# Patient Record
Sex: MALE | Race: WHITE | NOT HISPANIC OR LATINO | Employment: OTHER | URBAN - METROPOLITAN AREA
[De-identification: names, ages, dates, MRNs, and addresses within clinical notes are randomized per-mention and may not be internally consistent; named-entity substitution may affect disease eponyms.]

---

## 2018-11-10 PROCEDURE — 83036 HEMOGLOBIN GLYCOSYLATED A1C: CPT

## 2018-11-12 ENCOUNTER — LAB REQUISITION (OUTPATIENT)
Dept: LAB | Facility: HOSPITAL | Age: 78
End: 2018-11-12

## 2018-11-12 DIAGNOSIS — Z00.00 ROUTINE GENERAL MEDICAL EXAMINATION AT A HEALTH CARE FACILITY: ICD-10-CM

## 2018-11-12 LAB — HBA1C MFR BLD: 6 % (ref 4.8–5.6)

## 2019-08-20 ENCOUNTER — OFFICE (OUTPATIENT)
Dept: URBAN - METROPOLITAN AREA CLINIC 64 | Facility: CLINIC | Age: 79
End: 2019-08-20

## 2019-08-20 VITALS
HEART RATE: 60 BPM | DIASTOLIC BLOOD PRESSURE: 97 MMHG | SYSTOLIC BLOOD PRESSURE: 161 MMHG | HEIGHT: 70 IN | WEIGHT: 183 LBS

## 2019-08-20 DIAGNOSIS — R19.4 CHANGE IN BOWEL HABIT: ICD-10-CM

## 2019-08-20 DIAGNOSIS — K22.70 BARRETT'S ESOPHAGUS WITHOUT DYSPLASIA: ICD-10-CM

## 2019-08-20 DIAGNOSIS — R93.3 ABNORMAL FINDINGS ON DIAGNOSTIC IMAGING OF OTHER PARTS OF DI: ICD-10-CM

## 2019-08-20 DIAGNOSIS — R14.0 ABDOMINAL DISTENSION (GASEOUS): ICD-10-CM

## 2019-08-20 PROCEDURE — 99204 OFFICE O/P NEW MOD 45 MIN: CPT | Performed by: INTERNAL MEDICINE

## 2019-08-20 RX ORDER — POLYETHYLENE GLYCOL 3350 17 G/17G
POWDER, FOR SOLUTION ORAL
Qty: 1 | Refills: 3 | Status: COMPLETED
Start: 2019-08-20 | End: 2019-11-08

## 2019-10-15 RX ORDER — ESCITALOPRAM OXALATE 10 MG/1
10 TABLET ORAL DAILY
COMMUNITY

## 2019-10-15 RX ORDER — HYDRALAZINE HYDROCHLORIDE 25 MG/1
25 TABLET, FILM COATED ORAL 2 TIMES DAILY PRN
COMMUNITY
End: 2020-09-16

## 2019-10-15 RX ORDER — FLUTICASONE PROPIONATE 50 MCG
1 SPRAY, SUSPENSION (ML) NASAL
Status: ON HOLD | COMMUNITY
End: 2022-08-18

## 2019-10-15 RX ORDER — ROSUVASTATIN CALCIUM 20 MG/1
20 TABLET, COATED ORAL EVERY EVENING
COMMUNITY

## 2019-10-15 RX ORDER — PREDNISONE 1 MG/1
5 TABLET ORAL DAILY
COMMUNITY

## 2019-10-15 RX ORDER — PROMETHAZINE HYDROCHLORIDE 25 MG/1
25 TABLET ORAL EVERY 6 HOURS PRN
COMMUNITY
End: 2020-03-04 | Stop reason: SDUPTHER

## 2019-10-15 RX ORDER — TAMSULOSIN HYDROCHLORIDE 0.4 MG/1
1 CAPSULE ORAL NIGHTLY
COMMUNITY
End: 2020-05-14 | Stop reason: HOSPADM

## 2019-10-15 RX ORDER — BRIMONIDINE TARTRATE 2 MG/ML
1 SOLUTION/ DROPS OPHTHALMIC NIGHTLY
COMMUNITY
End: 2020-03-04 | Stop reason: SDUPTHER

## 2019-10-15 RX ORDER — ASPIRIN 81 MG/1
81 TABLET ORAL DAILY
COMMUNITY
End: 2020-03-04

## 2019-10-15 RX ORDER — LEVOTHYROXINE SODIUM 0.03 MG/1
25 TABLET ORAL
COMMUNITY

## 2019-10-15 RX ORDER — LORATADINE 10 MG/1
10 TABLET ORAL DAILY
Status: ON HOLD | COMMUNITY
End: 2022-08-18

## 2019-10-15 RX ORDER — GALANTAMINE HYDROBROMIDE 8 MG/1
8 CAPSULE, EXTENDED RELEASE ORAL 2 TIMES DAILY
COMMUNITY
End: 2021-04-26

## 2019-10-15 RX ORDER — METOPROLOL SUCCINATE 50 MG/1
50 TABLET, EXTENDED RELEASE ORAL NIGHTLY
COMMUNITY
End: 2020-03-04 | Stop reason: SDUPTHER

## 2019-10-15 RX ORDER — PANTOPRAZOLE SODIUM 40 MG/1
40 TABLET, DELAYED RELEASE ORAL 2 TIMES DAILY
Status: ON HOLD | COMMUNITY
End: 2022-08-18

## 2019-10-16 ENCOUNTER — ANESTHESIA EVENT (OUTPATIENT)
Dept: GASTROENTEROLOGY | Facility: HOSPITAL | Age: 79
End: 2019-10-16

## 2019-10-17 ENCOUNTER — HOSPITAL ENCOUNTER (OUTPATIENT)
Facility: HOSPITAL | Age: 79
Setting detail: HOSPITAL OUTPATIENT SURGERY
Discharge: HOME | End: 2019-10-17
Attending: INTERNAL MEDICINE | Admitting: INTERNAL MEDICINE

## 2019-10-17 ENCOUNTER — ANESTHESIA (OUTPATIENT)
Dept: GASTROENTEROLOGY | Facility: HOSPITAL | Age: 79
End: 2019-10-17

## 2019-10-17 ENCOUNTER — ON CAMPUS - OUTPATIENT (OUTPATIENT)
Dept: URBAN - METROPOLITAN AREA HOSPITAL 85 | Facility: HOSPITAL | Age: 79
End: 2019-10-17

## 2019-10-17 VITALS
SYSTOLIC BLOOD PRESSURE: 123 MMHG | DIASTOLIC BLOOD PRESSURE: 80 MMHG | HEIGHT: 70 IN | HEART RATE: 63 BPM | BODY MASS INDEX: 26.16 KG/M2 | WEIGHT: 182.76 LBS | RESPIRATION RATE: 14 BRPM | TEMPERATURE: 98.1 F | OXYGEN SATURATION: 96 %

## 2019-10-17 DIAGNOSIS — R93.89 ABNORMAL FINDING ON CT SCAN: ICD-10-CM

## 2019-10-17 DIAGNOSIS — K44.9 DIAPHRAGMATIC HERNIA WITHOUT OBSTRUCTION OR GANGRENE: ICD-10-CM

## 2019-10-17 DIAGNOSIS — R93.3 ABNORMAL FINDINGS ON DIAGNOSTIC IMAGING OF OTHER PARTS OF DI: ICD-10-CM

## 2019-10-17 DIAGNOSIS — K21.0 GASTRO-ESOPHAGEAL REFLUX DISEASE WITH ESOPHAGITIS: ICD-10-CM

## 2019-10-17 DIAGNOSIS — K22.70 BARRETT'S ESOPHAGUS WITHOUT DYSPLASIA: ICD-10-CM

## 2019-10-17 DIAGNOSIS — K22.10 ULCER OF ESOPHAGUS WITHOUT BLEEDING: ICD-10-CM

## 2019-10-17 PROCEDURE — 43239 EGD BIOPSY SINGLE/MULTIPLE: CPT | Performed by: INTERNAL MEDICINE

## 2019-10-17 PROCEDURE — 88305 TISSUE EXAM BY PATHOLOGIST: CPT | Performed by: INTERNAL MEDICINE

## 2019-10-17 PROCEDURE — 25010000002 PROPOFOL 10 MG/ML EMULSION: Performed by: ANESTHESIOLOGY

## 2019-10-17 PROCEDURE — 88312 SPECIAL STAINS GROUP 1: CPT | Performed by: INTERNAL MEDICINE

## 2019-10-17 RX ORDER — PROPOFOL 10 MG/ML
VIAL (ML) INTRAVENOUS AS NEEDED
Status: DISCONTINUED | OUTPATIENT
Start: 2019-10-17 | End: 2019-10-17 | Stop reason: SURG

## 2019-10-17 RX ORDER — FLUMAZENIL 0.1 MG/ML
0.2 INJECTION INTRAVENOUS AS NEEDED
Status: DISCONTINUED | OUTPATIENT
Start: 2019-10-17 | End: 2019-10-17 | Stop reason: HOSPADM

## 2019-10-17 RX ORDER — PROMETHAZINE HYDROCHLORIDE 25 MG/ML
6.25 INJECTION, SOLUTION INTRAMUSCULAR; INTRAVENOUS ONCE AS NEEDED
Status: DISCONTINUED | OUTPATIENT
Start: 2019-10-17 | End: 2019-10-17 | Stop reason: HOSPADM

## 2019-10-17 RX ORDER — SODIUM CHLORIDE 0.9 % (FLUSH) 0.9 %
3 SYRINGE (ML) INJECTION EVERY 12 HOURS SCHEDULED
Status: DISCONTINUED | OUTPATIENT
Start: 2019-10-17 | End: 2019-10-17 | Stop reason: HOSPADM

## 2019-10-17 RX ORDER — SODIUM CHLORIDE 9 MG/ML
9 INJECTION, SOLUTION INTRAVENOUS CONTINUOUS PRN
Status: DISCONTINUED | OUTPATIENT
Start: 2019-10-17 | End: 2019-10-17 | Stop reason: HOSPADM

## 2019-10-17 RX ORDER — PROMETHAZINE HYDROCHLORIDE 25 MG/1
25 SUPPOSITORY RECTAL ONCE AS NEEDED
Status: DISCONTINUED | OUTPATIENT
Start: 2019-10-17 | End: 2019-10-17 | Stop reason: HOSPADM

## 2019-10-17 RX ORDER — LABETALOL HYDROCHLORIDE 5 MG/ML
5 INJECTION, SOLUTION INTRAVENOUS
Status: DISCONTINUED | OUTPATIENT
Start: 2019-10-17 | End: 2019-10-17 | Stop reason: HOSPADM

## 2019-10-17 RX ORDER — MEPERIDINE HYDROCHLORIDE 25 MG/ML
12.5 INJECTION INTRAMUSCULAR; INTRAVENOUS; SUBCUTANEOUS
Status: DISCONTINUED | OUTPATIENT
Start: 2019-10-17 | End: 2019-10-17 | Stop reason: HOSPADM

## 2019-10-17 RX ORDER — LIDOCAINE HYDROCHLORIDE 10 MG/ML
INJECTION, SOLUTION EPIDURAL; INFILTRATION; INTRACAUDAL; PERINEURAL AS NEEDED
Status: DISCONTINUED | OUTPATIENT
Start: 2019-10-17 | End: 2019-10-17 | Stop reason: SURG

## 2019-10-17 RX ORDER — ACETAMINOPHEN 650 MG/1
650 SUPPOSITORY RECTAL ONCE AS NEEDED
Status: DISCONTINUED | OUTPATIENT
Start: 2019-10-17 | End: 2019-10-17 | Stop reason: HOSPADM

## 2019-10-17 RX ORDER — NALOXONE HCL 0.4 MG/ML
0.4 VIAL (ML) INJECTION AS NEEDED
Status: DISCONTINUED | OUTPATIENT
Start: 2019-10-17 | End: 2019-10-17 | Stop reason: HOSPADM

## 2019-10-17 RX ORDER — SODIUM CHLORIDE 0.9 % (FLUSH) 0.9 %
10 SYRINGE (ML) INJECTION AS NEEDED
Status: DISCONTINUED | OUTPATIENT
Start: 2019-10-17 | End: 2019-10-17 | Stop reason: HOSPADM

## 2019-10-17 RX ORDER — ACETAMINOPHEN 325 MG/1
650 TABLET ORAL ONCE AS NEEDED
Status: DISCONTINUED | OUTPATIENT
Start: 2019-10-17 | End: 2019-10-17 | Stop reason: HOSPADM

## 2019-10-17 RX ORDER — ONDANSETRON 2 MG/ML
4 INJECTION INTRAMUSCULAR; INTRAVENOUS ONCE AS NEEDED
Status: DISCONTINUED | OUTPATIENT
Start: 2019-10-17 | End: 2019-10-17 | Stop reason: HOSPADM

## 2019-10-17 RX ORDER — ONDANSETRON 2 MG/ML
4 INJECTION INTRAMUSCULAR; INTRAVENOUS ONCE AS NEEDED
Status: DISCONTINUED | OUTPATIENT
Start: 2019-10-17 | End: 2019-10-17

## 2019-10-17 RX ORDER — PHENYLEPHRINE HCL IN 0.9% NACL 0.5 MG/5ML
.5-3 SYRINGE (ML) INTRAVENOUS
Status: DISCONTINUED | OUTPATIENT
Start: 2019-10-17 | End: 2019-10-17 | Stop reason: HOSPADM

## 2019-10-17 RX ORDER — HYDRALAZINE HYDROCHLORIDE 20 MG/ML
5 INJECTION INTRAMUSCULAR; INTRAVENOUS
Status: DISCONTINUED | OUTPATIENT
Start: 2019-10-17 | End: 2019-10-17 | Stop reason: HOSPADM

## 2019-10-17 RX ORDER — PROMETHAZINE HYDROCHLORIDE 25 MG/1
25 TABLET ORAL ONCE AS NEEDED
Status: DISCONTINUED | OUTPATIENT
Start: 2019-10-17 | End: 2019-10-17 | Stop reason: HOSPADM

## 2019-10-17 RX ORDER — SODIUM CHLORIDE 0.9 % (FLUSH) 0.9 %
3-10 SYRINGE (ML) INJECTION AS NEEDED
Status: DISCONTINUED | OUTPATIENT
Start: 2019-10-17 | End: 2019-10-17 | Stop reason: HOSPADM

## 2019-10-17 RX ADMIN — SODIUM CHLORIDE 9 ML/HR: 0.9 INJECTION, SOLUTION INTRAVENOUS at 10:18

## 2019-10-17 RX ADMIN — PROPOFOL 50 MG: 10 INJECTION, EMULSION INTRAVENOUS at 10:25

## 2019-10-17 RX ADMIN — PROPOFOL 50 MG: 10 INJECTION, EMULSION INTRAVENOUS at 10:20

## 2019-10-17 RX ADMIN — LIDOCAINE HYDROCHLORIDE 50 MG: 10 INJECTION, SOLUTION EPIDURAL; INFILTRATION; INTRACAUDAL; PERINEURAL at 10:20

## 2019-10-17 RX ADMIN — PROPOFOL 50 MG: 10 INJECTION, EMULSION INTRAVENOUS at 10:31

## 2019-10-17 NOTE — H&P
Patient Care Team:  Maude Ricketts APRN as PCP - General      Subjective .     History of present illness:    Jayden Bond is a 79 y.o. male who presents today for Procedure(s):  ESOPHAGOGASTRODUODENOSCOPY for the indications listed below.     The updated Patient Profile was reviewed prior to the procedure, in conjunction with the Physical Exam, including medical conditions, surgical procedures, medications, allergies, family history and social history.     Pre-operatively, I reviewed the indication(s) for the procedure, the risks of the procedure [including but not limited to: unexpected bleeding possibly requiring hospitalization and/or unplanned repeat procedures, perforation possibly requiring surgical treatment, missed lesions and complications of sedation/MAC (also explained by anesthesia staff)].     I have evaluated the patient for risks associated with the planned anesthesia and the procedure to be performed and find the patient an acceptable candidate for IV sedation.    Multiple opportunities were provided for any questions or concerns, and all questions were answered satisfactorily before any anesthesia was administered. We will proceed with the planned procedure.      ASSESSMENT/PLAN:  egd      Past Medical History:  Past Medical History:   Diagnosis Date   • Coronary artery disease     [facility]   • Dementia (CMS/HCC)    • Depression    • Disease of thyroid gland    • GERD (gastroesophageal reflux disease)    • Head pain    • Hyperlipidemia    • Hypertension    • Stroke (CMS/HCC)     x 3 in 2018       Past Surgical History:  Past Surgical History:   Procedure Laterality Date   • APPENDECTOMY     • CHOLECYSTECTOMY     • HERNIA REPAIR     • PACEMAKER IMPLANTATION     • TEMPORAL ARTERY BIOPSY     • WRIST SURGERY      severed artery       Social History:  Social History     Tobacco Use   • Smoking status: Former Smoker   • Smokeless tobacco: Never Used   • Tobacco comment: quit 25 yrs ago   Substance Use  Topics   • Alcohol use: No     Frequency: Never   • Drug use: No       Family History:  History reviewed. No pertinent family history.    Medications:  Medications Prior to Admission   Medication Sig Dispense Refill Last Dose   • apixaban (ELIQUIS) 5 MG tablet tablet Take 5 mg by mouth 2 (Two) Times a Day.      • aspirin 81 MG EC tablet Take 81 mg by mouth Daily.      • brimonidine (ALPHAGAN) 0.2 % ophthalmic solution Administer 1 drop to both eyes Every Night.      • escitalopram (LEXAPRO) 10 MG tablet Take 10 mg by mouth Daily.      • fluticasone (FLONASE) 50 MCG/ACT nasal spray 2 sprays into the nostril(s) as directed by provider Daily.      • galantamine ER (RAZADYNE ER) 8 MG 24 hr capsule Take 8 mg by mouth 2 (Two) Times a Day.      • hydrALAZINE (APRESOLINE) 50 MG tablet Take 50 mg by mouth 2 (Two) Times a Day.      • levothyroxine (SYNTHROID, LEVOTHROID) 25 MCG tablet Take 25 mcg by mouth Daily.      • loratadine (CLARITIN) 10 MG tablet Take 10 mg by mouth Daily.      • metoprolol succinate XL (TOPROL-XL) 50 MG 24 hr tablet Take 50 mg by mouth Every Night.      • pantoprazole (PROTONIX) 40 MG EC tablet Take 40 mg by mouth Daily.      • predniSONE (DELTASONE) 5 MG tablet Take 5 mg by mouth Daily.      • promethazine (PHENERGAN) 25 MG tablet Take 25 mg by mouth Every 6 (Six) Hours As Needed for Nausea or Vomiting.      • rosuvastatin (CRESTOR) 20 MG tablet Take 20 mg by mouth Every Night.      • tamsulosin (FLOMAX) 0.4 MG capsule 24 hr capsule Take 1 capsule by mouth Every Night.          Scheduled Meds:  sodium chloride 3 mL Intravenous Q12H     Continuous Infusions:   PRN Meds:.•  sodium chloride    ALLERGIES:  Doxycycline        Objective     Vital Signs:        Physical Exam:      General Appearance:    Awake and alert, in no acute distress   Lungs:     Clear to auscultation bilaterally, respirations regular, even and unlabored    Heart:    Regular rhythm and normal rate, normal S1 and S2, no             murmur, no gallop, no rub   Abdomen:     Normal bowel sounds, soft, non-tender, no rebound or guarding, non-distended, no hepatosplenomegaly        Results Review:   I reviewed the patient's labs and imaging.  Lab Results (last 24 hours)     ** No results found for the last 24 hours. **          Imaging Results (last 24 hours)     ** No results found for the last 24 hours. **             I discussed the patients findings and my recommendations with the patient.  Ashok Sanchez MD  10/17/19  9:27 AM

## 2019-10-17 NOTE — ANESTHESIA POSTPROCEDURE EVALUATION
Patient: Jayden Bond    Procedure Summary     Date:  10/17/19 Room / Location:  [facility] ENDOSCOPY 1 / [facility] ENDOSCOPY    Anesthesia Start:  1012 Anesthesia Stop:  1040    Procedure:  ESOPHAGOGASTRODUODENOSCOPY with biopsy x 2 areas (N/A ) Diagnosis:  (BARRETTS ESOPHAGUS, CHANGE IN BOWEL HABITS, BLOATING)    Surgeon:  Ashok Sanchez MD Provider:  Shira Hung MD    Anesthesia Type:  MAC ASA Status:  3          Anesthesia Type: MAC  Last vitals  BP   (!) 184/99 (10/17/19 0946)   Temp   98.1 °F (36.7 °C) (10/17/19 0946)   Pulse   61 (10/17/19 0946)   Resp   14 (10/17/19 0946)     SpO2         Post Anesthesia Care and Evaluation    Patient location during evaluation: PACU  Patient participation: complete - patient participated  Level of consciousness: awake  Pain management: adequate  Airway patency: patent  Anesthetic complications: No anesthetic complications  PONV Status: controlled  Cardiovascular status: acceptable  Respiratory status: acceptable  Hydration status: acceptable  Post Neuraxial Block status: Motor and sensory function returned to baseline

## 2019-10-17 NOTE — OP NOTE
ESOPHAGOGASTRODUODENOSCOPY Procedure Report    Patient Name:  Jayden Bond  YOB: 1940    Date of Surgery:  10/17/2019     Pre-Op Diagnosis:  BARRETTS ESOPHAGUS, CHANGE IN BOWEL HABITS, BLOATING         Procedure/CPT® Codes:      Procedure(s):  ESOPHAGOGASTRODUODENOSCOPY with biopsy x 2 areas    Staff:  Surgeon(s):  Ashok Sanchez MD      Anesthesia: Monitored Anesthesia Care    Description of Procedure:  A description of the procedure as well as risks, benefits and alternative methods were explained to the patient who voiced understanding and signed the corresponding consent form. A physical exam was performed and vital signs were monitored throughout the procedure.    An upper GI endoscope was placed into the mouth and proceeded through the esophagus, stomach and second portion of the duodenum without difficulty. The scope was then retroflexed and the fundus was visualized. The procedure was not difficult and there were no immediate complications.  From 42-37 hiatal hernia was noticed, at the GE junction there was a close to 8 mm ulcer which was biopsied.  Very short segment of Rapp's was noticed less than 1 cm which was biopsied separately.  Rest of the esophageal mucosa looks normal.  Gastric mucosa looks normal fundus body antrum area except there is a slight deformity with erosion in the antral region.  There was no obvious ulcer noticed in the pyloric channel.  First and the second part of the duodenum also looks normal without any ulceration.  Subsequently, side-viewing ERCP scope was advanced to evaluate ampulla given CT findings.  No obvious mass was seen around the ampulla.  No other mass was seen in the second part of the duodenum with a side-viewing scope as well.  Patient tolerated procedure very well no immediate complication was noticed.    Impression:  1.  No obvious duodenal mass.  No obvious ampullary mass noticed with a side-viewing scope.  2.  Short segment of Rapp's was  biopsied.  3.  Small ulcer was seen at the GE junction area that was separately biopsied.    Recommendations:  Follow the biopsy result.  Patient will be placed on a PPI p.o. daily.  Follow the GI clinic as scheduled.  Continue with the fiber supplement.  Follow up with GI clinic as needed  Follow up with PCP as scheduled  Follow up with biopsy report      Ashok Sanchez MD     Date: 10/17/2019    Time: 10:39 AM

## 2019-10-17 NOTE — ANESTHESIA PREPROCEDURE EVALUATION
Anesthesia Evaluation     Patient summary reviewed and Nursing notes reviewed   no history of anesthetic complications:  NPO Solid Status: > 8 hours  NPO Liquid Status: > 8 hours           Airway   Mallampati: II  TM distance: >3 FB  Neck ROM: full  No difficulty expected  Dental      Pulmonary - negative pulmonary ROS    breath sounds clear to auscultation  Cardiovascular     ECG reviewed  Rhythm: regular  Rate: normal    (+) pacemaker pacemaker, hypertension 2 medications or greater, CAD, hyperlipidemia,       Neuro/Psych  (+) CVA, psychiatric history Depression, dementia,     GI/Hepatic/Renal/Endo    (+)  GERD poorly controlled,  hypothyroidism,     Musculoskeletal (-) negative ROS    Abdominal     Abdomen: soft.   Substance History - negative use     OB/GYN negative ob/gyn ROS         Other - negative ROS                       Anesthesia Plan    ASA 3     MAC     intravenous induction   Anesthetic plan, all risks, benefits, and alternatives have been provided, discussed and informed consent has been obtained with: patient.

## 2019-10-18 LAB
LAB AP CASE REPORT: NORMAL
LAB AP CLINICAL INFORMATION: NORMAL
LAB AP DIAGNOSIS COMMENT: NORMAL
PATH REPORT.FINAL DX SPEC: NORMAL
PATH REPORT.GROSS SPEC: NORMAL

## 2019-11-08 ENCOUNTER — OFFICE (OUTPATIENT)
Dept: URBAN - METROPOLITAN AREA CLINIC 64 | Facility: CLINIC | Age: 79
End: 2019-11-08

## 2019-11-08 VITALS
DIASTOLIC BLOOD PRESSURE: 83 MMHG | HEART RATE: 64 BPM | SYSTOLIC BLOOD PRESSURE: 126 MMHG | WEIGHT: 184 LBS | HEIGHT: 70 IN

## 2019-11-08 DIAGNOSIS — R14.0 ABDOMINAL DISTENSION (GASEOUS): ICD-10-CM

## 2019-11-08 DIAGNOSIS — K21.9 GASTRO-ESOPHAGEAL REFLUX DISEASE WITHOUT ESOPHAGITIS: ICD-10-CM

## 2019-11-08 DIAGNOSIS — R53.83 OTHER FATIGUE: ICD-10-CM

## 2019-11-08 DIAGNOSIS — I10 ESSENTIAL (PRIMARY) HYPERTENSION: ICD-10-CM

## 2019-11-08 PROCEDURE — 99214 OFFICE O/P EST MOD 30 MIN: CPT | Performed by: INTERNAL MEDICINE

## 2020-02-03 ENCOUNTER — APPOINTMENT (OUTPATIENT)
Dept: GENERAL RADIOLOGY | Facility: HOSPITAL | Age: 80
End: 2020-02-03

## 2020-02-03 ENCOUNTER — HOSPITAL ENCOUNTER (EMERGENCY)
Facility: HOSPITAL | Age: 80
End: 2020-02-03

## 2020-02-03 ENCOUNTER — HOSPITAL ENCOUNTER (OUTPATIENT)
Facility: HOSPITAL | Age: 80
Setting detail: OBSERVATION
Discharge: HOME | End: 2020-02-04
Attending: FAMILY MEDICINE | Admitting: FAMILY MEDICINE

## 2020-02-03 DIAGNOSIS — R07.9 CHEST PAIN, UNSPECIFIED TYPE: Primary | ICD-10-CM

## 2020-02-03 LAB
ALBUMIN SERPL-MCNC: 4.1 G/DL (ref 3.5–5.2)
ALBUMIN/GLOB SERPL: 1.4 G/DL
ALP SERPL-CCNC: 75 U/L (ref 39–117)
ALT SERPL W P-5'-P-CCNC: 17 U/L (ref 1–41)
ANION GAP SERPL CALCULATED.3IONS-SCNC: 11 MMOL/L (ref 5–15)
ANION GAP SERPL CALCULATED.3IONS-SCNC: 12 MMOL/L (ref 5–15)
AST SERPL-CCNC: 22 U/L (ref 1–40)
BASOPHILS # BLD AUTO: 0.1 10*3/MM3 (ref 0–0.2)
BASOPHILS NFR BLD AUTO: 0.5 % (ref 0–1.5)
BILIRUB SERPL-MCNC: 0.4 MG/DL (ref 0.2–1.2)
BUN BLD-MCNC: 11 MG/DL (ref 8–23)
BUN BLD-MCNC: 12 MG/DL (ref 8–23)
BUN/CREAT SERPL: 10.5 (ref 7–25)
BUN/CREAT SERPL: 8.9 (ref 7–25)
CALCIUM SPEC-SCNC: 8.9 MG/DL (ref 8.6–10.5)
CALCIUM SPEC-SCNC: 9.5 MG/DL (ref 8.6–10.5)
CHLORIDE SERPL-SCNC: 103 MMOL/L (ref 98–107)
CHLORIDE SERPL-SCNC: 105 MMOL/L (ref 98–107)
CHOLEST SERPL-MCNC: 103 MG/DL (ref 0–200)
CO2 SERPL-SCNC: 26 MMOL/L (ref 22–29)
CO2 SERPL-SCNC: 27 MMOL/L (ref 22–29)
CREAT BLD-MCNC: 1.14 MG/DL (ref 0.76–1.27)
CREAT BLD-MCNC: 1.24 MG/DL (ref 0.76–1.27)
D DIMER PPP FEU-MCNC: 0.27 MCGFEU/ML (ref 0.17–0.59)
DEPRECATED RDW RBC AUTO: 44.2 FL (ref 37–54)
EOSINOPHIL # BLD AUTO: 0.1 10*3/MM3 (ref 0–0.4)
EOSINOPHIL NFR BLD AUTO: 1.1 % (ref 0.3–6.2)
ERYTHROCYTE [DISTWIDTH] IN BLOOD BY AUTOMATED COUNT: 14 % (ref 12.3–15.4)
GFR SERPL CREATININE-BSD FRML MDRD: 56 ML/MIN/1.73
GFR SERPL CREATININE-BSD FRML MDRD: 62 ML/MIN/1.73
GLOBULIN UR ELPH-MCNC: 2.9 GM/DL
GLUCOSE BLD-MCNC: 129 MG/DL (ref 65–99)
GLUCOSE BLD-MCNC: 86 MG/DL (ref 65–99)
HCT VFR BLD AUTO: 42.5 % (ref 37.5–51)
HDLC SERPL-MCNC: 46 MG/DL (ref 40–60)
HGB BLD-MCNC: 14.5 G/DL (ref 13–17.7)
LDLC SERPL CALC-MCNC: 26 MG/DL (ref 0–100)
LDLC/HDLC SERPL: 0.57 {RATIO}
LIPASE SERPL-CCNC: 37 U/L (ref 13–60)
LYMPHOCYTES # BLD AUTO: 1.4 10*3/MM3 (ref 0.7–3.1)
LYMPHOCYTES NFR BLD AUTO: 14.7 % (ref 19.6–45.3)
MCH RBC QN AUTO: 30.4 PG (ref 26.6–33)
MCHC RBC AUTO-ENTMCNC: 34.1 G/DL (ref 31.5–35.7)
MCV RBC AUTO: 89.2 FL (ref 79–97)
MONOCYTES # BLD AUTO: 0.7 10*3/MM3 (ref 0.1–0.9)
MONOCYTES NFR BLD AUTO: 7.5 % (ref 5–12)
NEUTROPHILS # BLD AUTO: 7.2 10*3/MM3 (ref 1.7–7)
NEUTROPHILS NFR BLD AUTO: 76.2 % (ref 42.7–76)
NRBC BLD AUTO-RTO: 0 /100 WBC (ref 0–0.2)
NT-PROBNP SERPL-MCNC: 166.2 PG/ML (ref 5–1800)
NT-PROBNP SERPL-MCNC: 172.9 PG/ML (ref 5–1800)
PLATELET # BLD AUTO: 268 10*3/MM3 (ref 140–450)
PMV BLD AUTO: 7.9 FL (ref 6–12)
POTASSIUM BLD-SCNC: 4.1 MMOL/L (ref 3.5–5.2)
POTASSIUM BLD-SCNC: 4.1 MMOL/L (ref 3.5–5.2)
PROCALCITONIN SERPL-MCNC: 0.06 NG/ML (ref 0.1–0.25)
PROT SERPL-MCNC: 7 G/DL (ref 6–8.5)
RBC # BLD AUTO: 4.76 10*6/MM3 (ref 4.14–5.8)
SODIUM BLD-SCNC: 142 MMOL/L (ref 136–145)
SODIUM BLD-SCNC: 142 MMOL/L (ref 136–145)
TRIGL SERPL-MCNC: 154 MG/DL (ref 0–150)
TROPONIN T SERPL-MCNC: <0.01 NG/ML (ref 0–0.03)
TROPONIN T SERPL-MCNC: <0.01 NG/ML (ref 0–0.03)
VLDLC SERPL-MCNC: 30.8 MG/DL
WBC NRBC COR # BLD: 9.5 10*3/MM3 (ref 3.4–10.8)

## 2020-02-03 PROCEDURE — 36415 COLL VENOUS BLD VENIPUNCTURE: CPT

## 2020-02-03 PROCEDURE — 85379 FIBRIN DEGRADATION QUANT: CPT | Performed by: FAMILY MEDICINE

## 2020-02-03 PROCEDURE — 80053 COMPREHEN METABOLIC PANEL: CPT | Performed by: PHYSICIAN ASSISTANT

## 2020-02-03 PROCEDURE — 83880 ASSAY OF NATRIURETIC PEPTIDE: CPT | Performed by: PHYSICIAN ASSISTANT

## 2020-02-03 PROCEDURE — G0378 HOSPITAL OBSERVATION PER HR: HCPCS

## 2020-02-03 PROCEDURE — 83880 ASSAY OF NATRIURETIC PEPTIDE: CPT | Performed by: FAMILY MEDICINE

## 2020-02-03 PROCEDURE — 71046 X-RAY EXAM CHEST 2 VIEWS: CPT

## 2020-02-03 PROCEDURE — 84484 ASSAY OF TROPONIN QUANT: CPT | Performed by: FAMILY MEDICINE

## 2020-02-03 PROCEDURE — 93005 ELECTROCARDIOGRAM TRACING: CPT

## 2020-02-03 PROCEDURE — 84145 PROCALCITONIN (PCT): CPT | Performed by: FAMILY MEDICINE

## 2020-02-03 PROCEDURE — 99284 EMERGENCY DEPT VISIT MOD MDM: CPT

## 2020-02-03 PROCEDURE — 84484 ASSAY OF TROPONIN QUANT: CPT | Performed by: PHYSICIAN ASSISTANT

## 2020-02-03 PROCEDURE — 80048 BASIC METABOLIC PNL TOTAL CA: CPT | Performed by: FAMILY MEDICINE

## 2020-02-03 PROCEDURE — 83690 ASSAY OF LIPASE: CPT | Performed by: PHYSICIAN ASSISTANT

## 2020-02-03 PROCEDURE — 93005 ELECTROCARDIOGRAM TRACING: CPT | Performed by: FAMILY MEDICINE

## 2020-02-03 PROCEDURE — 80061 LIPID PANEL: CPT | Performed by: FAMILY MEDICINE

## 2020-02-03 PROCEDURE — 85025 COMPLETE CBC W/AUTO DIFF WBC: CPT | Performed by: PHYSICIAN ASSISTANT

## 2020-02-03 RX ORDER — LEVOTHYROXINE SODIUM 0.03 MG/1
25 TABLET ORAL
COMMUNITY
End: 2020-03-04 | Stop reason: SDUPTHER

## 2020-02-03 RX ORDER — CLOPIDOGREL BISULFATE 75 MG/1
75 TABLET ORAL DAILY
Status: ON HOLD | COMMUNITY
End: 2020-02-04

## 2020-02-03 RX ORDER — CALCIUM CARBONATE 200(500)MG
2 TABLET,CHEWABLE ORAL 2 TIMES DAILY PRN
Status: DISCONTINUED | OUTPATIENT
Start: 2020-02-03 | End: 2020-02-04 | Stop reason: HOSPADM

## 2020-02-03 RX ORDER — MULTIPLE VITAMINS W/ MINERALS TAB 9MG-400MCG
1 TAB ORAL NIGHTLY
COMMUNITY

## 2020-02-03 RX ORDER — ESCITALOPRAM OXALATE 10 MG/1
10 TABLET ORAL DAILY
COMMUNITY
End: 2020-03-04 | Stop reason: SDUPTHER

## 2020-02-03 RX ORDER — PROMETHAZINE HYDROCHLORIDE 25 MG/1
25 TABLET ORAL EVERY 6 HOURS PRN
COMMUNITY
End: 2020-03-06 | Stop reason: HOSPADM

## 2020-02-03 RX ORDER — SODIUM CHLORIDE 0.9 % (FLUSH) 0.9 %
10 SYRINGE (ML) INJECTION AS NEEDED
Status: DISCONTINUED | OUTPATIENT
Start: 2020-02-03 | End: 2020-02-04 | Stop reason: HOSPADM

## 2020-02-03 RX ORDER — NITROGLYCERIN 0.4 MG/1
0.4 TABLET SUBLINGUAL
COMMUNITY

## 2020-02-03 RX ORDER — PREDNISONE 1 MG/1
5 TABLET ORAL DAILY
COMMUNITY
End: 2020-03-04 | Stop reason: SDUPTHER

## 2020-02-03 RX ORDER — FLUTICASONE PROPIONATE 50 MCG
1 SPRAY, SUSPENSION (ML) NASAL DAILY
COMMUNITY
End: 2020-03-04 | Stop reason: SDUPTHER

## 2020-02-03 RX ORDER — BRIMONIDINE TARTRATE 2 MG/ML
1 SOLUTION/ DROPS OPHTHALMIC 2 TIMES DAILY
Status: ON HOLD | COMMUNITY
End: 2020-02-04

## 2020-02-03 RX ORDER — ACETAMINOPHEN 325 MG/1
650 TABLET ORAL EVERY 4 HOURS PRN
Status: DISCONTINUED | OUTPATIENT
Start: 2020-02-03 | End: 2020-02-04 | Stop reason: HOSPADM

## 2020-02-03 RX ORDER — LORATADINE 10 MG/1
1 CAPSULE, LIQUID FILLED ORAL DAILY
COMMUNITY
End: 2020-03-04 | Stop reason: SDUPTHER

## 2020-02-03 RX ORDER — CHOLECALCIFEROL (VITAMIN D3) 125 MCG
5 CAPSULE ORAL NIGHTLY PRN
Status: DISCONTINUED | OUTPATIENT
Start: 2020-02-03 | End: 2020-02-04 | Stop reason: HOSPADM

## 2020-02-03 RX ORDER — PANTOPRAZOLE SODIUM 40 MG/1
40 TABLET, DELAYED RELEASE ORAL 2 TIMES DAILY
Status: ON HOLD | COMMUNITY
End: 2020-02-04

## 2020-02-03 RX ORDER — HEPARIN SODIUM 5000 [USP'U]/ML
5000 INJECTION, SOLUTION INTRAVENOUS; SUBCUTANEOUS EVERY 12 HOURS SCHEDULED
Status: DISCONTINUED | OUTPATIENT
Start: 2020-02-04 | End: 2020-02-04

## 2020-02-03 RX ORDER — TAMSULOSIN HYDROCHLORIDE 0.4 MG/1
1 CAPSULE ORAL NIGHTLY
Status: ON HOLD | COMMUNITY
End: 2020-02-04

## 2020-02-03 RX ORDER — TRAMADOL HYDROCHLORIDE 50 MG/1
50 TABLET ORAL EVERY 6 HOURS PRN
Status: DISCONTINUED | OUTPATIENT
Start: 2020-02-03 | End: 2020-02-04 | Stop reason: HOSPADM

## 2020-02-03 RX ORDER — ROSUVASTATIN CALCIUM 20 MG/1
20 TABLET, COATED ORAL NIGHTLY
Status: ON HOLD | COMMUNITY
End: 2020-02-04

## 2020-02-03 RX ORDER — ACETAMINOPHEN 160 MG/5ML
650 SOLUTION ORAL EVERY 4 HOURS PRN
Status: DISCONTINUED | OUTPATIENT
Start: 2020-02-03 | End: 2020-02-04 | Stop reason: HOSPADM

## 2020-02-03 RX ORDER — HYDRALAZINE HYDROCHLORIDE 50 MG/1
50 TABLET, FILM COATED ORAL 2 TIMES DAILY
Status: ON HOLD | COMMUNITY
End: 2020-02-04

## 2020-02-03 RX ORDER — ASPIRIN 81 MG/1
324 TABLET, CHEWABLE ORAL ONCE
Status: COMPLETED | OUTPATIENT
Start: 2020-02-03 | End: 2020-02-03

## 2020-02-03 RX ORDER — GALANTAMINE HYDROBROMIDE 8 MG/1
8 CAPSULE, EXTENDED RELEASE ORAL
COMMUNITY
End: 2020-03-04 | Stop reason: SDUPTHER

## 2020-02-03 RX ORDER — CHLORAL HYDRATE 500 MG
1000 CAPSULE ORAL
COMMUNITY
End: 2021-09-14

## 2020-02-03 RX ORDER — ACETAMINOPHEN 650 MG/1
650 SUPPOSITORY RECTAL EVERY 4 HOURS PRN
Status: DISCONTINUED | OUTPATIENT
Start: 2020-02-03 | End: 2020-02-04 | Stop reason: HOSPADM

## 2020-02-03 RX ORDER — BISACODYL 10 MG
10 SUPPOSITORY, RECTAL RECTAL DAILY PRN
Status: DISCONTINUED | OUTPATIENT
Start: 2020-02-03 | End: 2020-02-04 | Stop reason: HOSPADM

## 2020-02-03 RX ORDER — LANOLIN ALCOHOL/MO/W.PET/CERES
2000 CREAM (GRAM) TOPICAL DAILY
COMMUNITY
End: 2021-10-22

## 2020-02-03 RX ADMIN — ASPIRIN 81 MG 324 MG: 81 TABLET ORAL at 18:32

## 2020-02-03 NOTE — ED PROVIDER NOTES
Subjective   History:  Patient is a 79-year-old male who presents to the ER with chest pain for the last week.  He reports he went to [facility] because he felt like something had moved in his chest a previous stent or his pacemaker.  He was told at [place] that his pacemaker was out of the pocket.  He is been trying to get a hold of his cardiologist at [facility] without success.      Onset: at least 1 week  Location: chest  Duration: constant  Character: dull ache  Aggravating/Alleviating factors: None  Radiation None  Severity: moderate            Review of Systems   Constitutional: Negative for fatigue and fever.   HENT: Negative for congestion.    Respiratory: Negative for cough and shortness of breath.    Cardiovascular: Positive for chest pain. Negative for palpitations and leg swelling.   Gastrointestinal: Negative for abdominal pain, nausea and vomiting.   Genitourinary: Negative.    Musculoskeletal: Negative.    Skin: Negative.    Psychiatric/Behavioral: Negative.        No past medical history on file.    No Known Allergies    No past surgical history on file.    No family history on file.    Social History     Socioeconomic History   • Marital status:      Spouse name: Not on file   • Number of children: Not on file   • Years of education: Not on file   • Highest education level: Not on file           Objective   Physical Exam   Constitutional: He is oriented to person, place, and time. He appears well-developed and well-nourished.   HENT:   Head: Normocephalic and atraumatic.   Eyes: Pupils are equal, round, and reactive to light.   Neck: Normal range of motion.   Cardiovascular: Normal rate and regular rhythm.   No increase in pain with palpation of chest wall   Pulmonary/Chest: Effort normal and breath sounds normal. He has no decreased breath sounds.   Musculoskeletal: Normal range of motion.   Neurological: He is alert and oriented to person, place, and time.   Skin: Skin is warm and  dry.   Psychiatric: He has a normal mood and affect. His behavior is normal.       Procedures           ED Course  ED Course as of Feb 03 2001   Mon Feb 03, 2020   1821 ECG interpreted by Dr. Narvaez and reviewed by myself: Sinus rhythm with PVCs    [MG]      ED Course User Index  [MG] Rehana Momin CHRIS LEIVA          Xr Chest 2 View    Result Date: 2/3/2020  1. Left chest wall pacemaker with leads in expected position. 2. Basilar predominant interstitial thickening likely related to chronic lung disease. Bandlike atelectasis or scarring within the left lower lobe.  Electronically Signed By-Elliott Saeed On:2/3/2020 7:34 PM This report was finalized on [id number] by  Elliott Saeed, .      Labs Reviewed   COMPREHENSIVE METABOLIC PANEL - Abnormal; Notable for the following components:       Result Value    Glucose 129 (*)     eGFR Non  Amer 56 (*)     All other components within normal limits    Narrative:     GFR Normal >60  Chronic Kidney Disease <60  Kidney Failure <15     CBC WITH AUTO DIFFERENTIAL - Abnormal; Notable for the following components:    Neutrophil % 76.2 (*)     Lymphocyte % 14.7 (*)     Neutrophils, Absolute 7.20 (*)     All other components within normal limits   LIPASE - Normal   BNP (IN-HOUSE) - Normal    Narrative:     Among patients with dyspnea, NT-proBNP is highly sensitive for the detection of acute congestive heart failure. In addition NT-proBNP of <300 pg/ml effectively rules out acute congestive heart failure with 99% negative predictive value.    Results may be falsely decreased if patient taking Biotin.     TROPONIN (IN-HOUSE) - Normal    Narrative:     Troponin T Reference Range:  <= 0.03 ng/mL-   Negative for AMI  >0.03 ng/mL-     Abnormal for myocardial necrosis.  Clinicians would have to utilize clinical acumen, EKG, Troponin and serial changes to determine if it is an Acute Myocardial Infarction or myocardial injury due to an underlying chronic condition.       Results  may be falsely decreased if patient taking Biotin.     URINALYSIS W/ CULTURE IF INDICATED   CBC AND DIFFERENTIAL    Narrative:     The following orders were created for panel order CBC & Differential.  Procedure                               Abnormality         Status                     ---------                               -----------         ------                     CBC Auto Differential[[id number]]        Abnormal            Final result                 Please view results for these tests on the individual orders.     Medications   sodium chloride 0.9 % flush 10 mL (has no administration in time range)   aspirin chewable tablet 324 mg (324 mg Oral Given 2/3/20 1832)                                           MDM  Number of Diagnoses or Management Options  Chest pain, unspecified type:   Diagnosis management comments: DISPOSITION:   Chart Review:  Comorbidity:  has no past medical history on file.  Differentials:this list is not all inclusive and does not constitute the entirety of considered causes --> pacemaker displacement  ECG: interpreted by ER physician and reviewed by myself: Sinus Rhythm with PVCs  Labs: Lab work fairly unremarkable cardiac etiology,    Imaging: Was interpreted by physician and reviewed by myself:  Xr Chest 2 View    Result Date: 2/3/2020  1. Left chest wall pacemaker with leads in expected position. 2. Basilar predominant interstitial thickening likely related to chronic lung disease. Bandlike atelectasis or scarring within the left lower lobe.  Electronically Signed By-Elliott Saeed On:2/3/2020 7:34 PM This report was finalized on [id number] by  Elliott Saeed, .      Disposition/Treatment:  Patient is a 79-year-old male who presents to the ER with chest pain over the last week he was seen at [facility] for chest pain in the ER he did not get admitted.  He reports that he is no better.  They try to call the cardiologist at [facility] but report they were unable to get  through.  Patient was admitted to Dr. Brown he is a [facility] patient for chest pain rule out he was stable and in agreement with plan.  Cardiology consult was pended.  Spoke with Dr. Narvaez who agrees with plan.       Amount and/or Complexity of Data Reviewed  Clinical lab tests: reviewed  Tests in the radiology section of CPT®: reviewed  Tests in the medicine section of CPT®: reviewed    Patient Progress  Patient progress: stable      Final diagnoses:   Chest pain, unspecified type            Rehana Momin, CHRIS  02/03/20 2005

## 2020-02-04 ENCOUNTER — APPOINTMENT (OUTPATIENT)
Dept: NUCLEAR MEDICINE | Facility: HOSPITAL | Age: 80
End: 2020-02-04

## 2020-02-04 ENCOUNTER — APPOINTMENT (OUTPATIENT)
Dept: CARDIOLOGY | Facility: HOSPITAL | Age: 80
End: 2020-02-04

## 2020-02-04 VITALS
HEART RATE: 63 BPM | BODY MASS INDEX: 26.05 KG/M2 | RESPIRATION RATE: 16 BRPM | SYSTOLIC BLOOD PRESSURE: 135 MMHG | HEIGHT: 70 IN | WEIGHT: 182 LBS | DIASTOLIC BLOOD PRESSURE: 67 MMHG | TEMPERATURE: 97.5 F | OXYGEN SATURATION: 94 %

## 2020-02-04 LAB
ANION GAP SERPL CALCULATED.3IONS-SCNC: 10 MMOL/L (ref 5–15)
BASOPHILS # BLD AUTO: 0.1 10*3/MM3 (ref 0–0.2)
BASOPHILS NFR BLD AUTO: 0.5 % (ref 0–1.5)
BH CV ECHO MEAS - ACS: 2.1 CM
BH CV ECHO MEAS - AO MAX PG (FULL): 1.1 MMHG
BH CV ECHO MEAS - AO MAX PG: 4.2 MMHG
BH CV ECHO MEAS - AO MEAN PG (FULL): 0.87 MMHG
BH CV ECHO MEAS - AO MEAN PG: 2.6 MMHG
BH CV ECHO MEAS - AO ROOT AREA (BSA CORRECTED): 2
BH CV ECHO MEAS - AO ROOT AREA: 12.1 CM^2
BH CV ECHO MEAS - AO ROOT DIAM: 3.9 CM
BH CV ECHO MEAS - AO V2 MAX: 102.7 CM/SEC
BH CV ECHO MEAS - AO V2 MEAN: 77.7 CM/SEC
BH CV ECHO MEAS - AO V2 VTI: 23.7 CM
BH CV ECHO MEAS - AORTIC HR: 60.4 BPM
BH CV ECHO MEAS - AORTIC R-R: 0.99 SEC
BH CV ECHO MEAS - ASC AORTA: 3.2 CM
BH CV ECHO MEAS - AVA(I,A): 4 CM^2
BH CV ECHO MEAS - AVA(I,D): 4 CM^2
BH CV ECHO MEAS - AVA(V,A): 3.8 CM^2
BH CV ECHO MEAS - AVA(V,D): 3.8 CM^2
BH CV ECHO MEAS - BSA(HAYCOCK): 2 M^2
BH CV ECHO MEAS - BSA: 2 M^2
BH CV ECHO MEAS - BZI_BMI: 26.1 KILOGRAMS/M^2
BH CV ECHO MEAS - BZI_METRIC_HEIGHT: 177.8 CM
BH CV ECHO MEAS - BZI_METRIC_WEIGHT: 82.6 KG
BH CV ECHO MEAS - CI(AO): 8.6 L/MIN/M^2
BH CV ECHO MEAS - CI(LVOT): 2.9 L/MIN/M^2
BH CV ECHO MEAS - CO(AO): 17.3 L/MIN
BH CV ECHO MEAS - CO(LVOT): 5.8 L/MIN
BH CV ECHO MEAS - EDV(CUBED): 38.3 ML
BH CV ECHO MEAS - EDV(MOD-SP2): 35.2 ML
BH CV ECHO MEAS - EDV(MOD-SP4): 50.6 ML
BH CV ECHO MEAS - EDV(TEICH): 46.4 ML
BH CV ECHO MEAS - EF(CUBED): 51 %
BH CV ECHO MEAS - EF(MOD-BP): 64 %
BH CV ECHO MEAS - EF(MOD-SP2): 64.2 %
BH CV ECHO MEAS - EF(MOD-SP4): 51.6 %
BH CV ECHO MEAS - EF(TEICH): 44.1 %
BH CV ECHO MEAS - ESV(CUBED): 18.8 ML
BH CV ECHO MEAS - ESV(MOD-SP2): 12.6 ML
BH CV ECHO MEAS - ESV(MOD-SP4): 24.5 ML
BH CV ECHO MEAS - ESV(TEICH): 26 ML
BH CV ECHO MEAS - FS: 21.2 %
BH CV ECHO MEAS - IVS/LVPW: 1
BH CV ECHO MEAS - IVSD: 1.2 CM
BH CV ECHO MEAS - LA DIMENSION(2D): 2.9 CM
BH CV ECHO MEAS - LV DIASTOLIC VOL/BSA (35-75): 25.2 ML/M^2
BH CV ECHO MEAS - LV MASS(C)D: 135.2 GRAMS
BH CV ECHO MEAS - LV MASS(C)DI: 67.4 GRAMS/M^2
BH CV ECHO MEAS - LV MAX PG: 3.1 MMHG
BH CV ECHO MEAS - LV MEAN PG: 1.7 MMHG
BH CV ECHO MEAS - LV SYSTOLIC VOL/BSA (12-30): 12.2 ML/M^2
BH CV ECHO MEAS - LV V1 MAX: 88.7 CM/SEC
BH CV ECHO MEAS - LV V1 MEAN: 60.3 CM/SEC
BH CV ECHO MEAS - LV V1 VTI: 21.8 CM
BH CV ECHO MEAS - LVIDD: 3.4 CM
BH CV ECHO MEAS - LVIDS: 2.7 CM
BH CV ECHO MEAS - LVOT AREA: 4.4 CM^2
BH CV ECHO MEAS - LVOT DIAM: 2.4 CM
BH CV ECHO MEAS - LVPWD: 1.2 CM
BH CV ECHO MEAS - MV A MAX VEL: 107.2 CM/SEC
BH CV ECHO MEAS - MV DEC SLOPE: 120 CM/SEC^2
BH CV ECHO MEAS - MV DEC TIME: 0.5 SEC
BH CV ECHO MEAS - MV E MAX VEL: 59.5 CM/SEC
BH CV ECHO MEAS - MV E/A: 0.55
BH CV ECHO MEAS - MV MAX PG: 4.4 MMHG
BH CV ECHO MEAS - MV MEAN PG: 1.7 MMHG
BH CV ECHO MEAS - MV V2 MAX: 105.1 CM/SEC
BH CV ECHO MEAS - MV V2 MEAN: 60.5 CM/SEC
BH CV ECHO MEAS - MV V2 VTI: 27.2 CM
BH CV ECHO MEAS - MVA(VTI): 3.5 CM^2
BH CV ECHO MEAS - PA ACC TIME: 0.14 SEC
BH CV ECHO MEAS - PA MAX PG (FULL): 1 MMHG
BH CV ECHO MEAS - PA MAX PG: 1.5 MMHG
BH CV ECHO MEAS - PA MEAN PG (FULL): 0.55 MMHG
BH CV ECHO MEAS - PA MEAN PG: 0.84 MMHG
BH CV ECHO MEAS - PA PR(ACCEL): 13.9 MMHG
BH CV ECHO MEAS - PA V2 MAX: 60.3 CM/SEC
BH CV ECHO MEAS - PA V2 MEAN: 44.1 CM/SEC
BH CV ECHO MEAS - PA V2 VTI: 12.4 CM
BH CV ECHO MEAS - PULM DIAS VEL: 46.1 CM/SEC
BH CV ECHO MEAS - PULM S/D: 1.1
BH CV ECHO MEAS - PULM SYS VEL: 49.5 CM/SEC
BH CV ECHO MEAS - PVA(I,A): 3.8 CM^2
BH CV ECHO MEAS - PVA(I,D): 3.8 CM^2
BH CV ECHO MEAS - PVA(V,A): 2.9 CM^2
BH CV ECHO MEAS - PVA(V,D): 2.9 CM^2
BH CV ECHO MEAS - QP/QS: 0.5
BH CV ECHO MEAS - RAP SYSTOLE: 8 MMHG
BH CV ECHO MEAS - RV MAX PG: 0.45 MMHG
BH CV ECHO MEAS - RV MEAN PG: 0.29 MMHG
BH CV ECHO MEAS - RV V1 MAX: 33.7 CM/SEC
BH CV ECHO MEAS - RV V1 MEAN: 26.1 CM/SEC
BH CV ECHO MEAS - RV V1 VTI: 9 CM
BH CV ECHO MEAS - RVDD: 3.1 CM
BH CV ECHO MEAS - RVOT AREA: 5.3 CM^2
BH CV ECHO MEAS - RVOT DIAM: 2.6 CM
BH CV ECHO MEAS - RVSP: 38.5 MMHG
BH CV ECHO MEAS - SI(AO): 142.9 ML/M^2
BH CV ECHO MEAS - SI(CUBED): 9.7 ML/M^2
BH CV ECHO MEAS - SI(LVOT): 47.6 ML/M^2
BH CV ECHO MEAS - SI(MOD-SP2): 11.3 ML/M^2
BH CV ECHO MEAS - SI(MOD-SP4): 13 ML/M^2
BH CV ECHO MEAS - SI(TEICH): 10.2 ML/M^2
BH CV ECHO MEAS - SV(AO): 286.5 ML
BH CV ECHO MEAS - SV(CUBED): 19.5 ML
BH CV ECHO MEAS - SV(LVOT): 95.4 ML
BH CV ECHO MEAS - SV(MOD-SP2): 22.6 ML
BH CV ECHO MEAS - SV(MOD-SP4): 26.1 ML
BH CV ECHO MEAS - SV(RVOT): 47.5 ML
BH CV ECHO MEAS - SV(TEICH): 20.5 ML
BH CV ECHO MEAS - TR MAX VEL: 275.6 CM/SEC
BH CV NUCLEAR PRIOR STUDY: 3
BH CV STRESS BP STAGE 1: NORMAL
BH CV STRESS BP STAGE 2: NORMAL
BH CV STRESS COMMENTS STAGE 1: NORMAL
BH CV STRESS COMMENTS STAGE 2: NORMAL
BH CV STRESS DOSE REGADENOSON STAGE 1: 0.4
BH CV STRESS DURATION MIN STAGE 1: 0
BH CV STRESS DURATION MIN STAGE 2: 4
BH CV STRESS DURATION SEC STAGE 1: 10
BH CV STRESS DURATION SEC STAGE 2: 0
BH CV STRESS HR STAGE 1: 62
BH CV STRESS HR STAGE 2: 71
BH CV STRESS PROTOCOL 1: NORMAL
BH CV STRESS RECOVERY BP: NORMAL MMHG
BH CV STRESS RECOVERY HR: 68 BPM
BH CV STRESS STAGE 1: 1
BH CV STRESS STAGE 2: 2
BILIRUB UR QL STRIP: NEGATIVE
BUN BLD-MCNC: 11 MG/DL (ref 8–23)
BUN/CREAT SERPL: 10.4 (ref 7–25)
CALCIUM SPEC-SCNC: 8.5 MG/DL (ref 8.6–10.5)
CHLORIDE SERPL-SCNC: 105 MMOL/L (ref 98–107)
CLARITY UR: CLEAR
CO2 SERPL-SCNC: 26 MMOL/L (ref 22–29)
COLOR UR: YELLOW
CREAT BLD-MCNC: 1.06 MG/DL (ref 0.76–1.27)
DEPRECATED RDW RBC AUTO: 42.9 FL (ref 37–54)
EOSINOPHIL # BLD AUTO: 0.2 10*3/MM3 (ref 0–0.4)
EOSINOPHIL NFR BLD AUTO: 2.4 % (ref 0.3–6.2)
ERYTHROCYTE [DISTWIDTH] IN BLOOD BY AUTOMATED COUNT: 13.7 % (ref 12.3–15.4)
GFR SERPL CREATININE-BSD FRML MDRD: 67 ML/MIN/1.73
GLUCOSE BLD-MCNC: 112 MG/DL (ref 65–99)
GLUCOSE UR STRIP-MCNC: NEGATIVE MG/DL
HCT VFR BLD AUTO: 37.3 % (ref 37.5–51)
HGB BLD-MCNC: 12.9 G/DL (ref 13–17.7)
HGB UR QL STRIP.AUTO: NEGATIVE
KETONES UR QL STRIP: NEGATIVE
LEUKOCYTE ESTERASE UR QL STRIP.AUTO: NEGATIVE
LV EF NUC BP: 66 %
LYMPHOCYTES # BLD AUTO: 2 10*3/MM3 (ref 0.7–3.1)
LYMPHOCYTES NFR BLD AUTO: 19.7 % (ref 19.6–45.3)
MAGNESIUM SERPL-MCNC: 2 MG/DL (ref 1.6–2.4)
MAXIMAL PREDICTED HEART RATE: 141 BPM
MCH RBC QN AUTO: 30.4 PG (ref 26.6–33)
MCHC RBC AUTO-ENTMCNC: 34.5 G/DL (ref 31.5–35.7)
MCV RBC AUTO: 88.1 FL (ref 79–97)
MONOCYTES # BLD AUTO: 0.8 10*3/MM3 (ref 0.1–0.9)
MONOCYTES NFR BLD AUTO: 8.3 % (ref 5–12)
NEUTROPHILS # BLD AUTO: 7.1 10*3/MM3 (ref 1.7–7)
NEUTROPHILS NFR BLD AUTO: 69.1 % (ref 42.7–76)
NITRITE UR QL STRIP: NEGATIVE
NRBC BLD AUTO-RTO: 0 /100 WBC (ref 0–0.2)
PERCENT MAX PREDICTED HR: 50.35 %
PH UR STRIP.AUTO: 6.5 [PH] (ref 5–8)
PLATELET # BLD AUTO: 235 10*3/MM3 (ref 140–450)
PMV BLD AUTO: 7.9 FL (ref 6–12)
POTASSIUM BLD-SCNC: 3.7 MMOL/L (ref 3.5–5.2)
PROT UR QL STRIP: NEGATIVE
RBC # BLD AUTO: 4.23 10*6/MM3 (ref 4.14–5.8)
SODIUM BLD-SCNC: 141 MMOL/L (ref 136–145)
SP GR UR STRIP: 1.02 (ref 1–1.03)
STRESS BASELINE BP: NORMAL MMHG
STRESS BASELINE HR: 65 BPM
STRESS PERCENT HR: 59 %
STRESS POST PEAK BP: NORMAL MMHG
STRESS POST PEAK HR: 71 BPM
STRESS TARGET HR: 120 BPM
TROPONIN T SERPL-MCNC: <0.01 NG/ML (ref 0–0.03)
TROPONIN T SERPL-MCNC: <0.01 NG/ML (ref 0–0.03)
UROBILINOGEN UR QL STRIP: NORMAL
WBC NRBC COR # BLD: 10.2 10*3/MM3 (ref 3.4–10.8)

## 2020-02-04 PROCEDURE — 78452 HT MUSCLE IMAGE SPECT MULT: CPT | Performed by: INTERNAL MEDICINE

## 2020-02-04 PROCEDURE — 99204 OFFICE O/P NEW MOD 45 MIN: CPT | Performed by: INTERNAL MEDICINE

## 2020-02-04 PROCEDURE — 93017 CV STRESS TEST TRACING ONLY: CPT

## 2020-02-04 PROCEDURE — A9500 TC99M SESTAMIBI: HCPCS | Performed by: FAMILY MEDICINE

## 2020-02-04 PROCEDURE — 84484 ASSAY OF TROPONIN QUANT: CPT | Performed by: FAMILY MEDICINE

## 2020-02-04 PROCEDURE — 63710000001 PREDNISONE PER 5 MG: Performed by: FAMILY MEDICINE

## 2020-02-04 PROCEDURE — 81003 URINALYSIS AUTO W/O SCOPE: CPT | Performed by: FAMILY MEDICINE

## 2020-02-04 PROCEDURE — 85025 COMPLETE CBC W/AUTO DIFF WBC: CPT | Performed by: FAMILY MEDICINE

## 2020-02-04 PROCEDURE — 93306 TTE W/DOPPLER COMPLETE: CPT

## 2020-02-04 PROCEDURE — 78452 HT MUSCLE IMAGE SPECT MULT: CPT

## 2020-02-04 PROCEDURE — 25010000002 REGADENOSON 0.4 MG/5ML SOLUTION: Performed by: FAMILY MEDICINE

## 2020-02-04 PROCEDURE — G0378 HOSPITAL OBSERVATION PER HR: HCPCS

## 2020-02-04 PROCEDURE — 83735 ASSAY OF MAGNESIUM: CPT | Performed by: FAMILY MEDICINE

## 2020-02-04 PROCEDURE — 93016 CV STRESS TEST SUPVJ ONLY: CPT | Performed by: NURSE PRACTITIONER

## 2020-02-04 PROCEDURE — 93306 TTE W/DOPPLER COMPLETE: CPT | Performed by: INTERNAL MEDICINE

## 2020-02-04 PROCEDURE — 0 TECHNETIUM SESTAMIBI: Performed by: FAMILY MEDICINE

## 2020-02-04 PROCEDURE — 93018 CV STRESS TEST I&R ONLY: CPT | Performed by: INTERNAL MEDICINE

## 2020-02-04 PROCEDURE — 80048 BASIC METABOLIC PNL TOTAL CA: CPT | Performed by: FAMILY MEDICINE

## 2020-02-04 RX ORDER — LANOLIN ALCOHOL/MO/W.PET/CERES
1000 CREAM (GRAM) TOPICAL NIGHTLY
Status: DISCONTINUED | OUTPATIENT
Start: 2020-02-04 | End: 2020-02-04 | Stop reason: HOSPADM

## 2020-02-04 RX ORDER — HYDRALAZINE HYDROCHLORIDE 25 MG/1
50 TABLET, FILM COATED ORAL EVERY 12 HOURS
Status: DISCONTINUED | OUTPATIENT
Start: 2020-02-04 | End: 2020-02-04 | Stop reason: HOSPADM

## 2020-02-04 RX ORDER — LEVOTHYROXINE SODIUM 0.03 MG/1
25 TABLET ORAL
Status: DISCONTINUED | OUTPATIENT
Start: 2020-02-04 | End: 2020-02-04

## 2020-02-04 RX ORDER — ISOSORBIDE MONONITRATE 30 MG/1
30 TABLET, EXTENDED RELEASE ORAL DAILY
Qty: 30 TABLET | Refills: 0 | Status: SHIPPED | OUTPATIENT
Start: 2020-02-04 | End: 2021-10-22

## 2020-02-04 RX ORDER — LANOLIN ALCOHOL/MO/W.PET/CERES
1000 CREAM (GRAM) TOPICAL DAILY
Status: DISCONTINUED | OUTPATIENT
Start: 2020-02-04 | End: 2020-02-04 | Stop reason: HOSPADM

## 2020-02-04 RX ORDER — FLUTICASONE PROPIONATE 50 MCG
1 SPRAY, SUSPENSION (ML) NASAL DAILY
Status: DISCONTINUED | OUTPATIENT
Start: 2020-02-04 | End: 2020-02-04 | Stop reason: HOSPADM

## 2020-02-04 RX ORDER — NITROGLYCERIN 0.4 MG/1
0.4 TABLET SUBLINGUAL
Status: DISCONTINUED | OUTPATIENT
Start: 2020-02-04 | End: 2020-02-04 | Stop reason: HOSPADM

## 2020-02-04 RX ORDER — TAMSULOSIN HYDROCHLORIDE 0.4 MG/1
0.4 CAPSULE ORAL NIGHTLY
Qty: 30 CAPSULE | Refills: 0 | Status: SHIPPED | OUTPATIENT
Start: 2020-02-04 | End: 2020-03-04 | Stop reason: SDUPTHER

## 2020-02-04 RX ORDER — LEVOTHYROXINE SODIUM 0.03 MG/1
25 TABLET ORAL
Status: DISCONTINUED | OUTPATIENT
Start: 2020-02-04 | End: 2020-02-04 | Stop reason: HOSPADM

## 2020-02-04 RX ORDER — ROSUVASTATIN CALCIUM 10 MG/1
20 TABLET, COATED ORAL NIGHTLY
Status: DISCONTINUED | OUTPATIENT
Start: 2020-02-04 | End: 2020-02-04 | Stop reason: HOSPADM

## 2020-02-04 RX ORDER — TAMSULOSIN HYDROCHLORIDE 0.4 MG/1
0.4 CAPSULE ORAL DAILY
Status: DISCONTINUED | OUTPATIENT
Start: 2020-02-04 | End: 2020-02-04

## 2020-02-04 RX ORDER — ROSUVASTATIN CALCIUM 20 MG/1
20 TABLET, COATED ORAL NIGHTLY
Qty: 30 TABLET | Refills: 0 | Status: SHIPPED | OUTPATIENT
Start: 2020-02-04 | End: 2020-03-04 | Stop reason: SDUPTHER

## 2020-02-04 RX ORDER — PREDNISONE 1 MG/1
5 TABLET ORAL DAILY
Status: DISCONTINUED | OUTPATIENT
Start: 2020-02-04 | End: 2020-02-04 | Stop reason: HOSPADM

## 2020-02-04 RX ORDER — MULTIVITAMIN,THERAPEUTIC
1 TABLET ORAL DAILY
Status: DISCONTINUED | OUTPATIENT
Start: 2020-02-04 | End: 2020-02-04 | Stop reason: HOSPADM

## 2020-02-04 RX ORDER — PANTOPRAZOLE SODIUM 40 MG/1
40 TABLET, DELAYED RELEASE ORAL NIGHTLY
Status: DISCONTINUED | OUTPATIENT
Start: 2020-02-04 | End: 2020-02-04 | Stop reason: HOSPADM

## 2020-02-04 RX ORDER — TAMSULOSIN HYDROCHLORIDE 0.4 MG/1
0.4 CAPSULE ORAL NIGHTLY
Status: DISCONTINUED | OUTPATIENT
Start: 2020-02-04 | End: 2020-02-04 | Stop reason: HOSPADM

## 2020-02-04 RX ORDER — BRIMONIDINE TARTRATE 2 MG/ML
1 SOLUTION/ DROPS OPHTHALMIC 2 TIMES DAILY
Qty: 10 ML | Refills: 12 | Status: SHIPPED | OUTPATIENT
Start: 2020-02-04

## 2020-02-04 RX ORDER — PROMETHAZINE HYDROCHLORIDE 25 MG/1
25 TABLET ORAL EVERY 6 HOURS PRN
Status: DISCONTINUED | OUTPATIENT
Start: 2020-02-04 | End: 2020-02-04 | Stop reason: HOSPADM

## 2020-02-04 RX ORDER — PANTOPRAZOLE SODIUM 40 MG/1
40 TABLET, DELAYED RELEASE ORAL NIGHTLY
Qty: 60 TABLET | Refills: 0 | Status: SHIPPED | OUTPATIENT
Start: 2020-02-04 | End: 2020-03-04 | Stop reason: SDUPTHER

## 2020-02-04 RX ORDER — BRIMONIDINE TARTRATE 2 MG/ML
1 SOLUTION/ DROPS OPHTHALMIC 2 TIMES DAILY
Status: DISCONTINUED | OUTPATIENT
Start: 2020-02-04 | End: 2020-02-04 | Stop reason: HOSPADM

## 2020-02-04 RX ORDER — CETIRIZINE HYDROCHLORIDE 10 MG/1
10 TABLET ORAL DAILY
Status: DISCONTINUED | OUTPATIENT
Start: 2020-02-04 | End: 2020-02-04 | Stop reason: HOSPADM

## 2020-02-04 RX ORDER — GALANTAMINE HYDROBROMIDE 8 MG/1
8 CAPSULE, EXTENDED RELEASE ORAL
Status: DISCONTINUED | OUTPATIENT
Start: 2020-02-04 | End: 2020-02-04

## 2020-02-04 RX ORDER — ESCITALOPRAM OXALATE 10 MG/1
10 TABLET ORAL DAILY
Status: DISCONTINUED | OUTPATIENT
Start: 2020-02-04 | End: 2020-02-04 | Stop reason: HOSPADM

## 2020-02-04 RX ORDER — HYDRALAZINE HYDROCHLORIDE 50 MG/1
50 TABLET, FILM COATED ORAL EVERY 12 HOURS
Qty: 60 TABLET | Refills: 0 | Status: SHIPPED | OUTPATIENT
Start: 2020-02-05 | End: 2020-03-04 | Stop reason: SDUPTHER

## 2020-02-04 RX ORDER — GALANTAMINE HYDROBROMIDE 4 MG/1
8 TABLET, FILM COATED ORAL 2 TIMES DAILY WITH MEALS
Status: DISCONTINUED | OUTPATIENT
Start: 2020-02-04 | End: 2020-02-04 | Stop reason: HOSPADM

## 2020-02-04 RX ADMIN — BRIMONIDINE TARTRATE 1 DROP: 2 SOLUTION OPHTHALMIC at 08:30

## 2020-02-04 RX ADMIN — METOPROLOL TARTRATE 25 MG: 25 TABLET ORAL at 00:53

## 2020-02-04 RX ADMIN — HYDRALAZINE HYDROCHLORIDE 50 MG: 25 TABLET, FILM COATED ORAL at 00:53

## 2020-02-04 RX ADMIN — CYANOCOBALAMIN TAB 1000 MCG 1000 MCG: 1000 TAB at 08:29

## 2020-02-04 RX ADMIN — APIXABAN 5 MG: 5 TABLET, FILM COATED ORAL at 00:53

## 2020-02-04 RX ADMIN — PREDNISONE 5 MG: 5 TABLET ORAL at 08:29

## 2020-02-04 RX ADMIN — TECHNETIUM TC 99M SESTAMIBI 1 DOSE: 1 INJECTION INTRAVENOUS at 12:20

## 2020-02-04 RX ADMIN — ROSUVASTATIN CALCIUM 20 MG: 10 TABLET, FILM COATED ORAL at 00:53

## 2020-02-04 RX ADMIN — CETIRIZINE HYDROCHLORIDE 10 MG: 10 TABLET, FILM COATED ORAL at 08:29

## 2020-02-04 RX ADMIN — FLUTICASONE PROPIONATE 1 SPRAY: 50 SPRAY, METERED NASAL at 11:19

## 2020-02-04 RX ADMIN — LEVOTHYROXINE SODIUM 25 MCG: 25 TABLET ORAL at 07:18

## 2020-02-04 RX ADMIN — GALANTAMINE 8 MG: 4 TABLET, FILM COATED ORAL at 11:19

## 2020-02-04 RX ADMIN — CYANOCOBALAMIN TAB 1000 MCG 1000 MCG: 1000 TAB at 00:53

## 2020-02-04 RX ADMIN — BRIMONIDINE TARTRATE 1 DROP: 2 SOLUTION OPHTHALMIC at 01:43

## 2020-02-04 RX ADMIN — PANTOPRAZOLE SODIUM 40 MG: 40 TABLET, DELAYED RELEASE ORAL at 00:54

## 2020-02-04 RX ADMIN — TECHNETIUM TC 99M SESTAMIBI 1 DOSE: 1 INJECTION INTRAVENOUS at 10:45

## 2020-02-04 RX ADMIN — HYDRALAZINE HYDROCHLORIDE 50 MG: 25 TABLET, FILM COATED ORAL at 14:03

## 2020-02-04 RX ADMIN — ESCITALOPRAM OXALATE 10 MG: 10 TABLET ORAL at 08:29

## 2020-02-04 RX ADMIN — TAMSULOSIN HYDROCHLORIDE 0.4 MG: 0.4 CAPSULE ORAL at 00:52

## 2020-02-04 RX ADMIN — REGADENOSON 0.4 MG: 0.08 INJECTION, SOLUTION INTRAVENOUS at 12:20

## 2020-02-04 NOTE — ED NOTES
Pt given urine specimen cup but unable to provide sample at this time.      Kelley Daniel  02/03/20 6649

## 2020-02-04 NOTE — PLAN OF CARE
Problem: Patient Care Overview  Goal: Plan of Care Review  Outcome: Ongoing (interventions implemented as appropriate)  Flowsheets (Taken 2/4/2020 1304)  Progress: improving  Plan of Care Reviewed With: patient; spouse  Outcome Summary: Pt having a myoview today and if normal will be discharged later today  Goal: Individualization and Mutuality  Outcome: Ongoing (interventions implemented as appropriate)  Goal: Discharge Needs Assessment  Outcome: Ongoing (interventions implemented as appropriate)  Goal: Interprofessional Rounds/Family Conf  Outcome: Ongoing (interventions implemented as appropriate)     Problem: Skin Injury Risk (Adult)  Goal: Identify Related Risk Factors and Signs and Symptoms  Outcome: Ongoing (interventions implemented as appropriate)  Goal: Skin Health and Integrity  Outcome: Ongoing (interventions implemented as appropriate)     Problem: Cardiac: ACS (Acute Coronary Syndrome) (Adult)  Goal: Signs and Symptoms of Listed Potential Problems Will be Absent, Minimized or Managed (Cardiac: ACS)  Outcome: Ongoing (interventions implemented as appropriate)

## 2020-02-04 NOTE — DISCHARGE SUMMARY
Date of Discharge:  2/4/2020    Discharge Diagnosis:  Chest pain, history coronary artery disease    Presenting Problem/History of Present Illness:   The patient presented with chest wall pain. He felt that perhaps his pacemaker AICD had been dislodged. He could not get into his usual cardiologist at [facility]. He came to the hospital was subsequently admitted. The patient was also complaining of some slight shortness of breath. With his history, he underwent stress Myoview under direction of Dr. Garcia. Fortunately this was negative. The patient did not have any EKG changes and his serial troponins were all negative. He was discharged back to home in stable condition. He will follow up with his usual cardiologist as scheduled at his earliest convenience.      Active Hospital Problems    Diagnosis  POA   • Chest pain [R07.9]  Yes      Resolved Hospital Problems   No resolved problems to display.         Condition at discharge: Stable  Hospital Course   see above      Procedures Performed         Consults:   Consults     Date and Time Order Name Status Description    2/3/2020 2003 Cardiology (on-call MD unless specified) Completed     2/3/2020 2000 Family Medicine Consult Completed     2/3/2020 1940 Hospitalist (on-call MD unless specified) Completed           Pertinent Test Results:Xr Chest 2 View    Result Date: 2/3/2020  1. Left chest wall pacemaker with leads in expected position. 2. Basilar predominant interstitial thickening likely related to chronic lung disease. Bandlike atelectasis or scarring within the left lower lobe.  Electronically Signed By-Elliott Saeed On:2/3/2020 7:34 PM This report was finalized on [id number] by  Elliott Saeed, .      Imaging Results (Last 7 Days)     Procedure Component Value Units Date/Time    XR Chest 2 View [[id number]] Collected:  02/03/20 1932     Updated:  02/03/20 1936    Narrative:       EXAMINATION: XR CHEST 2 VW-     DATE OF EXAM: 2/3/2020 6:58 PM      INDICATION: Chest pain with concern of pacemaker moved out of pocket.      COMPARISON: None available     TECHNIQUE: PA and Lateral views of the chest were obtained.     FINDINGS:  There is a left chest wall pacemaker with leads terminating over the  right atrium and right ventricle. The leads appear contiguous without  evidence of disconnection. The cardiomediastinal silhouette is within  normal limits. There are coarsened basilar predominant interstitial  markings likely related to chronic lung disease. There is bandlike  atelectasis or scarring within the left lower lobe. There is no pleural  effusion or pneumothorax. Multilevel degenerative changes of the  thoracic spine.       Impression:       1. Left chest wall pacemaker with leads in expected position.  2. Basilar predominant interstitial thickening likely related to chronic  lung disease. Bandlike atelectasis or scarring within the left lower  lobe.     Electronically Signed By-Elliott Saeed On:2/3/2020 7:34 PM  This report was finalized on [id number] by  Elliott Saeed, .              Condition on Discharge:  Fair    Vital Signs  Temp:  [97.5 °F (36.4 °C)-98 °F (36.7 °C)] 97.5 °F (36.4 °C)  Heart Rate:  [59-69] 63  Resp:  [15-20] 16  BP: (127-175)/(67-95) 135/67    Physical Exam:      please refer to physical exam done date of admission February 4, 2020                                                                  Discharge Disposition  Home or Self Care    Discharge Medications     Discharge Medications      New Medications      Instructions Start Date   apixaban 5 MG tablet tablet  Commonly known as:  ELIQUIS   5 mg, Oral, Every 12 Hours Scheduled      brimonidine 0.2 % ophthalmic solution  Commonly known as:  ALPHAGAN   1 drop, Both Eyes, 2 Times Daily      hydrALAZINE 50 MG tablet  Commonly known as:  APRESOLINE   50 mg, Oral, Every 12 Hours   Start Date:  February 5, 2020     isosorbide mononitrate 30 MG 24 hr tablet  Commonly known as:   IMDUR   30 mg, Oral, Daily      metoprolol tartrate 25 MG tablet  Commonly known as:  LOPRESSOR   25 mg, Oral, Every 12 Hours Scheduled      pantoprazole 40 MG EC tablet  Commonly known as:  PROTONIX   40 mg, Oral, Nightly      rosuvastatin 20 MG tablet  Commonly known as:  CRESTOR   20 mg, Oral, Nightly      tamsulosin 0.4 MG capsule 24 hr capsule  Commonly known as:  FLOMAX   0.4 mg, Oral, Nightly         Continue These Medications      Instructions Start Date   escitalopram 10 MG tablet  Commonly known as:  LEXAPRO   10 mg, Oral, Daily      fish oil 1000 MG capsule capsule   1,000 mg, Oral, Daily With Breakfast      fluticasone 50 MCG/ACT nasal spray  Commonly known as:  FLONASE   1 spray, Nasal, Daily      galantamine ER 8 MG 24 hr capsule  Commonly known as:  RAZADYNE ER   8 mg, Oral, Daily With Breakfast & Dinner      levothyroxine 25 MCG tablet  Commonly known as:  SYNTHROID, LEVOTHROID   25 mcg, Oral, Every Morning Before Breakfast, 1 hour before breakfast       Loratadine 10 MG capsule   1 tablet, Oral, Daily      multivitamin with minerals tablet tablet   1 tablet, Oral, Nightly      nitroglycerin 0.4 MG SL tablet  Commonly known as:  NITROSTAT   0.4 mg, Sublingual, Every 5 Minutes PRN, Take no more than 3 doses in 15 minutes.       predniSONE 5 MG tablet  Commonly known as:  DELTASONE   5 mg, Oral, Daily      promethazine 25 MG tablet  Commonly known as:  PHENERGAN   25 mg, Oral, Every 6 Hours PRN      vitamin B-12 1000 MCG tablet  Commonly known as:  CYANOCOBALAMIN   1,000 mcg, Oral, Daily             Discharge Diet:   Diet Instructions     Diet: Cardiac      Discharge Diet:  Cardiac          Activity at Discharge:   Activity Instructions     Activity as Tolerated            Follow-up AppointmentsNo future appointments.      Test Results Pending at Discharge:   none       Romelia Cornejo DO  02/04/20  5:24 PM

## 2020-02-04 NOTE — NURSING NOTE
Dr Harris paged regarding his am med and that pt wife was concerned about the myoview and didn't want to have it done , the wife voiced she was told by his other cardiologist' if he had another one it could kill him',dr harris informed of this. She stated she was on her way in

## 2020-02-04 NOTE — NURSING NOTE
Wife explained she stays with  him at all times because the pt tends to get scared and confused at night and monitors his meds strictly and watches his care.

## 2020-02-04 NOTE — PLAN OF CARE
Problem: Patient Care Overview  Goal: Plan of Care Review  Outcome: Ongoing (interventions implemented as appropriate)  Flowsheets  Taken 2/4/2020 0333  Progress: no change  Taken 2/3/2020 2300  Plan of Care Reviewed With: spouse  Note:   Poss stress test this am. Cardiology consult, telemetry, meds,   Goal: Individualization and Mutuality  Outcome: Ongoing (interventions implemented as appropriate)  Goal: Discharge Needs Assessment  Outcome: Ongoing (interventions implemented as appropriate)  Goal: Interprofessional Rounds/Family Conf  Outcome: Ongoing (interventions implemented as appropriate)     Problem: Skin Injury Risk (Adult)  Goal: Identify Related Risk Factors and Signs and Symptoms  Outcome: Ongoing (interventions implemented as appropriate)  Goal: Skin Health and Integrity  Outcome: Ongoing (interventions implemented as appropriate)     Problem: Cardiac: ACS (Acute Coronary Syndrome) (Adult)  Goal: Signs and Symptoms of Listed Potential Problems Will be Absent, Minimized or Managed (Cardiac: ACS)  Outcome: Ongoing (interventions implemented as appropriate)

## 2020-02-04 NOTE — H&P
"        History and Physical      Patient Care Team:  Maude Ricketts APRN as PCP - General (Nurse Practitioner)    Chief complaint left-sided chest wall pain, concern about pacemaker being \"out-of-pocket\", occasional shortness of breath    Subjective     Patient is a 79 y.o. male presents with 1 week of left-sided chest wall pain.  He was convinced that his pacemaker had moved.  Apparently he sees a cardiologist in [facility] who wanted him to go to the emergency department at [place] but the patient and his wife refused to go to the [place] emergency department.  They then were seen at [place] and was told that his pacemaker was out of the pocket and he wanted to see his cardiologist but apparently did not get a call back per the wife.  In either event he then came to our ER and has been admitted for chest pain rule out.  Patient and his wife are very limited historians but what I can gather is they want him \"checked out to make sure his pacemaker has not moved.\"  They also tell me that his usual cardiologist, Dr. Toledo, has told them that if the patient gets another stress Myoview, \"it will likely kill him\".  This morning patient is resting comfortably, so far his serial troponins are negative, d-dimer is negative, patient denies any cough or shortness of breath    Review of Systems   All systems were reviewed and negative except for: Left-sided chest wall pain and some recurrent dyspnea    History  No past medical history on file.  No past surgical history on file.  No family history on file.  Social History     Tobacco Use   • Smoking status: Never Smoker   • Smokeless tobacco: Never Used   Substance Use Topics   • Alcohol use: Not on file   • Drug use: Not on file     Allergies:  Doxycycline    Objective     Vital Signs  Temp:  [97.6 °F (36.4 °C)-98 °F (36.7 °C)] 98 °F (36.7 °C)  Heart Rate:  [59-69] 59  Resp:  [15-18] 16  BP: (130-175)/(80-95) 130/80      Physical Exam:      General Appearance: "    Alert, cooperative, in no acute distress   Head:    Normocephalic, without obvious abnormality, atraumatic   Eyes:           Conjunctivae and sclerae normal, no   icterus, no pallor, corneas clear, PERRLA   Throat:   No oral lesions, no thrush, oral mucosa moist   Neck:   No adenopathy, supple, trachea midline, no thyromegaly, no   carotid bruit, no JVD   Lungs:     Clear to auscultation,respirations regular, even and                  unlabored    Heart:    Regular rhythm and normal rate, normal S1 and S2, no            murmur, no gallop, no rub, no click   Chest Wall:    No abnormalities observed   Abdomen:     Normal bowel sounds, no masses, no organomegaly, soft        non-tender, non-distended, no guarding, no rebound                tenderness   Rectal:     Deferred   Extremities:   Moves all extremities well, no edema, no cyanosis, no             redness   Pulses:   Pulses palpable and equal bilaterally   Skin:   No bleeding, bruising or rash, no pain on deep palpation around the well-healed pacemaker insertion site   Lymph nodes:   No palpable adenopathy   Neurologic:   Cranial nerves 2 - 12 grossly intact, sensation intact, DTR       present and equal bilaterally       Labs:  Lab Results (last 24 hours)     Procedure Component Value Units Date/Time    Troponin [[id number]]  (Normal) Collected:  02/04/20 0805    Specimen:  Blood Updated:  02/04/20 0912     Troponin T <0.010 ng/mL     Narrative:       Troponin T Reference Range:  <= 0.03 ng/mL-   Negative for AMI  >0.03 ng/mL-     Abnormal for myocardial necrosis.  Clinicians would have to utilize clinical acumen, EKG, Troponin and serial changes to determine if it is an Acute Myocardial Infarction or myocardial injury due to an underlying chronic condition.       Results may be falsely decreased if patient taking Biotin.      Troponin [[id number]]  (Normal) Collected:  02/04/20 0351    Specimen:  Blood Updated:  02/04/20 0426     Troponin T <0.010 ng/mL      Narrative:       Troponin T Reference Range:  <= 0.03 ng/mL-   Negative for AMI  >0.03 ng/mL-     Abnormal for myocardial necrosis.  Clinicians would have to utilize clinical acumen, EKG, Troponin and serial changes to determine if it is an Acute Myocardial Infarction or myocardial injury due to an underlying chronic condition.       Results may be falsely decreased if patient taking Biotin.      Basic Metabolic Panel [[id number]]  (Abnormal) Collected:  02/04/20 0351    Specimen:  Blood Updated:  02/04/20 0426     Glucose 112 mg/dL      BUN 11 mg/dL      Creatinine 1.06 mg/dL      Sodium 141 mmol/L      Potassium 3.7 mmol/L      Chloride 105 mmol/L      CO2 26.0 mmol/L      Calcium 8.5 mg/dL      eGFR Non African Amer 67 mL/min/1.73      BUN/Creatinine Ratio 10.4     Anion Gap 10.0 mmol/L     Narrative:       GFR Normal >60  Chronic Kidney Disease <60  Kidney Failure <15      Magnesium [[id number]]  (Normal) Collected:  02/04/20 0351    Specimen:  Blood Updated:  02/04/20 0426     Magnesium 2.0 mg/dL     CBC & Differential [[id number]] Collected:  02/04/20 0351    Specimen:  Blood Updated:  02/04/20 0408    Narrative:       The following orders were created for panel order CBC & Differential.  Procedure                               Abnormality         Status                     ---------                               -----------         ------                     CBC Auto Differential[[id number]]        Abnormal            Final result                 Please view results for these tests on the individual orders.    CBC Auto Differential [[id number]]  (Abnormal) Collected:  02/04/20 0351    Specimen:  Blood Updated:  02/04/20 0408     WBC 10.20 10*3/mm3      RBC 4.23 10*6/mm3      Hemoglobin 12.9 g/dL      Hematocrit 37.3 %      MCV 88.1 fL      MCH 30.4 pg      MCHC 34.5 g/dL      RDW 13.7 %      RDW-SD 42.9 fl      MPV 7.9 fL      Platelets 235 10*3/mm3      Neutrophil % 69.1 %      Lymphocyte % 19.7 %      Monocyte %  8.3 %      Eosinophil % 2.4 %      Basophil % 0.5 %      Neutrophils, Absolute 7.10 10*3/mm3      Lymphocytes, Absolute 2.00 10*3/mm3      Monocytes, Absolute 0.80 10*3/mm3      Eosinophils, Absolute 0.20 10*3/mm3      Basophils, Absolute 0.10 10*3/mm3      nRBC 0.0 /100 WBC     Urinalysis With Culture If Indicated - Urine, Clean Catch [[id number]]  (Normal) Collected:  02/04/20 0134    Specimen:  Urine, Clean Catch Updated:  02/04/20 0322     Color, UA Yellow     Appearance, UA Clear     pH, UA 6.5     Specific Gravity, UA 1.021     Glucose, UA Negative     Ketones, UA Negative     Bilirubin, UA Negative     Blood, UA Negative     Protein, UA Negative     Leuk Esterase, UA Negative     Nitrite, UA Negative     Urobilinogen, UA 0.2 E.U./dL    Narrative:       Urine microscopic not indicated.    D-dimer, Quantitative [[id number]]  (Normal) Collected:  02/03/20 2239    Specimen:  Blood Updated:  02/03/20 2313     D-Dimer, Quantitative 0.27 MCGFEU/mL     Narrative:       Reference Range  --------------------------------------------------------------------     < 0.50   Negative Predictive Value  0.50-0.59   Indeterminate    >= 0.60   Probable VTE             A very low percentage of patients with DVT may yield D-Dimer results   below the cut-off of 0.50 MCGFEU/mL.  This is known to be more   prevalent in patients with distal DVT.             Results of this test should always be interpreted in conjunction with   the patient's medical history, clinical presentation and other   findings.  Clinical diagnosis should not be based on the result of   [place] D-Dimer alone.    Procalcitonin [[id number]]  (Abnormal) Collected:  02/03/20 2034    Specimen:  Blood from Arm, Right Updated:  02/03/20 2301     Procalcitonin 0.06 ng/mL     Narrative:       As a Marker for Sepsis (Non-Neonates):   1. <0.5 ng/mL represents a low risk of severe sepsis and/or septic shock.  1. >2 ng/mL represents a high risk of severe sepsis and/or septic  "shock.    As a Marker for Lower Respiratory Tract Infections that require antibiotic therapy:  PCT on Admission     Antibiotic Therapy             6-12 Hrs later  > 0.5                Strongly Recommended            >0.25 - <0.5         Recommended  0.1 - 0.25           Discouraged                   Remeasure/reassess PCT  <0.1                 Strongly Discouraged          Remeasure/reassess PCT      As 28 day mortality risk marker: \"Change in Procalcitonin Result\" (> 80 % or <=80 %) if Day 0 (or Day 1) and Day 4 values are available. Refer to http://www.[employer] Labspct-calculator.com/   Change in PCT <=80 %   A decrease of PCT levels below or equal to 80 % defines a positive change in PCT test result representing a higher risk for 28-day all-cause mortality of patients diagnosed with severe sepsis or septic shock.  Change in PCT > 80 %   A decrease of PCT levels of more than 80 % defines a negative change in PCT result representing a lower risk for 28-day all-cause mortality of patients diagnosed with severe sepsis or septic shock.                Results may be falsely decreased if patient taking Biotin.     Lipid Panel [[id number]]  (Abnormal) Collected:  02/03/20 1825    Specimen:  Blood Updated:  02/03/20 2127     Total Cholesterol 103 mg/dL      Triglycerides 154 mg/dL      HDL Cholesterol 46 mg/dL      LDL Cholesterol  26 mg/dL      VLDL Cholesterol 30.8 mg/dL      LDL/HDL Ratio 0.57    Narrative:       Cholesterol Reference Ranges  (U.S. Department of Health and Human Services ATP III Classifications)    Desirable          <200 mg/dL  Borderline High    200-239 mg/dL  High Risk          >240 mg/dL      Triglyceride Reference Ranges  (U.S. Department of Health and Human Services ATP III Classifications)    Normal           <150 mg/dL  Borderline High  150-199 mg/dL  High             200-499 mg/dL  Very High        >500 mg/dL    HDL Reference Ranges  (U.S. Department of Health and Human Services ATP III " Classifcations)    Low     <40 mg/dl (major risk factor for CHD)  High    >60 mg/dl ('negative' risk factor for CHD)        LDL Reference Ranges  (U.S. Department of Health and Human Services ATP III Classifcations)    Optimal          <100 mg/dL  Near Optimal     100-129 mg/dL  Borderline High  130-159 mg/dL  High             160-189 mg/dL  Very High        >189 mg/dL    Basic Metabolic Panel [[id number]]  (Normal) Collected:  02/03/20 2034    Specimen:  Blood from Arm, Right Updated:  02/03/20 2059     Glucose 86 mg/dL      BUN 12 mg/dL      Creatinine 1.14 mg/dL      Sodium 142 mmol/L      Potassium 4.1 mmol/L      Chloride 105 mmol/L      CO2 26.0 mmol/L      Calcium 8.9 mg/dL      eGFR Non African Amer 62 mL/min/1.73      BUN/Creatinine Ratio 10.5     Anion Gap 11.0 mmol/L     Narrative:       GFR Normal >60  Chronic Kidney Disease <60  Kidney Failure <15      Troponin [[id number]]  (Normal) Collected:  02/03/20 2034    Specimen:  Blood from Arm, Right Updated:  02/03/20 2059     Troponin T <0.010 ng/mL     Narrative:       Troponin T Reference Range:  <= 0.03 ng/mL-   Negative for AMI  >0.03 ng/mL-     Abnormal for myocardial necrosis.  Clinicians would have to utilize clinical acumen, EKG, Troponin and serial changes to determine if it is an Acute Myocardial Infarction or myocardial injury due to an underlying chronic condition.       Results may be falsely decreased if patient taking Biotin.      BNP [[id number]]  (Normal) Collected:  02/03/20 2034    Specimen:  Blood from Arm, Right Updated:  02/03/20 2057     proBNP 166.2 pg/mL     Narrative:       Among patients with dyspnea, NT-proBNP is highly sensitive for the detection of acute congestive heart failure. In addition NT-proBNP of <300 pg/ml effectively rules out acute congestive heart failure with 99% negative predictive value.    Results may be falsely decreased if patient taking Biotin.      Lipase [[id number]]  (Normal) Collected:  02/03/20 6390     Specimen:  Blood Updated:  02/03/20 1857     Lipase 37 U/L     Comprehensive Metabolic Panel [[id number]]  (Abnormal) Collected:  02/03/20 1825    Specimen:  Blood Updated:  02/03/20 1857     Glucose 129 mg/dL      BUN 11 mg/dL      Creatinine 1.24 mg/dL      Sodium 142 mmol/L      Potassium 4.1 mmol/L      Chloride 103 mmol/L      CO2 27.0 mmol/L      Calcium 9.5 mg/dL      Total Protein 7.0 g/dL      Albumin 4.10 g/dL      ALT (SGPT) 17 U/L      AST (SGOT) 22 U/L      Alkaline Phosphatase 75 U/L      Total Bilirubin 0.4 mg/dL      eGFR Non African Amer 56 mL/min/1.73      Globulin 2.9 gm/dL      A/G Ratio 1.4 g/dL      BUN/Creatinine Ratio 8.9     Anion Gap 12.0 mmol/L     Narrative:       GFR Normal >60  Chronic Kidney Disease <60  Kidney Failure <15      Troponin [[id number]]  (Normal) Collected:  02/03/20 1825    Specimen:  Blood Updated:  02/03/20 1857     Troponin T <0.010 ng/mL     Narrative:       Troponin T Reference Range:  <= 0.03 ng/mL-   Negative for AMI  >0.03 ng/mL-     Abnormal for myocardial necrosis.  Clinicians would have to utilize clinical acumen, EKG, Troponin and serial changes to determine if it is an Acute Myocardial Infarction or myocardial injury due to an underlying chronic condition.       Results may be falsely decreased if patient taking Biotin.      BNP [[id number]]  (Normal) Collected:  02/03/20 1825    Specimen:  Blood Updated:  02/03/20 1855     proBNP 172.9 pg/mL     Narrative:       Among patients with dyspnea, NT-proBNP is highly sensitive for the detection of acute congestive heart failure. In addition NT-proBNP of <300 pg/ml effectively rules out acute congestive heart failure with 99% negative predictive value.    Results may be falsely decreased if patient taking Biotin.      CBC & Differential [[id number]] Collected:  02/03/20 1825    Specimen:  Blood Updated:  02/03/20 1832    Narrative:       The following orders were created for panel order CBC & Differential.  Procedure     "                           Abnormality         Status                     ---------                               -----------         ------                     CBC Auto Differential[[id number]]        Abnormal            Final result                 Please view results for these tests on the individual orders.    CBC Auto Differential [[id number]]  (Abnormal) Collected:  02/03/20 1825    Specimen:  Blood Updated:  02/03/20 1832     WBC 9.50 10*3/mm3      RBC 4.76 10*6/mm3      Hemoglobin 14.5 g/dL      Hematocrit 42.5 %      MCV 89.2 fL      MCH 30.4 pg      MCHC 34.1 g/dL      RDW 14.0 %      RDW-SD 44.2 fl      MPV 7.9 fL      Platelets 268 10*3/mm3      Neutrophil % 76.2 %      Lymphocyte % 14.7 %      Monocyte % 7.5 %      Eosinophil % 1.1 %      Basophil % 0.5 %      Neutrophils, Absolute 7.20 10*3/mm3      Lymphocytes, Absolute 1.40 10*3/mm3      Monocytes, Absolute 0.70 10*3/mm3      Eosinophils, Absolute 0.10 10*3/mm3      Basophils, Absolute 0.10 10*3/mm3      nRBC 0.0 /100 WBC           Radiology:  Xr Chest 2 View    Result Date: 2/3/2020  1. Left chest wall pacemaker with leads in expected position. 2. Basilar predominant interstitial thickening likely related to chronic lung disease. Bandlike atelectasis or scarring within the left lower lobe.  Electronically Signed By-Elliott Saeed On:2/3/2020 7:34 PM This report was finalized on [id number] by  Elliott Saeed, .        Results Review:    I reviewed the patient's new clinical results.  I reviewed the patient's new imaging results and agree with the interpretation.    Assessment/Plan       Chest pain  Recurrent dyspnea on exertion  Angina with history of previous stent placements in the past.  Most recent apparently was November 2019.    Pacemaker placement in 2015, patient concerned with pacer has moved \"out of pocket\" as told by the nurse practitioner at his usual cardiologist office.  COPD   coagulopathy secondary to medication use/Eliquis  Major " depression disorder  Acquired hypothyroidism  Essential hypertension  Dyslipidemia    Early satiety-patient has appointment with his usual gastroenterologist and neurologist Dr. Sanchez this Friday, February 7, 2020    Patient has been admitted for observation, once again so far his troponins are negative.  On telemetry it appears that his pacemaker is functioning normally.  Patient and his wife would like to speak to cardiologist about not having the stress Myoview and what else might need to be done to check the pacemaker.  Currently they are refusing stress Myoview testing.      Expected Length of Stay 1-2 days    I discussed the patients findings and my recommendations with patient and nursing staff.     Romelia Cornejo,   02/04/20  9:18 AM

## 2020-02-04 NOTE — NURSING NOTE
Per Dr Jose tellezview negative and can go home from his standpoint and follow up with his cardiologist in 1-2 weeks.  Will make Dr Cornejo aware

## 2020-02-04 NOTE — CONSULTS
CARDIOLOGY CONSULT NOTE      Referring Provider: Dr. Cornejo    Reason for Consultation: Chest Pain    Attending: Romelia Cornejo DO    Chief complaint    Chest pain    Subjective .     History of present illness:  Jayden Bond is a 79 y.o. male who presents with reported chest pain.  The patient is normally seen by [facility].  He has a history of known coronary artery disease, sick sinus syndrome, previous dual-chamber pacemaker, hypertension, dyslipidemia and thoracic abdominal aortic aneurysm measuring 4.1 cm, and dysautonomia.    I have reviewed all pertinent cardiac records available in care everywhere including last cardiac catheterization from 2014 stress Myoview from 2018 and echocardiogram from 2017.    The cath report from November 2014 showed first diagonal lesion of 60%, patent stent in the left circumflex, patent stent in the distal RCA, patent stent in the second posterior lateral ventricular branch there was an 80% ostial stenosis in the PDA.    Stress test from March 2018 is reviewed that showed no reversible ischemia    Echocardiogram from December 2017 is reported as ejection fraction of 70% with RVSP of 39 mmHg.    There is also report of a CTA of the chest from November 2018 that showed a thoracic aortic aneurysm measuring 4.1 cm.    In June 2015 the patient had a dual-chamber [employer] pacemaker implanted for sick sinus syndrome.    According to the patient and his wife he has been having a sensation of something moving in his chest.  He reports he feels like a stent is moving he was recently seen at [facility] emergency room and his wife reports someone told her that the pacemaker was not in correct position.  Unfortunately he has not followed up with his primary cardiologist.    He came to the emergency room at [facility] due to reports of elevated blood pressure and chest pain with feelings of movement in his chest.    On admission his EKG showed no acute ST or T  wave segment abnormalities his cardiac enzymes have been negative x3 he has been pain-free since he has been here.  His BNP was less than 200 and there was no evidence of heart failure on chest x-ray            Review of Systems   Constitution: Negative for decreased appetite and diaphoresis.   HENT: Negative for congestion, hearing loss and nosebleeds.    Cardiovascular: Positive for chest pain and palpitations. Negative for claudication, dyspnea on exertion, irregular heartbeat, leg swelling, near-syncope, orthopnea, paroxysmal nocturnal dyspnea and syncope.   Respiratory: Negative for cough, shortness of breath and sleep disturbances due to breathing.    Endocrine: Negative for polyuria.   Hematologic/Lymphatic: Does not bruise/bleed easily.   Skin: Negative for itching and rash.   Musculoskeletal: Negative for back pain, muscle weakness and myalgias.   Gastrointestinal: Negative for abdominal pain, change in bowel habit and nausea.   Genitourinary: Negative for dysuria, flank pain, frequency and hesitancy.   Neurological: Negative for dizziness, tremors and weakness.   Psychiatric/Behavioral: Negative for altered mental status. The patient does not have insomnia.        History  Past Medical History:   Diagnosis Date   • CAD (coronary artery disease)    • Dysautonomia (CMS/HCC)    • Dyslipidemia    • Hypertension    • SSS (sick sinus syndrome) (CMS/[facility])        Past Surgical History:   Procedure Laterality Date   • LEFT HEART CATH         History reviewed. No pertinent family history.    Social History     Tobacco Use   • Smoking status: Never Smoker   • Smokeless tobacco: Never Used   Substance Use Topics   • Alcohol use: Not on file   • Drug use: Not on file        Medications Prior to Admission   Medication Sig Dispense Refill Last Dose   • escitalopram (LEXAPRO) 10 MG tablet Take 10 mg by mouth Daily.   2/3/2020 at Unknown time   • fluticasone (FLONASE) 50 MCG/ACT nasal spray 1 spray into the nostril(s) as  directed by provider Daily.   2/3/2020 at Unknown time   • galantamine ER (RAZADYNE ER) 8 MG 24 hr capsule Take 8 mg by mouth Daily With Breakfast & Dinner.   2/3/2020 at Unknown time   • levothyroxine (SYNTHROID, LEVOTHROID) 25 MCG tablet Take 25 mcg by mouth Every Morning Before Breakfast. 1 hour before breakfast   2/3/2020 at Unknown time   • Loratadine 10 MG capsule Take 1 tablet by mouth Daily.   2/3/2020 at Unknown time   • Multiple Vitamins-Minerals (MULTIVITAMIN WITH MINERALS) tablet tablet Take 1 tablet by mouth Every Night.   2/3/2020 at Unknown time   • Omega-3 Fatty Acids (FISH OIL) 1000 MG capsule capsule Take 1,000 mg by mouth Daily With Breakfast.   2/3/2020 at Unknown time   • predniSONE (DELTASONE) 5 MG tablet Take 5 mg by mouth Daily.   2/3/2020 at Unknown time   • promethazine (PHENERGAN) 25 MG tablet Take 25 mg by mouth Every 6 (Six) Hours As Needed for Nausea or Vomiting.      • vitamin B-12 (CYANOCOBALAMIN) 1000 MCG tablet Take 1,000 mcg by mouth Daily.   2/3/2020 at Unknown time   • nitroglycerin (NITROSTAT) 0.4 MG SL tablet Place 0.4 mg under the tongue Every 5 (Five) Minutes As Needed for Chest Pain. Take no more than 3 doses in 15 minutes.            Doxycycline    Scheduled Meds:    apixaban 5 mg Oral Q12H   brimonidine 1 drop Both Eyes BID   cetirizine 10 mg Oral Daily   escitalopram 10 mg Oral Daily   fluticasone 1 spray Nasal Daily   galantamine 8 mg Oral BID With Meals   hydrALAZINE 50 mg Oral Q12H   levothyroxine 25 mcg Oral Q AM   metoprolol tartrate 25 mg Oral Q12H   pantoprazole 40 mg Oral Nightly   predniSONE 5 mg Oral Daily   rosuvastatin 20 mg Oral Nightly   tamsulosin 0.4 mg Oral Nightly   THERA 1 tablet Oral Daily   vitamin B-12 1,000 mcg Oral Nightly   vitamin B-12 1,000 mcg Oral Daily     Continuous Infusions:   PRN Meds:.•  acetaminophen **OR** acetaminophen **OR** acetaminophen  •  bisacodyl  •  calcium carbonate  •  magnesium hydroxide  •  melatonin  •  nitroglycerin  •   "promethazine  •  [COMPLETED] Insert peripheral IV **AND** sodium chloride  •  sodium chloride  •  traMADol    Objective     VITAL SIGNS  Vitals:    02/04/20 1250 02/04/20 1410 02/04/20 1411 02/04/20 1412   BP: 144/82 145/80 135/84 127/82   BP Location:  Right arm Right arm Right arm   Patient Position:  Lying Sitting Standing   Pulse: 64      Resp: 20      Temp:       TempSrc:       SpO2:       Weight: 82.6 kg (182 lb)      Height: 177.8 cm (70\")          Flowsheet Rows      First Filed Value   Admission Height  177.8 cm (70\") Documented at 02/03/2020 1746   Admission Weight  82.6 kg (182 lb) Documented at 02/03/2020 1746           TELEMETRY: SR    Physical Exam:  Physical Exam   Constitutional: He is oriented to person, place, and time. He appears well-developed and well-nourished. No distress.   HENT:   Head: Normocephalic and atraumatic.   Eyes: Pupils are equal, round, and reactive to light.   Neck: Normal range of motion. Neck supple. No JVD present.   Cardiovascular: Normal rate, regular rhythm, S1 normal, S2 normal, normal heart sounds and intact distal pulses.   No murmur heard.  Pulmonary/Chest: Effort normal and breath sounds normal.   Abdominal: Soft. Normal appearance. He exhibits no distension. There is no tenderness.   Musculoskeletal: Normal range of motion. He exhibits no edema.   Neurological: He is alert and oriented to person, place, and time.   Skin: Skin is warm and dry.   Psychiatric: He has a normal mood and affect.        Results Review:   I reviewed the patient's new clinical results.    CBC    Results from last 7 days   Lab Units 02/04/20  0351 02/03/20  1825   WBC 10*3/mm3 10.20 9.50   HEMOGLOBIN g/dL 12.9* 14.5   PLATELETS 10*3/mm3 235 268     BMP   Results from last 7 days   Lab Units 02/04/20  0351 02/03/20 2034 02/03/20  1825   SODIUM mmol/L 141 142 142   POTASSIUM mmol/L 3.7 4.1 4.1   CHLORIDE mmol/L 105 105 103   CO2 mmol/L 26.0 26.0 27.0   BUN mg/dL 11 12 11   CREATININE mg/dL 1.06 " 1.14 1.24   GLUCOSE mg/dL 112* 86 129*   MAGNESIUM mg/dL 2.0  --   --      Cr Clearance Estimated Creatinine Clearance: 66 mL/min (by C-G formula based on SCr of 1.06 mg/dL).  [facility]     HbA1C   Lab Results   Component Value Date    HGBA1C 6.00 (H) 11/10/2018     Blood Glucose No results found for: POCGLU  Infection   Results from last 7 days   Lab Units 02/03/20 2034   PROCALCITONIN ng/mL 0.06*     CMP   Results from last 7 days   Lab Units 02/04/20 0351 02/03/20 2034 02/03/20  1825   SODIUM mmol/L 141 142 142   POTASSIUM mmol/L 3.7 4.1 4.1   CHLORIDE mmol/L 105 105 103   CO2 mmol/L 26.0 26.0 27.0   BUN mg/dL 11 12 11   CREATININE mg/dL 1.06 1.14 1.24   GLUCOSE mg/dL 112* 86 129*   ALBUMIN g/dL  --   --  4.10   BILIRUBIN mg/dL  --   --  0.4   ALK PHOS U/L  --   --  75   AST (SGOT) U/L  --   --  22   ALT (SGPT) U/L  --   --  17   LIPASE U/L  --   --  37     ABG      UA    Results from last 7 days   Lab Units 02/04/20  0134   NITRITE UA  Negative     SHENG  No results found for: POCMETH, POCAMPHET, POCBARBITUR, POCBENZO, POCCOCAINE, POCOPIATES, POCOXYCODO, POCPHENCYC, POCPROPOXY, POCTHC, POCTRICYC  [facility] Labs   Results from last 7 days   Lab Units 02/04/20 0351 02/03/20 2034 02/03/20  1825   HEMOGLOBIN g/dL 12.9*  --  14.5   PLATELETS 10*3/mm3 235  --  268   CREATININE mg/dL 1.06 1.14 1.24     Radiology(recent) Xr Chest 2 View    Result Date: 2/3/2020  1. Left chest wall pacemaker with leads in expected position. 2. Basilar predominant interstitial thickening likely related to chronic lung disease. Bandlike atelectasis or scarring within the left lower lobe.  Electronically Signed By-Elliott Saeed On:2/3/2020 7:34 PM This report was finalized on [id number] by  Elliott Saeed, .      Results from last 7 days   Lab Units 02/04/20  0805   TROPONIN T ng/mL <0.010       Imaging Results (Last 24 Hours)     Procedure Component Value Units Date/Time    XR Chest 2 View [[id number]] Collected:  02/03/20 1932      Updated:  02/03/20 1936    Narrative:       EXAMINATION: XR CHEST 2 VW-     DATE OF EXAM: 2/3/2020 6:58 PM     INDICATION: Chest pain with concern of pacemaker moved out of pocket.      COMPARISON: None available     TECHNIQUE: PA and Lateral views of the chest were obtained.     FINDINGS:  There is a left chest wall pacemaker with leads terminating over the  right atrium and right ventricle. The leads appear contiguous without  evidence of disconnection. The cardiomediastinal silhouette is within  normal limits. There are coarsened basilar predominant interstitial  markings likely related to chronic lung disease. There is bandlike  atelectasis or scarring within the left lower lobe. There is no pleural  effusion or pneumothorax. Multilevel degenerative changes of the  thoracic spine.       Impression:       1. Left chest wall pacemaker with leads in expected position.  2. Basilar predominant interstitial thickening likely related to chronic  lung disease. Bandlike atelectasis or scarring within the left lower  lobe.     Electronically Signed By-Elliott Saeed On:2/3/2020 7:34 PM  This report was finalized on [id number] by  Elliott Saeed, .          EKG              I personally viewed and interpreted the patient's EKG/Telemetry data:    ECHOCARDIOGRAM:      STRESS MYOVIEW:    CARDIAC CATHETERIZATION:    OTHER:         Assessment/Plan       Chest pain    ASSESSMENT:    Chest Pain     -MI ruled out     -EKG with no acute ST/T wave abnormalities  CAD      -Multiple PCI in past to LAD, Lcx, RA, PL ventricular branch     -Report of residual disease in D1 60%; ostial PDA 80%  Preserved LV function  SSS/Dual Chamber PM  HTN  Hx orthostatic hypotension  Thoracic Aortic Aneurysm 4.1 cm 11/2018        Plan:  Stress myoview to rule out ischemic burden  Will have BST rep interrogate PM  Check Echocardiogram  Additional recommendations per Dr. Garcia    I discussed the patients findings and my recommendations with patient  and RN      Patient is seen and examined and findings are verified.  Patient does not report much symptoms but he was admitted with chest pain.  Patient was reporting that his pacemaker is acting funny and his stent is moving his chest.    Hemodynamics are stable.    Chest is clear to auscultation.  Normal S1 and S2.  Abdomen is soft.  No leg edema noted.    Patient was ruled out for myocardial infarction.  EKG was unremarkable.  I proceeded with stress test which showed no myocardial ischemia or infarction.    At this stage I would recommend to add Imdur 30 mg daily.  Patient can be discharged and follow-up with PCP.  Patient also had primary cardiologist and he can be follow-up with them.  Pacemaker would be interrogated prior to discharge.    Electronically signed by Jose Garcia MD, 02/04/20, 3:52 PM.      Jose Garcia MD  02/04/20  3:51 PM

## 2020-02-07 ENCOUNTER — OFFICE (OUTPATIENT)
Dept: URBAN - METROPOLITAN AREA CLINIC 64 | Facility: CLINIC | Age: 80
End: 2020-02-07

## 2020-02-07 ENCOUNTER — TRANSCRIBE ORDERS (OUTPATIENT)
Dept: ADMINISTRATIVE | Facility: HOSPITAL | Age: 80
End: 2020-02-07

## 2020-02-07 VITALS — DIASTOLIC BLOOD PRESSURE: 79 MMHG | WEIGHT: 182 LBS | SYSTOLIC BLOOD PRESSURE: 115 MMHG | HEIGHT: 70 IN

## 2020-02-07 DIAGNOSIS — K59.00 CONSTIPATION, UNSPECIFIED: ICD-10-CM

## 2020-02-07 DIAGNOSIS — K59.00 CONSTIPATION, UNSPECIFIED CONSTIPATION TYPE: Primary | ICD-10-CM

## 2020-02-07 DIAGNOSIS — R68.81 EARLY SATIETY: ICD-10-CM

## 2020-02-07 DIAGNOSIS — R14.0 ABDOMINAL DISTENSION (GASEOUS): ICD-10-CM

## 2020-02-07 DIAGNOSIS — R14.0 GASTRIC TYMPANY: ICD-10-CM

## 2020-02-07 PROCEDURE — 99213 OFFICE O/P EST LOW 20 MIN: CPT | Performed by: NURSE PRACTITIONER

## 2020-02-26 ENCOUNTER — APPOINTMENT (OUTPATIENT)
Dept: NUCLEAR MEDICINE | Facility: HOSPITAL | Age: 80
End: 2020-02-26

## 2020-03-04 ENCOUNTER — APPOINTMENT (OUTPATIENT)
Dept: GENERAL RADIOLOGY | Facility: HOSPITAL | Age: 80
End: 2020-03-04

## 2020-03-04 ENCOUNTER — APPOINTMENT (OUTPATIENT)
Dept: CT IMAGING | Facility: HOSPITAL | Age: 80
End: 2020-03-04

## 2020-03-04 ENCOUNTER — HOSPITAL ENCOUNTER (INPATIENT)
Facility: HOSPITAL | Age: 80
LOS: 1 days | Discharge: HOME | End: 2020-03-06
Attending: FAMILY MEDICINE | Admitting: FAMILY MEDICINE

## 2020-03-04 DIAGNOSIS — R55 SYNCOPE, UNSPECIFIED SYNCOPE TYPE: Primary | ICD-10-CM

## 2020-03-04 DIAGNOSIS — R53.1 WEAKNESS: ICD-10-CM

## 2020-03-04 DIAGNOSIS — R42 DIZZY: ICD-10-CM

## 2020-03-04 LAB
ALBUMIN SERPL-MCNC: 3.8 G/DL (ref 3.5–5.2)
ALBUMIN/GLOB SERPL: 1.5 G/DL
ALP SERPL-CCNC: 66 U/L (ref 39–117)
ALT SERPL W P-5'-P-CCNC: 15 U/L (ref 1–41)
ANION GAP SERPL CALCULATED.3IONS-SCNC: 10 MMOL/L (ref 5–15)
AST SERPL-CCNC: 18 U/L (ref 1–40)
BACTERIA UR QL AUTO: ABNORMAL /HPF
BASOPHILS # BLD AUTO: 0.1 10*3/MM3 (ref 0–0.2)
BASOPHILS NFR BLD AUTO: 0.5 % (ref 0–1.5)
BILIRUB SERPL-MCNC: 0.4 MG/DL (ref 0.2–1.2)
BILIRUB UR QL STRIP: NEGATIVE
BUN BLD-MCNC: 14 MG/DL (ref 8–23)
BUN/CREAT SERPL: 9.4 (ref 7–25)
CALCIUM SPEC-SCNC: 9.3 MG/DL (ref 8.6–10.5)
CHLORIDE SERPL-SCNC: 105 MMOL/L (ref 98–107)
CLARITY UR: ABNORMAL
CO2 SERPL-SCNC: 26 MMOL/L (ref 22–29)
COLOR UR: YELLOW
CREAT BLD-MCNC: 1.49 MG/DL (ref 0.76–1.27)
DEPRECATED RDW RBC AUTO: 44.6 FL (ref 37–54)
EOSINOPHIL # BLD AUTO: 0.1 10*3/MM3 (ref 0–0.4)
EOSINOPHIL NFR BLD AUTO: 0.8 % (ref 0.3–6.2)
ERYTHROCYTE [DISTWIDTH] IN BLOOD BY AUTOMATED COUNT: 14.2 % (ref 12.3–15.4)
GFR SERPL CREATININE-BSD FRML MDRD: 45 ML/MIN/1.73
GLOBULIN UR ELPH-MCNC: 2.5 GM/DL
GLUCOSE BLD-MCNC: 130 MG/DL (ref 65–99)
GLUCOSE UR STRIP-MCNC: NEGATIVE MG/DL
HCT VFR BLD AUTO: 37.9 % (ref 37.5–51)
HGB BLD-MCNC: 12.8 G/DL (ref 13–17.7)
HGB UR QL STRIP.AUTO: ABNORMAL
HYALINE CASTS UR QL AUTO: ABNORMAL /LPF
KETONES UR QL STRIP: NEGATIVE
LEUKOCYTE ESTERASE UR QL STRIP.AUTO: NEGATIVE
LIPASE SERPL-CCNC: 35 U/L (ref 13–60)
LYMPHOCYTES # BLD AUTO: 0.9 10*3/MM3 (ref 0.7–3.1)
LYMPHOCYTES NFR BLD AUTO: 9.1 % (ref 19.6–45.3)
MAGNESIUM SERPL-MCNC: 2 MG/DL (ref 1.6–2.4)
MCH RBC QN AUTO: 30.3 PG (ref 26.6–33)
MCHC RBC AUTO-ENTMCNC: 33.9 G/DL (ref 31.5–35.7)
MCV RBC AUTO: 89.5 FL (ref 79–97)
MONOCYTES # BLD AUTO: 0.7 10*3/MM3 (ref 0.1–0.9)
MONOCYTES NFR BLD AUTO: 6.7 % (ref 5–12)
NEUTROPHILS # BLD AUTO: 8.6 10*3/MM3 (ref 1.7–7)
NEUTROPHILS NFR BLD AUTO: 82.9 % (ref 42.7–76)
NITRITE UR QL STRIP: NEGATIVE
NRBC BLD AUTO-RTO: 0 /100 WBC (ref 0–0.2)
NT-PROBNP SERPL-MCNC: 257.3 PG/ML (ref 5–1800)
PH UR STRIP.AUTO: 6 [PH] (ref 5–8)
PLATELET # BLD AUTO: 241 10*3/MM3 (ref 140–450)
PMV BLD AUTO: 8 FL (ref 6–12)
POTASSIUM BLD-SCNC: 4.5 MMOL/L (ref 3.5–5.2)
PROT SERPL-MCNC: 6.3 G/DL (ref 6–8.5)
PROT UR QL STRIP: ABNORMAL
RBC # BLD AUTO: 4.23 10*6/MM3 (ref 4.14–5.8)
RBC # UR: ABNORMAL /HPF
REF LAB TEST METHOD: ABNORMAL
SODIUM BLD-SCNC: 141 MMOL/L (ref 136–145)
SP GR UR STRIP: 1.02 (ref 1–1.03)
SQUAMOUS #/AREA URNS HPF: ABNORMAL /HPF
TROPONIN T SERPL-MCNC: <0.01 NG/ML (ref 0–0.03)
UROBILINOGEN UR QL STRIP: ABNORMAL
WBC NRBC COR # BLD: 10.4 10*3/MM3 (ref 3.4–10.8)
WBC UR QL AUTO: ABNORMAL /HPF

## 2020-03-04 PROCEDURE — 93005 ELECTROCARDIOGRAM TRACING: CPT | Performed by: EMERGENCY MEDICINE

## 2020-03-04 PROCEDURE — 71045 X-RAY EXAM CHEST 1 VIEW: CPT

## 2020-03-04 PROCEDURE — 99284 EMERGENCY DEPT VISIT MOD MDM: CPT

## 2020-03-04 PROCEDURE — G0378 HOSPITAL OBSERVATION PER HR: HCPCS

## 2020-03-04 PROCEDURE — 83690 ASSAY OF LIPASE: CPT | Performed by: PHYSICIAN ASSISTANT

## 2020-03-04 PROCEDURE — 93005 ELECTROCARDIOGRAM TRACING: CPT | Performed by: FAMILY MEDICINE

## 2020-03-04 PROCEDURE — 81001 URINALYSIS AUTO W/SCOPE: CPT | Performed by: PHYSICIAN ASSISTANT

## 2020-03-04 PROCEDURE — P9612 CATHETERIZE FOR URINE SPEC: HCPCS

## 2020-03-04 PROCEDURE — 70450 CT HEAD/BRAIN W/O DYE: CPT

## 2020-03-04 PROCEDURE — 83880 ASSAY OF NATRIURETIC PEPTIDE: CPT | Performed by: PHYSICIAN ASSISTANT

## 2020-03-04 PROCEDURE — 84484 ASSAY OF TROPONIN QUANT: CPT | Performed by: PHYSICIAN ASSISTANT

## 2020-03-04 PROCEDURE — 85025 COMPLETE CBC W/AUTO DIFF WBC: CPT | Performed by: PHYSICIAN ASSISTANT

## 2020-03-04 PROCEDURE — 83735 ASSAY OF MAGNESIUM: CPT | Performed by: PHYSICIAN ASSISTANT

## 2020-03-04 PROCEDURE — 80053 COMPREHEN METABOLIC PANEL: CPT | Performed by: PHYSICIAN ASSISTANT

## 2020-03-04 RX ORDER — LEVOTHYROXINE SODIUM 0.03 MG/1
25 TABLET ORAL
Status: DISCONTINUED | OUTPATIENT
Start: 2020-03-05 | End: 2020-03-06 | Stop reason: HOSPADM

## 2020-03-04 RX ORDER — LORAZEPAM 2 MG/ML
0.5 INJECTION INTRAMUSCULAR EVERY 6 HOURS PRN
Status: DISCONTINUED | OUTPATIENT
Start: 2020-03-04 | End: 2020-03-06 | Stop reason: HOSPADM

## 2020-03-04 RX ORDER — PROMETHAZINE HYDROCHLORIDE 25 MG/1
25 TABLET ORAL EVERY 6 HOURS PRN
Status: DISCONTINUED | OUTPATIENT
Start: 2020-03-04 | End: 2020-03-06 | Stop reason: HOSPADM

## 2020-03-04 RX ORDER — POTASSIUM CHLORIDE 1.5 G/1.77G
40 POWDER, FOR SOLUTION ORAL AS NEEDED
Status: DISCONTINUED | OUTPATIENT
Start: 2020-03-04 | End: 2020-03-06 | Stop reason: HOSPADM

## 2020-03-04 RX ORDER — CLOPIDOGREL BISULFATE 75 MG/1
75 TABLET ORAL DAILY
COMMUNITY
End: 2020-10-13 | Stop reason: SDUPTHER

## 2020-03-04 RX ORDER — SODIUM CHLORIDE 9 MG/ML
100 INJECTION, SOLUTION INTRAVENOUS CONTINUOUS
Status: DISCONTINUED | OUTPATIENT
Start: 2020-03-04 | End: 2020-03-06 | Stop reason: HOSPADM

## 2020-03-04 RX ORDER — DIPHENHYDRAMINE HYDROCHLORIDE 50 MG/ML
25 INJECTION INTRAMUSCULAR; INTRAVENOUS EVERY 6 HOURS PRN
Status: DISCONTINUED | OUTPATIENT
Start: 2020-03-04 | End: 2020-03-06 | Stop reason: HOSPADM

## 2020-03-04 RX ORDER — PANTOPRAZOLE SODIUM 40 MG/1
40 TABLET, DELAYED RELEASE ORAL
Status: DISCONTINUED | OUTPATIENT
Start: 2020-03-05 | End: 2020-03-05

## 2020-03-04 RX ORDER — NITROGLYCERIN 0.4 MG/1
0.4 TABLET SUBLINGUAL
Status: DISCONTINUED | OUTPATIENT
Start: 2020-03-04 | End: 2020-03-06 | Stop reason: HOSPADM

## 2020-03-04 RX ORDER — ACETAMINOPHEN 325 MG/1
650 TABLET ORAL EVERY 6 HOURS PRN
Status: DISCONTINUED | OUTPATIENT
Start: 2020-03-04 | End: 2020-03-06 | Stop reason: HOSPADM

## 2020-03-04 RX ORDER — SODIUM CHLORIDE 0.9 % (FLUSH) 0.9 %
10 SYRINGE (ML) INJECTION AS NEEDED
Status: DISCONTINUED | OUTPATIENT
Start: 2020-03-04 | End: 2020-03-06 | Stop reason: HOSPADM

## 2020-03-04 RX ORDER — MULTIPLE VITAMINS W/ MINERALS TAB 2148-113
1 TAB ORAL NIGHTLY
Status: DISCONTINUED | OUTPATIENT
Start: 2020-03-04 | End: 2020-03-06 | Stop reason: HOSPADM

## 2020-03-04 RX ORDER — ONDANSETRON 2 MG/ML
4 INJECTION INTRAMUSCULAR; INTRAVENOUS EVERY 6 HOURS PRN
Status: DISCONTINUED | OUTPATIENT
Start: 2020-03-04 | End: 2020-03-06 | Stop reason: HOSPADM

## 2020-03-04 RX ORDER — POTASSIUM CHLORIDE 20 MEQ/1
40 TABLET, EXTENDED RELEASE ORAL AS NEEDED
Status: DISCONTINUED | OUTPATIENT
Start: 2020-03-04 | End: 2020-03-06 | Stop reason: HOSPADM

## 2020-03-04 RX ORDER — ISOSORBIDE MONONITRATE 30 MG/1
30 TABLET, EXTENDED RELEASE ORAL DAILY
Status: DISCONTINUED | OUTPATIENT
Start: 2020-03-05 | End: 2020-03-06 | Stop reason: HOSPADM

## 2020-03-04 RX ORDER — ROSUVASTATIN CALCIUM 10 MG/1
20 TABLET, COATED ORAL NIGHTLY
Status: DISCONTINUED | OUTPATIENT
Start: 2020-03-04 | End: 2020-03-06 | Stop reason: HOSPADM

## 2020-03-04 RX ORDER — FLUTICASONE PROPIONATE 50 MCG
2 SPRAY, SUSPENSION (ML) NASAL DAILY
Status: DISCONTINUED | OUTPATIENT
Start: 2020-03-05 | End: 2020-03-06 | Stop reason: HOSPADM

## 2020-03-04 RX ORDER — GALANTAMINE HYDROBROMIDE 8 MG/1
8 CAPSULE, EXTENDED RELEASE ORAL 2 TIMES DAILY
Status: DISCONTINUED | OUTPATIENT
Start: 2020-03-04 | End: 2020-03-06 | Stop reason: HOSPADM

## 2020-03-04 RX ORDER — POTASSIUM CHLORIDE 7.45 MG/ML
10 INJECTION INTRAVENOUS
Status: DISCONTINUED | OUTPATIENT
Start: 2020-03-04 | End: 2020-03-06 | Stop reason: HOSPADM

## 2020-03-04 RX ORDER — HYDRALAZINE HYDROCHLORIDE 25 MG/1
50 TABLET, FILM COATED ORAL 2 TIMES DAILY
Status: DISCONTINUED | OUTPATIENT
Start: 2020-03-04 | End: 2020-03-06 | Stop reason: HOSPADM

## 2020-03-04 RX ORDER — PREDNISONE 1 MG/1
5 TABLET ORAL
Status: DISCONTINUED | OUTPATIENT
Start: 2020-03-05 | End: 2020-03-06 | Stop reason: HOSPADM

## 2020-03-04 RX ORDER — CLOPIDOGREL BISULFATE 75 MG/1
75 TABLET ORAL DAILY
Status: DISCONTINUED | OUTPATIENT
Start: 2020-03-05 | End: 2020-03-06 | Stop reason: HOSPADM

## 2020-03-04 RX ORDER — LANOLIN ALCOHOL/MO/W.PET/CERES
1000 CREAM (GRAM) TOPICAL DAILY
Status: DISCONTINUED | OUTPATIENT
Start: 2020-03-05 | End: 2020-03-06 | Stop reason: HOSPADM

## 2020-03-04 RX ORDER — ESCITALOPRAM OXALATE 10 MG/1
10 TABLET ORAL DAILY
Status: DISCONTINUED | OUTPATIENT
Start: 2020-03-05 | End: 2020-03-06 | Stop reason: HOSPADM

## 2020-03-04 RX ORDER — TAMSULOSIN HYDROCHLORIDE 0.4 MG/1
0.4 CAPSULE ORAL NIGHTLY
Status: DISCONTINUED | OUTPATIENT
Start: 2020-03-04 | End: 2020-03-06 | Stop reason: HOSPADM

## 2020-03-04 RX ORDER — BRIMONIDINE TARTRATE 2 MG/ML
1 SOLUTION/ DROPS OPHTHALMIC 2 TIMES DAILY
Status: DISCONTINUED | OUTPATIENT
Start: 2020-03-04 | End: 2020-03-06 | Stop reason: HOSPADM

## 2020-03-04 RX ORDER — CETIRIZINE HYDROCHLORIDE 10 MG/1
10 TABLET ORAL DAILY
Status: DISCONTINUED | OUTPATIENT
Start: 2020-03-05 | End: 2020-03-06 | Stop reason: HOSPADM

## 2020-03-04 NOTE — ED PROVIDER NOTES
Subjective   Patient is a 79-year-old  male with history of stroke, CAD with pacemaker placement, dementia, hypertension presents the ER with wife who is reporting fatigue, weakness and syncopal episode earlier today.  Wife states that they were leaving the [facility] from getting blood work, she states that she left patient with her daughter at the curb, went side came back out and patient was on the ground.  Patient's daughter states that she lowered him to the ground, denies head injury.  Patient's wife in the room reports that he has been acting more lethargic, dizzy and reports he appears short of breath over the last few days.  Patient says her significant for stroke 11/2019.  Patient denies any pain at this time, no chest pain, vomiting, headache, neck pain or back pain.  Patient does report feeling fatigue, slightly dizzy and a little short of breath.  Patient denies any sensory or motor deficits.  Patient denies any slurred speech or visual disturbances.  Patient states that he was aware that he was about to pass out, remembers this episode.  Patient denies any recent travel, denies being on any muscles been sick, denies fever chills.      History provided by:  Patient      Review of Systems   Constitutional: Positive for fatigue. Negative for chills, diaphoresis and fever.   HENT: Negative for sore throat and trouble swallowing.    Respiratory: Positive for shortness of breath. Negative for cough and wheezing.    Cardiovascular: Negative for chest pain and palpitations.   Gastrointestinal: Negative for abdominal pain, diarrhea, nausea and vomiting.   Genitourinary: Negative for dysuria.   Musculoskeletal: Negative for myalgias.   Skin: Negative for rash.   Neurological: Positive for dizziness and weakness. Negative for syncope, light-headedness and headaches.   Psychiatric/Behavioral: Negative for behavioral problems.   All other systems reviewed and are negative.      Past Medical  History:   Diagnosis Date   • CAD (coronary artery disease)    • Coronary artery disease     PTCI   • Dementia (CMS/HCC)    • Depression    • Disease of thyroid gland    • Dysautonomia (CMS/HCC)    • Dyslipidemia    • GERD (gastroesophageal reflux disease)    • Head pain    • Hyperlipidemia    • Hypertension    • SSS (sick sinus syndrome) (CMS/HCC)    • Stroke (CMS/HCC)     x 3 in 2018       Allergies   Allergen Reactions   • Doxycycline GI Intolerance   • Doxycycline Nausea And Vomiting       Past Surgical History:   Procedure Laterality Date   • APPENDECTOMY     • CHOLECYSTECTOMY     • ENDOSCOPY N/A 10/17/2019    Procedure: ESOPHAGOGASTRODUODENOSCOPY with biopsy x 2 areas;  Surgeon: Ashok Sanchez MD;  Location: [facility] ENDOSCOPY;  Service: Gastroenterology   • HERNIA REPAIR     • LEFT HEART CATH     • PACEMAKER IMPLANTATION     • TEMPORAL ARTERY BIOPSY     • WRIST SURGERY      severed artery       No family history on file.    Social History     Socioeconomic History   • Marital status:      Spouse name: Not on file   • Number of children: Not on file   • Years of education: Not on file   • Highest education level: Not on file   Tobacco Use   • Smoking status: Never Smoker   • Smokeless tobacco: Never Used   • Tobacco comment: quit 25 yrs ago   Substance and Sexual Activity   • Alcohol use: No     Frequency: Never   • Drug use: No   • Sexual activity: Defer   Social History Narrative    ** Merged History Encounter **                Objective   Physical Exam   Constitutional: He is oriented to person, place, and time. He appears well-developed and well-nourished. No distress.   HENT:   Head: Normocephalic and atraumatic.   Mouth/Throat: Oropharynx is clear and moist. No oropharyngeal exudate.   Eyes: Pupils are equal, round, and reactive to light. Conjunctivae and EOM are normal.   EOMs normal, no nystagmus, visual fields are full   Neck: Normal range of motion.   No meningismus   Cardiovascular: Normal  "rate, regular rhythm, normal heart sounds and intact distal pulses.   No murmur heard.  Pulmonary/Chest: Effort normal and breath sounds normal. He has no wheezes.   Abdominal: Soft. Bowel sounds are normal. There is no tenderness.   Lymphadenopathy:     He has no cervical adenopathy.   Neurological: He is alert and oriented to person, place, and time. He has normal strength. No cranial nerve deficit or sensory deficit. He displays a negative Romberg sign. GCS eye subscore is 4. GCS verbal subscore is 5. GCS motor subscore is 6.   Skin: Skin is warm. Capillary refill takes less than 2 seconds. No rash noted. No erythema.   Psychiatric: He has a normal mood and affect. His behavior is normal.   Nursing note and vitals reviewed.      ECG 12 Lead    Date/Time: 3/4/2020 7:08 PM  Performed by: Rehana Patel PA  Authorized by: Rehana Patel PA   Interpreted by physician  Comparison: compared with previous ECG from 2/3/2020  Similar to previous ECG  Comparison to previous ECG: NSR., paced, PVCs rate 69  Rhythm: sinus rhythm and paced  Ectopy: PVCs  Rate: normal  BPM: 61  Conduction: conduction normal  ST Segments: ST segments normal  Clinical impression: abnormal ECG                 ED Course  ED Course as of Mar 05 0344   Wed Mar 04, 2020   1912 Spoke to Dominic, representative from [employer] who states that patient's pacemaker is operating appropriately, she called no abnormalities, dysrhythmias or changes in rhythm.    [MM]   2038 Spoke to Lnyette Dunlap regarding patient's HPI, physical exam, lab work and imaging who agreed accept patient for admission.    [MM]      ED Course User Index  [MM] Rehana Patel PA    /96 (BP Location: Left arm, Patient Position: Lying)   Pulse 56   Temp 97.8 °F (36.6 °C) (Oral)   Resp 18   Ht 172.7 cm (68\")   Wt 80.2 kg (176 lb 12.9 oz)   SpO2 96%   BMI 26.88 kg/m²   Labs Reviewed   COMPREHENSIVE METABOLIC PANEL - Abnormal; Notable for the following components:       " Result Value    Glucose 130 (*)     Creatinine 1.49 (*)     eGFR Non  Amer 45 (*)     All other components within normal limits    Narrative:     GFR Normal >60  Chronic Kidney Disease <60  Kidney Failure <15     URINALYSIS W/ MICROSCOPIC IF INDICATED (NO CULTURE) - Abnormal; Notable for the following components:    Appearance, UA Cloudy (*)     Blood, UA Moderate (2+) (*)     Protein, UA 30 mg/dL (1+) (*)     All other components within normal limits   CBC WITH AUTO DIFFERENTIAL - Abnormal; Notable for the following components:    Hemoglobin 12.8 (*)     Neutrophil % 82.9 (*)     Lymphocyte % 9.1 (*)     Neutrophils, Absolute 8.60 (*)     All other components within normal limits   URINALYSIS, MICROSCOPIC ONLY - Abnormal; Notable for the following components:    RBC, UA Too Numerous to Count (*)     WBC, UA 3-5 (*)     All other components within normal limits   LIPASE - Normal   TROPONIN (IN-HOUSE) - Normal    Narrative:     Troponin T Reference Range:  <= 0.03 ng/mL-   Negative for AMI  >0.03 ng/mL-     Abnormal for myocardial necrosis.  Clinicians would have to utilize clinical acumen, EKG, Troponin and serial changes to determine if it is an Acute Myocardial Infarction or myocardial injury due to an underlying chronic condition.       Results may be falsely decreased if patient taking Biotin.     BNP (IN-HOUSE) - Normal    Narrative:     Among patients with dyspnea, NT-proBNP is highly sensitive for the detection of acute congestive heart failure. In addition NT-proBNP of <300 pg/ml effectively rules out acute congestive heart failure with 99% negative predictive value.    Results may be falsely decreased if patient taking Biotin.     MAGNESIUM - Normal   COMPREHENSIVE METABOLIC PANEL   CBC WITH AUTO DIFFERENTIAL   CBC AND DIFFERENTIAL    Narrative:     The following orders were created for panel order CBC & Differential.  Procedure                               Abnormality         Status                      ---------                               -----------         ------                     CBC Auto Differential[[id number]]        Abnormal            Final result                 Please view results for these tests on the individual orders.   CBC AND DIFFERENTIAL    Narrative:     The following orders were created for panel order CBC & Differential.  Procedure                               Abnormality         Status                     ---------                               -----------         ------                     CBC Auto Differential[[id number]]                                                         Please view results for these tests on the individual orders.       Ct Head Without Contrast    Result Date: 3/4/2020  1. No acute intercranial abnormality. Specifically, no evidence of hemorrhage, mass effect or midline shift. 2. Evidence of prior infarcts involving the right frontal, right occipital, and left temporal lobes. The left temporal infarct is chronic in appearance but is new from prior exam dated 11/22/2018. 3. Confluent periventricular white matter hypodensity likely representing sequelae of advanced chronic small vessel ischemic disease.  Electronically Signed By-Elliott Saeed On:3/4/2020 8:19 PM This report was finalized on [id number] by  Elliott Saeed .    Xr Chest 1 View    Result Date: 3/4/2020  1. Probable retrocardiac/medial left basilar airspace opacity likely representing atelectasis or infection.  Electronically Signed By-Elliott Saeed On:3/4/2020 7:25 PM This report was finalized on [id number] by  Elliott Saeed, .                                           MDM  Number of Diagnoses or Management Options  Dizzy:   Syncope, unspecified syncope type:   Weakness:   Diagnosis management comments: MEDICAL DECISION  Epic Chart Review: Patient's last hospital admission 2/3/2020 for chest pain, stress test per Dr. Garcia negative, EKG is normal.  Serial troponins  normal.  Comorbidities: History of stroke, hypertension, CAD, hyperlipidemia,  Differentials: TIA, dysrhythmia, electrolyte abnormality, dehydration, intracranial abnormality or hemorrhage; this list is not all inclusive and does not constitute the entirety of considered causes  Radiology interpretation:  Images reviewed by me and interpreted by radiologist,   CT Head without   Final result by Elliott Saeed MD (03/04/20 20:19:40)    Impression:     1. No acute intercranial abnormality. Specifically, no evidence of  hemorrhage, mass effect or midline shift.  2. Evidence of prior infarcts involving the right frontal, right  occipital, and left temporal lobes. The left temporal infarct is chronic  in appearance but is new from prior exam dated 11/22/2018.  3. Confluent periventricular white matter hypodensity likely  representing sequelae of advanced chronic small vessel ischemic disease.     Electronically Signed By-Elliott Saeed On:3/4/2020 8:19 PM  This report was finalized on [id number] by  Elliott Saeed, .        Narrative:     Exam: CT HEAD WO CONTRAST-     Date of Exam: 3/4/2020 7:52 PM     Indication: Dizziness, syncope     Comparison: Head CT dated 11/22/2018     Technique:  Axial CT images of the head were obtained from skull base to  vertex without IV contrast.  Coronal and sagittal reconstructions were  performed.  Automated exposure control and iterative reconstruction  methods were used.     FINDINGS  The ventricles and sulci are proportionally prominent compatible with  global cerebral volume loss. There is no mass effect or midline shift.  There is no acute intracranial hemorrhage. There is no abnormal  extra-axial fluid collection. There is chronic-appearing hypodensity and  encephalomalacia involving the right frontal cortex, left temporal lobe,  and right occipital lobe likely related to prior infarcts. The left  temporal infarct has a chronic appearance but is new from 11/22/2018.  The  remainder of the previously mentioned infarct superior present on  the prior exam. There is confluent periventricular white matter  hypodensity likely representing sequelae of chronic small vessel  ischemic disease. There is atherosclerotic calcification within the  carotid siphons. There is normal variant vertebrobasilar dolichoectasia.  The calvarium is intact. The mastoid air cells and middle ears are well  aerated. The paranasal sinuses are clear. There is mild mucosal  thickening within the right posterior ethmoid sinuses.    Lab interpretation:  Labs viewed by me significant for, glucose 130, creatinine 1.49.  Lipase normal 35.  Troponin less than 0.01.  BNP normal 257.3.  Magnesium normal 2.0.  Urinalysis 2+ blood, 1+ protein, negative UTI.  CBC within normal limits with exception of hemoglobin 12.8.  Urinalysis TNTC RBCs.  EKG interpretation: Reviewed by self, interpreted by Dr. Narvaez, normal sinus rhythm, paced, rate of 69, PVCs present.    Patient was seen and evaluated by myself in the emergency room.  Patient denied any pain or discomfort at this time.  Patient had peripheral IV placed, lab work obtained.  Patient's lab work is unremarkable.  EKG showed normal sinus rhythm, no acute changes.  Pacemaker was interrogated, spoke with Dominic, with [employer] who stated that there were no acute findings within the last week, noted that there is 6 years of battery life left.  CT of head showed no acute intracranial abnormalities, evidence of prior infarct involving the right frontal right occipital and left temporal lobes, the left temporal infarct is chronic in appearance but is new from prior exam dated 11/22/2018.  Patient was reevaluated, patient has no new complaints.  Patient has no focal neurological deficits.  Patient will be admitted for observation and further work-up of syncopal episode.  Consult placed to family medicine physician, spoke with Lynette Dunlap who agreed to accept patient for admission.   Patient remained afebrile, nontoxic appearance and in no acute distress while in the ER.  Patient was stable on admission.         Amount and/or Complexity of Data Reviewed  Clinical lab tests: ordered and reviewed  Tests in the radiology section of CPT®: ordered and reviewed    Patient Progress  Patient progress: stable      Final diagnoses:   Syncope, unspecified syncope type   Dizzy   Weakness            Rehana Patel PA  03/05/20 0350

## 2020-03-05 ENCOUNTER — APPOINTMENT (OUTPATIENT)
Dept: CARDIOLOGY | Facility: HOSPITAL | Age: 80
End: 2020-03-05

## 2020-03-05 LAB
ALBUMIN SERPL-MCNC: 3.5 G/DL (ref 3.5–5.2)
ALBUMIN/GLOB SERPL: 1.5 G/DL
ALP SERPL-CCNC: 62 U/L (ref 39–117)
ALT SERPL W P-5'-P-CCNC: 14 U/L (ref 1–41)
ANION GAP SERPL CALCULATED.3IONS-SCNC: 10 MMOL/L (ref 5–15)
AST SERPL-CCNC: 17 U/L (ref 1–40)
BACTERIA UR QL AUTO: ABNORMAL /HPF
BASOPHILS # BLD AUTO: 0 10*3/MM3 (ref 0–0.2)
BASOPHILS NFR BLD AUTO: 0.3 % (ref 0–1.5)
BH CV XLRA MEAS LEFT CCA RATIO VEL: 68 CM/SEC
BH CV XLRA MEAS LEFT DIST CCA EDV: -17.2 CM/SEC
BH CV XLRA MEAS LEFT DIST CCA PSV: -59.6 CM/SEC
BH CV XLRA MEAS LEFT DIST ICA PSV: -49.5 CM/SEC
BH CV XLRA MEAS LEFT ICA RATIO VEL: -57.6 CM/SEC
BH CV XLRA MEAS LEFT ICA/CCA RATIO: -0.85
BH CV XLRA MEAS LEFT PROX CCA EDV: 12.1 CM/SEC
BH CV XLRA MEAS LEFT PROX CCA PSV: 68 CM/SEC
BH CV XLRA MEAS LEFT PROX ECA PSV: -154 CM/SEC
BH CV XLRA MEAS LEFT PROX ICA EDV: -15.3 CM/SEC
BH CV XLRA MEAS LEFT PROX ICA PSV: -57.6 CM/SEC
BH CV XLRA MEAS LEFT PROX SCLA PSV: 80.1 CM/SEC
BH CV XLRA MEAS LEFT VERTEBRAL A EDV: -12.2 CM/SEC
BH CV XLRA MEAS LEFT VERTEBRAL A PSV: -37 CM/SEC
BH CV XLRA MEAS RIGHT CCA RATIO VEL: 77 CM/SEC
BH CV XLRA MEAS RIGHT DIST CCA EDV: -9.2 CM/SEC
BH CV XLRA MEAS RIGHT DIST CCA PSV: -67.6 CM/SEC
BH CV XLRA MEAS RIGHT DIST ICA EDV: -13.2 CM/SEC
BH CV XLRA MEAS RIGHT DIST ICA PSV: -47.4 CM/SEC
BH CV XLRA MEAS RIGHT ICA RATIO VEL: -47.4 CM/SEC
BH CV XLRA MEAS RIGHT ICA/CCA RATIO: -0.62
BH CV XLRA MEAS RIGHT PROX CCA EDV: 11.2 CM/SEC
BH CV XLRA MEAS RIGHT PROX CCA PSV: 77 CM/SEC
BH CV XLRA MEAS RIGHT PROX ECA PSV: -113 CM/SEC
BH CV XLRA MEAS RIGHT PROX ICA EDV: -12.7 CM/SEC
BH CV XLRA MEAS RIGHT PROX ICA PSV: -47.4 CM/SEC
BH CV XLRA MEAS RIGHT PROX SCLA PSV: 101 CM/SEC
BH CV XLRA MEAS RIGHT VERTEBRAL A EDV: -8.8 CM/SEC
BH CV XLRA MEAS RIGHT VERTEBRAL A PSV: -50.5 CM/SEC
BILIRUB SERPL-MCNC: 0.3 MG/DL (ref 0.2–1.2)
BILIRUB UR QL STRIP: NEGATIVE
BUN BLD-MCNC: 13 MG/DL (ref 8–23)
BUN/CREAT SERPL: 10.4 (ref 7–25)
CALCIUM SPEC-SCNC: 8.8 MG/DL (ref 8.6–10.5)
CHLORIDE SERPL-SCNC: 107 MMOL/L (ref 98–107)
CHOLEST SERPL-MCNC: 102 MG/DL (ref 0–200)
CLARITY UR: ABNORMAL
CO2 SERPL-SCNC: 23 MMOL/L (ref 22–29)
COLOR UR: ABNORMAL
CREAT BLD-MCNC: 1.25 MG/DL (ref 0.76–1.27)
DEPRECATED RDW RBC AUTO: 43.3 FL (ref 37–54)
EOSINOPHIL # BLD AUTO: 0.2 10*3/MM3 (ref 0–0.4)
EOSINOPHIL NFR BLD AUTO: 1.4 % (ref 0.3–6.2)
ERYTHROCYTE [DISTWIDTH] IN BLOOD BY AUTOMATED COUNT: 14 % (ref 12.3–15.4)
GFR SERPL CREATININE-BSD FRML MDRD: 56 ML/MIN/1.73
GLOBULIN UR ELPH-MCNC: 2.4 GM/DL
GLUCOSE BLD-MCNC: 94 MG/DL (ref 65–99)
GLUCOSE UR STRIP-MCNC: NEGATIVE MG/DL
HBA1C MFR BLD: 6.2 % (ref 4.8–5.6)
HCT VFR BLD AUTO: 38.4 % (ref 37.5–51)
HDLC SERPL-MCNC: 38 MG/DL (ref 40–60)
HGB BLD-MCNC: 12.9 G/DL (ref 13–17.7)
HGB UR QL STRIP.AUTO: ABNORMAL
HYALINE CASTS UR QL AUTO: ABNORMAL /LPF
KETONES UR QL STRIP: NEGATIVE
LDLC SERPL CALC-MCNC: 33 MG/DL (ref 0–100)
LDLC/HDLC SERPL: 0.87 {RATIO}
LEUKOCYTE ESTERASE UR QL STRIP.AUTO: ABNORMAL
LYMPHOCYTES # BLD AUTO: 1.9 10*3/MM3 (ref 0.7–3.1)
LYMPHOCYTES NFR BLD AUTO: 16.5 % (ref 19.6–45.3)
MCH RBC QN AUTO: 29.5 PG (ref 26.6–33)
MCHC RBC AUTO-ENTMCNC: 33.5 G/DL (ref 31.5–35.7)
MCV RBC AUTO: 88.2 FL (ref 79–97)
MONOCYTES # BLD AUTO: 0.9 10*3/MM3 (ref 0.1–0.9)
MONOCYTES NFR BLD AUTO: 7.3 % (ref 5–12)
NEUTROPHILS # BLD AUTO: 8.7 10*3/MM3 (ref 1.7–7)
NEUTROPHILS NFR BLD AUTO: 74.5 % (ref 42.7–76)
NITRITE UR QL STRIP: NEGATIVE
NRBC BLD AUTO-RTO: 0 /100 WBC (ref 0–0.2)
PH UR STRIP.AUTO: 5.5 [PH] (ref 5–8)
PLATELET # BLD AUTO: 224 10*3/MM3 (ref 140–450)
PMV BLD AUTO: 7.8 FL (ref 6–12)
POTASSIUM BLD-SCNC: 3.8 MMOL/L (ref 3.5–5.2)
PROT SERPL-MCNC: 5.9 G/DL (ref 6–8.5)
PROT UR QL STRIP: ABNORMAL
RBC # BLD AUTO: 4.36 10*6/MM3 (ref 4.14–5.8)
RBC # UR: ABNORMAL /HPF
REF LAB TEST METHOD: ABNORMAL
SODIUM BLD-SCNC: 140 MMOL/L (ref 136–145)
SP GR UR STRIP: 1.01 (ref 1–1.03)
SQUAMOUS #/AREA URNS HPF: ABNORMAL /HPF
TRIGL SERPL-MCNC: 155 MG/DL (ref 0–150)
TSH SERPL DL<=0.05 MIU/L-ACNC: 3.57 UIU/ML (ref 0.27–4.2)
UROBILINOGEN UR QL STRIP: ABNORMAL
VLDLC SERPL-MCNC: 31 MG/DL
WBC NRBC COR # BLD: 11.7 10*3/MM3 (ref 3.4–10.8)
WBC UR QL AUTO: ABNORMAL /HPF

## 2020-03-05 PROCEDURE — 99222 1ST HOSP IP/OBS MODERATE 55: CPT | Performed by: NURSE PRACTITIONER

## 2020-03-05 PROCEDURE — 93880 EXTRACRANIAL BILAT STUDY: CPT

## 2020-03-05 PROCEDURE — 80061 LIPID PANEL: CPT | Performed by: NURSE PRACTITIONER

## 2020-03-05 PROCEDURE — 80053 COMPREHEN METABOLIC PANEL: CPT | Performed by: NURSE PRACTITIONER

## 2020-03-05 PROCEDURE — 25010000002 CEFTRIAXONE PER 250 MG: Performed by: NURSE PRACTITIONER

## 2020-03-05 PROCEDURE — G0378 HOSPITAL OBSERVATION PER HR: HCPCS

## 2020-03-05 PROCEDURE — 83036 HEMOGLOBIN GLYCOSYLATED A1C: CPT | Performed by: NURSE PRACTITIONER

## 2020-03-05 PROCEDURE — 84443 ASSAY THYROID STIM HORMONE: CPT | Performed by: NURSE PRACTITIONER

## 2020-03-05 PROCEDURE — 85025 COMPLETE CBC W/AUTO DIFF WBC: CPT | Performed by: NURSE PRACTITIONER

## 2020-03-05 PROCEDURE — 63710000001 PREDNISONE PER 5 MG: Performed by: NURSE PRACTITIONER

## 2020-03-05 PROCEDURE — 81001 URINALYSIS AUTO W/SCOPE: CPT | Performed by: UROLOGY

## 2020-03-05 RX ORDER — PANTOPRAZOLE SODIUM 40 MG/1
40 TABLET, DELAYED RELEASE ORAL
Status: DISCONTINUED | OUTPATIENT
Start: 2020-03-05 | End: 2020-03-06 | Stop reason: HOSPADM

## 2020-03-05 RX ORDER — HYDRALAZINE HYDROCHLORIDE 20 MG/ML
10 INJECTION INTRAMUSCULAR; INTRAVENOUS EVERY 6 HOURS PRN
Status: DISCONTINUED | OUTPATIENT
Start: 2020-03-05 | End: 2020-03-06 | Stop reason: HOSPADM

## 2020-03-05 RX ORDER — PANTOPRAZOLE SODIUM 40 MG/1
40 TABLET, DELAYED RELEASE ORAL
Status: DISCONTINUED | OUTPATIENT
Start: 2020-03-06 | End: 2020-03-05

## 2020-03-05 RX ADMIN — Medication 10 ML: at 00:12

## 2020-03-05 RX ADMIN — TAMSULOSIN HYDROCHLORIDE 0.4 MG: 0.4 CAPSULE ORAL at 00:11

## 2020-03-05 RX ADMIN — CYANOCOBALAMIN TAB 1000 MCG 1000 MCG: 1000 TAB at 09:57

## 2020-03-05 RX ADMIN — PREDNISONE 5 MG: 5 TABLET ORAL at 08:00

## 2020-03-05 RX ADMIN — Medication 10 ML: at 20:56

## 2020-03-05 RX ADMIN — CETIRIZINE HYDROCHLORIDE 10 MG: 10 TABLET, FILM COATED ORAL at 09:57

## 2020-03-05 RX ADMIN — APIXABAN 5 MG: 5 TABLET, FILM COATED ORAL at 00:11

## 2020-03-05 RX ADMIN — ISOSORBIDE MONONITRATE 30 MG: 30 TABLET, EXTENDED RELEASE ORAL at 09:57

## 2020-03-05 RX ADMIN — ESCITALOPRAM OXALATE 10 MG: 10 TABLET ORAL at 09:57

## 2020-03-05 RX ADMIN — PANTOPRAZOLE SODIUM 40 MG: 40 TABLET, DELAYED RELEASE ORAL at 22:08

## 2020-03-05 RX ADMIN — HYDRALAZINE HYDROCHLORIDE 50 MG: 25 TABLET, FILM COATED ORAL at 00:12

## 2020-03-05 RX ADMIN — HYDRALAZINE HYDROCHLORIDE 50 MG: 25 TABLET, FILM COATED ORAL at 09:57

## 2020-03-05 RX ADMIN — Medication 10 ML: at 09:57

## 2020-03-05 RX ADMIN — FLUTICASONE PROPIONATE 2 SPRAY: 50 SPRAY, METERED NASAL at 09:58

## 2020-03-05 RX ADMIN — HYDRALAZINE HYDROCHLORIDE 50 MG: 25 TABLET, FILM COATED ORAL at 20:55

## 2020-03-05 RX ADMIN — MULTIPLE VITAMINS W/ MINERALS TAB 1 TABLET: TAB at 20:55

## 2020-03-05 RX ADMIN — APIXABAN 5 MG: 5 TABLET, FILM COATED ORAL at 09:57

## 2020-03-05 RX ADMIN — TAMSULOSIN HYDROCHLORIDE 0.4 MG: 0.4 CAPSULE ORAL at 20:55

## 2020-03-05 RX ADMIN — BRIMONIDINE TARTRATE 1 DROP: 2 SOLUTION OPHTHALMIC at 20:56

## 2020-03-05 RX ADMIN — LEVOTHYROXINE SODIUM 25 MCG: 25 TABLET ORAL at 05:10

## 2020-03-05 RX ADMIN — ROSUVASTATIN CALCIUM 20 MG: 10 TABLET, FILM COATED ORAL at 20:55

## 2020-03-05 RX ADMIN — CEFTRIAXONE SODIUM 1 G: 1 INJECTION, POWDER, FOR SOLUTION INTRAMUSCULAR; INTRAVENOUS at 21:21

## 2020-03-05 RX ADMIN — BRIMONIDINE TARTRATE 1 DROP: 2 SOLUTION OPHTHALMIC at 09:58

## 2020-03-05 RX ADMIN — MULTIPLE VITAMINS W/ MINERALS TAB 1 TABLET: TAB at 00:11

## 2020-03-05 RX ADMIN — APIXABAN 5 MG: 5 TABLET, FILM COATED ORAL at 20:55

## 2020-03-05 RX ADMIN — PANTOPRAZOLE SODIUM 40 MG: 40 TABLET, DELAYED RELEASE ORAL at 05:10

## 2020-03-05 RX ADMIN — ROSUVASTATIN CALCIUM 20 MG: 10 TABLET, FILM COATED ORAL at 00:12

## 2020-03-05 RX ADMIN — SODIUM CHLORIDE 100 ML/HR: 900 INJECTION, SOLUTION INTRAVENOUS at 00:11

## 2020-03-05 RX ADMIN — CLOPIDOGREL BISULFATE 75 MG: 75 TABLET ORAL at 09:57

## 2020-03-05 RX ADMIN — METOPROLOL TARTRATE 25 MG: 25 TABLET, FILM COATED ORAL at 17:29

## 2020-03-05 RX ADMIN — METOPROLOL TARTRATE 25 MG: 25 TABLET, FILM COATED ORAL at 00:11

## 2020-03-05 RX ADMIN — WATER 1 G: 100 INJECTION, SOLUTION INTRAVENOUS at 00:11

## 2020-03-05 RX ADMIN — GALANTAMINE HYDROBROMIDE 8 MG: 8 CAPSULE, EXTENDED RELEASE ORAL at 20:55

## 2020-03-05 NOTE — CONSULTS
Primary Care Provider: Maude Ricketts APRN     Consult requested by: DEANDRA Tucker    Reason for Consultation: Neurological evaluation, presyncope    History taken from: patient chart RN    Chief complaint: Presyncope       SUBJECTIVE:    History of present illness: Patient is a 79 y.o. male admitted through ER where he was brought by ambulance yesterday anila after syncope episode at home. Wife reports he was getting out of the car and passed out, eyes rolled back in his head and he was unresponsive for a minute or 2. She did not think he was breathing during that time but not sure. He had Hx of labile BP, some hypotension, syncope and presyncope in past, dementia, CAD. He was here in Jan for chest pain and work up. Since here he has had no further syncope/dizziness and denies all pain or c/o.     Portions of the above HPI have been copied from previous encounters and edited as appropriate.    Patient's wife at bedside. States they were driving back from his Cardiology appointment when the patient feel that his vision was a little off. When arriving home, the daughter in law was helping the patient out of the car and he states he felt very weak and dizzy. The family member lowered him to the ground and patient lost consciousness. The wife feels that he was stiff and his eyes were rolled back. No obvious seizure activity. Patient was unconscious for several minutes per the wife. He came to after she started pumping on his chest. He did not have loss of bowel or bladder, no tongue bite. He was talking to EMS when they arrived. Wife did not feel that he was more confused than normal or lethargic after the event. No other recent history of events like this. Wife feels that patient has been very weak the past few weeks and she is not comfortable with him going home in the same condition.     Review of Systems   Constitutional: Positive for fatigue. Negative for chills.   Eyes: Negative for photophobia and  visual disturbance.   Cardiovascular: Negative for chest pain, palpitations and leg swelling.   Gastrointestinal: Negative for diarrhea, nausea and vomiting.   Neurological: Positive for dizziness, syncope and facial asymmetry. Negative for tremors, seizures, speech difficulty, weakness, light-headedness, numbness and headaches.          PATIENT HISTORY:  Past Medical History:   Diagnosis Date   • CAD (coronary artery disease)    • Coronary artery disease     [facility]   • Dementia (CMS/HCC)    • Depression    • Disease of thyroid gland    • Dysautonomia (CMS/HCC)    • Dyslipidemia    • GERD (gastroesophageal reflux disease)    • Head pain    • Hyperlipidemia    • Hypertension    • SSS (sick sinus syndrome) (CMS/HCC)    • Stroke (CMS/HCC)     x 3 in 2018   , Past Surgical History:   Procedure Laterality Date   • APPENDECTOMY     • CHOLECYSTECTOMY     • ENDOSCOPY N/A 10/17/2019    Procedure: ESOPHAGOGASTRODUODENOSCOPY with biopsy x 2 areas;  Surgeon: Ashok Sanchez MD;  Location: [facility] ENDOSCOPY;  Service: Gastroenterology   • HERNIA REPAIR     • LEFT HEART CATH     • PACEMAKER IMPLANTATION     • TEMPORAL ARTERY BIOPSY     • WRIST SURGERY      severed artery   , No family history on file., Social History     Tobacco Use   • Smoking status: Never Smoker   • Smokeless tobacco: Never Used   • Tobacco comment: quit 25 yrs ago   Substance Use Topics   • Alcohol use: No     Frequency: Never   • Drug use: No   , Medications Prior to Admission   Medication Sig Dispense Refill Last Dose   • clopidogrel (PLAVIX) 75 MG tablet Take 75 mg by mouth Daily.      • apixaban (ELIQUIS) 5 MG tablet tablet Take 1 tablet by mouth Every 12 (Twelve) Hours. Indications: Atrial Fibrillation 60 tablet 0    • brimonidine (ALPHAGAN) 0.2 % ophthalmic solution Administer 1 drop to both eyes 2 (Two) Times a Day. 10 mL 12    • escitalopram (LEXAPRO) 10 MG tablet Take 10 mg by mouth Daily.   10/17/2019 at Unknown time   • fluticasone (FLONASE) 50 MCG/ACT  nasal spray 2 sprays into the nostril(s) as directed by provider Daily.   10/16/2019 at Unknown time   • galantamine ER (RAZADYNE ER) 8 MG 24 hr capsule Take 8 mg by mouth 2 (Two) Times a Day.   10/16/2019 at Unknown time   • hydrALAZINE (APRESOLINE) 50 MG tablet Take 50 mg by mouth 2 (Two) Times a Day.   10/17/2019 at Unknown time   • isosorbide mononitrate (IMDUR) 30 MG 24 hr tablet Take 1 tablet by mouth Daily. 30 tablet 0    • levothyroxine (SYNTHROID, LEVOTHROID) 25 MCG tablet Take 25 mcg by mouth Daily.   10/17/2019 at Unknown time   • loratadine (CLARITIN) 10 MG tablet Take 10 mg by mouth Daily.   10/17/2019 at Unknown time   • metoprolol tartrate (LOPRESSOR) 25 MG tablet Take 1 tablet by mouth Every 12 (Twelve) Hours. (Patient taking differently: Take 25 mg by mouth Every Evening. Hold if pulse < 60) 60 tablet 0    • Multiple Vitamins-Minerals (MULTIVITAMIN WITH MINERALS) tablet tablet Take 1 tablet by mouth Every Night.   2/3/2020 at Unknown time   • nitroglycerin (NITROSTAT) 0.4 MG SL tablet Place 0.4 mg under the tongue Every 5 (Five) Minutes As Needed for Chest Pain. Take no more than 3 doses in 15 minutes.      • Omega-3 Fatty Acids (FISH OIL) 1000 MG capsule capsule Take 1,000 mg by mouth Daily With Breakfast.   2/3/2020 at Unknown time   • pantoprazole (PROTONIX) 40 MG EC tablet Take 40 mg by mouth Daily.   10/16/2019 at Unknown time   • predniSONE (DELTASONE) 5 MG tablet Take 5 mg by mouth Daily.   10/16/2019 at Unknown time   • promethazine (PHENERGAN) 25 MG tablet Take 25 mg by mouth Every 6 (Six) Hours As Needed for Nausea or Vomiting.      • rosuvastatin (CRESTOR) 20 MG tablet Take 20 mg by mouth Every Night.   10/16/2019 at Unknown time   • tamsulosin (FLOMAX) 0.4 MG capsule 24 hr capsule Take 1 capsule by mouth Every Night.   10/16/2019 at Unknown time   • vitamin B-12 (CYANOCOBALAMIN) 1000 MCG tablet Take 1,000 mcg by mouth Daily.   2/3/2020 at Unknown time   , Scheduled Meds:    apixaban 5 mg  Oral Q12H   brimonidine 1 drop Both Eyes BID   cefTRIAXone 1 g Intravenous Q24H   cetirizine 10 mg Oral Daily   clopidogrel 75 mg Oral Daily   escitalopram 10 mg Oral Daily   fluticasone 2 spray Nasal Daily   galantamine ER 8 mg Oral BID   hydrALAZINE 50 mg Oral BID   isosorbide mononitrate 30 mg Oral Daily   levothyroxine 25 mcg Oral Q AM   metoprolol tartrate 25 mg Oral Q PM   multivitamin (eye vitamin) 1 tablet Oral Nightly   pantoprazole 40 mg Oral Q AM   predniSONE 5 mg Oral Daily With Breakfast   rosuvastatin 20 mg Oral Nightly   tamsulosin 0.4 mg Oral Nightly   vitamin B-12 1,000 mcg Oral Daily   , Continuous Infusions:    sodium chloride 100 mL/hr Last Rate: 100 mL/hr (03/05/20 0011)   , PRN Meds:  •  acetaminophen  •  diphenhydrAMINE  •  hydrALAZINE  •  LORazepam  •  nitroglycerin  •  ondansetron  •  potassium chloride **OR** potassium chloride **OR** potassium chloride  •  promethazine  •  [COMPLETED] Insert peripheral IV **AND** sodium chloride, Allergies:  Doxycycline and Doxycycline    ________________________________________________________        OBJECTIVE:    PHYSICAL EXAM:    Constitutional: The patient is in no apparent distress, bright awake and alert. There is no shortness of breath.     HEENT: Normocephalic, atraumatic.     Chest: Breathing unlabored    Cardiac: Regular rate and rhythm.     Extremities:  No clubbing, cyanosis or edema.    NEUROLOGICAL:    Cognition:   Oriented x 3   Fund of knowledge limited .  Short term memory is not intact     Cranial nerves;    II - pupils bilaterally equal reacting to light,  No new Visual field deficits;  Fundoscopic exam- Not able to be done, non-dilated exam  III,IV,VI: EOMI with no diplopia  V: Normal facial sensations  VII: No facial asymmetry,  VIII: No New hearing abnormality  IX, X, XI: normal gag and shoulder shrug;  XII: tongue is in the midline.    Sensory:  Intact to light touch in all extremities.     Motor: Strength 5-/5 bilaterally upper and  lower extremities. No involuntary movements present. Normal tone and bulk.  Deep tendon reflexes: 0/4 and symmetrical in biceps, brachioradialis, triceps, bilateral 2/4 knees and ankles. Both plantars are flexor.    Cerebellar: Finger to nose and mirror movements normal bilaterally.    Gait and balance: Deferred.     Physical exam performed by HAILEY Almendarez.  ________________________________________________________   RESULTS REVIEW:    VITAL SIGNS:   Temp:  [97.3 °F (36.3 °C)-97.8 °F (36.6 °C)] 97.8 °F (36.6 °C)  Heart Rate:  [56-67] 56  Resp:  [17-18] 18  BP: (113-162)/(63-96) 162/96     LABS:  WBC   Date Value Ref Range Status   03/05/2020 11.70 (H) 3.40 - 10.80 10*3/mm3 Final     RBC   Date Value Ref Range Status   03/05/2020 4.36 4.14 - 5.80 10*6/mm3 Final     Hemoglobin   Date Value Ref Range Status   03/05/2020 12.9 (L) 13.0 - 17.7 g/dL Final     Hematocrit   Date Value Ref Range Status   03/05/2020 38.4 37.5 - 51.0 % Final     MCV   Date Value Ref Range Status   03/05/2020 88.2 79.0 - 97.0 fL Final     MCH   Date Value Ref Range Status   03/05/2020 29.5 26.6 - 33.0 pg Final     MCHC   Date Value Ref Range Status   03/05/2020 33.5 31.5 - 35.7 g/dL Final     RDW   Date Value Ref Range Status   03/05/2020 14.0 12.3 - 15.4 % Final     RDW-SD   Date Value Ref Range Status   03/05/2020 43.3 37.0 - 54.0 fl Final     MPV   Date Value Ref Range Status   03/05/2020 7.8 6.0 - 12.0 fL Final     Platelets   Date Value Ref Range Status   03/05/2020 224 140 - 450 10*3/mm3 Final     Neutrophil %   Date Value Ref Range Status   03/05/2020 74.5 42.7 - 76.0 % Final     Lymphocyte %   Date Value Ref Range Status   03/05/2020 16.5 (L) 19.6 - 45.3 % Final     Monocyte %   Date Value Ref Range Status   03/05/2020 7.3 5.0 - 12.0 % Final     Eosinophil %   Date Value Ref Range Status   03/05/2020 1.4 0.3 - 6.2 % Final     Basophil %   Date Value Ref Range Status   03/05/2020 0.3 0.0 - 1.5 % Final     Neutrophils, Absolute   Date  Value Ref Range Status   03/05/2020 8.70 (H) 1.70 - 7.00 10*3/mm3 Final     Lymphocytes, Absolute   Date Value Ref Range Status   03/05/2020 1.90 0.70 - 3.10 10*3/mm3 Final     Monocytes, Absolute   Date Value Ref Range Status   03/05/2020 0.90 0.10 - 0.90 10*3/mm3 Final     Eosinophils, Absolute   Date Value Ref Range Status   03/05/2020 0.20 0.00 - 0.40 10*3/mm3 Final     Basophils, Absolute   Date Value Ref Range Status   03/05/2020 0.00 0.00 - 0.20 10*3/mm3 Final     nRBC   Date Value Ref Range Status   03/05/2020 0.0 0.0 - 0.2 /100 WBC Final     Glucose   Date Value Ref Range Status   03/05/2020 94 65 - 99 mg/dL Final     BUN   Date Value Ref Range Status   03/05/2020 13 8 - 23 mg/dL Final     Creatinine   Date Value Ref Range Status   03/05/2020 1.25 0.76 - 1.27 mg/dL Final     Sodium   Date Value Ref Range Status   03/05/2020 140 136 - 145 mmol/L Final     Potassium   Date Value Ref Range Status   03/05/2020 3.8 3.5 - 5.2 mmol/L Final     Chloride   Date Value Ref Range Status   03/05/2020 107 98 - 107 mmol/L Final     CO2   Date Value Ref Range Status   03/05/2020 23.0 22.0 - 29.0 mmol/L Final     Calcium   Date Value Ref Range Status   03/05/2020 8.8 8.6 - 10.5 mg/dL Final     Total Protein   Date Value Ref Range Status   03/05/2020 5.9 (L) 6.0 - 8.5 g/dL Final     Albumin   Date Value Ref Range Status   03/05/2020 3.50 3.50 - 5.20 g/dL Final     ALT (SGPT)   Date Value Ref Range Status   03/05/2020 14 1 - 41 U/L Final     AST (SGOT)   Date Value Ref Range Status   03/05/2020 17 1 - 40 U/L Final     Alkaline Phosphatase   Date Value Ref Range Status   03/05/2020 62 39 - 117 U/L Final     Total Bilirubin   Date Value Ref Range Status   03/05/2020 0.3 0.2 - 1.2 mg/dL Final     eGFR Non  Amer   Date Value Ref Range Status   03/05/2020 56 (L) >60 mL/min/1.73 Final     BUN/Creatinine Ratio   Date Value Ref Range Status   03/05/2020 10.4 7.0 - 25.0 Final     Anion Gap   Date Value Ref Range Status    03/05/2020 10.0 5.0 - 15.0 mmol/L Final       Lab Results   Component Value Date    LDL 26 02/03/2020    HGBA1C 6.00 (H) 11/10/2018         IMAGING STUDIES:  Ct Head Without Contrast    Result Date: 3/4/2020  1. No acute intercranial abnormality. Specifically, no evidence of hemorrhage, mass effect or midline shift. 2. Evidence of prior infarcts involving the right frontal, right occipital, and left temporal lobes. The left temporal infarct is chronic in appearance but is new from prior exam dated 11/22/2018. 3. Confluent periventricular white matter hypodensity likely representing sequelae of advanced chronic small vessel ischemic disease.  Electronically Signed By-Elliott Saeed On:3/4/2020 8:19 PM This report was finalized on [id number] by  Elliott Saeed, .    Xr Chest 1 View    Result Date: 3/4/2020  1. Probable retrocardiac/medial left basilar airspace opacity likely representing atelectasis or infection.  Electronically Signed By-Elliott Saeed On:3/4/2020 7:25 PM This report was finalized on [id number] by  Elliott Saeed, .      I reviewed the patient's new clinical results.      ________________________________________________________     PROBLEM LIST:    Syncope          Assessment/Plan   ASSESSMENT/PLAN:  Syncope vs seizure. Most likely hypotensive episode with symptoms of weakness and dizziness prior to spell. Cannot rule out seizure- will obtain EEG.   - CT head did not show any acute findings   - Carotids   - EEG in AM or can be done outpatient   - EKG: Sinus rhythm, CA interval prolonged 222  - A1C: P, B12: P, LDL: 33, TSH: 3.570  - Orthostatic Vitals (lying, sitting, standing)  - Medications reviewed  - Fall (syncope) precautions    Generalized weakness  - PT/OT eval  - may need placement    HTN  - BP very labile, pt has history of orthostatic hypotension  - Closely monitor     History of chronic infarcts  -Continue Eliquis, Plavix, statin  - Blood pressure should be less than 130/80  outpatient, HbA1c less than 6.5, LDL less than 70; b12>500 and smoking cessation if applicable. We would be grateful if the primary team / primary care physician would keep a close watch on the above targets.    Will follow.     I discussed the patient's findings and my recommendations with patient, family and nursing staff    DEANDRA Qureshi  03/05/20  10:04 AM

## 2020-03-05 NOTE — PROGRESS NOTES
Discharge Planning Assessment   [facility]     Patient Name: Jayden Bond  MRN: [id number]  Today's Date: 3/5/2020    Admit Date: 3/4/2020    Discharge Needs Assessment     Row Name 03/05/20 1507       Living Environment    Lives With  spouse    Current Living Arrangements  home/apartment/condo    Potentially Unsafe Housing Conditions  unable to assess    Primary Care Provided by  spouse/significant other    Provides Primary Care For  no one, unable/limited ability to care for self    Family Caregiver if Needed  spouse    Quality of Family Relationships  supportive    Able to Return to Prior Arrangements  yes spouse states that patient will return home with her       Resource/Environmental Concerns    Resource/Environmental Concerns  none    Transportation Concerns  car, none       Transition Planning    Patient/Family Anticipates Transition to  home with family    Transportation Anticipated  family or friend will provide       Discharge Needs Assessment    Readmission Within the Last 30 Days  other (see comments) was here in february for chest   pain as an observation    Concerns to be Addressed  discharge planning    Equipment Currently Used at Home  oxygen patient has oxygen thru [facility] at night    Anticipated Changes Related to Illness  inability to care for self    Provided Post Acute Provider List?  N/A    N/A Provider List Comment  spouse denies any needs for discharge        Discharge Plan     Row Name 03/05/20 1510       Plan    Plan  from home with physical therapy to evaluate    Patient/Family in Agreement with Plan  yes spouse states patient has no dme to ambulate; she denies any needs for discharge          Functional Status     Row Name 03/05/20 1505       Functional Status    Usual Activity Tolerance  poor    Current Activity Tolerance  poor       Functional Status, IADL    Medications  assistive person wife states she takes care of medications and helping bathing patient           Carol naegele rn  Case  management  Office number [phone]  Cell phone [phone]

## 2020-03-05 NOTE — PLAN OF CARE
Patient resting abed, no complaints voiced. Patient having some hematuria but did have a straight cath procedure done earlier.

## 2020-03-05 NOTE — CONSULTS
Urology Consult Note    Patient:Jayden Bond :1940  Room:[facility]  Admit Date3/2020  Age:79 y.o.     SEX:male     DOS:3/5/2020     MR:[id number]     Visit:[id number]       Attending: Allan Mckeon MD  Referring Provider: Dr. Mckeon  Reason for Consultation: Gross hematuria    Patient Care Team:  Maude Ricketts APRN as PCP - General (Nurse Practitioner)  Maude Ricketts APRN    Chief complaint blood in urine    Subjective .     History of present illness: 79-year-old gentleman who was admitted after a syncopal episode yesterday.  At that time in the emergency room patient had an in and out catheterization performed.  Ever since then he has been noticing gross hematuria.  He has passed a few small clots with this.  It does seem to be turning more brown and clearing a little bit today.  Of note he is on Eliquis and Plavix which makes this worse.  He does have some associated obstructive voiding symptoms.  Patient has some urgency and frequency.  He also gets up 1-4 times a night.  Patient is followed by my partner Dr. Daniel Cerrato.  He did have a cystoscopy approximately 2 years ago which revealed BPH but no other pathology.    Patient is emptying well with a postvoid residual of 9 mL.    Urinalysis reveals too numerous to count red cells, 6-12 white cells, but no bacteria.    Review of Systems  10 point review of systems were reviewed and are negative except for:  Constitution:  positive for See HPI    History  Past Medical History:   Diagnosis Date   • CAD (coronary artery disease)    • Coronary artery disease     PTCI   • Dementia (CMS/HCC)    • Depression    • Disease of thyroid gland    • Dysautonomia (CMS/HCC)    • Dyslipidemia    • GERD (gastroesophageal reflux disease)    • Head pain    • Hyperlipidemia    • Hypertension    • SSS (sick sinus syndrome) (CMS/HCC)    • Stroke (CMS/HCC)     x 3 in 2018     Past Surgical History:   Procedure Laterality Date   • APPENDECTOMY     • CHOLECYSTECTOMY     •  ENDOSCOPY N/A 10/17/2019    Procedure: ESOPHAGOGASTRODUODENOSCOPY with biopsy x 2 areas;  Surgeon: Ashok Sanchez MD;  Location: [facility] ENDOSCOPY;  Service: Gastroenterology   • HERNIA REPAIR     • LEFT HEART CATH     • PACEMAKER IMPLANTATION     • TEMPORAL ARTERY BIOPSY     • WRIST SURGERY      severed artery     Social History     Socioeconomic History   • Marital status:      Spouse name: Not on file   • Number of children: Not on file   • Years of education: Not on file   • Highest education level: Not on file   Tobacco Use   • Smoking status: Never Smoker   • Smokeless tobacco: Never Used   • Tobacco comment: quit 25 yrs ago   Substance and Sexual Activity   • Alcohol use: No     Frequency: Never   • Drug use: No   • Sexual activity: Defer   Social History Narrative    ** Merged History Encounter **          No family history on file.  Allergy  Allergies   Allergen Reactions   • Doxycycline GI Intolerance   • Doxycycline Nausea And Vomiting     Prior to Admission medications    Medication Sig Start Date End Date Taking? Authorizing Provider   clopidogrel (PLAVIX) 75 MG tablet Take 75 mg by mouth Daily.   Yes Juan Bateman MD   apixaban (ELIQUIS) 5 MG tablet tablet Take 1 tablet by mouth Every 12 (Twelve) Hours. Indications: Atrial Fibrillation 2/4/20   Romelia Cornejo DO   brimonidine (ALPHAGAN) 0.2 % ophthalmic solution Administer 1 drop to both eyes 2 (Two) Times a Day. 2/4/20   Romelia Cornejo DO   escitalopram (LEXAPRO) 10 MG tablet Take 10 mg by mouth Daily.    ProviderJuan MD   fluticasone (FLONASE) 50 MCG/ACT nasal spray 2 sprays into the nostril(s) as directed by provider Daily.    Juan Bateman MD   galantamine ER (RAZADYNE ER) 8 MG 24 hr capsule Take 8 mg by mouth 2 (Two) Times a Day.    Juan Bateman MD   hydrALAZINE (APRESOLINE) 50 MG tablet Take 50 mg by mouth 2 (Two) Times a Day.    Juan Bateman MD   isosorbide mononitrate (IMDUR) 30 MG 24 hr tablet  Take 1 tablet by mouth Daily. 20   Romelia Cornejo, DO   levothyroxine (SYNTHROID, LEVOTHROID) 25 MCG tablet Take 25 mcg by mouth Daily.    Juan Bateman MD   loratadine (CLARITIN) 10 MG tablet Take 10 mg by mouth Daily.    Juan Bateman MD   metoprolol tartrate (LOPRESSOR) 25 MG tablet Take 1 tablet by mouth Every 12 (Twelve) Hours.  Patient taking differently: Take 25 mg by mouth Every Evening. Hold if pulse < 60 20   Romelia Cornejo, DO   Multiple Vitamins-Minerals (MULTIVITAMIN WITH MINERALS) tablet tablet Take 1 tablet by mouth Every Night.    Juan Bateman MD   nitroglycerin (NITROSTAT) 0.4 MG SL tablet Place 0.4 mg under the tongue Every 5 (Five) Minutes As Needed for Chest Pain. Take no more than 3 doses in 15 minutes.    Juan Bateman MD   Omega-3 Fatty Acids (FISH OIL) 1000 MG capsule capsule Take 1,000 mg by mouth Daily With Breakfast.    Juan Bateman MD   pantoprazole (PROTONIX) 40 MG EC tablet Take 40 mg by mouth Daily.    Juan Bateman MD   predniSONE (DELTASONE) 5 MG tablet Take 5 mg by mouth Daily.    Juan Bateman MD   promethazine (PHENERGAN) 25 MG tablet Take 25 mg by mouth Every 6 (Six) Hours As Needed for Nausea or Vomiting.    Juan Bateman MD   rosuvastatin (CRESTOR) 20 MG tablet Take 20 mg by mouth Every Night.    Juan Bateman MD   tamsulosin (FLOMAX) 0.4 MG capsule 24 hr capsule Take 1 capsule by mouth Every Night.    Juan Bateman MD   vitamin B-12 (CYANOCOBALAMIN) 1000 MCG tablet Take 1,000 mcg by mouth Daily.    Juan Bateman MD         Objective     tMax 24 hours:  Temp (24hrs), Av.6 °F (36.4 °C), Min:97.3 °F (36.3 °C), Max:97.8 °F (36.6 °C)    Vital Sign Ranges:  Temp:  [97.3 °F (36.3 °C)-97.8 °F (36.6 °C)] 97.8 °F (36.6 °C)  Heart Rate:  [56-82] 82  Resp:  [16-18] 16  BP: (113-162)/(63-96) 152/94  Intake and Output Last 3 Shifts:  I/O last 3 completed shifts:  In: -   Out: 200  [Urine:200]      Physical Exam:   General Appearance alert, appears stated age and cooperative  Head normocephalic, without obvious abnormality and atraumatic  Eyes lids and lashes normal, conjunctivae and sclerae normal, no icterus, no pallor and corneas clear  Lungs respirations regular, respirations even and respirations unlabored  Heart regular rhythm & normal rate  Abdomen soft non-tender, no guarding and no rebound tenderness  Extremities moves extremities well, no edema, no cyanosis and no redness  Skin no bleeding, bruising or rash  Neurologic Mental Status orientated to person, place, time and situation    Results Review:     Lab Results (last 24 hours)     Procedure Component Value Units Date/Time    Urinalysis With Microscopic If Indicated (No Culture) - Urine, Clean Catch [[id number]]  (Abnormal) Collected:  03/05/20 1006    Specimen:  Urine, Clean Catch Updated:  03/05/20 1311     Color, UA Orange     Comment: Result checked         Appearance, UA Cloudy     Comment: Result checked         pH, UA 5.5     Specific Gravity, UA 1.014     Glucose, UA Negative     Ketones, UA Negative     Bilirubin, UA Negative     Blood, UA Large (3+)     Protein,  mg/dL (2+)     Leuk Esterase, UA Trace     Nitrite, UA Negative     Urobilinogen, UA 0.2 E.U./dL    Urinalysis, Microscopic Only - Urine, Clean Catch [[id number]]  (Abnormal) Collected:  03/05/20 1006    Specimen:  Urine, Clean Catch Updated:  03/05/20 1311     RBC, UA Too Numerous to Count /HPF      WBC, UA 6-12 /HPF      Bacteria, UA None Seen /HPF      Squamous Epithelial Cells, UA 0-2 /HPF      Hyaline Casts, UA 0-2 /LPF      Methodology Automated Microscopy    Hemoglobin A1c [[id number]] Collected:  03/05/20 0520    Specimen:  Blood Updated:  03/05/20 1243    Lipid Panel [[id number]]  (Abnormal) Collected:  03/05/20 0520    Specimen:  Blood Updated:  03/05/20 1125     Total Cholesterol 102 mg/dL      Triglycerides 155 mg/dL      HDL Cholesterol 38 mg/dL         LDL Cholesterol  33 mg/dL      VLDL Cholesterol 31 mg/dL      LDL/HDL Ratio 0.87    Narrative:       Cholesterol Reference Ranges  (U.S. Department of Health and Human Services ATP III Classifications)    Desirable          <200 mg/dL  Borderline High    200-239 mg/dL  High Risk          >240 mg/dL      Triglyceride Reference Ranges  (U.S. Department of Health and Human Services ATP III Classifications)    Normal           <150 mg/dL  Borderline High  150-199 mg/dL  High             200-499 mg/dL  Very High        >500 mg/dL    HDL Reference Ranges  (U.S. Department of Health and Human Services ATP III Classifcations)    Low     <40 mg/dl (major risk factor for CHD)  High    >60 mg/dl ('negative' risk factor for CHD)        LDL Reference Ranges  (U.S. Department of Health and Human Services ATP III Classifcations)    Optimal          <100 mg/dL  Near Optimal     100-129 mg/dL  Borderline High  130-159 mg/dL  High             160-189 mg/dL  Very High        >189 mg/dL    TSH [[id number]]  (Normal) Collected:  03/05/20 0520    Specimen:  Blood Updated:  03/05/20 1051     TSH 3.570 uIU/mL     Comprehensive Metabolic Panel [[id number]]  (Abnormal) Collected:  03/05/20 0520    Specimen:  Blood Updated:  03/05/20 0658     Glucose 94 mg/dL      BUN 13 mg/dL      Creatinine 1.25 mg/dL      Sodium 140 mmol/L      Potassium 3.8 mmol/L      Chloride 107 mmol/L      CO2 23.0 mmol/L      Calcium 8.8 mg/dL      Total Protein 5.9 g/dL      Albumin 3.50 g/dL      ALT (SGPT) 14 U/L      AST (SGOT) 17 U/L      Alkaline Phosphatase 62 U/L      Total Bilirubin 0.3 mg/dL      eGFR Non African Amer 56 mL/min/1.73      Globulin 2.4 gm/dL      A/G Ratio 1.5 g/dL      BUN/Creatinine Ratio 10.4     Anion Gap 10.0 mmol/L     Narrative:       GFR Normal >60  Chronic Kidney Disease <60  Kidney Failure <15      CBC & Differential [[id number]] Collected:  03/05/20 0520    Specimen:  Blood Updated:  03/05/20 0633    Narrative:       The following  orders were created for panel order CBC & Differential.  Procedure                               Abnormality         Status                     ---------                               -----------         ------                     CBC Auto Differential[[id number]]        Abnormal            Final result                 Please view results for these tests on the individual orders.    CBC Auto Differential [[id number]]  (Abnormal) Collected:  03/05/20 0520    Specimen:  Blood Updated:  03/05/20 0633     WBC 11.70 10*3/mm3      RBC 4.36 10*6/mm3      Hemoglobin 12.9 g/dL      Hematocrit 38.4 %      MCV 88.2 fL      MCH 29.5 pg      MCHC 33.5 g/dL      RDW 14.0 %      RDW-SD 43.3 fl      MPV 7.8 fL      Platelets 224 10*3/mm3      Neutrophil % 74.5 %      Lymphocyte % 16.5 %      Monocyte % 7.3 %      Eosinophil % 1.4 %      Basophil % 0.3 %      Neutrophils, Absolute 8.70 10*3/mm3      Lymphocytes, Absolute 1.90 10*3/mm3      Monocytes, Absolute 0.90 10*3/mm3      Eosinophils, Absolute 0.20 10*3/mm3      Basophils, Absolute 0.00 10*3/mm3      nRBC 0.0 /100 WBC     Comprehensive Metabolic Panel [[id number]]  (Abnormal) Collected:  03/04/20 1934    Specimen:  Blood Updated:  03/04/20 2006     Glucose 130 mg/dL      BUN 14 mg/dL      Creatinine 1.49 mg/dL      Sodium 141 mmol/L      Potassium 4.5 mmol/L      Chloride 105 mmol/L      CO2 26.0 mmol/L      Calcium 9.3 mg/dL      Total Protein 6.3 g/dL      Albumin 3.80 g/dL      ALT (SGPT) 15 U/L      AST (SGOT) 18 U/L      Alkaline Phosphatase 66 U/L      Total Bilirubin 0.4 mg/dL      eGFR Non African Amer 45 mL/min/1.73      Globulin 2.5 gm/dL      A/G Ratio 1.5 g/dL      BUN/Creatinine Ratio 9.4     Anion Gap 10.0 mmol/L     Narrative:       GFR Normal >60  Chronic Kidney Disease <60  Kidney Failure <15      Lipase [[id number]]  (Normal) Collected:  03/04/20 1934    Specimen:  Blood Updated:  03/04/20 2006     Lipase 35 U/L     Troponin [[id number]]  (Normal) Collected:   03/04/20 1934    Specimen:  Blood Updated:  03/04/20 2006     Troponin T <0.010 ng/mL     Narrative:       Troponin T Reference Range:  <= 0.03 ng/mL-   Negative for AMI  >0.03 ng/mL-     Abnormal for myocardial necrosis.  Clinicians would have to utilize clinical acumen, EKG, Troponin and serial changes to determine if it is an Acute Myocardial Infarction or myocardial injury due to an underlying chronic condition.       Results may be falsely decreased if patient taking Biotin.      Magnesium [[id number]]  (Normal) Collected:  03/04/20 1934    Specimen:  Blood Updated:  03/04/20 2006     Magnesium 2.0 mg/dL     BNP [[id number]]  (Normal) Collected:  03/04/20 1934    Specimen:  Blood Updated:  03/04/20 2002     proBNP 257.3 pg/mL     Narrative:       Among patients with dyspnea, NT-proBNP is highly sensitive for the detection of acute congestive heart failure. In addition NT-proBNP of <300 pg/ml effectively rules out acute congestive heart failure with 99% negative predictive value.    Results may be falsely decreased if patient taking Biotin.      Urinalysis, Microscopic Only - Urine, Catheter [[id number]]  (Abnormal) Collected:  03/04/20 1817    Specimen:  Urine, Catheter Updated:  03/04/20 1900     RBC, UA Too Numerous to Count /HPF      WBC, UA 3-5 /HPF      Bacteria, UA None Seen /HPF      Squamous Epithelial Cells, UA 0-2 /HPF      Hyaline Casts, UA 7-12 /LPF      Methodology Manual Light Microscopy    Urinalysis With Microscopic If Indicated (No Culture) - Urine, Catheter [[id number]]  (Abnormal) Collected:  03/04/20 1817    Specimen:  Urine, Catheter Updated:  03/04/20 1844     Color, UA Yellow     Appearance, UA Cloudy     Comment: Result checked         pH, UA 6.0     Specific Gravity, UA 1.016     Glucose, UA Negative     Ketones, UA Negative     Bilirubin, UA Negative     Blood, UA Moderate (2+)     Protein, UA 30 mg/dL (1+)     Leuk Esterase, UA Negative     Nitrite, UA Negative     Urobilinogen, UA 0.2  E.U./dL    CBC & Differential [[id number]] Collected:  03/04/20 1817    Specimen:  Blood Updated:  03/04/20 1826    Narrative:       The following orders were created for panel order CBC & Differential.  Procedure                               Abnormality         Status                     ---------                               -----------         ------                     CBC Auto Differential[[id number]]        Abnormal            Final result                 Please view results for these tests on the individual orders.    CBC Auto Differential [[id number]]  (Abnormal) Collected:  03/04/20 1817    Specimen:  Blood Updated:  03/04/20 1826     WBC 10.40 10*3/mm3      RBC 4.23 10*6/mm3      Hemoglobin 12.8 g/dL      Hematocrit 37.9 %      MCV 89.5 fL      MCH 30.3 pg      MCHC 33.9 g/dL      RDW 14.2 %      RDW-SD 44.6 fl      MPV 8.0 fL      Platelets 241 10*3/mm3      Neutrophil % 82.9 %      Lymphocyte % 9.1 %      Monocyte % 6.7 %      Eosinophil % 0.8 %      Basophil % 0.5 %      Neutrophils, Absolute 8.60 10*3/mm3      Lymphocytes, Absolute 0.90 10*3/mm3      Monocytes, Absolute 0.70 10*3/mm3      Eosinophils, Absolute 0.10 10*3/mm3      Basophils, Absolute 0.10 10*3/mm3      nRBC 0.0 /100 WBC          No results found for: URINECX     Imaging Results (Last 7 Days)     Procedure Component Value Units Date/Time    CT Head Without Contrast [[id number]] Collected:  03/04/20 2014     Updated:  03/04/20 2021    Narrative:       Exam: CT HEAD WO CONTRAST-     Date of Exam: 3/4/2020 7:52 PM     Indication: Dizziness, syncope     Comparison: Head CT dated 11/22/2018     Technique:  Axial CT images of the head were obtained from skull base to  vertex without IV contrast.  Coronal and sagittal reconstructions were  performed.  Automated exposure control and iterative reconstruction  methods were used.     FINDINGS  The ventricles and sulci are proportionally prominent compatible with  global cerebral volume loss. There is  no mass effect or midline shift.  There is no acute intracranial hemorrhage. There is no abnormal  extra-axial fluid collection. There is chronic-appearing hypodensity and  encephalomalacia involving the right frontal cortex, left temporal lobe,  and right occipital lobe likely related to prior infarcts. The left  temporal infarct has a chronic appearance but is new from 11/22/2018.  The remainder of the previously mentioned infarct superior present on  the prior exam. There is confluent periventricular white matter  hypodensity likely representing sequelae of chronic small vessel  ischemic disease. There is atherosclerotic calcification within the  carotid siphons. There is normal variant vertebrobasilar dolichoectasia.  The calvarium is intact. The mastoid air cells and middle ears are well  aerated. The paranasal sinuses are clear. There is mild mucosal  thickening within the right posterior ethmoid sinuses.       Impression:       1. No acute intercranial abnormality. Specifically, no evidence of  hemorrhage, mass effect or midline shift.  2. Evidence of prior infarcts involving the right frontal, right  occipital, and left temporal lobes. The left temporal infarct is chronic  in appearance but is new from prior exam dated 11/22/2018.  3. Confluent periventricular white matter hypodensity likely  representing sequelae of advanced chronic small vessel ischemic disease.     Electronically Signed By-Elliott Saeed On:3/4/2020 8:19 PM  This report was finalized on [id number] by  Elliott Saeed, .    XR Chest 1 View [[id number]] Collected:  03/04/20 1922     Updated:  03/04/20 1927    Narrative:       EXAMINATION: XR CHEST 1 VW-     DATE OF EXAM: 3/4/2020 7:03 PM     INDICATION: Weakness, dizziness, syncope     COMPARISON: Chest radiograph dated 02/03/2020     TECHNIQUE: Portable AP view of the chest was obtained.     FINDINGS:  There is a left chest wall pacemaker with leads terminating of the right  atrium and  right ventricle. There is stable cardiomegaly with coronary  artery calcification. There is aortic arch athetotic calcification.  Pulmonary vascularity appears within normal limits. There is left medial  basilar airspace opacity which appears new from the prior examination  and may reflect atelectasis or infection. The right lung is clear. There  is no pleural effusion or pneumothorax. Multilevel degenerative changes  of the thoracic spine.       Impression:       1. Probable retrocardiac/medial left basilar airspace opacity likely  representing atelectasis or infection.     Electronically Signed By-Elliott Saeed On:3/4/2020 7:25 PM  This report was finalized on [id number] by  Elliott Saeed, .          Inpatient Meds:   Scheduled Meds:  apixaban 5 mg Oral Q12H   brimonidine 1 drop Both Eyes BID   cefTRIAXone 1 g Intravenous Q24H   cetirizine 10 mg Oral Daily   clopidogrel 75 mg Oral Daily   escitalopram 10 mg Oral Daily   fluticasone 2 spray Nasal Daily   galantamine ER 8 mg Oral BID   hydrALAZINE 50 mg Oral BID   isosorbide mononitrate 30 mg Oral Daily   levothyroxine 25 mcg Oral Q AM   metoprolol tartrate 25 mg Oral Q PM   multivitamin (eye vitamin) 1 tablet Oral Nightly   pantoprazole 40 mg Oral Q AM   predniSONE 5 mg Oral Daily With Breakfast   rosuvastatin 20 mg Oral Nightly   tamsulosin 0.4 mg Oral Nightly   vitamin B-12 1,000 mcg Oral Daily      Continuous Infusions:  sodium chloride 100 mL/hr Last Rate: 100 mL/hr (03/05/20 0011)      PRN Meds:.•  acetaminophen  •  diphenhydrAMINE  •  hydrALAZINE  •  LORazepam  •  nitroglycerin  •  ondansetron  •  potassium chloride **OR** potassium chloride **OR** potassium chloride  •  promethazine  •  [COMPLETED] Insert peripheral IV **AND** sodium chloride      Assessment/Plan     Active Problems:    Syncope    Gross hematuria likely due to trauma from intermittent catheterization in a patient that is anticoagulated  BPH with lower urinary tract  symptoms    Plan  If urine is clearing then okay for discharge tomorrow.  We will keep n.p.o. after not midnight in case needs clot evacuation and fulguration tomorrow  Follow-up Dr. Cerrato next week as already scheduled.        I discussed the patients findings and my recommendations with patient and family    Thank you for this  consult    Laureano Nick MD  03/05/20  1:56 PM

## 2020-03-05 NOTE — PLAN OF CARE
Patient to ambulate without complaints of dizziness with a standby assist. Urology stated that they will likely be doing a bedside cysto. Pt orthostatics were unremarkable.

## 2020-03-05 NOTE — H&P
Patient Care Team:  Maude Ricketts APRN as PCP - General (Nurse Practitioner)  Maude Ricketts APRN    Chief complaint Syncope/Near syncopy    Subjective     Patient is a 79 y.o. male admitted through ER where he was brought by ambulance yesterday anila after syncope episode at home. Wife reports he was getting out of the car and passed out, eyes rolled back in his head and he was unresponsive for a minute or 2. She did not think he was breathing during that time but not sure. He had Hx of labile BP, some hypotension, syncope and presyncope in past, dementia, CAD. He was here in Jan for chest pain and work up. Since here he has had no further syncope/dizziness and denies all pain or c/o.   Review of Systems   Pertinent items are noted in HPI, all other systems reviewed and negative    History  Past Medical History:   Diagnosis Date   • CAD (coronary artery disease)    • Coronary artery disease     PTCI   • Dementia (CMS/HCC)    • Depression    • Disease of thyroid gland    • Dysautonomia (CMS/HCC)    • Dyslipidemia    • GERD (gastroesophageal reflux disease)    • Head pain    • Hyperlipidemia    • Hypertension    • SSS (sick sinus syndrome) (CMS/HCC)    • Stroke (CMS/HCC)     x 3 in 2018     Past Surgical History:   Procedure Laterality Date   • APPENDECTOMY     • CHOLECYSTECTOMY     • ENDOSCOPY N/A 10/17/2019    Procedure: ESOPHAGOGASTRODUODENOSCOPY with biopsy x 2 areas;  Surgeon: Ashok Sanchez MD;  Location: [facility] ENDOSCOPY;  Service: Gastroenterology   • HERNIA REPAIR     • LEFT HEART CATH     • PACEMAKER IMPLANTATION     • TEMPORAL ARTERY BIOPSY     • WRIST SURGERY      severed artery     No family history on file.  Social History     Tobacco Use   • Smoking status: Never Smoker   • Smokeless tobacco: Never Used   • Tobacco comment: quit 25 yrs ago   Substance Use Topics   • Alcohol use: No     Frequency: Never   • Drug use: No     Allergies:  Doxycycline and Doxycycline    Objective     Vital  Signs  Temp:  [97.3 °F (36.3 °C)-97.8 °F (36.6 °C)] 97.8 °F (36.6 °C)  Heart Rate:  [56-67] 56  Resp:  [17-18] 18  BP: (113-162)/(63-96) 162/96      Physical Exam:      General Appearance:    Alert, cooperative, in no acute distress, pleasant    Head:    Normocephalic, without obvious abnormality, atraumatic   Eyes:            Lids and lashes normal, conjunctivae and sclerae normal   Ears:    Ears appear intact with no abnormalities noted   Throat:   No oral lesions, no thrush, oral mucosa moist   Neck:   No adenopathy, supple, trachea midline   Back:     No kyphosis present, no scoliosis present, no skin lesions,      erythema or scars, no tenderness to percussion or                   palpation,   range of motion normal   Lungs:     Clear to auscultation,respirations regular, even and                  unlabored    Heart:    Regular rhythm and normal rate, normal S1 and S2, no            murmur, no gallop, no rub, no click   Chest Wall:    No abnormalities observed   Abdomen:     Normal bowel sounds, no masses, no organomegaly, soft        non-tender, non-distended, no guarding, no rebound                tenderness   Rectal:     Deferred   Extremities:   Moves all extremities well, no edema, no cyanosis, no             redness   Pulses:   Pulses palpable and equal bilaterally   Skin:   No bleeding, bruising or rash   Lymph nodes:   No palpable adenopathy   Neurologic:   Cranial nerves 2 - 12 grossly intact, alert to person and place       Results Review:    I reviewed the patient's new clinical results.  I reviewed the patient's new imaging results and agree with the interpretation.    Assessment/Plan     1. Syncope-Does not present as seizure, posssible hypotension episode, will consult neurology  2. HTN-BP labile, up an down, cont to monitor, IV Hydralazine ordered  3. CAD-Stable, no chest pain or SOA  4. Dementia-Cont home meds  5. Fatigue/Physical decline-PT eval    Expected Length of Stay 2 days    I discussed  the patients findings and my recommendations with patient and nursing staff.     Maude Ricketts, DEANDRA  03/05/20  8:00 AM

## 2020-03-06 ENCOUNTER — APPOINTMENT (OUTPATIENT)
Dept: NEUROLOGY | Facility: HOSPITAL | Age: 80
End: 2020-03-06

## 2020-03-06 VITALS
SYSTOLIC BLOOD PRESSURE: 162 MMHG | BODY MASS INDEX: 27.7 KG/M2 | HEART RATE: 71 BPM | HEIGHT: 68 IN | WEIGHT: 182.76 LBS | DIASTOLIC BLOOD PRESSURE: 88 MMHG | TEMPERATURE: 97.8 F | OXYGEN SATURATION: 96 % | RESPIRATION RATE: 18 BRPM

## 2020-03-06 PROBLEM — R55 SYNCOPE: Status: RESOLVED | Noted: 2020-03-04 | Resolved: 2020-03-06

## 2020-03-06 LAB
ALBUMIN SERPL-MCNC: 3.1 G/DL (ref 3.5–5.2)
ALBUMIN/GLOB SERPL: 1.2 G/DL
ALP SERPL-CCNC: 61 U/L (ref 39–117)
ALT SERPL W P-5'-P-CCNC: 13 U/L (ref 1–41)
ANION GAP SERPL CALCULATED.3IONS-SCNC: 10 MMOL/L (ref 5–15)
AST SERPL-CCNC: 20 U/L (ref 1–40)
BASOPHILS # BLD AUTO: 0 10*3/MM3 (ref 0–0.2)
BASOPHILS NFR BLD AUTO: 0.3 % (ref 0–1.5)
BILIRUB SERPL-MCNC: 0.2 MG/DL (ref 0.2–1.2)
BUN BLD-MCNC: 15 MG/DL (ref 8–23)
BUN/CREAT SERPL: 11.4 (ref 7–25)
CALCIUM SPEC-SCNC: 8.5 MG/DL (ref 8.6–10.5)
CHLORIDE SERPL-SCNC: 109 MMOL/L (ref 98–107)
CO2 SERPL-SCNC: 21 MMOL/L (ref 22–29)
CREAT BLD-MCNC: 1.32 MG/DL (ref 0.76–1.27)
DEPRECATED RDW RBC AUTO: 44.2 FL (ref 37–54)
EOSINOPHIL # BLD AUTO: 0.2 10*3/MM3 (ref 0–0.4)
EOSINOPHIL NFR BLD AUTO: 2 % (ref 0.3–6.2)
ERYTHROCYTE [DISTWIDTH] IN BLOOD BY AUTOMATED COUNT: 14.1 % (ref 12.3–15.4)
FOLATE SERPL-MCNC: 16.5 NG/ML (ref 4.78–24.2)
GFR SERPL CREATININE-BSD FRML MDRD: 52 ML/MIN/1.73
GLOBULIN UR ELPH-MCNC: 2.6 GM/DL
GLUCOSE BLD-MCNC: 87 MG/DL (ref 65–99)
HCT VFR BLD AUTO: 34.1 % (ref 37.5–51)
HGB BLD-MCNC: 11.5 G/DL (ref 13–17.7)
LYMPHOCYTES # BLD AUTO: 1.6 10*3/MM3 (ref 0.7–3.1)
LYMPHOCYTES NFR BLD AUTO: 13.1 % (ref 19.6–45.3)
MCH RBC QN AUTO: 30 PG (ref 26.6–33)
MCHC RBC AUTO-ENTMCNC: 33.8 G/DL (ref 31.5–35.7)
MCV RBC AUTO: 88.7 FL (ref 79–97)
MONOCYTES # BLD AUTO: 1 10*3/MM3 (ref 0.1–0.9)
MONOCYTES NFR BLD AUTO: 8.1 % (ref 5–12)
NEUTROPHILS # BLD AUTO: 9.3 10*3/MM3 (ref 1.7–7)
NEUTROPHILS NFR BLD AUTO: 76.5 % (ref 42.7–76)
NRBC BLD AUTO-RTO: 0.1 /100 WBC (ref 0–0.2)
PLATELET # BLD AUTO: 201 10*3/MM3 (ref 140–450)
PMV BLD AUTO: 7.8 FL (ref 6–12)
POTASSIUM BLD-SCNC: 4.1 MMOL/L (ref 3.5–5.2)
PROT SERPL-MCNC: 5.7 G/DL (ref 6–8.5)
RBC # BLD AUTO: 3.84 10*6/MM3 (ref 4.14–5.8)
SODIUM BLD-SCNC: 140 MMOL/L (ref 136–145)
VIT B12 BLD-MCNC: 901 PG/ML (ref 211–946)
WBC NRBC COR # BLD: 12.2 10*3/MM3 (ref 3.4–10.8)

## 2020-03-06 PROCEDURE — 95816 EEG AWAKE AND DROWSY: CPT

## 2020-03-06 PROCEDURE — 99232 SBSQ HOSP IP/OBS MODERATE 35: CPT | Performed by: NURSE PRACTITIONER

## 2020-03-06 PROCEDURE — 82607 VITAMIN B-12: CPT | Performed by: NURSE PRACTITIONER

## 2020-03-06 PROCEDURE — 80053 COMPREHEN METABOLIC PANEL: CPT | Performed by: NURSE PRACTITIONER

## 2020-03-06 PROCEDURE — 95816 EEG AWAKE AND DROWSY: CPT | Performed by: PSYCHIATRY & NEUROLOGY

## 2020-03-06 PROCEDURE — 97163 PT EVAL HIGH COMPLEX 45 MIN: CPT

## 2020-03-06 PROCEDURE — 85025 COMPLETE CBC W/AUTO DIFF WBC: CPT | Performed by: NURSE PRACTITIONER

## 2020-03-06 PROCEDURE — 82746 ASSAY OF FOLIC ACID SERUM: CPT | Performed by: NURSE PRACTITIONER

## 2020-03-06 RX ADMIN — BRIMONIDINE TARTRATE 1 DROP: 2 SOLUTION OPHTHALMIC at 10:01

## 2020-03-06 RX ADMIN — LEVOTHYROXINE SODIUM 25 MCG: 25 TABLET ORAL at 05:28

## 2020-03-06 RX ADMIN — FLUTICASONE PROPIONATE 2 SPRAY: 50 SPRAY, METERED NASAL at 10:01

## 2020-03-06 NOTE — PLAN OF CARE
Problem: Patient Care Overview  Goal: Plan of Care Review  Outcome: Ongoing (interventions implemented as appropriate)  Flowsheets (Taken 3/6/2020 1025)  Outcome Summary: Patient can perform all bed mobility, transfers and gait with min a for safety.  He is an increased fall risk 2' to generalized weakness and balance issues.  He states some increased confusion with UTI.  He would benefit from AD device training to decrease fall risk.  PT to see patient while at  and then DC home with home health intervention and family assist based on todays findings.

## 2020-03-06 NOTE — PROGRESS NOTES
LOS: 1 day     Chief Complaint: Syncope       SUBJECTIVE:  History taken from: patient chart family      Patient Complaints: The wife does feel he is back to his baseline.  They have decided not to go to do outpatient therapy because it causes too much emotional stress for the patient.      Review of Systems   Cardiovascular: Positive for chest pain. Negative for palpitations and leg swelling.   Neurological: Positive for weakness (generalized). Negative for dizziness, tremors, seizures, syncope, facial asymmetry, speech difficulty, light-headedness, numbness and headaches.        ________________________________________________     OBJECTIVE:    On exam:  GENERAL: NAD  CARDIO: RRR  NEURO:  Oriented x2  Short term memory not intact   EOMI, PERRL   No facial asymmetry  Speech clear without dysarthria  Sensations intact and equal bilaterally  Strength 5-/5 and equal in all extremities  No ataxia    ________________________________________________   RESULTS REVIEW    VITAL SIGNS:  Temp:  [97.7 °F (36.5 °C)-98.5 °F (36.9 °C)] 97.9 °F (36.6 °C)  Heart Rate:  [58-63] 58  Resp:  [17-18] 18  BP: (106-161)/(61-77) 161/77    LABS:   Lab Results   Component Value Date    WBC 12.20 (H) 03/06/2020    HGB 11.5 (L) 03/06/2020    HCT 34.1 (L) 03/06/2020    MCV 88.7 03/06/2020     03/06/2020     Lab Results   Component Value Date    GLUCOSE 87 03/06/2020    BUN 15 03/06/2020    CREATININE 1.32 (H) 03/06/2020    EGFRIFNONA 52 (L) 03/06/2020    BCR 11.4 03/06/2020    K 4.1 03/06/2020    CO2 21.0 (L) 03/06/2020    CALCIUM 8.5 (L) 03/06/2020    ALBUMIN 3.10 (L) 03/06/2020    LABIL2 1.5 11/21/2018    AST 20 03/06/2020    ALT 13 03/06/2020       Lab Results   Component Value Date    TSH 3.570 03/05/2020    LDL 33 03/05/2020    HGBA1C 6.20 (H) 03/05/2020         IMAGING STUDIES:  Ct Head Without Contrast    Result Date: 3/4/2020  1. No acute intercranial abnormality. Specifically, no evidence of hemorrhage, mass effect or  midline shift. 2. Evidence of prior infarcts involving the right frontal, right occipital, and left temporal lobes. The left temporal infarct is chronic in appearance but is new from prior exam dated 11/22/2018. 3. Confluent periventricular white matter hypodensity likely representing sequelae of advanced chronic small vessel ischemic disease.  Electronically Signed By-Elliott Saeed On:3/4/2020 8:19 PM This report was finalized on [id number] by  Elliott Saeed, .    Xr Chest 1 View    Result Date: 3/4/2020  1. Probable retrocardiac/medial left basilar airspace opacity likely representing atelectasis or infection.  Electronically Signed By-Elliott Saeed On:3/4/2020 7:25 PM This report was finalized on [id number] by  Elliott Saeed, .      I reviewed the patient's new clinical results.    ________________________________________________      PROBLEM LIST:    Syncope        Assessment/Plan   ASSESSMENT/PLAN:    Syncope vs seizure. Most likely hypotensive episode with symptoms of weakness and dizziness prior to spell. Cannot rule out seizure.   - CT head did not show any acute findings   - Carotids: Bilateral ICA plaque without significant stenosis.  - EEG reviewed per Dr. Soria- no epileptic spikes just generalized slowing.   - EKG: Sinus rhythm, UT interval prolonged 222  - A1C: 6.2, B12: P, LDL: 33, TSH: 3.570  - Orthostatic Vitals normal per nursing staff   - Fall (syncope) precautions  - This was a single event, no AED or additional testing needed at this time.      Generalized weakness  - PT/OT eval  - Family decided not to follow up with outpatient rehab.      HTN  - BP very labile, pt has history of orthostatic hypotension  - Closely monitor      History of embolic strokes   -Continue Eliquis, Plavix, statin  - Blood pressure should be less than 130/80 outpatient, HbA1c less than 6.5, LDL less than 70; b12>500 and smoking cessation if applicable. We would be grateful if the primary team / primary  care physician would keep a close watch on the above targets.       There are no further recommendations. Will sign off, please call with any questions or concerns.    **Please refer to previous notes for further details and recommendations.  I discussed the patients findings and my recommendations with patient and family.     DEANDRA Qureshi  03/06/20  9:15 AM

## 2020-03-06 NOTE — PROGRESS NOTES
Discharge Planning Assessment   [facility]     Patient Name: Jayden Bond  MRN: [id number]  Today's Date: 3/6/2020    Admit Date: 3/4/2020          Plan    Plan  rehab list given for spouse to review    Provided Post Acute Provider List?  Yes    Post Acute Provider List  Inpatient Rehab;Nursing Home    Delivered To  Support Person given to spouse to review    Method of Delivery  In person    Patient/Family in Agreement with Plan  yes    Plan Comments  -- per physical therapy patient needs rehab; np has talked to spouse regarding rehab but spouse states she needs to talk to patient first; will continue to follow          Carol naegele rn  Case management  Office number [phone]  Cell phone [phone]

## 2020-03-06 NOTE — PLAN OF CARE
Problem: Patient Care Overview  Goal: Plan of Care Review  3/6/2020 1035 by Aisha Lanza, PT  Outcome: Ongoing (interventions implemented as appropriate)  Flowsheets (Taken 3/6/2020 1035)  Outcome Summary: patient could also benefit from short stay at  rehab based on limited family support at home as wife has recently had fall with fractured hip  3/6/2020 1029 by Aisha Lanza, PT  Outcome: Ongoing (interventions implemented as appropriate)  Flowsheets (Taken 3/6/2020 1025)  Outcome Summary: Patient can perform all bed mobility, transfers and gait with min a for safety.  He is an increased fall risk 2' to generalized weakness and balance issues.  He states some increased confusion with UTI.  He would benefit from AD device training to decrease fall risk.  PT to see patient while at  and then DC home with home health intervention and family assist based on todays findings.

## 2020-03-06 NOTE — DISCHARGE PLACEMENT REQUEST
"Barrington Bond (79 y.o. Male)     Date of Birth Social Security Number Address Home Phone MRN    [id number]  [id number] beds at the [facility]   [place] [facility] [phone] [id number]    Sabianist Marital Status          Episcopal        Admission Date Admission Type Admitting Provider Attending Provider Department, Room/Bed    3/4/20 Emergency Allan Mckeon MD Heimer, Brian T, MD [facility] 3C MEDICAL INPATIENT, 378/1    Discharge Date Discharge Disposition Discharge Destination                       Attending Provider:  Allan Mckeon MD    Allergies:  Doxycycline, Doxycycline    Isolation:  None   Infection:  None   Code Status:  CPR    Ht:  172.7 cm (68\")   Wt:  82.9 kg (182 lb 12.2 oz)    Admission Cmt:  None   Principal Problem:  None                Active Insurance as of 3/4/2020     Primary Coverage     Payor Plan Insurance Group Employer/Plan Group    MEDICARE MEDICARE A & B      Payor Plan Address Payor Plan Phone Number Payor Plan Fax Number Effective Dates    [address] [phone]  8/1/2005 - None Entered    [facility] [id number]       Subscriber Name Subscriber Birth Date Member ID       BARRINGTON BOND 1940 [id number]           Secondary Coverage     Payor Plan Insurance Group Employer/Plan Group    STANDARD LIFE ACCIDENT STANDARD LIFE AND ACCIDENT [facility] SUP [id number]     Payor Plan Address Payor Plan Phone Number Payor Plan Fax Number Effective Dates    [address] [address] [phone]  8/17/2005 - None Entered    [facility] [id number]       Subscriber Name Subscriber Birth Date Member ID       BARRINGTON BOND 1940 [id number]                 Emergency Contacts      (Rel.) Home Phone Work Phone Mobile Phone    MATT BOND (Spouse) [phone] -- [phone]            Emergency Contact Information     Name Relation Home Work Mobile    MATT BOND Spouse [phone]          Insurance Information                MEDICARE/MEDICARE A & B Phone: [phone]    Subscriber: Ronda" Jayden MCNULTY Subscriber#: [id number]    Group#:  Precert#:         STANDARD LIFE ACCIDENT/STANDARD LIFE AND ACCIDENT [facility] Phone: [phone]    Subscriber: Jayden Bond Subscriber#: [id number]    Group#: [id number] Precert#:

## 2020-03-06 NOTE — PROGRESS NOTES
Urology Progress Note    Patient Identification:  Name:  Jayden Bond  Age:  79 y.o.  Sex:  male  :  1940  MRN:  [id number]    Chief Complaint: Urine has cleared    History of Present Illness: Patient voiding well.  Urine is now clear yellow color.    Problem List:  Patient Active Problem List   Diagnosis   • Chest pain   • Syncope     Past Medical History:  Past Medical History:   Diagnosis Date   • CAD (coronary artery disease)    • Coronary artery disease     PT[facility]   • Dementia (CMS/HCC)    • Depression    • Disease of thyroid gland    • Dysautonomia (CMS/HCC)    • Dyslipidemia    • GERD (gastroesophageal reflux disease)    • Head pain    • Hyperlipidemia    • Hypertension    • SSS (sick sinus syndrome) (CMS/HCC)    • Stroke (CMS/HCC)     x 3 in 2018     Past Surgical History:  Past Surgical History:   Procedure Laterality Date   • APPENDECTOMY     • CHOLECYSTECTOMY     • ENDOSCOPY N/A 10/17/2019    Procedure: ESOPHAGOGASTRODUODENOSCOPY with biopsy x 2 areas;  Surgeon: Ashok Sanchez MD;  Location: [facility] ENDOSCOPY;  Service: Gastroenterology   • HERNIA REPAIR     • LEFT HEART CATH     • PACEMAKER IMPLANTATION     • TEMPORAL ARTERY BIOPSY     • WRIST SURGERY      severed artery     Home Meds:  Medications Prior to Admission   Medication Sig Dispense Refill Last Dose   • clopidogrel (PLAVIX) 75 MG tablet Take 75 mg by mouth Daily.      • apixaban (ELIQUIS) 5 MG tablet tablet Take 1 tablet by mouth Every 12 (Twelve) Hours. Indications: Atrial Fibrillation 60 tablet 0    • brimonidine (ALPHAGAN) 0.2 % ophthalmic solution Administer 1 drop to both eyes 2 (Two) Times a Day. 10 mL 12    • escitalopram (LEXAPRO) 10 MG tablet Take 10 mg by mouth Daily.   10/17/2019 at Unknown time   • fluticasone (FLONASE) 50 MCG/ACT nasal spray 2 sprays into the nostril(s) as directed by provider Daily.   10/16/2019 at Unknown time   • galantamine ER (RAZADYNE ER) 8 MG 24 hr capsule Take 8 mg by mouth 2 (Two) Times a Day.    10/16/2019 at Unknown time   • hydrALAZINE (APRESOLINE) 50 MG tablet Take 50 mg by mouth 2 (Two) Times a Day.   10/17/2019 at Unknown time   • isosorbide mononitrate (IMDUR) 30 MG 24 hr tablet Take 1 tablet by mouth Daily. 30 tablet 0    • levothyroxine (SYNTHROID, LEVOTHROID) 25 MCG tablet Take 25 mcg by mouth Daily.   10/17/2019 at Unknown time   • loratadine (CLARITIN) 10 MG tablet Take 10 mg by mouth Daily.   10/17/2019 at Unknown time   • metoprolol tartrate (LOPRESSOR) 25 MG tablet Take 1 tablet by mouth Every 12 (Twelve) Hours. (Patient taking differently: Take 25 mg by mouth Every Evening. Hold if pulse < 60) 60 tablet 0    • Multiple Vitamins-Minerals (MULTIVITAMIN WITH MINERALS) tablet tablet Take 1 tablet by mouth Every Night.   2/3/2020 at Unknown time   • nitroglycerin (NITROSTAT) 0.4 MG SL tablet Place 0.4 mg under the tongue Every 5 (Five) Minutes As Needed for Chest Pain. Take no more than 3 doses in 15 minutes.      • Omega-3 Fatty Acids (FISH OIL) 1000 MG capsule capsule Take 1,000 mg by mouth Daily With Breakfast.   2/3/2020 at Unknown time   • pantoprazole (PROTONIX) 40 MG EC tablet Take 40 mg by mouth Daily.   10/16/2019 at Unknown time   • predniSONE (DELTASONE) 5 MG tablet Take 5 mg by mouth Daily.   10/16/2019 at Unknown time   • promethazine (PHENERGAN) 25 MG tablet Take 25 mg by mouth Every 6 (Six) Hours As Needed for Nausea or Vomiting.      • rosuvastatin (CRESTOR) 20 MG tablet Take 20 mg by mouth Every Night.   10/16/2019 at Unknown time   • tamsulosin (FLOMAX) 0.4 MG capsule 24 hr capsule Take 1 capsule by mouth Every Night.   10/16/2019 at Unknown time   • vitamin B-12 (CYANOCOBALAMIN) 1000 MCG tablet Take 1,000 mcg by mouth Daily.   2/3/2020 at Unknown time     Current Meds:    Current Facility-Administered Medications:   •  acetaminophen (TYLENOL) tablet 650 mg, 650 mg, Oral, Q6H PRN, Atkins, Maude D, APRN  •  apixaban (ELIQUIS) tablet 5 mg, 5 mg, Oral, Q12H, Maude Ricketts  APRN, 5 mg at 03/05/20 2055  •  brimonidine (ALPHAGAN) 0.2 % ophthalmic solution 1 drop, 1 drop, Both Eyes, BID, Atkins, Theresa D, APRN, 1 drop at 03/05/20 2056  •  cefTRIAXone (ROCEPHIN) 1 g in sodium chloride 0.9 % 100 mL IVPB, 1 g, Intravenous, Q24H, Atkins, Theresa D, APRN, Last Rate: 200 mL/hr at 03/05/20 2121, 1 g at 03/05/20 2121  •  cetirizine (zyrTEC) tablet 10 mg, 10 mg, Oral, Daily, Atkins, Theresa D, APRN, 10 mg at 03/05/20 0957  •  clopidogrel (PLAVIX) tablet 75 mg, 75 mg, Oral, Daily, Atkins, Theresa D, APRN, 75 mg at 03/05/20 0957  •  diphenhydrAMINE (BENADRYL) injection 25 mg, 25 mg, Intravenous, Q6H PRN, Atkins, Theresa D, APRN  •  escitalopram (LEXAPRO) tablet 10 mg, 10 mg, Oral, Daily, Atkins, Theresa D, APRN, 10 mg at 03/05/20 0957  •  fluticasone (FLONASE) 50 MCG/ACT nasal spray 2 spray, 2 spray, Nasal, Daily, Atkins, Theresa D, APRN, 2 spray at 03/05/20 0958  •  galantamine ER (RAZADYNE ER) 24 hr capsule 8 mg, 8 mg, Oral, BID, Atkins, Theresa D, APRN, 8 mg at 03/05/20 2055  •  hydrALAZINE (APRESOLINE) injection 10 mg, 10 mg, Intravenous, Q6H PRN, Atkins, Maude D, APRN  •  hydrALAZINE (APRESOLINE) tablet 50 mg, 50 mg, Oral, BID, Atkins, Maude D, APRN, 50 mg at 03/05/20 2055  •  isosorbide mononitrate (IMDUR) 24 hr tablet 30 mg, 30 mg, Oral, Daily, Atkins, Theresa D, APRN, 30 mg at 03/05/20 0957  •  levothyroxine (SYNTHROID, LEVOTHROID) tablet 25 mcg, 25 mcg, Oral, Q AM, Attyler, Maude D, APRN, 25 mcg at 03/06/20 0528  •  LORazepam (ATIVAN) injection 0.5 mg, 0.5 mg, Intravenous, Q6H PRN, Atkins, Maude D, APRN  •  metoprolol tartrate (LOPRESSOR) tablet 25 mg, 25 mg, Oral, Q PM, Attyler, Maude D, APRN, 25 mg at 03/05/20 1729  •  multivitamin (eye vitamin) tablet 1 tablet, 1 tablet, Oral, Nightly, Attyler, Maude D, APRN, 1 tablet at 03/05/20 2055  •  nitroglycerin (NITROSTAT) SL tablet 0.4 mg, 0.4 mg, Sublingual, Q5 Min PRN, Attyler, Maude D, APRN  •  ondansetron (ZOFRAN) injection 4 mg,  "4 mg, Intravenous, Q6H PRN, Atkins, Maude D, APRN  •  pantoprazole (PROTONIX) EC tablet 40 mg, 40 mg, Oral, BID AC, Allan Mckeon MD, 40 mg at 20  •  potassium chloride (K-DUR,KLOR-CON) CR tablet 40 mEq, 40 mEq, Oral, PRN **OR** potassium chloride (KLOR-CON) packet 40 mEq, 40 mEq, Oral, PRN **OR** potassium chloride 10 mEq in 100 mL IVPB, 10 mEq, Intravenous, Q1H PRN, Atkins, Maude D, APRN  •  predniSONE (DELTASONE) tablet 5 mg, 5 mg, Oral, Daily With Breakfast, Atkins, Maude D, APRN, 5 mg at 20 0800  •  promethazine (PHENERGAN) tablet 25 mg, 25 mg, Oral, Q6H PRN, Atkins, Maude D, APRN  •  rosuvastatin (CRESTOR) tablet 20 mg, 20 mg, Oral, Nightly, Atkins, Maude D, APRN, 20 mg at 20  •  [COMPLETED] Insert peripheral IV, , , Once **AND** sodium chloride 0.9 % flush 10 mL, 10 mL, Intravenous, PRN, Rehana Patel PA, 10 mL at 20  •  sodium chloride 0.9 % infusion, 100 mL/hr, Intravenous, Continuous, Atkins, Maude D, APRN, Last Rate: 100 mL/hr at 20, 100 mL/hr at 20  •  tamsulosin (FLOMAX) 24 hr capsule 0.4 mg, 0.4 mg, Oral, Nightly, Atkins, Maude D, APRN, 0.4 mg at 20  •  vitamin B-12 (CYANOCOBALAMIN) tablet 1,000 mcg, 1,000 mcg, Oral, Daily, Atkins, Maude D, APRN, 1,000 mcg at 20 0957  Allergies:  Doxycycline and Doxycycline    Review of Systems no fevers or chills.  No congestion or nasal discharge.    Objective:  tMax 24 hours:  Temp (24hrs), Av °F (36.7 °C), Min:97.7 °F (36.5 °C), Max:98.5 °F (36.9 °C)    Vital Sign Ranges:  Temp:  [97.7 °F (36.5 °C)-98.5 °F (36.9 °C)] 97.9 °F (36.6 °C)  Heart Rate:  [58-63] 58  Resp:  [17-18] 18  BP: (106-161)/(61-77) 161/77  Intake and Output Last 3 Shifts:  I/O last 3 completed shifts:  In: 840 [P.O.:840]  Out: 325 [Urine:325]    Exam:  /77 (BP Location: Left arm, Patient Position: Lying)   Pulse 58   Temp 97.9 °F (36.6 °C) (Oral)   Resp 18   Ht 172.7 cm (68\")   Wt " 82.9 kg (182 lb 12.2 oz)   SpO2 98%   BMI 27.79 kg/m²    General Appearance:    Alert, cooperative, no acute distress, general         appearance is normal   Head:    Normocephalic, without obvious abnormality, atraumatic   Eyes:            Pupils/Irises normal. Exterior inspection conjunctivae       and lids normal.   Ears:    Normal external inspection   Nose:   Exterior inspection of nose is normal   Throat:   Lips, mucosa, and tongue normal   Lungs:     Respirations unlabored; normal effort, no audible     abnormality   CV:   Regular rhythm and normal rate, no edema   Abdomen:     examination of the abdomen is normal with     no masses, tenderness, or distension    :        Data Review:  All labs (24hrs):    Lab Results (last 24 hours)     Procedure Component Value Units Date/Time    Comprehensive Metabolic Panel [[id number]]  (Abnormal) Collected:  03/06/20 0325    Specimen:  Blood Updated:  03/06/20 0434     Glucose 87 mg/dL      BUN 15 mg/dL      Creatinine 1.32 mg/dL      Sodium 140 mmol/L      Potassium 4.1 mmol/L      Chloride 109 mmol/L      CO2 21.0 mmol/L      Calcium 8.5 mg/dL      Total Protein 5.7 g/dL      Albumin 3.10 g/dL      ALT (SGPT) 13 U/L      AST (SGOT) 20 U/L      Alkaline Phosphatase 61 U/L      Total Bilirubin 0.2 mg/dL      eGFR Non African Amer 52 mL/min/1.73      Globulin 2.6 gm/dL      A/G Ratio 1.2 g/dL      BUN/Creatinine Ratio 11.4     Anion Gap 10.0 mmol/L     Narrative:       GFR Normal >60  Chronic Kidney Disease <60  Kidney Failure <15      CBC & Differential [[id number]] Collected:  03/06/20 0325    Specimen:  Blood Updated:  03/06/20 0405    Narrative:       The following orders were created for panel order CBC & Differential.  Procedure                               Abnormality         Status                     ---------                               -----------         ------                     CBC Auto Differential[[id number]]        Abnormal            Final result                  Please view results for these tests on the individual orders.    CBC Auto Differential [[id number]]  (Abnormal) Collected:  03/06/20 0325    Specimen:  Blood Updated:  03/06/20 0405     WBC 12.20 10*3/mm3      RBC 3.84 10*6/mm3      Hemoglobin 11.5 g/dL      Hematocrit 34.1 %      MCV 88.7 fL      MCH 30.0 pg      MCHC 33.8 g/dL      RDW 14.1 %      RDW-SD 44.2 fl      MPV 7.8 fL      Platelets 201 10*3/mm3      Neutrophil % 76.5 %      Lymphocyte % 13.1 %      Monocyte % 8.1 %      Eosinophil % 2.0 %      Basophil % 0.3 %      Neutrophils, Absolute 9.30 10*3/mm3      Lymphocytes, Absolute 1.60 10*3/mm3      Monocytes, Absolute 1.00 10*3/mm3      Eosinophils, Absolute 0.20 10*3/mm3      Basophils, Absolute 0.00 10*3/mm3      nRBC 0.1 /100 WBC     Vitamin B12 [[id number]] Collected:  03/06/20 0325    Specimen:  Blood Updated:  03/06/20 0400    Folate [[id number]] Collected:  03/06/20 0325    Specimen:  Blood Updated:  03/06/20 0400    Hemoglobin A1c [[id number]]  (Abnormal) Collected:  03/05/20 0520    Specimen:  Blood Updated:  03/05/20 1603     Hemoglobin A1C 6.20 %     Narrative:       Hemoglobin A1C Ranges:    Increased Risk for Diabetes  5.7% to 6.4%  Diabetes                     >= 6.5%  Diabetic Goal                < 7.0%    Urinalysis With Microscopic If Indicated (No Culture) - Urine, Clean Catch [[id number]]  (Abnormal) Collected:  03/05/20 1006    Specimen:  Urine, Clean Catch Updated:  03/05/20 1311     Color, UA Orange     Comment: Result checked         Appearance, UA Cloudy     Comment: Result checked         pH, UA 5.5     Specific Gravity, UA 1.014     Glucose, UA Negative     Ketones, UA Negative     Bilirubin, UA Negative     Blood, UA Large (3+)     Protein,  mg/dL (2+)     Leuk Esterase, UA Trace     Nitrite, UA Negative     Urobilinogen, UA 0.2 E.U./dL    Urinalysis, Microscopic Only - Urine, Clean Catch [[id number]]  (Abnormal) Collected:  03/05/20 1006    Specimen:  Urine, Clean  Catch Updated:  03/05/20 1311     RBC, UA Too Numerous to Count /HPF      WBC, UA 6-12 /HPF      Bacteria, UA None Seen /HPF      Squamous Epithelial Cells, UA 0-2 /HPF      Hyaline Casts, UA 0-2 /LPF      Methodology Automated Microscopy    Lipid Panel [[id number]]  (Abnormal) Collected:  03/05/20 0520    Specimen:  Blood Updated:  03/05/20 1125     Total Cholesterol 102 mg/dL      Triglycerides 155 mg/dL      HDL Cholesterol 38 mg/dL      LDL Cholesterol  33 mg/dL      VLDL Cholesterol 31 mg/dL      LDL/HDL Ratio 0.87    Narrative:       Cholesterol Reference Ranges  (U.S. Department of Health and Human Services ATP III Classifications)    Desirable          <200 mg/dL  Borderline High    200-239 mg/dL  High Risk          >240 mg/dL      Triglyceride Reference Ranges  (U.S. Department of Health and Human Services ATP III Classifications)    Normal           <150 mg/dL  Borderline High  150-199 mg/dL  High             200-499 mg/dL  Very High        >500 mg/dL    HDL Reference Ranges  (U.S. Department of Health and Human Services ATP III Classifcations)    Low     <40 mg/dl (major risk factor for CHD)  High    >60 mg/dl ('negative' risk factor for CHD)        LDL Reference Ranges  (U.S. Department of Health and Human Services ATP III Classifcations)    Optimal          <100 mg/dL  Near Optimal     100-129 mg/dL  Borderline High  130-159 mg/dL  High             160-189 mg/dL  Very High        >189 mg/dL    TSH [[id number]]  (Normal) Collected:  03/05/20 0520    Specimen:  Blood Updated:  03/05/20 1051     TSH 3.570 uIU/mL         Radiology:   Imaging Results (Last 72 Hours)     Procedure Component Value Units Date/Time    CT Head Without Contrast [[id number]] Collected:  03/04/20 2014     Updated:  03/04/20 2021    Narrative:       Exam: CT HEAD WO CONTRAST-     Date of Exam: 3/4/2020 7:52 PM     Indication: Dizziness, syncope     Comparison: Head CT dated 11/22/2018     Technique:  Axial CT images of the head were  obtained from skull base to  vertex without IV contrast.  Coronal and sagittal reconstructions were  performed.  Automated exposure control and iterative reconstruction  methods were used.     FINDINGS  The ventricles and sulci are proportionally prominent compatible with  global cerebral volume loss. There is no mass effect or midline shift.  There is no acute intracranial hemorrhage. There is no abnormal  extra-axial fluid collection. There is chronic-appearing hypodensity and  encephalomalacia involving the right frontal cortex, left temporal lobe,  and right occipital lobe likely related to prior infarcts. The left  temporal infarct has a chronic appearance but is new from 11/22/2018.  The remainder of the previously mentioned infarct superior present on  the prior exam. There is confluent periventricular white matter  hypodensity likely representing sequelae of chronic small vessel  ischemic disease. There is atherosclerotic calcification within the  carotid siphons. There is normal variant vertebrobasilar dolichoectasia.  The calvarium is intact. The mastoid air cells and middle ears are well  aerated. The paranasal sinuses are clear. There is mild mucosal  thickening within the right posterior ethmoid sinuses.       Impression:       1. No acute intercranial abnormality. Specifically, no evidence of  hemorrhage, mass effect or midline shift.  2. Evidence of prior infarcts involving the right frontal, right  occipital, and left temporal lobes. The left temporal infarct is chronic  in appearance but is new from prior exam dated 11/22/2018.  3. Confluent periventricular white matter hypodensity likely  representing sequelae of advanced chronic small vessel ischemic disease.     Electronically Signed By-Elliott Saeed On:3/4/2020 8:19 PM  This report was finalized on [id number] by  Elliott Saeed, .    XR Chest 1 View [[id number]] Collected:  03/04/20 1922     Updated:  03/04/20 1927    Narrative:        EXAMINATION: XR CHEST 1 VW-     DATE OF EXAM: 3/4/2020 7:03 PM     INDICATION: Weakness, dizziness, syncope     COMPARISON: Chest radiograph dated 02/03/2020     TECHNIQUE: Portable AP view of the chest was obtained.     FINDINGS:  There is a left chest wall pacemaker with leads terminating of the right  atrium and right ventricle. There is stable cardiomegaly with coronary  artery calcification. There is aortic arch athetotic calcification.  Pulmonary vascularity appears within normal limits. There is left medial  basilar airspace opacity which appears new from the prior examination  and may reflect atelectasis or infection. The right lung is clear. There  is no pleural effusion or pneumothorax. Multilevel degenerative changes  of the thoracic spine.       Impression:       1. Probable retrocardiac/medial left basilar airspace opacity likely  representing atelectasis or infection.     Electronically Signed By-Elliott Saeed On:3/4/2020 7:25 PM  This report was finalized on [id number] by  Elliott Saeed, .          Assessment/Plan:    Active Problems:    Syncope    Gross hematuria secondary to urethral or prostatic trauma from catheterization in a patient on anticoagulation  BPH with lower urinary tract symptoms    Plan  Okay for discharge from urology standpoint  Follow-up with Dr. Cerrato next week as already arranged      Laureano Nick MD  3/6/2020  7:46 AM

## 2020-03-06 NOTE — PROCEDURES
This is an inpatient, 26-minute 18 seconds  Digitally recorded multi-montage EEG with leads placed according to the international 10/20 system  Photic stimulation was not done  Hyperventilation was not done    With the patient fully aroused, the background is slow at about 6 to 7 Hz  No alpha rhythm    He was drowsy but I did not see any deeper stages of sleep  No asymmetry    Nothing suggesting clear-cut epileptiform activity or asymmetry  No clinical events      Impression:    This may be an abnormal adult awake and drowsy EEG.  Does show mild slowing which can be sometimes seen in drowsy state but if awake then this is minimally abnormal showing mild encephalopathy    Nonspecific  No seizures but EEG like this does not rule out epilepsy

## 2020-03-06 NOTE — PLAN OF CARE
Problem: Patient Care Overview  Goal: Plan of Care Review  Outcome: Ongoing (interventions implemented as appropriate)  Flowsheets (Taken 3/6/2020 4073)  Progress: no change  Plan of Care Reviewed With: patient  Outcome Summary: Pt resting comfortably in bed. Breathing even and unlabored. Pt has no c/o pain or discomfort. Pt has fall in room after getting up from bed to use restroom without alerting staff for assistance. Bed alarm was set however did not alarm. Charge nurse placed work order for bed check and will order new bed.Urinal moved closer to patient, bell given to spouse to alert staff if patient attempts to get up again without alarm or call light. Will continue to monitor.  Goal: Individualization and Mutuality  Outcome: Ongoing (interventions implemented as appropriate)  Goal: Discharge Needs Assessment  Outcome: Ongoing (interventions implemented as appropriate)  Goal: Interprofessional Rounds/Family Conf  Outcome: Ongoing (interventions implemented as appropriate)

## 2020-03-06 NOTE — PLAN OF CARE
Problem: Patient Care Overview  Goal: Plan of Care Review  3/6/2020 1115 by Aisha Lanza, PT  Outcome: Ongoing (interventions implemented as appropriate)  Flowsheets  Taken 3/6/2020 1115  Plan of Care Reviewed With: patient;spouse  Taken 3/6/2020 1108  Outcome Summary: patient has had decline in gait, strength and balance.  He has sustained several falls and cognitive status has declined.  He is disoriented but can respond to 1 step command with repeation.  He shows increased fall risk and should not ambulate or transfer without min a for safety and AD. Patient initially refuses AD and says he will not use one at home.  But he does give in and use one.  Wife has health issues of her own and will be limited in her ability to care for him.  WIll be seen at  and then will DC to IP rehab to improve functional level.  3/6/2020 1035 by Aisha Lanza, PT  Outcome: Ongoing (interventions implemented as appropriate)  Flowsheets (Taken 3/6/2020 1035)  Outcome Summary: patient could also benefit from short stay at IP rehab based on limited family support at home as wife has recently had fall with fractured hip  3/6/2020 1029 by Aisha Lanza, PT  Outcome: Ongoing (interventions implemented as appropriate)  Flowsheets (Taken 3/6/2020 1025)  Outcome Summary: Patient can perform all bed mobility, transfers and gait with min a for safety.  He is an increased fall risk 2' to generalized weakness and balance issues.  He states some increased confusion with UTI.  He would benefit from AD device training to decrease fall risk.  PT to see patient while at  and then DC home with home health intervention and family assist based on todays findings.

## 2020-03-07 ENCOUNTER — READMISSION MANAGEMENT (OUTPATIENT)
Dept: CALL CENTER | Facility: HOSPITAL | Age: 80
End: 2020-03-07

## 2020-03-07 NOTE — NURSING NOTE
Placed call out to Dr. Mckeon to get order for DME for walker and HHPT.  Linda declined and said that we needed to contact Lynette Ricketts because she placed the discharge orders.

## 2020-03-07 NOTE — OUTREACH NOTE
Prep Survey      Responses   Gnosticist facility patient discharged from?  [facility]   Is LACE score < 7 ?  No   Eligibility  Not Eligible   What are the reasons patient is not eligible?  [facility], [facility]   Does the patient have one of the following disease processes/diagnoses(primary or secondary)?  Other   Prep survey completed?  Yes          Sariah Dominguez RN

## 2020-03-08 NOTE — DISCHARGE SUMMARY
Date of Discharge:  3/8/2020    Discharge Diagnosis: Syncope, weakness    Presenting Problem/History of Present Illness  Active Hospital Problems   No active problems to display.      Resolved Hospital Problems    Diagnosis Date Resolved POA   • Syncope [R55] 03/06/2020 Yes      Hospital Course  Patient is a 79 y.o. male with mild dementia admitted through the ER after a syncope spell at home. He has had stroke and seizure work up that was all negative. Cardiac monitoring was normal, no evidence for ACS. Labs remained good. Neurology consulted and recommended testing completed. Urology consulted as pt had hematuria following catherization, however, this cleared up by day 2.  PT eval recommended inpt rehab as pt is very weak and has been for some time. Spouse has noted this and pt currently getting outpt PT with no improvement. However, spouse refuses inpt rehab at this time for pt and he will be DC home    Procedures Performed    03/06 1626 EEG    Consults:   Consults     Date and Time Order Name Status Description    3/5/2020 0618 Inpatient Urology Consult Completed     3/4/2020 2029 Family Medicine Consult Completed     2/3/2020 2003 Cardiology (on-call MD unless specified) Completed     2/3/2020 2000 Family Medicine Consult Completed     2/3/2020 1940 Hospitalist (on-call MD unless specified) Completed           Pertinent Test Results:     Condition on Discharge:  Fair    Vital Signs       Physical Exam:     General Appearance:    Alert, cooperative, in no acute distress, mild dementia   Head:    Normocephalic, without obvious abnormality, atraumatic   Eyes:            Lids and lashes normal, conjunctivae and sclerae normal,    Ears:    Ears appear intact with no abnormalities noted   Throat:   No oral lesions, no thrush, oral mucosa moist   Neck:   No adenopathy, supple, trachea midline   Back:     No kyphosis present, no scoliosis present, no skin lesions,      erythema or scars, no tenderness to percussion  or                   palpation,   range of motion normal   Lungs:     Clear to auscultation,respirations regular, even and                  unlabored    Heart:    Regular rhythm and normal rate, normal S1 and S2, no            murmur, no gallop, no rub, no click   Chest Wall:    No abnormalities observed   Abdomen:     Normal bowel sounds, no masses, no organomegaly, soft        non-tender, non-distended, no guarding, no rebound                tenderness   Rectal:     Deferred   Extremities:   Moves all extremities well, no edema, no cyanosis, no             redness   Pulses:   Pulses palpable and equal bilaterally   Skin:   No bleeding, bruising or rash   Lymph nodes:   No palpable adenopathy   Neurologic:   Cranial nerves 2 - 12 grossly intact, sensation intact       Discharge Disposition  Home or Self Care    Discharge Medications     Discharge Medications      Changes to Medications      Instructions Start Date   metoprolol tartrate 25 MG tablet  Commonly known as:  LOPRESSOR  What changed:    · when to take this  · additional instructions   25 mg, Oral, Every 12 Hours Scheduled         Continue These Medications      Instructions Start Date   apixaban 5 MG tablet tablet  Commonly known as:  ELIQUIS   5 mg, Oral, Every 12 Hours Scheduled      brimonidine 0.2 % ophthalmic solution  Commonly known as:  ALPHAGAN   1 drop, Both Eyes, 2 Times Daily      clopidogrel 75 MG tablet  Commonly known as:  PLAVIX   75 mg, Oral, Daily      escitalopram 10 MG tablet  Commonly known as:  LEXAPRO   10 mg, Oral, Daily      fish oil 1000 MG capsule capsule   1,000 mg, Oral, Daily With Breakfast      fluticasone 50 MCG/ACT nasal spray  Commonly known as:  FLONASE   2 sprays, Nasal, Daily      galantamine ER 8 MG 24 hr capsule  Commonly known as:  RAZADYNE ER   8 mg, Oral, 2 Times Daily      hydrALAZINE 50 MG tablet  Commonly known as:  APRESOLINE   50 mg, Oral, 2 Times Daily      isosorbide mononitrate 30 MG 24 hr tablet  Commonly  known as:  IMDUR   30 mg, Oral, Daily      levothyroxine 25 MCG tablet  Commonly known as:  SYNTHROID, LEVOTHROID   25 mcg, Oral, Daily      loratadine 10 MG tablet  Commonly known as:  CLARITIN   10 mg, Oral, Daily      multivitamin with minerals tablet tablet   1 tablet, Oral, Nightly      nitroglycerin 0.4 MG SL tablet  Commonly known as:  NITROSTAT   0.4 mg, Sublingual, Every 5 Minutes PRN, Take no more than 3 doses in 15 minutes.       pantoprazole 40 MG EC tablet  Commonly known as:  PROTONIX   40 mg, Oral, Daily      predniSONE 5 MG tablet  Commonly known as:  DELTASONE   5 mg, Oral, Daily      rosuvastatin 20 MG tablet  Commonly known as:  CRESTOR   20 mg, Oral, Nightly      tamsulosin 0.4 MG capsule 24 hr capsule  Commonly known as:  FLOMAX   1 capsule, Oral, Nightly      vitamin B-12 1000 MCG tablet  Commonly known as:  CYANOCOBALAMIN   1,000 mcg, Oral, Daily         Stop These Medications    promethazine 25 MG tablet  Commonly known as:  PHENERGAN            Discharge Diet:   Diet Instructions     Healthy heart diet                 Activity at Discharge:   Activity Instructions     As tolerated               Follow-up Appointments  No future appointments.  Additional Instructions for the Follow-ups that You Need to Schedule     Ambulatory Referral to Home Health   As directed      Face to Face Visit Date:  3/7/2020    Follow-up provider for Plan of Care?:  I will be treating the patient on an ongoing basis.  Please send me the Plan of Care for signature.    Follow-up provider:  VASU GAINES [[id number]]    Reason/Clinical Findings:  weakness    Describe mobility limitations that make leaving home difficult:  weakness    Nursing/Therapeutic Services Requested:  Physical Therapy    PT orders:  Strengthening Home safety assessment    Frequency:  1 Week 1               Test Results Pending at Discharge       Maude Ricketts, DEANDRA  03/08/20  7:22 PM

## 2020-03-08 NOTE — DISCHARGE PLACEMENT REQUEST
"Barrington Bond (79 y.o. Male)     Date of Birth Social Security Number Address Home Phone MRN    1940  [id number]   [place] [facility] [phone] [id number]    Mandaeism Marital Status          Alevism        Admission Date Admission Type Admitting Provider Attending Provider Department, Room/Bed    3/4/20 Emergency Allan Mckeon MD  [facility] 3C MEDICAL INPATIENT, 378/1    Discharge Date Discharge Disposition Discharge Destination        3/6/2020 Home or Self Care              Attending Provider:  (none)   Allergies:  Doxycycline, Doxycycline    Isolation:  None   Infection:  None   Code Status:  Prior    Ht:  172.7 cm (68\")   Wt:  82.9 kg (182 lb 12.2 oz)    Admission Cmt:  None   Principal Problem:  None                Active Insurance as of 3/4/2020     Primary Coverage     Payor Plan Insurance Group Employer/Plan Group    MEDICARE MEDICARE A & B      Payor Plan Address Payor Plan Phone Number Payor Plan Fax Number Effective Dates    [address] [phone]  8/1/2005 - None Entered    [facility] [id number]       Subscriber Name Subscriber Birth Date Member ID       BARRINGTON BOND 1940 [id number]           Secondary Coverage     Payor Plan Insurance Group Employer/Plan Group    STANDARD LIFE ACCIDENT STANDARD LIFE AND ACCIDENT MC SUP NGN     Payor Plan Address Payor Plan Phone Number Payor Plan Fax Number Effective Dates    [address] [address] [phone]  8/17/2005 - None Entered    [facility] [id number]       Subscriber Name Subscriber Birth Date Member ID       BARRINGTON BOND 1940 [id number]                 Emergency Contacts      (Rel.) Home Phone Work Phone Mobile Phone    VELMAMATT (Spouse) [phone] -- [phone]            "

## 2020-03-08 NOTE — DISCHARGE PLACEMENT REQUEST
"Barrington Bond (79 y.o. Male)     Date of Birth Social Security Number Address Home Phone MRN    1940  [id number] [facility]   [place] [facility] [phone] [id number]    Mandaen Marital Status          Mosque        Admission Date Admission Type Admitting Provider Attending Provider Department, Room/Bed    3/4/20 Emergency Allan Mckeon MD  [facility] 3C MEDICAL INPATIENT, 378/1    Discharge Date Discharge Disposition Discharge Destination        3/6/2020 Home or Self Care              Attending Provider:  (none)   Allergies:  Doxycycline, Doxycycline    Isolation:  None   Infection:  None   Code Status:  Prior    Ht:  172.7 cm (68\")   Wt:  82.9 kg (182 lb 12.2 oz)    Admission Cmt:  None   Principal Problem:  None                Active Insurance as of 3/4/2020     Primary Coverage     Payor Plan Insurance Group Employer/Plan Group    MEDICARE MEDICARE A & B      Payor Plan Address Payor Plan Phone Number Payor Plan Fax Number Effective Dates    [address] [phone]  8/1/2005 - None Entered    [facility] [address]       Subscriber Name Subscriber Birth Date Member ID       BARRINGTON BOND 1940 [id number]           Secondary Coverage     Payor Plan Insurance Group Employer/Plan Group    STANDARD LIFE ACCIDENT STANDARD LIFE AND ACCIDENT MC SUP NGN     Payor Plan Address Payor Plan Phone Number Payor Plan Fax Number Effective Dates    [address] [address] [phone]  8/17/2005 - None Entered    [facility] [address]       Subscriber Name Subscriber Birth Date Member ID       BARRINGTON BOND 1940 [id number]                 Emergency Contacts      (Rel.) Home Phone Work Phone Mobile Phone    VELMAMATT (Spouse) [phone] -- [phone]            "

## 2020-03-09 NOTE — PROGRESS NOTES
Discharge Planning Assessment   [facility]     Patient Name: Jayden Bond  MRN: [id number]  Today's Date: 3/9/2020    Admit Date: 3/4/2020          Plan    Final Discharge Disposition Code  06 - home with home health care    Final Note  home with [facility] home health          Carol naegele rn  Case management  Office number [phone]  Cell phone [phone]

## 2020-05-01 ENCOUNTER — HOSPITAL ENCOUNTER (EMERGENCY)
Facility: HOSPITAL | Age: 80
Discharge: HOME | End: 2020-05-01
Admitting: EMERGENCY MEDICINE

## 2020-05-01 ENCOUNTER — APPOINTMENT (OUTPATIENT)
Dept: GENERAL RADIOLOGY | Facility: HOSPITAL | Age: 80
End: 2020-05-01

## 2020-05-01 VITALS
WEIGHT: 180 LBS | HEART RATE: 63 BPM | OXYGEN SATURATION: 97 % | SYSTOLIC BLOOD PRESSURE: 125 MMHG | TEMPERATURE: 97.4 F | DIASTOLIC BLOOD PRESSURE: 75 MMHG | BODY MASS INDEX: 26.66 KG/M2 | HEIGHT: 69 IN | RESPIRATION RATE: 16 BRPM

## 2020-05-01 DIAGNOSIS — R07.89 CHEST WALL PAIN: Primary | ICD-10-CM

## 2020-05-01 LAB
ALBUMIN SERPL-MCNC: 2.9 G/DL (ref 3.5–5.2)
ALBUMIN/GLOB SERPL: 1.5 G/DL
ALP SERPL-CCNC: 48 U/L (ref 39–117)
ALT SERPL W P-5'-P-CCNC: 11 U/L (ref 1–41)
ANION GAP SERPL CALCULATED.3IONS-SCNC: 10 MMOL/L (ref 5–15)
APTT PPP: 26.4 SECONDS (ref 24–31)
AST SERPL-CCNC: 19 U/L (ref 1–40)
BACTERIA UR QL AUTO: ABNORMAL /HPF
BASOPHILS # BLD AUTO: 0 10*3/MM3 (ref 0–0.2)
BASOPHILS NFR BLD AUTO: 0.3 % (ref 0–1.5)
BILIRUB SERPL-MCNC: 0.3 MG/DL (ref 0.2–1.2)
BILIRUB UR QL STRIP: NEGATIVE
BUN BLD-MCNC: 8 MG/DL (ref 8–23)
BUN/CREAT SERPL: 7.3 (ref 7–25)
CALCIUM SPEC-SCNC: 7.2 MG/DL (ref 8.6–10.5)
CHLORIDE SERPL-SCNC: 113 MMOL/L (ref 98–107)
CLARITY UR: CLEAR
CO2 SERPL-SCNC: 21 MMOL/L (ref 22–29)
COLOR UR: YELLOW
CREAT BLD-MCNC: 1.09 MG/DL (ref 0.76–1.27)
DEPRECATED RDW RBC AUTO: 43.8 FL (ref 37–54)
EOSINOPHIL # BLD AUTO: 0.1 10*3/MM3 (ref 0–0.4)
EOSINOPHIL NFR BLD AUTO: 0.9 % (ref 0.3–6.2)
ERYTHROCYTE [DISTWIDTH] IN BLOOD BY AUTOMATED COUNT: 14.1 % (ref 12.3–15.4)
GFR SERPL CREATININE-BSD FRML MDRD: 65 ML/MIN/1.73
GLOBULIN UR ELPH-MCNC: 2 GM/DL
GLUCOSE BLD-MCNC: 160 MG/DL (ref 65–99)
GLUCOSE UR STRIP-MCNC: NEGATIVE MG/DL
HCT VFR BLD AUTO: 33.9 % (ref 37.5–51)
HGB BLD-MCNC: 11.6 G/DL (ref 13–17.7)
HGB UR QL STRIP.AUTO: ABNORMAL
HYALINE CASTS UR QL AUTO: ABNORMAL /LPF
INR PPP: 1.23 (ref 0.9–1.1)
KETONES UR QL STRIP: NEGATIVE
LEUKOCYTE ESTERASE UR QL STRIP.AUTO: NEGATIVE
LYMPHOCYTES # BLD AUTO: 1.1 10*3/MM3 (ref 0.7–3.1)
LYMPHOCYTES NFR BLD AUTO: 10.6 % (ref 19.6–45.3)
MCH RBC QN AUTO: 29.9 PG (ref 26.6–33)
MCHC RBC AUTO-ENTMCNC: 34.2 G/DL (ref 31.5–35.7)
MCV RBC AUTO: 87.5 FL (ref 79–97)
MONOCYTES # BLD AUTO: 0.5 10*3/MM3 (ref 0.1–0.9)
MONOCYTES NFR BLD AUTO: 5.1 % (ref 5–12)
NEUTROPHILS # BLD AUTO: 8.4 10*3/MM3 (ref 1.7–7)
NEUTROPHILS NFR BLD AUTO: 83.1 % (ref 42.7–76)
NITRITE UR QL STRIP: NEGATIVE
NRBC BLD AUTO-RTO: 0 /100 WBC (ref 0–0.2)
PH UR STRIP.AUTO: 6 [PH] (ref 5–8)
PLATELET # BLD AUTO: 234 10*3/MM3 (ref 140–450)
PMV BLD AUTO: 8.2 FL (ref 6–12)
POTASSIUM BLD-SCNC: 3.9 MMOL/L (ref 3.5–5.2)
PROT SERPL-MCNC: 4.9 G/DL (ref 6–8.5)
PROT UR QL STRIP: NEGATIVE
PROTHROMBIN TIME: 12.5 SECONDS (ref 9.6–11.7)
RBC # BLD AUTO: 3.87 10*6/MM3 (ref 4.14–5.8)
RBC # UR: ABNORMAL /HPF
REF LAB TEST METHOD: ABNORMAL
SODIUM BLD-SCNC: 144 MMOL/L (ref 136–145)
SP GR UR STRIP: 1.02 (ref 1–1.03)
SQUAMOUS #/AREA URNS HPF: ABNORMAL /HPF
TROPONIN T SERPL-MCNC: <0.01 NG/ML (ref 0–0.03)
TROPONIN T SERPL-MCNC: <0.01 NG/ML (ref 0–0.03)
UROBILINOGEN UR QL STRIP: ABNORMAL
WBC NRBC COR # BLD: 10.1 10*3/MM3 (ref 3.4–10.8)
WBC UR QL AUTO: ABNORMAL /HPF

## 2020-05-01 PROCEDURE — 80053 COMPREHEN METABOLIC PANEL: CPT | Performed by: NURSE PRACTITIONER

## 2020-05-01 PROCEDURE — 84484 ASSAY OF TROPONIN QUANT: CPT | Performed by: PHYSICIAN ASSISTANT

## 2020-05-01 PROCEDURE — 85610 PROTHROMBIN TIME: CPT | Performed by: NURSE PRACTITIONER

## 2020-05-01 PROCEDURE — 85730 THROMBOPLASTIN TIME PARTIAL: CPT | Performed by: NURSE PRACTITIONER

## 2020-05-01 PROCEDURE — P9612 CATHETERIZE FOR URINE SPEC: HCPCS

## 2020-05-01 PROCEDURE — 84484 ASSAY OF TROPONIN QUANT: CPT | Performed by: NURSE PRACTITIONER

## 2020-05-01 PROCEDURE — 93005 ELECTROCARDIOGRAM TRACING: CPT

## 2020-05-01 PROCEDURE — 85025 COMPLETE CBC W/AUTO DIFF WBC: CPT | Performed by: NURSE PRACTITIONER

## 2020-05-01 PROCEDURE — 81001 URINALYSIS AUTO W/SCOPE: CPT | Performed by: PHYSICIAN ASSISTANT

## 2020-05-01 PROCEDURE — 71045 X-RAY EXAM CHEST 1 VIEW: CPT

## 2020-05-01 PROCEDURE — 99284 EMERGENCY DEPT VISIT MOD MDM: CPT

## 2020-05-01 RX ORDER — SODIUM CHLORIDE 0.9 % (FLUSH) 0.9 %
10 SYRINGE (ML) INJECTION AS NEEDED
Status: DISCONTINUED | OUTPATIENT
Start: 2020-05-01 | End: 2020-05-02 | Stop reason: HOSPADM

## 2020-05-01 RX ADMIN — SODIUM CHLORIDE 500 ML: 0.9 INJECTION, SOLUTION INTRAVENOUS at 16:01

## 2020-05-01 NOTE — DISCHARGE INSTRUCTIONS
Return to the ER for any worsening symptoms.  Follow-up with your primary care doctor on Monday.  May also follow-up with your cardiologist.

## 2020-05-01 NOTE — ED PROVIDER NOTES
Subjective   Patient is a 79-year-old white male with history of dementia, CAD, sick sinus syndrome with pacemaker, hypertension, high cholesterol, CVA who is brought in by EMS from home with reports of pain over the left chest at the site of his pacemaker.  Wife reports that patient had what appeared to be a syncopal episode just prior to him complaining of this pain in his chest.  States he has had similar episodes to this in the last couple of months. Per wife patient has been worked up for this. Patient is poor historian due to his dementia and gives little further information.  He denies any chest pain or symptoms currently.          Review of Systems   Unable to perform ROS: Dementia   Cardiovascular: Positive for chest pain.       Past Medical History:   Diagnosis Date   • CAD (coronary artery disease)    • Coronary artery disease     PTCI   • Dementia (CMS/HCC)    • Depression    • Disease of thyroid gland    • Dysautonomia (CMS/HCC)    • Dyslipidemia    • GERD (gastroesophageal reflux disease)    • Head pain    • Hyperlipidemia    • Hypertension    • SSS (sick sinus syndrome) (CMS/HCC)    • Stroke (CMS/HCC)     x 3 in 2018       Allergies   Allergen Reactions   • Doxycycline GI Intolerance   • Doxycycline Nausea And Vomiting       Past Surgical History:   Procedure Laterality Date   • APPENDECTOMY     • CHOLECYSTECTOMY     • ENDOSCOPY N/A 10/17/2019    Procedure: ESOPHAGOGASTRODUODENOSCOPY with biopsy x 2 areas;  Surgeon: Ashok Sanchez MD;  Location: [facility] ENDOSCOPY;  Service: Gastroenterology   • HERNIA REPAIR     • LEFT HEART CATH     • PACEMAKER IMPLANTATION     • TEMPORAL ARTERY BIOPSY     • WRIST SURGERY      severed artery       History reviewed. No pertinent family history.    Social History     Socioeconomic History   • Marital status:      Spouse name: Not on file   • Number of children: Not on file   • Years of education: Not on file   • Highest education level: Not on file   Tobacco Use    • Smoking status: Never Smoker   • Smokeless tobacco: Never Used   • Tobacco comment: quit 25 yrs ago   Substance and Sexual Activity   • Alcohol use: No     Frequency: Never   • Drug use: No   • Sexual activity: Defer   Social History Narrative    ** Merged History Encounter **                Objective   Physical Exam   Constitutional: He appears well-developed.   Vital signs and triage nurse note reviewed.  Constitutional: Awake, alert; well-developed and well-nourished. No acute distress is noted.  HEENT: Normocephalic, atraumatic; pupils are PERRL with intact EOM; oropharynx is pink and moist without exudate or erythema.  No drooling or pooling of oral secretions.  Neck: Supple, full range of motion without pain; no cervical lymphadenopathy. Normal phonation.  Cardiovascular: Regular rate and rhythm, normal S1-S2.  No murmur noted.  Pulmonary: Respiratory effort regular nonlabored, breath sounds clear to auscultation all fields.  Abdomen: Soft, nontender, nondistended with normoactive bowel sounds; no rebound or guarding.  Musculoskeletal: Independent range of motion of all extremities with no palpable tenderness or edema.  Neuro: Alert oriented to person, speech is clear and appropriate, GCS 14.    Skin: Flesh tone, warm, dry, intact; no erythematous or petechial rash or lesion.        Procedures           ED Course  ED Course as of May 01 2103   Fri May 01, 2020   1545 EKG reviewed by me and interpreted by Dr. Narvaez: Atrial paced complexes with a ventricular rate of 60.  Borderline prolonged KS interval.  No significant change from prior EKG on 3/4/2020.    [MD]   1600 Pacemaker was interrogated and I spoke with [employer] rep who reports pacemaker is functioning appropriately and has shown no abnormality in the last 3 weeks.    [MD]   1657 Care turned over to KILEY Kimball at 1700 pending repeat troponin results and disposition.    [MD]   1712 Care assumed from Rosa Rodriguez NP at 1700. Agree with her above  assessment    [MG]   2102 Patient's wife reports that patient was nauseous to be catheterized because he has an enlarged prostate and he is on Eliquis and he has had traumatic catheterizations in the past.  Patient did have hematuria after his catheterization.  Spoke with the wife and then spoke with Dr. Nick about this.  Patient's wife was given careful close return precaution follow-up instructions if this does not get better over the weekend to come back to the ER or call first urology on-call doctor.  She was in agreement with plan.  Dr. Nick was in agreement with plan    [MG]      ED Course User Index  [MD] Monica Rodriguez APRN  [MG] Rehana Momin, CHIRS      Labs Reviewed   COMPREHENSIVE METABOLIC PANEL - Abnormal; Notable for the following components:       Result Value    Glucose 160 (*)     Chloride 113 (*)     CO2 21.0 (*)     Calcium 7.2 (*)     Total Protein 4.9 (*)     Albumin 2.90 (*)     All other components within normal limits    Narrative:     GFR Normal >60  Chronic Kidney Disease <60  Kidney Failure <15     PROTIME-INR - Abnormal; Notable for the following components:    Protime 12.5 (*)     INR 1.23 (*)     All other components within normal limits   CBC WITH AUTO DIFFERENTIAL - Abnormal; Notable for the following components:    RBC 3.87 (*)     Hemoglobin 11.6 (*)     Hematocrit 33.9 (*)     Neutrophil % 83.1 (*)     Lymphocyte % 10.6 (*)     Neutrophils, Absolute 8.40 (*)     All other components within normal limits   URINALYSIS W/ CULTURE IF INDICATED - Abnormal; Notable for the following components:    Blood, UA Small (1+) (*)     All other components within normal limits   URINALYSIS, MICROSCOPIC ONLY - Abnormal; Notable for the following components:    RBC, UA 21-30 (*)     WBC, UA 0-2 (*)     All other components within normal limits   APTT - Normal   TROPONIN (IN-HOUSE) - Normal    Narrative:     Troponin T Reference Range:  <= 0.03 ng/mL-   Negative for AMI  >0.03 ng/mL-     Abnormal  for myocardial necrosis.  Clinicians would have to utilize clinical acumen, EKG, Troponin and serial changes to determine if it is an Acute Myocardial Infarction or myocardial injury due to an underlying chronic condition.       Results may be falsely decreased if patient taking Biotin.     TROPONIN (IN-HOUSE) - Normal    Narrative:     Troponin T Reference Range:  <= 0.03 ng/mL-   Negative for AMI  >0.03 ng/mL-     Abnormal for myocardial necrosis.  Clinicians would have to utilize clinical acumen, EKG, Troponin and serial changes to determine if it is an Acute Myocardial Infarction or myocardial injury due to an underlying chronic condition.       Results may be falsely decreased if patient taking Biotin.     CBC AND DIFFERENTIAL    Narrative:     The following orders were created for panel order CBC & Differential.  Procedure                               Abnormality         Status                     ---------                               -----------         ------                     CBC Auto Differential[[id number]]        Abnormal            Final result                 Please view results for these tests on the individual orders.     Xr Chest 1 View    Result Date: 5/1/2020  Mildly limited study demonstrating chronic changes with no active disease.  Electronically Signed By-Felix Vieira On:5/1/2020 4:16 PM This report was finalized on [id number] by  Felix Vieira, .    Medications   sodium chloride 0.9 % flush 10 mL (has no administration in time range)   sodium chloride 0.9 % bolus 500 mL (0 mL Intravenous Stopped 5/1/20 7744)                                          MDM  Number of Diagnoses or Management Options  Chest wall pain:   Diagnosis management comments: I examined the patient using the appropriate personal protective equipment.      DISPOSITION:   Chart Review:  Comorbidity:  has a past medical history of CAD (coronary artery disease), Coronary artery disease, Dementia (CMS/[facility]), Depression,  Disease of thyroid gland, Dysautonomia (CMS/HCC), Dyslipidemia, GERD (gastroesophageal reflux disease), Head pain, Hyperlipidemia, Hypertension, SSS (sick sinus syndrome) (CMS/HCC), and Stroke (CMS/HCC).  Differentials:this list is not all inclusive and does not constitute the entirety of considered causes -->   ECG: interpreted by ER physician and reviewed by myself: Atrial paced complexes borderline prolonged NE interval.  Labs: Troponin and repeat troponin negative.  Glucose 160, PVC 10.10    Imaging: Was interpreted by physician and reviewed by myself:  Xr Chest 1 View    Result Date: 5/1/2020  Mildly limited study demonstrating chronic changes with no active disease.  Electronically Signed By-Felix Vieira On:5/1/2020 4:16 PM This report was finalized on [id number] by  Felix Vieira, .      Disposition/Treatment:    Patient is 79-year-old male who presents to the ER complaining of pain over his pacemaker site.  Wife reports that he had a similar episode several months ago along with a syncopal like espiode and had a full work-up including CVA rule out which was negative.  She reports that he seemed to have some sort of syncopal episode but did not lose consciousness.  Patient's lab work and imaging was negative.  Pacemaker was interrogated by Rosa Rodriguez.  Patient has not had an episode in several months.  Patient was monitored in the ER and had a repeat troponin which was negative. He had no reoccurrence of pain. Patient did have hematuria after catherization. He is on eliquis. Wife was being evaluated in the ER and was not able to provide history at the time of patient's enlarged prostate and instructions from urology to not cath. Spoke with Dr. Nick who is careful with close return precautions. Recommended verbally they call the office Monday. He was discharged home in stable condition return precaution follow instruction provided he was in agreement with plan       Amount and/or Complexity of Data  Reviewed  Clinical lab tests: reviewed  Tests in the radiology section of CPT®: reviewed  Tests in the medicine section of CPT®: reviewed  Decide to obtain previous medical records or to obtain history from someone other than the patient: yes    Patient Progress  Patient progress: stable      Final diagnoses:   Chest wall pain            Rehana Momin PA-C  05/01/20 9687       Rehana Momin PA-C  05/01/20 2104       Rehana Momin PA-C  05/09/20 0699

## 2020-05-02 NOTE — ED NOTES
Upon discharge the pts wife is extremely angry because she states that pt was mini cathed while he was here and she did not want him to be. States he has been cathed before and it caused bleeding. PA notified and is at the bedside.     Karyn Washington RN  05/01/20 2026

## 2020-05-11 ENCOUNTER — HOSPITAL ENCOUNTER (INPATIENT)
Facility: HOSPITAL | Age: 80
LOS: 3 days | Discharge: HOME HEALTH | End: 2020-05-14
Attending: EMERGENCY MEDICINE | Admitting: FAMILY MEDICINE

## 2020-05-11 ENCOUNTER — APPOINTMENT (OUTPATIENT)
Dept: GENERAL RADIOLOGY | Facility: HOSPITAL | Age: 80
End: 2020-05-11

## 2020-05-11 ENCOUNTER — APPOINTMENT (OUTPATIENT)
Dept: CT IMAGING | Facility: HOSPITAL | Age: 80
End: 2020-05-11

## 2020-05-11 DIAGNOSIS — J18.9 HCAP (HEALTHCARE-ASSOCIATED PNEUMONIA): ICD-10-CM

## 2020-05-11 DIAGNOSIS — J44.9 CHRONIC OBSTRUCTIVE PULMONARY DISEASE, UNSPECIFIED COPD TYPE (HCC): ICD-10-CM

## 2020-05-11 DIAGNOSIS — R55 SYNCOPE, UNSPECIFIED SYNCOPE TYPE: Primary | ICD-10-CM

## 2020-05-11 DIAGNOSIS — Z20.822 SUSPECTED COVID-19 VIRUS INFECTION: ICD-10-CM

## 2020-05-11 LAB
ALBUMIN SERPL-MCNC: 3.8 G/DL (ref 3.5–5.2)
ALBUMIN/GLOB SERPL: 1.5 G/DL
ALP SERPL-CCNC: 62 U/L (ref 39–117)
ALT SERPL W P-5'-P-CCNC: 14 U/L (ref 1–41)
ANION GAP SERPL CALCULATED.3IONS-SCNC: 10 MMOL/L (ref 5–15)
APTT PPP: 24.8 SECONDS (ref 61–76.5)
AST SERPL-CCNC: 18 U/L (ref 1–40)
B PERT DNA SPEC QL NAA+PROBE: NOT DETECTED
BASOPHILS # BLD AUTO: 0.1 10*3/MM3 (ref 0–0.2)
BASOPHILS NFR BLD AUTO: 0.3 % (ref 0–1.5)
BILIRUB SERPL-MCNC: 0.3 MG/DL (ref 0.2–1.2)
BILIRUB UR QL STRIP: NEGATIVE
BUN BLD-MCNC: 15 MG/DL (ref 8–23)
BUN/CREAT SERPL: 11.8 (ref 7–25)
C PNEUM DNA NPH QL NAA+NON-PROBE: NOT DETECTED
CALCIUM SPEC-SCNC: 8.8 MG/DL (ref 8.6–10.5)
CHLORIDE SERPL-SCNC: 105 MMOL/L (ref 98–107)
CLARITY UR: CLEAR
CO2 SERPL-SCNC: 25 MMOL/L (ref 22–29)
COLOR UR: YELLOW
CREAT BLD-MCNC: 1.27 MG/DL (ref 0.76–1.27)
CRP SERPL-MCNC: 0.31 MG/DL (ref 0–0.5)
D DIMER PPP FEU-MCNC: 0.26 MCGFEU/ML (ref 0.17–0.59)
D-LACTATE SERPL-SCNC: 2.7 MMOL/L (ref 0.5–2)
DEPRECATED RDW RBC AUTO: 44.6 FL (ref 37–54)
EOSINOPHIL # BLD AUTO: 0.2 10*3/MM3 (ref 0–0.4)
EOSINOPHIL NFR BLD AUTO: 1.1 % (ref 0.3–6.2)
ERYTHROCYTE [DISTWIDTH] IN BLOOD BY AUTOMATED COUNT: 14.3 % (ref 12.3–15.4)
FERRITIN SERPL-MCNC: 24.22 NG/ML (ref 30–400)
FLUAV H1 2009 PAND RNA NPH QL NAA+PROBE: NOT DETECTED
FLUAV H1 HA GENE NPH QL NAA+PROBE: NOT DETECTED
FLUAV H3 RNA NPH QL NAA+PROBE: NOT DETECTED
FLUAV SUBTYP SPEC NAA+PROBE: NOT DETECTED
FLUBV RNA ISLT QL NAA+PROBE: NOT DETECTED
GFR SERPL CREATININE-BSD FRML MDRD: 55 ML/MIN/1.73
GLOBULIN UR ELPH-MCNC: 2.5 GM/DL
GLUCOSE BLD-MCNC: 168 MG/DL (ref 65–99)
GLUCOSE UR STRIP-MCNC: NEGATIVE MG/DL
HADV DNA SPEC NAA+PROBE: NOT DETECTED
HCOV 229E RNA SPEC QL NAA+PROBE: NOT DETECTED
HCOV HKU1 RNA SPEC QL NAA+PROBE: NOT DETECTED
HCOV NL63 RNA SPEC QL NAA+PROBE: NOT DETECTED
HCOV OC43 RNA SPEC QL NAA+PROBE: NOT DETECTED
HCT VFR BLD AUTO: 34.2 % (ref 37.5–51)
HGB BLD-MCNC: 11.6 G/DL (ref 13–17.7)
HGB UR QL STRIP.AUTO: NEGATIVE
HMPV RNA NPH QL NAA+NON-PROBE: NOT DETECTED
HPIV1 RNA SPEC QL NAA+PROBE: NOT DETECTED
HPIV2 RNA SPEC QL NAA+PROBE: NOT DETECTED
HPIV3 RNA NPH QL NAA+PROBE: NOT DETECTED
HPIV4 P GENE NPH QL NAA+PROBE: NOT DETECTED
INR PPP: 1.1 (ref 2–3)
KETONES UR QL STRIP: NEGATIVE
LDH SERPL-CCNC: 171 U/L (ref 135–225)
LEUKOCYTE ESTERASE UR QL STRIP.AUTO: NEGATIVE
LYMPHOCYTES # BLD AUTO: 0.9 10*3/MM3 (ref 0.7–3.1)
LYMPHOCYTES NFR BLD AUTO: 6.1 % (ref 19.6–45.3)
M PNEUMO IGG SER IA-ACNC: NOT DETECTED
MAGNESIUM SERPL-MCNC: 2 MG/DL (ref 1.6–2.4)
MCH RBC QN AUTO: 30 PG (ref 26.6–33)
MCHC RBC AUTO-ENTMCNC: 34.1 G/DL (ref 31.5–35.7)
MCV RBC AUTO: 88 FL (ref 79–97)
MONOCYTES # BLD AUTO: 0.7 10*3/MM3 (ref 0.1–0.9)
MONOCYTES NFR BLD AUTO: 4.4 % (ref 5–12)
NEUTROPHILS # BLD AUTO: 13.5 10*3/MM3 (ref 1.7–7)
NEUTROPHILS NFR BLD AUTO: 88.1 % (ref 42.7–76)
NITRITE UR QL STRIP: NEGATIVE
NRBC BLD AUTO-RTO: 0 /100 WBC (ref 0–0.2)
NT-PROBNP SERPL-MCNC: 131.6 PG/ML (ref 5–1800)
PH UR STRIP.AUTO: 6 [PH] (ref 5–8)
PLATELET # BLD AUTO: 233 10*3/MM3 (ref 140–450)
PMV BLD AUTO: 8.2 FL (ref 6–12)
POTASSIUM BLD-SCNC: 4.4 MMOL/L (ref 3.5–5.2)
PROCALCITONIN SERPL-MCNC: 0.08 NG/ML (ref 0.1–0.25)
PROT SERPL-MCNC: 6.3 G/DL (ref 6–8.5)
PROT UR QL STRIP: NEGATIVE
PROTHROMBIN TIME: 11.4 SECONDS (ref 19.4–28.5)
RBC # BLD AUTO: 3.88 10*6/MM3 (ref 4.14–5.8)
RHINOVIRUS RNA SPEC NAA+PROBE: NOT DETECTED
RSV RNA NPH QL NAA+NON-PROBE: NOT DETECTED
SODIUM BLD-SCNC: 140 MMOL/L (ref 136–145)
SP GR UR STRIP: 1.02 (ref 1–1.03)
TROPONIN T SERPL-MCNC: <0.01 NG/ML (ref 0–0.03)
TROPONIN T SERPL-MCNC: <0.01 NG/ML (ref 0–0.03)
TSH SERPL DL<=0.05 MIU/L-ACNC: 4.64 UIU/ML (ref 0.27–4.2)
UROBILINOGEN UR QL STRIP: NORMAL
WBC NRBC COR # BLD: 15.4 10*3/MM3 (ref 3.4–10.8)

## 2020-05-11 PROCEDURE — 71045 X-RAY EXAM CHEST 1 VIEW: CPT

## 2020-05-11 PROCEDURE — 84484 ASSAY OF TROPONIN QUANT: CPT | Performed by: FAMILY MEDICINE

## 2020-05-11 PROCEDURE — 36415 COLL VENOUS BLD VENIPUNCTURE: CPT

## 2020-05-11 PROCEDURE — 83880 ASSAY OF NATRIURETIC PEPTIDE: CPT | Performed by: EMERGENCY MEDICINE

## 2020-05-11 PROCEDURE — 82728 ASSAY OF FERRITIN: CPT | Performed by: FAMILY MEDICINE

## 2020-05-11 PROCEDURE — 85730 THROMBOPLASTIN TIME PARTIAL: CPT | Performed by: EMERGENCY MEDICINE

## 2020-05-11 PROCEDURE — 81003 URINALYSIS AUTO W/O SCOPE: CPT | Performed by: EMERGENCY MEDICINE

## 2020-05-11 PROCEDURE — 84145 PROCALCITONIN (PCT): CPT | Performed by: EMERGENCY MEDICINE

## 2020-05-11 PROCEDURE — 84443 ASSAY THYROID STIM HORMONE: CPT | Performed by: FAMILY MEDICINE

## 2020-05-11 PROCEDURE — 87150 DNA/RNA AMPLIFIED PROBE: CPT | Performed by: EMERGENCY MEDICINE

## 2020-05-11 PROCEDURE — 93005 ELECTROCARDIOGRAM TRACING: CPT | Performed by: EMERGENCY MEDICINE

## 2020-05-11 PROCEDURE — 85610 PROTHROMBIN TIME: CPT | Performed by: EMERGENCY MEDICINE

## 2020-05-11 PROCEDURE — 94640 AIRWAY INHALATION TREATMENT: CPT

## 2020-05-11 PROCEDURE — U0004 COV-19 TEST NON-CDC HGH THRU: HCPCS | Performed by: EMERGENCY MEDICINE

## 2020-05-11 PROCEDURE — 70450 CT HEAD/BRAIN W/O DYE: CPT

## 2020-05-11 PROCEDURE — 85025 COMPLETE CBC W/AUTO DIFF WBC: CPT | Performed by: EMERGENCY MEDICINE

## 2020-05-11 PROCEDURE — 84484 ASSAY OF TROPONIN QUANT: CPT | Performed by: EMERGENCY MEDICINE

## 2020-05-11 PROCEDURE — 0099U HC BIOFIRE FILMARRAY RESP PANEL 1: CPT | Performed by: EMERGENCY MEDICINE

## 2020-05-11 PROCEDURE — 80053 COMPREHEN METABOLIC PANEL: CPT | Performed by: EMERGENCY MEDICINE

## 2020-05-11 PROCEDURE — 85379 FIBRIN DEGRADATION QUANT: CPT | Performed by: FAMILY MEDICINE

## 2020-05-11 PROCEDURE — 87040 BLOOD CULTURE FOR BACTERIA: CPT | Performed by: EMERGENCY MEDICINE

## 2020-05-11 PROCEDURE — 86140 C-REACTIVE PROTEIN: CPT | Performed by: EMERGENCY MEDICINE

## 2020-05-11 PROCEDURE — 99284 EMERGENCY DEPT VISIT MOD MDM: CPT

## 2020-05-11 PROCEDURE — 94799 UNLISTED PULMONARY SVC/PX: CPT

## 2020-05-11 PROCEDURE — 83615 LACTATE (LD) (LDH) ENZYME: CPT | Performed by: EMERGENCY MEDICINE

## 2020-05-11 PROCEDURE — 83605 ASSAY OF LACTIC ACID: CPT | Performed by: FAMILY MEDICINE

## 2020-05-11 PROCEDURE — 25010000002 CEFEPIME PER 500 MG: Performed by: EMERGENCY MEDICINE

## 2020-05-11 PROCEDURE — 87147 CULTURE TYPE IMMUNOLOGIC: CPT | Performed by: EMERGENCY MEDICINE

## 2020-05-11 PROCEDURE — 83735 ASSAY OF MAGNESIUM: CPT | Performed by: EMERGENCY MEDICINE

## 2020-05-11 RX ORDER — CHOLECALCIFEROL (VITAMIN D3) 125 MCG
5 CAPSULE ORAL NIGHTLY PRN
Status: DISCONTINUED | OUTPATIENT
Start: 2020-05-11 | End: 2020-05-14 | Stop reason: HOSPADM

## 2020-05-11 RX ORDER — BRIMONIDINE TARTRATE 2 MG/ML
1 SOLUTION/ DROPS OPHTHALMIC 2 TIMES DAILY
Status: DISCONTINUED | OUTPATIENT
Start: 2020-05-11 | End: 2020-05-14 | Stop reason: HOSPADM

## 2020-05-11 RX ORDER — ONDANSETRON 2 MG/ML
4 INJECTION INTRAMUSCULAR; INTRAVENOUS EVERY 6 HOURS PRN
Status: DISCONTINUED | OUTPATIENT
Start: 2020-05-11 | End: 2020-05-14 | Stop reason: HOSPADM

## 2020-05-11 RX ORDER — ACETAMINOPHEN 325 MG/1
650 TABLET ORAL EVERY 4 HOURS PRN
Status: DISCONTINUED | OUTPATIENT
Start: 2020-05-11 | End: 2020-05-14 | Stop reason: HOSPADM

## 2020-05-11 RX ORDER — CALCIUM CARBONATE 200(500)MG
2 TABLET,CHEWABLE ORAL 2 TIMES DAILY PRN
Status: DISCONTINUED | OUTPATIENT
Start: 2020-05-11 | End: 2020-05-14 | Stop reason: HOSPADM

## 2020-05-11 RX ORDER — GALANTAMINE HYDROBROMIDE 8 MG/1
8 CAPSULE, EXTENDED RELEASE ORAL 2 TIMES DAILY
Status: DISCONTINUED | OUTPATIENT
Start: 2020-05-11 | End: 2020-05-14 | Stop reason: HOSPADM

## 2020-05-11 RX ORDER — BISACODYL 10 MG
10 SUPPOSITORY, RECTAL RECTAL DAILY PRN
Status: DISCONTINUED | OUTPATIENT
Start: 2020-05-11 | End: 2020-05-14 | Stop reason: HOSPADM

## 2020-05-11 RX ORDER — SODIUM CHLORIDE 0.9 % (FLUSH) 0.9 %
10 SYRINGE (ML) INJECTION AS NEEDED
Status: DISCONTINUED | OUTPATIENT
Start: 2020-05-11 | End: 2020-05-14 | Stop reason: HOSPADM

## 2020-05-11 RX ORDER — ACETAMINOPHEN 650 MG/1
650 SUPPOSITORY RECTAL EVERY 4 HOURS PRN
Status: DISCONTINUED | OUTPATIENT
Start: 2020-05-11 | End: 2020-05-14 | Stop reason: HOSPADM

## 2020-05-11 RX ORDER — PANTOPRAZOLE SODIUM 40 MG/1
40 TABLET, DELAYED RELEASE ORAL DAILY
Status: DISCONTINUED | OUTPATIENT
Start: 2020-05-12 | End: 2020-05-14 | Stop reason: HOSPADM

## 2020-05-11 RX ORDER — ESCITALOPRAM OXALATE 10 MG/1
10 TABLET ORAL DAILY
Status: DISCONTINUED | OUTPATIENT
Start: 2020-05-12 | End: 2020-05-14 | Stop reason: HOSPADM

## 2020-05-11 RX ORDER — LEVOTHYROXINE SODIUM 0.03 MG/1
25 TABLET ORAL DAILY
Status: DISCONTINUED | OUTPATIENT
Start: 2020-05-12 | End: 2020-05-14 | Stop reason: HOSPADM

## 2020-05-11 RX ORDER — ISOSORBIDE MONONITRATE 30 MG/1
30 TABLET, EXTENDED RELEASE ORAL DAILY
Status: DISCONTINUED | OUTPATIENT
Start: 2020-05-12 | End: 2020-05-14 | Stop reason: HOSPADM

## 2020-05-11 RX ORDER — ALBUTEROL SULFATE 90 UG/1
2 AEROSOL, METERED RESPIRATORY (INHALATION) EVERY 4 HOURS PRN
COMMUNITY
End: 2020-10-12

## 2020-05-11 RX ORDER — TAMSULOSIN HYDROCHLORIDE 0.4 MG/1
0.4 CAPSULE ORAL NIGHTLY
Status: DISCONTINUED | OUTPATIENT
Start: 2020-05-11 | End: 2020-05-12

## 2020-05-11 RX ORDER — ALBUTEROL SULFATE 2.5 MG/3ML
2.5 SOLUTION RESPIRATORY (INHALATION)
Status: DISCONTINUED | OUTPATIENT
Start: 2020-05-11 | End: 2020-05-14 | Stop reason: HOSPADM

## 2020-05-11 RX ORDER — HYDRALAZINE HYDROCHLORIDE 25 MG/1
25 TABLET, FILM COATED ORAL 2 TIMES DAILY
Status: DISCONTINUED | OUTPATIENT
Start: 2020-05-11 | End: 2020-05-14 | Stop reason: HOSPADM

## 2020-05-11 RX ORDER — ALBUTEROL SULFATE 90 UG/1
2 AEROSOL, METERED RESPIRATORY (INHALATION)
Status: DISCONTINUED | OUTPATIENT
Start: 2020-05-11 | End: 2020-05-14 | Stop reason: HOSPADM

## 2020-05-11 RX ORDER — NITROGLYCERIN 0.4 MG/1
0.4 TABLET SUBLINGUAL
Status: DISCONTINUED | OUTPATIENT
Start: 2020-05-11 | End: 2020-05-14 | Stop reason: HOSPADM

## 2020-05-11 RX ORDER — IPRATROPIUM BROMIDE AND ALBUTEROL SULFATE 2.5; .5 MG/3ML; MG/3ML
3 SOLUTION RESPIRATORY (INHALATION)
Status: DISCONTINUED | OUTPATIENT
Start: 2020-05-11 | End: 2020-05-11

## 2020-05-11 RX ORDER — SODIUM CHLORIDE 9 MG/ML
100 INJECTION, SOLUTION INTRAVENOUS CONTINUOUS
Status: DISCONTINUED | OUTPATIENT
Start: 2020-05-12 | End: 2020-05-14

## 2020-05-11 RX ORDER — ROSUVASTATIN CALCIUM 10 MG/1
20 TABLET, COATED ORAL NIGHTLY
Status: DISCONTINUED | OUTPATIENT
Start: 2020-05-11 | End: 2020-05-14 | Stop reason: HOSPADM

## 2020-05-11 RX ORDER — SODIUM CHLORIDE 0.9 % (FLUSH) 0.9 %
10 SYRINGE (ML) INJECTION EVERY 12 HOURS SCHEDULED
Status: DISCONTINUED | OUTPATIENT
Start: 2020-05-11 | End: 2020-05-14 | Stop reason: HOSPADM

## 2020-05-11 RX ORDER — ONDANSETRON 4 MG/1
4 TABLET, FILM COATED ORAL EVERY 6 HOURS PRN
Status: DISCONTINUED | OUTPATIENT
Start: 2020-05-11 | End: 2020-05-14 | Stop reason: HOSPADM

## 2020-05-11 RX ORDER — LANOLIN ALCOHOL/MO/W.PET/CERES
1000 CREAM (GRAM) TOPICAL DAILY
Status: DISCONTINUED | OUTPATIENT
Start: 2020-05-12 | End: 2020-05-14 | Stop reason: HOSPADM

## 2020-05-11 RX ORDER — PREDNISONE 1 MG/1
5 TABLET ORAL DAILY
Status: DISCONTINUED | OUTPATIENT
Start: 2020-05-12 | End: 2020-05-14 | Stop reason: HOSPADM

## 2020-05-11 RX ORDER — ACETAMINOPHEN 160 MG/5ML
650 SOLUTION ORAL EVERY 4 HOURS PRN
Status: DISCONTINUED | OUTPATIENT
Start: 2020-05-11 | End: 2020-05-14 | Stop reason: HOSPADM

## 2020-05-11 RX ORDER — HYDROCODONE BITARTRATE AND ACETAMINOPHEN 7.5; 325 MG/1; MG/1
1 TABLET ORAL EVERY 4 HOURS PRN
Status: DISCONTINUED | OUTPATIENT
Start: 2020-05-11 | End: 2020-05-14 | Stop reason: HOSPADM

## 2020-05-11 RX ORDER — VANCOMYCIN HYDROCHLORIDE
20 ONCE
Status: DISCONTINUED | OUTPATIENT
Start: 2020-05-11 | End: 2020-05-12

## 2020-05-11 RX ADMIN — Medication 10 ML: at 21:14

## 2020-05-11 RX ADMIN — ROSUVASTATIN CALCIUM 20 MG: 10 TABLET, FILM COATED ORAL at 21:13

## 2020-05-11 RX ADMIN — SODIUM CHLORIDE 500 ML: 0.9 INJECTION, SOLUTION INTRAVENOUS at 12:57

## 2020-05-11 RX ADMIN — HYDRALAZINE HYDROCHLORIDE 25 MG: 25 TABLET, FILM COATED ORAL at 21:13

## 2020-05-11 RX ADMIN — ALBUTEROL SULFATE 2 PUFF: 90 AEROSOL, METERED RESPIRATORY (INHALATION) at 20:33

## 2020-05-11 RX ADMIN — SODIUM CHLORIDE 100 ML/HR: 900 INJECTION, SOLUTION INTRAVENOUS at 23:31

## 2020-05-11 RX ADMIN — APIXABAN 5 MG: 5 TABLET, FILM COATED ORAL at 21:14

## 2020-05-11 RX ADMIN — CEFEPIME HYDROCHLORIDE 2 G: 2 INJECTION, POWDER, FOR SOLUTION INTRAVENOUS at 15:47

## 2020-05-11 RX ADMIN — GALANTAMINE HYDROBROMIDE 8 MG: 8 CAPSULE, EXTENDED RELEASE ORAL at 21:14

## 2020-05-11 RX ADMIN — BRIMONIDINE TARTRATE 1 DROP: 2 SOLUTION OPHTHALMIC at 21:15

## 2020-05-11 RX ADMIN — TAMSULOSIN HYDROCHLORIDE 0.4 MG: 0.4 CAPSULE ORAL at 21:14

## 2020-05-11 RX ADMIN — METOPROLOL TARTRATE 25 MG: 25 TABLET, FILM COATED ORAL at 21:13

## 2020-05-11 NOTE — ED PROVIDER NOTES
"Subjective   Chief complaint: Patient 79-year-old male.  He had a syncopal event at home.  He does have a history of dementia.  His wife I am speaking with on the phone the bedside.  When I asked patient what happens he states \"cannot remember\".  His wife states that he was getting up to go to a cardiologist appointment with Dr. Jeffers.  He stood up said he could not do the sat down and went unresponsive.  He took a while to come around he was in and out of consciousness.  Got real pale and diaphoretic.  No no seizure activity.  He has had passing out spells before.  His wife states that he got a stent in his heart back in November of last year.  Ever since then he has gone \"downhill\".  He has a pacemaker as well.    Context: Was sitting at home and wife was standing him up    Duration: Few minutes he goes in and out of consciousness    Timing: Just prior to arrival    Severity: Severe    Associated Symptoms: No chest pain.  He does feel weak all over.        PCP:            Review of Systems   Constitutional: Negative for fever.   HENT: Negative.    Respiratory: Negative.    Cardiovascular: Negative.         Patient chronically has pain around his pacemaker insertion site   Gastrointestinal: Negative.    Musculoskeletal: Negative.    Skin: Negative.    Neurological: Positive for syncope and light-headedness.        Patient with general weakness   Psychiatric/Behavioral: Negative.        Past Medical History:   Diagnosis Date   • CAD (coronary artery disease)    • Coronary artery disease     PTCI   • Dementia (CMS/HCC)    • Depression    • Disease of thyroid gland    • Dysautonomia (CMS/HCC)    • Dyslipidemia    • GERD (gastroesophageal reflux disease)    • Head pain    • Hyperlipidemia    • Hypertension    • SSS (sick sinus syndrome) (CMS/HCC)    • Stroke (CMS/HCC)     x 3 in 2018       Allergies   Allergen Reactions   • Doxycycline GI Intolerance   • Doxycycline Nausea And Vomiting       Past Surgical History: "   Procedure Laterality Date   • APPENDECTOMY     • CHOLECYSTECTOMY     • ENDOSCOPY N/A 10/17/2019    Procedure: ESOPHAGOGASTRODUODENOSCOPY with biopsy x 2 areas;  Surgeon: Ashok Sanchez MD;  Location: [facility] ENDOSCOPY;  Service: Gastroenterology   • HERNIA REPAIR     • LEFT HEART CATH     • PACEMAKER IMPLANTATION      Medtronic   • TEMPORAL ARTERY BIOPSY     • WRIST SURGERY      severed artery       Family History   Problem Relation Age of Onset   • Heart disease Father        Social History     Socioeconomic History   • Marital status:      Spouse name: Not on file   • Number of children: Not on file   • Years of education: Not on file   • Highest education level: Not on file   Tobacco Use   • Smoking status: Never Smoker   • Smokeless tobacco: Never Used   • Tobacco comment: quit 25 yrs ago   Substance and Sexual Activity   • Alcohol use: No     Frequency: Never   • Drug use: No   • Sexual activity: Defer   Social History Narrative    ** Merged History Encounter **                Objective   Physical Exam   Constitutional: He appears well-developed and well-nourished.   HENT:   Head: Normocephalic and atraumatic.   Eyes: Pupils are equal, round, and reactive to light.   Neck: Neck supple.   Cardiovascular: Normal rate and regular rhythm.   Pulmonary/Chest: Effort normal and breath sounds normal.   Abdominal: Soft.   Neurological: He is alert. He is disoriented. No cranial nerve deficit or sensory deficit.   Patient chronically disoriented to time.  Patient has no focal deficit.  He is generally weak.   Skin: Skin is warm. There is pallor.   Psychiatric: He has a normal mood and affect. His behavior is normal.   Nursing note and vitals reviewed.      Procedures           ED Course           Results for orders placed or performed during the hospital encounter of 05/11/20   Comprehensive Metabolic Panel   Result Value Ref Range    Glucose 168 (H) 65 - 99 mg/dL    BUN 15 8 - 23 mg/dL    Creatinine 1.27 0.76 -  1.27 mg/dL    Sodium 140 136 - 145 mmol/L    Potassium 4.4 3.5 - 5.2 mmol/L    Chloride 105 98 - 107 mmol/L    CO2 25.0 22.0 - 29.0 mmol/L    Calcium 8.8 8.6 - 10.5 mg/dL    Total Protein 6.3 6.0 - 8.5 g/dL    Albumin 3.80 3.50 - 5.20 g/dL    ALT (SGPT) 14 1 - 41 U/L    AST (SGOT) 18 1 - 40 U/L    Alkaline Phosphatase 62 39 - 117 U/L    Total Bilirubin 0.3 0.2 - 1.2 mg/dL    eGFR Non African Amer 55 (L) >60 mL/min/1.73    Globulin 2.5 gm/dL    A/G Ratio 1.5 g/dL    BUN/Creatinine Ratio 11.8 7.0 - 25.0    Anion Gap 10.0 5.0 - 15.0 mmol/L   Protime-INR   Result Value Ref Range    Protime 11.4 (L) 19.4 - 28.5 Seconds    INR 1.10 (L) 2.00 - 3.00   aPTT   Result Value Ref Range    PTT 24.8 (L) 61.0 - 76.5 seconds   CBC Auto Differential   Result Value Ref Range    WBC 15.40 (H) 3.40 - 10.80 10*3/mm3    RBC 3.88 (L) 4.14 - 5.80 10*6/mm3    Hemoglobin 11.6 (L) 13.0 - 17.7 g/dL    Hematocrit 34.2 (L) 37.5 - 51.0 %    MCV 88.0 79.0 - 97.0 fL    MCH 30.0 26.6 - 33.0 pg    MCHC 34.1 31.5 - 35.7 g/dL    RDW 14.3 12.3 - 15.4 %    RDW-SD 44.6 37.0 - 54.0 fl    MPV 8.2 6.0 - 12.0 fL    Platelets 233 140 - 450 10*3/mm3    Neutrophil % 88.1 (H) 42.7 - 76.0 %    Lymphocyte % 6.1 (L) 19.6 - 45.3 %    Monocyte % 4.4 (L) 5.0 - 12.0 %    Eosinophil % 1.1 0.3 - 6.2 %    Basophil % 0.3 0.0 - 1.5 %    Neutrophils, Absolute 13.50 (H) 1.70 - 7.00 10*3/mm3    Lymphocytes, Absolute 0.90 0.70 - 3.10 10*3/mm3    Monocytes, Absolute 0.70 0.10 - 0.90 10*3/mm3    Eosinophils, Absolute 0.20 0.00 - 0.40 10*3/mm3    Basophils, Absolute 0.10 0.00 - 0.20 10*3/mm3    nRBC 0.0 0.0 - 0.2 /100 WBC   BNP   Result Value Ref Range    proBNP 131.6 5.0-1,800.0 pg/mL     Xr Chest 1 View    Result Date: 5/11/2020  Evidence for mild increase in ill-defined airspace infiltrates and interstitial prominence within the mid to lower lungs bilaterally. These findings may indicate developing atypical/viral infection or multifocal pneumonia. Changes of pulmonary edema could  also be considered. Recommend correlation for signs or symptoms of acute infection versus volume overload and follow-up to ensure resolution.  Electronically Signed By-Harshil Guerrero On:5/11/2020 1:04 PM This report was finalized on [id number] by  Harshil Guerrero, .                        EKG interpreted by myself shows sinus rhythm Q waves in the inferior leads and nonspecific T wave abnormalities rate of 60.            MDM  Number of Diagnoses or Management Options  HCAP (healthcare-associated pneumonia):   Suspected Covid-19 Virus Infection:   Syncope, unspecified syncope type:   Diagnosis management comments: This point time patient had a syncopal event at home.  He stood up and was so weak he completely passed out.  Sounds orthostatics.  The wife states that he has been declining however at home for the last few weeks.  There was no mention of chest pain.  The patient does have a history of dementia.  He has been hospitalized here recently.  And with these new infiltrates I will treat him for H CAP.  He has a high white count.  Again he has not been coughing here however with these chest x-ray changes I will go ahead and draw coronavirus to rule that out and we will further obtain work-up for his syncope.  Does not appear to have any acute ischemic changes on his EKG.  He had a syncope episode like this a month ago.  This may not be related to any pneumonia.  He has ongoing issues with this.  And again it appears to me potentially this is fluid balance issue as he was so weak he stood up to go to the doctors and then collapsed and went unresponsive.  Currently he was he was awake pleasant asking for something to drink.  No signs of any acute distress.  Do not think PE.       Amount and/or Complexity of Data Reviewed  Clinical lab tests: reviewed  Tests in the radiology section of CPT®: reviewed  Tests in the medicine section of CPT®: reviewed  Discuss the patient with other providers: yes  Independent  visualization of images, tracings, or specimens: yes    Risk of Complications, Morbidity, and/or Mortality  Presenting problems: high  Diagnostic procedures: high  Management options: high        Final diagnoses:   None     Syncope  HCAP  Rule out COVID         Cy Rivera,   05/11/20 1413       Cy Rivera,   05/11/20 1514

## 2020-05-12 LAB
ANION GAP SERPL CALCULATED.3IONS-SCNC: 12 MMOL/L (ref 5–15)
BACTERIA BLD CULT: ABNORMAL
BASOPHILS # BLD AUTO: 0 10*3/MM3 (ref 0–0.2)
BASOPHILS NFR BLD AUTO: 0.4 % (ref 0–1.5)
BOTTLE TYPE: ABNORMAL
BUN BLD-MCNC: 15 MG/DL (ref 8–23)
BUN/CREAT SERPL: 11.4 (ref 7–25)
CALCIUM SPEC-SCNC: 8.9 MG/DL (ref 8.6–10.5)
CHLORIDE SERPL-SCNC: 105 MMOL/L (ref 98–107)
CO2 SERPL-SCNC: 24 MMOL/L (ref 22–29)
CREAT BLD-MCNC: 1.32 MG/DL (ref 0.76–1.27)
D-LACTATE SERPL-SCNC: 1.5 MMOL/L (ref 0.5–2)
DEPRECATED RDW RBC AUTO: 42.4 FL (ref 37–54)
EOSINOPHIL # BLD AUTO: 0.2 10*3/MM3 (ref 0–0.4)
EOSINOPHIL NFR BLD AUTO: 1.9 % (ref 0.3–6.2)
ERYTHROCYTE [DISTWIDTH] IN BLOOD BY AUTOMATED COUNT: 14 % (ref 12.3–15.4)
GFR SERPL CREATININE-BSD FRML MDRD: 52 ML/MIN/1.73
GLUCOSE BLD-MCNC: 91 MG/DL (ref 65–99)
HCT VFR BLD AUTO: 35.5 % (ref 37.5–51)
HGB BLD-MCNC: 12 G/DL (ref 13–17.7)
LACTATE HOLD SPECIMEN: NORMAL
LYMPHOCYTES # BLD AUTO: 1.7 10*3/MM3 (ref 0.7–3.1)
LYMPHOCYTES NFR BLD AUTO: 14.9 % (ref 19.6–45.3)
MAGNESIUM SERPL-MCNC: 1.9 MG/DL (ref 1.6–2.4)
MCH RBC QN AUTO: 29.1 PG (ref 26.6–33)
MCHC RBC AUTO-ENTMCNC: 33.8 G/DL (ref 31.5–35.7)
MCV RBC AUTO: 86.1 FL (ref 79–97)
MONOCYTES # BLD AUTO: 0.9 10*3/MM3 (ref 0.1–0.9)
MONOCYTES NFR BLD AUTO: 7.8 % (ref 5–12)
NEUTROPHILS # BLD AUTO: 8.4 10*3/MM3 (ref 1.7–7)
NEUTROPHILS NFR BLD AUTO: 75 % (ref 42.7–76)
NRBC BLD AUTO-RTO: 0 /100 WBC (ref 0–0.2)
NT-PROBNP SERPL-MCNC: 179.9 PG/ML (ref 5–1800)
PLATELET # BLD AUTO: 254 10*3/MM3 (ref 140–450)
PMV BLD AUTO: 8.1 FL (ref 6–12)
POTASSIUM BLD-SCNC: 3.8 MMOL/L (ref 3.5–5.2)
RBC # BLD AUTO: 4.12 10*6/MM3 (ref 4.14–5.8)
REF LAB TEST METHOD: NORMAL
SARS-COV-2 RNA RESP QL NAA+PROBE: NOT DETECTED
SODIUM BLD-SCNC: 141 MMOL/L (ref 136–145)
TROPONIN T SERPL-MCNC: <0.01 NG/ML (ref 0–0.03)
TROPONIN T SERPL-MCNC: <0.01 NG/ML (ref 0–0.03)
WBC NRBC COR # BLD: 11.2 10*3/MM3 (ref 3.4–10.8)

## 2020-05-12 PROCEDURE — 85025 COMPLETE CBC W/AUTO DIFF WBC: CPT | Performed by: FAMILY MEDICINE

## 2020-05-12 PROCEDURE — 25010000002 CEFEPIME PER 500 MG: Performed by: FAMILY MEDICINE

## 2020-05-12 PROCEDURE — 83605 ASSAY OF LACTIC ACID: CPT | Performed by: FAMILY MEDICINE

## 2020-05-12 PROCEDURE — 94799 UNLISTED PULMONARY SVC/PX: CPT

## 2020-05-12 PROCEDURE — 63710000001 PREDNISONE PER 5 MG: Performed by: FAMILY MEDICINE

## 2020-05-12 PROCEDURE — 84484 ASSAY OF TROPONIN QUANT: CPT | Performed by: NURSE PRACTITIONER

## 2020-05-12 PROCEDURE — 83735 ASSAY OF MAGNESIUM: CPT | Performed by: FAMILY MEDICINE

## 2020-05-12 PROCEDURE — 80048 BASIC METABOLIC PNL TOTAL CA: CPT | Performed by: FAMILY MEDICINE

## 2020-05-12 PROCEDURE — 83880 ASSAY OF NATRIURETIC PEPTIDE: CPT | Performed by: FAMILY MEDICINE

## 2020-05-12 PROCEDURE — 84484 ASSAY OF TROPONIN QUANT: CPT | Performed by: FAMILY MEDICINE

## 2020-05-12 RX ORDER — CLOPIDOGREL BISULFATE 75 MG/1
75 TABLET ORAL DAILY
Status: DISCONTINUED | OUTPATIENT
Start: 2020-05-12 | End: 2020-05-14 | Stop reason: HOSPADM

## 2020-05-12 RX ADMIN — HYDRALAZINE HYDROCHLORIDE 25 MG: 25 TABLET, FILM COATED ORAL at 09:10

## 2020-05-12 RX ADMIN — GALANTAMINE HYDROBROMIDE 8 MG: 8 CAPSULE, EXTENDED RELEASE ORAL at 12:18

## 2020-05-12 RX ADMIN — HYDRALAZINE HYDROCHLORIDE 25 MG: 25 TABLET, FILM COATED ORAL at 20:40

## 2020-05-12 RX ADMIN — CEFEPIME HYDROCHLORIDE 2 G: 2 INJECTION, POWDER, FOR SOLUTION INTRAVENOUS at 20:51

## 2020-05-12 RX ADMIN — APIXABAN 5 MG: 5 TABLET, FILM COATED ORAL at 09:10

## 2020-05-12 RX ADMIN — ESCITALOPRAM OXALATE 10 MG: 10 TABLET ORAL at 09:10

## 2020-05-12 RX ADMIN — ALBUTEROL SULFATE 2 PUFF: 90 AEROSOL, METERED RESPIRATORY (INHALATION) at 12:20

## 2020-05-12 RX ADMIN — ALBUTEROL SULFATE 2 PUFF: 90 AEROSOL, METERED RESPIRATORY (INHALATION) at 15:33

## 2020-05-12 RX ADMIN — CYANOCOBALAMIN TAB 1000 MCG 1000 MCG: 1000 TAB at 09:10

## 2020-05-12 RX ADMIN — APIXABAN 5 MG: 5 TABLET, FILM COATED ORAL at 20:40

## 2020-05-12 RX ADMIN — ALBUTEROL SULFATE 2 PUFF: 90 AEROSOL, METERED RESPIRATORY (INHALATION) at 19:29

## 2020-05-12 RX ADMIN — PANTOPRAZOLE SODIUM 40 MG: 40 TABLET, DELAYED RELEASE ORAL at 09:10

## 2020-05-12 RX ADMIN — BRIMONIDINE TARTRATE 1 DROP: 2 SOLUTION OPHTHALMIC at 09:16

## 2020-05-12 RX ADMIN — PREDNISONE 5 MG: 5 TABLET ORAL at 09:10

## 2020-05-12 RX ADMIN — LEVOTHYROXINE SODIUM 25 MCG: 25 TABLET ORAL at 09:10

## 2020-05-12 RX ADMIN — ALBUTEROL SULFATE 2 PUFF: 90 AEROSOL, METERED RESPIRATORY (INHALATION) at 07:23

## 2020-05-12 RX ADMIN — Medication 10 ML: at 09:11

## 2020-05-12 RX ADMIN — Medication 10 ML: at 21:06

## 2020-05-12 RX ADMIN — CLOPIDOGREL BISULFATE 75 MG: 75 TABLET ORAL at 14:50

## 2020-05-12 RX ADMIN — METOPROLOL TARTRATE 25 MG: 25 TABLET, FILM COATED ORAL at 20:40

## 2020-05-12 RX ADMIN — CEFEPIME HYDROCHLORIDE 2 G: 2 INJECTION, POWDER, FOR SOLUTION INTRAVENOUS at 09:11

## 2020-05-12 RX ADMIN — ISOSORBIDE MONONITRATE 30 MG: 30 TABLET, EXTENDED RELEASE ORAL at 09:10

## 2020-05-12 RX ADMIN — GALANTAMINE HYDROBROMIDE 8 MG: 8 CAPSULE, EXTENDED RELEASE ORAL at 20:42

## 2020-05-12 RX ADMIN — BRIMONIDINE TARTRATE 1 DROP: 2 SOLUTION OPHTHALMIC at 20:42

## 2020-05-12 RX ADMIN — ROSUVASTATIN CALCIUM 20 MG: 10 TABLET, FILM COATED ORAL at 20:40

## 2020-05-12 NOTE — SIGNIFICANT NOTE
"ST received orders for swallow evaluation 2/2 \"dysphagia screen failed\". However, it is documented that the patient passed a swallow screen and was placed on a regular diet/thin liquids. Pt has been tested for COVID-19 with results still pending. Contacted RN who reports pt is slightly confused (baseline dementia) but mental status has improved overall. RN reports pt has tolerated meds given with liquids and has not demonstrated difficulty with meals. SLP to f/u tomorrow to complete clinical swallow evaluation if indicated and appropriate.   "

## 2020-05-12 NOTE — CONSULTS
[facility]: [facility]         CONSULT NOTE    Patient Identification:  Jayden Bond  79 y.o.  male  1940  [id number]            Requesting physician: Attending physician    Reason for Consultation:  PNA R/O COVID      History of Present Illness:    79 year old male who presented to [facility] ED with syncopal spell. He has a mild dementia and was recently admitted to the hospital in March for a syncopal episode. At that time he was worked up for stroke, seizure, and cardiac workup which were all negative. The wife told ED physician that the patient went unresponsive. The wife reported he was in and out of consciousness, no seizure activity but was pale and diaphoretic. He has had a history of hypotension, syncope, dementia, CAD, depression, dyslipidemia, GERD, SSS that required a pacemaker, dysautonomia, and previous stroke in 2018. He has multiple PCI in past known to have HTN with orthostatic hypotension.      Assessment:     possible left lower lobe Pneumonia  Await Covid results  WC 11, neutrophils normal; leukopenia  Echo 2/4/20: EF 61- 65%;  RVSP 38          Recommendations:    Antibiotics  GI prophylaxis protonix  Bronchodilator  Eliquis     once COVID-19 results are available will consider CT scan of the chest    Review of Sytems:  Review of Systems   HENT: Positive for congestion, rhinorrhea and sneezing.    Respiratory: Positive for cough and shortness of breath. Negative for apnea, chest tightness, wheezing and stridor.    Cardiovascular: Negative for leg swelling.       Past Medical History:  Past Medical History:   Diagnosis Date   • CAD (coronary artery disease)    • Coronary artery disease     PTCI   • Dementia (CMS/HCC)    • Depression    • Disease of thyroid gland    • Dysautonomia (CMS/HCC)    • Dyslipidemia    • GERD (gastroesophageal reflux disease)    • Head pain    • Hyperlipidemia    • Hypertension    • SSS (sick sinus syndrome) (CMS/HCC)    • Stroke (CMS/HCC)     x 3 in 2018       Past  Surgical History:  Past Surgical History:   Procedure Laterality Date   • APPENDECTOMY     • CHOLECYSTECTOMY     • ENDOSCOPY N/A 10/17/2019    Procedure: ESOPHAGOGASTRODUODENOSCOPY with biopsy x 2 areas;  Surgeon: Ashok Sanchez MD;  Location: [facility] ENDOSCOPY;  Service: Gastroenterology   • HERNIA REPAIR     • LEFT HEART CATH     • PACEMAKER IMPLANTATION      Medtronic   • TEMPORAL ARTERY BIOPSY     • WRIST SURGERY      severed artery        Home Meds:  Medications Prior to Admission   Medication Sig Dispense Refill Last Dose   • albuterol sulfate  (90 Base) MCG/ACT inhaler Inhale 2 puffs Every 4 (Four) Hours As Needed for Wheezing.      • apixaban (ELIQUIS) 5 MG tablet tablet Take 1 tablet by mouth Every 12 (Twelve) Hours. Indications: Atrial Fibrillation 60 tablet 0    • brimonidine (ALPHAGAN) 0.2 % ophthalmic solution Administer 1 drop to both eyes 2 (Two) Times a Day. 10 mL 12    • clopidogrel (PLAVIX) 75 MG tablet Take 75 mg by mouth Daily.      • escitalopram (LEXAPRO) 10 MG tablet Take 10 mg by mouth Daily.   10/17/2019 at Unknown time   • fluticasone (FLONASE) 50 MCG/ACT nasal spray 2 sprays into the nostril(s) as directed by provider Daily.   10/16/2019 at Unknown time   • galantamine ER (RAZADYNE ER) 8 MG 24 hr capsule Take 8 mg by mouth 2 (Two) Times a Day.   10/16/2019 at Unknown time   • hydrALAZINE (APRESOLINE) 25 MG tablet Take 25 mg by mouth 2 (Two) Times a Day.   10/17/2019 at Unknown time   • isosorbide mononitrate (IMDUR) 30 MG 24 hr tablet Take 1 tablet by mouth Daily. 30 tablet 0    • levothyroxine (SYNTHROID, LEVOTHROID) 25 MCG tablet Take 25 mcg by mouth Daily.   10/17/2019 at Unknown time   • loratadine (CLARITIN) 10 MG tablet Take 10 mg by mouth Daily.   10/17/2019 at Unknown time   • metoprolol tartrate (LOPRESSOR) 25 MG tablet Take 1 tablet by mouth Every 12 (Twelve) Hours. (Patient taking differently: Take 25 mg by mouth Every Evening. Hold if pulse < 60) 60 tablet 0    • Multiple  "Vitamins-Minerals (MULTIVITAMIN WITH MINERALS) tablet tablet Take 1 tablet by mouth Every Night.   2/3/2020 at Unknown time   • nitroglycerin (NITROSTAT) 0.4 MG SL tablet Place 0.4 mg under the tongue Every 5 (Five) Minutes As Needed for Chest Pain. Take no more than 3 doses in 15 minutes.      • Omega-3 Fatty Acids (FISH OIL) 1000 MG capsule capsule Take 1,000 mg by mouth Daily With Breakfast.   2/3/2020 at Unknown time   • pantoprazole (PROTONIX) 40 MG EC tablet Take 40 mg by mouth Daily.   10/16/2019 at Unknown time   • predniSONE (DELTASONE) 5 MG tablet Take 5 mg by mouth Daily.   10/16/2019 at Unknown time   • rosuvastatin (CRESTOR) 20 MG tablet Take 20 mg by mouth Every Night.   10/16/2019 at Unknown time   • tamsulosin (FLOMAX) 0.4 MG capsule 24 hr capsule Take 1 capsule by mouth Every Night.   10/16/2019 at Unknown time   • vitamin B-12 (CYANOCOBALAMIN) 1000 MCG tablet Take 1,000 mcg by mouth Daily.   2/3/2020 at Unknown time       Allergies:  Allergies   Allergen Reactions   • Doxycycline GI Intolerance   • Doxycycline Nausea And Vomiting       Social History:   Social History     Socioeconomic History   • Marital status:      Spouse name: Not on file   • Number of children: Not on file   • Years of education: Not on file   • Highest education level: Not on file   Tobacco Use   • Smoking status: Never Smoker   • Smokeless tobacco: Never Used   • Tobacco comment: quit 25 yrs ago   Substance and Sexual Activity   • Alcohol use: No     Frequency: Never   • Drug use: No   • Sexual activity: Defer   Social History Narrative    ** Merged History Encounter **            Family History:  Family History   Problem Relation Age of Onset   • Heart disease Father        Physical Exam:  /96 (BP Location: Left arm, Patient Position: Lying)   Pulse 70   Temp 97.4 °F (36.3 °C) (Oral)   Resp 18   Ht 180.3 cm (71\")   Wt 81.8 kg (180 lb 5.4 oz)   SpO2 90%   BMI 25.15 kg/m²  Body mass index is 25.15 kg/m². " 90% 81.8 kg (180 lb 5.4 oz)  Physical Exam   Cardiovascular:   Murmur heard.  Pulmonary/Chest: No respiratory distress. He has wheezes. He has rales. He exhibits no tenderness.   Abdominal: He exhibits no distension. There is no tenderness.   Musculoskeletal: He exhibits edema.       LABS:  Lab Results   Component Value Date    CALCIUM 8.9 05/12/2020     Results from last 7 days   Lab Units 05/12/20  0548 05/11/20  1256   MAGNESIUM mg/dL 1.9 2.0   SODIUM mmol/L 141 140   POTASSIUM mmol/L 3.8 4.4   CHLORIDE mmol/L 105 105   CO2 mmol/L 24.0 25.0   BUN mg/dL 15 15   CREATININE mg/dL 1.32* 1.27   GLUCOSE mg/dL 91 168*   CALCIUM mg/dL 8.9 8.8   WBC 10*3/mm3 11.20* 15.40*   HEMOGLOBIN g/dL 12.0* 11.6*   PLATELETS 10*3/mm3 254 233   ALT (SGPT) U/L  --  14   AST (SGOT) U/L  --  18   PROBNP pg/mL  --  131.6   PROCALCITONIN ng/mL  --  0.08*     Lab Results   Component Value Date    CKTOTAL 23 (L) 11/09/2018    TROPONINT <0.010 05/12/2020     Results from last 7 days   Lab Units 05/12/20  0548 05/11/20  2234 05/11/20  1256   TROPONIN T ng/mL <0.010 <0.010 <0.010         Results from last 7 days   Lab Units 05/12/20  0231 05/11/20  2234 05/11/20  1256   PROCALCITONIN ng/mL  --   --  0.08*   LACTATE mmol/L 1.5 2.7*  --          Results from last 7 days   Lab Units 05/11/20  1443   ADENOVIRUS DETECTION BY PCR  Not Detected   CORONAVIRUS 229E  Not Detected   CORONAVIRUS HKU1  Not Detected   CORONAVIRUS NL63  Not Detected   CORONAVIRUS OC43  Not Detected   HUMAN METAPNEUMOVIRUS  Not Detected   HUMAN RHINOVIRUS/ENTEROVIRUS  Not Detected   INFLUENZA B PCR  Not Detected   PARAINFLUENZA 1  Not Detected   PARAINFLUENZA VIRUS 2  Not Detected   PARAINFLUENZA VIRUS 3  Not Detected   PARAINFLUENZA VIRUS 4  Not Detected   BORDETELLA PERTUSSIS PCR  Not Detected   CHLAMYDOPHILA PNEUMONIAE PCR  Not Detected   MYCOPLAMA PNEUMO PCR  Not Detected   INFLUENZA A PCR  Not Detected   INFLUENZA A H3  Not Detected   INFLUENZA A H1  Not Detected   RSV,  PCR  Not Detected     Results from last 7 days   Lab Units 05/11/20  1256   INR  1.10*         Lab Results   Component Value Date    TSH 4.640 (H) 05/11/2020     Estimated Creatinine Clearance: 52.5 mL/min (A) (by C-G formula based on SCr of 1.32 mg/dL (H)).  Results from last 7 days   Lab Units 05/11/20  1752   NITRITE UA  Negative        Imaging:  Imaging Results (Last 24 Hours)     Procedure Component Value Units Date/Time    CT Head Without Contrast [[id number]] Collected:  05/11/20 1501     Updated:  05/11/20 1512    Narrative:       CT HEAD WO CONTRAST-     Date of Exam: 5/11/2020 2:51 PM     Indication: Syncope/fainting; R55-Syncope and collapse; J18.9-Pneumonia,  unspecified organism; R68.89-Other general symptoms and signs.       Comparison: CT head without contrast 03/04/2020, MRI brain without  contrast 11/20/2018.     Technique:  Without contrast, contiguous axial CT images of the head  were obtained from skull base to vertex.   Automated exposure control  and iterative reconstruction methods were used.     FINDINGS:  No acute intracranial hemorrhage, mass, or mass effect is seen.  Increased left cerebral convexity extra-axial CSF density compared to  the right is stable. The septum pellucidum is to the right of the falx,  which is also stable and may be related to right-sided volume loss.  Mild-to-moderate global volume loss is stable. Advanced  periventricular/subcortical white matter hypodensities are nonspecific,  but are most commonly seen in the setting of chronic small vessel  ischemic change. Prior infarcts are redemonstrated in right frontal  tolerated occipital, and left temporal lobes. 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 No acute fracture is noted. There is  mucosal thickening in the right side of the sphenoid sinus and in right  ethmoid air cells posteriorly. Mastoid air cells are clear.       Impression:       1.No acute intracranial abnormality is identified on noncontrast  CT.  2.Stable appearance of prior infarcts and advanced probable chronic  small vessel ischemic change.     Electronically Signed By-Armida Escobar On:5/11/2020 3:10 PM  This report was finalized on [id number] by  Armida Escobar, .    XR Chest 1 View [[id number]] Collected:  05/11/20 1302     Updated:  05/11/20 1306    Narrative:          DATE OF EXAM:   5/11/2020 12:30 PM     PROCEDURE:   XR CHEST 1 VW-     INDICATIONS:   Weakness syncope     COMPARISON:  05/01/2020, 03/04/2020, 02/03/2020, 11/09/2018.     TECHNIQUE:   [Portable chest radiograph]     FINDINGS:   There may be mild interval increase in ill-defined airspace infiltrates  within the lung bases. There may also be mild interval increase in  interstitial prominence. The cardiac silhouette and mediastinum are  stable. A left cardiac pacemaker/AICD remains in place with leads  intact. No acute osseous abnormalities are seen.        Impression:       Evidence for mild increase in ill-defined airspace infiltrates and  interstitial prominence within the mid to lower lungs bilaterally. These  findings may indicate developing atypical/viral infection or multifocal  pneumonia. Changes of pulmonary edema could also be considered.  Recommend correlation for signs or symptoms of acute infection versus  volume overload and follow-up to ensure resolution.     Electronically Signed By-Harshil Guerrero On:5/11/2020 1:04 PM  This report was finalized on [id number] by  Harshil Guerrero, .            Current Meds:   SCHEDULE    albuterol sulfate HFA 2 puff Inhalation 4x Daily - RT   apixaban 5 mg Oral Q12H   brimonidine 1 drop Both Eyes BID   cefepime 2 g Intravenous Q12H   escitalopram 10 mg Oral Daily   galantamine ER 8 mg Oral BID   hydrALAZINE 25 mg Oral BID   isosorbide mononitrate 30 mg Oral Daily   levothyroxine 25 mcg Oral Daily   metoprolol tartrate 25 mg Oral Nightly   pantoprazole 40 mg Oral Daily   predniSONE 5 mg Oral Daily   rosuvastatin 20 mg Oral Nightly    sodium chloride 10 mL Intravenous Q12H   vancomycin 20 mg/kg (Adjusted) Intravenous Once   vitamin B-12 1,000 mcg Oral Daily     Infusions    Pharmacy to Dose Cefepime     sodium chloride 100 mL/hr Last Rate: 100 mL/hr (05/11/20 6553)     PRNs  •  acetaminophen **OR** acetaminophen **OR** acetaminophen  •  albuterol  •  bisacodyl  •  calcium carbonate  •  HYDROcodone-acetaminophen  •  magnesium hydroxide  •  melatonin  •  nitroglycerin  •  ondansetron **OR** ondansetron  •  Pharmacy to Dose Cefepime  •  [COMPLETED] Insert peripheral IV **AND** sodium chloride  •  sodium chloride        Alayna Cruz, APRN  5/12/2020  06:51

## 2020-05-12 NOTE — PROGRESS NOTES
H&P addendum     Community acquired pneumonia present upon admission  Plans: Antimicrobial therapy and respiratory support

## 2020-05-12 NOTE — H&P
Patient Care Team:  Maude Ricketts APRN as PCP - General (Nurse Practitioner)  Deam, Ashutosh Santana MD as Consulting Physician (Cardiac Electrophysiology)    Chief Conplaint  Subjective     The patient is a 79 y.o. male presents with acute syncope    HPI  The patient is a poor historian secondary to history of Alzheimer's dementia.  History was obtained primarily through the chart.  The patient is a 79-year-old  male with known history of autonomic dysfunction syndrome and atherosclerotic heart disease who was recently referred to electrophysiology.  The day of presentation, he apparently had an acute syncopal event and was brought to [facility] for evaluation.  He was admitted and was found to have a community-acquired pneumonia.  COVID-19 status is at present pending.  The patient denies acute distress.  He denies substernal chest pain or left arm paresthesias, jaw discomfort or orthopnea.    Review of Systems  Review of Systems   Constitutional: Positive for fatigue.   Respiratory: Negative.    Cardiovascular: Negative.    Gastrointestinal: Negative.    Genitourinary: Negative.    Neurological: Positive for syncope and weakness.       History  Past Medical History:   Diagnosis Date   • CAD (coronary artery disease)    • Coronary artery disease     PTCI   • Dementia (CMS/HCC)    • Depression    • Disease of thyroid gland    • Dysautonomia (CMS/[facility])    • Dyslipidemia    • GERD (gastroesophageal reflux disease)    • Head pain    • Hyperlipidemia    • Hypertension    • SSS (sick sinus syndrome) (CMS/HCC)    • Stroke (CMS/HCC)     x 3 in 2018     Past Surgical History:   Procedure Laterality Date   • APPENDECTOMY     • CHOLECYSTECTOMY     • ENDOSCOPY N/A 10/17/2019    Procedure: ESOPHAGOGASTRODUODENOSCOPY with biopsy x 2 areas;  Surgeon: Ashok Sanchez MD;  Location: [facility] ENDOSCOPY;  Service: Gastroenterology   • HERNIA REPAIR     • LEFT HEART CATH     • PACEMAKER IMPLANTATION      [employer]    • TEMPORAL ARTERY BIOPSY     • WRIST SURGERY      severed artery     Family History   Problem Relation Age of Onset   • Heart disease Father      Social History     Tobacco Use   • Smoking status: Never Smoker   • Smokeless tobacco: Never Used   • Tobacco comment: quit 25 yrs ago   Substance Use Topics   • Alcohol use: No     Frequency: Never   • Drug use: No     Allergies:  Doxycycline and Doxycycline    Objective     Vital Signs  Temp:  [97.4 °F (36.3 °C)-98.4 °F (36.9 °C)] 97.4 °F (36.3 °C)  Heart Rate:  [58-80] 80  Resp:  [16-18] 18  BP: ()/() 181/100      Physical Exam:   Physical Exam   Constitutional: He appears well-developed and well-nourished.   HENT:   Head: Normocephalic and atraumatic.   Eyes: Pupils are equal, round, and reactive to light. EOM are normal.   Neck: Normal range of motion.   Cardiovascular: Regular rhythm.   Pulmonary/Chest: Effort normal.   Abdominal: Soft.   Musculoskeletal: Normal range of motion.   Neurological: He is alert.   Skin: Skin is warm.   Nursing note and vitals reviewed.           Results Review:   CBC    Results from last 7 days   Lab Units 05/12/20  0548 05/11/20  1256   WBC 10*3/mm3 11.20* 15.40*   HEMOGLOBIN g/dL 12.0* 11.6*   PLATELETS 10*3/mm3 254 233     BMP   Results from last 7 days   Lab Units 05/12/20  0548 05/11/20  1256   SODIUM mmol/L 141 140   POTASSIUM mmol/L 3.8 4.4   CHLORIDE mmol/L 105 105   CO2 mmol/L 24.0 25.0   BUN mg/dL 15 15   CREATININE mg/dL 1.32* 1.27   GLUCOSE mg/dL 91 168*   MAGNESIUM mg/dL 1.9 2.0     Cr Clearance Estimated Creatinine Clearance: 52.5 mL/min (A) (by C-G formula based on SCr of 1.32 mg/dL (H)).  Coag   Results from last 7 days   Lab Units 05/11/20  1256   INR  1.10*   APTT seconds 24.8*     HbA1C   Lab Results   Component Value Date    HGBA1C 6.20 (H) 03/05/2020    HGBA1C 6.00 (H) 11/10/2018     Blood Glucose No results found for: POCGLU  Infection   Results from last 7 days   Lab Units 05/11/20  2223    PROCALCITONIN ng/mL 0.08*     CMP   Results from last 7 days   Lab Units 05/12/20  0548 05/11/20  1256   SODIUM mmol/L 141 140   POTASSIUM mmol/L 3.8 4.4   CHLORIDE mmol/L 105 105   CO2 mmol/L 24.0 25.0   BUN mg/dL 15 15   CREATININE mg/dL 1.32* 1.27   GLUCOSE mg/dL 91 168*   ALBUMIN g/dL  --  3.80   BILIRUBIN mg/dL  --  0.3   ALK PHOS U/L  --  62   AST (SGOT) U/L  --  18   ALT (SGPT) U/L  --  14     UA    Results from last 7 days   Lab Units 05/11/20  1752   NITRITE UA  Negative     Radiology(recent) Ct Head Without Contrast    Result Date: 5/11/2020  1.No acute intracranial abnormality is identified on noncontrast CT. 2.Stable appearance of prior infarcts and advanced probable chronic small vessel ischemic change.  Electronically Signed By-Armida Escobar On:5/11/2020 3:10 PM This report was finalized on [id number] by  Armida Escobar, .    Xr Chest 1 View    Result Date: 5/11/2020  Evidence for mild increase in ill-defined airspace infiltrates and interstitial prominence within the mid to lower lungs bilaterally. These findings may indicate developing atypical/viral infection or multifocal pneumonia. Changes of pulmonary edema could also be considered. Recommend correlation for signs or symptoms of acute infection versus volume overload and follow-up to ensure resolution.  Electronically Signed By-Harshil Guerrero On:5/11/2020 1:04 PM This report was finalized on [id number] by  Harshil Guerrero, .       Assessment:    Acute syncope  Atherosclerotic heart disease of native coronary arteries with angina pectoris  History of cardiac asystole  Cerebrovascular disease status post CVA  Panlobular COPD with emphysema  Valvular heart disease  Hypertension associated with chronic kidney disease stage III  Chronic kidney disease stage III secondary to hypertensive nephropathy  Acquired hypothyroidism  Dyslipidemia  History of pacemaker placement  Alzheimer's dementia without behavioral disturbance  Gastroesophageal reflux  disease without esophagitis  History of autonomic dysfunction syndrome  Anemia of chronic disease  Insulin resistance        Discontinue alpha blockade//will follow orthostatics//cardiac evaluation//may need EP evaluation//plans to follow  Expected Length of Stay 2 days    I discussed the patients findings and my recommendations with patient and nursing staff.     Allan Mckeon MD  05/12/20  07:22

## 2020-05-12 NOTE — DISCHARGE PLACEMENT REQUEST
"Barrington Bond (79 y.o. Male)     Date of Birth Social Security Number Address Home Phone MRN    1940  [id number]  [place] [address] [phone] [id number]    Mosque Marital Status          Restorationist        Admission Date Admission Type Admitting Provider Attending Provider Department, Room/Bed    5/11/20 Emergency Allan Mckeon MD [facility]imer[facility] T[facility]    Discharge Date Discharge Disposition Discharge Destination                       Attending Provider:  Allan Mckeon MD    Allergies:  Doxycycline, Doxycycline    Isolation:  Enh Drop/Con   Infection:  COVID (rule out) (05/11/20)   Code Status:  CPR    Ht:  180.3 cm (71\")   Wt:  81.8 kg (180 lb 5.4 oz)    Admission Cmt:  None   Principal Problem:  None                Active Insurance as of 5/11/2020     Primary Coverage     Payor Plan Insurance Group Employer/Plan Group    MEDICARE MEDICARE A & B      Payor Plan Address Payor Plan Phone Number Payor Plan Fax Number Effective Dates    [address] [phone]  8/1/2005 - None Entered    [facility] [address]       Subscriber Name Subscriber Birth Date Member ID       BARRINGTON BOND 1940 [id number]           Secondary Coverage     Payor Plan Insurance Group Employer/Plan Group    STANDARD LIFE ACCIDENT STANDARD LIFE ACCIDENT      Payor Plan Address Payor Plan Phone Number Payor Plan Fax Number Effective Dates    [address]   9/27/2019 - None Entered    [facility] [address]       Subscriber Name Subscriber Birth Date Member ID       BARRINGTON BOND 1940 [id number]                 Emergency Contacts      (Rel.) Home Phone Work Phone Mobile Phone    MATT BOND (Spouse) [phone] -- [phone]        Miscellaneous DME [[id number]] (Order [id number])   Order   Date: 5/12/2020 Department: [facility] OBSERVATION Ordering/Authorizing: Allan Mckeon MD   Order History   Outpatient   Date/Time Action Taken User Additional " Information   05/12/20 1431 Sign Ines Whitmore RN Ordering Mode: Verbal with readback   Order Details     Frequency Duration Priority Order Class   None None Routine External   Start Date/Time     Start Date   05/12/20   Order Information     Order Date Service Start Date Start Time   05/12/20 Medicine 05/12/20    Reference Links     Associated Reports   View Encounter   Order Questions     Question Answer Comment   Type of DME 3 in 1 Bedside Commode    Length of Need (99 Months = Lifetime) 99 Months = Lifetime    Note:  Custom values to enter length of need for DME          Verbal Order Info     Action Created on Order Mode Entered by Responsible Provider Signed by Signed on   Ordering 05/12/20 1431 Verbal with readback Ines Whitmore RN Heimer, Brian T, MD     Source Order Set / Preference List     Preference List   AMB SUPPLIES [1416]   Clinical Indications      ICD-10-CM ICD-9-CM   Syncope, unspecified syncope type  - Primary     R55 780.2   Reprint Order Requisition     Miscellaneous DME (Order #[id number]) on 5/12/20   Encounter-Level Cardiology Documents:     There are no encounter-level cardiology documents.   Encounter     View Encounter          Order Provider Info         Office phone Pager E-mail   Ordering User Ines Whitmore RN -- -- --   Authorizing Provider Allan Mckeon [phone] -- --   Attending Provider Allan Mckeon [phone] -- --   Entered By Ines Whitmore RN -- -- --   Ordering Provider Allan Mckeon [phone] -- --   Linked Charges     No charges linked to this procedure   Tracking Reports      Cosign Tracking Order Transmittal Tracking   Authorized by:  Allan Mckeon MD  (NPI: [id number])       Lab Component SmartPhrase Guide     Miscellaneous DME (Order #[id number]) on 5/12/20         Insurance Information                MEDICARE/MEDICARE A & B Phone: [phone]    Subscriber: Jayden Bond Subscriber#: [id number]    Group#:  Precert#:         STANDARD  LIFE ACCIDENT/STANDARD LIFE ACCIDENT Phone:     Subscriber: Jayden Bond Subscriber#: [id number]    Group#:  Precert#:              History & Physical      Allan Mckeon MD at 05/12/20 0722          Patient Care Team:  Maude Ricketts APRN as PCP - General (Nurse Practitioner)  Deam, Ashutosh Santana MD as Consulting Physician (Cardiac Electrophysiology)    Chief Conplaint  Subjective     The patient is a 79 y.o. male presents with acute syncope    HPI  The patient is a poor historian secondary to history of Alzheimer's dementia.  History was obtained primarily through the chart.  The patient is a 79-year-old  male with known history of autonomic dysfunction syndrome and atherosclerotic heart disease who was recently referred to electrophysiology.  The day of presentation, he apparently had an acute syncopal event and was brought to [facility] for evaluation.  He was admitted and was found to have a community-acquired pneumonia.  COVID-19 status is at present pending.  The patient denies acute distress.  He denies substernal chest pain or left arm paresthesias, jaw discomfort or orthopnea.    Review of Systems  Review of Systems   Constitutional: Positive for fatigue.   Respiratory: Negative.    Cardiovascular: Negative.    Gastrointestinal: Negative.    Genitourinary: Negative.    Neurological: Positive for syncope and weakness.       History  Past Medical History:   Diagnosis Date   • CAD (coronary artery disease)    • Coronary artery disease     PTCI   • Dementia (CMS/HCC)    • Depression    • Disease of thyroid gland    • Dysautonomia (CMS/HCC)    • Dyslipidemia    • GERD (gastroesophageal reflux disease)    • Head pain    • Hyperlipidemia    • Hypertension    • SSS (sick sinus syndrome) (CMS/HCC)    • Stroke (CMS/HCC)     x 3 in 2018     Past Surgical History:   Procedure Laterality Date   • APPENDECTOMY     • CHOLECYSTECTOMY     • ENDOSCOPY N/A 10/17/2019    Procedure:  ESOPHAGOGASTRODUODENOSCOPY with biopsy x 2 areas;  Surgeon: Ashok Sanchez MD;  Location: [facility] ENDOSCOPY;  Service: Gastroenterology   • HERNIA REPAIR     • LEFT HEART CATH     • PACEMAKER IMPLANTATION      [employer]   • TEMPORAL ARTERY BIOPSY     • WRIST SURGERY      severed artery     Family History   Problem Relation Age of Onset   • Heart disease Father      Social History     Tobacco Use   • Smoking status: Never Smoker   • Smokeless tobacco: Never Used   • Tobacco comment: quit 25 yrs ago   Substance Use Topics   • Alcohol use: No     Frequency: Never   • Drug use: No     Allergies:  Doxycycline and Doxycycline    Objective     Vital Signs  Temp:  [97.4 °F (36.3 °C)-98.4 °F (36.9 °C)] 97.4 °F (36.3 °C)  Heart Rate:  [58-80] 80  Resp:  [16-18] 18  BP: ()/() 181/100      Physical Exam:   Physical Exam   Constitutional: He appears well-developed and well-nourished.   HENT:   Head: Normocephalic and atraumatic.   Eyes: Pupils are equal, round, and reactive to light. EOM are normal.   Neck: Normal range of motion.   Cardiovascular: Regular rhythm.   Pulmonary/Chest: Effort normal.   Abdominal: Soft.   Musculoskeletal: Normal range of motion.   Neurological: He is alert.   Skin: Skin is warm.   Nursing note and vitals reviewed.           Results Review:   CBC    Results from last 7 days   Lab Units 05/12/20  0548 05/11/20  1256   WBC 10*3/mm3 11.20* 15.40*   HEMOGLOBIN g/dL 12.0* 11.6*   PLATELETS 10*3/mm3 254 233     BMP   Results from last 7 days   Lab Units 05/12/20  0548 05/11/20  1256   SODIUM mmol/L 141 140   POTASSIUM mmol/L 3.8 4.4   CHLORIDE mmol/L 105 105   CO2 mmol/L 24.0 25.0   BUN mg/dL 15 15   CREATININE mg/dL 1.32* 1.27   GLUCOSE mg/dL 91 168*   MAGNESIUM mg/dL 1.9 2.0     Cr Clearance Estimated Creatinine Clearance: 52.5 mL/min (A) (by C-G formula based on SCr of 1.32 mg/dL (H)).  Coag   Results from last 7 days   Lab Units 05/11/20  1256   INR  1.10*   APTT seconds 24.8*     HbA1C      Lab Results   Component Value Date    HGBA1C 6.20 (H) 03/05/2020    HGBA1C 6.00 (H) 11/10/2018     Blood Glucose No results found for: POCGLU  Infection   Results from last 7 days   Lab Units 05/11/20  1256   PROCALCITONIN ng/mL 0.08*     CMP   Results from last 7 days   Lab Units 05/12/20  0548 05/11/20  1256   SODIUM mmol/L 141 140   POTASSIUM mmol/L 3.8 4.4   CHLORIDE mmol/L 105 105   CO2 mmol/L 24.0 25.0   BUN mg/dL 15 15   CREATININE mg/dL 1.32* 1.27   GLUCOSE mg/dL 91 168*   ALBUMIN g/dL  --  3.80   BILIRUBIN mg/dL  --  0.3   ALK PHOS U/L  --  62   AST (SGOT) U/L  --  18   ALT (SGPT) U/L  --  14     UA    Results from last 7 days   Lab Units 05/11/20  1752   NITRITE UA  Negative     Radiology(recent) Ct Head Without Contrast    Result Date: 5/11/2020  1.No acute intracranial abnormality is identified on noncontrast CT. 2.Stable appearance of prior infarcts and advanced probable chronic small vessel ischemic change.  Electronically Signed By-Armida Escobar On:5/11/2020 3:10 PM This report was finalized on [id number] by  Armida Escobar, .    Xr Chest 1 View    Result Date: 5/11/2020  Evidence for mild increase in ill-defined airspace infiltrates and interstitial prominence within the mid to lower lungs bilaterally. These findings may indicate developing atypical/viral infection or multifocal pneumonia. Changes of pulmonary edema could also be considered. Recommend correlation for signs or symptoms of acute infection versus volume overload and follow-up to ensure resolution.  Electronically Signed By-Harshil Guerrero On:5/11/2020 1:04 PM This report was finalized on [id number] by  Harshil Guerrero, .       Assessment:    Acute syncope  Atherosclerotic heart disease of native coronary arteries with angina pectoris  History of cardiac asystole  Cerebrovascular disease status post CVA  Panlobular COPD with emphysema  Valvular heart disease  Hypertension associated with chronic kidney disease stage III  Chronic  kidney disease stage III secondary to hypertensive nephropathy  Acquired hypothyroidism  Dyslipidemia  History of pacemaker placement  Alzheimer's dementia without behavioral disturbance  Gastroesophageal reflux disease without esophagitis  History of autonomic dysfunction syndrome  Anemia of chronic disease  Insulin resistance        Discontinue alpha blockade//will follow orthostatics//cardiac evaluation//may need EP evaluation//plans to follow  Expected Length of Stay 2 days    I discussed the patients findings and my recommendations with patient and nursing staff.     Allan Mckeon MD  05/12/20  07:22          Electronically signed by Allan Mkceon MD at 05/12/20 0736          Physician Progress Notes (last 24 hours) (Notes from 05/11/20 1432 through 05/12/20 1432)      Allan Mckeon MD at 05/12/20 0737        H&P addendum     Community acquired pneumonia present upon admission  Plans: Antimicrobial therapy and respiratory support      Electronically signed by Allan Mckeon MD at 05/12/20 0763

## 2020-05-12 NOTE — PLAN OF CARE
Problem: Patient Care Overview  Goal: Plan of Care Review  Outcome: Ongoing (interventions implemented as appropriate)  Flowsheets  Taken 5/12/2020 0208  Progress: improving  Outcome Summary: Waiting on results of COVID 19 test. Pt has no complaints of pain on this shift.  Pt is resting at this time.  Will continue to monitor.  Taken 5/11/2020 2333  Plan of Care Reviewed With: patient     Problem: Patient Care Overview  Goal: Discharge Needs Assessment  Outcome: Ongoing (interventions implemented as appropriate)  Flowsheets  Taken 5/12/2020 0208 by Mary Hsieh RN  Equipment Needed After Discharge: none  Anticipated Changes Related to Illness: none  Transportation Concerns: car, none  Concerns to be Addressed: no discharge needs identified  Readmission Within the Last 30 Days: no previous admission in last 30 days  Taken 5/11/2020 1618 by Elly Johnson RN  Equipment Currently Used at Home: oxygen  Taken 5/11/2020 1649 by Elly Johnson, RN  Transportation Anticipated: family or friend will provide  Patient/Family Anticipated Services at Transition: none  Patient/Family Anticipates Transition to: home with family     Problem: Fall Risk (Adult)  Goal: Identify Related Risk Factors and Signs and Symptoms  Outcome: Ongoing (interventions implemented as appropriate)  Flowsheets (Taken 5/12/2020 0208)  Related Risk Factors (Fall Risk): gait/mobility problems; homeostatic imbalance  Signs and Symptoms (Fall Risk): presence of risk factors     Problem: Fall Risk (Adult)  Goal: Absence of Fall  Outcome: Ongoing (interventions implemented as appropriate)  Flowsheets (Taken 5/12/2020 0208)  Absence of Fall: making progress toward outcome     Problem: Syncope (Adult)  Goal: Identify Related Risk Factors and Signs and Symptoms  Outcome: Ongoing (interventions implemented as appropriate)     Problem: Syncope (Adult)  Goal: Physical Safety/Health Maintenance  Outcome: Ongoing (interventions implemented as  appropriate)  Flowsheets (Taken 5/12/2020 0208)  Physical Safety/Health Maintenance: making progress toward outcome     Problem: Syncope (Adult)  Goal: Optimal Emotional/Functional [place]  Outcome: Ongoing (interventions implemented as appropriate)  Flowsheets (Taken 5/12/2020 0208)  Optimal Emotional/Functional [place]: making progress toward outcome

## 2020-05-12 NOTE — PROGRESS NOTES
Discharge Planning Assessment  [facility]     Patient Name: Jayden Bond  MRN: [id number]  Today's Date: 5/12/2020    Admit Date: 5/11/2020    Discharge Needs Assessment     Row Name 05/12/20 1435       Living Environment    Lives With  spouse Spoke  to spouse per phone due to Covid process    Current Living Arrangements  home/apartment/condo    Primary Care Provided by  spouse/significant other Daughter lives next door     Quality of Family Relationships  helpful;involved    Able to Return to Prior Arrangements  -- Pending PT eval       Resource/Environmental Concerns    Resource/Environmental Concerns  none    Transportation Concerns  car, none       Transition Planning    Patient/Family Anticipates Transition to  home with family    Patient/Family Anticipated Services at Transition  home health care Recently had [facility]    Transportation Anticipated  family or friend will provide       Discharge Needs Assessment    Concerns to be Addressed  discharge planning    Equipment Currently Used at Home  rollator;oxygen Uses oxygen at HS at 2 L from [employer]    Equipment Needed After Discharge  -- Spouse asked for a 3 in 1 BSC - referral made to Leah from [employer] - order completed and sent per [facility] - Leah kentl contact spouse to set up delivery        Discharge Plan     Row Name 05/12/20 1441       Plan    Plan  Pending PT aniket    PCP Liliam NP           Durable Medical Equipment      Service Provider Request Status Selected Services Address Phone Number Fax Number    [facility] Pending - Request Sent N/A [address] [address] [phone]             Demographic Summary     Row Name 05/12/20 1434       General Information    Admission Type  inpatient    Arrived From  emergency department    Required Notices Provided  Important Message from Medicare    Referral Source  admission list    Reason for Consult  discharge planning    Preferred Language  English         Functional Status     Row Name 05/12/20 1434       Functional Status, IADL    Medications  completely dependent    Meal Preparation  completely dependent    Housekeeping  completely dependent    Laundry  completely dependent    Shopping  completely dependent        DC Barriers - Covid Pending, Blood cultures Pending, Echo Pending    Ines Whitmore RN, CM  Office Phone [phone]  Cell [phone]

## 2020-05-12 NOTE — CONSULTS
[facility] HEART SPECIALISTS CONSULT        Subjective:     Encounter Date:05/11/2020      Patient ID: Jayden Bond is a 79 y.o. male.    Chief Complaint: syncope      HPI:  Jayden Bond is a 79 y.o. male who known to Dr. Toledo with [facility], but patient had upcoming appointment with Dr. Jeffers as patient's family was seeking a second opinion possible pacemaker pocket issues.  Patient has a past cardiac history of CAD s/p PCI with AMEYA to the LAD 11/29/2019 at [facility], SSS s/p Medtronic PPM 2015, NSVT, and orthostatic hypotension.  Last 2D echo 2/2020 showed EF = 61-65% with grade 1 diastolic dysfunction, and no significant VHD.  He has a PMH of HTN, HLD, CKD, thyroid dysfunction, COPD, CVA, and dementia.  Patient's wife reports a history of 4.2 cm aortic aneurysm.      Patient presented per EMS following a syncope episode, and cardiology was consulted for further evaluation.  Patient has a history of dementia and a difficulty historian, so much of his history is coming from his wife by phone with patient's permission.  She states that patient has been c/o weakness.  She tried to stand him to go to clinic appointment with Dr. Jeffers, but patient lost conscious for several minutes while seated, and she called EMS.  She tells me that he has not had any recent c/o angina though he constantly c/o tenderness in a localized area of his pacemaker site.  She reports that he has had no c/o dyspnea, PND/orthopnea, or edema.   She tells me that patient has been eating and drinking.       Past Medical History:   Diagnosis Date   • CAD (coronary artery disease)    • Coronary artery disease     PTCI   • Dementia (CMS/HCC)    • Depression    • Disease of thyroid gland    • Dysautonomia (CMS/HCC)    • Dyslipidemia    • GERD (gastroesophageal reflux disease)    • Head pain    • Hyperlipidemia    • Hypertension    • SSS (sick sinus syndrome) (CMS/HCC)    • Stroke (CMS/HCC)     x 3 in 2018         Past Surgical History:  "  Procedure Laterality Date   • APPENDECTOMY     • CHOLECYSTECTOMY     • ENDOSCOPY N/A 10/17/2019    Procedure: ESOPHAGOGASTRODUODENOSCOPY with biopsy x 2 areas;  Surgeon: Ashok Sanchez MD;  Location: [facility] ENDOSCOPY;  Service: Gastroenterology   • HERNIA REPAIR     • LEFT HEART CATH     • PACEMAKER IMPLANTATION      Medtronic   • TEMPORAL ARTERY BIOPSY     • WRIST SURGERY      severed artery         Social History     Socioeconomic History   • Marital status:      Spouse name: Not on file   • Number of children: Not on file   • Years of education: Not on file   • Highest education level: Not on file   Tobacco Use   • Smoking status: Never Smoker   • Smokeless tobacco: Never Used   • Tobacco comment: quit 25 yrs ago   Substance and Sexual Activity   • Alcohol use: No     Frequency: Never   • Drug use: No   • Sexual activity: Defer   Social History Narrative    ** Merged History Encounter **              Allergies   Allergen Reactions   • Doxycycline GI Intolerance   • Doxycycline Nausea And Vomiting       Current Medications:   Scheduled Meds:  albuterol sulfate HFA 2 puff Inhalation 4x Daily - RT   apixaban 5 mg Oral Q12H   brimonidine 1 drop Both Eyes BID   cefepime 2 g Intravenous Q12H   escitalopram 10 mg Oral Daily   galantamine ER 8 mg Oral BID   hydrALAZINE 25 mg Oral BID   isosorbide mononitrate 30 mg Oral Daily   levothyroxine 25 mcg Oral Daily   metoprolol tartrate 25 mg Oral Nightly   pantoprazole 40 mg Oral Daily   predniSONE 5 mg Oral Daily   rosuvastatin 20 mg Oral Nightly   sodium chloride 10 mL Intravenous Q12H   vitamin B-12 1,000 mcg Oral Daily     Continuous Infusions:  Pharmacy to Dose Cefepime     sodium chloride 100 mL/hr Last Rate: 100 mL/hr (05/11/20 2331)       ROS  All other systems reviewed and are negative.       Objective:         /84 (BP Location: Left arm, Patient Position: Lying)   Pulse 71   Temp 97.6 °F (36.4 °C) (Oral)   Resp 15   Ht 180.3 cm (71\")   Wt 81.8 kg " (180 lb 5.4 oz)   SpO2 95%   BMI 25.15 kg/m²       General: Well-developed in NAD.  Neuro: AAOx2.  HEENT: sclera clear, no xanthalasmas.  CV: S1S2 RRR. No murmurs or gallops. No JVD.  Resp: Breathing is unlabored. Anterior lungs diminished bases.  GI: BS+. Abdomen soft and NTTP.  Ext: Pedal pulses are palpable. Extremities are non-edematous.  MS: moves all extremities, generalized weakness.  Skin: warm, dry.  Psych: calm and cooperative.            Lab Review:     Results from last 7 days   Lab Units 05/12/20  0548 05/11/20  1256   SODIUM mmol/L 141 140   POTASSIUM mmol/L 3.8 4.4   CHLORIDE mmol/L 105 105   CO2 mmol/L 24.0 25.0   BUN mg/dL 15 15   CREATININE mg/dL 1.32* 1.27   GLUCOSE mg/dL 91 168*   CALCIUM mg/dL 8.9 8.8   AST (SGOT) U/L  --  18   ALT (SGPT) U/L  --  14     Results from last 7 days   Lab Units 05/12/20  0548 05/11/20  2234 05/11/20  1256   TROPONIN T ng/mL <0.010 <0.010 <0.010     Results from last 7 days   Lab Units 05/12/20  0548 05/11/20  1256   WBC 10*3/mm3 11.20* 15.40*   HEMOGLOBIN g/dL 12.0* 11.6*   HEMATOCRIT % 35.5* 34.2*   PLATELETS 10*3/mm3 254 233     Results from last 7 days   Lab Units 05/11/20  1256   INR  1.10*   APTT seconds 24.8*     Results from last 7 days   Lab Units 05/12/20  0548 05/11/20  1256   MAGNESIUM mg/dL 1.9 2.0           Invalid input(s): LDLCALC  Results from last 7 days   Lab Units 05/11/20  1256   PROBNP pg/mL 131.6     Results from last 7 days   Lab Units 05/11/20  1256   TSH uIU/mL 4.640*       Recent Radiology:  Imaging Results (Most Recent)     Procedure Component Value Units Date/Time    CT Head Without Contrast [[id number]] Collected:  05/11/20 1501     Updated:  05/11/20 1512    Narrative:       CT HEAD WO CONTRAST-     Date of Exam: 5/11/2020 2:51 PM     Indication: Syncope/fainting; R55-Syncope and collapse; J18.9-Pneumonia,  unspecified organism; R68.89-Other general symptoms and signs.       Comparison: CT head without contrast 03/04/2020, MRI brain  without  contrast 11/20/2018.     Technique:  Without contrast, contiguous axial CT images of the head  were obtained from skull base to vertex.   Automated exposure control  and iterative reconstruction methods were used.     FINDINGS:  No acute intracranial hemorrhage, mass, or mass effect is seen.  Increased left cerebral convexity extra-axial CSF density compared to  the right is stable. The septum pellucidum is to the right of the falx,  which is also stable and may be related to right-sided volume loss.  Mild-to-moderate global volume loss is stable. Advanced  periventricular/subcortical white matter hypodensities are nonspecific,  but are most commonly seen in the setting of chronic small vessel  ischemic change. Prior infarcts are redemonstrated in right frontal  tolerated occipital, and left temporal lobes. 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 No acute fracture is noted. There is  mucosal thickening in the right side of the sphenoid sinus and in right  ethmoid air cells posteriorly. Mastoid air cells are clear.       Impression:       1.No acute intracranial abnormality is identified on noncontrast CT.  2.Stable appearance of prior infarcts and advanced probable chronic  small vessel ischemic change.     Electronically Signed By-Armida Escobar On:5/11/2020 3:10 PM  This report was finalized on [id number] by  Armida Escobar, .    XR Chest 1 View [[id number]] Collected:  05/11/20 1302     Updated:  05/11/20 1306    Narrative:          DATE OF EXAM:   5/11/2020 12:30 PM     PROCEDURE:   XR CHEST 1 VW-     INDICATIONS:   Weakness syncope     COMPARISON:  05/01/2020, 03/04/2020, 02/03/2020, 11/09/2018.     TECHNIQUE:   [Portable chest radiograph]     FINDINGS:   There may be mild interval increase in ill-defined airspace infiltrates  within the lung bases. There may also be mild interval increase in  interstitial prominence. The cardiac silhouette and mediastinum are  stable. A left cardiac  pacemaker/AICD remains in place with leads  intact. No acute osseous abnormalities are seen.        Impression:       Evidence for mild increase in ill-defined airspace infiltrates and  interstitial prominence within the mid to lower lungs bilaterally. These  findings may indicate developing atypical/viral infection or multifocal  pneumonia. Changes of pulmonary edema could also be considered.  Recommend correlation for signs or symptoms of acute infection versus  volume overload and follow-up to ensure resolution.     Electronically Signed By-Harshil Guerrero On:5/11/2020 1:04 PM  This report was finalized on [id number] by  Harshil Guerrero, .            ECHOCARDIOGRAM:    Results for orders placed during the hospital encounter of 02/03/20   Adult Transthoracic Echo Complete W/ Cont if Necessary Per Protocol    Narrative · Left ventricular systolic function is normal.  · Left ventricular wall thickness is consistent with borderline concentric   hypertrophy.  · Left ventricular diastolic dysfunction (grade I) consistent with   impaired relaxation.  · Estimated EF appears to be in the range of 61 - 65%.                Assessment:         Active Hospital Problems    Diagnosis POA   • Syncope [R55] Yes     1) Syncope  - troponin negative x 2  - EKG shows no acute ST abnormalities  - d-dimer negative  - check 2D echo  - check device interrogation  - check TSH  - check orthostatic v/s    2) Bilateral PNA  - leukocytosis  - blood culture pending  - lactate initially elevated.  - COVID 19 test PENDING  - pulmonary following    3) CAD s/p PCI with AMEYA to LAD 11/29/2019 at [place]   - Last 2D echo 2/2020 showed EF = 61-65% with grade 1 diastolic dysfunction, and no significant VHD.    4) SSS s/p Medtronic PPM in 2015  - hx NSVT    5) Hx orthostatic hypotension    6) Hx CVA    7) COPD    8) CKD  - Cr 1.32 on admit    9) Hx thyroid dysfunction    10) hx dementia    11) reported hx 4.2 cm aortic aneurysm  - will obtain records            Plan:   Will check serial cardiac enzymes.  Will check 2D echo.  Will interrogate PPM.  Check orthostatic v/s.  Will resume plavix (also on eliquis without aspirin) given within 6 months AMEYA.  Will obtain records from Dr. Toledo's office.  Will d/w attending cardiologist for further recommendations.       Electronically signed by DEANDRA Lea, 05/12/20, 2:17 PM.

## 2020-05-13 ENCOUNTER — APPOINTMENT (OUTPATIENT)
Dept: CT IMAGING | Facility: HOSPITAL | Age: 80
End: 2020-05-13

## 2020-05-13 ENCOUNTER — APPOINTMENT (OUTPATIENT)
Dept: CARDIOLOGY | Facility: HOSPITAL | Age: 80
End: 2020-05-13

## 2020-05-13 LAB
ANION GAP SERPL CALCULATED.3IONS-SCNC: 11 MMOL/L (ref 5–15)
BASOPHILS # BLD AUTO: 0.1 10*3/MM3 (ref 0–0.2)
BASOPHILS NFR BLD AUTO: 0.5 % (ref 0–1.5)
BH CV ECHO MEAS - AO MAX PG (FULL): 1.7 MMHG
BH CV ECHO MEAS - AO MAX PG: 4.2 MMHG
BH CV ECHO MEAS - AO MEAN PG (FULL): 1.4 MMHG
BH CV ECHO MEAS - AO MEAN PG: 2.8 MMHG
BH CV ECHO MEAS - AO ROOT AREA (BSA CORRECTED): 1.5
BH CV ECHO MEAS - AO ROOT AREA: 7.5 CM^2
BH CV ECHO MEAS - AO ROOT DIAM: 3.1 CM
BH CV ECHO MEAS - AO V2 MAX: 102.4 CM/SEC
BH CV ECHO MEAS - AO V2 MEAN: 79.9 CM/SEC
BH CV ECHO MEAS - AO V2 VTI: 25.3 CM
BH CV ECHO MEAS - AORTIC HR: 75.1 BPM
BH CV ECHO MEAS - AORTIC R-R: 0.8 SEC
BH CV ECHO MEAS - AVA(I,A): 3.7 CM^2
BH CV ECHO MEAS - AVA(I,D): 3.7 CM^2
BH CV ECHO MEAS - AVA(V,A): 4 CM^2
BH CV ECHO MEAS - AVA(V,D): 4 CM^2
BH CV ECHO MEAS - BSA(HAYCOCK): 2 M^2
BH CV ECHO MEAS - BSA: 2 M^2
BH CV ECHO MEAS - BZI_BMI: 25.1 KILOGRAMS/M^2
BH CV ECHO MEAS - BZI_METRIC_HEIGHT: 180.3 CM
BH CV ECHO MEAS - BZI_METRIC_WEIGHT: 81.6 KG
BH CV ECHO MEAS - CI(AO): 7.1 L/MIN/M^2
BH CV ECHO MEAS - CI(LVOT): 3.5 L/MIN/M^2
BH CV ECHO MEAS - CO(AO): 14.2 L/MIN
BH CV ECHO MEAS - CO(LVOT): 7 L/MIN
BH CV ECHO MEAS - EDV(CUBED): 20.2 ML
BH CV ECHO MEAS - EDV(MOD-SP4): 30 ML
BH CV ECHO MEAS - EDV(TEICH): 27.6 ML
BH CV ECHO MEAS - EF(CUBED): 64.1 %
BH CV ECHO MEAS - EF(MOD-BP): 70 %
BH CV ECHO MEAS - EF(MOD-SP4): 69.6 %
BH CV ECHO MEAS - EF(TEICH): 57.6 %
BH CV ECHO MEAS - ESV(CUBED): 7.3 ML
BH CV ECHO MEAS - ESV(MOD-SP4): 9.1 ML
BH CV ECHO MEAS - ESV(TEICH): 11.7 ML
BH CV ECHO MEAS - FS: 28.9 %
BH CV ECHO MEAS - IVS/LVPW: 1.5
BH CV ECHO MEAS - IVSD: 1.8 CM
BH CV ECHO MEAS - LA DIMENSION(2D): 3.5 CM
BH CV ECHO MEAS - LV DIASTOLIC VOL/BSA (35-75): 14.9 ML/M^2
BH CV ECHO MEAS - LV MASS(C)D: 138.8 GRAMS
BH CV ECHO MEAS - LV MASS(C)DI: 68.8 GRAMS/M^2
BH CV ECHO MEAS - LV MAX PG: 2.5 MMHG
BH CV ECHO MEAS - LV MEAN PG: 1.4 MMHG
BH CV ECHO MEAS - LV SYSTOLIC VOL/BSA (12-30): 4.5 ML/M^2
BH CV ECHO MEAS - LV V1 MAX: 78.5 CM/SEC
BH CV ECHO MEAS - LV V1 MEAN: 54.4 CM/SEC
BH CV ECHO MEAS - LV V1 VTI: 17.8 CM
BH CV ECHO MEAS - LVIDD: 2.7 CM
BH CV ECHO MEAS - LVIDS: 1.9 CM
BH CV ECHO MEAS - LVOT AREA: 5.3 CM^2
BH CV ECHO MEAS - LVOT DIAM: 2.6 CM
BH CV ECHO MEAS - LVPWD: 1.2 CM
BH CV ECHO MEAS - MV A MAX VEL: 114.2 CM/SEC
BH CV ECHO MEAS - MV DEC SLOPE: 301.6 CM/SEC^2
BH CV ECHO MEAS - MV DEC TIME: 0.21 SEC
BH CV ECHO MEAS - MV E MAX VEL: 63 CM/SEC
BH CV ECHO MEAS - MV E/A: 0.55
BH CV ECHO MEAS - MV MAX PG: 6.3 MMHG
BH CV ECHO MEAS - MV MEAN PG: 2.3 MMHG
BH CV ECHO MEAS - MV V2 MAX: 125.7 CM/SEC
BH CV ECHO MEAS - MV V2 MEAN: 70.6 CM/SEC
BH CV ECHO MEAS - MV V2 VTI: 23.9 CM
BH CV ECHO MEAS - MVA(VTI): 3.9 CM^2
BH CV ECHO MEAS - RAP SYSTOLE: 3 MMHG
BH CV ECHO MEAS - RVDD: 3.4 CM
BH CV ECHO MEAS - RVSP: 7.6 MMHG
BH CV ECHO MEAS - SI(AO): 94 ML/M^2
BH CV ECHO MEAS - SI(CUBED): 6.4 ML/M^2
BH CV ECHO MEAS - SI(LVOT): 46.4 ML/M^2
BH CV ECHO MEAS - SI(MOD-SP4): 10.3 ML/M^2
BH CV ECHO MEAS - SI(TEICH): 7.9 ML/M^2
BH CV ECHO MEAS - SV(AO): 189.6 ML
BH CV ECHO MEAS - SV(CUBED): 13 ML
BH CV ECHO MEAS - SV(LVOT): 93.5 ML
BH CV ECHO MEAS - SV(MOD-SP4): 20.9 ML
BH CV ECHO MEAS - SV(TEICH): 15.9 ML
BH CV ECHO MEAS - TR MAX VEL: 106.7 CM/SEC
BUN BLD-MCNC: 12 MG/DL (ref 8–23)
BUN/CREAT SERPL: 10.6 (ref 7–25)
CALCIUM SPEC-SCNC: 9.3 MG/DL (ref 8.6–10.5)
CHLORIDE SERPL-SCNC: 105 MMOL/L (ref 98–107)
CO2 SERPL-SCNC: 25 MMOL/L (ref 22–29)
CREAT BLD-MCNC: 1.13 MG/DL (ref 0.76–1.27)
DEPRECATED RDW RBC AUTO: 42 FL (ref 37–54)
EOSINOPHIL # BLD AUTO: 0.3 10*3/MM3 (ref 0–0.4)
EOSINOPHIL NFR BLD AUTO: 2.8 % (ref 0.3–6.2)
ERYTHROCYTE [DISTWIDTH] IN BLOOD BY AUTOMATED COUNT: 13.9 % (ref 12.3–15.4)
GFR SERPL CREATININE-BSD FRML MDRD: 63 ML/MIN/1.73
GLUCOSE BLD-MCNC: 92 MG/DL (ref 65–99)
HCT VFR BLD AUTO: 36.2 % (ref 37.5–51)
HGB BLD-MCNC: 12.5 G/DL (ref 13–17.7)
LYMPHOCYTES # BLD AUTO: 1.8 10*3/MM3 (ref 0.7–3.1)
LYMPHOCYTES NFR BLD AUTO: 16.1 % (ref 19.6–45.3)
MCH RBC QN AUTO: 29.4 PG (ref 26.6–33)
MCHC RBC AUTO-ENTMCNC: 34.5 G/DL (ref 31.5–35.7)
MCV RBC AUTO: 85.4 FL (ref 79–97)
MONOCYTES # BLD AUTO: 1.1 10*3/MM3 (ref 0.1–0.9)
MONOCYTES NFR BLD AUTO: 9.4 % (ref 5–12)
NEUTROPHILS # BLD AUTO: 8 10*3/MM3 (ref 1.7–7)
NEUTROPHILS NFR BLD AUTO: 71.2 % (ref 42.7–76)
NRBC BLD AUTO-RTO: 0 /100 WBC (ref 0–0.2)
PLATELET # BLD AUTO: 257 10*3/MM3 (ref 140–450)
PMV BLD AUTO: 7.9 FL (ref 6–12)
POTASSIUM BLD-SCNC: 3.7 MMOL/L (ref 3.5–5.2)
RBC # BLD AUTO: 4.24 10*6/MM3 (ref 4.14–5.8)
SODIUM BLD-SCNC: 141 MMOL/L (ref 136–145)
TSH SERPL DL<=0.05 MIU/L-ACNC: 4.89 UIU/ML (ref 0.27–4.2)
WBC NRBC COR # BLD: 11.2 10*3/MM3 (ref 3.4–10.8)

## 2020-05-13 PROCEDURE — 93306 TTE W/DOPPLER COMPLETE: CPT

## 2020-05-13 PROCEDURE — 25010000002 ONDANSETRON PER 1 MG: Performed by: FAMILY MEDICINE

## 2020-05-13 PROCEDURE — 99221 1ST HOSP IP/OBS SF/LOW 40: CPT | Performed by: PSYCHIATRY & NEUROLOGY

## 2020-05-13 PROCEDURE — 85025 COMPLETE CBC W/AUTO DIFF WBC: CPT | Performed by: FAMILY MEDICINE

## 2020-05-13 PROCEDURE — 25010000002 CEFEPIME PER 500 MG: Performed by: FAMILY MEDICINE

## 2020-05-13 PROCEDURE — 94799 UNLISTED PULMONARY SVC/PX: CPT

## 2020-05-13 PROCEDURE — 80048 BASIC METABOLIC PNL TOTAL CA: CPT | Performed by: FAMILY MEDICINE

## 2020-05-13 PROCEDURE — 97162 PT EVAL MOD COMPLEX 30 MIN: CPT

## 2020-05-13 PROCEDURE — 71250 CT THORAX DX C-: CPT

## 2020-05-13 PROCEDURE — 99233 SBSQ HOSP IP/OBS HIGH 50: CPT | Performed by: INTERNAL MEDICINE

## 2020-05-13 PROCEDURE — 25010000002 PIPERACILLIN SOD-TAZOBACTAM PER 1 G: Performed by: INTERNAL MEDICINE

## 2020-05-13 PROCEDURE — 93306 TTE W/DOPPLER COMPLETE: CPT | Performed by: INTERNAL MEDICINE

## 2020-05-13 PROCEDURE — 84443 ASSAY THYROID STIM HORMONE: CPT | Performed by: NURSE PRACTITIONER

## 2020-05-13 PROCEDURE — 25010000003 LEVETIRACETAM IN NACL 0.75% 1000 MG/100ML SOLUTION: Performed by: PSYCHIATRY & NEUROLOGY

## 2020-05-13 PROCEDURE — 92610 EVALUATE SWALLOWING FUNCTION: CPT

## 2020-05-13 PROCEDURE — 63710000001 PREDNISONE PER 5 MG: Performed by: FAMILY MEDICINE

## 2020-05-13 PROCEDURE — 4B02XSZ MEASUREMENT OF CARDIAC PACEMAKER, EXTERNAL APPROACH: ICD-10-PCS | Performed by: NURSE PRACTITIONER

## 2020-05-13 RX ORDER — POTASSIUM CHLORIDE 7.45 MG/ML
10 INJECTION INTRAVENOUS
Status: DISCONTINUED | OUTPATIENT
Start: 2020-05-13 | End: 2020-05-14 | Stop reason: HOSPADM

## 2020-05-13 RX ORDER — LEVETIRACETAM 10 MG/ML
1000 INJECTION INTRAVASCULAR ONCE
Status: COMPLETED | OUTPATIENT
Start: 2020-05-13 | End: 2020-05-13

## 2020-05-13 RX ORDER — POTASSIUM CHLORIDE 20 MEQ/1
40 TABLET, EXTENDED RELEASE ORAL AS NEEDED
Status: DISCONTINUED | OUTPATIENT
Start: 2020-05-13 | End: 2020-05-14 | Stop reason: HOSPADM

## 2020-05-13 RX ORDER — HYDRALAZINE HYDROCHLORIDE 20 MG/ML
20 INJECTION INTRAMUSCULAR; INTRAVENOUS EVERY 6 HOURS PRN
Status: DISCONTINUED | OUTPATIENT
Start: 2020-05-13 | End: 2020-05-14 | Stop reason: HOSPADM

## 2020-05-13 RX ORDER — MAGNESIUM SULFATE HEPTAHYDRATE 40 MG/ML
2 INJECTION, SOLUTION INTRAVENOUS AS NEEDED
Status: DISCONTINUED | OUTPATIENT
Start: 2020-05-13 | End: 2020-05-14 | Stop reason: HOSPADM

## 2020-05-13 RX ORDER — LEVETIRACETAM 500 MG/1
500 TABLET ORAL 2 TIMES DAILY
Status: DISCONTINUED | OUTPATIENT
Start: 2020-05-13 | End: 2020-05-14 | Stop reason: HOSPADM

## 2020-05-13 RX ORDER — MAGNESIUM SULFATE 1 G/100ML
1 INJECTION INTRAVENOUS AS NEEDED
Status: DISCONTINUED | OUTPATIENT
Start: 2020-05-13 | End: 2020-05-14 | Stop reason: HOSPADM

## 2020-05-13 RX ORDER — LORAZEPAM 2 MG/ML
0.5 INJECTION INTRAMUSCULAR EVERY 6 HOURS PRN
Status: DISCONTINUED | OUTPATIENT
Start: 2020-05-13 | End: 2020-05-14 | Stop reason: HOSPADM

## 2020-05-13 RX ORDER — POTASSIUM CHLORIDE 1.5 G/1.77G
40 POWDER, FOR SOLUTION ORAL AS NEEDED
Status: DISCONTINUED | OUTPATIENT
Start: 2020-05-13 | End: 2020-05-14 | Stop reason: HOSPADM

## 2020-05-13 RX ADMIN — GALANTAMINE HYDROBROMIDE 8 MG: 8 CAPSULE, EXTENDED RELEASE ORAL at 10:30

## 2020-05-13 RX ADMIN — CLOPIDOGREL BISULFATE 75 MG: 75 TABLET ORAL at 08:26

## 2020-05-13 RX ADMIN — Medication 10 ML: at 21:54

## 2020-05-13 RX ADMIN — HYDROCODONE BITARTRATE AND ACETAMINOPHEN 1 TABLET: 7.5; 325 TABLET ORAL at 15:06

## 2020-05-13 RX ADMIN — ALBUTEROL SULFATE 2 PUFF: 90 AEROSOL, METERED RESPIRATORY (INHALATION) at 15:45

## 2020-05-13 RX ADMIN — PIPERACILLIN AND TAZOBACTAM 3.38 G: 3; .375 INJECTION, POWDER, FOR SOLUTION INTRAVENOUS at 23:41

## 2020-05-13 RX ADMIN — ISOSORBIDE MONONITRATE 30 MG: 30 TABLET, EXTENDED RELEASE ORAL at 08:27

## 2020-05-13 RX ADMIN — LEVETIRACETAM 500 MG: 500 TABLET ORAL at 21:55

## 2020-05-13 RX ADMIN — APIXABAN 5 MG: 5 TABLET, FILM COATED ORAL at 21:55

## 2020-05-13 RX ADMIN — ALBUTEROL SULFATE 2 PUFF: 90 AEROSOL, METERED RESPIRATORY (INHALATION) at 19:06

## 2020-05-13 RX ADMIN — HYDRALAZINE HYDROCHLORIDE 25 MG: 25 TABLET, FILM COATED ORAL at 08:26

## 2020-05-13 RX ADMIN — ALBUTEROL SULFATE 2 PUFF: 90 AEROSOL, METERED RESPIRATORY (INHALATION) at 07:32

## 2020-05-13 RX ADMIN — APIXABAN 5 MG: 5 TABLET, FILM COATED ORAL at 08:27

## 2020-05-13 RX ADMIN — CEFEPIME HYDROCHLORIDE 2 G: 2 INJECTION, POWDER, FOR SOLUTION INTRAVENOUS at 08:27

## 2020-05-13 RX ADMIN — Medication 10 ML: at 08:27

## 2020-05-13 RX ADMIN — ROSUVASTATIN CALCIUM 20 MG: 10 TABLET, FILM COATED ORAL at 21:54

## 2020-05-13 RX ADMIN — ONDANSETRON 4 MG: 2 INJECTION INTRAMUSCULAR; INTRAVENOUS at 08:39

## 2020-05-13 RX ADMIN — ESCITALOPRAM OXALATE 10 MG: 10 TABLET ORAL at 08:26

## 2020-05-13 RX ADMIN — METOPROLOL TARTRATE 25 MG: 25 TABLET, FILM COATED ORAL at 21:55

## 2020-05-13 RX ADMIN — PANTOPRAZOLE SODIUM 40 MG: 40 TABLET, DELAYED RELEASE ORAL at 08:27

## 2020-05-13 RX ADMIN — BRIMONIDINE TARTRATE 1 DROP: 2 SOLUTION OPHTHALMIC at 21:57

## 2020-05-13 RX ADMIN — GALANTAMINE HYDROBROMIDE 8 MG: 8 CAPSULE, EXTENDED RELEASE ORAL at 21:54

## 2020-05-13 RX ADMIN — CYANOCOBALAMIN TAB 1000 MCG 1000 MCG: 1000 TAB at 08:27

## 2020-05-13 RX ADMIN — BRIMONIDINE TARTRATE 1 DROP: 2 SOLUTION OPHTHALMIC at 10:30

## 2020-05-13 RX ADMIN — SODIUM CHLORIDE 100 ML/HR: 900 INJECTION, SOLUTION INTRAVENOUS at 23:44

## 2020-05-13 RX ADMIN — PIPERACILLIN AND TAZOBACTAM 3.38 G: 3; .375 INJECTION, POWDER, FOR SOLUTION INTRAVENOUS at 16:43

## 2020-05-13 RX ADMIN — LEVOTHYROXINE SODIUM 25 MCG: 25 TABLET ORAL at 08:27

## 2020-05-13 RX ADMIN — PREDNISONE 5 MG: 5 TABLET ORAL at 08:27

## 2020-05-13 RX ADMIN — LEVETIRACETAM 1000 MG: 10 INJECTION INTRAVENOUS at 15:07

## 2020-05-13 NOTE — PLAN OF CARE
"  Problem: Patient Care Overview  Goal: Plan of Care Review  Outcome: Ongoing (interventions implemented as appropriate)  Flowsheets (Taken 5/13/2020 0949)  Plan of Care Reviewed With: patient  Outcome Summary: 80 y/o male admitted on 5/11 due to syncopal episode at MD office with c/o cough; (-) COVID and suspect pna. PMH includes Alzheimer's dse, pacemaker. Pt is able to follow commands but unsure of accuracy of report regarding PLOF. Pt mainly c/o \"jerking and not being able to hold his head up.\" Jerking / tremoring noted in supine and sitting but not during ambulation. Pt required min A for supine to sit transfer and to come to standing. Pt ambulate 20 ft using rw CGA for most part, min A only to maneuver rw around corners. Pt is a high risk for falls and unsure if he has adequate support at home thus will recommend IP rehab for strength, balance training. PPE used gloves, mask, face shield.      "

## 2020-05-13 NOTE — PLAN OF CARE
Problem: Patient Care Overview  Goal: Plan of Care Review  Outcome: Ongoing (interventions implemented as appropriate)  Flowsheets  Taken 5/13/2020 1748 by Sturgeon, Valerie, LPN  Progress: no change  Outcome Summary: pt c/o facial tremors with headache several times during the shift; MD aware and neurology consult ordered; new meds started; required O2 at 2L per n/c; ECHO and chest CT completed this shift; will continue to monitor  Taken 5/13/2020 0927 by Reyes, Carmela, PT  Plan of Care Reviewed With: patient

## 2020-05-13 NOTE — THERAPY EVALUATION
Patient Name: Jayden Bond  : 1940    MRN: [id number]                              Today's Date: 2020       Admit Date: 2020    Visit Dx:     ICD-10-CM ICD-9-CM   1. Syncope, unspecified syncope type R55 780.2   2. HCAP (healthcare-associated pneumonia) J18.9 486   3. Suspected Covid-19 Virus Infection R68.89      Patient Active Problem List   Diagnosis   • Chest pain   • Hyperlipidemia   • Hypertension   • Stroke (CMS/HCC)   • Disease of thyroid gland   • CAD (coronary artery disease)   • Dysautonomia (CMS/HCC)   • Dementia (CMS/HCC)   • Coronary artery disease   • Syncope     Past Medical History:   Diagnosis Date   • CAD (coronary artery disease)    • Coronary artery disease     [facility]   • Dementia (CMS/HCC)    • Depression    • Disease of thyroid gland    • Dysautonomia (CMS/HCC)    • Dyslipidemia    • GERD (gastroesophageal reflux disease)    • Head pain    • Hyperlipidemia    • Hypertension    • SSS (sick sinus syndrome) (CMS/HCC)    • Stroke (CMS/HCC)     x 3 in 2018     Past Surgical History:   Procedure Laterality Date   • APPENDECTOMY     • CHOLECYSTECTOMY     • ENDOSCOPY N/A 10/17/2019    Procedure: ESOPHAGOGASTRODUODENOSCOPY with biopsy x 2 areas;  Surgeon: Ashok Sanchez MD;  Location: [facility] ENDOSCOPY;  Service: Gastroenterology   • HERNIA REPAIR     • LEFT HEART CATH     • PACEMAKER IMPLANTATION      [employer]   • TEMPORAL ARTERY BIOPSY     • WRIST SURGERY      severed artery     General Information     Row Name 20 0933          PT Evaluation Time/Intention    Document Type  evaluation  -CR     Mode of Treatment  physical therapy  -CR     Row Name 20 0933          General Information    Patient Profile Reviewed?  yes  -CR     Prior Level of Function  min assist:;gait;transfer  -CR     Existing Precautions/Restrictions  cardiac  -CR     Barriers to Rehab  medically complex  -CR     Row Name 20 0933          Home Main Entrance    Number of Stairs, Main Entrance  one  -CR      Row Name 05/13/20 0933          Cognitive Assessment/Intervention- PT/OT    Orientation Status (Cognition)  oriented to;person;place  -CR     Row Name 05/13/20 0933          Safety Issues, Functional Mobility    Impairments Affecting Function (Mobility)  balance;muscle tone abnormal;strength;postural/trunk control;endurance/activity tolerance  -CR       User Key  (r) = Recorded By, (t) = Taken By, (c) = Cosigned By    Initials Name Provider Type    CR Reyes, Carmela, PT Physical Therapist        Mobility     Row Name 05/13/20 0947          Bed Mobility Assessment/Treatment    Bed Mobility Assessment/Treatment  supine-sit;sit-supine  -CR     Supine-Sit [place] (Bed Mobility)  minimum assist (75% patient effort);verbal cues  -CR     Sit-Supine [place] (Bed Mobility)  supervision  -CR     Assistive Device (Bed Mobility)  bed rails  -CR     Comment (Bed Mobility)  during supine to sit, had pt lean on elbow for 20 seconds prior to bringing trunk fully upright  -CR     Row Name 05/13/20 0947          Sit-Stand Transfer    Sit-Stand [place] (Transfers)  minimum assist (75% patient effort)  -CR     Assistive Device (Sit-Stand Transfers)  walker, front-wheeled  -CR     Row Name 05/13/20 0929          Gait/Stairs Assessment/Training    Gait/Stairs Assessment/Training  gait/ambulation assistive device  -CR     [place] Level (Gait)  contact guard min A to manuever rw  -CR     Assistive Device (Gait)  walker, front-wheeled  -CR     Deviations/Abnormal Patterns (Gait)  festinating/shuffling;gait speed decreased  -CR     Bilateral Gait Deviations  forward flexed posture  -CR       User Key  (r) = Recorded By, (t) = Taken By, (c) = Cosigned By    Initials Name Provider Type    CR Reyes, Carmela, PT Physical Therapist        Obj/Interventions     Row Name 05/13/20 0948          General ROM    GENERAL ROM COMMENTS  AROM BUE/LE wfl   -CR     Row Name 05/13/20 0948          MMT (Manual Muscle Testing)    General  MMT Comments  grossly wfl   -CR     Row Name 05/13/20 0948          Static Sitting Balance    Level of [place] (Unsupported Sitting, Static Balance)  supervision  -CR     Sitting Position (Unsupported Sitting, Static Balance)  sitting on edge of bed  -CR     Time Able to Maintain Position (Unsupported Sitting, Static Balance)  2 to 3 minutes  -CR     Row Name 05/13/20 0948          Dynamic Sitting Balance    Level of [place], Reaches Outside Midline (Sitting, Dynamic Balance)  contact guard assist  -CR     Sitting Position, Reaches Outside Midline (Sitting, Dynamic Balance)  sitting on edge of bed  -CR     Row Name 05/13/20 0948          Static Standing Balance    Level of [place] (Supported Standing, Static Balance)  contact guard assist  -CR     Time Able to Maintain Position (Supported Standing, Static Balance)  45 to 60 seconds  -CR     Assistive Device Utilized (Supported Standing, Static Balance)  walker, rolling  -CR     Row Name 05/13/20 0948          Dynamic Standing Balance    Level of [place], Reaches Outside Midline (Standing, Dynamic Balance)  contact guard assist  -CR     Time Able to Maintain Position, Reaches Outside Midline (Standing, Dynamic Balance)  1 to 2 minutes  -CR     Assistive Device Utilized (Supported Standing, Dynamic Balance)  walker, rolling  -CR     Row Name 05/13/20 0948          Sensory Assessment/Intervention    Sensory General Assessment  no sensation deficits identified  -CR       User Key  (r) = Recorded By, (t) = Taken By, (c) = Cosigned By    Initials Name Provider Type    CR Reyes, Carmela, PT Physical Therapist        Goals/Plan     Row Name 05/13/20 0951          Bed Mobility Goal 1 (PT)    Activity/Assistive Device (Bed Mobility Goal 1, PT)  sit to supine/supine to sit  -CR     [place] Level/Cues Needed (Bed Mobility Goal 1, PT)  conditional independence  -CR     Time Frame (Bed Mobility Goal 1, PT)  5 days  -CR     Row Name 05/13/20 0975           "Transfer Goal 1 (PT)    Activity/Assistive Device (Transfer Goal 1, PT)  transfers, all  -CR     [place] Level/Cues Needed (Transfer Goal 1, PT)  supervision required  -CR     Time Frame (Transfer Goal 1, PT)  1 week  -CR     Row Name 05/13/20 0958          Gait Training Goal 1 (PT)    Activity/Assistive Device (Gait Training Goal 1, PT)  assistive device use;decrease fall risk;improve balance and speed;increase endurance/gait distance;walker, rolling  -CR     [place] Level (Gait Training Goal 1, PT)  supervision required  -CR     Distance (Gait Goal 1, PT)  80 ft x2  -CR     Time Frame (Gait Training Goal 1, PT)  2 weeks  -CR       User Key  (r) = Recorded By, (t) = Taken By, (c) = Cosigned By    Initials Name Provider Type    CR Reyes, Carmela, PT Physical Therapist        Clinical Impression     Row Name 05/13/20 0970          Pain Assessment    Additional Documentation  Pain Scale: FACES Pre/Post-Treatment (Group)  -CR     Row Name 05/13/20 7614          Pain Scale: FACES Pre/Post-Treatment    Pain: FACES Scale, Pretreatment  0-->no hurt  -CR     Pain: FACES Scale, Post-Treatment  0-->no hurt  -CR     Row Name 05/13/20 3827          Plan of Care Review    Plan of Care Reviewed With  patient  -CR     Outcome Summary  78 y/o male admitted on 5/11 due to syncopal episode at MD office with c/o cough; (-) COVID and suspect pna. PMH includes Alzheimer's dse, pacemaker. Pt is able to follow commands but unsure of accuracy of report regarding PLOF. Pt mainly c/o \"jerking and not being able to hold his head up.\" Jerking / tremoring noted in supine and sitting but not during ambulation. Pt required min A for supine to sit transfer and to come to standing. Pt ambulate 20 ft using rw CGA for most part, min A only to maneuver rw around corners. Pt is a high risk for falls and unsure if he has adequate support at home thus will recommend IP rehab for strength, balance training.   -CR     Row Name 05/13/20 0949       "    Physical Therapy Clinical Impression    Criteria for Skilled Interventions Met (PT Clinical Impression)  yes;treatment indicated  -CR     Rehab Potential (PT Clinical Summary)  fair, will monitor progress closely  -CR     Predicted Duration of Therapy (PT)  dc  -CR     Row Name 05/13/20 0949          Positioning and Restraints    Pre-Treatment Position  in bed  -CR     Post Treatment Position  bed  -CR     In Bed  notified nsg;fowlers;exit alarm on;call light within reach  -CR       User Key  (r) = Recorded By, (t) = Taken By, (c) = Cosigned By    Initials Name Provider Type    CR Reyes, Carmela, PT Physical Therapist        Outcome Measures     Row Name 05/13/20 1000          How much help from another person do you currently need...    Turning from your back to your side while in flat bed without using bedrails?  3  -CR     Moving from lying on back to sitting on the side of a flat bed without bedrails?  3  -CR     Moving to and from a bed to a chair (including a wheelchair)?  3  -CR     Standing up from a chair using your arms (e.g., wheelchair, bedside chair)?  3  -CR     Climbing 3-5 steps with a railing?  2  -CR     To walk in hospital room?  3  -CR     AM-PAC 6 Clicks Score (PT)  17  -CR     Row Name 05/13/20 1000          Functional Assessment    Outcome Measure Options  AM-PAC 6 Clicks Basic Mobility (PT)  -CR       User Key  (r) = Recorded By, (t) = Taken By, (c) = Cosigned By    Initials Name Provider Type    CR Reyes, Carmela, PT Physical Therapist        Physical Therapy Education                 Title: PT OT SLP Therapies (In Progress)     Topic: Physical Therapy (In Progress)     Point: Mobility training (In Progress)     Description:   Instruct learner(s) on safety and technique for assisting patient out of bed, chair or wheelchair.  Instruct in the proper use of assistive devices, such as walker, crutches, cane or brace.              Patient Friendly Description:   It's important to get you on  your feet again, but we need to do so in a way that is safe for you. Falling has serious consequences, and your personal safety is the most important thing of all.        When it's time to get out of bed, one of us or a family member will sit next to you on the bed to give you support.     If your doctor or nurse tells you to use a walker, crutches, a cane, or a brace, be sure you use it every time you get out of bed, even if you think you don't need it.    Learning Progress Summary           Patient Acceptance, E, NR by CR at 5/13/2020 1000                   Point: Home exercise program (Not Started)     Description:   Instruct learner(s) on appropriate technique for monitoring, assisting and/or progressing patient with therapeutic exercises and activities.              Learner Progress:   Not documented in this visit.          Point: Body mechanics (Not Started)     Description:   Instruct learner(s) on proper positioning and spine alignment for patient and/or caregiver during mobility tasks and/or exercises.              Learner Progress:   Not documented in this visit.          Point: Precautions (Not Started)     Description:   Instruct learner(s) on prescribed precautions during mobility and gait tasks              Learner Progress:   Not documented in this visit.                      User Key     Initials Effective Dates Name Provider Type Discipline     03/01/19 -  Reyes, Carmela, PT Physical Therapist PT              PT Recommendation and Plan  Planned Therapy Interventions (PT Eval): balance training, bed mobility training, gait training, neuromuscular re-education, strengthening, patient/family education, transfer training, postural re-education  Outcome Summary/Treatment Plan (PT)  Anticipated Discharge Disposition (PT): inpatient rehabilitation facility  Plan of Care Reviewed With: patient  Outcome Summary: 78 y/o male admitted on 5/11 due to syncopal episode at MD office with c/o cough; (-) COVID and  "suspect pna. PMH includes Alzheimer's dse, pacemaker. Pt is able to follow commands but unsure of accuracy of report regarding PLOF. Pt mainly c/o \"jerking and not being able to hold his head up.\" Jerking / tremoring noted in supine and sitting but not during ambulation. Pt required min A for supine to sit transfer and to come to standing. Pt ambulate 20 ft using rw CGA for most part, min A only to maneuver rw around corners. Pt is a high risk for falls and unsure if he has adequate support at home thus will recommend IP rehab for strength, balance training.      Time Calculation:   PT Charges     Row Name 05/13/20 1001             Time Calculation    Start Time  0850  -CR      Stop Time  0918  -CR      Time Calculation (min)  28 min  -CR      PT Received On  05/13/20  -CR      PT - Next Appointment  05/14/20  -CR      PT Goal Re-Cert Due Date  05/27/20  -CR         Time Calculation- PT    Total Timed Code Minutes- PT  0 minute(s)  -CR        User Key  (r) = Recorded By, (t) = Taken By, (c) = Cosigned By    Initials Name Provider Type    CR Reyes, Carmela, PT Physical Therapist        Therapy Charges for Today     Code Description Service Date Service Provider Modifiers Qty    [id number] HC PT EVAL MOD COMPLEXITY 3 5/13/2020 Reyes, Carmela, PT GP 1          PT G-Codes  Outcome Measure Options: AM-PAC 6 Clicks Basic Mobility (PT)  AM-PAC 6 Clicks Score (PT): 17    Carmela Reyes, PT  5/13/2020       "

## 2020-05-13 NOTE — PROGRESS NOTES
[facility]        LOS:  LOS: 2 days   Patient Name: Jayden Bond  Age/Sex: 79 y.o. male  : 1940  MRN: [id number]    Day of Service: 20   Length of Stay: 2  Encounter Provider: DEANDRA Zambrano  Place of Service: [facility] CARDIOLOGY  Patient Care Team:  Maude Ricketts APRN as PCP - General (Nurse Practitioner)  Deam, Ashutosh Santana MD as Consulting Physician (Cardiac Electrophysiology)    Subjective:     Chief Complaint: f/u syncope    Subjective: Patient is laying in bed in no acute distress.  He is SR on telemetry, HR 60s.  He states he does not feel well. He denies chest pain or SOA.  His COVID screening was negative.  He has been moved out of COVID rule out.  He complains of new twitching / jerking movements with face on left side. I witnessed episode while I was in the room, he has no focal weaknesses or deficits.  He has no facial droop, his strength and movement is equal in bilateral upper and lower extremities.        Current Medications:   Scheduled Meds:  albuterol sulfate HFA 2 puff Inhalation 4x Daily - RT   apixaban 5 mg Oral Q12H   brimonidine 1 drop Both Eyes BID   cefepime 2 g Intravenous Q8H   clopidogrel 75 mg Oral Daily   escitalopram 10 mg Oral Daily   galantamine ER 8 mg Oral BID   hydrALAZINE 25 mg Oral BID   isosorbide mononitrate 30 mg Oral Daily   levothyroxine 25 mcg Oral Daily   metoprolol tartrate 25 mg Oral Nightly   pantoprazole 40 mg Oral Daily   predniSONE 5 mg Oral Daily   rosuvastatin 20 mg Oral Nightly   sodium chloride 10 mL Intravenous Q12H   vitamin B-12 1,000 mcg Oral Daily     Continuous Infusions:  Pharmacy to Dose Cefepime     sodium chloride 100 mL/hr Last Rate: 100 mL/hr (20 3029)       Allergies:  Allergies   Allergen Reactions   • Doxycycline GI Intolerance   • Doxycycline Nausea And Vomiting       Review of Symptoms:  Constitutional: Patient afebrile no chills or unexpected weight  changes  Respiratory: No cough, no wheezing or dyspnea  Cardiovascular: No chest pain, palpitations, dyspnea, orthopnea and no edema  Gastrointestinal: No nausea, vomiting, constipation or diarrhea.  No melena or dark stools    All other systems reviewed and are negative save those described above      Objective:     Temp:  [97.3 °F (36.3 °C)-98.1 °F (36.7 °C)] 97.7 °F (36.5 °C)  Heart Rate:  [60-82] 60  Resp:  [15-19] 17  BP: (133-201)/() 174/92     Intake/Output Summary (Last 24 hours) at 5/13/2020 1033  Last data filed at 5/13/2020 0832  Gross per 24 hour   Intake 360 ml   Output 1125 ml   Net -765 ml     Body mass index is 25.1 kg/m².      05/12/20  0253 05/12/20  0556 05/13/20  1010   Weight: 80.8 kg (178 lb 2.1 oz) 81.8 kg (180 lb 5.4 oz) 81.6 kg (180 lb)       Physical exam  Constitutional: well-nourished, and appears stated age in no acute distress  PERRL: Conjunctiva clear, no pallor, anicteric  HENMT: normocephalic, normal dentition, no cyanosis or pallor  Neck:no bruits, or thrills and bilateral normal carotid upstroke. Normal jugular venous pressure  Cardiovascular: No parasternal heaves an non-displaced focal PMI. Normal rate and rhythm: no rub, gallop, murmur or click and normal S1 and S2; no lower or upper extremity edema.   Lungs: unlabored, no wheezing with no rales or rhonchi on auscultation.  Extremities: Warm, no clubbing, cyanosis. Full and equal peripheral pulses in extremities with no bruits appreciated.   Abdomen: soft, non-tender, non-distended  Musculoskeletal: no joint tenderness or swelling and no erythema  Skin: Warm and dry, non-erythematous   Neuro:alert and normal affect. Oriented to time, place and person.           Lab Review:   Results from last 7 days   Lab Units 05/13/20  0504 05/12/20  0548 05/11/20  1256   SODIUM mmol/L 141 141 140   POTASSIUM mmol/L 3.7 3.8 4.4   CHLORIDE mmol/L 105 105 105   CO2 mmol/L 25.0 24.0 25.0   BUN mg/dL 12 15 15   CREATININE mg/dL 1.13 1.32*  1.27   GLUCOSE mg/dL 92 91 168*   CALCIUM mg/dL 9.3 8.9 8.8   AST (SGOT) U/L  --   --  18   ALT (SGPT) U/L  --   --  14     Results from last 7 days   Lab Units 05/12/20  1424 05/12/20  0548 05/11/20  2234 05/11/20  1256   TROPONIN T ng/mL <0.010 <0.010 <0.010 <0.010     Results from last 7 days   Lab Units 05/13/20  0504 05/12/20  0548   WBC 10*3/mm3 11.20* 11.20*   HEMOGLOBIN g/dL 12.5* 12.0*   HEMATOCRIT % 36.2* 35.5*   PLATELETS 10*3/mm3 257 254     Results from last 7 days   Lab Units 05/11/20  1256   INR  1.10*   APTT seconds 24.8*     Results from last 7 days   Lab Units 05/12/20  0548 05/11/20  1256   MAGNESIUM mg/dL 1.9 2.0           Invalid input(s): LDLCALC  Results from last 7 days   Lab Units 05/12/20  0548 05/11/20  1256   PROBNP pg/mL 179.9 131.6     Results from last 7 days   Lab Units 05/13/20  0504 05/11/20  1256   TSH uIU/mL 4.890* 4.640*       Recent Radiology:  Imaging Results (Most Recent)     Procedure Component Value Units Date/Time    CT Chest Without Contrast [[id number]] Collected:  05/13/20 0819     Updated:  05/13/20 0825    Narrative:          DATE OF EXAM:  5/13/2020 7:50 AM     PROCEDURE:  CT CHEST WO CONTRAST-     INDICATIONS:   Pleural effusion     COMPARISON:   11/21/2018     TECHNIQUE:  Routine transaxial slices were obtained through the chest without the  administration of intravenous contrast. Reconstructed coronal and  sagittal images were also obtained. Automated exposure control and  iterative construction methods were used.     FINDINGS:  There is advanced emphysematous lung disease and there are changes of  interstitial fibrosis present. This as a basilar predominance. No acute  infiltrates are present in the lungs. There are no suspicious pulmonary  nodules or masses there is a stable nodule in the left lower lobe which  is calcified. No pleural fluid is identified.     There is a transvenous pacemaker in place. Cardiac stents are in place  with dense atherosclerotic  coronary calcifications. There are several  small nodes identified in the mediastinum. Some of these are calcified  and all are unchanged. Similarly there are bilateral calcified hilar  nodes present. No suspicious lymphadenopathy is seen. There is no  evidence of pleural or pericardial fluid. There is a small hiatal hernia  present. Scans to the upper abdomen are unremarkable.        Impression:          1. Advanced emphysematous lung disease and interstitial fibrosis. This  is stable compared with the previous CT.  2. Evidence of old healed granulomatous disease. Partially calcified  mediastinal nodes are present and stable compared with the patient's  previous CT scan.  3. Coronary atherosclerotic disease.  4. Small hiatal hernia, unchanged from the patient's previous study.     Electronically Signed By-Desmond Dwyer On:5/13/2020 8:23 AM  This report was finalized on [id number] by  Desmond Dwyer, .    CT Head Without Contrast [[id number]] Collected:  05/11/20 1501     Updated:  05/11/20 1512    Narrative:       CT HEAD WO CONTRAST-     Date of Exam: 5/11/2020 2:51 PM     Indication: Syncope/fainting; R55-Syncope and collapse; J18.9-Pneumonia,  unspecified organism; R68.89-Other general symptoms and signs.       Comparison: CT head without contrast 03/04/2020, MRI brain without  contrast 11/20/2018.     Technique:  Without contrast, contiguous axial CT images of the head  were obtained from skull base to vertex.   Automated exposure control  and iterative reconstruction methods were used.     FINDINGS:  No acute intracranial hemorrhage, mass, or mass effect is seen.  Increased left cerebral convexity extra-axial CSF density compared to  the right is stable. The septum pellucidum is to the right of the falx,  which is also stable and may be related to right-sided volume loss.  Mild-to-moderate global volume loss is stable. Advanced  periventricular/subcortical white matter hypodensities are nonspecific,  but are most  commonly seen in the setting of chronic small vessel  ischemic change. Prior infarcts are redemonstrated in right frontal  tolerated occipital, and left temporal lobes. 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 No acute fracture is noted. There is  mucosal thickening in the right side of the sphenoid sinus and in right  ethmoid air cells posteriorly. Mastoid air cells are clear.       Impression:       1.No acute intracranial abnormality is identified on noncontrast CT.  2.Stable appearance of prior infarcts and advanced probable chronic  small vessel ischemic change.     Electronically Signed By-Armida Escobar On:5/11/2020 3:10 PM  This report was finalized on [id number] by  Armida Escobar, .    XR Chest 1 View [[id number]] Collected:  05/11/20 1302     Updated:  05/11/20 1306    Narrative:          DATE OF EXAM:   5/11/2020 12:30 PM     PROCEDURE:   XR CHEST 1 VW-     INDICATIONS:   Weakness syncope     COMPARISON:  05/01/2020, 03/04/2020, 02/03/2020, 11/09/2018.     TECHNIQUE:   [Portable chest radiograph]     FINDINGS:   There may be mild interval increase in ill-defined airspace infiltrates  within the lung bases. There may also be mild interval increase in  interstitial prominence. The cardiac silhouette and mediastinum are  stable. A left cardiac pacemaker/AICD remains in place with leads  intact. No acute osseous abnormalities are seen.        Impression:       Evidence for mild increase in ill-defined airspace infiltrates and  interstitial prominence within the mid to lower lungs bilaterally. These  findings may indicate developing atypical/viral infection or multifocal  pneumonia. Changes of pulmonary edema could also be considered.  Recommend correlation for signs or symptoms of acute infection versus  volume overload and follow-up to ensure resolution.     Electronically Signed By-Harshil Guerrero On:5/11/2020 1:04 PM  This report was finalized on [id number] by  Harshil Guerrero, .           ECHOCARDIOGRAM:    Results for orders placed during the hospital encounter of 02/03/20   Adult Transthoracic Echo Complete W/ Cont if Necessary Per Protocol    Narrative · Left ventricular systolic function is normal.  · Left ventricular wall thickness is consistent with borderline concentric   hypertrophy.  · Left ventricular diastolic dysfunction (grade I) consistent with   impaired relaxation.  · Estimated EF appears to be in the range of 61 - 65%.            I reviewed the patient's new clinical results.    EKG:          Assessment:       Syncope    1) Syncope  - troponin negative x 2  - EKG shows no acute ST abnormalities  - d-dimer negative  - check 2D echo  - check device interrogation  - TSH 4.8- management per primary  - check orthostatic v/s     2) Bilateral PNA  - leukocytosis  - blood culture pending  - lactate initially elevated.  - COVID 19 test NEGATIVE  - pulmonary following  - on Abx      3) CAD s/p PCI with AMEYA to LAD 11/29/2019 at [place]   - Last 2D echo 2/2020 showed EF = 61-65% with grade 1 diastolic dysfunction, and no significant VHD.  - on Plavix, no ASA as he is also on apixiban for prior CVA     4) SSS s/p Medtronic PPM in 2015  - hx NSVT     5) Hx orthostatic hypotension / autonomic dysfunction  - + tilt table test 2015     6) Hx CVA     7) COPD     8) CKD  - Cr 1.32 on admit  - improved with IVF, 1.13 today     9) Hx thyroid dysfunction     10) hx dementia     11) reported hx 4.2 cm aortic aneurysm    Plan:   Patient will have 2D ECHO today  PPM interrogation planned today 70    Patient with jerking and twitching movements today- he reports these are new, I recommend neurology evaluation    Patient's kidney function has improved with IVF    Syncopal event could be multifactorial in setting of dehydration, pneumonia, underlying autonomic dysfunction with possible orthostasis in setting of above.    Patient has history of autonomic dysfunction with positive tilt table test which  prompted pacemaker 2015.  Continue hydralazine and Imdur systolic blood pressure greater than 150

## 2020-05-13 NOTE — THERAPY EVALUATION
Acute Care - Speech Language Pathology   Swallow Initial Evaluation  [facility]     Patient Name: Jayden Bond  : 1940  MRN: [id number]  Today's Date: 2020               Admit Date: 2020    Visit Dx:     ICD-10-CM ICD-9-CM   1. Syncope, unspecified syncope type R55 780.2   2. HCAP (healthcare-associated pneumonia) J18.9 486   3. Suspected Covid-19 Virus Infection R68.89      Patient Active Problem List   Diagnosis   • Chest pain   • Hyperlipidemia   • Hypertension   • Stroke (CMS/HCC)   • Disease of thyroid gland   • CAD (coronary artery disease)   • Dysautonomia (CMS/HCC)   • Dementia (CMS/HCC)   • Coronary artery disease   • Syncope     Past Medical History:   Diagnosis Date   • CAD (coronary artery disease)    • Coronary artery disease     [facility]   • Dementia (CMS/HCC)    • Depression    • Disease of thyroid gland    • Dysautonomia (CMS/HCC)    • Dyslipidemia    • GERD (gastroesophageal reflux disease)    • Head pain    • Hyperlipidemia    • Hypertension    • SSS (sick sinus syndrome) (CMS/HCC)    • Stroke (CMS/HCC)     x 3 in 2018     Past Surgical History:   Procedure Laterality Date   • APPENDECTOMY     • CHOLECYSTECTOMY     • ENDOSCOPY N/A 10/17/2019    Procedure: ESOPHAGOGASTRODUODENOSCOPY with biopsy x 2 areas;  Surgeon: Ashok Sanchez MD;  Location: [facility] ENDOSCOPY;  Service: Gastroenterology   • HERNIA REPAIR     • LEFT HEART CATH     • PACEMAKER IMPLANTATION      Medtronic   • TEMPORAL ARTERY BIOPSY     • WRIST SURGERY      severed artery        SWALLOW EVALUATION (last 72 hours)      SLP Adult Swallow Evaluation     Row Name 20 1300       General Information    Current Method of Nutrition  regular textures;thin liquids  -EC    Prior Level of Function-Swallowing  no diet consistency restrictions  -EC    Plans/Goals Discussed with  patient  -EC       Oral Motor and Function    Dentition Assessment  upper dentures/partial in place;lower dentures/partial in place  -EC    Secretion  Management  WNL/WFL  -EC    Mucosal Quality  moist, healthy  -EC    Volitional Swallow  WFL  -EC       Oral Musculature and Cranial Nerve Assessment    Oral Motor General Assessment  WFL  -EC       General Eating/Swallowing Observations    Eating/Swallowing Skills  self-fed;fed by SLP;appropriate self-feeding skills observed  -EC    Positioning During Eating  upright 90 degree;upright in bed  -EC    Utensils Used  spoon;straw  -EC    Consistencies Trialed  regular textures;pureed;thin liquids mechanical soft, mixed consistency  -EC       Clinical Swallow Eval    Clinical Swallow Evaluation Summary  Pt takes trials of applesauce, peaches, crackers, ice chips and thin water by straw w/oral transit appearing WFL and no clinical s/s of aspiration.  Pt has no c/o swallow difficulty.    -EC       Clinical Impression    SLP Swallowing Diagnosis  functional oral phase;functional pharyngeal phase  -EC    Rehab Potential/Prognosis, Swallowing  good, to achieve stated therapy goals  -EC    Swallow Criteria for Skilled Therapeutic Interventions Met  demonstrates skilled criteria  -EC       Recommendations    Therapy Frequency (Swallow)  PRN  -EC    Predicted Duration Therapy Intervention (Days)  until discharge  -EC    SLP Diet Recommendation  regular textures;thin liquids  -EC       Swallow Goals (SLP)    Oral Nutrition/Hydration Goal Selection (SLP)  oral nutrition/hydration, SLP goal 1;oral nutrition/hydration, SLP goal 2  -EC       Oral Nutrition/Hydration Goal 1 (SLP)    Oral Nutrition/Hydration Goal 1, SLP  Pt to be seen for a follow up meal assessment to ensure diet tolerance w/further recommendations as indicated.  -EC    Time Frame (Oral Nutrition/Hydration Goal 1, SLP)  2 days  -EC       Oral Nutrition/Hydration Goal 2 (SLP)    Oral Nutrition/Hydration Goal 2, SLP  Pt to tolerate safest & least restrictive diet recommendations w/no complications from aspiration.  -EC    Time Frame (Oral Nutrition/Hydration Goal 2,  SLP)  by discharge  -EC      User Key  (r) = Recorded By, (t) = Taken By, (c) = Cosigned By    Initials Name Effective Dates    EC Judit Pradhan 03/01/19 -           EDUCATION  The patient has been educated in the following areas:   Dysphagia (Swallowing Impairment).    SLP Recommendation and Plan  SLP Swallowing Diagnosis: functional oral phase, functional pharyngeal phase  SLP Diet Recommendation: regular textures, thin liquids                 Swallow Criteria for Skilled Therapeutic Interventions Met: demonstrates skilled criteria     Rehab Potential/Prognosis, Swallowing: good, to achieve stated therapy goals  Therapy Frequency (Swallow): PRN  Predicted Duration Therapy Intervention (Days): until discharge            SLP GOALS     Row Name 05/13/20 1300             Oral Nutrition/Hydration Goal 1 (SLP)    Oral Nutrition/Hydration Goal 1, SLP  Pt to be seen for a follow up meal assessment to ensure diet tolerance w/further recommendations as indicated.  -EC      Time Frame (Oral Nutrition/Hydration Goal 1, SLP)  2 days  -EC         Oral Nutrition/Hydration Goal 2 (SLP)    Oral Nutrition/Hydration Goal 2, SLP  Pt to tolerate safest & least restrictive diet recommendations w/no complications from aspiration.  -EC      Time Frame (Oral Nutrition/Hydration Goal 2, SLP)  by discharge  -EC        User Key  (r) = Recorded By, (t) = Taken By, (c) = Cosigned By    Initials Name Provider Type    Judit Bran Speech and Language Pathologist             Time Calculation:                Judit Pradhan  5/13/2020

## 2020-05-13 NOTE — PLAN OF CARE
Problem: Patient Care Overview  Goal: Plan of Care Review  Outcome: Ongoing (interventions implemented as appropriate)  Note:   Swallow evaluation completed on this date.  Pt tolerates all consistencies w/no clinical s/s of aspiration.  Recommend continue regular diet w/thin liquids. ST to follow up for diet tolerance.

## 2020-05-13 NOTE — PROGRESS NOTES
Walking oximetry not done.  No plans to discharge patient today.  Valerie Sturgeon LPN notified, and she agreed we would wait closer to patient discharge.

## 2020-05-13 NOTE — CONSULTS
Primary Care Provider: Maude Ricketts APRN     Consult requested by: Dr. Mckeon  Reason for Consultation: Neurological evaluation/syncope and tremors, could be seizures    History taken from: patient chart RN    Chief complaint: Passing out       SUBJECTIVE:    History of present illness:   The patient is a very pleasant 79-year-old right-handed white male who was evaluated in room 248 at [facility].  Source of information is the patient but more than the previous records.  We have seen this gentleman for syncopal type episodes in the past and were unable to find any particular cause  Also he has autonomic instability and had significant variations in his blood pressure and we were suspecting orthostatic hypotension    Now he is having facial twitching which is very atypical but definitely more so on the face on the left side and sometimes a whole-body myoclonic type activity with no other abnormalities identified    No his prior EEG was showing mild slowing and not specific for seizures this episode concerns me for possibility that he may be having focal seizure  He has had prior infarcts  CT was stable  There is nothing suggesting CNS infection type picture    As per admitting, the patient is a poor historian secondary to history of Alzheimer's dementia.  History was obtained primarily through the chart.  The patient is a 79-year-old  male with known history of autonomic dysfunction syndrome and atherosclerotic heart disease who was recently referred to electrophysiology.  The day of presentation, he apparently had an acute syncopal event and was brought to [facility] for evaluation.  He was admitted and was found to have a community-acquired pneumonia.  COVID-19 status is at present pending.  The patient denies acute distress.  He denies substernal chest pain or left arm paresthesias, jaw discomfort or orthopnea.    Review of Systems   Very difficult to do because of his  cognitive changes  No fever chills rigors or sweats  No weight issues  No sleep problems  HEENT:  No speech problem, vision changes, facial asymmetry or pain, or neck problem except for the facial twitching  Chest: No chest pain, clubbing, cyanosis, orthopnea palpitations  Pulmonary:  No shortness of air, cough or expectoration  Abdomen:  No swelling/tension, constipation,diarrhea or pain  No genitourinary symptoms  Extremity problems as discussed  No back problem  He has a history of dementia  Neurologic issues as discussed  No hematologic, dermatologic or endocrine problems          PATIENT HISTORY:  Past Medical History:   Diagnosis Date   • CAD (coronary artery disease)    • Coronary artery disease     PTCI   • Dementia (CMS/[facility])    • Depression    • Disease of thyroid gland    • Dysautonomia (CMS/[facility])    • Dyslipidemia    • GERD (gastroesophageal reflux disease)    • Head pain    • Hyperlipidemia    • Hypertension    • SSS (sick sinus syndrome) (CMS/[facility])    • Stroke (CMS/[facility])     x 3 in 2018   , Past Surgical History:   Procedure Laterality Date   • APPENDECTOMY     • CHOLECYSTECTOMY     • ENDOSCOPY N/A 10/17/2019    Procedure: ESOPHAGOGASTRODUODENOSCOPY with biopsy x 2 areas;  Surgeon: Ashok Sanchez MD;  Location: [facility] ENDOSCOPY;  Service: Gastroenterology   • HERNIA REPAIR     • LEFT HEART CATH     • PACEMAKER IMPLANTATION      [employer]   • TEMPORAL ARTERY BIOPSY     • WRIST SURGERY      severed artery   , Family History   Problem Relation Age of Onset   • Heart disease Father    , Social History     Tobacco Use   • Smoking status: Never Smoker   • Smokeless tobacco: Never Used   • Tobacco comment: quit 25 yrs ago   Substance Use Topics   • Alcohol use: No     Frequency: Never   • Drug use: No   , Medications Prior to Admission   Medication Sig Dispense Refill Last Dose   • albuterol sulfate  (90 Base) MCG/ACT inhaler Inhale 2 puffs Every 4 (Four) Hours As Needed for Wheezing.      • apixaban  (ELIQUIS) 5 MG tablet tablet Take 1 tablet by mouth Every 12 (Twelve) Hours. Indications: Atrial Fibrillation 60 tablet 0    • brimonidine (ALPHAGAN) 0.2 % ophthalmic solution Administer 1 drop to both eyes 2 (Two) Times a Day. 10 mL 12    • clopidogrel (PLAVIX) 75 MG tablet Take 75 mg by mouth Daily.      • escitalopram (LEXAPRO) 10 MG tablet Take 10 mg by mouth Daily.   10/17/2019 at Unknown time   • fluticasone (FLONASE) 50 MCG/ACT nasal spray 2 sprays into the nostril(s) as directed by provider Daily.   10/16/2019 at Unknown time   • galantamine ER (RAZADYNE ER) 8 MG 24 hr capsule Take 8 mg by mouth 2 (Two) Times a Day.   10/16/2019 at Unknown time   • hydrALAZINE (APRESOLINE) 25 MG tablet Take 25 mg by mouth 2 (Two) Times a Day.   10/17/2019 at Unknown time   • isosorbide mononitrate (IMDUR) 30 MG 24 hr tablet Take 1 tablet by mouth Daily. 30 tablet 0    • levothyroxine (SYNTHROID, LEVOTHROID) 25 MCG tablet Take 25 mcg by mouth Daily.   10/17/2019 at Unknown time   • loratadine (CLARITIN) 10 MG tablet Take 10 mg by mouth Daily.   10/17/2019 at Unknown time   • metoprolol tartrate (LOPRESSOR) 25 MG tablet Take 1 tablet by mouth Every 12 (Twelve) Hours. (Patient taking differently: Take 25 mg by mouth Every Evening. Hold if pulse < 60) 60 tablet 0    • Multiple Vitamins-Minerals (MULTIVITAMIN WITH MINERALS) tablet tablet Take 1 tablet by mouth Every Night.   2/3/2020 at Unknown time   • nitroglycerin (NITROSTAT) 0.4 MG SL tablet Place 0.4 mg under the tongue Every 5 (Five) Minutes As Needed for Chest Pain. Take no more than 3 doses in 15 minutes.      • Omega-3 Fatty Acids (FISH OIL) 1000 MG capsule capsule Take 1,000 mg by mouth Daily With Breakfast.   2/3/2020 at Unknown time   • pantoprazole (PROTONIX) 40 MG EC tablet Take 40 mg by mouth Daily.   10/16/2019 at Unknown time   • predniSONE (DELTASONE) 5 MG tablet Take 5 mg by mouth Daily.   10/16/2019 at Unknown time   • rosuvastatin (CRESTOR) 20 MG tablet Take  20 mg by mouth Every Night.   10/16/2019 at Unknown time   • tamsulosin (FLOMAX) 0.4 MG capsule 24 hr capsule Take 1 capsule by mouth Every Night.   10/16/2019 at Unknown time   • vitamin B-12 (CYANOCOBALAMIN) 1000 MCG tablet Take 1,000 mcg by mouth Daily.   2/3/2020 at Unknown time   , Scheduled Meds:    albuterol sulfate HFA 2 puff Inhalation 4x Daily - RT   apixaban 5 mg Oral Q12H   brimonidine 1 drop Both Eyes BID   cefepime 2 g Intravenous Q8H   clopidogrel 75 mg Oral Daily   escitalopram 10 mg Oral Daily   galantamine ER 8 mg Oral BID   hydrALAZINE 25 mg Oral BID   isosorbide mononitrate 30 mg Oral Daily   levothyroxine 25 mcg Oral Daily   metoprolol tartrate 25 mg Oral Nightly   pantoprazole 40 mg Oral Daily   predniSONE 5 mg Oral Daily   rosuvastatin 20 mg Oral Nightly   sodium chloride 10 mL Intravenous Q12H   vitamin B-12 1,000 mcg Oral Daily   , Continuous Infusions:    Pharmacy to Dose Cefepime     sodium chloride 100 mL/hr Last Rate: 100 mL/hr (05/11/20 2331)   , PRN Meds:  •  acetaminophen **OR** acetaminophen **OR** acetaminophen  •  albuterol  •  bisacodyl  •  calcium carbonate  •  hydrALAZINE  •  HYDROcodone-acetaminophen  •  magnesium hydroxide  •  melatonin  •  nitroglycerin  •  ondansetron **OR** ondansetron  •  Pharmacy to Dose Cefepime  •  [COMPLETED] Insert peripheral IV **AND** sodium chloride  •  sodium chloride, Allergies:  Doxycycline and Doxycycline    ________________________________________________________        OBJECTIVE:  Upon today's exam, the gentleman is pleasantly confused, in no acute distress      Neurologic Exam    The patient is awake and alert and oriented x health  Normal speech and no aphasia  Cranial nerve examination demonstrate:  Full fields of vision to confrontation  Pupils are round, reactive to light and accommodation and size of about 3 mm  No ptosis or nystagmus  Funduscopic examination was not successful  Eye movements are conjugate     Sensation on the face and  scalp are normal  Muscles of mastication are normal and symmetric  Muscles of  facial expression are normal and symmetric, he has periodic left-sided twitching  Hearing is intact bilaterally  Head turning and shoulder shrugs were unremarkable  Tongue was midline  Could not visualize his oropharynx or uvula  Motor examination:  Normal bulk, tone and strength was 5-/5  No fasciculations     Sensory examination:  Intact for soft touch, pain and position sensation  Romberg was not evaluated     Reflexes:  0     Coordination:  Slow finger-to-nose to finger gait:  Deferred     Toe signs:  [place]    ________________________________________________________   RESULTS REVIEW:    VITAL SIGNS:   Temp:  [97.3 °F (36.3 °C)-98.1 °F (36.7 °C)] 97.7 °F (36.5 °C)  Heart Rate:  [60-91] 91  Resp:  [16-18] 17  BP: (116-201)/() 116/79     LABS:  WBC   Date Value Ref Range Status   05/13/2020 11.20 (H) 3.40 - 10.80 10*3/mm3 Final     RBC   Date Value Ref Range Status   05/13/2020 4.24 4.14 - 5.80 10*6/mm3 Final     Hemoglobin   Date Value Ref Range Status   05/13/2020 12.5 (L) 13.0 - 17.7 g/dL Final     Hematocrit   Date Value Ref Range Status   05/13/2020 36.2 (L) 37.5 - 51.0 % Final     MCV   Date Value Ref Range Status   05/13/2020 85.4 79.0 - 97.0 fL Final     MCH   Date Value Ref Range Status   05/13/2020 29.4 26.6 - 33.0 pg Final     MCHC   Date Value Ref Range Status   05/13/2020 34.5 31.5 - 35.7 g/dL Final     RDW   Date Value Ref Range Status   05/13/2020 13.9 12.3 - 15.4 % Final     RDW-SD   Date Value Ref Range Status   05/13/2020 42.0 37.0 - 54.0 fl Final     MPV   Date Value Ref Range Status   05/13/2020 7.9 6.0 - 12.0 fL Final     Platelets   Date Value Ref Range Status   05/13/2020 257 140 - 450 10*3/mm3 Final     Neutrophil %   Date Value Ref Range Status   05/13/2020 71.2 42.7 - 76.0 % Final     Lymphocyte %   Date Value Ref Range Status   05/13/2020 16.1 (L) 19.6 - 45.3 % Final     Monocyte %   Date Value Ref Range  Status   05/13/2020 9.4 5.0 - 12.0 % Final     Eosinophil %   Date Value Ref Range Status   05/13/2020 2.8 0.3 - 6.2 % Final     Basophil %   Date Value Ref Range Status   05/13/2020 0.5 0.0 - 1.5 % Final     Neutrophils, Absolute   Date Value Ref Range Status   05/13/2020 8.00 (H) 1.70 - 7.00 10*3/mm3 Final     Lymphocytes, Absolute   Date Value Ref Range Status   05/13/2020 1.80 0.70 - 3.10 10*3/mm3 Final     Monocytes, Absolute   Date Value Ref Range Status   05/13/2020 1.10 (H) 0.10 - 0.90 10*3/mm3 Final     Eosinophils, Absolute   Date Value Ref Range Status   05/13/2020 0.30 0.00 - 0.40 10*3/mm3 Final     Basophils, Absolute   Date Value Ref Range Status   05/13/2020 0.10 0.00 - 0.20 10*3/mm3 Final     nRBC   Date Value Ref Range Status   05/13/2020 0.0 0.0 - 0.2 /100 WBC Final     Glucose   Date Value Ref Range Status   05/13/2020 92 65 - 99 mg/dL Final     BUN   Date Value Ref Range Status   05/13/2020 12 8 - 23 mg/dL Final     Creatinine   Date Value Ref Range Status   05/13/2020 1.13 0.76 - 1.27 mg/dL Final     Sodium   Date Value Ref Range Status   05/13/2020 141 136 - 145 mmol/L Final     Potassium   Date Value Ref Range Status   05/13/2020 3.7 3.5 - 5.2 mmol/L Final     Chloride   Date Value Ref Range Status   05/13/2020 105 98 - 107 mmol/L Final     CO2   Date Value Ref Range Status   05/13/2020 25.0 22.0 - 29.0 mmol/L Final     Calcium   Date Value Ref Range Status   05/13/2020 9.3 8.6 - 10.5 mg/dL Final     Total Protein   Date Value Ref Range Status   05/11/2020 6.3 6.0 - 8.5 g/dL Final     Albumin   Date Value Ref Range Status   05/11/2020 3.80 3.50 - 5.20 g/dL Final     ALT (SGPT)   Date Value Ref Range Status   05/11/2020 14 1 - 41 U/L Final     AST (SGOT)   Date Value Ref Range Status   05/11/2020 18 1 - 40 U/L Final     Alkaline Phosphatase   Date Value Ref Range Status   05/11/2020 62 39 - 117 U/L Final     Total Bilirubin   Date Value Ref Range Status   05/11/2020 0.3 0.2 - 1.2 mg/dL Final      eGFR Non  Amer   Date Value Ref Range Status   05/13/2020 63 >60 mL/min/1.73 Final     BUN/Creatinine Ratio   Date Value Ref Range Status   05/13/2020 10.6 7.0 - 25.0 Final     Anion Gap   Date Value Ref Range Status   05/13/2020 11.0 5.0 - 15.0 mmol/L Final       Lab Results   Component Value Date    TSH 4.890 (H) 05/13/2020    LDL 33 03/05/2020    HGBA1C 6.20 (H) 03/05/2020    [id number] 901 03/06/2020         IMAGING STUDIES:  Ct Head Without Contrast    Result Date: 5/11/2020  1.No acute intracranial abnormality is identified on noncontrast CT. 2.Stable appearance of prior infarcts and advanced probable chronic small vessel ischemic change.  Electronically Signed By-Armida Escobar On:5/11/2020 3:10 PM This report was finalized on [id number] by  Armida Escobar, .    Ct Chest Without Contrast    Result Date: 5/13/2020   1. Advanced emphysematous lung disease and interstitial fibrosis. This is stable compared with the previous CT. 2. Evidence of old healed granulomatous disease. Partially calcified mediastinal nodes are present and stable compared with the patient's previous CT scan. 3. Coronary atherosclerotic disease. 4. Small hiatal hernia, unchanged from the patient's previous study.  Electronically Signed By-Desmond Dwyer On:5/13/2020 8:23 AM This report was finalized on [id number] by  Desmond Dwyer, .      I reviewed the patient's new clinical results.      ________________________________________________________     PROBLEM LIST:    Syncope          Assessment/Plan   ASSESSMENT/PLAN:  Facial twitching and episode of syncope with no significant orthostasis.  This could be focal seizures.  I am going to start him on Keppra.  We have had EEG on him in the past and negative EEG does not rule out epilepsy.  Seizure precautions state laws apply anyway though it looks like he is not doing any particular activities outside       - Fall, syncope precautions (see below)    SEIZURE/SYNCOPE  INSTRUCTIONS:  -Recommended to observe all seizure precautions, including, but not limited to:   -No driving until seizure free for more than 3 months- as per State driving regulation / law;   -Avoid all high-risk activity, Take showers instead of baths, Avoid swimming without observation, Avoid open heat sources, Avoid working at heights, and Avoid engaging in all potentially hazardous activities.   -Patient expressed clear understanding.          I discussed the patient's findings and my recommendations with patient and nursing staff    Luiz Soria MD  05/13/20  14:28

## 2020-05-13 NOTE — PLAN OF CARE
Problem: Patient Care Overview  Goal: Plan of Care Review  Outcome: Ongoing (interventions implemented as appropriate)  Flowsheets (Taken 5/13/2020 0774)  Progress: improving  Plan of Care Reviewed With: patient  Note:   Patient transfer from Observation after a negative COVID 19 test. Pt has intermittent confusion with a history of dementia. Will continue to monitor.

## 2020-05-13 NOTE — DISCHARGE PLACEMENT REQUEST
"Bixby Barrington MCNULTY (79 y.o. Male)     Date of Birth Social Security Number Address Home Phone MRN    1940  [id number] [facility]   [place] [facility] [phone] [id number]    Alevism Marital Status          Confucianism        Admission Date Admission Type Admitting Provider Attending Provider Department, Room/Bed    5/11/20 Emergency Allan Mckeon MD Heimer, Brian T, MD [address] MEDICAL INPATIENT, 248/1    Discharge Date Discharge Disposition Discharge Destination                       Attending Provider:  Allan Mckeon MD    Allergies:  Doxycycline, Doxycycline    Isolation:  None   Infection:  None   Code Status:  CPR    Ht:  180.3 cm (71\")   Wt:  81.6 kg (180 lb)    Admission Cmt:  None   Principal Problem:  None                Active Insurance as of 5/11/2020     Primary Coverage     Payor Plan Insurance Group Employer/Plan Group    MEDICARE MEDICARE A & B      Payor Plan Address Payor Plan Phone Number Payor Plan Fax Number Effective Dates    [address] [phone]  8/1/2005 - None Entered    [facility] [id number]       Subscriber Name Subscriber Birth Date Member ID       BARRINGTON CARREON 1940 [id number]           Secondary Coverage     Payor Plan Insurance Group Employer/Plan Group    STANDARD LIFE ACCIDENT STANDARD LIFE ACCIDENT      Payor Plan Address Payor Plan Phone Number Payor Plan Fax Number Effective Dates    [address]   9/27/2019 - None Entered    [place] [address]       Subscriber Name Subscriber Birth Date Member ID       BARRINGTON CARREON 1940 [id number]                 Emergency Contacts      (Rel.) Home Phone Work Phone Mobile Phone    BONITA CARREONY (Spouse) [phone] -- [phone]          "

## 2020-05-13 NOTE — PROGRESS NOTES
Daily Progress Note    Syncope    Assessment    Possible left lower lobe Pneumonia  Negative for COVID 19  WC 11, neutrophils normal; leukopenia  Echo 2/4/20: EF 61- 65%;  RVSP 38    Plan    Antibiotics cefipime  GI prophylaxis protonix  Bronchodilator  Eliquis     LOS: 2 days       Subjective         Objective     Vital signs for last 24 hours:  Vitals:    05/13/20 0418 05/13/20 0538 05/13/20 0732 05/13/20 0818   BP: (!) 189/104 (!) 178/102  174/92   BP Location: Right leg      Patient Position: Lying      Pulse: 60 64 66 60   Resp: 18 16 18 17   Temp:    97.7 °F (36.5 °C)   TempSrc:       SpO2:   95% 92%   Weight:       Height:           Intake/Output last 3 shifts:  I/O last 3 completed shifts:  In: 360 [P.O.:360]  Out: 1775 [Urine:1775]  Intake/Output this shift:  I/O this shift:  In: -   Out: 700 [Urine:700]      Radiology  Imaging Results (Last 24 Hours)     Procedure Component Value Units Date/Time    CT Chest Without Contrast [[id number]] Collected:  05/13/20 0819     Updated:  05/13/20 0825    Narrative:          DATE OF EXAM:  5/13/2020 7:50 AM     PROCEDURE:  CT CHEST WO CONTRAST-     INDICATIONS:   Pleural effusion     COMPARISON:   11/21/2018     TECHNIQUE:  Routine transaxial slices were obtained through the chest without the  administration of intravenous contrast. Reconstructed coronal and  sagittal images were also obtained. Automated exposure control and  iterative construction methods were used.     FINDINGS:  There is advanced emphysematous lung disease and there are changes of  interstitial fibrosis present. This as a basilar predominance. No acute  infiltrates are present in the lungs. There are no suspicious pulmonary  nodules or masses there is a stable nodule in the left lower lobe which  is calcified. No pleural fluid is identified.     There is a transvenous pacemaker in place. Cardiac stents are in place  with dense atherosclerotic coronary calcifications. There are several  small nodes  identified in the mediastinum. Some of these are calcified  and all are unchanged. Similarly there are bilateral calcified hilar  nodes present. No suspicious lymphadenopathy is seen. There is no  evidence of pleural or pericardial fluid. There is a small hiatal hernia  present. Scans to the upper abdomen are unremarkable.        Impression:          1. Advanced emphysematous lung disease and interstitial fibrosis. This  is stable compared with the previous CT.  2. Evidence of old healed granulomatous disease. Partially calcified  mediastinal nodes are present and stable compared with the patient's  previous CT scan.  3. Coronary atherosclerotic disease.  4. Small hiatal hernia, unchanged from the patient's previous study.     Electronically Signed By-Desmond Dwyer On:5/13/2020 8:23 AM  This report was finalized on [id number] by  Desmond Dwyer, .          Labs:  Results from last 7 days   Lab Units 05/13/20  0504   WBC 10*3/mm3 11.20*   HEMOGLOBIN g/dL 12.5*   HEMATOCRIT % 36.2*   PLATELETS 10*3/mm3 257     Results from last 7 days   Lab Units 05/13/20  0504  05/11/20  1256   SODIUM mmol/L 141   < > 140   POTASSIUM mmol/L 3.7   < > 4.4   CHLORIDE mmol/L 105   < > 105   CO2 mmol/L 25.0   < > 25.0   BUN mg/dL 12   < > 15   CREATININE mg/dL 1.13   < > 1.27   CALCIUM mg/dL 9.3   < > 8.8   BILIRUBIN mg/dL  --   --  0.3   ALK PHOS U/L  --   --  62   ALT (SGPT) U/L  --   --  14   AST (SGOT) U/L  --   --  18   GLUCOSE mg/dL 92   < > 168*    < > = values in this interval not displayed.         Results from last 7 days   Lab Units 05/11/20  1256   ALBUMIN g/dL 3.80     Results from last 7 days   Lab Units 05/12/20  1424 05/12/20  0548 05/11/20  2234   TROPONIN T ng/mL <0.010 <0.010 <0.010         Results from last 7 days   Lab Units 05/12/20  0548   MAGNESIUM mg/dL 1.9     Results from last 7 days   Lab Units 05/11/20  1256   INR  1.10*   APTT seconds 24.8*     Results from last 7 days   Lab Units 05/13/20  0504   TSH uIU/mL  4.890*           Meds:   SCHEDULE    albuterol sulfate HFA 2 puff Inhalation 4x Daily - RT   apixaban 5 mg Oral Q12H   brimonidine 1 drop Both Eyes BID   cefepime 2 g Intravenous Q8H   clopidogrel 75 mg Oral Daily   escitalopram 10 mg Oral Daily   galantamine ER 8 mg Oral BID   hydrALAZINE 25 mg Oral BID   isosorbide mononitrate 30 mg Oral Daily   levothyroxine 25 mcg Oral Daily   metoprolol tartrate 25 mg Oral Nightly   pantoprazole 40 mg Oral Daily   predniSONE 5 mg Oral Daily   rosuvastatin 20 mg Oral Nightly   sodium chloride 10 mL Intravenous Q12H   vitamin B-12 1,000 mcg Oral Daily     Infusions    Pharmacy to Dose Cefepime     sodium chloride 100 mL/hr Last Rate: 100 mL/hr (05/11/20 3681)     PRNs  •  acetaminophen **OR** acetaminophen **OR** acetaminophen  •  albuterol  •  bisacodyl  •  calcium carbonate  •  hydrALAZINE  •  HYDROcodone-acetaminophen  •  magnesium hydroxide  •  melatonin  •  nitroglycerin  •  ondansetron **OR** ondansetron  •  Pharmacy to Dose Cefepime  •  [COMPLETED] Insert peripheral IV **AND** sodium chloride  •  sodium chloride    Physical Exam:  Physical Exam   Pulmonary/Chest: He has wheezes. He has rales.   Musculoskeletal: He exhibits edema.   Neurological: He is alert.   Skin: Skin is warm and dry.       ROS  Review of Systems   HENT: Positive for congestion.    Respiratory: Positive for cough and shortness of breath.                  Total time spent with patient greater than: 1 hour

## 2020-05-13 NOTE — PROGRESS NOTES
"     LOS: 2 days   Patient Care Team:  Maude Ricketts APRN as PCP - General (Nurse Practitioner)  Deam, Ashutosh Santana MD as Consulting Physician (Cardiac Electrophysiology)    Chief Complaint:  syncope    Subjective    \"I'm sleepy\"    Interval History:     Patient Complaints: tired  Patient Denies:  HA, CP, dizziness, SOA, N/V, abd pain, diarrhea  History taken from: patient and nurse    Review of Systems:   All negative in 12 point system except for fatigue  Objective    Pt resting bed, arouses easily to answer questions but is drowsy  Nurse reports pt has been started on Keppra per neurologist who believes pt have been having seizure like activity earlier with face trembling/jerking    Vital Signs  Temp:  [97.3 °F (36.3 °C)-98.1 °F (36.7 °C)] 97.7 °F (36.5 °C)  Heart Rate:  [60-82] 60  Resp:  [14-19] 17  BP: (133-201)/() 174/92    Physical Exam:     General Appearance:    Alert, cooperative, in no acute distress, sleepy   Head:    Normocephalic, without obvious abnormality, atraumatic   Eyes:            Lids and lashes normal, conjunctivae and sclerae normal   Ears:    Ears appear intact with no abnormalities noted   Throat:   No oral lesions, no thrush, oral mucosa moist   Neck:   No adenopathy, supple, trachea midline   Back:     Not examined   Lungs:     Clear to auscultation,respirations regular, even and                  unlabored    Heart:    Regular rhythm and normal rate, normal S1 and S2, no            murmur, no gallop, no rub, no click   Chest Wall:    No abnormalities observed   Abdomen:     Normal bowel sounds, no masses, no organomegaly, soft        non-tender, non-distended, no guarding, no rebound                tenderness   Rectal:     Deferred   Extremities:   Moves all extremities well, no edema, no cyanosis, no             redness   Pulses:   Pulses palpable and equal bilaterally   Skin:   No bleeding, bruising or rash   Lymph nodes:   No palpable adenopathy   Neurologic:   Cranial " nerves 2 - 12 grossly intact, pt forgetful        Results Review:     I reviewed the patient's new clinical results.  I reviewed the patient's new imaging results and agree with the interpretation.    Medication Review:   Scheduled Meds:  albuterol sulfate HFA 2 puff Inhalation 4x Daily - RT   apixaban 5 mg Oral Q12H   brimonidine 1 drop Both Eyes BID   cefepime 2 g Intravenous Q8H   clopidogrel 75 mg Oral Daily   escitalopram 10 mg Oral Daily   galantamine ER 8 mg Oral BID   hydrALAZINE 25 mg Oral BID   isosorbide mononitrate 30 mg Oral Daily   levothyroxine 25 mcg Oral Daily   metoprolol tartrate 25 mg Oral Nightly   pantoprazole 40 mg Oral Daily   predniSONE 5 mg Oral Daily   rosuvastatin 20 mg Oral Nightly   sodium chloride 10 mL Intravenous Q12H   vitamin B-12 1,000 mcg Oral Daily     Continuous Infusions:  Pharmacy to Dose Cefepime     sodium chloride 100 mL/hr Last Rate: 100 mL/hr (05/11/20 2331)     PRN Meds:.•  acetaminophen **OR** acetaminophen **OR** acetaminophen  •  albuterol  •  bisacodyl  •  calcium carbonate  •  hydrALAZINE  •  HYDROcodone-acetaminophen  •  magnesium hydroxide  •  melatonin  •  nitroglycerin  •  ondansetron **OR** ondansetron  •  Pharmacy to Dose Cefepime  •  [COMPLETED] Insert peripheral IV **AND** sodium chloride  •  sodium chloride    Labs:  Lab Results (last 24 hours)     Procedure Component Value Units Date/Time    Blood Culture - Blood, Arm, Right [[id number]]  (Abnormal) Collected:  05/11/20 1446    Specimen:  Blood from Arm, Right Updated:  05/13/20 0642     Blood Culture Gram Positive Cocci     Gram Stain Aerobic Bottle Gram positive cocci    TSH [[id number]]  (Abnormal) Collected:  05/13/20 0504    Specimen:  Blood Updated:  05/13/20 0608     TSH 4.890 uIU/mL      Comment: TSH results may be falsely decreased if patient taking Biotin.       Basic Metabolic Panel [[id number]]  (Normal) Collected:  05/13/20 0504    Specimen:  Blood Updated:  05/13/20 0603     Glucose 92 mg/dL         BUN 12 mg/dL      Creatinine 1.13 mg/dL      Sodium 141 mmol/L      Potassium 3.7 mmol/L      Chloride 105 mmol/L      CO2 25.0 mmol/L      Calcium 9.3 mg/dL      eGFR Non African Amer 63 mL/min/1.73      BUN/Creatinine Ratio 10.6     Anion Gap 11.0 mmol/L     Narrative:       GFR Normal >60  Chronic Kidney Disease <60  Kidney Failure <15      CBC & Differential [[id number]] Collected:  05/13/20 0504    Specimen:  Blood Updated:  05/13/20 0539    Narrative:       The following orders were created for panel order CBC & Differential.  Procedure                               Abnormality         Status                     ---------                               -----------         ------                     CBC Auto Differential[[id number]]        Abnormal            Final result                 Please view results for these tests on the individual orders.    CBC Auto Differential [[id number]]  (Abnormal) Collected:  05/13/20 0504    Specimen:  Blood Updated:  05/13/20 0539     WBC 11.20 10*3/mm3      RBC 4.24 10*6/mm3      Hemoglobin 12.5 g/dL      Hematocrit 36.2 %      MCV 85.4 fL      MCH 29.4 pg      MCHC 34.5 g/dL      RDW 13.9 %      RDW-SD 42.0 fl      MPV 7.9 fL      Platelets 257 10*3/mm3      Neutrophil % 71.2 %      Lymphocyte % 16.1 %      Monocyte % 9.4 %      Eosinophil % 2.8 %      Basophil % 0.5 %      Neutrophils, Absolute 8.00 10*3/mm3      Lymphocytes, Absolute 1.80 10*3/mm3      Monocytes, Absolute 1.10 10*3/mm3      Eosinophils, Absolute 0.30 10*3/mm3      Basophils, Absolute 0.10 10*3/mm3      nRBC 0.0 /100 WBC     Blood Culture ID, PCR - Blood, Arm, Right [[id number]]  (Abnormal) Collected:  05/11/20 1446    Specimen:  Blood from Arm, Right Updated:  05/12/20 2208     BCID, PCR Negative by BCID PCR. Culture to Follow.     BOTTLE TYPE Aerobic Bottle    BNP [[id number]]  (Normal) Collected:  05/12/20 0548    Specimen:  Blood Updated:  05/12/20 1831     proBNP 179.9 pg/mL     Narrative:       Among  patients with dyspnea, NT-proBNP is highly sensitive for the detection of acute congestive heart failure. In addition NT-proBNP of <300 pg/ml effectively rules out acute congestive heart failure with 99% negative predictive value.    Results may be falsely decreased if patient taking Biotin.      CORONAVIRUS (COVID-19),RT-PCR, BIOTAP, NP/OP SWAB IN TRANSPORT MEDIA OR SALINE - Swab, Nasopharynx [[id number]] Collected:  05/11/20 1443    Specimen:  Swab from Nasopharynx Updated:  05/12/20 1722     Reference Lab Report --     COVID19 Not Detected    Blood Culture - Blood, Arm, Left [[id number]] Collected:  05/11/20 1515    Specimen:  Blood from Arm, Left Updated:  05/12/20 1530     Blood Culture No growth at 24 hours    Troponin [[id number]]  (Normal) Collected:  05/12/20 1424    Specimen:  Blood Updated:  05/12/20 1503     Troponin T <0.010 ng/mL     Narrative:       Troponin T Reference Range:  <= 0.03 ng/mL-   Negative for AMI  >0.03 ng/mL-     Abnormal for myocardial necrosis.  Clinicians would have to utilize clinical acumen, EKG, Troponin and serial changes to determine if it is an Acute Myocardial Infarction or myocardial injury due to an underlying chronic condition.       Results may be falsely decreased if patient taking Biotin.             Assessment/Plan     Acute syncope-no further episodes since admission  Atherosclerotic heart disease of native coronary arteries with angina pectoris  History of cardiac asystole  Cerebrovascular disease status post CVA  Panlobular COPD with emphysema  Valvular heart disease  Hypertension associated with chronic kidney disease stage III  Chronic kidney disease stage III secondary to hypertensive nephropathy  Acquired hypothyroidism  Dyslipidemia  History of pacemaker placement  Alzheimer's dementia without behavioral disturbance  Gastroesophageal reflux disease without esophagitis  History of autonomic dysfunction syndrome  Anemia of chronic disease  Insulin  resistance  Possible focal seizure activity-Seen and evaluated by neurology and started on Keppra        PLAN-cont current meds as recommended by specialist, if pt remains stable possible DC tomorrow  Will need 6min walking oximeter prior to DC        Maude Ricketts, DEANDRA  05/13/20  09:44

## 2020-05-13 NOTE — PROGRESS NOTES
Continued Stay Note  [facility]     Patient Name: Jayden Bond  MRN: [id number]  Today's Date: 5/13/2020    Admit Date: 5/11/2020    Discharge Plan     Row Name 05/13/20 1213       Plan    Plan  Declined IP rehab. Anticipate home with spouse and [facility] , order placed and pending acceptance. May require walking oximetry prior to d/c, has HS O2 through [facility].     Provided Post Acute Provider List?  Refused    Refused Provider List Comment  wife declined a HH list due to having [facility] in the past and wants to use their services again     Patient/Family in Agreement with Plan  yes    Plan Comments  Called and spoke to patient via room phone and informed of PT recommendations of IP rehab, patient declined., agreeable to HH, and asked I call his wife. Called and spoke to wife Nava, and informed patient is declining IP rehab, wife is also declining rehab. She is agreeable to [facility], order placed and referral made. DC barriers: IV abx, IVF        Elizabet Mcclellan RN

## 2020-05-14 ENCOUNTER — READMISSION MANAGEMENT (OUTPATIENT)
Dept: CALL CENTER | Facility: HOSPITAL | Age: 80
End: 2020-05-14

## 2020-05-14 VITALS
HEART RATE: 79 BPM | DIASTOLIC BLOOD PRESSURE: 64 MMHG | SYSTOLIC BLOOD PRESSURE: 104 MMHG | BODY MASS INDEX: 25.2 KG/M2 | TEMPERATURE: 98.1 F | HEIGHT: 71 IN | OXYGEN SATURATION: 92 % | RESPIRATION RATE: 16 BRPM | WEIGHT: 180 LBS

## 2020-05-14 PROBLEM — R55 SYNCOPE: Status: RESOLVED | Noted: 2020-05-11 | Resolved: 2020-05-14

## 2020-05-14 LAB
ALBUMIN SERPL-MCNC: 3.5 G/DL (ref 3.5–5.2)
ALBUMIN/GLOB SERPL: 1.6 G/DL
ALP SERPL-CCNC: 52 U/L (ref 39–117)
ALT SERPL W P-5'-P-CCNC: 12 U/L (ref 1–41)
ANION GAP SERPL CALCULATED.3IONS-SCNC: 10 MMOL/L (ref 5–15)
AST SERPL-CCNC: 19 U/L (ref 1–40)
BACTERIA SPEC AEROBE CULT: ABNORMAL
BASOPHILS # BLD AUTO: 0 10*3/MM3 (ref 0–0.2)
BASOPHILS NFR BLD AUTO: 0.3 % (ref 0–1.5)
BILIRUB SERPL-MCNC: 0.5 MG/DL (ref 0.2–1.2)
BUN BLD-MCNC: 16 MG/DL (ref 8–23)
BUN/CREAT SERPL: 10 (ref 7–25)
CALCIUM SPEC-SCNC: 8.9 MG/DL (ref 8.6–10.5)
CHLORIDE SERPL-SCNC: 106 MMOL/L (ref 98–107)
CO2 SERPL-SCNC: 26 MMOL/L (ref 22–29)
CREAT BLD-MCNC: 1.6 MG/DL (ref 0.76–1.27)
DEPRECATED RDW RBC AUTO: 45.1 FL (ref 37–54)
EOSINOPHIL # BLD AUTO: 0.4 10*3/MM3 (ref 0–0.4)
EOSINOPHIL NFR BLD AUTO: 3.4 % (ref 0.3–6.2)
ERYTHROCYTE [DISTWIDTH] IN BLOOD BY AUTOMATED COUNT: 14.5 % (ref 12.3–15.4)
GFR SERPL CREATININE-BSD FRML MDRD: 42 ML/MIN/1.73
GLOBULIN UR ELPH-MCNC: 2.2 GM/DL
GLUCOSE BLD-MCNC: 92 MG/DL (ref 65–99)
GRAM STN SPEC: ABNORMAL
HCT VFR BLD AUTO: 34 % (ref 37.5–51)
HGB BLD-MCNC: 11.6 G/DL (ref 13–17.7)
ISOLATED FROM: ABNORMAL
LYMPHOCYTES # BLD AUTO: 1.6 10*3/MM3 (ref 0.7–3.1)
LYMPHOCYTES NFR BLD AUTO: 15.2 % (ref 19.6–45.3)
MAGNESIUM SERPL-MCNC: 2.1 MG/DL (ref 1.6–2.4)
MCH RBC QN AUTO: 29.8 PG (ref 26.6–33)
MCHC RBC AUTO-ENTMCNC: 34.1 G/DL (ref 31.5–35.7)
MCV RBC AUTO: 87.4 FL (ref 79–97)
MONOCYTES # BLD AUTO: 1 10*3/MM3 (ref 0.1–0.9)
MONOCYTES NFR BLD AUTO: 9.1 % (ref 5–12)
NEUTROPHILS # BLD AUTO: 7.6 10*3/MM3 (ref 1.7–7)
NEUTROPHILS NFR BLD AUTO: 72 % (ref 42.7–76)
NRBC BLD AUTO-RTO: 0 /100 WBC (ref 0–0.2)
PLATELET # BLD AUTO: 234 10*3/MM3 (ref 140–450)
PMV BLD AUTO: 8 FL (ref 6–12)
POTASSIUM BLD-SCNC: 3.9 MMOL/L (ref 3.5–5.2)
PROT SERPL-MCNC: 5.7 G/DL (ref 6–8.5)
RBC # BLD AUTO: 3.89 10*6/MM3 (ref 4.14–5.8)
SODIUM BLD-SCNC: 142 MMOL/L (ref 136–145)
WBC NRBC COR # BLD: 10.6 10*3/MM3 (ref 3.4–10.8)

## 2020-05-14 PROCEDURE — 94799 UNLISTED PULMONARY SVC/PX: CPT

## 2020-05-14 PROCEDURE — 80053 COMPREHEN METABOLIC PANEL: CPT | Performed by: NURSE PRACTITIONER

## 2020-05-14 PROCEDURE — 83735 ASSAY OF MAGNESIUM: CPT | Performed by: NURSE PRACTITIONER

## 2020-05-14 PROCEDURE — 25010000002 PIPERACILLIN SOD-TAZOBACTAM PER 1 G: Performed by: INTERNAL MEDICINE

## 2020-05-14 PROCEDURE — 85025 COMPLETE CBC W/AUTO DIFF WBC: CPT | Performed by: FAMILY MEDICINE

## 2020-05-14 PROCEDURE — 63710000001 PREDNISONE PER 5 MG: Performed by: FAMILY MEDICINE

## 2020-05-14 RX ORDER — LORAZEPAM 0.5 MG/1
0.5 TABLET ORAL EVERY 6 HOURS PRN
Status: DISCONTINUED | OUTPATIENT
Start: 2020-05-14 | End: 2020-05-14 | Stop reason: HOSPADM

## 2020-05-14 RX ORDER — LEVETIRACETAM 500 MG/1
500 TABLET ORAL 2 TIMES DAILY
Qty: 60 TABLET | Refills: 1 | Status: SHIPPED | OUTPATIENT
Start: 2020-05-14 | End: 2020-09-16

## 2020-05-14 RX ADMIN — PREDNISONE 5 MG: 5 TABLET ORAL at 08:06

## 2020-05-14 RX ADMIN — LORAZEPAM 0.5 MG: 0.5 TABLET ORAL at 13:39

## 2020-05-14 RX ADMIN — BRIMONIDINE TARTRATE 1 DROP: 2 SOLUTION OPHTHALMIC at 08:06

## 2020-05-14 RX ADMIN — PANTOPRAZOLE SODIUM 40 MG: 40 TABLET, DELAYED RELEASE ORAL at 08:06

## 2020-05-14 RX ADMIN — ACETAMINOPHEN 650 MG: 325 TABLET, FILM COATED ORAL at 13:12

## 2020-05-14 RX ADMIN — ALBUTEROL SULFATE 2 PUFF: 90 AEROSOL, METERED RESPIRATORY (INHALATION) at 07:05

## 2020-05-14 RX ADMIN — Medication 10 ML: at 08:06

## 2020-05-14 RX ADMIN — APIXABAN 5 MG: 5 TABLET, FILM COATED ORAL at 08:06

## 2020-05-14 RX ADMIN — HYDRALAZINE HYDROCHLORIDE 25 MG: 25 TABLET, FILM COATED ORAL at 08:05

## 2020-05-14 RX ADMIN — CYANOCOBALAMIN TAB 1000 MCG 1000 MCG: 1000 TAB at 08:05

## 2020-05-14 RX ADMIN — LEVETIRACETAM 500 MG: 500 TABLET ORAL at 08:06

## 2020-05-14 RX ADMIN — SODIUM CHLORIDE 500 ML: 900 INJECTION, SOLUTION INTRAVENOUS at 08:07

## 2020-05-14 RX ADMIN — ALBUTEROL SULFATE 2 PUFF: 90 AEROSOL, METERED RESPIRATORY (INHALATION) at 10:47

## 2020-05-14 RX ADMIN — ESCITALOPRAM OXALATE 10 MG: 10 TABLET ORAL at 08:05

## 2020-05-14 RX ADMIN — PIPERACILLIN AND TAZOBACTAM 3.38 G: 3; .375 INJECTION, POWDER, FOR SOLUTION INTRAVENOUS at 05:59

## 2020-05-14 RX ADMIN — CLOPIDOGREL BISULFATE 75 MG: 75 TABLET ORAL at 08:05

## 2020-05-14 RX ADMIN — LEVOTHYROXINE SODIUM 25 MCG: 25 TABLET ORAL at 08:06

## 2020-05-14 RX ADMIN — ALBUTEROL SULFATE 2 PUFF: 90 AEROSOL, METERED RESPIRATORY (INHALATION) at 14:58

## 2020-05-14 RX ADMIN — GALANTAMINE HYDROBROMIDE 8 MG: 8 CAPSULE, EXTENDED RELEASE ORAL at 08:06

## 2020-05-14 RX ADMIN — ISOSORBIDE MONONITRATE 30 MG: 30 TABLET, EXTENDED RELEASE ORAL at 08:06

## 2020-05-14 NOTE — PROGRESS NOTES
Continued Stay Note  [facility]     Patient Name: Jayden Bond  MRN: [id number]  Today's Date: 5/14/2020    Admit Date: 5/11/2020    Discharge Plan     Row Name 05/14/20 1515       Plan    Plan Comments  Per Lisa with [facility], she is delivering new home O2 and will deliver new 3:1 BSC to room since the previous one that was delivered could not be located.      Elizabet Mcclellan RN

## 2020-05-14 NOTE — PROGRESS NOTES
Daily Progress Note    * No active hospital problems. *    Assessment    Possible left lower lobe Pneumonia  Negative for COVID 19    Echo 2/4/20: EF 61- 65%;  RVSP 38    Plan    Antibiotics zosyn  GI prophylaxis protonix  Bronchodilator  Eliquis     LOS: 3 days       Subjective         Objective     Vital signs for last 24 hours:  Vitals:    05/13/20 2058 05/14/20 0545 05/14/20 0705 05/14/20 0805   BP: 119/74 159/88  157/86   BP Location: Left arm      Patient Position: Lying Lying     Pulse: 66 60 63 60   Resp: 15 18 18    Temp: 97.9 °F (36.6 °C) 97.7 °F (36.5 °C)     TempSrc: Oral Oral     SpO2: 93% 95% 95%    Weight:       Height:           Intake/Output last 3 shifts:  I/O last 3 completed shifts:  In: 360 [P.O.:360]  Out: 1125 [Urine:1125]  Intake/Output this shift:  I/O this shift:  In: -   Out: 100 [Urine:100]      Radiology  Imaging Results (Last 24 Hours)     ** No results found for the last 24 hours. **          Labs:  Results from last 7 days   Lab Units 05/14/20  0509   WBC 10*3/mm3 10.60   HEMOGLOBIN g/dL 11.6*   HEMATOCRIT % 34.0*   PLATELETS 10*3/mm3 234     Results from last 7 days   Lab Units 05/14/20  0509   SODIUM mmol/L 142   POTASSIUM mmol/L 3.9   CHLORIDE mmol/L 106   CO2 mmol/L 26.0   BUN mg/dL 16   CREATININE mg/dL 1.60*   CALCIUM mg/dL 8.9   BILIRUBIN mg/dL 0.5   ALK PHOS U/L 52   ALT (SGPT) U/L 12   AST (SGOT) U/L 19   GLUCOSE mg/dL 92         Results from last 7 days   Lab Units 05/14/20  0509 05/11/20  1256   ALBUMIN g/dL 3.50 3.80     Results from last 7 days   Lab Units 05/12/20  1424 05/12/20  0548 05/11/20  2234   TROPONIN T ng/mL <0.010 <0.010 <0.010         Results from last 7 days   Lab Units 05/14/20  0509   MAGNESIUM mg/dL 2.1     Results from last 7 days   Lab Units 05/11/20  1256   INR  1.10*   APTT seconds 24.8*     Results from last 7 days   Lab Units 05/13/20  0504   TSH uIU/mL 4.890*           Meds:   SCHEDULE    albuterol sulfate HFA 2 puff Inhalation 4x Daily - RT    apixaban 5 mg Oral Q12H   brimonidine 1 drop Both Eyes BID   clopidogrel 75 mg Oral Daily   escitalopram 10 mg Oral Daily   galantamine ER 8 mg Oral BID   hydrALAZINE 25 mg Oral BID   isosorbide mononitrate 30 mg Oral Daily   levETIRAcetam 500 mg Oral BID   levothyroxine 25 mcg Oral Daily   metoprolol tartrate 25 mg Oral Nightly   pantoprazole 40 mg Oral Daily   piperacillin-tazobactam 3.375 g Intravenous Q8H   predniSONE 5 mg Oral Daily   rosuvastatin 20 mg Oral Nightly   sodium chloride 10 mL Intravenous Q12H   vitamin B-12 1,000 mcg Oral Daily     Infusions     PRNs  •  acetaminophen **OR** acetaminophen **OR** acetaminophen  •  albuterol  •  bisacodyl  •  calcium carbonate  •  hydrALAZINE  •  HYDROcodone-acetaminophen  •  LORazepam  •  magnesium hydroxide  •  magnesium sulfate **OR** magnesium sulfate in D5W 1g/100mL (PREMIX)  •  melatonin  •  nitroglycerin  •  ondansetron **OR** ondansetron  •  potassium chloride **OR** potassium chloride **OR** potassium chloride  •  [COMPLETED] Insert peripheral IV **AND** sodium chloride  •  sodium chloride    Physical Exam:  Physical Exam   Pulmonary/Chest: He has wheezes.   Musculoskeletal: He exhibits edema.   Neurological: He is alert.   Skin: Skin is warm and dry.       ROS  Review of Systems   HENT: Positive for congestion.    Respiratory: Positive for cough.                  Total time spent with patient greater than: 1 hour

## 2020-05-14 NOTE — PROGRESS NOTES
Continued Stay Note   [facility]     Patient Name: Jayden Bond  MRN: [id number]  Today's Date: 5/14/2020    Admit Date: 5/11/2020    Discharge Plan     Row Name 05/14/20 1302       Plan    Plan  Declined IP rehab. Anticipate home with spouse and [facility], order placed and accepted. [facility] to provide new continuous home O2 on 5/14, order placed. See note for details regarding 3:1 BSC.     Plan Comments  Received notice from [facility] bedside RN that patients family requesting his bedside commode that was previously ordered. Faiza stated the commode is not in patients room. Spoke to Lisa from [facility], she informed she deliverd the 3:1 BSC on Tuesday to observation unit room 109. Called obs unit and they cannot locate commode. Per Mary manager of [facility] if commode is not located [facility] will supply another commode and hospital will be responsible for payment. Updated Lisa and bedside RN. Also notified Lisa patient qualifies for continuous home O2, order placed and discharge orders noted. She will deliver new O2 to room today.                  Elizabet Mcclellan RN

## 2020-05-14 NOTE — DISCHARGE INSTRUCTIONS
[facility] tomorrow Friday for repeat bmp  FU with me in 2 weeks in office

## 2020-05-14 NOTE — PROGRESS NOTES
Patient apparently back to the baseline.  Delirious and agitated.  No facial twitching.  No further syncopal type episodes.  Apparently is being discharged.  Continue the present medication follow-up with neurology as indicated

## 2020-05-14 NOTE — PROGRESS NOTES
Exercise Oximetry    Patient Name:Jayden Bond   MRN: [id number]   Date: 05/14/20             ROOM AIR BASELINE   SpO2% 90   Heart Rate 60   Blood Pressure      EXERCISE ON ROOM AIR SpO2% EXERCISE ON O2 @ 2 LPM SpO2%   1 MINUTE       61HR 87 1 MINUTE    2 MINUTES  2 MINUTES    3 MINUTES  3 MINUTES    4 MINUTES  4 MINUTES    5 MINUTES  5 MINUTES    6 MINUTES  6 MINUTES               Distance Walked   Distance Walked   Dyspnea (Latanya Scale)   Dyspnea (Latanya Scale)   Fatigue (Latanya Scale)   Fatigue (Latanya Scale)   SpO2% Post Exercise  SpO2% Post Exercise   HR Post Exercise  HR Post Exercise   Time to Recovery  Time to Recovery     Comments: Patient is very confused states he can't walk. As soon as I stood patient up on the side of he bed his O2 saturation dropped to 87%, I placed a 2lpm NC on the patient and his O2 saturation increased 93%.

## 2020-05-14 NOTE — PLAN OF CARE
Problem: Patient Care Overview  Goal: Plan of Care Review  Outcome: Ongoing (interventions implemented as appropriate)  Flowsheets  Taken 5/14/2020 0619  Progress: no change  Taken 5/13/2020 1901  Plan of Care Reviewed With: patient  Note:   Patient rested comfortably in bed throughout the night.  Patient was lethargic at start of shift. Patient was able to answer all questions appropriately.  No new concerns at this time, will continue to monitor.

## 2020-05-14 NOTE — DISCHARGE SUMMARY
Date of Discharge:  5/14/2020    Discharge Diagnosis: Syncope, HTN, seizure like activity, dehydration, leukocytosis, hypoxia, dementia    Presenting Problem/History of Present Illness  Active Hospital Problems    Diagnosis  POA   • Syncope [R55]  Yes      Resolved Hospital Problems   No resolved problems to display.        Hospital Course  Patient is a 79 y.o. male admitted through the  ER after he had a syncope episode at home. His chest xray showed possible PNA and he was admitted and started on IV antibiotics. He did not have any cough, congestion or SOA. Chest CT done 2 days later was negative for PNA.. He was also negative for Covid 19. Cardiology consulted and pt had ECHO done, no further test or meds were ordered. Pulmonology was consulted for his possible PNA. He was noted to be mildly hypoxic on room air and required continuous O2 which he will be sent home with. Neurology was consulted after he displayed seizure like activity. He was started on Keppra BID. He remained weak and did not eat or drink much with periods of worsening confusion. He required fluid bolus for mildly elevated creatinine. PT recommended inpt rehab at DC, however, spouse again refused this. On day of DC pt was a little more lethargic and confused felt to be due to starting the Keppra. I discussed all of pts medications, Dx and new meds and DC plan with his wife Nava who was agreeable to all. Pt will have [facility].     Procedures Performed         Consults:   Consults     Date and Time Order Name Status Description    5/13/2020 1219 Inpatient Neurology Consult General Completed     5/12/2020 0720 Inpatient Cardiology Consult Completed     5/11/2020 1911 Inpatient Pulmonology Consult      5/11/2020 1415 Hospitalist (on-call MD unless specified) Completed     5/1/2020 2031 Urology (on-call MD unless specified) Completed           Pertinent Test Results: none    Condition on Discharge:  Fair    Vital Signs  Temp:  [97.7 °F (36.5  °C)-97.9 °F (36.6 °C)] 97.7 °F (36.5 °C)  Heart Rate:  [60-91] 63  Resp:  [15-18] 18  BP: (116-174)/(72-92) 159/88    Physical Exam:     General Appearance:    Alert, cooperative, in no acute distress, sleepy, mild confusion   Head:    Normocephalic, without obvious abnormality, atraumatic   Eyes:            Lids and lashes normal, conjunctivae and sclerae normal,    Ears:    Ears appear intact with no abnormalities noted   Throat:   No oral lesions, no thrush, oral mucosa moist   Neck:   No adenopathy, supple, trachea midline, no thyromegaly   Back:     No kyphosis present, no scoliosis present, no skin lesions,      erythema or scars, no tenderness to percussion or                   palpation,   range of motion normal   Lungs:     Clear to auscultation,respirations regular, even and                  unlabored    Heart:    Regular rhythm and normal rate, normal S1 and S2, no            murmur, no gallop, no rub, no click   Chest Wall:    No abnormalities observed   Abdomen:     Normal bowel sounds, no masses, no organomegaly, soft        non-tender, non-distended, no guarding, no rebound                tenderness   Rectal:     Deferred   Extremities:   Moves all extremities well, no edema, no cyanosis, no             redness   Pulses:   Pulses palpable and equal bilaterally   Skin:   No bleeding, bruising or rash   Lymph nodes:   No palpable adenopathy   Neurologic:   Cranial nerves 2 - 12 grossly intact, sensation intact, mild confusion       Discharge Disposition      Discharge Medications     Discharge Medications      ASK your doctor about these medications      Instructions Start Date   albuterol sulfate  (90 Base) MCG/ACT inhaler  Commonly known as:  PROVENTIL HFA;VENTOLIN HFA;PROAIR HFA   2 puffs, Inhalation, Every 4 Hours PRN      apixaban 5 MG tablet tablet  Commonly known as:  ELIQUIS   5 mg, Oral, Every 12 Hours Scheduled      brimonidine 0.2 % ophthalmic solution  Commonly known as:  ALPHAGAN   1  drop, Both Eyes, 2 Times Daily      clopidogrel 75 MG tablet  Commonly known as:  PLAVIX   75 mg, Oral, Daily      escitalopram 10 MG tablet  Commonly known as:  LEXAPRO   10 mg, Oral, Daily      fish oil 1000 MG capsule capsule   1,000 mg, Oral, Daily With Breakfast      fluticasone 50 MCG/ACT nasal spray  Commonly known as:  FLONASE   2 sprays, Nasal, Daily      galantamine ER 8 MG 24 hr capsule  Commonly known as:  RAZADYNE ER   8 mg, Oral, 2 Times Daily      hydrALAZINE 25 MG tablet  Commonly known as:  APRESOLINE   25 mg, Oral, 2 Times Daily      isosorbide mononitrate 30 MG 24 hr tablet  Commonly known as:  IMDUR   30 mg, Oral, Daily      levothyroxine 25 MCG tablet  Commonly known as:  SYNTHROID, LEVOTHROID   25 mcg, Oral, Daily      loratadine 10 MG tablet  Commonly known as:  CLARITIN   10 mg, Oral, Daily      metoprolol tartrate 25 MG tablet  Commonly known as:  LOPRESSOR   25 mg, Oral, Every 12 Hours Scheduled      multivitamin with minerals tablet tablet   1 tablet, Oral, Nightly      nitroglycerin 0.4 MG SL tablet  Commonly known as:  NITROSTAT   0.4 mg, Sublingual, Every 5 Minutes PRN, Take no more than 3 doses in 15 minutes.       pantoprazole 40 MG EC tablet  Commonly known as:  PROTONIX   40 mg, Oral, Daily      predniSONE 5 MG tablet  Commonly known as:  DELTASONE   5 mg, Oral, Daily      rosuvastatin 20 MG tablet  Commonly known as:  CRESTOR   20 mg, Oral, Nightly      tamsulosin 0.4 MG capsule 24 hr capsule  Commonly known as:  FLOMAX   1 capsule, Oral, Nightly      vitamin B-12 1000 MCG tablet  Commonly known as:  CYANOCOBALAMIN   1,000 mcg, Oral, Daily             Discharge Diet:     Activity at Discharge:     Follow-up Appointments  No future appointments.  Additional Instructions for the Follow-ups that You Need to Schedule     Ambulatory Referral to Home Health   As directed      Face to Face Visit Date:  5/13/2020    Follow-up provider for Plan of Care?:  I will be treating the patient on  an ongoing basis.  Please send me the Plan of Care for signature.    Follow-up provider:  VASU GAINES [[id number]]    Reason/Clinical Findings:  post hospital evaluation    Describe mobility limitations that make leaving home difficult:  weakness    Nursing/Therapeutic Services Requested:  Other ([facility] to evaluate)    Frequency:  1 Week 1               Test Results Pending at Discharge   Order Current Status    Blood Culture - Blood, Arm, Left Preliminary result           Maude Ricketts, DEANDRA  05/14/20  07:37

## 2020-05-15 ENCOUNTER — READMISSION MANAGEMENT (OUTPATIENT)
Dept: CALL CENTER | Facility: HOSPITAL | Age: 80
End: 2020-05-15

## 2020-05-15 ENCOUNTER — LAB REQUISITION (OUTPATIENT)
Dept: LAB | Facility: HOSPITAL | Age: 80
End: 2020-05-15

## 2020-05-15 DIAGNOSIS — E86.0 DEHYDRATION: ICD-10-CM

## 2020-05-15 LAB
ANION GAP SERPL CALCULATED.3IONS-SCNC: 10 MMOL/L (ref 5–15)
BUN BLD-MCNC: 13 MG/DL (ref 8–23)
BUN/CREAT SERPL: 12.5 (ref 7–25)
CALCIUM SPEC-SCNC: 8.9 MG/DL (ref 8.6–10.5)
CHLORIDE SERPL-SCNC: 110 MMOL/L (ref 98–107)
CO2 SERPL-SCNC: 24 MMOL/L (ref 22–29)
CREAT BLD-MCNC: 1.04 MG/DL (ref 0.76–1.27)
GFR SERPL CREATININE-BSD FRML MDRD: 69 ML/MIN/1.73
GLUCOSE BLD-MCNC: 106 MG/DL (ref 65–99)
POTASSIUM BLD-SCNC: 4.2 MMOL/L (ref 3.5–5.2)
SODIUM BLD-SCNC: 144 MMOL/L (ref 136–145)

## 2020-05-15 PROCEDURE — 80048 BASIC METABOLIC PNL TOTAL CA: CPT | Performed by: NURSE PRACTITIONER

## 2020-05-15 NOTE — OUTREACH NOTE
Prep Survey      Responses   [facility] [place] facility patient discharged from?  [facility]   Is LACE score < 7 ?  No   Eligibility  Readm Mgmt   Discharge diagnosis  Syncope, HTN, seizure like activity, dehydration, leukocytosis, hypoxia, dementia   COVID-19 Test Status  Negative   Does the patient have one of the following disease processes/diagnoses(primary or secondary)?  Other   Does the patient have Home health ordered?  Yes   What is the Home health agency?   [facility]   Is there a DME ordered?  Yes   What DME was ordered?  [facility] -  home O2,    3:1 BSC     Prep survey completed?  Yes          Sariah Dominguez RN

## 2020-05-15 NOTE — OUTREACH NOTE
Medical Week 1 Survey      Responses   [facility] patient discharged from?  [facility]   COVID-19 Test Status  Negative   Does the patient have one of the following disease processes/diagnoses(primary or secondary)?  Other   Is there a successful TCM telephone encounter documented?  No   Week 1 attempt successful?  Yes   Call start time  1419   Call end time  1421   Discharge diagnosis  Syncope, HTN, seizure like activity, dehydration, leukocytosis, hypoxia, dementia   Is patient permission given to speak with other caregiver?  Yes   List who call center can speak with  Nava- Wife   Person spoke with today (if not patient) and relationship  Nava- Wife    Meds reviewed with patient/caregiver?  Yes   Is the patient having any side effects they believe may be caused by any medication additions or changes?  No   Does the patient have all medications ordered at discharge?  Yes   Is the patient taking all medications as directed (includes completed medication regime)?  Yes   Does the patient have a primary care provider?   Yes   Does the patient have an appointment with their PCP within 7 days of discharge?  Yes   Has the patient kept scheduled appointments due by today?  N/A   Has home health visited the patient within 72 hours of discharge?  Yes   Has all DME been delivered?  Yes   Pulse Ox monitoring  None   Psychosocial issues?  No   Did the patient receive a copy of their discharge instructions?  Yes   Nursing interventions  Reviewed instructions with patient   What is the patient's perception of their health status since discharge?  Same   Is the patient/caregiver able to teach back signs and symptoms related to disease process for when to call PCP?  Yes   Is the patient/caregiver able to teach back signs and symptoms related to disease process for when to call 911?  Yes   Is the patient/caregiver able to teach back the hierarchy of who to call/visit for symptoms/problems? PCP, Specialist, Home health nurse, Urgent  [facility] [place], ED, 911  Yes   Week 1 call completed?  Yes          Melonie Morales RN

## 2020-05-15 NOTE — PROGRESS NOTES
Case Management Discharge Note      Final Note: Home with [facility]     Provided Post Acute Provider List?: Refused  Refused Provider List Comment: wife declined a HH list due to having [facility] in the past and wants to use their services again        Durable Medical Equipment - Selection Complete      Service Provider Request Status Selected Services Address Phone Number Fax Number    [facility] Selected Durable Medical Equipment [address], [place] [facility] [address] [phone]         Home Medical Care - Selection Complete      Service Provider Request Status Selected Services Address Phone Number Fax Number    [facility] [place] Selected Home Health Services [address] [address] [phone]             Final Discharge Disposition Code: 06 - home with home health care

## 2020-05-16 ENCOUNTER — READMISSION MANAGEMENT (OUTPATIENT)
Dept: CALL CENTER | Facility: HOSPITAL | Age: 80
End: 2020-05-16

## 2020-05-16 LAB — BACTERIA SPEC AEROBE CULT: NORMAL

## 2020-05-16 NOTE — OUTREACH NOTE
Medical Week 1 Survey      Responses   [facility] patient discharged from?  [facility]   COVID-19 Test Status  Negative   Does the patient have one of the following disease processes/diagnoses(primary or secondary)?  Other   Is there a successful TCM telephone encounter documented?  No   Week 1 attempt successful?  Yes   Call start time  1045   Call end time  1055   Discharge diagnosis  Syncope, HTN, seizure like activity, dehydration, leukocytosis, hypoxia, dementia   Is patient permission given to speak with other caregiver?  Yes   List who call center can speak with  Nava- Wife   Person spoke with today (if not patient) and relationship  Nava- Wife    Meds reviewed with patient/caregiver?  Yes   Is the patient having any side effects they believe may be caused by any medication additions or changes?  No   Does the patient have all medications ordered at discharge?  Yes   Is the patient taking all medications as directed (includes completed medication regime)?  Yes   Does the patient have a primary care provider?   Yes   Does the patient have an appointment with their PCP within 7 days of discharge?  Yes   Has the patient kept scheduled appointments due by today?  N/A   What is the Home health agency?   [facility]   Has home health visited the patient within 72 hours of discharge?  Yes   What DME was ordered?  [employer] -  home O2,    3:1 BSC     Has all DME been delivered?  Yes   Pulse Ox monitoring  None   Psychosocial issues?  No   Comments  Pt's wife reports pt hallucinated last night. She reports pt was also restless and confused about his location. She reports he is resting well know. I advised the pt's wife that if the pt is disoriented to his location, difficult to arouse or is hallucinating to return to the ER for evalution.    Did the patient receive a copy of their discharge instructions?  Yes   Nursing interventions  Reviewed instructions with patient   What is the patient's perception of their  health status since discharge?  Same   Is the patient/caregiver able to teach back signs and symptoms related to disease process for when to call PCP?  Yes   Is the patient/caregiver able to teach back signs and symptoms related to disease process for when to call 911?  Yes   Is the patient/caregiver able to teach back the hierarchy of who to call/visit for symptoms/problems? PCP, Specialist, Home health nurse, Urgent Care, ED, 911  Yes   Week 1 call completed?  Yes          Manuel Carrera RN

## 2020-05-17 ENCOUNTER — READMISSION MANAGEMENT (OUTPATIENT)
Dept: CALL CENTER | Facility: HOSPITAL | Age: 80
End: 2020-05-17

## 2020-05-17 NOTE — OUTREACH NOTE
Medical Week 1 Survey      Responses   [facility] patient discharged from?  [facility]   COVID-19 Test Status  Negative   Does the patient have one of the following disease processes/diagnoses(primary or secondary)?  Other   Is there a successful TCM telephone encounter documented?  No   Week 1 attempt successful?  Yes   Call start time  1351   Call end time  1402   Discharge diagnosis  Syncope, HTN, seizure like activity, dehydration, leukocytosis, hypoxia, dementia   Is patient permission given to speak with other caregiver?  Yes   List who call center can speak with  Nava- Wife   Person spoke with today (if not patient) and relationship  Nava- Wife    Meds reviewed with patient/caregiver?  Yes   Is the patient having any side effects they believe may be caused by any medication additions or changes?  Yes   Side effects comments   Wife states he has decreased urin output due to stopping Flomax.  Advised to contact PCP regarding this.   Does the patient have all medications ordered at discharge?  Yes   Is the patient taking all medications as directed (includes completed medication regime)?  Yes   Does the patient have a primary care provider?   Yes   Does the patient have an appointment with their PCP within 7 days of discharge?  Yes   Has the patient kept scheduled appointments due by today?  N/A   Did the patient receive a copy of their discharge instructions?  Yes   Nursing interventions  Reviewed instructions with patient   What is the patient's perception of their health status since discharge?  Same   Is the patient/caregiver able to teach back signs and symptoms related to disease process for when to call PCP?  Yes   Is the patient/caregiver able to teach back signs and symptoms related to disease process for when to call 911?  Yes   Is the patient/caregiver able to teach back the hierarchy of who to call/visit for symptoms/problems? PCP, Specialist, Home health nurse, Urgent Care, ED, 911  Yes   Additional  teach back comments  Wife states he is still having confusion but it has improved some.  She states he is sleeping a lot but was told that this is from the Keppra.  She is going to discuss with his PCP and neurology why they put him on this medication.  She is also going to discus his decrease in urine output since they discontinued the Flomax   Week 1 call completed?  Yes   Wrap up additional comments  Questions answered and pt to discuss concerns with PCP          Faiza Hastings LPN

## 2020-05-20 ENCOUNTER — READMISSION MANAGEMENT (OUTPATIENT)
Dept: CALL CENTER | Facility: HOSPITAL | Age: 80
End: 2020-05-20

## 2020-05-20 NOTE — OUTREACH NOTE
Medical Week 1 Survey      Responses   [facility] patient discharged from?  [facility]   COVID-19 Test Status  Negative   Does the patient have one of the following disease processes/diagnoses(primary or secondary)?  Other   Is there a successful TCM telephone encounter documented?  No   Week 1 attempt successful?  Yes   Call start time  1157   Call end time  1203   Discharge diagnosis  Syncope, HTN, seizure like activity, dehydration, leukocytosis, hypoxia, dementia   Is patient permission given to speak with other caregiver?  Yes   List who call center can speak with  Nava- Wife   Person spoke with today (if not patient) and relationship  Nava- Wife    Meds reviewed with patient/caregiver?  Yes   Is the patient having any side effects they believe may be caused by any medication additions or changes?  No   Does the patient have all medications ordered at discharge?  Yes   Is the patient taking all medications as directed (includes completed medication regime)?  Yes   Does the patient have a primary care provider?   Yes   Does the patient have an appointment with their PCP within 7 days of discharge?  No   What is preventing the patient from scheduling follow up appointments within 7 days of discharge?  Haven't had time   Nursing Interventions  Educated patient on importance of making appointment, Advised patient to make appointment   Has the patient kept scheduled appointments due by today?  N/A   Comments  States she is calling [employer] today    What is the Home health agency?   [facility]    Has home health visited the patient within 72 hours of discharge?  Yes   What DME was ordered?  [employer] -  home O2,    3:1 BSC     Has all DME been delivered?  Yes   Pulse Ox monitoring  None   Psychosocial issues?  No   Did the patient receive a copy of their discharge instructions?  Yes   Nursing interventions  Reviewed instructions with patient   What is the patient's perception of their health status since  discharge?  Same   Is the patient/caregiver able to teach back signs and symptoms related to disease process for when to call PCP?  Yes   Is the patient/caregiver able to teach back signs and symptoms related to disease process for when to call 911?  Yes   Is the patient/caregiver able to teach back the hierarchy of who to call/visit for symptoms/problems? PCP, Specialist, Home health nurse, Urgent Care, ED, 911  Yes   Additional teach back comments  Wife states he is not improving.  Home health was out yesterday and will be back tomorrow.  Advised to contact his PCP.  States he was restless and up frequently to urinate.  States he is still weak and sleeping alot.  States she is also feeling ill and have medical issues.  Advised to contact PCP regarding both her and husbands symptoms.  States she will do that.    Week 1 call completed?  Yes   Wrap up additional comments  Questions answered and pt to discuss concerns with PCP          Faiza Hastings LPN

## 2020-05-22 ENCOUNTER — LAB (OUTPATIENT)
Dept: LAB | Facility: HOSPITAL | Age: 80
End: 2020-05-22

## 2020-05-22 ENCOUNTER — TRANSCRIBE ORDERS (OUTPATIENT)
Dept: ADMINISTRATIVE | Facility: HOSPITAL | Age: 80
End: 2020-05-22

## 2020-05-22 DIAGNOSIS — Z79.899 ENCOUNTER FOR LONG-TERM (CURRENT) USE OF OTHER MEDICATIONS: ICD-10-CM

## 2020-05-22 DIAGNOSIS — I10 HYPERTENSION, UNSPECIFIED TYPE: ICD-10-CM

## 2020-05-22 DIAGNOSIS — E86.0 DEHYDRATION: ICD-10-CM

## 2020-05-22 DIAGNOSIS — I10 HYPERTENSION, UNSPECIFIED TYPE: Primary | ICD-10-CM

## 2020-05-22 LAB
ALBUMIN SERPL-MCNC: 4.2 G/DL (ref 3.5–5.2)
ALBUMIN/GLOB SERPL: 1.5 G/DL
ALP SERPL-CCNC: 65 U/L (ref 39–117)
ALT SERPL W P-5'-P-CCNC: 15 U/L (ref 1–41)
ANION GAP SERPL CALCULATED.3IONS-SCNC: 14.2 MMOL/L (ref 5–15)
AST SERPL-CCNC: 21 U/L (ref 1–40)
BASOPHILS # BLD AUTO: 0.06 10*3/MM3 (ref 0–0.2)
BASOPHILS NFR BLD AUTO: 0.5 % (ref 0–1.5)
BILIRUB SERPL-MCNC: 0.4 MG/DL (ref 0.2–1.2)
BUN BLD-MCNC: 16 MG/DL (ref 8–23)
BUN/CREAT SERPL: 10.6 (ref 7–25)
CALCIUM SPEC-SCNC: 9.6 MG/DL (ref 8.6–10.5)
CHLORIDE SERPL-SCNC: 101 MMOL/L (ref 98–107)
CO2 SERPL-SCNC: 24.8 MMOL/L (ref 22–29)
CREAT BLD-MCNC: 1.51 MG/DL (ref 0.76–1.27)
DEPRECATED RDW RBC AUTO: 41.3 FL (ref 37–54)
EOSINOPHIL # BLD AUTO: 0.1 10*3/MM3 (ref 0–0.4)
EOSINOPHIL NFR BLD AUTO: 0.9 % (ref 0.3–6.2)
ERYTHROCYTE [DISTWIDTH] IN BLOOD BY AUTOMATED COUNT: 12.9 % (ref 12.3–15.4)
GFR SERPL CREATININE-BSD FRML MDRD: 45 ML/MIN/1.73
GLOBULIN UR ELPH-MCNC: 2.8 GM/DL
GLUCOSE BLD-MCNC: 134 MG/DL (ref 65–99)
HCT VFR BLD AUTO: 39.4 % (ref 37.5–51)
HGB BLD-MCNC: 13 G/DL (ref 13–17.7)
IMM GRANULOCYTES # BLD AUTO: 0.07 10*3/MM3 (ref 0–0.05)
IMM GRANULOCYTES NFR BLD AUTO: 0.6 % (ref 0–0.5)
LYMPHOCYTES # BLD AUTO: 1.79 10*3/MM3 (ref 0.7–3.1)
LYMPHOCYTES NFR BLD AUTO: 15.6 % (ref 19.6–45.3)
MAGNESIUM SERPL-MCNC: 2.2 MG/DL (ref 1.6–2.4)
MCH RBC QN AUTO: 29.1 PG (ref 26.6–33)
MCHC RBC AUTO-ENTMCNC: 33 G/DL (ref 31.5–35.7)
MCV RBC AUTO: 88.3 FL (ref 79–97)
MONOCYTES # BLD AUTO: 0.88 10*3/MM3 (ref 0.1–0.9)
MONOCYTES NFR BLD AUTO: 7.7 % (ref 5–12)
NEUTROPHILS # BLD AUTO: 8.6 10*3/MM3 (ref 1.7–7)
NEUTROPHILS NFR BLD AUTO: 74.7 % (ref 42.7–76)
NRBC BLD AUTO-RTO: 0 /100 WBC (ref 0–0.2)
PLATELET # BLD AUTO: 329 10*3/MM3 (ref 140–450)
PMV BLD AUTO: 10.4 FL (ref 6–12)
POTASSIUM BLD-SCNC: 4.3 MMOL/L (ref 3.5–5.2)
PROT SERPL-MCNC: 7 G/DL (ref 6–8.5)
RBC # BLD AUTO: 4.46 10*6/MM3 (ref 4.14–5.8)
SODIUM BLD-SCNC: 140 MMOL/L (ref 136–145)
WBC NRBC COR # BLD: 11.5 10*3/MM3 (ref 3.4–10.8)

## 2020-05-22 PROCEDURE — 83735 ASSAY OF MAGNESIUM: CPT

## 2020-05-22 PROCEDURE — 80177 DRUG SCRN QUAN LEVETIRACETAM: CPT

## 2020-05-22 PROCEDURE — 36415 COLL VENOUS BLD VENIPUNCTURE: CPT

## 2020-05-22 PROCEDURE — 85025 COMPLETE CBC W/AUTO DIFF WBC: CPT

## 2020-05-22 PROCEDURE — 80053 COMPREHEN METABOLIC PANEL: CPT

## 2020-05-24 LAB — LEVETIRACETAM SERPL-MCNC: 24.7 UG/ML (ref 10–40)

## 2020-05-26 ENCOUNTER — READMISSION MANAGEMENT (OUTPATIENT)
Dept: CALL CENTER | Facility: HOSPITAL | Age: 80
End: 2020-05-26

## 2020-05-26 NOTE — OUTREACH NOTE
Medical Week 2 Survey      Responses   [facility] patient discharged from?  [facility]   COVID-19 Test Status  Negative   Does the patient have one of the following disease processes/diagnoses(primary or secondary)?  Other   Week 2 attempt successful?  No   Unsuccessful attempts  Attempt 1          Faiza Hastings LPN

## 2020-05-29 ENCOUNTER — READMISSION MANAGEMENT (OUTPATIENT)
Dept: CALL CENTER | Facility: HOSPITAL | Age: 80
End: 2020-05-29

## 2020-05-29 NOTE — OUTREACH NOTE
Medical Week 2 Survey      Responses   [facility] patient discharged from?  [facility]   COVID-19 Test Status  Negative   Does the patient have one of the following disease processes/diagnoses(primary or secondary)?  Other   Week 2 attempt successful?  No   Unsuccessful attempts  Attempt 2   Revoke  Readmitted [He is back in the hospital. ]          Sara Rivero RN

## 2020-07-21 ENCOUNTER — HOSPITAL ENCOUNTER (EMERGENCY)
Facility: HOSPITAL | Age: 80
Discharge: HOME | End: 2020-07-21
Attending: EMERGENCY MEDICINE | Admitting: EMERGENCY MEDICINE

## 2020-07-21 ENCOUNTER — APPOINTMENT (OUTPATIENT)
Dept: GENERAL RADIOLOGY | Facility: HOSPITAL | Age: 80
End: 2020-07-21

## 2020-07-21 VITALS
HEIGHT: 70 IN | HEART RATE: 61 BPM | DIASTOLIC BLOOD PRESSURE: 89 MMHG | OXYGEN SATURATION: 97 % | SYSTOLIC BLOOD PRESSURE: 153 MMHG | WEIGHT: 176.37 LBS | BODY MASS INDEX: 25.25 KG/M2 | TEMPERATURE: 97.9 F | RESPIRATION RATE: 17 BRPM

## 2020-07-21 DIAGNOSIS — R07.9 CHEST PAIN, UNSPECIFIED TYPE: Primary | ICD-10-CM

## 2020-07-21 DIAGNOSIS — I95.9 HYPOTENSION, UNSPECIFIED HYPOTENSION TYPE: ICD-10-CM

## 2020-07-21 LAB
ALBUMIN SERPL-MCNC: 3.4 G/DL (ref 3.5–5.2)
ALBUMIN/GLOB SERPL: 1.4 G/DL
ALP SERPL-CCNC: 62 U/L (ref 39–117)
ALT SERPL W P-5'-P-CCNC: 11 U/L (ref 1–41)
ANION GAP SERPL CALCULATED.3IONS-SCNC: 10 MMOL/L (ref 5–15)
AST SERPL-CCNC: 17 U/L (ref 1–40)
BACTERIA UR QL AUTO: ABNORMAL /HPF
BACTERIA UR QL AUTO: ABNORMAL /HPF
BASOPHILS # BLD AUTO: 0 10*3/MM3 (ref 0–0.2)
BASOPHILS NFR BLD AUTO: 0.3 % (ref 0–1.5)
BILIRUB SERPL-MCNC: 0.2 MG/DL (ref 0–1.2)
BILIRUB UR QL STRIP: NEGATIVE
BILIRUB UR QL STRIP: NEGATIVE
BUN SERPL-MCNC: 13 MG/DL (ref 8–23)
BUN SERPL-MCNC: ABNORMAL MG/DL
BUN/CREAT SERPL: ABNORMAL
CALCIUM SPEC-SCNC: 8.4 MG/DL (ref 8.6–10.5)
CHLORIDE SERPL-SCNC: 105 MMOL/L (ref 98–107)
CLARITY UR: CLEAR
CLARITY UR: CLEAR
CO2 SERPL-SCNC: 26 MMOL/L (ref 22–29)
COLOR UR: YELLOW
COLOR UR: YELLOW
CREAT SERPL-MCNC: 1.32 MG/DL (ref 0.76–1.27)
DEPRECATED RDW RBC AUTO: 44.6 FL (ref 37–54)
EOSINOPHIL # BLD AUTO: 0.1 10*3/MM3 (ref 0–0.4)
EOSINOPHIL NFR BLD AUTO: 1.4 % (ref 0.3–6.2)
ERYTHROCYTE [DISTWIDTH] IN BLOOD BY AUTOMATED COUNT: 14.8 % (ref 12.3–15.4)
GFR SERPL CREATININE-BSD FRML MDRD: 52 ML/MIN/1.73
GLOBULIN UR ELPH-MCNC: 2.5 GM/DL
GLUCOSE SERPL-MCNC: 150 MG/DL (ref 65–99)
GLUCOSE UR STRIP-MCNC: NEGATIVE MG/DL
GLUCOSE UR STRIP-MCNC: NEGATIVE MG/DL
HCT VFR BLD AUTO: 34.3 % (ref 37.5–51)
HGB BLD-MCNC: 11 G/DL (ref 13–17.7)
HGB UR QL STRIP.AUTO: NEGATIVE
HGB UR QL STRIP.AUTO: NEGATIVE
HYALINE CASTS UR QL AUTO: ABNORMAL /LPF
HYALINE CASTS UR QL AUTO: ABNORMAL /LPF
KETONES UR QL STRIP: NEGATIVE
KETONES UR QL STRIP: NEGATIVE
LEUKOCYTE ESTERASE UR QL STRIP.AUTO: ABNORMAL
LEUKOCYTE ESTERASE UR QL STRIP.AUTO: ABNORMAL
LYMPHOCYTES # BLD AUTO: 1.1 10*3/MM3 (ref 0.7–3.1)
LYMPHOCYTES NFR BLD AUTO: 12.4 % (ref 19.6–45.3)
MCH RBC QN AUTO: 27.7 PG (ref 26.6–33)
MCHC RBC AUTO-ENTMCNC: 32.1 G/DL (ref 31.5–35.7)
MCV RBC AUTO: 86.3 FL (ref 79–97)
MONOCYTES # BLD AUTO: 0.7 10*3/MM3 (ref 0.1–0.9)
MONOCYTES NFR BLD AUTO: 7.5 % (ref 5–12)
NEUTROPHILS NFR BLD AUTO: 7 10*3/MM3 (ref 1.7–7)
NEUTROPHILS NFR BLD AUTO: 78.4 % (ref 42.7–76)
NITRITE UR QL STRIP: NEGATIVE
NITRITE UR QL STRIP: NEGATIVE
NRBC BLD AUTO-RTO: 0 /100 WBC (ref 0–0.2)
PH UR STRIP.AUTO: 6 [PH] (ref 5–8)
PH UR STRIP.AUTO: 6 [PH] (ref 5–8)
PLATELET # BLD AUTO: 240 10*3/MM3 (ref 140–450)
PMV BLD AUTO: 8.4 FL (ref 6–12)
POTASSIUM SERPL-SCNC: 4.5 MMOL/L (ref 3.5–5.2)
PROT SERPL-MCNC: 5.9 G/DL (ref 6–8.5)
PROT UR QL STRIP: NEGATIVE
PROT UR QL STRIP: NEGATIVE
RBC # BLD AUTO: 3.97 10*6/MM3 (ref 4.14–5.8)
RBC # UR: ABNORMAL /HPF
RBC # UR: ABNORMAL /HPF
REF LAB TEST METHOD: ABNORMAL
REF LAB TEST METHOD: ABNORMAL
SODIUM SERPL-SCNC: 141 MMOL/L (ref 136–145)
SP GR UR STRIP: 1.02 (ref 1–1.03)
SP GR UR STRIP: 1.02 (ref 1–1.03)
SQUAMOUS #/AREA URNS HPF: ABNORMAL /HPF
SQUAMOUS #/AREA URNS HPF: ABNORMAL /HPF
TROPONIN T SERPL-MCNC: <0.01 NG/ML (ref 0–0.03)
UROBILINOGEN UR QL STRIP: ABNORMAL
UROBILINOGEN UR QL STRIP: ABNORMAL
WBC # BLD AUTO: 9 10*3/MM3 (ref 3.4–10.8)
WBC UR QL AUTO: ABNORMAL /HPF
WBC UR QL AUTO: ABNORMAL /HPF

## 2020-07-21 PROCEDURE — 99284 EMERGENCY DEPT VISIT MOD MDM: CPT

## 2020-07-21 PROCEDURE — 96360 HYDRATION IV INFUSION INIT: CPT

## 2020-07-21 PROCEDURE — 71045 X-RAY EXAM CHEST 1 VIEW: CPT

## 2020-07-21 PROCEDURE — 96361 HYDRATE IV INFUSION ADD-ON: CPT

## 2020-07-21 PROCEDURE — 51798 US URINE CAPACITY MEASURE: CPT

## 2020-07-21 PROCEDURE — 93005 ELECTROCARDIOGRAM TRACING: CPT | Performed by: EMERGENCY MEDICINE

## 2020-07-21 PROCEDURE — 81001 URINALYSIS AUTO W/SCOPE: CPT | Performed by: EMERGENCY MEDICINE

## 2020-07-21 PROCEDURE — 80053 COMPREHEN METABOLIC PANEL: CPT | Performed by: EMERGENCY MEDICINE

## 2020-07-21 PROCEDURE — 99285 EMERGENCY DEPT VISIT HI MDM: CPT

## 2020-07-21 PROCEDURE — 85025 COMPLETE CBC W/AUTO DIFF WBC: CPT | Performed by: EMERGENCY MEDICINE

## 2020-07-21 PROCEDURE — 84484 ASSAY OF TROPONIN QUANT: CPT | Performed by: EMERGENCY MEDICINE

## 2020-07-21 PROCEDURE — 84520 ASSAY OF UREA NITROGEN: CPT | Performed by: EMERGENCY MEDICINE

## 2020-07-21 RX ORDER — SODIUM CHLORIDE, SODIUM LACTATE, POTASSIUM CHLORIDE, CALCIUM CHLORIDE 600; 310; 30; 20 MG/100ML; MG/100ML; MG/100ML; MG/100ML
125 INJECTION, SOLUTION INTRAVENOUS CONTINUOUS
Status: DISCONTINUED | OUTPATIENT
Start: 2020-07-21 | End: 2020-07-22 | Stop reason: HOSPADM

## 2020-07-21 RX ADMIN — SODIUM CHLORIDE, SODIUM LACTATE, POTASSIUM CHLORIDE, AND CALCIUM CHLORIDE 500 ML: .6; .31; .03; .02 INJECTION, SOLUTION INTRAVENOUS at 19:48

## 2020-07-21 RX ADMIN — SODIUM CHLORIDE, SODIUM LACTATE, POTASSIUM CHLORIDE, AND CALCIUM CHLORIDE 125 ML/HR: .6; .31; .03; .02 INJECTION, SOLUTION INTRAVENOUS at 21:09

## 2020-07-21 NOTE — ED NOTES
Spoke with Nava Bond, wife, about patient's frequency of urinating. States this has been an intermittent problem since May 2020. She also expressed concerns over pacemaker, stating that pt has had occasional chest pain.     Cinthya Narvaez RN  07/21/20 1923

## 2020-07-22 NOTE — ED NOTES
MD notified of wife's concerns regarding pacer, urinary urgency, lack of sleep last night, and bleeding with any urinary catheterizing.      Cinthya Narvaez RN  07/21/20 2003

## 2020-07-22 NOTE — ED NOTES
Attempted to reach by phone at physician's behest. Wanting to see if wife is able to care for him at home or if admission is best option for him.     Cinthya Narvaez, RN  07/21/20 5874

## 2020-07-22 NOTE — ED NOTES
Again attempted to reach wife. Left message on answering machine.     Cinthya Narvaez RN  07/21/20 5386

## 2020-07-22 NOTE — ED PROVIDER NOTES
Subjective   History of Present Illness  79-year-old male presents from home.  Apparently he has had some increased urinary frequency.  He has had occasional chest pain.  Patient has dementia and can give no history about why he is here.  He reports no chest pain or shortness of breath at this time.  No abdominal pain.  EMS reported to staff that patient's house was very hot and he was in heavy clothes    sReview of Systems  Unable to obtain complete review of systems due to dementia however no complaints of headache chest pain abdominal pain vomiting diarrhea.  Past Medical History:   Diagnosis Date   • CAD (coronary artery disease)    • Coronary artery disease     PTCI   • Dementia (CMS/HCC)    • Depression    • Disease of thyroid gland    • Dysautonomia (CMS/HCC)    • Dyslipidemia    • GERD (gastroesophageal reflux disease)    • Head pain    • Hyperlipidemia    • Hypertension    • SSS (sick sinus syndrome) (CMS/HCC)    • Stroke (CMS/HCC)     x 3 in 2018       Allergies   Allergen Reactions   • Doxycycline GI Intolerance   • Doxycycline Nausea And Vomiting       Past Surgical History:   Procedure Laterality Date   • APPENDECTOMY     • CHOLECYSTECTOMY     • ENDOSCOPY N/A 10/17/2019    Procedure: ESOPHAGOGASTRODUODENOSCOPY with biopsy x 2 areas;  Surgeon: Ashok Sanchez MD;  Location: [facility] ENDOSCOPY;  Service: Gastroenterology   • HERNIA REPAIR     • LEFT HEART CATH     • PACEMAKER IMPLANTATION      [employer]   • TEMPORAL ARTERY BIOPSY     • WRIST SURGERY      severed artery       Family History   Problem Relation Age of Onset   • Heart disease Father        Social History     Socioeconomic History   • Marital status:      Spouse name: Not on file   • Number of children: Not on file   • Years of education: Not on file   • Highest education level: Not on file   Tobacco Use   • Smoking status: Never Smoker   • Smokeless tobacco: Never Used   • Tobacco comment: quit 25 yrs ago   Substance and Sexual Activity    • Alcohol use: No     Frequency: Never   • Drug use: No   • Sexual activity: Defer   Social History Narrative    ** Merged History Encounter **          Prior to Admission medications    Medication Sig Start Date End Date Taking? Authorizing Provider   albuterol sulfate  (90 Base) MCG/ACT inhaler Inhale 2 puffs Every 4 (Four) Hours As Needed for Wheezing.    Juan Bateman MD   apixaban (ELIQUIS) 5 MG tablet tablet Take 1 tablet by mouth Every 12 (Twelve) Hours. Indications: Atrial Fibrillation 2/4/20   Romelia Cornejo DO   brimonidine (ALPHAGAN) 0.2 % ophthalmic solution Administer 1 drop to both eyes 2 (Two) Times a Day. 2/4/20   Romelia Cornejo DO   clopidogrel (PLAVIX) 75 MG tablet Take 75 mg by mouth Daily.    Juan Bateman MD   escitalopram (LEXAPRO) 10 MG tablet Take 10 mg by mouth Daily.    Juan Bateman MD   fluticasone (FLONASE) 50 MCG/ACT nasal spray 2 sprays into the nostril(s) as directed by provider Daily.    Juan Bateman MD   galantamine ER (RAZADYNE ER) 8 MG 24 hr capsule Take 8 mg by mouth 2 (Two) Times a Day.    Juan Bateman MD   hydrALAZINE (APRESOLINE) 25 MG tablet Take 25 mg by mouth 2 (Two) Times a Day.    Juan Bateman MD   isosorbide mononitrate (IMDUR) 30 MG 24 hr tablet Take 1 tablet by mouth Daily. 2/4/20   Romelia Cornejo DO   levETIRAcetam (KEPPRA) 500 MG tablet Take 1 tablet by mouth 2 (Two) Times a Day. 5/14/20   Maude Ricketts APRN   levothyroxine (SYNTHROID, LEVOTHROID) 25 MCG tablet Take 25 mcg by mouth Daily.    Juan Bateman MD   loratadine (CLARITIN) 10 MG tablet Take 10 mg by mouth Daily.    Juan Bateman MD   metoprolol tartrate (LOPRESSOR) 25 MG tablet Take 1 tablet by mouth Every Night. 5/14/20   Maude Ricketts APRN   Multiple Vitamins-Minerals (MULTIVITAMIN WITH MINERALS) tablet tablet Take 1 tablet by mouth Every Night.    Juan Bateman MD   nitroglycerin (NITROSTAT) 0.4 MG SL tablet  Place 0.4 mg under the tongue Every 5 (Five) Minutes As Needed for Chest Pain. Take no more than 3 doses in 15 minutes.    Juan Bateman MD   Omega-3 Fatty Acids (FISH OIL) 1000 MG capsule capsule Take 1,000 mg by mouth Daily With Breakfast.    Juan Bateman MD   pantoprazole (PROTONIX) 40 MG EC tablet Take 40 mg by mouth Daily.    Juan Bateman MD   predniSONE (DELTASONE) 5 MG tablet Take 5 mg by mouth Daily.    Juan Bateman MD   rosuvastatin (CRESTOR) 20 MG tablet Take 20 mg by mouth Every Night.    Juan Bateman MD   vitamin B-12 (CYANOCOBALAMIN) 1000 MCG tablet Take 1,000 mcg by mouth Daily.    Juan Bateman MD           Objective   Physical Exam  79-year-old male awake alert.  Generally well-developed well-nourished.  Pupils equal round and light.  Oropharynx clear neck supple chest clear cardiovascular regular rate and rhythm without murmur appreciated abdomen soft nontender extremities no tenderness edema.  Motor sensory grossly intact to extremities without deficit finger-to-nose intact.  Speech clear  Procedures           ED Course      Results for orders placed or performed during the hospital encounter of 07/21/20   Comprehensive Metabolic Panel   Result Value Ref Range    Glucose 150 (H) 65 - 99 mg/dL    BUN      Creatinine 1.32 (H) 0.76 - 1.27 mg/dL    Sodium 141 136 - 145 mmol/L    Potassium 4.5 3.5 - 5.2 mmol/L    Chloride 105 98 - 107 mmol/L    CO2 26.0 22.0 - 29.0 mmol/L    Calcium 8.4 (L) 8.6 - 10.5 mg/dL    Total Protein 5.9 (L) 6.0 - 8.5 g/dL    Albumin 3.40 (L) 3.50 - 5.20 g/dL    ALT (SGPT) 11 1 - 41 U/L    AST (SGOT) 17 1 - 40 U/L    Alkaline Phosphatase 62 39 - 117 U/L    Total Bilirubin 0.2 0.0 - 1.2 mg/dL    eGFR Non African Amer 52 (L) >60 mL/min/1.73    Globulin 2.5 gm/dL    A/G Ratio 1.4 g/dL    BUN/Creatinine Ratio      Anion Gap 10.0 5.0 - 15.0 mmol/L   Troponin   Result Value Ref Range    Troponin T <0.010 0.000 - 0.030 ng/mL   CBC Auto  Differential   Result Value Ref Range    WBC 9.00 3.40 - 10.80 10*3/mm3    RBC 3.97 (L) 4.14 - 5.80 10*6/mm3    Hemoglobin 11.0 (L) 13.0 - 17.7 g/dL    Hematocrit 34.3 (L) 37.5 - 51.0 %    MCV 86.3 79.0 - 97.0 fL    MCH 27.7 26.6 - 33.0 pg    MCHC 32.1 31.5 - 35.7 g/dL    RDW 14.8 12.3 - 15.4 %    RDW-SD 44.6 37.0 - 54.0 fl    MPV 8.4 6.0 - 12.0 fL    Platelets 240 140 - 450 10*3/mm3    Neutrophil % 78.4 (H) 42.7 - 76.0 %    Lymphocyte % 12.4 (L) 19.6 - 45.3 %    Monocyte % 7.5 5.0 - 12.0 %    Eosinophil % 1.4 0.3 - 6.2 %    Basophil % 0.3 0.0 - 1.5 %    Neutrophils, Absolute 7.00 1.70 - 7.00 10*3/mm3    Lymphocytes, Absolute 1.10 0.70 - 3.10 10*3/mm3    Monocytes, Absolute 0.70 0.10 - 0.90 10*3/mm3    Eosinophils, Absolute 0.10 0.00 - 0.40 10*3/mm3    Basophils, Absolute 0.00 0.00 - 0.20 10*3/mm3    nRBC 0.0 0.0 - 0.2 /100 WBC   Urinalysis With Microscopic If Indicated (No Culture) - Urine, Clean Catch   Result Value Ref Range    Color, UA Yellow Yellow, Straw    Appearance, UA Clear Clear    pH, UA 6.0 5.0 - 8.0    Specific Gravity, UA 1.017 1.005 - 1.030    Glucose, UA Negative Negative    Ketones, UA Negative Negative    Bilirubin, UA Negative Negative    Blood, UA Negative Negative    Protein, UA Negative Negative    Leuk Esterase, UA Trace (A) Negative    Nitrite, UA Negative Negative    Urobilinogen, UA 0.2 E.U./dL 0.2 - 1.0 E.U./dL   BUN   Result Value Ref Range    BUN 13 8 - 23 mg/dL   Urinalysis, Microscopic Only - Urine, Clean Catch   Result Value Ref Range    RBC, UA None Seen None Seen /HPF    WBC, UA 3-5 (A) None Seen /HPF    Bacteria, UA Trace (A) None Seen /HPF    Squamous Epithelial Cells, UA 0-2 None Seen, 0-2 /HPF    Hyaline Casts, UA None Seen None Seen /LPF    Methodology Manual Light Microscopy      Xr Chest 1 View    Result Date: 7/21/2020  1. Mild cardiomegaly. 2. Diffuse increase in markings consistent with chronic lung disease. 3. Cardiac Electronically Signed: Abner Timmons MD  "7/21/2020 19:51 EDT    Medications   lactated ringers infusion (125 mL/hr Intravenous New Bag 7/21/20 2109)   lactated ringers bolus 500 mL (0 mL Intravenous Stopped 7/21/20 2015)     /74   Pulse 62   Temp 97.7 °F (36.5 °C) (Oral)   Resp 16   Ht 177.8 cm (70\")   Wt 80 kg (176 lb 5.9 oz)   SpO2 98%   BMI 25.31 kg/m²                                        MDM  Chart review: Patient has had admissions this year for chest pain and syncope most recently May.  At that time he had CT chest that was negative for pneumonia loss.  Inpatient rehab was recommended however wife refused this.  He was set up for home health care.  Comorbidity: As per past history  Differential: UTI, electrolyte abnormality, MI, dehydration  My EKG interpretation: Sinus rhythm nonspecific T wave abnormality laterally.  First-degree AV block, old inferior infarct.  Compared to previous no significant change  Lab: Comprehensive metabolic panel glucose 150 BUN 13 creatinine 1.32, ALT AST normal, urinalysis 3-5 white cells trace bacteria CBC normal white count hemoglobin 11 platelet count 240 with 78 segs no bands  Radiology: I reviewed chest x-ray.  There is mild cardiomegaly with some chronic lung disease without evidence of acute abnormality  Discussion/treatment: Patient received fluid bolus.  Patient was discussed with Dr. Mckeon.  He reports he can follow him up tomorrow in the office if need be.  Patient's findings were discussed with wife.  Repeat evaluation patient is awake alert he is eating.  His blood pressure has remained normal since initial small fluid bolus.  We will check a culture on his urine but with only 3-5 white cells we will hold off on antibiotics unless culture is positive.  He will be discharged to follow-up Dr. Mckeon tomorrow return new or worsening symptoms.  He has had no complaints of pain while here.  He was noted to have had recent cardiology evaluation for orthostatic hypotension with abnormal tilt table " test.  It was noted at that time that there was thought that some of his fatigue was related to not sleeping with frequent bathroom trips.  He was also noted to have had some low blood pressure and was advised to only use hydralazine for blood pressure greater than 150/100.  It was noted that he had a stress myocardial perfusion February this year that was felt to be low risk study with small size lateral wall infarct with no myocardial ischemia.  Patient was evaluated using appropriate PPE      Final diagnoses:   Chest pain, unspecified type   Hypotension, unspecified hypotension type            Prince Simms MD  07/21/20 4450

## 2020-09-16 ENCOUNTER — OFFICE VISIT (OUTPATIENT)
Dept: CARDIOLOGY | Facility: CLINIC | Age: 80
End: 2020-09-16

## 2020-09-16 VITALS
HEART RATE: 65 BPM | BODY MASS INDEX: 25.77 KG/M2 | WEIGHT: 180 LBS | HEIGHT: 70 IN | DIASTOLIC BLOOD PRESSURE: 56 MMHG | SYSTOLIC BLOOD PRESSURE: 98 MMHG

## 2020-09-16 DIAGNOSIS — I25.10 CORONARY ARTERY DISEASE INVOLVING NATIVE CORONARY ARTERY OF NATIVE HEART WITHOUT ANGINA PECTORIS: ICD-10-CM

## 2020-09-16 DIAGNOSIS — I48.0 PAROXYSMAL ATRIAL FIBRILLATION (HCC): ICD-10-CM

## 2020-09-16 DIAGNOSIS — I63.9 CEREBROVASCULAR ACCIDENT (CVA), UNSPECIFIED MECHANISM (HCC): ICD-10-CM

## 2020-09-16 DIAGNOSIS — I10 ESSENTIAL HYPERTENSION: ICD-10-CM

## 2020-09-16 DIAGNOSIS — R07.2 PRECORDIAL PAIN: ICD-10-CM

## 2020-09-16 DIAGNOSIS — E78.2 MIXED HYPERLIPIDEMIA: Primary | ICD-10-CM

## 2020-09-16 PROBLEM — H02.403 ACQUIRED BLEPHAROPTOSIS OF BOTH EYES: Status: ACTIVE | Noted: 2017-03-15

## 2020-09-16 PROBLEM — H02.59 LAXITY OF EYELID: Status: ACTIVE | Noted: 2017-03-15

## 2020-09-16 PROBLEM — I47.29 NONSUSTAINED VENTRICULAR TACHYCARDIA: Status: ACTIVE | Noted: 2019-11-05

## 2020-09-16 PROBLEM — H04.223 EPIPHORA DUE TO INSUFFICIENT DRAINAGE OF BOTH SIDES: Status: ACTIVE | Noted: 2017-03-15

## 2020-09-16 PROBLEM — H04.569 PUNCTAL STENOSIS, ACQUIRED: Status: ACTIVE | Noted: 2017-03-15

## 2020-09-16 PROBLEM — Z96.1 PSEUDOPHAKIA, BOTH EYES: Status: ACTIVE | Noted: 2017-03-15

## 2020-09-16 PROBLEM — Z79.01 ANTICOAGULANT LONG-TERM USE: Status: ACTIVE | Noted: 2019-04-24

## 2020-09-16 PROBLEM — H02.535: Status: ACTIVE | Noted: 2017-03-15

## 2020-09-16 PROBLEM — J44.9 COPD (CHRONIC OBSTRUCTIVE PULMONARY DISEASE) (HCC): Status: ACTIVE | Noted: 2019-02-06

## 2020-09-16 PROCEDURE — 99214 OFFICE O/P EST MOD 30 MIN: CPT | Performed by: INTERNAL MEDICINE

## 2020-09-16 RX ORDER — MONTELUKAST SODIUM 10 MG/1
1 TABLET ORAL DAILY
COMMUNITY
Start: 2020-09-15 | End: 2020-12-07

## 2020-09-16 RX ORDER — TAMSULOSIN HYDROCHLORIDE 0.4 MG/1
0.4 CAPSULE ORAL DAILY
COMMUNITY
End: 2020-10-12

## 2020-09-16 RX ORDER — ESLICARBAZEPINE ACETATE 400 MG/1
1 TABLET ORAL DAILY
COMMUNITY
End: 2020-10-12

## 2020-09-16 RX ORDER — MIDODRINE HYDROCHLORIDE 2.5 MG/1
2.5 TABLET ORAL
Qty: 90 TABLET | Refills: 3 | Status: SHIPPED | OUTPATIENT
Start: 2020-09-16 | End: 2021-04-26

## 2020-09-16 RX ORDER — LEVETIRACETAM 250 MG/1
250 TABLET ORAL 2 TIMES DAILY
COMMUNITY
Start: 2020-08-28 | End: 2020-09-16

## 2020-09-16 NOTE — PROGRESS NOTES
Date of Office Visit: 2020  Encounter Provider: Dr. Jose Garcia  Place of Service: [facility] CARDIOLOGY [place]  Patient Name: Jayden Bond  :1940  Maude Ricketts APRN    Chief Complaint   Patient presents with   • Coronary Artery Disease     consult   • Hypertension   • Hyperlipidemia   • Stroke     History of Present Illness:    I am pleased to see Mr. Bond in my office today as a new consultation.    As you know, patient is 80 years old white gentleman whose past medical history is significant for orthostatic hypotension, CAD, coronary artery stenting, paroxysmal atrial fibrillation, tachybradycardia syndrome, status post permanent pacemaker, who came today to establish his cardiac care with me and also for nosebleed.    In the past patient has been seen by Dr. Mcclain and recently patient is being seen by Dr. Toledo at [facility].  In 2019, patient underwent cardiac catheterization and was found to have intermediate lesion of LAD.  Patient underwent FFR assessment and it was abnormal and patient underwent subsequent stenting.  Patient was started on aspirin and Plavix.  Patient was also on Eliquis 5 mg twice daily for possible paroxysmal atrial fibrillation.    Patient reports that from last few days.  He is having symptom of nosebleed.  Especially when he sneezes or clean his nose.  Patient denies any active chest pain.  Patient denies any orthopnea or PND.  Patient is very limited in his activity.  He is almost wheelchair-bound.  Patient can transfer weight but cannot ambulate.  Patient denies any leg edema.    At this stage, I would recommend that patient blood pressure is very low.  I would start him on midodrine 2.5 mg 3 times a day.  I would discontinue hydralazine.  I would also decrease Eliquis to 2.5 mg twice daily.  I would consider stopping Plavix in future.  Low-dose aspirin would be started.  Blood pressure monitoring is recommended.  I would see the patient  in 6 weeks with pacemaker check        Past Medical History:   Diagnosis Date   • CAD (coronary artery disease)    • Coronary artery disease     PTCI   • Dementia (CMS/HCC)    • Depression    • Disease of thyroid gland    • Dysautonomia (CMS/HCC)    • Dyslipidemia    • GERD (gastroesophageal reflux disease)    • Head pain    • Hyperlipidemia    • Hypertension    • SSS (sick sinus syndrome) (CMS/HCC)    • Stroke (CMS/HCC)     x 3 in 2018         Past Surgical History:   Procedure Laterality Date   • APPENDECTOMY     • CHOLECYSTECTOMY     • ENDOSCOPY N/A 10/17/2019    Procedure: ESOPHAGOGASTRODUODENOSCOPY with biopsy x 2 areas;  Surgeon: Ashok Sanchez MD;  Location: [facility] ENDOSCOPY;  Service: Gastroenterology   • HERNIA REPAIR     • INSERT / REPLACE / REMOVE PACEMAKER     • LEFT HEART CATH     • PACEMAKER IMPLANTATION      Medtronic   • TEMPORAL ARTERY BIOPSY     • WRIST SURGERY      severed artery           Current Outpatient Medications:   •  albuterol sulfate  (90 Base) MCG/ACT inhaler, Inhale 2 puffs Every 4 (Four) Hours As Needed for Wheezing., Disp: , Rfl:   •  apixaban (ELIQUIS) 2.5 MG tablet tablet, Take 2 tablets by mouth Every 12 (Twelve) Hours. Indications: Atrial Fibrillation, Disp: , Rfl:   •  brimonidine (ALPHAGAN) 0.2 % ophthalmic solution, Administer 1 drop to both eyes 2 (Two) Times a Day., Disp: 10 mL, Rfl: 12  •  clopidogrel (PLAVIX) 75 MG tablet, Take 75 mg by mouth Daily., Disp: , Rfl:   •  escitalopram (LEXAPRO) 10 MG tablet, Take 10 mg by mouth Daily., Disp: , Rfl:   •  Eslicarbazepine Acetate (Aptiom) 400 MG tablet, Take 1 tablet by mouth Daily., Disp: , Rfl:   •  fluticasone (FLONASE) 50 MCG/ACT nasal spray, 2 sprays into the nostril(s) as directed by provider Daily., Disp: , Rfl:   •  galantamine ER (RAZADYNE ER) 8 MG 24 hr capsule, Take 8 mg by mouth 2 (Two) Times a Day., Disp: , Rfl:   •  isosorbide mononitrate (IMDUR) 30 MG 24 hr tablet, Take 1 tablet by mouth Daily., Disp: 30  tablet, Rfl: 0  •  levothyroxine (SYNTHROID, LEVOTHROID) 25 MCG tablet, Take 25 mcg by mouth Daily., Disp: , Rfl:   •  loratadine (CLARITIN) 10 MG tablet, Take 10 mg by mouth Daily., Disp: , Rfl:   •  montelukast (SINGULAIR) 10 MG tablet, 1 tablet Daily., Disp: , Rfl:   •  Multiple Vitamins-Minerals (MULTIVITAMIN WITH MINERALS) tablet tablet, Take 1 tablet by mouth Every Night., Disp: , Rfl:   •  nitroglycerin (NITROSTAT) 0.4 MG SL tablet, Place 0.4 mg under the tongue Every 5 (Five) Minutes As Needed for Chest Pain. Take no more than 3 doses in 15 minutes., Disp: , Rfl:   •  Omega-3 Fatty Acids (FISH OIL) 1000 MG capsule capsule, Take 1,000 mg by mouth Daily With Breakfast., Disp: , Rfl:   •  pantoprazole (PROTONIX) 40 MG EC tablet, Take 40 mg by mouth 2 (two) times a day., Disp: , Rfl:   •  predniSONE (DELTASONE) 5 MG tablet, Take 5 mg by mouth Daily., Disp: , Rfl:   •  rosuvastatin (CRESTOR) 20 MG tablet, Take 20 mg by mouth Every Night., Disp: , Rfl:   •  tamsulosin (FLOMAX) 0.4 MG capsule 24 hr capsule, Take 0.4 mg by mouth Daily., Disp: , Rfl:   •  vitamin B-12 (CYANOCOBALAMIN) 1000 MCG tablet, Take 1,000 mcg by mouth Daily., Disp: , Rfl:   •  midodrine (PROAMATINE) 2.5 MG tablet, Take 1 tablet by mouth 3 (Three) Times a Day Before Meals., Disp: 90 tablet, Rfl: 3      Social History     Socioeconomic History   • Marital status:      Spouse name: Not on file   • Number of children: Not on file   • Years of education: Not on file   • Highest education level: Not on file   Tobacco Use   • Smoking status: Never Smoker   • Smokeless tobacco: Never Used   • Tobacco comment: quit 25 yrs ago   Substance and Sexual Activity   • Alcohol use: No     Frequency: Never   • Drug use: No   • Sexual activity: Defer   Social History Narrative    ** Merged History Encounter **              Review of Systems   Constitution: Positive for malaise/fatigue. Negative for chills and fever.   HENT: Negative for ear discharge and  "nosebleeds.    Eyes: Negative for discharge and redness.   Cardiovascular: Negative for chest pain, orthopnea, palpitations, paroxysmal nocturnal dyspnea and syncope.   Respiratory: Positive for shortness of breath. Negative for cough and wheezing.    Endocrine: Negative for heat intolerance.   Skin: Negative for rash.   Musculoskeletal: Positive for arthritis and joint pain. Negative for myalgias.   Gastrointestinal: Negative for abdominal pain, melena, nausea and vomiting.   Genitourinary: Negative for dysuria and hematuria.   Neurological: Positive for disturbances in coordination. Negative for dizziness, light-headedness, numbness and tremors.   Psychiatric/Behavioral: Positive for depression. The patient is not nervous/anxious.        Procedures    Procedures    No orders to display           Objective:    BP 98/56   Pulse 65   Ht 177.8 cm (70\")   Wt 81.6 kg (180 lb)   BMI 25.83 kg/m²         Constitutional:       Appearance: Well-developed.   Eyes:      General: No scleral icterus.        Right eye: No discharge.   HENT:      Head: Normocephalic and atraumatic.   Neck:      Thyroid: No thyromegaly.      Lymphadenopathy: No cervical adenopathy.   Pulmonary:      Effort: Pulmonary effort is normal. No respiratory distress.      Breath sounds: Normal breath sounds. No wheezing. No rales.   Cardiovascular:      Normal rate. Regular rhythm.      No gallop.   Abdominal:      Tenderness: There is no abdominal tenderness.   Skin:     Findings: No erythema or rash.   Neurological:      Mental Status: Alert and oriented to person, place, and time.             Assessment:       Diagnosis Plan   1. Mixed hyperlipidemia  midodrine (PROAMATINE) 2.5 MG tablet    apixaban (ELIQUIS) 2.5 MG tablet tablet   2. Essential hypertension  midodrine (PROAMATINE) 2.5 MG tablet    apixaban (ELIQUIS) 2.5 MG tablet tablet   3. Cerebrovascular accident (CVA), unspecified mechanism (CMS/HCC)  midodrine (PROAMATINE) 2.5 MG tablet    " apixaban (ELIQUIS) 2.5 MG tablet tablet   4. Coronary artery disease involving native coronary artery of native heart without angina pectoris  midodrine (PROAMATINE) 2.5 MG tablet    apixaban (ELIQUIS) 2.5 MG tablet tablet   5. Precordial pain  midodrine (PROAMATINE) 2.5 MG tablet    apixaban (ELIQUIS) 2.5 MG tablet tablet   6. Paroxysmal atrial fibrillation (CMS/HCC)  apixaban (ELIQUIS) 2.5 MG tablet tablet            Plan:       I would discontinue hydralazine.  I would start midodrine 2.5 mg every 8.  Blood pressure monitoring is recommended.  I will see the patient in 6 weeks for pacemaker check and also blood pressure monitoring.  I would decrease Eliquis to 2.5 mg twice daily.

## 2020-10-12 ENCOUNTER — OFFICE VISIT (OUTPATIENT)
Dept: CARDIOLOGY | Facility: CLINIC | Age: 80
End: 2020-10-12

## 2020-10-12 VITALS
HEART RATE: 67 BPM | SYSTOLIC BLOOD PRESSURE: 141 MMHG | OXYGEN SATURATION: 97 % | DIASTOLIC BLOOD PRESSURE: 90 MMHG | WEIGHT: 171 LBS | HEIGHT: 70 IN | BODY MASS INDEX: 24.48 KG/M2

## 2020-10-12 DIAGNOSIS — I10 ESSENTIAL HYPERTENSION: ICD-10-CM

## 2020-10-12 DIAGNOSIS — Z86.73 HISTORY OF CVA (CEREBROVASCULAR ACCIDENT): ICD-10-CM

## 2020-10-12 DIAGNOSIS — I25.10 CORONARY ARTERY DISEASE INVOLVING NATIVE CORONARY ARTERY OF NATIVE HEART WITHOUT ANGINA PECTORIS: ICD-10-CM

## 2020-10-12 DIAGNOSIS — I63.9 CEREBROVASCULAR ACCIDENT (CVA), UNSPECIFIED MECHANISM (HCC): ICD-10-CM

## 2020-10-12 DIAGNOSIS — R07.2 PRECORDIAL PAIN: ICD-10-CM

## 2020-10-12 DIAGNOSIS — E78.2 MIXED HYPERLIPIDEMIA: Primary | ICD-10-CM

## 2020-10-12 PROCEDURE — 99213 OFFICE O/P EST LOW 20 MIN: CPT | Performed by: INTERNAL MEDICINE

## 2020-10-12 NOTE — PROGRESS NOTES
Date of Office Visit: 10/12/2020  Encounter Provider: Dr. Jose Garcia    Place of Service: [facility] CARDIOLOGY [place]  Patient Name: Jayden Bond  :1940  Maude Ricketts APRN    Chief Complaint   Patient presents with   • Follow-up   • Hyperlipidemia     History of Present Illness    I am pleased to see Mr. Bond in my office today as a as a follow-up    As you know, patient is 80 years old white gentleman whose past medical history is significant for orthostatic hypotension, CAD, coronary artery stenting, paroxysmal atrial fibrillation, tachybradycardia syndrome, status post permanent pacemaker, who came today for follow-up.    In the past patient has been seen by Dr. Mcclain and recently patient is being seen by Dr. Toledo at [facility].  In 2019, patient underwent cardiac catheterization and was found to have intermediate lesion of LAD.  Patient underwent FFR assessment and it was abnormal and patient underwent subsequent stenting.  Patient was started on aspirin and Plavix.  Patient was also on Eliquis 5 mg twice daily for possible paroxysmal atrial fibrillation.    Patient came today for unscheduled visit because he had one episode of chest pain.  Chest pain is atypical and possibly noncardiac.  Patient is awake and alert but has advanced dementia and cannot give much of the history.  Most of the history is taken from his wife.  Patient denies any chest discomfort at this stage.  Patient does not have any obvious shortness of breath no leg edema noted.    At this stage I will continue current treatment.  Patient will have a pacemaker check on 15 October 2020.  At present continue current treatment.  I would consider stopping Plavix in future and at that time I would start aspirin 81 mg daily..        Past Medical History:   Diagnosis Date   • CAD (coronary artery disease)    • Coronary artery disease     PTCI   • Dementia (CMS/HCC)    • Depression    • Disease of thyroid gland     • Dysautonomia (CMS/HCC)    • Dyslipidemia    • GERD (gastroesophageal reflux disease)    • Head pain    • Hyperlipidemia    • Hypertension    • SSS (sick sinus syndrome) (CMS/HCC)    • Stroke (CMS/HCC)     x 3 in 2018         Past Surgical History:   Procedure Laterality Date   • APPENDECTOMY     • CHOLECYSTECTOMY     • ENDOSCOPY N/A 10/17/2019    Procedure: ESOPHAGOGASTRODUODENOSCOPY with biopsy x 2 areas;  Surgeon: Ashok Sanchez MD;  Location: [facility] ENDOSCOPY;  Service: Gastroenterology   • HERNIA REPAIR     • INSERT / REPLACE / REMOVE PACEMAKER     • LEFT HEART CATH     • PACEMAKER IMPLANTATION      Medtronic   • TEMPORAL ARTERY BIOPSY     • WRIST SURGERY      severed artery           Current Outpatient Medications:   •  apixaban (ELIQUIS) 2.5 MG tablet tablet, Take 2 tablets by mouth Every 12 (Twelve) Hours. Indications: Atrial Fibrillation, Disp: , Rfl:   •  brimonidine (ALPHAGAN) 0.2 % ophthalmic solution, Administer 1 drop to both eyes 2 (Two) Times a Day., Disp: 10 mL, Rfl: 12  •  clopidogrel (PLAVIX) 75 MG tablet, Take 75 mg by mouth Daily., Disp: , Rfl:   •  escitalopram (LEXAPRO) 10 MG tablet, Take 10 mg by mouth Daily., Disp: , Rfl:   •  fluticasone (FLONASE) 50 MCG/ACT nasal spray, 2 sprays into the nostril(s) as directed by provider Daily., Disp: , Rfl:   •  galantamine ER (RAZADYNE ER) 8 MG 24 hr capsule, Take 8 mg by mouth 2 (Two) Times a Day., Disp: , Rfl:   •  isosorbide mononitrate (IMDUR) 30 MG 24 hr tablet, Take 1 tablet by mouth Daily., Disp: 30 tablet, Rfl: 0  •  levothyroxine (SYNTHROID, LEVOTHROID) 25 MCG tablet, Take 25 mcg by mouth Daily., Disp: , Rfl:   •  loratadine (CLARITIN) 10 MG tablet, Take 10 mg by mouth Daily., Disp: , Rfl:   •  midodrine (PROAMATINE) 2.5 MG tablet, Take 1 tablet by mouth 3 (Three) Times a Day Before Meals. (Patient taking differently: Take 2.5 mg by mouth As Needed.), Disp: 90 tablet, Rfl: 3  •  Multiple Vitamins-Minerals (MULTIVITAMIN WITH MINERALS) tablet  tablet, Take 1 tablet by mouth Every Night., Disp: , Rfl:   •  nitroglycerin (NITROSTAT) 0.4 MG SL tablet, Place 0.4 mg under the tongue Every 5 (Five) Minutes As Needed for Chest Pain. Take no more than 3 doses in 15 minutes., Disp: , Rfl:   •  Omega-3 Fatty Acids (FISH OIL) 1000 MG capsule capsule, Take 1,000 mg by mouth Daily With Breakfast., Disp: , Rfl:   •  pantoprazole (PROTONIX) 40 MG EC tablet, Take 40 mg by mouth 2 (two) times a day., Disp: , Rfl:   •  predniSONE (DELTASONE) 5 MG tablet, Take 5 mg by mouth Daily., Disp: , Rfl:   •  rosuvastatin (CRESTOR) 20 MG tablet, Take 20 mg by mouth Every Night., Disp: , Rfl:   •  vitamin B-12 (CYANOCOBALAMIN) 1000 MCG tablet, Take 1,000 mcg by mouth Daily., Disp: , Rfl:   •  montelukast (SINGULAIR) 10 MG tablet, 1 tablet Daily., Disp: , Rfl:       Social History     Socioeconomic History   • Marital status:      Spouse name: Not on file   • Number of children: Not on file   • Years of education: Not on file   • Highest education level: Not on file   Tobacco Use   • Smoking status: Never Smoker   • Smokeless tobacco: Never Used   • Tobacco comment: quit 25 yrs ago   Substance and Sexual Activity   • Alcohol use: No     Frequency: Never   • Drug use: No   • Sexual activity: Defer   Social History Narrative    ** Merged History Encounter **              Review of Systems   Constitution: Negative for chills and fever.   HENT: Negative for ear discharge and nosebleeds.    Eyes: Negative for discharge and redness.   Cardiovascular: Positive for chest pain. Negative for orthopnea, palpitations, paroxysmal nocturnal dyspnea and syncope.   Respiratory: Positive for shortness of breath. Negative for cough and wheezing.    Endocrine: Negative for heat intolerance.   Skin: Negative for rash.   Musculoskeletal: Positive for arthritis, back pain and joint pain. Negative for myalgias.   Gastrointestinal: Negative for abdominal pain, melena, nausea and vomiting.  "  Genitourinary: Negative for dysuria and hematuria.   Neurological: Negative for dizziness, light-headedness, numbness and tremors.   Psychiatric/Behavioral: Negative for depression. The patient is not nervous/anxious.        Procedures    Procedures    No orders to display           Objective:    /90   Pulse 67   Ht 177.8 cm (70\")   Wt 77.6 kg (171 lb)   SpO2 97% Comment: 2 liters  BMI 24.54 kg/m²         Constitutional:       Appearance: Well-developed.   Eyes:      General: No scleral icterus.        Right eye: No discharge.   HENT:      Head: Normocephalic and atraumatic.   Neck:      Thyroid: No thyromegaly.      Lymphadenopathy: No cervical adenopathy.   Pulmonary:      Effort: Pulmonary effort is normal. No respiratory distress.      Breath sounds: Normal breath sounds. No wheezing. No rales.   Cardiovascular:      Normal rate. Regular rhythm.      No gallop.   Abdominal:      Tenderness: There is no abdominal tenderness.   Skin:     Findings: No erythema or rash.   Neurological:      Mental Status: Alert and oriented to person, place, and time.             Assessment:       Diagnosis Plan   1. Mixed hyperlipidemia     2. Essential hypertension     3. Cerebrovascular accident (CVA), unspecified mechanism (CMS/HCC)     4. Coronary artery disease involving native coronary artery of native heart without angina pectoris     5. Precordial pain     6. History of CVA (cerebrovascular accident)              Plan:       Continue current treatment.  Proceed with pacemaker check on October 15, 2020.  I will see the patient in future continue current therapy.  Once I stop Plavix patient would need aspirin at that time  "

## 2020-10-14 RX ORDER — CLOPIDOGREL BISULFATE 75 MG/1
75 TABLET ORAL DAILY
Qty: 30 TABLET | Refills: 2 | Status: SHIPPED | OUTPATIENT
Start: 2020-10-14 | End: 2021-01-08

## 2020-10-15 ENCOUNTER — CLINICAL SUPPORT NO REQUIREMENTS (OUTPATIENT)
Dept: CARDIOLOGY | Facility: CLINIC | Age: 80
End: 2020-10-15

## 2020-10-15 ENCOUNTER — OFFICE VISIT (OUTPATIENT)
Dept: CARDIOLOGY | Facility: CLINIC | Age: 80
End: 2020-10-15

## 2020-10-15 VITALS
HEART RATE: 62 BPM | HEIGHT: 70 IN | SYSTOLIC BLOOD PRESSURE: 138 MMHG | WEIGHT: 173 LBS | BODY MASS INDEX: 24.77 KG/M2 | OXYGEN SATURATION: 99 % | DIASTOLIC BLOOD PRESSURE: 74 MMHG

## 2020-10-15 DIAGNOSIS — I25.10 CORONARY ARTERY DISEASE INVOLVING NATIVE CORONARY ARTERY OF NATIVE HEART WITHOUT ANGINA PECTORIS: ICD-10-CM

## 2020-10-15 DIAGNOSIS — E78.2 MIXED HYPERLIPIDEMIA: ICD-10-CM

## 2020-10-15 DIAGNOSIS — I49.5 SSS (SICK SINUS SYNDROME) (HCC): Primary | ICD-10-CM

## 2020-10-15 DIAGNOSIS — Z95.0 CARDIAC PACEMAKER IN SITU: ICD-10-CM

## 2020-10-15 DIAGNOSIS — I10 ESSENTIAL HYPERTENSION: ICD-10-CM

## 2020-10-15 PROCEDURE — 93000 ELECTROCARDIOGRAM COMPLETE: CPT | Performed by: NURSE PRACTITIONER

## 2020-10-15 PROCEDURE — 99213 OFFICE O/P EST LOW 20 MIN: CPT | Performed by: NURSE PRACTITIONER

## 2020-10-15 PROCEDURE — 93280 PM DEVICE PROGR EVAL DUAL: CPT | Performed by: NURSE PRACTITIONER

## 2020-10-15 NOTE — PROGRESS NOTES
Cardiology Office Follow Up Visit      Primary Care Provider:  Maude Ricketts APRN    Reason for f/u:     Sick sinus syndrome  Dual-chamber pacemaker  Coronary artery disease  Hypertension      Subjective     CC:    Denies chest pain or dyspnea    History of Present Illness       Jayden Bond is a 80 y.o. male.  Patient was recently seen by Dr. Garcia in [place] for follow-up regarding his coronary artery disease.  He was asked to come to our office today for interrogation of his pacemaker.    The patient has a known history of sick sinus syndrome, high pretension with periods of orthostasis, previous dual-chamber pacemaker, coronary artery disease.    Patient's pacemaker pocket in the left upper chest is without significant redness swelling or hematoma.  It is healed nicely.  He complains of some intermittent pain at the site.  There is no redness or signs of inflammation.  He has been instructed to try not to rub the pacemaker pocket frequently.    He denies exertional chest pain or dyspnea currently per his wife he is compliant with medical therapy        Past Medical History:   Diagnosis Date   • CAD (coronary artery disease)    • Coronary artery disease     PTCI   • Dementia (CMS/HCC)    • Depression    • Disease of thyroid gland    • Dysautonomia (CMS/HCC)    • Dyslipidemia    • GERD (gastroesophageal reflux disease)    • Head pain    • Hyperlipidemia    • Hypertension    • SSS (sick sinus syndrome) (CMS/HCC)    • Stroke (CMS/HCC)     x 3 in 2018       Past Surgical History:   Procedure Laterality Date   • APPENDECTOMY     • CHOLECYSTECTOMY     • ENDOSCOPY N/A 10/17/2019    Procedure: ESOPHAGOGASTRODUODENOSCOPY with biopsy x 2 areas;  Surgeon: Ashok Sanchez MD;  Location: [facility] ENDOSCOPY;  Service: Gastroenterology   • HERNIA REPAIR     • INSERT / REPLACE / REMOVE PACEMAKER     • LEFT HEART CATH     • PACEMAKER IMPLANTATION      Medtronic   • TEMPORAL ARTERY BIOPSY     • WRIST SURGERY      severed artery          Current Outpatient Medications:   •  apixaban (ELIQUIS) 2.5 MG tablet tablet, Take 2 tablets by mouth Every 12 (Twelve) Hours. Indications: Atrial Fibrillation, Disp: , Rfl:   •  brimonidine (ALPHAGAN) 0.2 % ophthalmic solution, Administer 1 drop to both eyes 2 (Two) Times a Day., Disp: 10 mL, Rfl: 12  •  clopidogrel (PLAVIX) 75 MG tablet, Take 1 tablet by mouth Daily., Disp: 30 tablet, Rfl: 2  •  escitalopram (LEXAPRO) 10 MG tablet, Take 10 mg by mouth Daily., Disp: , Rfl:   •  fluticasone (FLONASE) 50 MCG/ACT nasal spray, 2 sprays into the nostril(s) as directed by provider Daily., Disp: , Rfl:   •  galantamine ER (RAZADYNE ER) 8 MG 24 hr capsule, Take 8 mg by mouth 2 (Two) Times a Day., Disp: , Rfl:   •  isosorbide mononitrate (IMDUR) 30 MG 24 hr tablet, Take 1 tablet by mouth Daily., Disp: 30 tablet, Rfl: 0  •  levothyroxine (SYNTHROID, LEVOTHROID) 25 MCG tablet, Take 25 mcg by mouth Daily., Disp: , Rfl:   •  loratadine (CLARITIN) 10 MG tablet, Take 10 mg by mouth Daily., Disp: , Rfl:   •  midodrine (PROAMATINE) 2.5 MG tablet, Take 1 tablet by mouth 3 (Three) Times a Day Before Meals. (Patient taking differently: Take 2.5 mg by mouth As Needed.), Disp: 90 tablet, Rfl: 3  •  montelukast (SINGULAIR) 10 MG tablet, 1 tablet Daily., Disp: , Rfl:   •  Multiple Vitamins-Minerals (MULTIVITAMIN WITH MINERALS) tablet tablet, Take 1 tablet by mouth Every Night., Disp: , Rfl:   •  nitroglycerin (NITROSTAT) 0.4 MG SL tablet, Place 0.4 mg under the tongue Every 5 (Five) Minutes As Needed for Chest Pain. Take no more than 3 doses in 15 minutes., Disp: , Rfl:   •  Omega-3 Fatty Acids (FISH OIL) 1000 MG capsule capsule, Take 1,000 mg by mouth Daily With Breakfast., Disp: , Rfl:   •  pantoprazole (PROTONIX) 40 MG EC tablet, Take 40 mg by mouth 2 (two) times a day., Disp: , Rfl:   •  predniSONE (DELTASONE) 5 MG tablet, Take 5 mg by mouth Daily., Disp: , Rfl:   •  rosuvastatin (CRESTOR) 20 MG tablet, Take 20 mg by mouth  Every Night., Disp: , Rfl:   •  vitamin B-12 (CYANOCOBALAMIN) 1000 MCG tablet, Take 1,000 mcg by mouth Daily., Disp: , Rfl:     Social History     Socioeconomic History   • Marital status:      Spouse name: Not on file   • Number of children: Not on file   • Years of education: Not on file   • Highest education level: Not on file   Tobacco Use   • Smoking status: Never Smoker   • Smokeless tobacco: Never Used   • Tobacco comment: quit 25 yrs ago   Substance and Sexual Activity   • Alcohol use: No     Frequency: Never   • Drug use: No   • Sexual activity: Defer   Social History Narrative    ** Merged History Encounter **            Family History   Problem Relation Age of Onset   • Heart disease Father        The following portions of the patient's history were reviewed and updated as appropriate: allergies, current medications, past family history, past medical history, past social history, past surgical history and problem list.    Review of Systems   Constitution: Negative for decreased appetite and diaphoresis.   HENT: Negative for congestion, hearing loss and nosebleeds.    Cardiovascular: Negative for chest pain, claudication, dyspnea on exertion, irregular heartbeat, leg swelling, near-syncope, orthopnea, palpitations, paroxysmal nocturnal dyspnea and syncope.   Respiratory: Negative for cough, shortness of breath and sleep disturbances due to breathing.    Endocrine: Negative for polyuria.   Hematologic/Lymphatic: Does not bruise/bleed easily.   Skin: Negative for itching and rash.   Musculoskeletal: Negative for back pain, muscle weakness and myalgias.   Gastrointestinal: Negative for abdominal pain, change in bowel habit and nausea.   Genitourinary: Negative for dysuria, flank pain, frequency and hesitancy.   Neurological: Negative for dizziness, tremors and weakness.   Psychiatric/Behavioral: Negative for altered mental status. The patient does not have insomnia.        /74   Pulse 62   Ht  "177.8 cm (70\")   Wt 78.5 kg (173 lb)   SpO2 99%   BMI 24.82 kg/m² .    Objective     Constitutional:       General: Not in acute distress.     Appearance: Normal appearance. Well-developed.   Eyes:      Pupils: Pupils are equal, round, and reactive to light.   HENT:      Head: Normocephalic and atraumatic.   Neck:      Musculoskeletal: Normal range of motion and neck supple.      Vascular: No JVD.   Pulmonary:      Effort: Pulmonary effort is normal.      Breath sounds: Normal breath sounds.   Cardiovascular:      Normal rate. Regular rhythm.   Pulses:     Intact distal pulses.   Edema:     Peripheral edema absent.   Abdominal:      General: There is no distension.      Palpations: Abdomen is soft.      Tenderness: There is no abdominal tenderness.   Musculoskeletal: Normal range of motion.   Skin:     General: Skin is warm and dry.   Neurological:      Mental Status: Alert and oriented to person, place, and time.             ECG 12 Lead    Date/Time: 10/15/2020 2:02 PM  Performed by: Courtney Aden APRN  Authorized by: Courtney Aden APRN   Rhythm: sinus rhythm  Q waves: aVF and III      Clinical impression: abnormal EKG                In Office Device Interrogation:     DEVICE INTERROGATION:  IN OFFICE    DEVICE TYPE:   Dual-chamber pacemaker    :   Medtronic    BATTERY:  Stable    TIME TO ELECTIVE REPLACEMENT INDICATORS:   5.5 years    CHARGE TIME:   Not applicable        LEAD DATA:    Atrial:   2.8 mV, 494 ohms, 0.75 V@0.4 ms    Ventricular:     11.4 mV, 551 ohms, 0.375 V@0.4 ms    LV:      Atrial pacing percentage: 84%    Ventricular pacing percentage: 0%      Arrhythmia Logbook Reviewed: No A. fib        Summary:    Stable Device Function    No significant arhythmia burden.     Battery status is stable.      NEXT IN OFFICE DEVICE CHECK DUE: 6-month    REMOTE DEVICE INTERROGATIONS: Not applicable per patient's wife they do not get signal with the home monitor at her home      Diagnoses and " all orders for this visit:    1. SSS (sick sinus syndrome) (CMS/HCC) (Primary)  Comments:  Stable since pacemaker implant    2. Cardiac pacemaker in situ  Comments:  Stable device function    3. Coronary artery disease involving native coronary artery of native heart without angina pectoris  Comments:  No signs and symptoms to suggest unstable angina    4. Mixed hyperlipidemia  Comments:  Remains on statin therapy    5. Essential hypertension  Comments:  Stable on current medical therapy    Other orders  -     ECG 12 Lead             Plan:  Patient's device is working well.  The battery is stable.    He has had no sustained arrhythmias.    We will continue current medications as prescribed and plan on seeing the patient back for scheduled follow-up with Dr. Garcia in 6 months.

## 2020-10-19 PROBLEM — I49.5 SSS (SICK SINUS SYNDROME) (HCC): Status: ACTIVE | Noted: 2020-10-19

## 2020-11-13 NOTE — TELEPHONE ENCOUNTER
Wife called and stated she knows Dr. Garcia told her not to check the blood pressure in the afternoon and he started him on Midodrine. She said she is still checking it at night and last night she checked it it was 198/128 heart rate of 71 and it was an irregular rhythm per her monitor. She wants to know what she should do with it getting up this high? She said in the morning it will be low 107/72.

## 2020-11-20 NOTE — TELEPHONE ENCOUNTER
His blood pressure yesterday ran high all day 173/115 pule 74 168/113 pulse 78 170/114 pulse 80  197/123 pulse 71 bed time 189/121 pulse 71  One hour later laying down 164/106 pulse 69  Today 163/108 pulse 65  Now 162/101 pulse 76  He has had no blood pressure medication today  Would like a call back today Please call [phone] if no answer  [phone]

## 2020-11-24 RX ORDER — NEBIVOLOL 5 MG/1
5 TABLET ORAL DAILY
Qty: 90 TABLET | Refills: 1 | Status: SHIPPED | OUTPATIENT
Start: 2020-11-24 | End: 2020-12-02

## 2020-11-24 NOTE — TELEPHONE ENCOUNTER
She does not want him to take the Bystolic .. wanted to argue about his B/P and med's  Informed her to make an appt to be seen. She said her and the  are not feeling well so they couldn't bring him in. I Made a phone visit with Courtney on Wed .

## 2020-12-02 ENCOUNTER — OFFICE VISIT (OUTPATIENT)
Dept: CARDIOLOGY | Facility: CLINIC | Age: 80
End: 2020-12-02

## 2020-12-02 VITALS
HEART RATE: 65 BPM | BODY MASS INDEX: 24.39 KG/M2 | SYSTOLIC BLOOD PRESSURE: 119 MMHG | DIASTOLIC BLOOD PRESSURE: 76 MMHG | WEIGHT: 170 LBS

## 2020-12-02 DIAGNOSIS — I10 ESSENTIAL HYPERTENSION: Primary | ICD-10-CM

## 2020-12-02 DIAGNOSIS — I95.1 DYSAUTONOMIA ORTHOSTATIC HYPOTENSION SYNDROME: ICD-10-CM

## 2020-12-02 PROCEDURE — 99442 PR PHYS/QHP TELEPHONE EVALUATION 11-20 MIN: CPT | Performed by: NURSE PRACTITIONER

## 2020-12-02 RX ORDER — ALBUTEROL SULFATE 90 UG/1
2 AEROSOL, METERED RESPIRATORY (INHALATION) EVERY 4 HOURS PRN
COMMUNITY
Start: 2020-10-21 | End: 2021-09-14

## 2020-12-07 ENCOUNTER — OFFICE VISIT (OUTPATIENT)
Dept: CARDIOLOGY | Facility: CLINIC | Age: 80
End: 2020-12-07

## 2020-12-07 VITALS
WEIGHT: 173 LBS | HEART RATE: 63 BPM | SYSTOLIC BLOOD PRESSURE: 100 MMHG | OXYGEN SATURATION: 97 % | HEIGHT: 70 IN | BODY MASS INDEX: 24.77 KG/M2 | DIASTOLIC BLOOD PRESSURE: 67 MMHG

## 2020-12-07 DIAGNOSIS — I47.29 NONSUSTAINED VENTRICULAR TACHYCARDIA (HCC): ICD-10-CM

## 2020-12-07 DIAGNOSIS — I49.5 SSS (SICK SINUS SYNDROME) (HCC): ICD-10-CM

## 2020-12-07 DIAGNOSIS — I63.9 CEREBROVASCULAR ACCIDENT (CVA), UNSPECIFIED MECHANISM (HCC): ICD-10-CM

## 2020-12-07 DIAGNOSIS — I25.10 CORONARY ARTERY DISEASE INVOLVING NATIVE CORONARY ARTERY OF NATIVE HEART WITHOUT ANGINA PECTORIS: ICD-10-CM

## 2020-12-07 DIAGNOSIS — I10 ESSENTIAL HYPERTENSION: ICD-10-CM

## 2020-12-07 DIAGNOSIS — R07.2 PRECORDIAL PAIN: ICD-10-CM

## 2020-12-07 DIAGNOSIS — E78.2 MIXED HYPERLIPIDEMIA: Primary | ICD-10-CM

## 2020-12-07 DIAGNOSIS — Z01.810 PREOPERATIVE CARDIOVASCULAR EXAMINATION: ICD-10-CM

## 2020-12-07 PROCEDURE — 99214 OFFICE O/P EST MOD 30 MIN: CPT | Performed by: INTERNAL MEDICINE

## 2020-12-07 NOTE — PROGRESS NOTES
Date of Office Visit: 2020  Encounter Provider: Dr. Jose Garcia    Place of Service: [facility] CARDIOLOGY [place]  Patient Name: Jayden Bond  :1940  Maude Ricketts APRN    Chief Complaint   Patient presents with   • Surgical Clearance     procedure with urology      History of Present Illness    I am pleased to see Mr. Bond in my office today as a as a follow-up    As you know, patient is 80 years old white gentleman whose past medical history is significant for orthostatic hypotension, CAD, coronary artery stenting, paroxysmal atrial fibrillation, tachybradycardia syndrome, status post permanent pacemaker, who came today for follow-up.    In the past patient has been seen by Dr. Mcclain and recently patient is being seen by Dr. Toledo at [facility].  In 2019, patient underwent cardiac catheterization and was found to have intermediate lesion of LAD.  Patient underwent FFR assessment and it was abnormal and patient underwent subsequent stenting.  Patient was started on aspirin and Plavix.  Patient was also on Eliquis 5 mg twice daily for possible paroxysmal atrial fibrillation.    Patient came today for unscheduled visit because of preoperative clearance.  Patient is being considered for urology surgery.  Patient denies any chest pain.  Patient denies any orthopnea or PND.  Patient had orthostatic hypotension and his blood pressure is definitely moving up and down.  At this stage, blood pressure is slightly low.  Patient is on midodrine.    At this stage, I would recommend that patient can proceed with surgery as planned.  I would not start any antihypertensive regimen.  I advised the patient to take extra half dose of Imdur at bedtime if blood pressure is elevated.  Aggressive blood pressure control in this patient probably would not be desirable because of risk of fall.        Past Medical History:   Diagnosis Date   • CAD (coronary artery disease)    • Coronary artery  disease     PTCI   • Dementia (CMS/HCC)    • Depression    • Disease of thyroid gland    • Dysautonomia (CMS/HCC)    • Dyslipidemia    • GERD (gastroesophageal reflux disease)    • Head pain    • Hyperlipidemia    • Hypertension    • SSS (sick sinus syndrome) (CMS/HCC)    • Stroke (CMS/HCC)     x 3 in 2018         Past Surgical History:   Procedure Laterality Date   • APPENDECTOMY     • CHOLECYSTECTOMY     • CYSTOSCOPY     • ENDOSCOPY N/A 10/17/2019    Procedure: ESOPHAGOGASTRODUODENOSCOPY with biopsy x 2 areas;  Surgeon: Ashok Sanchez MD;  Location: [facility] ENDOSCOPY;  Service: Gastroenterology   • HERNIA REPAIR     • INSERT / REPLACE / REMOVE PACEMAKER     • LEFT HEART CATH     • PACEMAKER IMPLANTATION      Medtronic   • TEMPORAL ARTERY BIOPSY     • WRIST SURGERY      severed artery           Current Outpatient Medications:   •  albuterol sulfate  (90 Base) MCG/ACT inhaler, Inhale 2 puffs Every 4 (Four) Hours As Needed., Disp: , Rfl:   •  apixaban (ELIQUIS) 2.5 MG tablet tablet, Take 2 tablets by mouth Every 12 (Twelve) Hours. Indications: Atrial Fibrillation (Patient taking differently: Take 2.5 mg by mouth Every 12 (Twelve) Hours. Indications: Atrial Fibrillation), Disp: , Rfl:   •  brimonidine (ALPHAGAN) 0.2 % ophthalmic solution, Administer 1 drop to both eyes 2 (Two) Times a Day., Disp: 10 mL, Rfl: 12  •  clopidogrel (PLAVIX) 75 MG tablet, Take 1 tablet by mouth Daily., Disp: 30 tablet, Rfl: 2  •  escitalopram (LEXAPRO) 10 MG tablet, Take 10 mg by mouth Daily., Disp: , Rfl:   •  fluticasone (FLONASE) 50 MCG/ACT nasal spray, 2 sprays into the nostril(s) as directed by provider Daily., Disp: , Rfl:   •  galantamine ER (RAZADYNE ER) 8 MG 24 hr capsule, Take 8 mg by mouth 2 (Two) Times a Day., Disp: , Rfl:   •  isosorbide mononitrate (IMDUR) 30 MG 24 hr tablet, Take 1 tablet by mouth Daily., Disp: 30 tablet, Rfl: 0  •  levothyroxine (SYNTHROID, LEVOTHROID) 25 MCG tablet, Take 25 mcg by mouth Daily., Disp:  , Rfl:   •  loratadine (CLARITIN) 10 MG tablet, Take 10 mg by mouth Daily., Disp: , Rfl:   •  midodrine (PROAMATINE) 2.5 MG tablet, Take 1 tablet by mouth 3 (Three) Times a Day Before Meals. (Patient taking differently: Take 2.5 mg by mouth As Needed. Only taking 1 a day), Disp: 90 tablet, Rfl: 3  •  Multiple Vitamins-Minerals (MULTIVITAMIN WITH MINERALS) tablet tablet, Take 1 tablet by mouth Every Night., Disp: , Rfl:   •  nitroglycerin (NITROSTAT) 0.4 MG SL tablet, Place 0.4 mg under the tongue Every 5 (Five) Minutes As Needed for Chest Pain. Take no more than 3 doses in 15 minutes., Disp: , Rfl:   •  Omega-3 Fatty Acids (FISH OIL) 1000 MG capsule capsule, Take 1,000 mg by mouth Daily With Breakfast., Disp: , Rfl:   •  pantoprazole (PROTONIX) 40 MG EC tablet, Take 40 mg by mouth 2 (two) times a day., Disp: , Rfl:   •  predniSONE (DELTASONE) 5 MG tablet, Take 5 mg by mouth Daily., Disp: , Rfl:   •  rosuvastatin (CRESTOR) 20 MG tablet, Take 20 mg by mouth Every Night., Disp: , Rfl:   •  vitamin B-12 (CYANOCOBALAMIN) 1000 MCG tablet, Take 1,000 mcg by mouth Daily., Disp: , Rfl:       Social History     Socioeconomic History   • Marital status:      Spouse name: Not on file   • Number of children: Not on file   • Years of education: Not on file   • Highest education level: Not on file   Tobacco Use   • Smoking status: Former Smoker   • Smokeless tobacco: Never Used   • Tobacco comment: quit 25 yrs ago   Substance and Sexual Activity   • Alcohol use: No     Frequency: Never   • Drug use: No   • Sexual activity: Defer   Social History Narrative    ** Merged History Encounter **              Review of Systems   Constitution: Negative for chills and fever.   HENT: Negative for ear discharge and nosebleeds.    Eyes: Negative for discharge and redness.   Cardiovascular: Negative for chest pain, orthopnea, palpitations, paroxysmal nocturnal dyspnea and syncope.   Respiratory: Negative for cough, shortness of breath  "and wheezing.    Endocrine: Negative for heat intolerance.   Skin: Negative for rash.   Musculoskeletal: Negative for arthritis and myalgias.   Gastrointestinal: Negative for abdominal pain, melena, nausea and vomiting.   Genitourinary: Negative for dysuria and hematuria.   Neurological: Negative for dizziness, light-headedness, numbness and tremors.   Psychiatric/Behavioral: Negative for depression. The patient is not nervous/anxious.        Procedures    Procedures    No orders to display           Objective:    /67   Pulse 63   Ht 177.8 cm (70\")   Wt 78.5 kg (173 lb)   SpO2 97% Comment: on 2 units  BMI 24.82 kg/m²         Constitutional:       Appearance: Well-developed.   Eyes:      General: No scleral icterus.        Right eye: No discharge.   HENT:      Head: Normocephalic and atraumatic.   Neck:      Thyroid: No thyromegaly.      Lymphadenopathy: No cervical adenopathy.   Pulmonary:      Effort: Pulmonary effort is normal. No respiratory distress.      Breath sounds: Normal breath sounds. No wheezing. No rales.   Cardiovascular:      Normal rate. Regular rhythm.      No gallop.   Abdominal:      Tenderness: There is no abdominal tenderness.   Skin:     Findings: No erythema or rash.   Neurological:      Mental Status: Alert and oriented to person, place, and time.             Assessment:       Diagnosis Plan   1. Mixed hyperlipidemia     2. Essential hypertension     3. Cerebrovascular accident (CVA), unspecified mechanism (CMS/HCC)     4. Coronary artery disease involving native coronary artery of native heart without angina pectoris     5. SSS (sick sinus syndrome) (CMS/HCC)     6. Nonsustained ventricular tachycardia (CMS/HCC)     7. Precordial pain     8. Preoperative cardiovascular examination              Plan:       At this stage, I would recommend that patient should continue current treatment.  I would advise the patient to use Imdur as needed to bring the blood pressure down.  I would not " recommend frequent blood pressure monitoring.

## 2020-12-10 NOTE — PROGRESS NOTES
CARDIOLOGY   TELE-VISIT        You have chosen to receive care through a telephone visit today. Do you consent to use a telephone visit for your medical care today? Yes        Primary Care Provider:   Maude Ricketts APRN        Reason for follow up:    Concern for elevated blood pressure      Subjective       CC:    Denies chest pain or dyspnea    History of Present Illness    Jayden Bond is a 80 y.o. male The patient has a known history of sick sinus syndrome, hypertension with periods of orthostasis, previous dual-chamber pacemaker, coronary artery disease.     He denies exertional chest pain or dyspnea currently per his wife he is compliant with medical therapy    He reports some recent elevations in his blood pressure currently 119/76 he continues to have scheduled appointment for office closer to 100.    No recurrent syncopal episodes    Past Medical History:   Diagnosis Date   • CAD (coronary artery disease)    • Coronary artery disease     PTCI   • Dementia (CMS/HCC)    • Depression    • Disease of thyroid gland    • Dysautonomia (CMS/HCC)    • Dyslipidemia    • GERD (gastroesophageal reflux disease)    • Head pain    • Hyperlipidemia    • Hypertension    • SSS (sick sinus syndrome) (CMS/HCC)    • Stroke (CMS/HCC)     x 3 in 2018       Past Surgical History:   Procedure Laterality Date   • APPENDECTOMY     • CHOLECYSTECTOMY     • CYSTOSCOPY     • ENDOSCOPY N/A 10/17/2019    Procedure: ESOPHAGOGASTRODUODENOSCOPY with biopsy x 2 areas;  Surgeon: Ashok Sanchez MD;  Location: [facility] ENDOSCOPY;  Service: Gastroenterology   • HERNIA REPAIR     • INSERT / REPLACE / REMOVE PACEMAKER     • LEFT HEART CATH     • PACEMAKER IMPLANTATION      Medtronic   • TEMPORAL ARTERY BIOPSY     • WRIST SURGERY      severed artery         Current Outpatient Medications:   •  albuterol sulfate  (90 Base) MCG/ACT inhaler, Inhale 2 puffs Every 4 (Four) Hours As Needed., Disp: , Rfl:   •  apixaban (ELIQUIS) 2.5 MG tablet  tablet, Take 2 tablets by mouth Every 12 (Twelve) Hours. Indications: Atrial Fibrillation (Patient taking differently: Take 2.5 mg by mouth Every 12 (Twelve) Hours. Indications: Atrial Fibrillation), Disp: , Rfl:   •  brimonidine (ALPHAGAN) 0.2 % ophthalmic solution, Administer 1 drop to both eyes 2 (Two) Times a Day., Disp: 10 mL, Rfl: 12  •  clopidogrel (PLAVIX) 75 MG tablet, Take 1 tablet by mouth Daily., Disp: 30 tablet, Rfl: 2  •  escitalopram (LEXAPRO) 10 MG tablet, Take 10 mg by mouth Daily., Disp: , Rfl:   •  fluticasone (FLONASE) 50 MCG/ACT nasal spray, 2 sprays into the nostril(s) as directed by provider Daily., Disp: , Rfl:   •  galantamine ER (RAZADYNE ER) 8 MG 24 hr capsule, Take 8 mg by mouth 2 (Two) Times a Day., Disp: , Rfl:   •  isosorbide mononitrate (IMDUR) 30 MG 24 hr tablet, Take 1 tablet by mouth Daily., Disp: 30 tablet, Rfl: 0  •  levothyroxine (SYNTHROID, LEVOTHROID) 25 MCG tablet, Take 25 mcg by mouth Daily., Disp: , Rfl:   •  loratadine (CLARITIN) 10 MG tablet, Take 10 mg by mouth Daily., Disp: , Rfl:   •  midodrine (PROAMATINE) 2.5 MG tablet, Take 1 tablet by mouth 3 (Three) Times a Day Before Meals. (Patient taking differently: Take 2.5 mg by mouth As Needed. Only taking 1 a day), Disp: 90 tablet, Rfl: 3  •  Multiple Vitamins-Minerals (MULTIVITAMIN WITH MINERALS) tablet tablet, Take 1 tablet by mouth Every Night., Disp: , Rfl:   •  Omega-3 Fatty Acids (FISH OIL) 1000 MG capsule capsule, Take 1,000 mg by mouth Daily With Breakfast., Disp: , Rfl:   •  pantoprazole (PROTONIX) 40 MG EC tablet, Take 40 mg by mouth 2 (two) times a day., Disp: , Rfl:   •  predniSONE (DELTASONE) 5 MG tablet, Take 5 mg by mouth Daily., Disp: , Rfl:   •  rosuvastatin (CRESTOR) 20 MG tablet, Take 20 mg by mouth Every Night., Disp: , Rfl:   •  vitamin B-12 (CYANOCOBALAMIN) 1000 MCG tablet, Take 1,000 mcg by mouth Daily., Disp: , Rfl:   •  nitroglycerin (NITROSTAT) 0.4 MG SL tablet, Place 0.4 mg under the tongue Every  5 (Five) Minutes As Needed for Chest Pain. Take no more than 3 doses in 15 minutes., Disp: , Rfl:     Social History     Socioeconomic History   • Marital status:      Spouse name: Not on file   • Number of children: Not on file   • Years of education: Not on file   • Highest education level: Not on file   Tobacco Use   • Smoking status: Former Smoker   • Smokeless tobacco: Never Used   • Tobacco comment: quit 25 yrs ago   Substance and Sexual Activity   • Alcohol use: No     Frequency: Never   • Drug use: No   • Sexual activity: Defer   Social History Narrative    ** Merged History Encounter **            Family History   Problem Relation Age of Onset   • Heart disease Father        The following portions of the patient's history were reviewed and updated as appropriate:  allergies, current medications, family history, social history, surgical history and problem list.     Review of Systems   Constitution: Negative for decreased appetite and diaphoresis.   HENT: Negative for congestion, hearing loss and nosebleeds.    Cardiovascular: Negative for chest pain, claudication, dyspnea on exertion, irregular heartbeat, leg swelling, near-syncope, orthopnea, palpitations, paroxysmal nocturnal dyspnea and syncope.   Respiratory: Negative for cough, shortness of breath and sleep disturbances due to breathing.    Endocrine: Negative for polyuria.   Hematologic/Lymphatic: Does not bruise/bleed easily.   Skin: Negative for itching and rash.   Musculoskeletal: Negative for back pain, muscle weakness and myalgias.   Gastrointestinal: Negative for abdominal pain, change in bowel habit and nausea.   Genitourinary: Negative for dysuria, flank pain, frequency and hesitancy.   Neurological: Negative for dizziness, tremors and weakness.   Psychiatric/Behavioral: Negative for altered mental status. The patient does not have insomnia.        /76   Pulse 65   Wt 77.1 kg (170 lb)   BMI 24.39 kg/m²     Objective     No  conversational dyspnea, respirations sound easy and unlabored denies edema    Diagnoses and all orders for this visit:    1. Essential hypertension (Primary)  Comments:  Patient has had some isolated elevated blood pressures    2. Dysautonomia orthostatic hypotension syndrome (CMS/HCC)  Comments:  History of orthostatic hypotension which has been well controlled with midodrine      Plan:  Advised the patient to move his isosorbide dosing.    Continue to monitor his blood pressure    Keep scheduled follow-up with Dr. Garcia    This visit has been rescheduled as a phone visit to comply with patient safety concerns in accordance with CDC recommendations. Total time of discussion was 15 minutes.

## 2021-01-08 RX ORDER — CLOPIDOGREL BISULFATE 75 MG/1
TABLET ORAL
Qty: 90 TABLET | Refills: 0 | Status: SHIPPED | OUTPATIENT
Start: 2021-01-08 | End: 2021-04-26

## 2021-03-10 ENCOUNTER — CLINICAL SUPPORT NO REQUIREMENTS (OUTPATIENT)
Dept: CARDIOLOGY | Facility: CLINIC | Age: 81
End: 2021-03-10

## 2021-03-10 ENCOUNTER — OFFICE VISIT (OUTPATIENT)
Dept: CARDIOLOGY | Facility: CLINIC | Age: 81
End: 2021-03-10

## 2021-03-10 VITALS
HEIGHT: 70 IN | BODY MASS INDEX: 24.77 KG/M2 | DIASTOLIC BLOOD PRESSURE: 58 MMHG | WEIGHT: 173 LBS | SYSTOLIC BLOOD PRESSURE: 100 MMHG

## 2021-03-10 DIAGNOSIS — I47.1 PAROXYSMAL SVT (SUPRAVENTRICULAR TACHYCARDIA) (HCC): ICD-10-CM

## 2021-03-10 DIAGNOSIS — I49.5 SSS (SICK SINUS SYNDROME) (HCC): ICD-10-CM

## 2021-03-10 DIAGNOSIS — I25.10 CORONARY ARTERY DISEASE INVOLVING NATIVE CORONARY ARTERY OF NATIVE HEART WITHOUT ANGINA PECTORIS: ICD-10-CM

## 2021-03-10 DIAGNOSIS — Z95.0 CARDIAC PACEMAKER IN SITU: ICD-10-CM

## 2021-03-10 DIAGNOSIS — R00.2 PALPITATIONS: Primary | ICD-10-CM

## 2021-03-10 PROCEDURE — 99213 OFFICE O/P EST LOW 20 MIN: CPT | Performed by: NURSE PRACTITIONER

## 2021-03-10 PROCEDURE — 93280 PM DEVICE PROGR EVAL DUAL: CPT | Performed by: NURSE PRACTITIONER

## 2021-03-11 PROBLEM — R00.2 PALPITATIONS: Status: ACTIVE | Noted: 2021-03-11

## 2021-03-11 NOTE — PROGRESS NOTES
Cardiology Office Follow Up Visit      Primary Care Provider:  Maude Ricktets APRN    Reason for f/u:     Concern for irregular heartbeats      Subjective     CC:    Patient denies chest pain, dyspnea    History of Present Illness       Jayden HAMLET Bond is a 80 y.o. male.  Patient has a known history of sick sinus syndrome, hypertension with periods of orthostasis, sick sinus syndrome with dual-chamber pacemaker and coronary artery disease.    He has very labile blood pressures with wife closely monitors these at home.    He came into the office because she was concerned on a home blood pressure monitor that was saying his heart rhythm was irregular and at times also his heart rate was noted to be 58.    The patient denies any current or recent chest pain, worsening dyspnea, PND, orthopnea, near syncope, lower extremity edema he complains of brief feelings of palpitations are not long-lasting or associated with any other symptoms.  They report compliance with medical therapy        Past Medical History:   Diagnosis Date   • CAD (coronary artery disease)    • Coronary artery disease     PTCI   • Dementia (CMS/HCC)    • Depression    • Disease of thyroid gland    • Dysautonomia (CMS/HCC)    • Dyslipidemia    • GERD (gastroesophageal reflux disease)    • Head pain    • Hyperlipidemia    • Hypertension    • SSS (sick sinus syndrome) (CMS/HCC)    • Stroke (CMS/HCC)     x 3 in 2018       Past Surgical History:   Procedure Laterality Date   • APPENDECTOMY     • CHOLECYSTECTOMY     • CYSTOSCOPY     • ENDOSCOPY N/A 10/17/2019    Procedure: ESOPHAGOGASTRODUODENOSCOPY with biopsy x 2 areas;  Surgeon: Ashok Sanchez MD;  Location: [facility] ENDOSCOPY;  Service: Gastroenterology   • HERNIA REPAIR     • INSERT / REPLACE / REMOVE PACEMAKER     • LEFT HEART CATH     • PACEMAKER IMPLANTATION      Medtronic   • TEMPORAL ARTERY BIOPSY     • WRIST SURGERY      severed artery         Current Outpatient Medications:   •  albuterol sulfate   (90 Base) MCG/ACT inhaler, Inhale 2 puffs Every 4 (Four) Hours As Needed., Disp: , Rfl:   •  apixaban (ELIQUIS) 2.5 MG tablet tablet, Take 2 tablets by mouth Every 12 (Twelve) Hours. Indications: Atrial Fibrillation (Patient taking differently: Take 2.5 mg by mouth Every 12 (Twelve) Hours. Indications: Atrial Fibrillation), Disp: , Rfl:   •  brimonidine (ALPHAGAN) 0.2 % ophthalmic solution, Administer 1 drop to both eyes 2 (Two) Times a Day., Disp: 10 mL, Rfl: 12  •  clopidogrel (PLAVIX) 75 MG tablet, TAKE 1 TABLET BY MOUTH EVERY DAY, Disp: 90 tablet, Rfl: 0  •  escitalopram (LEXAPRO) 10 MG tablet, Take 10 mg by mouth Daily., Disp: , Rfl:   •  fluticasone (FLONASE) 50 MCG/ACT nasal spray, 2 sprays into the nostril(s) as directed by provider Daily., Disp: , Rfl:   •  galantamine ER (RAZADYNE ER) 8 MG 24 hr capsule, Take 8 mg by mouth 2 (Two) Times a Day., Disp: , Rfl:   •  isosorbide mononitrate (IMDUR) 30 MG 24 hr tablet, Take 1 tablet by mouth Daily., Disp: 30 tablet, Rfl: 0  •  levothyroxine (SYNTHROID, LEVOTHROID) 25 MCG tablet, Take 25 mcg by mouth Daily., Disp: , Rfl:   •  loratadine (CLARITIN) 10 MG tablet, Take 10 mg by mouth Daily., Disp: , Rfl:   •  midodrine (PROAMATINE) 2.5 MG tablet, Take 1 tablet by mouth 3 (Three) Times a Day Before Meals. (Patient taking differently: Take 2.5 mg by mouth As Needed. Only taking 1 a day), Disp: 90 tablet, Rfl: 3  •  Multiple Vitamins-Minerals (MULTIVITAMIN WITH MINERALS) tablet tablet, Take 1 tablet by mouth Every Night., Disp: , Rfl:   •  nitroglycerin (NITROSTAT) 0.4 MG SL tablet, Place 0.4 mg under the tongue Every 5 (Five) Minutes As Needed for Chest Pain. Take no more than 3 doses in 15 minutes., Disp: , Rfl:   •  Omega-3 Fatty Acids (FISH OIL) 1000 MG capsule capsule, Take 1,000 mg by mouth Daily With Breakfast., Disp: , Rfl:   •  pantoprazole (PROTONIX) 40 MG EC tablet, Take 40 mg by mouth 2 (two) times a day., Disp: , Rfl:   •  predniSONE (DELTASONE) 5  MG tablet, Take 5 mg by mouth Daily., Disp: , Rfl:   •  rosuvastatin (CRESTOR) 20 MG tablet, Take 20 mg by mouth Every Night., Disp: , Rfl:   •  vitamin B-12 (CYANOCOBALAMIN) 1000 MCG tablet, Take 1,000 mcg by mouth Daily., Disp: , Rfl:   •  Incruse Ellipta 62.5 MCG/INH aerosol powder , Daily., Disp: , Rfl:     Social History     Socioeconomic History   • Marital status:      Spouse name: Not on file   • Number of children: Not on file   • Years of education: Not on file   • Highest education level: Not on file   Tobacco Use   • Smoking status: Former Smoker   • Smokeless tobacco: Never Used   • Tobacco comment: quit 25 yrs ago   Substance and Sexual Activity   • Alcohol use: No   • Drug use: No   • Sexual activity: Defer       Family History   Problem Relation Age of Onset   • Heart disease Father        The following portions of the patient's history were reviewed and updated as appropriate: allergies, current medications, past family history, past medical history, past social history, past surgical history and problem list.    Review of Systems   Constitutional: Negative for decreased appetite and diaphoresis.   HENT: Negative for congestion, hearing loss and nosebleeds.    Cardiovascular: Positive for palpitations. Negative for chest pain, claudication, dyspnea on exertion, irregular heartbeat, leg swelling, near-syncope, orthopnea, paroxysmal nocturnal dyspnea and syncope.   Respiratory: Negative for cough, shortness of breath and sleep disturbances due to breathing.    Endocrine: Negative for polyuria.   Hematologic/Lymphatic: Does not bruise/bleed easily.   Skin: Negative for itching and rash.   Musculoskeletal: Negative for back pain, muscle weakness and myalgias.   Gastrointestinal: Negative for abdominal pain, change in bowel habit and nausea.   Genitourinary: Negative for dysuria, flank pain, frequency and hesitancy.   Neurological: Negative for dizziness, tremors and weakness.  "  Psychiatric/Behavioral: Negative for altered mental status. The patient does not have insomnia.        /58 (BP Location: Left arm, Patient Position: Sitting, Cuff Size: Adult)   Ht 177.8 cm (70\")   Wt 78.5 kg (173 lb)   BMI 24.82 kg/m² .    Objective     Constitutional:       General: Not in acute distress.     Appearance: Normal appearance. Well-developed.   Eyes:      Pupils: Pupils are equal, round, and reactive to light.   HENT:      Head: Normocephalic and atraumatic.   Neck:      Vascular: No JVD.   Pulmonary:      Effort: Pulmonary effort is normal.      Breath sounds: Normal breath sounds.   Cardiovascular:      Normal rate. Regular rhythm.   Pulses:     Intact distal pulses.   Abdominal:      General: There is no distension.      Palpations: Abdomen is soft.      Tenderness: There is no abdominal tenderness.   Musculoskeletal: Normal range of motion.      Cervical back: Normal range of motion and neck supple. Skin:     General: Skin is warm and dry.   Neurological:      Mental Status: Alert and oriented to person, place, and time.           EKG ordered and reviewed by me in office  Procedures        In Office Device Interrogation: Reviewed    DEVICE INTERROGATION:  IN OFFICE    DEVICE TYPE:   Dual-chamber pacemaker    :   Medtronic    BATTERY:  Stable    TIME TO ELECTIVE REPLACEMENT INDICATORS:   5.5 years    CHARGE TIME:   Not applicable        LEAD DATA:    Atrial:   2.4 mV, 437 ohms, 0.75 V@0.4 ms    Ventricular:     12.0 mV, 194 ohms, 0.375 V@0.4 ms    LV:      Atrial pacing percentage: 30.7%    Ventricular pacing percentage: Less than 0.1%      Arrhythmia Logbook Reviewed: Very brief SVT        Summary:    Stable Device Function    No significant arhythmia burden.     Battery status is stable.      NEXT IN OFFICE DEVICE CHECK DUE: 6-month    REMOTE DEVICE INTERROGATIONS: Not applicable      Diagnoses and all orders for this visit:    1. Palpitations (Primary)  Comments:  Patient " complaining of increasing palpitations over the last week    2. Paroxysmal SVT (supraventricular tachycardia) (CMS/[facility])  Comments:  Very brief SVT noted on device interrogation  Orders:  -     Cancel: Comprehensive Metabolic Panel; Future  -     Cancel: TSH; Future  -     Cancel: Magnesium; Future  -     Magnesium; Future  -     Magnesium  -     TSH; Future  -     TSH  -     Comprehensive Metabolic Panel; Future  -     Comprehensive Metabolic Panel    3. Coronary artery disease involving native coronary artery of native heart without angina pectoris  Comments:  Patient denies chest pain    4. SSS (sick sinus syndrome) (CMS/[facility])  Comments:  Stable since pacemaker implant    5. Cardiac pacemaker in situ  Comments:  Device interrogated in office and shows stable device function.  Brief PSVT noted on device interrogation    Other orders  -     ECG 12 Lead                  Medical decision making:    Patient device interrogation is stable.  She had a very brief episode of SVT lasting just a few seconds.    I had a long discussion with him and his wife regarding using Holter monitors to monitor his heart rate.  Due to his pacemaker and occasional PACs.  The patient may not reliably be able to use home monitor to tell what his heart rate and regularity is.    I explained to her that his pacemaker is programmed at 68 counts and milliseconds which is much more accurate than the home monitor.  So when home monitor that reports a heart rate of 58 or 59 is likely actually truly 60.    There is no evidence of pacemaker dysfunction.  He had very brief SVT.  Which may relate to some of his palpitations.    He has not had any recent labs.  I would like to check a BMP, TSH, magnesium level to ensure that his electrolytes are stable.    I have made no changes in his medication.    Unfortunately the patient's had problems in the past with severe orthostasis and not tolerated beta-blockers.    We will continue the current medications  for now.  He remains on midodrine.  If he has any new or worsening problems of asked his wife to contact the office sooner..

## 2021-03-19 ENCOUNTER — LAB (OUTPATIENT)
Dept: LAB | Facility: HOSPITAL | Age: 81
End: 2021-03-19

## 2021-03-19 DIAGNOSIS — R55 SYNCOPE AND COLLAPSE: Primary | ICD-10-CM

## 2021-03-19 DIAGNOSIS — I10 HYPERTENSION, UNSPECIFIED TYPE: ICD-10-CM

## 2021-03-19 LAB
ALBUMIN SERPL-MCNC: 3.6 G/DL (ref 3.5–5.2)
ALBUMIN/GLOB SERPL: 1.3 G/DL
ALP SERPL-CCNC: 77 U/L (ref 39–117)
ALT SERPL W P-5'-P-CCNC: 9 U/L (ref 1–41)
ANION GAP SERPL CALCULATED.3IONS-SCNC: 9.4 MMOL/L (ref 5–15)
AST SERPL-CCNC: 17 U/L (ref 1–40)
BASOPHILS # BLD AUTO: 0.05 10*3/MM3 (ref 0–0.2)
BASOPHILS NFR BLD AUTO: 0.4 % (ref 0–1.5)
BILIRUB SERPL-MCNC: 0.2 MG/DL (ref 0–1.2)
BUN SERPL-MCNC: 18 MG/DL (ref 8–23)
BUN/CREAT SERPL: 15.4 (ref 7–25)
CALCIUM SPEC-SCNC: 9 MG/DL (ref 8.6–10.5)
CHLORIDE SERPL-SCNC: 103 MMOL/L (ref 98–107)
CO2 SERPL-SCNC: 24.6 MMOL/L (ref 22–29)
CREAT SERPL-MCNC: 1.17 MG/DL (ref 0.76–1.27)
DEPRECATED RDW RBC AUTO: 38.6 FL (ref 37–54)
EOSINOPHIL # BLD AUTO: 0.1 10*3/MM3 (ref 0–0.4)
EOSINOPHIL NFR BLD AUTO: 0.9 % (ref 0.3–6.2)
ERYTHROCYTE [DISTWIDTH] IN BLOOD BY AUTOMATED COUNT: 13.5 % (ref 12.3–15.4)
FOLATE SERPL-MCNC: >20 NG/ML (ref 4.78–24.2)
GFR SERPL CREATININE-BSD FRML MDRD: 60 ML/MIN/1.73
GLOBULIN UR ELPH-MCNC: 2.7 GM/DL
GLUCOSE SERPL-MCNC: 213 MG/DL (ref 65–99)
HCT VFR BLD AUTO: 35.6 % (ref 37.5–51)
HGB BLD-MCNC: 11.1 G/DL (ref 13–17.7)
IMM GRANULOCYTES # BLD AUTO: 0.04 10*3/MM3 (ref 0–0.05)
IMM GRANULOCYTES NFR BLD AUTO: 0.4 % (ref 0–0.5)
LYMPHOCYTES # BLD AUTO: 1.01 10*3/MM3 (ref 0.7–3.1)
LYMPHOCYTES NFR BLD AUTO: 9 % (ref 19.6–45.3)
MCH RBC QN AUTO: 25.2 PG (ref 26.6–33)
MCHC RBC AUTO-ENTMCNC: 31.2 G/DL (ref 31.5–35.7)
MCV RBC AUTO: 80.9 FL (ref 79–97)
MONOCYTES # BLD AUTO: 0.47 10*3/MM3 (ref 0.1–0.9)
MONOCYTES NFR BLD AUTO: 4.2 % (ref 5–12)
NEUTROPHILS NFR BLD AUTO: 85.1 % (ref 42.7–76)
NEUTROPHILS NFR BLD AUTO: 9.54 10*3/MM3 (ref 1.7–7)
NRBC BLD AUTO-RTO: 0 /100 WBC (ref 0–0.2)
PLATELET # BLD AUTO: 285 10*3/MM3 (ref 140–450)
PMV BLD AUTO: 10.2 FL (ref 6–12)
POTASSIUM SERPL-SCNC: 4.4 MMOL/L (ref 3.5–5.2)
PROT SERPL-MCNC: 6.3 G/DL (ref 6–8.5)
RBC # BLD AUTO: 4.4 10*6/MM3 (ref 4.14–5.8)
SODIUM SERPL-SCNC: 137 MMOL/L (ref 136–145)
T4 FREE SERPL-MCNC: 1.09 NG/DL (ref 0.93–1.7)
TSH SERPL DL<=0.05 MIU/L-ACNC: 3.25 UIU/ML (ref 0.27–4.2)
VIT B12 BLD-MCNC: >2000 PG/ML (ref 211–946)
WBC # BLD AUTO: 11.21 10*3/MM3 (ref 3.4–10.8)

## 2021-03-19 PROCEDURE — 80053 COMPREHEN METABOLIC PANEL: CPT

## 2021-03-19 PROCEDURE — 82607 VITAMIN B-12: CPT

## 2021-03-19 PROCEDURE — 82746 ASSAY OF FOLIC ACID SERUM: CPT

## 2021-03-19 PROCEDURE — 36415 COLL VENOUS BLD VENIPUNCTURE: CPT

## 2021-03-19 PROCEDURE — 84443 ASSAY THYROID STIM HORMONE: CPT

## 2021-03-19 PROCEDURE — 84439 ASSAY OF FREE THYROXINE: CPT

## 2021-03-19 PROCEDURE — 85025 COMPLETE CBC W/AUTO DIFF WBC: CPT

## 2021-04-14 NOTE — PROGRESS NOTES
Date of Office Visit: 04/15/2021  Encounter Provider: Dr. Jose Garcia  Place of Service: [facility] CARDIOLOGY [place]  Patient Name: Jayden Bond  :1940  Maude Ricketts APRN    Chief Complaint   Patient presents with   • Chest Pain     1 month follow up    • Hypertension   • Hyperlipidemia   • Coronary Artery Disease   • Stroke     History of Present Illness:    I am pleased to see Mr. Bond in my office today as a follow-up.    As you know, patient is 80 years old white gentleman whose past medical history significant for orthostatic hypotension, sick sinus syndrome, status post permanent dual-chamber pacemaker who came today for follow-up.    In 2020, patient underwent stress test which showed small amount of lateral wall ischemia.  However he was relatively asymptomatic and medical treatment was recommended.  Echocardiogram showed EF of 55 to 60%.    This was unscheduled visit.  Patient wife is concerned that he is very lethargic and and fatigue.  Patient denies any chest pain.  Patient denies any palpitation.  Patient is short of breath.  No orthopnea PND no leg edema.    Pacemaker is interrogated and it is functioning appropriately.    At this stage, his hemodynamics are stable.  He is not orthostatic.  He is doing well.  I showed the patient and his wife that patient is short of breath because of deconditioning.  He needs to increase his activity at home with a walker.  Patient wife is very anxious and tries to do too much for the patient.  Patient is old and weak and frail.        Past Medical History:   Diagnosis Date   • CAD (coronary artery disease)    • Coronary artery disease     [facility]CI   • Dementia (CMS/HCC)    • Depression    • Disease of thyroid gland    • Dysautonomia (CMS/HCC)    • Dyslipidemia    • GERD (gastroesophageal reflux disease)    • Head pain    • Hyperlipidemia    • Hypertension    • SSS (sick sinus syndrome) (CMS/HCC)    • Stroke (CMS/HCC)     x 3 in 2018          Past Surgical History:   Procedure Laterality Date   • APPENDECTOMY     • CHOLECYSTECTOMY     • CYSTOSCOPY     • ENDOSCOPY N/A 10/17/2019    Procedure: ESOPHAGOGASTRODUODENOSCOPY with biopsy x 2 areas;  Surgeon: Ashok Sanchez MD;  Location: [facility] ENDOSCOPY;  Service: Gastroenterology   • HERNIA REPAIR     • INSERT / REPLACE / REMOVE PACEMAKER     • LEFT HEART CATH     • PACEMAKER IMPLANTATION      Medtronic   • TEMPORAL ARTERY BIOPSY     • WRIST SURGERY      severed artery           Current Outpatient Medications:   •  albuterol sulfate  (90 Base) MCG/ACT inhaler, Inhale 2 puffs Every 4 (Four) Hours As Needed., Disp: , Rfl:   •  apixaban (ELIQUIS) 2.5 MG tablet tablet, Take 2 tablets by mouth Every 12 (Twelve) Hours. Indications: Atrial Fibrillation (Patient taking differently: Take 2.5 mg by mouth Every 12 (Twelve) Hours. Indications: Atrial Fibrillation), Disp: , Rfl:   •  brimonidine (ALPHAGAN) 0.2 % ophthalmic solution, Administer 1 drop to both eyes 2 (Two) Times a Day., Disp: 10 mL, Rfl: 12  •  clopidogrel (PLAVIX) 75 MG tablet, TAKE 1 TABLET BY MOUTH EVERY DAY, Disp: 90 tablet, Rfl: 0  •  escitalopram (LEXAPRO) 10 MG tablet, Take 10 mg by mouth Daily., Disp: , Rfl:   •  fluticasone (FLONASE) 50 MCG/ACT nasal spray, 2 sprays into the nostril(s) as directed by provider Daily., Disp: , Rfl:   •  galantamine ER (RAZADYNE ER) 8 MG 24 hr capsule, Take 8 mg by mouth 2 (Two) Times a Day., Disp: , Rfl:   •  Incruse Ellipta 62.5 MCG/INH aerosol powder , Daily., Disp: , Rfl:   •  isosorbide mononitrate (IMDUR) 30 MG 24 hr tablet, Take 1 tablet by mouth Daily., Disp: 30 tablet, Rfl: 0  •  levothyroxine (SYNTHROID, LEVOTHROID) 25 MCG tablet, Take 25 mcg by mouth Daily., Disp: , Rfl:   •  loratadine (CLARITIN) 10 MG tablet, Take 10 mg by mouth Daily., Disp: , Rfl:   •  midodrine (PROAMATINE) 2.5 MG tablet, Take 1 tablet by mouth 3 (Three) Times a Day Before Meals. (Patient taking differently: Take 2.5 mg  by mouth As Needed. Only taking 1 a day), Disp: 90 tablet, Rfl: 3  •  Multiple Vitamins-Minerals (MULTIVITAMIN WITH MINERALS) tablet tablet, Take 1 tablet by mouth Every Night., Disp: , Rfl:   •  nitroglycerin (NITROSTAT) 0.4 MG SL tablet, Place 0.4 mg under the tongue Every 5 (Five) Minutes As Needed for Chest Pain. Take no more than 3 doses in 15 minutes., Disp: , Rfl:   •  Omega-3 Fatty Acids (FISH OIL) 1000 MG capsule capsule, Take 1,000 mg by mouth Daily With Breakfast., Disp: , Rfl:   •  pantoprazole (PROTONIX) 40 MG EC tablet, Take 40 mg by mouth 2 (two) times a day., Disp: , Rfl:   •  predniSONE (DELTASONE) 5 MG tablet, Take 5 mg by mouth Daily., Disp: , Rfl:   •  rosuvastatin (CRESTOR) 20 MG tablet, Take 20 mg by mouth Every Night., Disp: , Rfl:   •  vitamin B-12 (CYANOCOBALAMIN) 1000 MCG tablet, Take 1,000 mcg by mouth Daily., Disp: , Rfl:       Social History     Socioeconomic History   • Marital status:      Spouse name: Not on file   • Number of children: Not on file   • Years of education: Not on file   • Highest education level: Not on file   Tobacco Use   • Smoking status: Former Smoker   • Smokeless tobacco: Never Used   • Tobacco comment: quit 25 yrs ago   Vaping Use   • Vaping Use: Never used   Substance and Sexual Activity   • Alcohol use: No   • Drug use: No   • Sexual activity: Defer         Review of Systems   Constitutional: Negative for chills and fever.   HENT: Negative for ear discharge and nosebleeds.    Eyes: Negative for discharge and redness.   Cardiovascular: Negative for chest pain, orthopnea, palpitations, paroxysmal nocturnal dyspnea and syncope.   Respiratory: Positive for shortness of breath. Negative for cough and wheezing.    Endocrine: Negative for heat intolerance.   Skin: Negative for rash.   Musculoskeletal: Positive for arthritis and joint pain. Negative for myalgias.   Gastrointestinal: Negative for abdominal pain, melena, nausea and vomiting.   Genitourinary:  "Negative for dysuria and hematuria.   Neurological: Negative for dizziness, light-headedness, numbness and tremors.   Psychiatric/Behavioral: Negative for depression. The patient is not nervous/anxious.        Procedures    Procedures    No orders to display           Objective:    /68   Pulse 68   Ht 177.8 cm (70\")   Wt 78.5 kg (173 lb)   BMI 24.82 kg/m²         Constitutional:       Appearance: Well-developed.   Eyes:      General: No scleral icterus.        Right eye: No discharge.   HENT:      Head: Normocephalic and atraumatic.   Neck:      Thyroid: No thyromegaly.      Lymphadenopathy: No cervical adenopathy.   Pulmonary:      Effort: Pulmonary effort is normal. No respiratory distress.      Breath sounds: Normal breath sounds. No wheezing. No rales.   Cardiovascular:      Normal rate. Regular rhythm.      No gallop.   Abdominal:      Tenderness: There is no abdominal tenderness.   Skin:     Findings: No erythema or rash.   Neurological:      Mental Status: Alert and oriented to person, place, and time.             Assessment:       Diagnosis Plan   1. Mixed hyperlipidemia     2. Essential hypertension     3. Precordial pain     4. Cerebrovascular accident (CVA), unspecified mechanism (CMS/HCC)     5. Coronary artery disease involving native coronary artery of native heart without angina pectoris     6. Palpitations     7. SSS (sick sinus syndrome) (CMS/[facility])              Plan:       MDM:    1.  Orthostatic hypotension:    Patient blood pressure is stable.  Continue midodrine.    2.  S/p permanent pacemaker:    Patient is doing well.  Pacemaker is interrogated there is no change in the programming.    3.  Abnormal stress test:    Patient has small inferior wall ischemia.  Patient is not having active chest pain.  Recommend observation  "

## 2021-04-15 ENCOUNTER — CLINICAL SUPPORT NO REQUIREMENTS (OUTPATIENT)
Dept: CARDIOLOGY | Facility: CLINIC | Age: 81
End: 2021-04-15

## 2021-04-15 ENCOUNTER — OFFICE VISIT (OUTPATIENT)
Dept: CARDIOLOGY | Facility: CLINIC | Age: 81
End: 2021-04-15

## 2021-04-15 VITALS
BODY MASS INDEX: 24.77 KG/M2 | WEIGHT: 173 LBS | SYSTOLIC BLOOD PRESSURE: 120 MMHG | HEART RATE: 68 BPM | DIASTOLIC BLOOD PRESSURE: 68 MMHG | HEIGHT: 70 IN

## 2021-04-15 DIAGNOSIS — R00.2 PALPITATIONS: ICD-10-CM

## 2021-04-15 DIAGNOSIS — I25.10 CORONARY ARTERY DISEASE INVOLVING NATIVE CORONARY ARTERY OF NATIVE HEART WITHOUT ANGINA PECTORIS: ICD-10-CM

## 2021-04-15 DIAGNOSIS — I10 ESSENTIAL HYPERTENSION: ICD-10-CM

## 2021-04-15 DIAGNOSIS — I49.5 SSS (SICK SINUS SYNDROME) (HCC): Primary | ICD-10-CM

## 2021-04-15 DIAGNOSIS — I63.9 CEREBROVASCULAR ACCIDENT (CVA), UNSPECIFIED MECHANISM (HCC): ICD-10-CM

## 2021-04-15 DIAGNOSIS — E78.2 MIXED HYPERLIPIDEMIA: Primary | ICD-10-CM

## 2021-04-15 DIAGNOSIS — I49.5 SSS (SICK SINUS SYNDROME) (HCC): ICD-10-CM

## 2021-04-15 DIAGNOSIS — R07.2 PRECORDIAL PAIN: ICD-10-CM

## 2021-04-15 DIAGNOSIS — Z95.0 CARDIAC PACEMAKER IN SITU: ICD-10-CM

## 2021-04-15 PROCEDURE — 93280 PM DEVICE PROGR EVAL DUAL: CPT | Performed by: NURSE PRACTITIONER

## 2021-04-15 PROCEDURE — 99213 OFFICE O/P EST LOW 20 MIN: CPT | Performed by: INTERNAL MEDICINE

## 2021-04-16 NOTE — PROGRESS NOTES
DEVICE INTERROGATION:  IN OFFICE    DEVICE TYPE:   Dual-chamber pacemaker    :   Medtronic    BATTERY:  Stable    TIME TO ELECTIVE REPLACEMENT INDICATORS:   4.5 years    CHARGE TIME:   Not applicable        LEAD DATA:       DEVICE REPROGRAMMED FOR TESTING      Atrial:   2.5 mV, 475 ohms, 0.75 V@0.4 ms    Ventricular:     9.9 mV, 494 ohms, 0.375 V@0.4 ms    LV:      Atrial pacing percentage: 32.4%    Ventricular pacing percentage: Less than 1%      Arrhythmia Logbook Reviewed: No A. fib        Summary:    Stable Device Function    No significant arhythmia burden.     Battery status is stable.    Reviewed with: Dr. Garcia      NEXT IN OFFICE DEVICE CHECK DUE: 6-month    REMOTE DEVICE INTERROGATIONS: Not applicable

## 2021-04-26 ENCOUNTER — APPOINTMENT (OUTPATIENT)
Dept: GENERAL RADIOLOGY | Facility: HOSPITAL | Age: 81
End: 2021-04-26

## 2021-04-26 ENCOUNTER — HOSPITAL ENCOUNTER (OUTPATIENT)
Facility: HOSPITAL | Age: 81
Setting detail: OBSERVATION
Discharge: HOME | End: 2021-04-28
Attending: EMERGENCY MEDICINE | Admitting: INTERNAL MEDICINE

## 2021-04-26 DIAGNOSIS — R06.00 DYSPNEA, UNSPECIFIED TYPE: ICD-10-CM

## 2021-04-26 DIAGNOSIS — R07.9 CHEST PAIN, UNSPECIFIED TYPE: Primary | ICD-10-CM

## 2021-04-26 LAB
ALBUMIN SERPL-MCNC: 4 G/DL (ref 3.5–5.2)
ALBUMIN/GLOB SERPL: 1.3 G/DL
ALP SERPL-CCNC: 87 U/L (ref 39–117)
ALT SERPL W P-5'-P-CCNC: 15 U/L (ref 1–41)
ANION GAP SERPL CALCULATED.3IONS-SCNC: 14 MMOL/L (ref 5–15)
APTT PPP: 25.6 SECONDS (ref 24–31)
AST SERPL-CCNC: 22 U/L (ref 1–40)
BASOPHILS # BLD AUTO: 0.1 10*3/MM3 (ref 0–0.2)
BASOPHILS NFR BLD AUTO: 0.9 % (ref 0–1.5)
BILIRUB SERPL-MCNC: 0.3 MG/DL (ref 0–1.2)
BILIRUB UR QL STRIP: NEGATIVE
BUN SERPL-MCNC: 19 MG/DL (ref 8–23)
BUN/CREAT SERPL: 15.3 (ref 7–25)
CALCIUM SPEC-SCNC: 9.4 MG/DL (ref 8.6–10.5)
CHLORIDE SERPL-SCNC: 104 MMOL/L (ref 98–107)
CLARITY UR: CLEAR
CO2 SERPL-SCNC: 22 MMOL/L (ref 22–29)
COLOR UR: YELLOW
CREAT SERPL-MCNC: 1.24 MG/DL (ref 0.76–1.27)
DEPRECATED RDW RBC AUTO: 44.2 FL (ref 37–54)
EOSINOPHIL # BLD AUTO: 0.2 10*3/MM3 (ref 0–0.4)
EOSINOPHIL NFR BLD AUTO: 1.7 % (ref 0.3–6.2)
ERYTHROCYTE [DISTWIDTH] IN BLOOD BY AUTOMATED COUNT: 16.2 % (ref 12.3–15.4)
GFR SERPL CREATININE-BSD FRML MDRD: 56 ML/MIN/1.73
GLOBULIN UR ELPH-MCNC: 3.2 GM/DL
GLUCOSE SERPL-MCNC: 109 MG/DL (ref 65–99)
GLUCOSE UR STRIP-MCNC: NEGATIVE MG/DL
HCT VFR BLD AUTO: 36.1 % (ref 37.5–51)
HGB BLD-MCNC: 11.4 G/DL (ref 13–17.7)
HGB UR QL STRIP.AUTO: NEGATIVE
INR PPP: 0.98 (ref 2–3)
KETONES UR QL STRIP: NEGATIVE
LEUKOCYTE ESTERASE UR QL STRIP.AUTO: NEGATIVE
LYMPHOCYTES # BLD AUTO: 1.7 10*3/MM3 (ref 0.7–3.1)
LYMPHOCYTES NFR BLD AUTO: 13 % (ref 19.6–45.3)
MCH RBC QN AUTO: 24 PG (ref 26.6–33)
MCHC RBC AUTO-ENTMCNC: 31.6 G/DL (ref 31.5–35.7)
MCV RBC AUTO: 75.9 FL (ref 79–97)
MONOCYTES # BLD AUTO: 1.2 10*3/MM3 (ref 0.1–0.9)
MONOCYTES NFR BLD AUTO: 9.3 % (ref 5–12)
NEUTROPHILS NFR BLD AUTO: 75.1 % (ref 42.7–76)
NEUTROPHILS NFR BLD AUTO: 9.7 10*3/MM3 (ref 1.7–7)
NITRITE UR QL STRIP: NEGATIVE
NRBC BLD AUTO-RTO: 0 /100 WBC (ref 0–0.2)
NT-PROBNP SERPL-MCNC: 182.5 PG/ML (ref 0–1800)
PH UR STRIP.AUTO: 5.5 [PH] (ref 5–8)
PLATELET # BLD AUTO: 338 10*3/MM3 (ref 140–450)
PMV BLD AUTO: 7.6 FL (ref 6–12)
POTASSIUM SERPL-SCNC: 4.4 MMOL/L (ref 3.5–5.2)
PROT SERPL-MCNC: 7.2 G/DL (ref 6–8.5)
PROT UR QL STRIP: NEGATIVE
PROTHROMBIN TIME: 10.8 SECONDS (ref 19.4–28.5)
RBC # BLD AUTO: 4.75 10*6/MM3 (ref 4.14–5.8)
SODIUM SERPL-SCNC: 140 MMOL/L (ref 136–145)
SP GR UR STRIP: 1.01 (ref 1–1.03)
TROPONIN T SERPL-MCNC: <0.01 NG/ML (ref 0–0.03)
TROPONIN T SERPL-MCNC: <0.01 NG/ML (ref 0–0.03)
UROBILINOGEN UR QL STRIP: NORMAL
WBC # BLD AUTO: 12.9 10*3/MM3 (ref 3.4–10.8)

## 2021-04-26 PROCEDURE — 71045 X-RAY EXAM CHEST 1 VIEW: CPT

## 2021-04-26 PROCEDURE — 80053 COMPREHEN METABOLIC PANEL: CPT | Performed by: EMERGENCY MEDICINE

## 2021-04-26 PROCEDURE — 85025 COMPLETE CBC W/AUTO DIFF WBC: CPT | Performed by: EMERGENCY MEDICINE

## 2021-04-26 PROCEDURE — 81003 URINALYSIS AUTO W/O SCOPE: CPT | Performed by: EMERGENCY MEDICINE

## 2021-04-26 PROCEDURE — 93005 ELECTROCARDIOGRAM TRACING: CPT | Performed by: EMERGENCY MEDICINE

## 2021-04-26 PROCEDURE — 99220 PR INITIAL OBSERVATION CARE/DAY 70 MINUTES: CPT | Performed by: INTERNAL MEDICINE

## 2021-04-26 PROCEDURE — G0378 HOSPITAL OBSERVATION PER HR: HCPCS

## 2021-04-26 PROCEDURE — 99284 EMERGENCY DEPT VISIT MOD MDM: CPT

## 2021-04-26 PROCEDURE — 85610 PROTHROMBIN TIME: CPT | Performed by: EMERGENCY MEDICINE

## 2021-04-26 PROCEDURE — U0004 COV-19 TEST NON-CDC HGH THRU: HCPCS | Performed by: EMERGENCY MEDICINE

## 2021-04-26 PROCEDURE — C9803 HOPD COVID-19 SPEC COLLECT: HCPCS

## 2021-04-26 PROCEDURE — 83880 ASSAY OF NATRIURETIC PEPTIDE: CPT | Performed by: EMERGENCY MEDICINE

## 2021-04-26 PROCEDURE — 84484 ASSAY OF TROPONIN QUANT: CPT | Performed by: EMERGENCY MEDICINE

## 2021-04-26 PROCEDURE — 85730 THROMBOPLASTIN TIME PARTIAL: CPT | Performed by: EMERGENCY MEDICINE

## 2021-04-26 PROCEDURE — U0005 INFEC AGEN DETEC AMPLI PROBE: HCPCS | Performed by: EMERGENCY MEDICINE

## 2021-04-26 RX ORDER — PANTOPRAZOLE SODIUM 40 MG/1
40 TABLET, DELAYED RELEASE ORAL
Status: DISCONTINUED | OUTPATIENT
Start: 2021-04-27 | End: 2021-04-28 | Stop reason: HOSPADM

## 2021-04-26 RX ORDER — AMOXICILLIN 250 MG
2 CAPSULE ORAL 2 TIMES DAILY
Status: DISCONTINUED | OUTPATIENT
Start: 2021-04-27 | End: 2021-04-28 | Stop reason: HOSPADM

## 2021-04-26 RX ORDER — MULTIPLE VITAMINS W/ MINERALS TAB 9MG-400MCG
1 TAB ORAL NIGHTLY
Status: DISCONTINUED | OUTPATIENT
Start: 2021-04-27 | End: 2021-04-28 | Stop reason: HOSPADM

## 2021-04-26 RX ORDER — MIDODRINE HYDROCHLORIDE 2.5 MG/1
2.5 TABLET ORAL
Status: DISCONTINUED | OUTPATIENT
Start: 2021-04-27 | End: 2021-04-28 | Stop reason: HOSPADM

## 2021-04-26 RX ORDER — ESCITALOPRAM OXALATE 10 MG/1
10 TABLET ORAL DAILY
Status: DISCONTINUED | OUTPATIENT
Start: 2021-04-27 | End: 2021-04-28 | Stop reason: HOSPADM

## 2021-04-26 RX ORDER — LANOLIN ALCOHOL/MO/W.PET/CERES
2000 CREAM (GRAM) TOPICAL DAILY
Status: DISCONTINUED | OUTPATIENT
Start: 2021-04-27 | End: 2021-04-28 | Stop reason: HOSPADM

## 2021-04-26 RX ORDER — POLYETHYLENE GLYCOL 3350 17 G/17G
17 POWDER, FOR SOLUTION ORAL DAILY PRN
Status: DISCONTINUED | OUTPATIENT
Start: 2021-04-26 | End: 2021-04-28 | Stop reason: HOSPADM

## 2021-04-26 RX ORDER — ACETAMINOPHEN 325 MG/1
650 TABLET ORAL EVERY 4 HOURS PRN
Status: DISCONTINUED | OUTPATIENT
Start: 2021-04-26 | End: 2021-04-28 | Stop reason: HOSPADM

## 2021-04-26 RX ORDER — PREDNISONE 1 MG/1
5 TABLET ORAL DAILY
Status: DISCONTINUED | OUTPATIENT
Start: 2021-04-27 | End: 2021-04-28 | Stop reason: HOSPADM

## 2021-04-26 RX ORDER — ONDANSETRON 2 MG/ML
4 INJECTION INTRAMUSCULAR; INTRAVENOUS EVERY 6 HOURS PRN
Status: DISCONTINUED | OUTPATIENT
Start: 2021-04-26 | End: 2021-04-28 | Stop reason: HOSPADM

## 2021-04-26 RX ORDER — CLOPIDOGREL BISULFATE 75 MG/1
TABLET ORAL
Qty: 90 TABLET | Refills: 0 | Status: SHIPPED | OUTPATIENT
Start: 2021-04-26 | End: 2021-07-29

## 2021-04-26 RX ORDER — BISACODYL 5 MG/1
5 TABLET, DELAYED RELEASE ORAL DAILY PRN
Status: DISCONTINUED | OUTPATIENT
Start: 2021-04-26 | End: 2021-04-28 | Stop reason: HOSPADM

## 2021-04-26 RX ORDER — BRIMONIDINE TARTRATE 2 MG/ML
1 SOLUTION/ DROPS OPHTHALMIC 2 TIMES DAILY
Status: DISCONTINUED | OUTPATIENT
Start: 2021-04-27 | End: 2021-04-28 | Stop reason: HOSPADM

## 2021-04-26 RX ORDER — BISACODYL 10 MG
10 SUPPOSITORY, RECTAL RECTAL DAILY PRN
Status: DISCONTINUED | OUTPATIENT
Start: 2021-04-26 | End: 2021-04-28 | Stop reason: HOSPADM

## 2021-04-26 RX ORDER — GALANTAMINE HYDROBROMIDE 8 MG/1
8 TABLET, FILM COATED ORAL 2 TIMES DAILY
Status: ON HOLD | COMMUNITY
End: 2021-09-15

## 2021-04-26 RX ORDER — CLOPIDOGREL BISULFATE 75 MG/1
75 TABLET ORAL DAILY
Status: DISCONTINUED | OUTPATIENT
Start: 2021-04-27 | End: 2021-04-28 | Stop reason: HOSPADM

## 2021-04-26 RX ORDER — CHOLECALCIFEROL (VITAMIN D3) 125 MCG
5 CAPSULE ORAL NIGHTLY PRN
Status: DISCONTINUED | OUTPATIENT
Start: 2021-04-26 | End: 2021-04-28 | Stop reason: HOSPADM

## 2021-04-26 RX ORDER — NITROGLYCERIN 0.4 MG/1
0.4 TABLET SUBLINGUAL
Status: DISCONTINUED | OUTPATIENT
Start: 2021-04-26 | End: 2021-04-28 | Stop reason: HOSPADM

## 2021-04-26 RX ORDER — SODIUM CHLORIDE 0.9 % (FLUSH) 0.9 %
10 SYRINGE (ML) INJECTION AS NEEDED
Status: DISCONTINUED | OUTPATIENT
Start: 2021-04-26 | End: 2021-04-28 | Stop reason: HOSPADM

## 2021-04-26 RX ORDER — GALANTAMINE HYDROBROMIDE 4 MG/1
8 TABLET, FILM COATED ORAL 2 TIMES DAILY WITH MEALS
Status: DISCONTINUED | OUTPATIENT
Start: 2021-04-27 | End: 2021-04-28 | Stop reason: HOSPADM

## 2021-04-26 RX ORDER — ACETAMINOPHEN 650 MG/1
650 SUPPOSITORY RECTAL EVERY 4 HOURS PRN
Status: DISCONTINUED | OUTPATIENT
Start: 2021-04-26 | End: 2021-04-28 | Stop reason: HOSPADM

## 2021-04-26 RX ORDER — ONDANSETRON 4 MG/1
4 TABLET, FILM COATED ORAL EVERY 6 HOURS PRN
Status: DISCONTINUED | OUTPATIENT
Start: 2021-04-26 | End: 2021-04-28 | Stop reason: HOSPADM

## 2021-04-26 RX ORDER — ACETAMINOPHEN 160 MG/5ML
650 SOLUTION ORAL EVERY 4 HOURS PRN
Status: DISCONTINUED | OUTPATIENT
Start: 2021-04-26 | End: 2021-04-28 | Stop reason: HOSPADM

## 2021-04-26 RX ORDER — LEVOTHYROXINE SODIUM 0.03 MG/1
25 TABLET ORAL
Status: DISCONTINUED | OUTPATIENT
Start: 2021-04-27 | End: 2021-04-28 | Stop reason: HOSPADM

## 2021-04-26 RX ORDER — MIDODRINE HYDROCHLORIDE 2.5 MG/1
2.5 TABLET ORAL 3 TIMES DAILY PRN
COMMUNITY
End: 2021-04-30

## 2021-04-26 RX ORDER — ROSUVASTATIN CALCIUM 10 MG/1
20 TABLET, COATED ORAL NIGHTLY
Status: DISCONTINUED | OUTPATIENT
Start: 2021-04-27 | End: 2021-04-28 | Stop reason: HOSPADM

## 2021-04-26 RX ORDER — ISOSORBIDE MONONITRATE 30 MG/1
30 TABLET, EXTENDED RELEASE ORAL DAILY
Status: DISCONTINUED | OUTPATIENT
Start: 2021-04-27 | End: 2021-04-28 | Stop reason: HOSPADM

## 2021-04-26 RX ORDER — NITROGLYCERIN 0.4 MG/1
0.4 TABLET SUBLINGUAL
Status: DISCONTINUED | OUTPATIENT
Start: 2021-04-26 | End: 2021-04-26 | Stop reason: SDUPTHER

## 2021-04-26 RX ADMIN — APIXABAN 2.5 MG: 2.5 TABLET, FILM COATED ORAL at 23:50

## 2021-04-26 RX ADMIN — MULTIPLE VITAMINS W/ MINERALS TAB 1 TABLET: TAB at 23:50

## 2021-04-26 RX ADMIN — ISOSORBIDE MONONITRATE 30 MG: 30 TABLET, EXTENDED RELEASE ORAL at 23:50

## 2021-04-26 RX ADMIN — GALANTAMINE 8 MG: 4 TABLET, FILM COATED ORAL at 23:50

## 2021-04-26 RX ADMIN — ROSUVASTATIN CALCIUM 20 MG: 10 TABLET, FILM COATED ORAL at 23:50

## 2021-04-26 RX ADMIN — Medication 10 ML: at 23:50

## 2021-04-27 ENCOUNTER — APPOINTMENT (OUTPATIENT)
Dept: NUCLEAR MEDICINE | Facility: HOSPITAL | Age: 81
End: 2021-04-27

## 2021-04-27 LAB
QT INTERVAL: 412 MS
SARS-COV-2 ORF1AB RESP QL NAA+PROBE: NOT DETECTED
TROPONIN T SERPL-MCNC: <0.01 NG/ML (ref 0–0.03)

## 2021-04-27 PROCEDURE — 99214 OFFICE O/P EST MOD 30 MIN: CPT | Performed by: INTERNAL MEDICINE

## 2021-04-27 PROCEDURE — 93017 CV STRESS TEST TRACING ONLY: CPT

## 2021-04-27 PROCEDURE — 93005 ELECTROCARDIOGRAM TRACING: CPT | Performed by: FAMILY MEDICINE

## 2021-04-27 PROCEDURE — 97165 OT EVAL LOW COMPLEX 30 MIN: CPT

## 2021-04-27 PROCEDURE — 93016 CV STRESS TEST SUPVJ ONLY: CPT | Performed by: INTERNAL MEDICINE

## 2021-04-27 PROCEDURE — G0378 HOSPITAL OBSERVATION PER HR: HCPCS

## 2021-04-27 PROCEDURE — 97162 PT EVAL MOD COMPLEX 30 MIN: CPT

## 2021-04-27 PROCEDURE — 93010 ELECTROCARDIOGRAM REPORT: CPT | Performed by: INTERNAL MEDICINE

## 2021-04-27 PROCEDURE — 93018 CV STRESS TEST I&R ONLY: CPT | Performed by: INTERNAL MEDICINE

## 2021-04-27 PROCEDURE — 78452 HT MUSCLE IMAGE SPECT MULT: CPT

## 2021-04-27 PROCEDURE — A9500 TC99M SESTAMIBI: HCPCS | Performed by: INTERNAL MEDICINE

## 2021-04-27 PROCEDURE — 63710000001 PREDNISONE PER 5 MG: Performed by: INTERNAL MEDICINE

## 2021-04-27 PROCEDURE — 84484 ASSAY OF TROPONIN QUANT: CPT | Performed by: INTERNAL MEDICINE

## 2021-04-27 PROCEDURE — 97530 THERAPEUTIC ACTIVITIES: CPT

## 2021-04-27 PROCEDURE — 99225 PR SBSQ OBSERVATION CARE/DAY 25 MINUTES: CPT | Performed by: INTERNAL MEDICINE

## 2021-04-27 PROCEDURE — 0 TECHNETIUM SESTAMIBI: Performed by: INTERNAL MEDICINE

## 2021-04-27 PROCEDURE — 78452 HT MUSCLE IMAGE SPECT MULT: CPT | Performed by: INTERNAL MEDICINE

## 2021-04-27 PROCEDURE — 25010000002 REGADENOSON 0.4 MG/5ML SOLUTION: Performed by: INTERNAL MEDICINE

## 2021-04-27 RX ADMIN — BRIMONIDINE TARTRATE 1 DROP: 2 SOLUTION/ DROPS OPHTHALMIC at 00:02

## 2021-04-27 RX ADMIN — GALANTAMINE 8 MG: 4 TABLET, FILM COATED ORAL at 18:32

## 2021-04-27 RX ADMIN — Medication 10 ML: at 08:47

## 2021-04-27 RX ADMIN — MULTIPLE VITAMINS W/ MINERALS TAB 1 TABLET: TAB at 20:01

## 2021-04-27 RX ADMIN — TECHNETIUM TC 99M SESTAMIBI 1 DOSE: 1 INJECTION INTRAVENOUS at 14:26

## 2021-04-27 RX ADMIN — TECHNETIUM TC 99M SESTAMIBI 1 DOSE: 1 INJECTION INTRAVENOUS at 16:04

## 2021-04-27 RX ADMIN — ROSUVASTATIN CALCIUM 20 MG: 10 TABLET, FILM COATED ORAL at 20:01

## 2021-04-27 RX ADMIN — BRIMONIDINE TARTRATE 1 DROP: 2 SOLUTION/ DROPS OPHTHALMIC at 08:58

## 2021-04-27 RX ADMIN — APIXABAN 2.5 MG: 2.5 TABLET, FILM COATED ORAL at 08:52

## 2021-04-27 RX ADMIN — PREDNISONE 5 MG: 5 TABLET ORAL at 08:47

## 2021-04-27 RX ADMIN — GALANTAMINE 8 MG: 4 TABLET, FILM COATED ORAL at 08:52

## 2021-04-27 RX ADMIN — CYANOCOBALAMIN TAB 1000 MCG 2000 MCG: 1000 TAB at 08:48

## 2021-04-27 RX ADMIN — DOCUSATE SODIUM 50 MG AND SENNOSIDES 8.6 MG 2 TABLET: 8.6; 5 TABLET, FILM COATED ORAL at 20:01

## 2021-04-27 RX ADMIN — REGADENOSON 0.4 MG: 0.08 INJECTION, SOLUTION INTRAVENOUS at 16:04

## 2021-04-27 RX ADMIN — LEVOTHYROXINE SODIUM 25 MCG: 0.03 TABLET ORAL at 06:06

## 2021-04-27 RX ADMIN — APIXABAN 2.5 MG: 2.5 TABLET, FILM COATED ORAL at 20:01

## 2021-04-27 RX ADMIN — ESCITALOPRAM OXALATE 10 MG: 10 TABLET ORAL at 08:47

## 2021-04-27 RX ADMIN — CLOPIDOGREL BISULFATE 75 MG: 75 TABLET ORAL at 08:48

## 2021-04-27 RX ADMIN — BRIMONIDINE TARTRATE 1 DROP: 2 SOLUTION/ DROPS OPHTHALMIC at 20:01

## 2021-04-27 RX ADMIN — ISOSORBIDE MONONITRATE 30 MG: 30 TABLET, EXTENDED RELEASE ORAL at 20:01

## 2021-04-27 RX ADMIN — CARBIDOPA AND LEVODOPA 2.5 MG: 50; 200 TABLET, EXTENDED RELEASE ORAL at 08:47

## 2021-04-27 RX ADMIN — Medication 10 ML: at 20:01

## 2021-04-28 VITALS
DIASTOLIC BLOOD PRESSURE: 89 MMHG | RESPIRATION RATE: 15 BRPM | TEMPERATURE: 97.8 F | HEIGHT: 70 IN | HEART RATE: 75 BPM | BODY MASS INDEX: 24.93 KG/M2 | OXYGEN SATURATION: 92 % | SYSTOLIC BLOOD PRESSURE: 129 MMHG | WEIGHT: 174.16 LBS

## 2021-04-28 LAB
ANION GAP SERPL CALCULATED.3IONS-SCNC: 11 MMOL/L (ref 5–15)
BH CV REST NUCLEAR ISOTOPE DOSE: 7.9 MCI
BH CV STRESS BP STAGE 1: NORMAL
BH CV STRESS BP STAGE 2: NORMAL
BH CV STRESS BP STAGE 3: NORMAL
BH CV STRESS BP STAGE 4: NORMAL
BH CV STRESS COMMENTS STAGE 1: NORMAL
BH CV STRESS COMMENTS STAGE 2: NORMAL
BH CV STRESS DOSE REGADENOSON STAGE 1: 0.4
BH CV STRESS DURATION MIN STAGE 1: 0
BH CV STRESS DURATION MIN STAGE 2: 4
BH CV STRESS DURATION SEC STAGE 1: 10
BH CV STRESS DURATION SEC STAGE 2: 0
BH CV STRESS HR STAGE 1: 60
BH CV STRESS HR STAGE 2: 85
BH CV STRESS HR STAGE 3: 81
BH CV STRESS HR STAGE 4: 84
BH CV STRESS NUCLEAR ISOTOPE DOSE: 21.3 MCI
BH CV STRESS PROTOCOL 1: NORMAL
BH CV STRESS RECOVERY BP: NORMAL MMHG
BH CV STRESS RECOVERY HR: 83 BPM
BH CV STRESS STAGE 1: 1
BH CV STRESS STAGE 2: 2
BH CV STRESS STAGE 3: 3
BH CV STRESS STAGE 4: 4
BUN SERPL-MCNC: 18 MG/DL (ref 8–23)
BUN/CREAT SERPL: 13.4 (ref 7–25)
CALCIUM SPEC-SCNC: 8.9 MG/DL (ref 8.6–10.5)
CHLORIDE SERPL-SCNC: 105 MMOL/L (ref 98–107)
CO2 SERPL-SCNC: 24 MMOL/L (ref 22–29)
CREAT SERPL-MCNC: 1.34 MG/DL (ref 0.76–1.27)
GFR SERPL CREATININE-BSD FRML MDRD: 51 ML/MIN/1.73
GLUCOSE SERPL-MCNC: 104 MG/DL (ref 65–99)
LV EF NUC BP: 75 %
MAXIMAL PREDICTED HEART RATE: 140 BPM
PERCENT MAX PREDICTED HR: 60.71 %
POTASSIUM SERPL-SCNC: 4.2 MMOL/L (ref 3.5–5.2)
QT INTERVAL: 409 MS
QT INTERVAL: 409 MS
SODIUM SERPL-SCNC: 140 MMOL/L (ref 136–145)
STRESS BASELINE BP: NORMAL MMHG
STRESS BASELINE HR: 66 BPM
STRESS PERCENT HR: 71 %
STRESS POST PEAK BP: NORMAL MMHG
STRESS POST PEAK HR: 85 BPM
STRESS TARGET HR: 119 BPM

## 2021-04-28 PROCEDURE — 63710000001 PREDNISONE PER 5 MG: Performed by: INTERNAL MEDICINE

## 2021-04-28 PROCEDURE — 99217 PR OBSERVATION CARE DISCHARGE MANAGEMENT: CPT | Performed by: INTERNAL MEDICINE

## 2021-04-28 PROCEDURE — 80048 BASIC METABOLIC PNL TOTAL CA: CPT | Performed by: INTERNAL MEDICINE

## 2021-04-28 PROCEDURE — G0378 HOSPITAL OBSERVATION PER HR: HCPCS

## 2021-04-28 RX ADMIN — APIXABAN 2.5 MG: 2.5 TABLET, FILM COATED ORAL at 08:27

## 2021-04-28 RX ADMIN — DOCUSATE SODIUM 50 MG AND SENNOSIDES 8.6 MG 2 TABLET: 8.6; 5 TABLET, FILM COATED ORAL at 08:27

## 2021-04-28 RX ADMIN — PANTOPRAZOLE SODIUM 40 MG: 40 TABLET, DELAYED RELEASE ORAL at 11:07

## 2021-04-28 RX ADMIN — ESCITALOPRAM OXALATE 10 MG: 10 TABLET ORAL at 08:43

## 2021-04-28 RX ADMIN — CYANOCOBALAMIN TAB 1000 MCG 2000 MCG: 1000 TAB at 08:27

## 2021-04-28 RX ADMIN — GALANTAMINE 8 MG: 4 TABLET, FILM COATED ORAL at 08:27

## 2021-04-28 RX ADMIN — CLOPIDOGREL BISULFATE 75 MG: 75 TABLET ORAL at 08:27

## 2021-04-28 RX ADMIN — BRIMONIDINE TARTRATE 1 DROP: 2 SOLUTION/ DROPS OPHTHALMIC at 08:29

## 2021-04-28 RX ADMIN — PREDNISONE 5 MG: 5 TABLET ORAL at 08:27

## 2021-04-28 RX ADMIN — LEVOTHYROXINE SODIUM 25 MCG: 0.03 TABLET ORAL at 07:07

## 2021-04-28 RX ADMIN — Medication 10 ML: at 08:26

## 2021-04-29 ENCOUNTER — READMISSION MANAGEMENT (OUTPATIENT)
Dept: CALL CENTER | Facility: HOSPITAL | Age: 81
End: 2021-04-29

## 2021-04-29 NOTE — OUTREACH NOTE
Prep Survey      Responses   [facility]?  [facility]   Is LACE score < 7 ?  No   Emergency Room discharge w/ pulse ox?  No   Eligibility  Readm Mgmt   Discharge diagnosis  Atypical chest pain secondary to GERD, ACS ruled out with negative stress    Does the patient have one of the following disease processes/diagnoses(primary or secondary)?  Other   Does the patient have Home health ordered?  No   Is there a DME ordered?  No   Prep survey completed?  Yes          Cande Royal RN

## 2021-04-30 DIAGNOSIS — I10 ESSENTIAL (PRIMARY) HYPERTENSION: ICD-10-CM

## 2021-04-30 DIAGNOSIS — E78.2 MIXED HYPERLIPIDEMIA: ICD-10-CM

## 2021-04-30 RX ORDER — MIDODRINE HYDROCHLORIDE 2.5 MG/1
TABLET ORAL
Qty: 270 TABLET | Refills: 1 | Status: SHIPPED | OUTPATIENT
Start: 2021-04-30 | End: 2021-09-14

## 2021-05-03 ENCOUNTER — READMISSION MANAGEMENT (OUTPATIENT)
Dept: CALL CENTER | Facility: HOSPITAL | Age: 81
End: 2021-05-03

## 2021-05-03 NOTE — OUTREACH NOTE
"Medical Week 1 Survey      Responses   [facility] patient discharged from?  [facility]   Does the patient have one of the following disease processes/diagnoses(primary or secondary)?  Other   Week 1 attempt successful?  Yes   Call start time  0823   Call end time  0826   Is patient permission given to speak with other caregiver?  Yes   List who call center can speak with  MATT CARREON   Person spoke with today (if not patient) and relationship  MATT CARREON- WIFE   Meds reviewed with patient/caregiver?  Yes   Does the patient have all medications ordered at discharge?  N/A   Is the patient taking all medications as directed (includes completed medication regime)?  Yes   Does the patient have a primary care provider?   Yes   Does the patient have an appointment with their PCP within 7 days of discharge?  No   Comments regarding PCP  WIFE STATES \"WE HAVENT' SEEN HER FOR A VERY LONG TIME.\" WIFE DECLINED ASSISTANCE MAKING PCP FOLLOW UP APPOINTMENT   Nursing Interventions  Educated patient on importance of making appointment, Advised patient to make appointment   Has the patient kept scheduled appointments due by today?  N/A   Has home health visited the patient within 72 hours of discharge?  N/A   Psychosocial issues?  No   Did the patient receive a copy of their discharge instructions?  Yes   Nursing interventions  Reviewed instructions with patient   What is the patient's perception of their health status since discharge?  Improving   Is the patient/caregiver able to teach back signs and symptoms related to disease process for when to call PCP?  Yes   Is the patient/caregiver able to teach back signs and symptoms related to disease process for when to call 911?  Yes   Is the patient/caregiver able to teach back the hierarchy of who to call/visit for symptoms/problems? PCP, Specialist, Home health nurse, Urgent Care, ED, 911  Yes   If the patient is a current smoker, are they able to teach back resources for cessation?  Not a " smoker   Week 1 call completed?  Yes          Jazymne Gil LPN

## 2021-05-11 ENCOUNTER — READMISSION MANAGEMENT (OUTPATIENT)
Dept: CALL CENTER | Facility: HOSPITAL | Age: 81
End: 2021-05-11

## 2021-05-11 NOTE — OUTREACH NOTE
Medical Week 2 Survey      Responses   [facility] patient discharged from?  [facility]   Does the patient have one of the following disease processes/diagnoses(primary or secondary)?  Other   Week 2 attempt successful?  Yes   Call start time  1424   Discharge diagnosis  Atypical chest pain secondary to GERD, ACS ruled out with negative stress    Call end time  1439   Person spoke with today (if not patient) and relationship  MATT CARREON- WIFE   Meds reviewed with patient/caregiver?  Yes   Is the patient having any side effects they believe may be caused by any medication additions or changes?  No   Does the patient have all medications ordered at discharge?  N/A   Is the patient taking all medications as directed (includes completed medication regime)?  Yes   Does the patient have a primary care provider?   Yes   Does the patient have an appointment with their PCP within 7 days of discharge?  No   What is preventing the patient from scheduling follow up appointments within 7 days of discharge?  Haven't had time   Nursing Interventions  Verified appointment date/time/provider [has pulmonary MD appt on 5/12/21]   Has the patient kept scheduled appointments due by today?  N/A   Has home health visited the patient within 72 hours of discharge?  N/A   Psychosocial issues?  No   Comments  wife states pt still having pain in chest area, has pulmonary MD appt on 5/12/21    Did the patient receive a copy of their discharge instructions?  Yes   Nursing interventions  Reviewed instructions with patient   What is the patient's perception of their health status since discharge?  Improving   Is the patient/caregiver able to teach back signs and symptoms related to disease process for when to call PCP?  Yes   Is the patient/caregiver able to teach back signs and symptoms related to disease process for when to call 911?  Yes   Is the patient/caregiver able to teach back the hierarchy of who to call/visit for symptoms/problems? PCP,  Specialist, Home health nurse, Urgent Care, ED, 911  Yes   Additional teach back comments  wife states pt still having fluctuating pain in chest, plans to discuss at next appt on 5/12/21   Week 2 Call Completed?  Yes          Damaris Bustamante RN

## 2021-05-20 ENCOUNTER — READMISSION MANAGEMENT (OUTPATIENT)
Dept: CALL CENTER | Facility: HOSPITAL | Age: 81
End: 2021-05-20

## 2021-05-20 NOTE — OUTREACH NOTE
Medical Week 3 Survey      Responses   [facility] patient discharged from?  [facility]   Does the patient have one of the following disease processes/diagnoses(primary or secondary)?  Other   Week 3 attempt successful?  Yes   Call start time  1626   Rescheduled  Rescheduled-pt requested   Call end time  1626          Lorenza Estevez, RN

## 2021-05-25 ENCOUNTER — READMISSION MANAGEMENT (OUTPATIENT)
Dept: CALL CENTER | Facility: HOSPITAL | Age: 81
End: 2021-05-25

## 2021-05-25 NOTE — OUTREACH NOTE
Medical Week 3 Survey      Responses   [facility] patient discharged from?  [facility]   Does the patient have one of the following disease processes/diagnoses(primary or secondary)?  Other   Week 3 attempt successful?  No   Unsuccessful attempts  Attempt 1          Jazmyne Gil LPN

## 2021-06-01 ENCOUNTER — READMISSION MANAGEMENT (OUTPATIENT)
Dept: CALL CENTER | Facility: HOSPITAL | Age: 81
End: 2021-06-01

## 2021-06-01 NOTE — OUTREACH NOTE
"Medical Week 3 Survey      Responses   [facility] patient discharged from?  [facility]   Does the patient have one of the following disease processes/diagnoses(primary or secondary)?  Other   Week 3 attempt successful?  Yes   Call start time  1607   Call end time  1635   General alerts for this patient  Please call home phone([phone] first)   Discharge diagnosis  Atypical chest pain secondary to GERD, ACS ruled out with negative stress    List who call center can speak with  MATT Childers reviewed with patient/caregiver?  Yes   Is the patient taking all medications as directed (includes completed medication regime)?  Yes   Does the patient have a primary care provider?   Yes   Comments regarding PCP  WIFE STATES \"WE HAVENT' SEEN HER FOR A VERY LONG TIME.\" WIFE DECLINED ASSISTANCE MAKING PCP FOLLOW UP APPOINTMENT   Does the patient have an appointment with their PCP within 7 days of discharge?  Yes   Has the patient kept scheduled appointments due by today?  Yes   Comments  Pt will see urology per spouse due to chronic urination   What DME was ordered?  pt is using home oxygen   Has all DME been delivered?  Yes   Psychosocial issues?  No   Did the patient receive a copy of their discharge instructions?  Yes   Nursing interventions  Reviewed instructions with patient   What is the patient's perception of their health status since discharge?  Improving   Is the patient/caregiver able to teach back signs and symptoms related to disease process for when to call PCP?  Yes   Is the patient/caregiver able to teach back signs and symptoms related to disease process for when to call 911?  Yes   Is the patient/caregiver able to teach back the hierarchy of who to call/visit for symptoms/problems? PCP, Specialist, Home health nurse, Urgent Care, ED, 911  Yes   Week 3 Call Completed?  Yes   Is the patient interested in additional calls from an ambulatory ?  NOTE:  applies to high risk patients requiring " additional follow-up.  No   Revoked  No further contact(revokes)-requires comment   Graduated/Revoked comments  Spoused voiced some concerns regarding catheterization.  She was advised to reach out to Patient Relations for further discussion.  She is appreciative of calls but declines the need for any future calls.            Cherie Boykin RN

## 2021-06-30 ENCOUNTER — OFFICE (OUTPATIENT)
Dept: URBAN - METROPOLITAN AREA CLINIC 64 | Facility: CLINIC | Age: 81
End: 2021-06-30

## 2021-06-30 VITALS — SYSTOLIC BLOOD PRESSURE: 90 MMHG | HEART RATE: 77 BPM | DIASTOLIC BLOOD PRESSURE: 58 MMHG | HEIGHT: 70 IN

## 2021-06-30 DIAGNOSIS — R10.12 LEFT UPPER QUADRANT PAIN: ICD-10-CM

## 2021-06-30 DIAGNOSIS — Z79.02 LONG TERM (CURRENT) USE OF ANTITHROMBOTICS/ANTIPLATELETS: ICD-10-CM

## 2021-06-30 DIAGNOSIS — F45.8 OTHER SOMATOFORM DISORDERS: ICD-10-CM

## 2021-06-30 DIAGNOSIS — R11.2 NAUSEA WITH VOMITING, UNSPECIFIED: ICD-10-CM

## 2021-06-30 DIAGNOSIS — Z79.01 LONG TERM (CURRENT) USE OF ANTICOAGULANTS: ICD-10-CM

## 2021-06-30 DIAGNOSIS — R13.10 DYSPHAGIA, UNSPECIFIED: ICD-10-CM

## 2021-06-30 PROCEDURE — 99214 OFFICE O/P EST MOD 30 MIN: CPT | Performed by: NURSE PRACTITIONER

## 2021-07-08 ENCOUNTER — TELEPHONE (OUTPATIENT)
Dept: CARDIOLOGY | Facility: CLINIC | Age: 81
End: 2021-07-08

## 2021-07-08 NOTE — TELEPHONE ENCOUNTER
Wife is insistent on speaking with Dr. Garcia because of his blood pressure. The times that we had open on the schedule didn't fit their schedule so she would like to speak with Dr. Gracia over the phone. His blood pressure is going to 111/70 and 116/75 and he is lethargic with his blood pressure running low. She said only Dr. Garcia understands what is going on.

## 2021-07-13 NOTE — TELEPHONE ENCOUNTER
Spoke to patient's wife, can we check report on pacemaker . He has had some IRR HB show on b/p machine .

## 2021-07-30 RX ORDER — CLOPIDOGREL BISULFATE 75 MG/1
TABLET ORAL
Qty: 90 TABLET | Refills: 1 | Status: SHIPPED | OUTPATIENT
Start: 2021-07-30 | End: 2022-01-31

## 2021-08-04 PROCEDURE — 93306 TTE W/DOPPLER COMPLETE: CPT | Performed by: INTERNAL MEDICINE

## 2021-08-05 ENCOUNTER — OUTSIDE FACILITY SERVICE (OUTPATIENT)
Dept: CARDIOLOGY | Facility: CLINIC | Age: 81
End: 2021-08-05

## 2021-09-13 ENCOUNTER — APPOINTMENT (OUTPATIENT)
Dept: GENERAL RADIOLOGY | Facility: HOSPITAL | Age: 81
End: 2021-09-13

## 2021-09-13 ENCOUNTER — APPOINTMENT (OUTPATIENT)
Dept: CT IMAGING | Facility: HOSPITAL | Age: 81
End: 2021-09-13

## 2021-09-13 ENCOUNTER — HOSPITAL ENCOUNTER (OUTPATIENT)
Facility: HOSPITAL | Age: 81
Setting detail: OBSERVATION
Discharge: HOME | End: 2021-09-15
Attending: EMERGENCY MEDICINE | Admitting: INTERNAL MEDICINE

## 2021-09-13 DIAGNOSIS — R10.9 ABDOMINAL PAIN, UNSPECIFIED ABDOMINAL LOCATION: ICD-10-CM

## 2021-09-13 DIAGNOSIS — G30.9 DEMENTIA IN ALZHEIMER'S DISEASE WITH DELIRIUM (HCC): ICD-10-CM

## 2021-09-13 DIAGNOSIS — R29.818 FOCAL NEUROLOGICAL DEFICIT: ICD-10-CM

## 2021-09-13 DIAGNOSIS — F02.82 DEMENTIA IN ALZHEIMER'S DISEASE WITH DELIRIUM (HCC): ICD-10-CM

## 2021-09-13 DIAGNOSIS — R41.82 ALTERED MENTAL STATUS, UNSPECIFIED ALTERED MENTAL STATUS TYPE: Primary | ICD-10-CM

## 2021-09-13 LAB
ABO GROUP BLD: NORMAL
APTT PPP: 28 SECONDS (ref 24–31)
BASOPHILS # BLD AUTO: 0 10*3/MM3 (ref 0–0.2)
BASOPHILS NFR BLD AUTO: 0.4 % (ref 0–1.5)
BLD GP AB SCN SERPL QL: NEGATIVE
DEPRECATED RDW RBC AUTO: 44.6 FL (ref 37–54)
EOSINOPHIL # BLD AUTO: 0.2 10*3/MM3 (ref 0–0.4)
EOSINOPHIL NFR BLD AUTO: 2.1 % (ref 0.3–6.2)
ERYTHROCYTE [DISTWIDTH] IN BLOOD BY AUTOMATED COUNT: 17.1 % (ref 12.3–15.4)
GLUCOSE BLDC GLUCOMTR-MCNC: 106 MG/DL (ref 70–105)
HCT VFR BLD AUTO: 31.8 % (ref 37.5–51)
HGB BLD-MCNC: 10.2 G/DL (ref 13–17.7)
INR PPP: 1.09 (ref 0.93–1.1)
LYMPHOCYTES # BLD AUTO: 2.5 10*3/MM3 (ref 0.7–3.1)
LYMPHOCYTES NFR BLD AUTO: 24.2 % (ref 19.6–45.3)
MCH RBC QN AUTO: 23.7 PG (ref 26.6–33)
MCHC RBC AUTO-ENTMCNC: 32.1 G/DL (ref 31.5–35.7)
MCV RBC AUTO: 74 FL (ref 79–97)
MONOCYTES # BLD AUTO: 1 10*3/MM3 (ref 0.1–0.9)
MONOCYTES NFR BLD AUTO: 9.7 % (ref 5–12)
NEUTROPHILS NFR BLD AUTO: 6.7 10*3/MM3 (ref 1.7–7)
NEUTROPHILS NFR BLD AUTO: 63.6 % (ref 42.7–76)
NRBC BLD AUTO-RTO: 0 /100 WBC (ref 0–0.2)
PLATELET # BLD AUTO: 279 10*3/MM3 (ref 140–450)
PMV BLD AUTO: 7.2 FL (ref 6–12)
PROTHROMBIN TIME: 12 SECONDS (ref 9.6–11.7)
RBC # BLD AUTO: 4.3 10*6/MM3 (ref 4.14–5.8)
RH BLD: POSITIVE
T&S EXPIRATION DATE: NORMAL
WBC # BLD AUTO: 10.5 10*3/MM3 (ref 3.4–10.8)

## 2021-09-13 PROCEDURE — 0 IOPAMIDOL PER 1 ML: Performed by: EMERGENCY MEDICINE

## 2021-09-13 PROCEDURE — 83690 ASSAY OF LIPASE: CPT | Performed by: EMERGENCY MEDICINE

## 2021-09-13 PROCEDURE — 85610 PROTHROMBIN TIME: CPT | Performed by: EMERGENCY MEDICINE

## 2021-09-13 PROCEDURE — 86900 BLOOD TYPING SEROLOGIC ABO: CPT | Performed by: EMERGENCY MEDICINE

## 2021-09-13 PROCEDURE — 70496 CT ANGIOGRAPHY HEAD: CPT

## 2021-09-13 PROCEDURE — 82962 GLUCOSE BLOOD TEST: CPT

## 2021-09-13 PROCEDURE — 71045 X-RAY EXAM CHEST 1 VIEW: CPT

## 2021-09-13 PROCEDURE — 86901 BLOOD TYPING SEROLOGIC RH(D): CPT | Performed by: EMERGENCY MEDICINE

## 2021-09-13 PROCEDURE — 86850 RBC ANTIBODY SCREEN: CPT | Performed by: EMERGENCY MEDICINE

## 2021-09-13 PROCEDURE — 70498 CT ANGIOGRAPHY NECK: CPT

## 2021-09-13 PROCEDURE — 84484 ASSAY OF TROPONIN QUANT: CPT | Performed by: EMERGENCY MEDICINE

## 2021-09-13 PROCEDURE — 80053 COMPREHEN METABOLIC PANEL: CPT | Performed by: EMERGENCY MEDICINE

## 2021-09-13 PROCEDURE — 85025 COMPLETE CBC W/AUTO DIFF WBC: CPT | Performed by: EMERGENCY MEDICINE

## 2021-09-13 PROCEDURE — 93005 ELECTROCARDIOGRAM TRACING: CPT | Performed by: EMERGENCY MEDICINE

## 2021-09-13 PROCEDURE — 99285 EMERGENCY DEPT VISIT HI MDM: CPT

## 2021-09-13 PROCEDURE — 85730 THROMBOPLASTIN TIME PARTIAL: CPT | Performed by: EMERGENCY MEDICINE

## 2021-09-13 PROCEDURE — 36415 COLL VENOUS BLD VENIPUNCTURE: CPT | Performed by: EMERGENCY MEDICINE

## 2021-09-13 PROCEDURE — 86900 BLOOD TYPING SEROLOGIC ABO: CPT

## 2021-09-13 PROCEDURE — 86901 BLOOD TYPING SEROLOGIC RH(D): CPT

## 2021-09-13 PROCEDURE — 70450 CT HEAD/BRAIN W/O DYE: CPT

## 2021-09-13 RX ORDER — SODIUM CHLORIDE 0.9 % (FLUSH) 0.9 %
10 SYRINGE (ML) INJECTION AS NEEDED
Status: DISCONTINUED | OUTPATIENT
Start: 2021-09-13 | End: 2021-09-15 | Stop reason: HOSPADM

## 2021-09-13 RX ADMIN — IOPAMIDOL 100 ML: 755 INJECTION, SOLUTION INTRAVENOUS at 23:26

## 2021-09-14 ENCOUNTER — APPOINTMENT (OUTPATIENT)
Dept: CT IMAGING | Facility: HOSPITAL | Age: 81
End: 2021-09-14

## 2021-09-14 PROBLEM — G30.9 DEMENTIA IN ALZHEIMER'S DISEASE WITH DELIRIUM: Chronic | Status: ACTIVE | Noted: 2021-09-14

## 2021-09-14 PROBLEM — F02.82 DEMENTIA IN ALZHEIMER'S DISEASE WITH DELIRIUM: Chronic | Status: ACTIVE | Noted: 2021-09-14

## 2021-09-14 PROBLEM — R41.82 ALTERED MENTAL STATUS: Status: ACTIVE | Noted: 2021-09-14

## 2021-09-14 LAB
ALBUMIN SERPL-MCNC: 3.5 G/DL (ref 3.5–5.2)
ALBUMIN/GLOB SERPL: 1.4 G/DL
ALP SERPL-CCNC: 65 U/L (ref 39–117)
ALT SERPL W P-5'-P-CCNC: 12 U/L (ref 1–41)
AMMONIA BLD-SCNC: 13 UMOL/L (ref 16–60)
ANION GAP SERPL CALCULATED.3IONS-SCNC: 10 MMOL/L (ref 5–15)
AST SERPL-CCNC: 15 U/L (ref 1–40)
BILIRUB SERPL-MCNC: 0.2 MG/DL (ref 0–1.2)
BILIRUB UR QL STRIP: NEGATIVE
BUN SERPL-MCNC: 23 MG/DL (ref 8–23)
BUN/CREAT SERPL: 20.7 (ref 7–25)
CALCIUM SPEC-SCNC: 8.6 MG/DL (ref 8.6–10.5)
CHLORIDE SERPL-SCNC: 105 MMOL/L (ref 98–107)
CHOLEST SERPL-MCNC: 77 MG/DL (ref 0–200)
CLARITY UR: CLEAR
CO2 SERPL-SCNC: 24 MMOL/L (ref 22–29)
COLOR UR: YELLOW
CREAT SERPL-MCNC: 1.11 MG/DL (ref 0.76–1.27)
GFR SERPL CREATININE-BSD FRML MDRD: 64 ML/MIN/1.73
GLOBULIN UR ELPH-MCNC: 2.5 GM/DL
GLUCOSE SERPL-MCNC: 90 MG/DL (ref 65–99)
GLUCOSE UR STRIP-MCNC: NEGATIVE MG/DL
HDLC SERPL-MCNC: 29 MG/DL (ref 40–60)
HGB UR QL STRIP.AUTO: NEGATIVE
HOLD SPECIMEN: NORMAL
HOLD SPECIMEN: NORMAL
KETONES UR QL STRIP: NEGATIVE
LDLC SERPL CALC-MCNC: 23 MG/DL (ref 0–100)
LDLC/HDLC SERPL: 0.66 {RATIO}
LEUKOCYTE ESTERASE UR QL STRIP.AUTO: NEGATIVE
LIPASE SERPL-CCNC: 36 U/L (ref 13–60)
NITRITE UR QL STRIP: NEGATIVE
PH UR STRIP.AUTO: <=5 [PH] (ref 5–8)
POTASSIUM SERPL-SCNC: 4 MMOL/L (ref 3.5–5.2)
POTASSIUM SERPL-SCNC: 4.2 MMOL/L (ref 3.5–5.2)
PROT SERPL-MCNC: 6 G/DL (ref 6–8.5)
PROT UR QL STRIP: NEGATIVE
QT INTERVAL: 411 MS
SARS-COV-2 RNA PNL SPEC NAA+PROBE: NOT DETECTED
SODIUM SERPL-SCNC: 139 MMOL/L (ref 136–145)
SP GR UR STRIP: 1.06 (ref 1–1.03)
TRIGL SERPL-MCNC: 145 MG/DL (ref 0–150)
TROPONIN T SERPL-MCNC: <0.01 NG/ML (ref 0–0.03)
TSH SERPL DL<=0.05 MIU/L-ACNC: 4.27 UIU/ML (ref 0.27–4.2)
UROBILINOGEN UR QL STRIP: ABNORMAL
VIT B12 BLD-MCNC: >2000 PG/ML (ref 211–946)
VLDLC SERPL-MCNC: 25 MG/DL (ref 5–40)
WHOLE BLOOD HOLD SPECIMEN: NORMAL
WHOLE BLOOD HOLD SPECIMEN: NORMAL

## 2021-09-14 PROCEDURE — 63710000001 PREDNISONE PER 5 MG: Performed by: NURSE PRACTITIONER

## 2021-09-14 PROCEDURE — 80061 LIPID PANEL: CPT | Performed by: NURSE PRACTITIONER

## 2021-09-14 PROCEDURE — 97162 PT EVAL MOD COMPLEX 30 MIN: CPT

## 2021-09-14 PROCEDURE — 84132 ASSAY OF SERUM POTASSIUM: CPT | Performed by: NURSE PRACTITIONER

## 2021-09-14 PROCEDURE — 74176 CT ABD & PELVIS W/O CONTRAST: CPT

## 2021-09-14 PROCEDURE — 81003 URINALYSIS AUTO W/O SCOPE: CPT | Performed by: EMERGENCY MEDICINE

## 2021-09-14 PROCEDURE — 82140 ASSAY OF AMMONIA: CPT | Performed by: NURSE PRACTITIONER

## 2021-09-14 PROCEDURE — G0378 HOSPITAL OBSERVATION PER HR: HCPCS

## 2021-09-14 PROCEDURE — 99214 OFFICE O/P EST MOD 30 MIN: CPT | Performed by: PSYCHIATRY & NEUROLOGY

## 2021-09-14 PROCEDURE — 83036 HEMOGLOBIN GLYCOSYLATED A1C: CPT | Performed by: NURSE PRACTITIONER

## 2021-09-14 PROCEDURE — 97166 OT EVAL MOD COMPLEX 45 MIN: CPT

## 2021-09-14 PROCEDURE — 82607 VITAMIN B-12: CPT | Performed by: NURSE PRACTITIONER

## 2021-09-14 PROCEDURE — 99220 PR INITIAL OBSERVATION CARE/DAY 70 MINUTES: CPT | Performed by: INTERNAL MEDICINE

## 2021-09-14 PROCEDURE — 87635 SARS-COV-2 COVID-19 AMP PRB: CPT | Performed by: EMERGENCY MEDICINE

## 2021-09-14 PROCEDURE — 84443 ASSAY THYROID STIM HORMONE: CPT | Performed by: NURSE PRACTITIONER

## 2021-09-14 PROCEDURE — C9803 HOPD COVID-19 SPEC COLLECT: HCPCS

## 2021-09-14 RX ORDER — POTASSIUM CHLORIDE 7.45 MG/ML
10 INJECTION INTRAVENOUS
Status: DISCONTINUED | OUTPATIENT
Start: 2021-09-14 | End: 2021-09-15 | Stop reason: HOSPADM

## 2021-09-14 RX ORDER — MAGNESIUM SULFATE 1 G/100ML
1 INJECTION INTRAVENOUS AS NEEDED
Status: DISCONTINUED | OUTPATIENT
Start: 2021-09-14 | End: 2021-09-15 | Stop reason: HOSPADM

## 2021-09-14 RX ORDER — MIDODRINE HYDROCHLORIDE 5 MG/1
5 TABLET ORAL 3 TIMES DAILY PRN
Status: DISCONTINUED | OUTPATIENT
Start: 2021-09-14 | End: 2021-09-15 | Stop reason: HOSPADM

## 2021-09-14 RX ORDER — ISOSORBIDE MONONITRATE 30 MG/1
30 TABLET, EXTENDED RELEASE ORAL NIGHTLY
Status: DISCONTINUED | OUTPATIENT
Start: 2021-09-14 | End: 2021-09-15 | Stop reason: HOSPADM

## 2021-09-14 RX ORDER — PANTOPRAZOLE SODIUM 40 MG/1
40 TABLET, DELAYED RELEASE ORAL
Status: DISCONTINUED | OUTPATIENT
Start: 2021-09-14 | End: 2021-09-15 | Stop reason: HOSPADM

## 2021-09-14 RX ORDER — ESCITALOPRAM OXALATE 10 MG/1
10 TABLET ORAL DAILY
Status: DISCONTINUED | OUTPATIENT
Start: 2021-09-14 | End: 2021-09-15 | Stop reason: HOSPADM

## 2021-09-14 RX ORDER — PREDNISONE 1 MG/1
5 TABLET ORAL DAILY
Status: DISCONTINUED | OUTPATIENT
Start: 2021-09-14 | End: 2021-09-14

## 2021-09-14 RX ORDER — ACETAMINOPHEN 650 MG/1
650 SUPPOSITORY RECTAL EVERY 4 HOURS PRN
Status: DISCONTINUED | OUTPATIENT
Start: 2021-09-14 | End: 2021-09-15 | Stop reason: HOSPADM

## 2021-09-14 RX ORDER — SODIUM CHLORIDE 0.9 % (FLUSH) 0.9 %
10 SYRINGE (ML) INJECTION EVERY 12 HOURS SCHEDULED
Status: DISCONTINUED | OUTPATIENT
Start: 2021-09-14 | End: 2021-09-15 | Stop reason: HOSPADM

## 2021-09-14 RX ORDER — MULTIPLE VITAMINS W/ MINERALS TAB 9MG-400MCG
1 TAB ORAL NIGHTLY
Status: DISCONTINUED | OUTPATIENT
Start: 2021-09-14 | End: 2021-09-15 | Stop reason: HOSPADM

## 2021-09-14 RX ORDER — BRIMONIDINE TARTRATE 2 MG/ML
1 SOLUTION/ DROPS OPHTHALMIC 2 TIMES DAILY
Status: DISCONTINUED | OUTPATIENT
Start: 2021-09-14 | End: 2021-09-15 | Stop reason: HOSPADM

## 2021-09-14 RX ORDER — CLOPIDOGREL BISULFATE 75 MG/1
75 TABLET ORAL DAILY
Status: DISCONTINUED | OUTPATIENT
Start: 2021-09-14 | End: 2021-09-15 | Stop reason: HOSPADM

## 2021-09-14 RX ORDER — ALBUTEROL SULFATE 2.5 MG/3ML
2.5 SOLUTION RESPIRATORY (INHALATION) EVERY 6 HOURS PRN
Status: DISCONTINUED | OUTPATIENT
Start: 2021-09-14 | End: 2021-09-15 | Stop reason: HOSPADM

## 2021-09-14 RX ORDER — FENTANYL/ROPIVACAINE/NS/PF 2-625MCG/1
15 PLASTIC BAG, INJECTION (ML) EPIDURAL AS NEEDED
Status: DISCONTINUED | OUTPATIENT
Start: 2021-09-14 | End: 2021-09-15 | Stop reason: HOSPADM

## 2021-09-14 RX ORDER — PREDNISONE 1 MG/1
2.5 TABLET ORAL DAILY
Status: DISCONTINUED | OUTPATIENT
Start: 2021-09-15 | End: 2021-09-15 | Stop reason: HOSPADM

## 2021-09-14 RX ORDER — LEVOTHYROXINE SODIUM 0.03 MG/1
25 TABLET ORAL
Status: DISCONTINUED | OUTPATIENT
Start: 2021-09-14 | End: 2021-09-15 | Stop reason: HOSPADM

## 2021-09-14 RX ORDER — NITROGLYCERIN 0.4 MG/1
0.4 TABLET SUBLINGUAL
Status: DISCONTINUED | OUTPATIENT
Start: 2021-09-14 | End: 2021-09-15 | Stop reason: HOSPADM

## 2021-09-14 RX ORDER — CETIRIZINE HYDROCHLORIDE 10 MG/1
5 TABLET ORAL DAILY
Status: DISCONTINUED | OUTPATIENT
Start: 2021-09-14 | End: 2021-09-14

## 2021-09-14 RX ORDER — MIDODRINE HYDROCHLORIDE 5 MG/1
5 TABLET ORAL 3 TIMES DAILY PRN
COMMUNITY
End: 2021-10-21 | Stop reason: SDUPTHER

## 2021-09-14 RX ORDER — ONDANSETRON 4 MG/1
4 TABLET, FILM COATED ORAL EVERY 6 HOURS PRN
Status: DISCONTINUED | OUTPATIENT
Start: 2021-09-14 | End: 2021-09-15 | Stop reason: HOSPADM

## 2021-09-14 RX ORDER — ONDANSETRON 2 MG/ML
4 INJECTION INTRAMUSCULAR; INTRAVENOUS EVERY 6 HOURS PRN
Status: DISCONTINUED | OUTPATIENT
Start: 2021-09-14 | End: 2021-09-15 | Stop reason: HOSPADM

## 2021-09-14 RX ORDER — MAGNESIUM SULFATE HEPTAHYDRATE 40 MG/ML
2 INJECTION, SOLUTION INTRAVENOUS AS NEEDED
Status: DISCONTINUED | OUTPATIENT
Start: 2021-09-14 | End: 2021-09-15 | Stop reason: HOSPADM

## 2021-09-14 RX ORDER — RISPERIDONE 0.5 MG/1
0.5 TABLET, ORALLY DISINTEGRATING ORAL NIGHTLY
Status: DISCONTINUED | OUTPATIENT
Start: 2021-09-14 | End: 2021-09-15 | Stop reason: HOSPADM

## 2021-09-14 RX ORDER — ISOSORBIDE MONONITRATE 30 MG/1
30 TABLET, EXTENDED RELEASE ORAL DAILY
Status: DISCONTINUED | OUTPATIENT
Start: 2021-09-14 | End: 2021-09-14

## 2021-09-14 RX ORDER — ACETAMINOPHEN 325 MG/1
650 TABLET ORAL EVERY 4 HOURS PRN
Status: DISCONTINUED | OUTPATIENT
Start: 2021-09-14 | End: 2021-09-15 | Stop reason: HOSPADM

## 2021-09-14 RX ORDER — ROSUVASTATIN CALCIUM 10 MG/1
20 TABLET, COATED ORAL NIGHTLY
Status: DISCONTINUED | OUTPATIENT
Start: 2021-09-14 | End: 2021-09-15 | Stop reason: HOSPADM

## 2021-09-14 RX ORDER — LANOLIN ALCOHOL/MO/W.PET/CERES
2000 CREAM (GRAM) TOPICAL DAILY
Status: DISCONTINUED | OUTPATIENT
Start: 2021-09-14 | End: 2021-09-15 | Stop reason: HOSPADM

## 2021-09-14 RX ORDER — CHOLECALCIFEROL (VITAMIN D3) 125 MCG
2000 CAPSULE ORAL DAILY
COMMUNITY
End: 2021-10-22

## 2021-09-14 RX ORDER — ACETAMINOPHEN 160 MG/5ML
650 SOLUTION ORAL EVERY 4 HOURS PRN
Status: DISCONTINUED | OUTPATIENT
Start: 2021-09-14 | End: 2021-09-15 | Stop reason: HOSPADM

## 2021-09-14 RX ORDER — SODIUM CHLORIDE 0.9 % (FLUSH) 0.9 %
10 SYRINGE (ML) INJECTION AS NEEDED
Status: DISCONTINUED | OUTPATIENT
Start: 2021-09-14 | End: 2021-09-15 | Stop reason: HOSPADM

## 2021-09-14 RX ORDER — GALANTAMINE HYDROBROMIDE 4 MG/1
8 TABLET, FILM COATED ORAL 2 TIMES DAILY WITH MEALS
Status: DISCONTINUED | OUTPATIENT
Start: 2021-09-14 | End: 2021-09-15 | Stop reason: HOSPADM

## 2021-09-14 RX ORDER — CHOLECALCIFEROL (VITAMIN D3) 125 MCG
20 CAPSULE ORAL NIGHTLY
Status: DISCONTINUED | OUTPATIENT
Start: 2021-09-14 | End: 2021-09-15 | Stop reason: HOSPADM

## 2021-09-14 RX ADMIN — Medication 10 ML: at 22:14

## 2021-09-14 RX ADMIN — PREDNISONE 5 MG: 5 TABLET ORAL at 09:18

## 2021-09-14 RX ADMIN — ROSUVASTATIN 20 MG: 10 TABLET, FILM COATED ORAL at 22:00

## 2021-09-14 RX ADMIN — APIXABAN 5 MG: 5 TABLET, FILM COATED ORAL at 22:14

## 2021-09-14 RX ADMIN — ISOSORBIDE MONONITRATE 30 MG: 30 TABLET, EXTENDED RELEASE ORAL at 22:30

## 2021-09-14 RX ADMIN — ROSUVASTATIN 20 MG: 10 TABLET, FILM COATED ORAL at 22:13

## 2021-09-14 RX ADMIN — ESCITALOPRAM OXALATE 10 MG: 10 TABLET ORAL at 09:16

## 2021-09-14 RX ADMIN — CYANOCOBALAMIN TAB 1000 MCG 2000 MCG: 1000 TAB at 09:16

## 2021-09-14 RX ADMIN — MULTIPLE VITAMINS W/ MINERALS TAB 1 TABLET: TAB at 22:13

## 2021-09-14 RX ADMIN — Medication 10 ML: at 09:10

## 2021-09-14 RX ADMIN — Medication 5000 UNITS: at 09:15

## 2021-09-14 RX ADMIN — CLOPIDOGREL BISULFATE 75 MG: 75 TABLET ORAL at 09:18

## 2021-09-14 RX ADMIN — LEVOTHYROXINE SODIUM 25 MCG: 0.03 TABLET ORAL at 07:49

## 2021-09-14 RX ADMIN — BRIMONIDINE TARTRATE 1 DROP: 2 SOLUTION/ DROPS OPHTHALMIC at 09:21

## 2021-09-14 RX ADMIN — Medication 10 ML: at 04:52

## 2021-09-14 RX ADMIN — APIXABAN 5 MG: 5 TABLET, FILM COATED ORAL at 09:18

## 2021-09-14 RX ADMIN — MULTIPLE VITAMINS W/ MINERALS TAB 1 TABLET: TAB at 22:02

## 2021-09-14 NOTE — PLAN OF CARE
Goal Outcome Evaluation:      Pt resting/asleep for most of the night with wife at bedside. Vital signs stable.

## 2021-09-14 NOTE — H&P
[facility] Medicine Services      Patient Name: Jayden Bond  : 1940  MRN: [id number]  Primary Care Physician:  Ant Hines MD  Date of admission: 2021      Subjective      Chief Complaint: Altered mental status    History of Present Illness:  Jayden Bond is a 81 y.o. male w/PMH of CVA, HLD, HTN, CAD, dementia, SSS W/pacemaker, COPD who presents to [facility] ED due to altered mental status.  Patient's wife reports she experienced an inpatient hospital admission and when she returned home his speech seems slurred and patient was altered.  Wife reports multiple changes in psychiatric medications over the last 3 weeks, most recent medication is trazodone.  Wife reports today he experienced slurred speech at 3 PM, went back to sleep and upon awakening she noticed left facial droop, increasing slurred speech, and she reports patient experience some numbness in left arm and abdominal pain.  Patient has dementia, poor historian, unable to complete review of systems at this time.        Upon arrival to the ED vitals 97.7, HR 68, RR 18, /80, O2 sat 96% on room air.  Labs notable for troponin less than 0.010, glucose 90, , K4.2, BUN 23, creatinine 1.11, GFR 65, ALT 12, AST 15, lipase 36, INR 1.09, PTT 28.0, WBC 10.5, Hgb 10.2, platelets 279, neutrophils 63.6.  UA negative.  CT head W/0 shows no acute intracranial hemorrhage, territorial infarct.  Multiple chronic infarcts.  The left occipital infarct is new since May 11, 2020, the other chronic infarcts are unchanged.  Moderate chronic small vessel ischemic changes.  Volume loss.  CTA of the head/neck shows no large vessel occlusion of the major arteries of the head or neck. Severe focal stenosis of the proximal right V4 segment.  Mild focal stenosis of the origin of the left vertebral artery. Atherosclerosis of both carotid bulbs without significant stenosis by NASCET criteria. Moderate to severe emphysema.   EKG shows sinus rhythm rate 68.    Review of Systems   Unable to perform ROS: dementia        Personal History     Past Medical History:   Diagnosis Date   • CAD (coronary artery disease)    • Coronary artery disease     PTCI   • Dementia (CMS/HCC)    • Depression    • Disease of thyroid gland    • Dysautonomia (CMS/HCC)    • Dyslipidemia    • GERD (gastroesophageal reflux disease)    • Head pain    • Hyperlipidemia    • Hypertension    • SSS (sick sinus syndrome) (CMS/HCC)    • Stroke (CMS/HCC)     x 3 in 2018       Past Surgical History:   Procedure Laterality Date   • APPENDECTOMY     • CHOLECYSTECTOMY     • CYSTOSCOPY     • ENDOSCOPY N/A 10/17/2019    Procedure: ESOPHAGOGASTRODUODENOSCOPY with biopsy x 2 areas;  Surgeon: Ashok Sanchez MD;  Location: [facility] ENDOSCOPY;  Service: Gastroenterology   • HERNIA REPAIR     • INSERT / REPLACE / REMOVE PACEMAKER     • LEFT HEART CATH     • PACEMAKER IMPLANTATION      Medtronic   • TEMPORAL ARTERY BIOPSY     • WRIST SURGERY      severed artery       Family History: family history includes Heart disease in his father. Otherwise pertinent FHx was reviewed and not pertinent to current issue.    Social History:  reports that he has quit smoking. He has never used smokeless tobacco. He reports that he does not drink alcohol and does not use drugs.    Home Medications:  Prior to Admission Medications     Prescriptions Last Dose Informant Patient Reported? Taking?    albuterol sulfate  (90 Base) MCG/ACT inhaler   Yes No    Inhale 2 puffs Every 4 (Four) Hours As Needed.    apixaban (ELIQUIS) 2.5 MG tablet tablet   Yes No    Take 2.5 mg by mouth 2 (Two) Times a Day.    brimonidine (ALPHAGAN) 0.2 % ophthalmic solution   No No    Administer 1 drop to both eyes 2 (Two) Times a Day.    clopidogrel (PLAVIX) 75 MG tablet   No No    TAKE 1 TABLET BY MOUTH EVERY DAY    escitalopram (LEXAPRO) 10 MG tablet   Yes No    Take 10 mg by mouth Daily.    fluticasone (FLONASE) 50 MCG/ACT  nasal spray   Yes No    2 sprays into the nostril(s) as directed by provider Daily.    galantamine (RAZADYNE) 8 MG tablet   Yes No    Take 8 mg by mouth 2 (Two) Times a Day.    Incruse Ellipta 62.5 MCG/INH aerosol powder   Spouse/Significant Other Yes No    Take 1 puff by mouth Daily.    isosorbide mononitrate (IMDUR) 30 MG 24 hr tablet   No No    Take 1 tablet by mouth Daily.    Patient taking differently:  Take 30 mg by mouth Every Evening.    levothyroxine (SYNTHROID, LEVOTHROID) 25 MCG tablet   Yes No    Take 25 mcg by mouth Daily.    loratadine (CLARITIN) 10 MG tablet   Yes No    Take 10 mg by mouth Daily.    midodrine (PROAMATINE) 2.5 MG tablet   No No    TAKE 1 TABLET BY MOUTH 3 TIMES A DAY WITH MEALS    Multiple Vitamins-Minerals (MULTIVITAMIN WITH MINERALS) tablet tablet   Yes No    Take 1 tablet by mouth Every Night.    nitroglycerin (NITROSTAT) 0.4 MG SL tablet   Yes No    Place 0.4 mg under the tongue Every 5 (Five) Minutes As Needed for Chest Pain. Take no more than 3 doses in 15 minutes.    Omega-3 Fatty Acids (FISH OIL) 1000 MG capsule capsule   Yes No    Take 1,000 mg by mouth Daily With Breakfast.    pantoprazole (PROTONIX) 40 MG EC tablet   Yes No    Take 40 mg by mouth 2 (two) times a day. At least 4 hours after synthroid    predniSONE (DELTASONE) 5 MG tablet   Yes No    Take 5 mg by mouth Daily.    rosuvastatin (CRESTOR) 20 MG tablet   Yes No    Take 20 mg by mouth Every Evening.    vitamin B-12 (CYANOCOBALAMIN) 1000 MCG tablet   Yes No    Take 2,000 mcg by mouth Daily.            Allergies:  Allergies   Allergen Reactions   • Trazodone Hallucinations   • Doxycycline GI Intolerance   • Doxycycline Nausea And Vomiting   • Quetiapine Other (See Comments)     Having falls on it       Objective      Vitals:   Temp:  [97.7 °F (36.5 °C)-98 °F (36.7 °C)] 98 °F (36.7 °C)  Heart Rate:  [64-68] 67  Resp:  [18-19] 19  BP: (113-136)/(70-92) 113/70  Flow (L/min):  [3] 3    Physical Exam  Vitals and nursing  note reviewed.   Constitutional:       Appearance: He is ill-appearing.   HENT:      Head: Normocephalic.      Right Ear: External ear normal.      Left Ear: External ear normal.      Nose: Nose normal.      Mouth/Throat:      Mouth: Mucous membranes are dry.   Eyes:      Pupils: Pupils are equal, round, and reactive to light.   Cardiovascular:      Rate and Rhythm: Normal rate and regular rhythm.      Pulses: Normal pulses.      Heart sounds: Normal heart sounds.   Pulmonary:      Effort: Pulmonary effort is normal.      Breath sounds: Normal air entry. Examination of the left-lower field reveals decreased breath sounds. Decreased breath sounds present.   Abdominal:      General: Bowel sounds are decreased.      Palpations: Abdomen is soft.      Tenderness: There is abdominal tenderness in the epigastric area. There is no guarding or rebound.   Musculoskeletal:         General: Normal range of motion.      Cervical back: No signs of trauma.   Skin:     General: Skin is warm and dry.      Findings: Bruising and ecchymosis present.   Neurological:      Cranial Nerves: No facial asymmetry.   Psychiatric:         Cognition and Memory: Cognition is impaired. Memory is impaired.          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 (CMS/HCC)    • SSS (sick sinus syndrome) (CMS/HCC)    • Mixed hyperlipidemia    • Essential hypertension    • Coronary artery disease involving native coronary artery of native heart without angina pectoris    • Dysautonomia (CMS/HCC)    • Anticoagulant long-term use    • COPD (chronic  obstructive pulmonary disease) (CMS/HCC)    • History of CVA (cerebrovascular accident)    • Cardiac pacemaker in situ      Altered mental status:  -Changes in psychiatric medications over the last 3 weeks, noticed medication trazodone  -CTA of the head/neck shows no large vessel occlusion of the major arteries of the head or neck. Severe focal stenosis of the proximal right V4 segment.  Mild focal stenosis of the origin of the left vertebral artery. Atherosclerosis of both carotid bulbs without significant stenosis by NASCET criteria  -EKG shows sinus rhythm rate 88  -Check ammonia, TSH, A1c, B12   -Neuro checks every 4 hours, NIH every shift  -ED provider spoke with neurology who requested admission, they will see patient in a.m.  -Hold sedating medication  -Maintain falls precautions  -Continue cardiac monitoring    SSS/pacemaker placement/CAD:  -Home medication Eliquis    Mixed hyperlipidemia:  -Encourage lifestyle modifications  -Encourage dietary modifications  -Home medication rosuvastatin    Essential hypertension:  -Encourage lifestyle modifications  -Encourage dietary modifications  -Home medication isosorbide  -Vital signs every 4    Acquired disease of thyroid:  -Home medication levothyroxine    Dementia:  -Home medication Razadyne, Lexapro  -Trialing different psychiatric medications over the last 3 weeks, most current one is trazodone    COPD:  -Albuterol nebulizer treatment every 6 hours as needed for shortness of breath  -Oxygen as needed to keep O2 saturation greater than 92%  -Home medication Incruse is nonformulary  -Home medication prednisone, loratadine    Acquired blepharoptosis:  -Home medication Alphagan    History of CVA/long-term anticoagulant use:  -2018  -Home medication /Plavix          DVT prophylaxis:  Medical and mechanical DVT prophylaxis orders are present.      Home medication list verified by nursing and/or pharmacy prior to provider review      CODE STATUS:    Code Status:  CPR  Medical Interventions (Level of Support Prior to Arrest): Full    Admission Status:  I believe this patient meets observation status.      I discussed the patient's findings and my recommendations with the patient.      The patient was interviewed wearing appropriate personal protective equipment.      Signature: Electronically signed by DEANDRA Willoughby, 09/14/21, 4:01 AM EDT.      Agree with above mentioned except my evaluation, assessment, plan and treatment supercedes that of ARNP or PA.        Electronically signed by Freddie Correa MD, 09/14/21, 4:27 PM EDT.                Jayden Bond is a 81 y.o. male w/PMH of CVA, HLD, HTN, CAD, dementia, SSS W/pacemaker, COPD who presents to [facility] ED due to altered mental status.  Patient's wife reports she experienced an inpatient hospital admission and when she returned home his speech seems slurred and patient was altered.  Wife reports multiple changes in psychiatric medications over the last 3 weeks, most recent medication is trazodone.  Wife reports today he experienced slurred speech at 3 PM, went back to sleep and upon awakening she noticed left facial droop, increasing slurred speech, and she reports patient experience some numbness in left arm and abdominal pain.  Patient has dementia, poor historian, unable to complete review of systems at this time.           Upon arrival to the ED vitals 97.7, HR 68, RR 18, /80, O2 sat 96% on room air.  Labs notable for troponin less than 0.010, glucose 90, , K4.2, BUN 23, creatinine 1.11, GFR 65, ALT 12, AST 15, lipase 36, INR 1.09, PTT 28.0, WBC 10.5, Hgb 10.2, platelets 279, neutrophils 63.6.  UA negative.  CT head W/0 shows no acute intracranial hemorrhage, territorial infarct.  Multiple chronic infarcts.  The left occipital infarct is new since May 11, 2020, the other chronic infarcts are unchanged.  Moderate chronic small vessel ischemic changes.  Volume loss.  CTA of the head/neck shows no  large vessel occlusion of the major arteries of the head or neck. Severe focal stenosis of the proximal right V4 segment.  Mild focal stenosis of the origin of the left vertebral artery. Atherosclerosis of both carotid bulbs without significant stenosis by NASCET criteria. Moderate to severe emphysema.  EKG shows sinus rhythm rate 68.     Patient able to answer simple questions.  However unable to provide any history whatsoever.  Lives at home with wife        NEIL  Unable due to confusion    Physical Exam     Constitutional: Patient appears well-developed and well-nourished and in no acute distress     HEENT:   Head: Normocephalic and atraumatic.   Eyes:  Pupils are equal, round, and reactive to light. EOM are intact. Sclera are anicteric and non-injected.  Mouth and Throat: Patient has moist mucous membranes. Oropharynx is clear of any erythema or exudate.       Neck: Neck supple.  No thyromegaly present. No lymphadenopathy present. No  masses.     Cardiovascular: Inspection: No JVD present. Palpation: No parasternal heave. Pedal pulses absent bilaterally. No leg edema. Auscultation: Regular rate, regular rhythm, S1 normal and S2 normal. reveals no gallop and no friction rub. No Carotid bruit bilaterally.    Pulmonary/Chest: Inspection: No distress, no use of accessory muscles. Lungs are clear to auscultation bilaterally. No respiratory distress. No wheezes. No rales.     Abdomen /Gastrointestinal: Inspection: no distension. Palpation: no masses, no organomegaly. Soft. There is no tenderness. Bowel sounds are normal.     Musculoskeletal: Normal Muscle tone. Age appropriate, no deformities.    Neurological: Patient is alert and disoriented. Cranial nerves II-XII are grossly intact with no focal deficits. Sensori-motor exam is normal. No cerebellar signs.    Skin: Skin is warm. No rash noted. Nails show no clubbing.  No cyanosis or erythema. No bruising.    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 (CMS/[facility]) [G30.9, F02.80, F05]  Yes     Priority: High   • SSS (sick sinus syndrome) (CMS/[facility]) [I49.5]  Yes   • Mixed hyperlipidemia [E78.2]  Yes   • Essential hypertension [I10]  Yes   • Coronary artery disease involving native coronary artery of native heart without angina pectoris [I25.10]  Yes   • Dysautonomia (CMS/[facility]) [G90.1]  Yes   • Anticoagulant long-term use [Z79.01]  Not Applicable   • COPD (chronic obstructive pulmonary disease) (CMS/[facility]) [J44.9]  Yes   • History of CVA (cerebrovascular accident) [Z86.73]  Not Applicable   • Cardiac pacemaker in situ [Z95.0]  Yes      Resolved Hospital Problems   No resolved problems to display.       Delirium:  Will take several days to return to baseline.  Stop anticholinergics  Stop unnecessary medications  Avoid benzodiazepines  Add melatonin, clonidine if tolerated      Hyperlipidemia:  Check lipid panel  Statins if indicated  Add Ezetimibe if appropriate  Only Icosapent Ethyl (omega-3) demonstrated efficacy in Hypertriglyceridemia. (STRENGTH, REDUCE IT trials)    Hypertension:  Continue home medications   Options include-  beta-blockers  Calcium channel blockers  ACE inhibitor  Vasodilators  Low-dose diuretics  PRN medications have not been shown to affect outcomes-to be avoided      Await input from PT OT and case management.  Currently stable.  Nothing much to offer in the hospital.  Placement

## 2021-09-14 NOTE — ED PROVIDER NOTES
Subjective   Chief complaint: Patient is a pleasant 81-year-old.  He had some left-sided arm numbness.  And slurred speech started about 6 hours ago.  He also is noted to have some left-sided facial droop per EMS in route.  The weakness in the arm resolved over the last 6 hours at some point.  He has a history of dementia.  Were not sure what his mental baseline is per EMS.  He lives with his wife.  Came from home.    Context: As above    Duration: 6 hours per report.    Timing: As above    Severity: Patient symptoms have improved somewhat.    Associated Symptoms: Patient denies abdominal pain or chest pain.  Denies shortness of breath.  Appropriate PPE was used.        PCP:            Review of Systems   Unable to perform ROS: Dementia       Past Medical History:   Diagnosis Date   • CAD (coronary artery disease)    • Coronary artery disease     PTCI   • Dementia (CMS/HCC)    • Depression    • Disease of thyroid gland    • Dysautonomia (CMS/HCC)    • Dyslipidemia    • GERD (gastroesophageal reflux disease)    • Head pain    • Hyperlipidemia    • Hypertension    • SSS (sick sinus syndrome) (CMS/HCC)    • Stroke (CMS/HCC)     x 3 in 2018       Allergies   Allergen Reactions   • Doxycycline GI Intolerance   • Doxycycline Nausea And Vomiting       Past Surgical History:   Procedure Laterality Date   • APPENDECTOMY     • CHOLECYSTECTOMY     • CYSTOSCOPY     • ENDOSCOPY N/A 10/17/2019    Procedure: ESOPHAGOGASTRODUODENOSCOPY with biopsy x 2 areas;  Surgeon: Ashok Sanchez MD;  Location: [facility] ENDOSCOPY;  Service: Gastroenterology   • HERNIA REPAIR     • INSERT / REPLACE / REMOVE PACEMAKER     • LEFT HEART CATH     • PACEMAKER IMPLANTATION      [employer]tronic   • TEMPORAL ARTERY BIOPSY     • WRIST SURGERY      severed artery       Family History   Problem Relation Age of Onset   • Heart disease Father        Social History     Socioeconomic History   • Marital status:      Spouse name: Not on file   • Number of  children: Not on file   • Years of education: Not on file   • Highest education level: Not on file   Tobacco Use   • Smoking status: Former Smoker   • Smokeless tobacco: Never Used   • Tobacco comment: quit 25 yrs ago   Vaping Use   • Vaping Use: Never used   Substance and Sexual Activity   • Alcohol use: No   • Drug use: No   • Sexual activity: Defer           Objective   Physical Exam  Vitals and nursing note reviewed.   Constitutional:       Appearance: Normal appearance.   HENT:      Head: Normocephalic and atraumatic.   Eyes:      Extraocular Movements: Extraocular movements intact.      Pupils: Pupils are equal, round, and reactive to light.   Cardiovascular:      Rate and Rhythm: Normal rate.      Pulses: Normal pulses.      Heart sounds: Normal heart sounds.   Pulmonary:      Effort: Pulmonary effort is normal.      Breath sounds: Normal breath sounds.   Abdominal:      Tenderness: There is no abdominal tenderness.   Musculoskeletal:         General: Normal range of motion.      Cervical back: Neck supple.   Skin:     General: Skin is warm and dry.      Capillary Refill: Capillary refill takes less than 2 seconds.   Neurological:      General: No focal deficit present.      Mental Status: He is alert. He is disoriented.      Comments: Does have some slurred speech.  Mild left facial droop.   Psychiatric:         Mood and Affect: Mood normal.         Behavior: Behavior normal.         Procedures           ED Course  ED Course as of Sep 14 0016   Mon Sep 13, 2021   2312 I spoke with Dr. Soria.  No TPA candidate.  Obtain CT and CT angios.  No perfusion study.    [LH]      ED Course User Index  [LH] Cy Rivera DO            Results for orders placed or performed during the hospital encounter of 09/13/21   Comprehensive Metabolic Panel    Specimen: Blood   Result Value Ref Range    Glucose 90 65 - 99 mg/dL    BUN 23 8 - 23 mg/dL    Creatinine 1.11 0.76 - 1.27 mg/dL    Sodium 139 136 - 145 mmol/L     Potassium 4.2 3.5 - 5.2 mmol/L    Chloride 105 98 - 107 mmol/L    CO2 24.0 22.0 - 29.0 mmol/L    Calcium 8.6 8.6 - 10.5 mg/dL    Total Protein 6.0 6.0 - 8.5 g/dL    Albumin 3.50 3.50 - 5.20 g/dL    ALT (SGPT) 12 1 - 41 U/L    AST (SGOT) 15 1 - 40 U/L    Alkaline Phosphatase 65 39 - 117 U/L    Total Bilirubin 0.2 0.0 - 1.2 mg/dL    eGFR Non African Amer 64 >60 mL/min/1.73    Globulin 2.5 gm/dL    A/G Ratio 1.4 g/dL    BUN/Creatinine Ratio 20.7 7.0 - 25.0    Anion Gap 10.0 5.0 - 15.0 mmol/L   Protime-INR    Specimen: Blood   Result Value Ref Range    Protime 12.0 (H) 9.6 - 11.7 Seconds    INR 1.09 0.93 - 1.10   aPTT    Specimen: Blood   Result Value Ref Range    PTT 28.0 24.0 - 31.0 seconds   Troponin    Specimen: Blood   Result Value Ref Range    Troponin T <0.010 0.000 - 0.030 ng/mL   CBC Auto Differential    Specimen: Blood   Result Value Ref Range    WBC 10.50 3.40 - 10.80 10*3/mm3    RBC 4.30 4.14 - 5.80 10*6/mm3    Hemoglobin 10.2 (L) 13.0 - 17.7 g/dL    Hematocrit 31.8 (L) 37.5 - 51.0 %    MCV 74.0 (L) 79.0 - 97.0 fL    MCH 23.7 (L) 26.6 - 33.0 pg    MCHC 32.1 31.5 - 35.7 g/dL    RDW 17.1 (H) 12.3 - 15.4 %    RDW-SD 44.6 37.0 - 54.0 fl    MPV 7.2 6.0 - 12.0 fL    Platelets 279 140 - 450 10*3/mm3    Neutrophil % 63.6 42.7 - 76.0 %    Lymphocyte % 24.2 19.6 - 45.3 %    Monocyte % 9.7 5.0 - 12.0 %    Eosinophil % 2.1 0.3 - 6.2 %    Basophil % 0.4 0.0 - 1.5 %    Neutrophils, Absolute 6.70 1.70 - 7.00 10*3/mm3    Lymphocytes, Absolute 2.50 0.70 - 3.10 10*3/mm3    Monocytes, Absolute 1.00 (H) 0.10 - 0.90 10*3/mm3    Eosinophils, Absolute 0.20 0.00 - 0.40 10*3/mm3    Basophils, Absolute 0.00 0.00 - 0.20 10*3/mm3    nRBC 0.0 0.0 - 0.2 /100 WBC   Lipase    Specimen: Blood   Result Value Ref Range    Lipase 36 13 - 60 U/L   POC Glucose Once    Specimen: Blood   Result Value Ref Range    Glucose 106 (H) 70 - 105 mg/dL   ECG 12 Lead   Result Value Ref Range    QT Interval 411 ms   Type & Screen    Specimen: Blood   Result  Value Ref Range    ABO Type O     RH type Positive     Antibody Screen Negative     T&S Expiration Date 9/16/2021 11:59:59 PM    Green Top (Gel)   Result Value Ref Range    Extra Tube Hold for add-ons.    Lavender Top   Result Value Ref Range    Extra Tube hold for add-on    Gold Top - SST   Result Value Ref Range    Extra Tube Hold for add-ons.    Light Blue Top   Result Value Ref Range    Extra Tube hold for add-on      CT Head Without Contrast Stroke Protocol    Result Date: 9/13/2021  CT head: 1.  No acute territorial infarct. No acute intracranial hemorrhage. 2.  Multiple chronic infarcts. The left occipital infarct is new since May 11, 2020. The other chronic infarcts are unchanged. 3.  Moderate chronic small vessel ischemic changes. Volume loss. CT head results were communicated to GINI FLOYD at 9:10 PM (head CT) by Dr. Corbett.  Electronically signed by:  Addison Griffiths DO  9/13/2021 9:25 PM      EKG interpreted myself shows sinus rhythm rate of 68                                MDM  Number of Diagnoses or Management Options  Diagnosis management comments: Patient did present with some left facial droop and slurred speech.  He had improved and his left-sided weakness.  He woke up this way.  He was last normal 6 hours prior.  But after speaking with wife it sounds as though for the last couple days he has been altered.  He improved somewhat when he woke today at 2 PM.  However he was still not back to his baseline.  He has been on and off different psych meds for the last couple of weeks.  Trazodone was recently.  Potentially this is the cause.  However with his history will bring him in for further stroke work-up.  Spoke with Dr. Soria.  Patient was not a TPA candidate.  There is no large vessel occlusion on CTA.  Wife reported abdominal pain from earlier today.  Patient has some mild discomfort but no rigidity.       Amount and/or Complexity of Data Reviewed  Clinical lab tests: reviewed  Tests  in the radiology section of CPT®: reviewed  Tests in the medicine section of CPT®: reviewed  Discussion of test results with the performing providers: yes  Discuss the patient with other providers: yes  Independent visualization of images, tracings, or specimens: yes    Risk of Complications, Morbidity, and/or Mortality  Presenting problems: high    Patient Progress  Patient progress: stable      Final diagnoses:   None     Mental status change  Abdominal pain  ED Disposition  ED Disposition     None          No follow-up provider specified.       Medication List      No changes were made to your prescriptions during this visit.          Cy Rivera,   09/14/21 0019

## 2021-09-14 NOTE — THERAPY EVALUATION
Patient Name: Jayden Bond  : 1940    MRN: [id number]                              Today's Date: 2021       Admit Date: 2021    Visit Dx:     ICD-10-CM ICD-9-CM   1. Altered mental status, unspecified altered mental status type  R41.82 780.97   2. Abdominal pain, unspecified abdominal location  R10.9 789.00   3. Focal neurological deficit  R29.818 781.99     Patient Active Problem List   Diagnosis   • Chest pain   • Mixed hyperlipidemia   • Essential hypertension   • Stroke (CMS/HCC)   • Disease of thyroid gland   • Coronary artery disease involving native coronary artery of native heart without angina pectoris   • Dysautonomia (CMS/HCC)   • Dementia (CMS/HCC)   • Acquired blepharoptosis of both eyes   • Anticoagulant long-term use   • Ataxia due to recent cerebrovascular accident   • Cardiac pacemaker in situ   • COPD (chronic obstructive pulmonary disease) (CMS/HCC)   • Degenerative cervical spinal stenosis   • Dysautonomia orthostatic hypotension syndrome   • Epiphora due to insufficient drainage of both sides   • History of CVA (cerebrovascular accident)   • History of PTCA   • Laxity of eyelid   • Punctal stenosis, acquired   • Lower eyelid retraction of left eye   • Nonsustained ventricular tachycardia (CMS/HCC)   • Pseudophakia, both eyes   • SSS (sick sinus syndrome) (CMS/HCC)   • Palpitations   • Altered mental status     Past Medical History:   Diagnosis Date   • CAD (coronary artery disease)    • Coronary artery disease     PTCI   • Dementia (CMS/HCC)    • Depression    • Disease of thyroid gland    • Dysautonomia (CMS/HCC)    • Dyslipidemia    • GERD (gastroesophageal reflux disease)    • Head pain    • Hyperlipidemia    • Hypertension    • SSS (sick sinus syndrome) (CMS/HCC)    • Stroke (CMS/HCC)     x 3 in 2018     Past Surgical History:   Procedure Laterality Date   • APPENDECTOMY     • CHOLECYSTECTOMY     • CYSTOSCOPY     • ENDOSCOPY N/A 10/17/2019    Procedure:  ESOPHAGOGASTRODUODENOSCOPY with biopsy x 2 areas;  Surgeon: Ashok Sanchez MD;  Location: [facility] ENDOSCOPY;  Service: Gastroenterology   • HERNIA REPAIR     • INSERT / REPLACE / REMOVE PACEMAKER     • LEFT HEART CATH     • PACEMAKER IMPLANTATION      [employer]   • TEMPORAL ARTERY BIOPSY     • WRIST SURGERY      severed artery     General Information     Row Name 09/14/21 1521          Physical Therapy Time and Intention    Document Type  evaluation  -     Mode of Treatment  physical therapy  -HC     Row Name 09/14/21 1521          General Information    Patient Profile Reviewed  yes  -HC     Prior Level of Function  -- wife assists with toileting, bathing as needed; ambulates using rollator  -     Existing Precautions/Restrictions  fall  -     Barriers to Rehab  cognitive status  -     Row Name 09/14/21 1521          Living Environment    Lives With  spouse spouse has own medical conditions and unable to provide high level of physical assist  -     Row Name 09/14/21 1521          Home Main Entrance    Number of Stairs, Main Entrance  none  -     Row Name 09/14/21 1521          Cognition    Orientation Status (Cognition)  oriented to;person Not oriented to place, time, or current situation  -     Row Name 09/14/21 1521          Safety Issues, Functional Mobility    Safety Issues Affecting Function (Mobility)  insight into deficits/self-awareness;safety precaution awareness  -     Impairments Affecting Function (Mobility)  balance;postural/trunk control;endurance/activity tolerance;strength;shortness of breath;cognition  -       User Key  (r) = Recorded By, (t) = Taken By, (c) = Cosigned By    Initials Name Provider Type    HC Brunilda Paz, PT Physical Therapist        Mobility     Row Name 09/14/21 1523          Bed Mobility    Bed Mobility  supine-sit;sit-supine  -     Supine-Sit [place] (Bed Mobility)  maximum assist (25% patient effort)  -     Sit-Supine [place] (Bed Mobility)   moderate assist (50% patient effort)  -     Row Name 09/14/21 1523          Sit-Stand Transfer    Sit-Stand [place] (Transfers)  moderate assist (50% patient effort)  -     Assistive Device (Sit-Stand Transfers)  walker, front-wheeled  -     Row Name 09/14/21 1523          Gait/Stairs (Locomotion)    [place] Level (Gait)  moderate assist (50% patient effort)  -     Assistive Device (Gait)  walker, front-wheeled  -     Distance in Feet (Gait)  50 ft  -     Deviations/Abnormal Patterns (Gait)  gait speed decreased  -     Comment (Gait/Stairs)  assist needed for navigation of RW in his environment  -       User Key  (r) = Recorded By, (t) = Taken By, (c) = Cosigned By    Initials Name Provider Type     Brunilda Paz, PT Physical Therapist        Obj/Interventions     Row Name 09/14/21 1524          Range of Motion Comprehensive    Comment, General Range of Motion  BLEs WFl  -     Row Name 09/14/21 1524          Strength Comprehensive (MMT)    Comment, General Manual Muscle Testing (MMT) Assessment  BLEs 4-/5  -     Row Name 09/14/21 1524          Balance    Balance Assessment  sitting static balance;sitting dynamic balance;standing static balance;standing dynamic balance  -HC     Static Sitting Balance  WFL  -HC     Dynamic Sitting Balance  mild impairment  -HC     Static Standing Balance  mild impairment  -HC     Dynamic Standing Balance  moderate impairment  -     Comment, Balance  Pt able ot sit EOB with CGA-Felicia  -     Row Name 09/14/21 1524          Sensory Assessment (Somatosensory)    Sensory Assessment (Somatosensory)  unable/difficult to assess  -       User Key  (r) = Recorded By, (t) = Taken By, (c) = Cosigned By    Initials Name Provider Type     Brunilda Paz, PT Physical Therapist        Goals/Plan     Row Name 09/14/21 1526          Bed Mobility Goal 1 (PT)    Activity/Assistive Device (Bed Mobility Goal 1, PT)  bed mobility activities, all  -      [place] Level/Cues Needed (Bed Mobility Goal 1, PT)  standby assist  -HC     Time Frame (Bed Mobility Goal 1, PT)  2 weeks  -     Row Name 09/14/21 1526          Transfer Goal 1 (PT)    Activity/Assistive Device (Transfer Goal 1, PT)  transfers, all  -HC     [place] Level/Cues Needed (Transfer Goal 1, PT)  standby assist  -HC     Time Frame (Transfer Goal 1, PT)  2 weeks  -     Row Name 09/14/21 1526          Gait Training Goal 1 (PT)    Activity/Assistive Device (Gait Training Goal 1, PT)  gait (walking locomotion);assistive device use  -HC     [place] Level (Gait Training Goal 1, PT)  standby assist  -HC     Distance (Gait Training Goal 1, PT)  100 ft  -HC     Time Frame (Gait Training Goal 1, PT)  2 weeks  -       User Key  (r) = Recorded By, (t) = Taken By, (c) = Cosigned By    Initials Name Provider Type     Brunilda Paz, PT Physical Therapist        Clinical Impression     Row Name 09/14/21 0836          Pain Scale: FACES Pre/Post-Treatment    Pain: FACES Scale, Pretreatment  4-->hurts little more  -     Posttreatment Pain Rating  4-->hurts little more  -     Pre/Posttreatment Pain Comment  left UE pain due to recent wound from fall  -     Row Name 09/14/21 3205          Plan of Care Review    Outcome Summary  Pt is 82 yo male admitted for AMS, left side weakness, left side facial droop.  CT head (-) acute changes but did show multiple chronic infarcts.  Pt with recent change in psychiatric meds.  -     Row Name 09/14/21 0497          Therapy Assessment/Plan (PT)    Patient/Family Therapy Goals Statement (PT)  pt's wife wants pt to return home  -     Rehab Potential (PT)  good, to achieve stated therapy goals  -     Criteria for Skilled Interventions Met (PT)  yes;skilled treatment is necessary  -     Predicted Duration of Therapy Intervention (PT)  until d/c  -     Row Name 09/14/21 6448          Positioning and Restraints    Pre-Treatment Position  in bed  -      Post Treatment Position  bed  -HC     In Bed  notified nsg;call light within reach;encouraged to call for assist;exit alarm on wife present  -HC       User Key  (r) = Recorded By, (t) = Taken By, (c) = Cosigned By    Initials Name Provider Type    Brunilda Vera, PT Physical Therapist        Outcome Measures     Row Name 09/14/21 1526          How much help from another person do you currently need...    Turning from your back to your side while in flat bed without using bedrails?  2  -HC     Moving from lying on back to sitting on the side of a flat bed without bedrails?  2  -HC     Moving to and from a bed to a chair (including a wheelchair)?  2  -HC     Standing up from a chair using your arms (e.g., wheelchair, bedside chair)?  2  -HC     Climbing 3-5 steps with a railing?  2  -HC     To walk in hospital room?  2  -HC     AM-PAC 6 Clicks Score (PT)  12  -HC     Row Name 09/14/21 1526 09/14/21 1506       Functional Assessment    Outcome Measure Options  AM-PAC 6 Clicks Basic Mobility (PT)  -  AM-PAC 6 Clicks Daily Activity (OT)  -ES      User Key  (r) = Recorded By, (t) = Taken By, (c) = Cosigned By    Initials Name Provider Type    Judit Brown, OT Occupational Therapist     Brunilda Paz, PT Physical Therapist                       Physical Therapy Education                 Title: PT OT SLP Therapies (In Progress)     Topic: Physical Therapy (In Progress)     Point: Mobility training (In Progress)     Learning Progress Summary           Patient Acceptance, E, NR by  at 9/14/2021 1527                   Point: Home exercise program (Not Started)     Learner Progress:  Not documented in this visit.          Point: Body mechanics (Not Started)     Learner Progress:  Not documented in this visit.          Point: Precautions (In Progress)     Learning Progress Summary           Patient Acceptance, E, NR by  at 9/14/2021 1527                               User Key     Initials Effective  Dates Name Provider Type Discipline     06/16/21 -  Brunilda Paz, PT Physical Therapist PT              PT Recommendation and Plan  Planned Therapy Interventions (PT): balance training, bed mobility training, gait training, neuromuscular re-education, strengthening, patient/family education, postural re-education, transfer training, ROM (range of motion)  Plan of Care Reviewed With: patient  Outcome Summary: Pt is 82 yo male admitted for AMS, left side weakness, left side facial droop.  CT head (-) acute changes but did show multiple chronic infarcts.  Pt with recent change in psychiatric meds.  PMH: dementia.  Pt present with weakness after recent fall with left UE pain and right side rib pain.  Pt requiring modA-maxA for bed mobility, modA for transfers, and able to ambulate with Felicia-modA due to impaired balance and need of assist with navigation of RW in his environment. Wife does not appear fully aware of his deficits or level of assist needed at this time.  Due to deficits, PT is recommending IP rehab to address deficits.  PT will follow 3x/week while at [facility].     Time Calculation:   PT Charges     Row Name 09/14/21 1531             Time Calculation    Start Time  1400  -      Stop Time  1434  -      Time Calculation (min)  34 min  -      PT Received On  09/14/21  -      PT - Next Appointment  09/16/21  -      PT Goal Re-Cert Due Date  09/28/21  -         Time Calculation- PT    Total Timed Code Minutes- PT  0 minute(s)  -        User Key  (r) = Recorded By, (t) = Taken By, (c) = Cosigned By    Initials Name Provider Type     Brunilda Paz, PT Physical Therapist        Therapy Charges for Today     Code Description Service Date Service Provider Modifiers Qty    [id number]  PT EVAL MOD COMPLEXITY 4 9/14/2021 Brunilda Paz, PT GP 1          PT G-Codes  Outcome Measure Options: AM-PAC 6 Clicks Basic Mobility (PT)  AM-PAC 6 Clicks Score (PT): 12  AM-PAC 6 Clicks Score (OT):  14    Brunilda Paz, PT  9/14/2021

## 2021-09-14 NOTE — PLAN OF CARE
Problem: Adult Inpatient Plan of Care  Goal: Plan of Care Review  Outcome: Ongoing, Progressing  Flowsheets  Taken 9/14/2021 1527  Plan of Care Reviewed With: patient  Outcome Summary: Pt is 80 yo male admitted for AMS, left side weakness, left side facial droop.  CT head (-) acute changes but did show multiple chronic infarcts.  Pt with recent change in psychiatric meds.  PMH: dementia.  Pt present with weakness after recent fall with left UE pain and right side rib pain.  Pt requiring modA-maxA for bed mobility, modA for transfers, and able to ambulate with Felicia-modA due to impaired balance and need of assist with navigation of RW in his environment. Wife does not appear fully aware of his deficits or level of assist needed at this time.  Due to deficits, PT is recommending IP rehab to address deficits.  PT will follow 3x/week while at [facility].

## 2021-09-14 NOTE — CONSULTS
Primary Care Provider: Ant Hines MD     Consult requested by: DEANDRA Willoughby    Reason for Consultation: Neurological evaluation /history of strokes, mental status changes  History taken from: patient chart family RN    Chief complaint: Mental status changes       SUBJECTIVE:    History of present illness:  The patient is an 81-year-old right-handed white male who is evaluated in room CDU 1 at [facility]    Source of information is mostly the medical records and my discussion with the patient's wife    Apparently has been having some problem and it looks like he is seeing a neurologist, I believe Dr. Avilez at [facility]  He has multi-infarct dementia    It looks like they try different medication, the first 1 sounds like Seroquel and he was having more hallucinations and delusions then he was given trazodone and again he was having more hallucination delusion but was sleeping more    So far she did not tell me there was anything focal  Previously we have worked him up for seizures also and he did not like Keppra    He is apparently back to his baseline, though I woke him up  His CT is okay  His CTA is not really bad    He is already on Plavix and Eliquis and initially my thought was 2.5 mg twice daily but he is on full dose    So I am not sure the MRI is going to change anything, because he is back to baseline and also he is maximally treated    They do not want epilepsy evaluation  He does not look like he is septic or has seizures otherwise    So there is nothing else to be done and this is probably worsening of his behavior problems and having difficulty with finding the right medication    As per admitting, Jayden Bond is a 81 y.o. male w/PMH of CVA, HLD, HTN, CAD, dementia, SSS W/pacemaker, COPD who presents to [facility] ED due to altered mental status.  Patient's wife reports she experienced an inpatient hospital admission and when she returned home his speech seems slurred  and patient was altered.  Wife reports multiple changes in psychiatric medications over the last 3 weeks, most recent medication is trazodone.  Wife reports today he experienced slurred speech at 3 PM, went back to sleep and upon awakening she noticed left facial droop, increasing slurred speech, and she reports patient experience some numbness in left arm and abdominal pain.  Patient has dementia, poor historian, unable to complete review of systems at this time.           Upon arrival to the ED vitals 97.7, HR 68, RR 18, /80, O2 sat 96% on room air.  Labs notable for troponin less than 0.010, glucose 90, , K4.2, BUN 23, creatinine 1.11, GFR 65, ALT 12, AST 15, lipase 36, INR 1.09, PTT 28.0, WBC 10.5, Hgb 10.2, platelets 279, neutrophils 63.6.  UA negative.  CT head W/0 shows no acute intracranial hemorrhage, territorial infarct.  Multiple chronic infarcts.  The left occipital infarct is new since May 11, 2020, the other chronic infarcts are unchanged.  Moderate chronic small vessel ischemic changes.  Volume loss.  CTA of the head/neck shows no large vessel occlusion of the major arteries of the head or neck. Severe focal stenosis of the proximal right V4 segment.  Mild focal stenosis of the origin of the left vertebral artery. Atherosclerosis of both carotid bulbs without significant stenosis by NASCET criteria. Moderate to severe emphysema.  EKG shows sinus rhythm rate 68.    Review of Systems   Not really possible because of his drowsiness and behavior but denies any headaches, denies any double vision, denies any neck pain, denies any chest pain, denies any shortness of air cough or expectoration, denies any nausea vomiting diarrhea or constipation, denies any genitourinary symptoms, denies any extremity or back problem  Has neurologic issues  Has psychiatric issues  Is on anticoagulation      PATIENT HISTORY:  Past Medical History:   Diagnosis Date   • CAD (coronary artery disease)    • Coronary  artery disease     [facility]CI   • Dementia (CMS/HCC)    • Depression    • Disease of thyroid gland    • Dysautonomia (CMS/HCC)    • Dyslipidemia    • GERD (gastroesophageal reflux disease)    • Head pain    • Hyperlipidemia    • Hypertension    • SSS (sick sinus syndrome) (CMS/HCC)    • Stroke (CMS/HCC)     x 3 in 2018   ,   Past Surgical History:   Procedure Laterality Date   • APPENDECTOMY     • CHOLECYSTECTOMY     • CYSTOSCOPY     • ENDOSCOPY N/A 10/17/2019    Procedure: ESOPHAGOGASTRODUODENOSCOPY with biopsy x 2 areas;  Surgeon: Ashok Sanchez MD;  Location: [facility] ENDOSCOPY;  Service: Gastroenterology   • HERNIA REPAIR     • INSERT / REPLACE / REMOVE PACEMAKER     • LEFT HEART CATH     • PACEMAKER IMPLANTATION      [employer]   • TEMPORAL ARTERY BIOPSY     • WRIST SURGERY      severed artery   ,   Family History   Problem Relation Age of Onset   • Heart disease Father    ,   Social History     Tobacco Use   • Smoking status: Former Smoker   • Smokeless tobacco: Never Used   • Tobacco comment: quit 25 yrs ago   Vaping Use   • Vaping Use: Never used   Substance Use Topics   • Alcohol use: No   • Drug use: No   , (Not in a hospital admission)  , Scheduled Meds:  apixaban, 5 mg, Oral, Q12H  brimonidine, 1 drop, Both Eyes, BID  cetirizine, 5 mg, Oral, Daily  clopidogrel, 75 mg, Oral, Daily  escitalopram, 10 mg, Oral, Daily  galantamine, 8 mg, Oral, BID With Meals  isosorbide mononitrate, 30 mg, Oral, Daily  levothyroxine, 25 mcg, Oral, Q AM  multivitamin with minerals, 1 tablet, Oral, Nightly  pantoprazole, 40 mg, Oral, BID AC  predniSONE, 5 mg, Oral, Daily  rosuvastatin, 20 mg, Oral, Nightly  sodium chloride, 10 mL, Intravenous, Q12H  vitamin B-12, 2,000 mcg, Oral, Daily  vitamin D3, 5,000 Units, Oral, Daily    , Continuous Infusions:   , PRN Meds:  •  acetaminophen **OR** acetaminophen **OR** acetaminophen  •  albuterol  •  magnesium sulfate **OR** magnesium sulfate in D5W 1g/100mL (PREMIX)  •  midodrine  •   nitroglycerin  •  ondansetron **OR** ondansetron  •  potassium chloride  •  potassium phosphate infusion greater than 15 mMoles **OR** potassium phosphate infusion greater than 15 mMoles **OR** potassium phosphate **OR** sodium phosphate IVPB **OR** sodium phosphate IVPB **OR** sodium phosphate IVPB  •  sodium chloride  •  sodium chloride, Allergies:  Trazodone, Doxycycline, Doxycycline, and Quetiapine    ________________________________________________________        OBJECTIVE:  Upon today's exam, he is resting comfortably in bed in no acute distress         Neurologic Exam    The patient does woke up, he is a bit drowsy  He is even struggling with naming his wife, he called her mother several times  He is not oriented to place or date, he says he is with the doctor though    He can name and he can follow some simple commands  No right left confusion or finger agnosia     Cranial nerve examination demonstrate:  Full fields of vision to confrontation  Pupils are round, reactive to light and accommodation and size of about 3 mm  No ptosis or nystagmus  Funduscopic examination was not successful  Eye movements are conjugate     Sensation on the face and scalp are normal  Muscles of mastication are normal and symmetric  Muscles of  facial expression are normal and symmetric  Hearing is intact bilaterally  Head turning and shoulder shrugs were unremarkable  Tongue was midline  I could not visualize her oropharynx or uvula     Motor examination:  Normal bulk, tone and strength was 5-/5  No fasciculations     Sensory examination:  Intact for soft touch, pain and position sensation  Romberg was not evaluated     Reflexes:  0/4     Coordination:  Normal finger-to-nose to finger, rapid alternating movements and toe to finger  Though very slow  Gait:  Deferred     Toe signs:  Mute    ________________________________________________________   RESULTS REVIEW:    VITAL SIGNS:   Temp:  [97.7 °F (36.5 °C)-98 °F (36.7 °C)] 98 °F  (36.7 °C)  Heart Rate:  [64-68] 64  Resp:  [18] 18  BP: (129-136)/(80-92) 136/92     LABS:  WBC   Date Value Ref Range Status   09/13/2021 10.50 3.40 - 10.80 10*3/mm3 Final     RBC   Date Value Ref Range Status   09/13/2021 4.30 4.14 - 5.80 10*6/mm3 Final     Hemoglobin   Date Value Ref Range Status   09/13/2021 10.2 (L) 13.0 - 17.7 g/dL Final     Hematocrit   Date Value Ref Range Status   09/13/2021 31.8 (L) 37.5 - 51.0 % Final     MCV   Date Value Ref Range Status   09/13/2021 74.0 (L) 79.0 - 97.0 fL Final     MCH   Date Value Ref Range Status   09/13/2021 23.7 (L) 26.6 - 33.0 pg Final     MCHC   Date Value Ref Range Status   09/13/2021 32.1 31.5 - 35.7 g/dL Final     RDW   Date Value Ref Range Status   09/13/2021 17.1 (H) 12.3 - 15.4 % Final     RDW-SD   Date Value Ref Range Status   09/13/2021 44.6 37.0 - 54.0 fl Final     MPV   Date Value Ref Range Status   09/13/2021 7.2 6.0 - 12.0 fL Final     Platelets   Date Value Ref Range Status   09/13/2021 279 140 - 450 10*3/mm3 Final     Neutrophil %   Date Value Ref Range Status   09/13/2021 63.6 42.7 - 76.0 % Final     Lymphocyte %   Date Value Ref Range Status   09/13/2021 24.2 19.6 - 45.3 % Final     Monocyte %   Date Value Ref Range Status   09/13/2021 9.7 5.0 - 12.0 % Final     Eosinophil %   Date Value Ref Range Status   09/13/2021 2.1 0.3 - 6.2 % Final     Basophil %   Date Value Ref Range Status   09/13/2021 0.4 0.0 - 1.5 % Final     Neutrophils, Absolute   Date Value Ref Range Status   09/13/2021 6.70 1.70 - 7.00 10*3/mm3 Final     Lymphocytes, Absolute   Date Value Ref Range Status   09/13/2021 2.50 0.70 - 3.10 10*3/mm3 Final     Monocytes, Absolute   Date Value Ref Range Status   09/13/2021 1.00 (H) 0.10 - 0.90 10*3/mm3 Final     Eosinophils, Absolute   Date Value Ref Range Status   09/13/2021 0.20 0.00 - 0.40 10*3/mm3 Final     Basophils, Absolute   Date Value Ref Range Status   09/13/2021 0.00 0.00 - 0.20 10*3/mm3 Final     nRBC   Date Value Ref Range  Status   09/13/2021 0.0 0.0 - 0.2 /100 WBC Final     Glucose   Date Value Ref Range Status   09/13/2021 90 65 - 99 mg/dL Final     BUN   Date Value Ref Range Status   09/13/2021 23 8 - 23 mg/dL Final     Creatinine   Date Value Ref Range Status   09/13/2021 1.11 0.76 - 1.27 mg/dL Final     Sodium   Date Value Ref Range Status   09/13/2021 139 136 - 145 mmol/L Final     Potassium   Date Value Ref Range Status   09/14/2021 4.0 3.5 - 5.2 mmol/L Final     Chloride   Date Value Ref Range Status   09/13/2021 105 98 - 107 mmol/L Final     CO2   Date Value Ref Range Status   09/13/2021 24.0 22.0 - 29.0 mmol/L Final     Calcium   Date Value Ref Range Status   09/13/2021 8.6 8.6 - 10.5 mg/dL Final     Total Protein   Date Value Ref Range Status   09/13/2021 6.0 6.0 - 8.5 g/dL Final     Albumin   Date Value Ref Range Status   09/13/2021 3.50 3.50 - 5.20 g/dL Final     ALT (SGPT)   Date Value Ref Range Status   09/13/2021 12 1 - 41 U/L Final     AST (SGOT)   Date Value Ref Range Status   09/13/2021 15 1 - 40 U/L Final     Alkaline Phosphatase   Date Value Ref Range Status   09/13/2021 65 39 - 117 U/L Final     Total Bilirubin   Date Value Ref Range Status   09/13/2021 0.2 0.0 - 1.2 mg/dL Final     eGFR Non  Amer   Date Value Ref Range Status   09/13/2021 64 >60 mL/min/1.73 Final     BUN/Creatinine Ratio   Date Value Ref Range Status   09/13/2021 20.7 7.0 - 25.0 Final     Anion Gap   Date Value Ref Range Status   09/13/2021 10.0 5.0 - 15.0 mmol/L Final       Lab Results   Component Value Date    TSH 4.270 (H) 09/14/2021    LDL 23 09/14/2021    HGBA1C 6.20 (H) 03/05/2020    RERMMQNE20 >2,000 (H) 03/19/2021         IMAGING STUDIES:  CT Abdomen Pelvis Without Contrast    Result Date: 9/14/2021  No acute abnormalities in the abdomen or pelvis. Chronic and incidental findings as above. Electronically signed by:  Saad Carroll M.D.  9/13/2021 11:41 PM    CT Angiogram Neck    Result Date: 9/14/2021  1.  No large vessel  occlusion of the major arteries of the head or neck. 2.  Severe focal stenosis of the proximal right V4 segment. 3.  Mild focal stenosis of the origin of the left vertebral artery. 4.  Atherosclerosis of both carotid bulbs without significant stenosis by NASCET criteria. 5.  Moderate to severe emphysema. Electronically signed by:  Addison Griffiths DO  9/13/2021 10:17 PM    XR Chest 1 View    Result Date: 9/14/2021   1. New area of airspace consolidation in the left base suggesting pneumonia. Correlate clinically. Follow-up evaluation recommended to document resolution and rule out underlying neoplasm. 2. Stable cardiomegaly. Chronic lung disease.  Electronically Signed By-Devin Boston MD On:9/14/2021 7:27 AM This report was finalized on [id number] by  Devin Boston MD.    CT Head Without Contrast Stroke Protocol    Result Date: 9/13/2021  CT head: 1.  No acute territorial infarct. No acute intracranial hemorrhage. 2.  Multiple chronic infarcts. The left occipital infarct is new since May 11, 2020. The other chronic infarcts are unchanged. 3.  Moderate chronic small vessel ischemic changes. Volume loss. CT head results were communicated to GINI FLOYD at 9:10 PM (head CT) by Dr. Corbett.  Electronically signed by:  Addison Griffiths DO  9/13/2021 9:25 PM    CT Angiogram Head w AI Analysis of LVO    Addendum Date: 9/14/2021    These results were communicated to GINI FLOYD at 9/13/2021 10:11 PM. Electronically signed by:  Addison Griffiths DO  9/13/2021 10:11 PM    Result Date: 9/14/2021  1.  No large vessel occlusion of the major arteries of the head or neck. 2.  Severe focal stenosis of the proximal right V4 segment. 3.  Mild focal stenosis of the origin of the left vertebral artery. 4.  Atherosclerosis of both carotid bulbs without significant stenosis by NASCET criteria. 5.  Moderate to severe emphysema. Electronically signed by:  Addison Griffiths DO  9/13/2021 10:05 PM      I reviewed the patient's new  clinical results.      ________________________________________________________     PROBLEM LIST:    Altered mental status    Mixed hyperlipidemia    Essential hypertension    Disease of thyroid gland    Coronary artery disease involving native coronary artery of native heart without angina pectoris    Dementia (CMS/[facility])    Acquired blepharoptosis of both eyes    Anticoagulant long-term use    Cardiac pacemaker in situ    COPD (chronic obstructive pulmonary disease) (CMS/[facility])    History of CVA (cerebrovascular accident)    SSS (sick sinus syndrome) (CMS/[facility])          Assessment/Plan   ASSESSMENT/PLAN:  Mental status changes  Behavioral problems  Multi-infarct dementia    Nothing suggesting stroke, seizure or CNS infection    He is maximally treated for his strokes  He does not have LVO    Unfortunately there is nothing much I have to offer any more    I discussed the patient's findings and my recommendations with patient and family    Luiz Soria MD  09/14/21  13:06 EDT

## 2021-09-14 NOTE — PLAN OF CARE
Goal Outcome Evaluation:  Plan of Care Reviewed With: patient, spouse      Pt is an 82 yo male admitted with AMS and L facial droop. CT (-) for acute changes, but does show multiple chronic infarcts. PMHx significant for CVA, HLD, HTN, CAD, dementia, SSS c pacemaker, and COPD. Pt resides with spouse, who has also been recently hospitalized. Patient has no steps at home and utilizes a tub-shower transfer. Spouse assists with ADLs intermittently within the home. 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 Pt supine in bed upon OT arrival. Bed mobility Max A. Sit<.stand Min A. 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 C/o pain in LUE and R-sided ribs from fall approximately one week ago. Max A for LB ADLs. Mod A for UB ADLs, primarily due to cognitive deficits. Pt with frequent recent falls and would benefit from IP rehab stay at discharge.

## 2021-09-14 NOTE — PLAN OF CARE
Problem: Adjustment to Illness (Stroke, Ischemic/Transient Ischemic Attack)  Goal: Optimal Coping  Outcome: Ongoing, Progressing     Problem: Bowel Elimination Impaired (Stroke, Ischemic/Transient Ischemic Attack)  Goal: Effective Bowel Elimination  Outcome: Ongoing, Progressing     Problem: Cerebral Tissue Perfusion Risk (Stroke, Ischemic/Transient Ischemic Attack)  Goal: Optimal Cerebral Tissue Perfusion  Outcome: Ongoing, Progressing  Intervention: Optimize Oxygenation and Ventilation  Recent Flowsheet Documentation  Taken 9/14/2021 1800 by Domi Carpenter RN  Head of Bed (HOB): HOB at 30-45 degrees     Problem: Communication Impairment (Stroke, Ischemic/Transient Ischemic Attack)  Goal: Improved Communication Skills  Outcome: Ongoing, Progressing  Intervention: Optimize Cognitive and Communication Skills  Recent Flowsheet Documentation  Taken 9/14/2021 1800 by Domi Carpenter RN  Reorientation Measures: clock in view     Problem: Eating/Swallowing Impairment (Stroke, Ischemic/Transient Ischemic Attack)  Goal: Oral Intake without Aspiration  Outcome: Ongoing, Progressing     Problem: Functional Ability Impaired (Stroke, Ischemic/Transient Ischemic Attack)  Goal: Optimal Functional Ability  Outcome: Ongoing, Progressing  Intervention: Optimize Functional Ability  Recent Flowsheet Documentation  Taken 9/14/2021 1800 by Domi Carpenter RN  Activity Management:   activity adjusted per tolerance   activity encouraged  Self-Care Promotion: independence encouraged     Problem: Hemodynamic Instability (Stroke, Ischemic/Transient Ischemic Attack)  Goal: Vital Signs Remain in Desired Range  Outcome: Ongoing, Progressing     Problem: Pain (Stroke, Ischemic/Transient Ischemic Attack)  Goal: Acceptable Pain Control  Outcome: Ongoing, Progressing     Problem: Sensorimotor Impairment (Stroke, Ischemic/Transient Ischemic Attack)  Goal: Improved Sensorimotor Function  Outcome: Ongoing, Progressing  Intervention:  Optimize Range of Motion, Motor Control and Function  Recent Flowsheet Documentation  Taken 9/14/2021 1800 by Domi Carpenter RN  Positioning/Transfer Devices:   pillows   in use  Intervention: Optimize Sensory and Perceptual Abilities  Recent Flowsheet Documentation  Taken 9/14/2021 1800 by Domi Carpenter RN  Pressure Reduction Techniques: frequent weight shift encouraged  Pressure Reduction Devices: pressure-redistributing mattress utilized     Problem: Urinary Elimination Impaired (Stroke, Ischemic/Transient Ischemic Attack)  Goal: Effective Urinary Elimination  Outcome: Ongoing, Progressing     Problem: Adult Inpatient Plan of Care  Goal: Plan of Care Review  Outcome: Ongoing, Progressing     Problem: Skin Injury Risk Increased  Goal: Skin Health and Integrity  Outcome: Ongoing, Progressing  Intervention: Optimize Skin Protection  Recent Flowsheet Documentation  Taken 9/14/2021 1800 by Domi Carpenter RN  Pressure Reduction Techniques: frequent weight shift encouraged  Head of Bed (HOB): HOB at 30-45 degrees  Pressure Reduction Devices: pressure-redistributing mattress utilized  Skin Protection:   adhesive use limited   tubing/devices free from skin contact     Problem: Fall Injury Risk  Goal: Absence of Fall and Fall-Related Injury  Outcome: Ongoing, Progressing  Intervention: Identify and Manage Contributors to Fall Injury Risk  Recent Flowsheet Documentation  Taken 9/14/2021 1800 by Domi Carpenter RN  Medication Review/Management: medications reviewed  Self-Care Promotion: independence encouraged  Intervention: Promote Injury-Free Environment  Recent Flowsheet Documentation  Taken 9/14/2021 1800 by Domi Carpenter RN  Safety Promotion/Fall Prevention:   assistive device/personal items within reach   clutter free environment maintained   fall prevention program maintained   nonskid shoes/slippers when out of bed   room organization consistent   safety round/check completed   Goal Outcome  Evaluation:         Patient arrived to the unit from the ED. A complete head to toe assessment was completed. The patient is on 3L of oxygen. The patient did not have any complaints. Will continue to monitor.

## 2021-09-15 ENCOUNTER — READMISSION MANAGEMENT (OUTPATIENT)
Dept: CALL CENTER | Facility: HOSPITAL | Age: 81
End: 2021-09-15

## 2021-09-15 VITALS
TEMPERATURE: 97.8 F | HEART RATE: 70 BPM | BODY MASS INDEX: 26.03 KG/M2 | OXYGEN SATURATION: 96 % | WEIGHT: 181.8 LBS | HEIGHT: 70 IN | DIASTOLIC BLOOD PRESSURE: 84 MMHG | RESPIRATION RATE: 20 BRPM | SYSTOLIC BLOOD PRESSURE: 135 MMHG

## 2021-09-15 LAB
HBA1C MFR BLD: 6 % (ref 4.8–5.6)
POTASSIUM SERPL-SCNC: 4.1 MMOL/L (ref 3.5–5.2)

## 2021-09-15 PROCEDURE — 36415 COLL VENOUS BLD VENIPUNCTURE: CPT | Performed by: NURSE PRACTITIONER

## 2021-09-15 PROCEDURE — 63710000001 PREDNISONE PER 5 MG: Performed by: INTERNAL MEDICINE

## 2021-09-15 PROCEDURE — G0378 HOSPITAL OBSERVATION PER HR: HCPCS

## 2021-09-15 PROCEDURE — 99217 PR OBSERVATION CARE DISCHARGE MANAGEMENT: CPT | Performed by: INTERNAL MEDICINE

## 2021-09-15 PROCEDURE — 97530 THERAPEUTIC ACTIVITIES: CPT

## 2021-09-15 PROCEDURE — 84132 ASSAY OF SERUM POTASSIUM: CPT | Performed by: NURSE PRACTITIONER

## 2021-09-15 RX ORDER — GALANTAMINE HYDROBROMIDE 8 MG/1
8 TABLET, FILM COATED ORAL 2 TIMES DAILY
Start: 2021-09-15 | End: 2021-09-28

## 2021-09-15 RX ADMIN — LEVOTHYROXINE SODIUM 25 MCG: 0.03 TABLET ORAL at 07:59

## 2021-09-15 RX ADMIN — ESCITALOPRAM OXALATE 10 MG: 10 TABLET ORAL at 09:43

## 2021-09-15 RX ADMIN — PANTOPRAZOLE SODIUM 40 MG: 40 TABLET, DELAYED RELEASE ORAL at 12:02

## 2021-09-15 RX ADMIN — BRIMONIDINE TARTRATE 1 DROP: 2 SOLUTION/ DROPS OPHTHALMIC at 00:03

## 2021-09-15 RX ADMIN — CYANOCOBALAMIN TAB 1000 MCG 2000 MCG: 1000 TAB at 09:43

## 2021-09-15 RX ADMIN — CLOPIDOGREL BISULFATE 75 MG: 75 TABLET ORAL at 09:43

## 2021-09-15 RX ADMIN — APIXABAN 5 MG: 5 TABLET, FILM COATED ORAL at 09:43

## 2021-09-15 RX ADMIN — BRIMONIDINE TARTRATE 1 DROP: 2 SOLUTION/ DROPS OPHTHALMIC at 08:01

## 2021-09-15 RX ADMIN — Medication 5000 UNITS: at 09:43

## 2021-09-15 RX ADMIN — Medication 10 ML: at 08:01

## 2021-09-15 RX ADMIN — PREDNISONE 2.5 MG: 5 TABLET ORAL at 09:43

## 2021-09-15 NOTE — CASE MANAGEMENT/SOCIAL WORK
Discharge Planning Assessment   [facility]     Patient Name: Jayden Bond  MRN: [id number]  Today's Date: 9/15/2021    Admit Date: 9/13/2021    Discharge Needs Assessment     Row Name 09/15/21 1600       Discharge Needs Assessment    Equipment Currently Used at Home  oxygen Home O2 3L pnc [facility]    Row Name 09/15/21 1554       Living Environment    Lives With  spouse    Name(s) of Who Lives With Patient  Nava    Current Living Arrangements  home/apartment/condo    Primary Care Provided by  spouse/significant other    Provides Primary Care For  no one, unable/limited ability to care for self    Caregiving Concerns  Daughter lives next door, son lives nearby.    Able to Return to Prior Arrangements  yes    Living Arrangement Comments  PT recommends inpt rehab. Spouse declines, wants to take home.       Transition Planning    Patient/Family Anticipates Transition to  home with help/services    Patient/Family Anticipated Services at Transition  home health care    Transportation Anticipated  family or friend will provide       Discharge Needs Assessment    Readmission Within the Last 30 Days  no previous admission in last 30 days    Equipment Currently Used at Home  walker, rolling    Concerns to be Addressed  discharge planning    Anticipated Changes Related to Illness  none    Discharge Facility/Level of Care Needs  home with home health    Patient's Choice of Community Agency(s)  [facility], was already chosen per spouse.    Discharge Coordination/Progress  DC Plan: Return home with spouse and [facility], pending acceptance.        Discharge Plan     Row Name 09/15/21 7176       Plan    Plan  DC Plan: Return home with spouse and [facility], pending acceptance. Pt has home O2.    Plan Comments  Spouse declined inpt rehab. Pharmacy [facility]        Continued Care and Services - Admitted Since 9/13/2021     Home Medical Care     Service Provider Request Status Selected Services Address Phone Fax  Patient Preferred    [facility] [address]  Accepted N/A [address] [place] [id number] [phone] -- --              Expected Discharge Date and Time     Expected Discharge Date Expected Discharge Time    Sep 15, 2021         Demographic Summary     Row Name 09/15/21 1552       General Information    Admission Type  observation    Arrived From  emergency department    Referral Source  admission list    Reason for Consult  discharge planning    Preferred Language  English     Used During This Interaction  no    General Information Comments  Spoke to pt and spouse in room.        Functional Status     Row Name 09/15/21 1553       Functional Status    Usual Activity Tolerance  fair    Current Activity Tolerance  poor       Functional Status, IADL    Medications  completely dependent    Meal Preparation  completely dependent    Housekeeping  completely dependent    Laundry  completely dependent    Shopping  completely dependent       Mental Status    General Appearance WDL  WDL       Mental Status Summary    Recent Changes in Mental Status/Cognitive Functioning  no changes          Met with patient in room wearing PPE: mask, goggles.      Maintained distance greater than six feet and spent less than 15 minutes in the room.             Nelly Duval RN

## 2021-09-15 NOTE — DISCHARGE SUMMARY
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) [G30.9, F02.80, F05] Yes     Priority: High   • SSS (sick sinus syndrome) (CMS/[facility]) [I49.5] Yes   • Mixed hyperlipidemia [E78.2] Yes   • Essential hypertension [I10] Yes   • Coronary artery disease involving native coronary artery of native heart without angina pectoris [I25.10] Yes   • Dysautonomia (CMS/[facility]) [G90.1] Yes   • Anticoagulant long-term use [Z79.01] Not Applicable   • COPD (chronic obstructive pulmonary disease) (CMS/[facility]) [J44.9] Yes   • History of CVA (cerebrovascular accident) [Z86.73] Not Applicable   • Cardiac pacemaker in situ [Z95.0] Yes      Resolved Hospital Problems   No resolved problems to display.         Hospital Course     Hospital Course:  Jayden Bond is a 81 y.o. male Jayden Bond is a 81 y.o. male w/PMH of CVA, HLD, HTN, CAD, dementia, SSS W/pacemaker, COPD who presents to [facility] ED due to altered mental status.  Patient's wife reports she experienced an inpatient hospital admission and when she returned home his speech seems slurred and patient was altered.  Wife reports multiple changes in psychiatric medications over the last 3 weeks, most recent medication is trazodone.  Wife reports today he experienced slurred speech at 3 PM, went back to sleep and upon awakening she noticed left facial droop, increasing slurred speech, and she reports patient experience some numbness in  left arm and abdominal pain.  Patient has dementia, poor historian, unable to complete review of systems at this time.           Upon arrival to the ED vitals 97.7, HR 68, RR 18, /80, O2 sat 96% on room air.  Labs notable for troponin less than 0.010, glucose 90, , K4.2, BUN 23, creatinine 1.11, GFR 65, ALT 12, AST 15, lipase 36, INR 1.09, PTT 28.0, WBC 10.5, Hgb 10.2, platelets 279, neutrophils 63.6.  UA negative.  CT head W/0 shows no acute intracranial hemorrhage, territorial infarct.  Multiple chronic infarcts.  The left occipital infarct is new since May 11, 2020, the other chronic infarcts are unchanged.  Moderate chronic small vessel ischemic changes.  Volume loss.  CTA of the head/neck shows no large vessel occlusion of the major arteries of the head or neck. Severe focal stenosis of the proximal right V4 segment.  Mild focal stenosis of the origin of the left vertebral artery. Atherosclerosis of both carotid bulbs without significant stenosis by NASCET criteria. Moderate to severe emphysema.  EKG shows sinus rhythm rate 68.     Patient able to answer simple questions.  However unable to provide any history whatsoever.  Lives at home with wife    Patient has known Alzheimer's and this time was admitted with mild delirium at best. He has been stable labs have been stable he is being discharged home. Patient wife does not want him going to rehab.    ROS:  Unable due to dementia    DISCHARGE Follow Up Recommendations for labs and diagnostics:       Reasons For Change In Medications and Indications for New Medications:      Day of Discharge     Vital Signs:  Temp:  [97.6 °F (36.4 °C)-98.3 °F (36.8 °C)] 97.8 °F (36.6 °C)  Heart Rate:  [64-98] 70  Resp:  [18-25] 20  BP: (102-149)/(60-96) 135/84  Flow (L/min):  [2-3] 3    Physical Exam:  Physical Exam Constitutional: Patient appears well-developed and well-nourished and in no acute distress      HEENT:   Head: Normocephalic and atraumatic.   Eyes:   Pupils are equal, round, and reactive to light. EOM are intact. Sclera are anicteric and non-injected.  Mouth and Throat: Patient has moist mucous membranes. Oropharynx is clear of any erythema or exudate.        Neck: Neck supple.  No thyromegaly present. No lymphadenopathy present. No  masses.      Cardiovascular: Inspection: No JVD present. Palpation: No parasternal heave. Pedal pulses absent bilaterally. No leg edema. Auscultation: Regular rate, regular rhythm, S1 normal and S2 normal. reveals no gallop and no friction rub. No Carotid bruit bilaterally.     Pulmonary/Chest: Inspection: No distress, no use of accessory muscles. Lungs are clear to auscultation bilaterally. No respiratory distress. No wheezes. No rales.      Abdomen /Gastrointestinal: Inspection: no distension. Palpation: no masses, no organomegaly. Soft. There is no tenderness. Bowel sounds are normal.      Musculoskeletal: Normal Muscle tone. Age appropriate, no deformities.     Neurological: Patient is alert and disoriented. Cranial nerves II-XII are grossly intact with no focal deficits. Sensori-motor exam is normal. No cerebellar signs.     Skin: Skin is warm. No rash noted. Nails show no clubbing.  No cyanosis or erythema. No bruising.     Emotional Behavior:   Appropriate       Debilities:  Age appropriate  Reviewed, no change in above data from the prior day.      Pertinent  and/or Most Recent Results     LAB RESULTS:      Lab 09/13/21  2304 09/13/21  2303   WBC 10.50  --    HEMOGLOBIN 10.2*  --    HEMATOCRIT 31.8*  --    PLATELETS 279  --    NEUTROS ABS 6.70  --    LYMPHS ABS 2.50  --    MONOS ABS 1.00*  --    EOS ABS 0.20  --    MCV 74.0*  --    PROTIME  --  12.0*   APTT  --  28.0         Lab 09/15/21  0514 09/14/21  1045 09/13/21  2303   SODIUM  --   --  139   POTASSIUM 4.1 4.0 4.2   CHLORIDE  --   --  105   CO2  --   --  24.0   ANION GAP  --   --  10.0   BUN  --   --  23   CREATININE  --   --  1.11   GLUCOSE  --   --  90   CALCIUM  --    --  8.6   HEMOGLOBIN A1C  --  6.00*  --    TSH  --  4.270*  --          Lab 09/13/21  2303   TOTAL PROTEIN 6.0   ALBUMIN 3.50   GLOBULIN 2.5   ALT (SGPT) 12   AST (SGOT) 15   BILIRUBIN 0.2   ALK PHOS 65   LIPASE 36         Lab 09/13/21  2303   TROPONIN T <0.010   PROTIME 12.0*   INR 1.09         Lab 09/14/21  1045   CHOLESTEROL 77   LDL CHOL 23   HDL CHOL 29*   TRIGLYCERIDES 145         Lab 09/14/21  1045 09/13/21  2304   VITAMIN B 12 >2,000*  --    ABO TYPING  --  O   RH TYPING  --  Positive   ANTIBODY SCREEN  --  Negative         Brief Urine Lab Results  (Last result in the past 365 days)      Color   Clarity   Blood   Leuk Est   Nitrite   Protein   CREAT   Urine HCG        09/14/21 0019 Yellow Clear Negative Negative Negative Negative             Microbiology Results (last 10 days)     Procedure Component Value - Date/Time    COVID PRE-OP / PRE-PROCEDURE SCREENING ORDER (NO ISOLATION) - Swab, Nasopharynx [[id number]]  (Normal) Collected: 09/14/21 0127    Lab Status: Final result Specimen: Swab from Nasopharynx Updated: 09/14/21 0152    Narrative:      The following orders were created for panel order COVID PRE-OP / PRE-PROCEDURE SCREENING ORDER (NO ISOLATION) - Swab, Nasopharynx.  Procedure                               Abnormality         Status                     ---------                               -----------         ------                     COVID-19,CEPHEID/SEBASTIAN/BD...[[id number]]  Normal              Final result                 Please view results for these tests on the individual orders.    COVID-19,CEPHEID/SEBASTIAN/BDMAX,COR/DEEPA/PAD/SUSANA IN-HOUSE(OR EMERGENT/ADD-ON),NP SWAB IN TRANSPORT MEDIA 3-4 HR TAT, RT-PCR - Swab, Nasopharynx [[id number]]  (Normal) Collected: 09/14/21 0127    Lab Status: Final result Specimen: Swab from Nasopharynx Updated: 09/14/21 0152     COVID19 Not Detected    Narrative:      Fact sheet for providers: https://www.fda.gov/media/725938/download     Fact sheet for patients:  https://www.fda.gov/media/090628/download  Fact sheet for providers: https://www.fda.gov/media/838818/download    Fact sheet for patients: https://www.fda.gov/media/041515/download    Test performed by PCR.          CT Abdomen Pelvis Without Contrast    Result Date: 9/14/2021  Impression: No acute abnormalities in the abdomen or pelvis. Chronic and incidental findings as above. Electronically signed by:  Saad Carroll M.D.  9/13/2021 11:41 PM    CT Angiogram Neck    Result Date: 9/14/2021  Impression: 1.  No large vessel occlusion of the major arteries of the head or neck. 2.  Severe focal stenosis of the proximal right V4 segment. 3.  Mild focal stenosis of the origin of the left vertebral artery. 4.  Atherosclerosis of both carotid bulbs without significant stenosis by NASCET criteria. 5.  Moderate to severe emphysema. Electronically signed by:  Addison Griffiths DO  9/13/2021 10:17 PM    XR Chest 1 View    Result Date: 9/14/2021  Impression:  1. New area of airspace consolidation in the left base suggesting pneumonia. Correlate clinically. Follow-up evaluation recommended to document resolution and rule out underlying neoplasm. 2. Stable cardiomegaly. Chronic lung disease.  Electronically Signed By-Devin Boston MD On:9/14/2021 7:27 AM This report was finalized on [id number] by  Devin Boston MD.    CT Head Without Contrast Stroke Protocol    Result Date: 9/13/2021  Impression: CT head: 1.  No acute territorial infarct. No acute intracranial hemorrhage. 2.  Multiple chronic infarcts. The left occipital infarct is new since May 11, 2020. The other chronic infarcts are unchanged. 3.  Moderate chronic small vessel ischemic changes. Volume loss. CT head results were communicated to GINI FLOYD at 9:10 PM (head CT) by Dr. Corbett.  Electronically signed by:  Addison Griffiths DO  9/13/2021 9:25 PM    CT Angiogram Head w AI Analysis of LVO    Addendum Date: 9/14/2021    These results were communicated to GINI  BRISENO HOTRICARDO at 9/13/2021 10:11 PM. Electronically signed by:  Addison Griffiths DO  9/13/2021 10:11 PM    Result Date: 9/14/2021  Impression: 1.  No large vessel occlusion of the major arteries of the head or neck. 2.  Severe focal stenosis of the proximal right V4 segment. 3.  Mild focal stenosis of the origin of the left vertebral artery. 4.  Atherosclerosis of both carotid bulbs without significant stenosis by NASCET criteria. 5.  Moderate to severe emphysema. Electronically signed by:  Addison Griffiths DO  9/13/2021 10:05 PM      Results for orders placed during the hospital encounter of 03/04/20    Duplex Carotid Ultrasound CAR    Interpretation Summary  · Proximal right internal carotid artery plaque without significant stenosis.  · Proximal left internal carotid artery plaque without significant stenosis.      Results for orders placed during the hospital encounter of 03/04/20    Duplex Carotid Ultrasound CAR    Interpretation Summary  · Proximal right internal carotid artery plaque without significant stenosis.  · Proximal left internal carotid artery plaque without significant stenosis.      Results for orders placed during the hospital encounter of 05/11/20    Adult Transthoracic Echo Complete W/ Cont if Necessary Per Protocol    Interpretation Summary  · The left ventricular cavity is small.  · Left ventricular systolic function is normal.  · Left ventricular diastolic dysfunction (grade I a) consistent with impaired relaxation.    Technically difficult study  Normal LV systolic function EF 65 to 70% with mild concentric LVH  No regional abnormalities identified  Normal RV size and function  Atria poorly seen  Mitral valve no stenosis minimal regurgitation  Aortic valve poorly seen no stenosis seen, no significant regurgitation  IVC normal caliber estimated right atrial pressure 5 mmHg  Cannot calculate PA systolic pressure with i insufficient TR gradient  Tricuspid valve and pulmonic valve poorly  seen      Labs Pending at Discharge:      Procedures Performed           Consults:   Consults     Date and Time Order Name Status Description    9/14/2021  2:17 AM Inpatient Neurology Consult Stroke Completed     9/14/2021 12:16 AM Hospitalist (on-call MD unless specified) Completed     9/13/2021 10:55 PM Inpatient Neurology Consult Stroke Completed     9/13/2021 10:55 PM Inpatient Neurology Consult Stroke Completed             Discharge Details        Discharge Medications      Changes to Medications      Instructions Start Date   galantamine 8 MG tablet  Commonly known as: RAZADYNE  What changed: when to take this   8 mg, Oral, 2 Times Daily         Continue These Medications      Instructions Start Date   apixaban 5 MG tablet tablet  Commonly known as: ELIQUIS   5 mg, Oral, 2 Times Daily      brimonidine 0.2 % ophthalmic solution  Commonly known as: ALPHAGAN   1 drop, Both Eyes, 2 Times Daily      clopidogrel 75 MG tablet  Commonly known as: PLAVIX   TAKE 1 TABLET BY MOUTH EVERY DAY      escitalopram 10 MG tablet  Commonly known as: LEXAPRO   10 mg, Oral, Daily      fluticasone 50 MCG/ACT nasal spray  Commonly known as: FLONASE   2 sprays, Nasal, Daily      Incruse Ellipta 62.5 MCG/INH aerosol powder   Generic drug: Umeclidinium Bromide   1 puff, Oral, Daily      isosorbide mononitrate 30 MG 24 hr tablet  Commonly known as: IMDUR   30 mg, Oral, Daily      levothyroxine 25 MCG tablet  Commonly known as: SYNTHROID, LEVOTHROID   25 mcg, Oral, Every Early Morning      loratadine 10 MG tablet  Commonly known as: CLARITIN   10 mg, Oral, Daily      midodrine 5 MG tablet  Commonly known as: PROAMATINE   5 mg, Oral, 3 Times Daily PRN      multivitamin with minerals tablet tablet   1 tablet, Oral, Nightly      nitroglycerin 0.4 MG SL tablet  Commonly known as: NITROSTAT   0.4 mg, Sublingual, Every 5 Minutes PRN, Take no more than 3 doses in 15 minutes.       pantoprazole 40 MG EC tablet  Commonly known as: PROTONIX   40  mg, Oral, 2 times daily, At least 4 hours after synthroid      predniSONE 5 MG tablet  Commonly known as: DELTASONE   5 mg, Oral, Daily      rosuvastatin 20 MG tablet  Commonly known as: CRESTOR   20 mg, Oral, Every Evening      vitamin B-12 1000 MCG tablet  Commonly known as: CYANOCOBALAMIN   2,000 mcg, Oral, Daily      vitamin D3 125 MCG (5000 UT) capsule capsule   5,000 Units, Oral, Daily             Allergies   Allergen Reactions   • Trazodone Hallucinations   • Doxycycline GI Intolerance   • Doxycycline Nausea And Vomiting   • Quetiapine Other (See Comments)     Having falls on it         Discharge Disposition:   Home or Self Care    Diet:  Hospital:  Diet Order   Procedures   • Diet Cardiac; Healthy Heart         Discharge Activity:   Activity Instructions     Activity as Tolerated              CODE STATUS:  Code Status and Medical Interventions:   Ordered at: 09/14/21 0217     Code Status:    CPR     Medical Interventions (Level of Support Prior to Arrest):    Full         Future Appointments   Date Time Provider Department Center   10/21/2021  3:00 PM Jose Garcia MD [facility] CAR NA P [facility] NA   10/21/2021  3:00 PM [facility], [facility] CARDIOLOGY NA [facility] NA P [facility] NA       Additional Instructions for the Follow-ups that You Need to Schedule     Discharge Follow-up with PCP   As directed       Currently Documented PCP:    Ant Hines MD    PCP Phone Number:    [phone]     Follow Up Details: If no PCP, call MD finder at [phone]               This patient has been examined wearing appropriate Personal Protective Equipment. 09/15/21      Signature: Freddie Correa MD, FACP

## 2021-09-15 NOTE — PLAN OF CARE
Goal Outcome Evaluation:  Pt A&Ox1-2, v/s stable, spouse at bedside & speaks to staff very rudely, is demanding & argumentative, pt is pleasantly confused, call light in reach, RN will cont to monitor.

## 2021-09-15 NOTE — THERAPY TREATMENT NOTE
Subjective: Pt agreeable to therapeutic plan of care.    Objective:     Bed mobility - Max-A  Transfers - Max-A, sit-stand-sit w/ RW. Required increased verbal and tactile cues to initiate movement.  Ambulation - 5 feet Mod-A w/ RW. Pt on 3L supplemental O2 upon entering room, SPO2 95%. Pt with feelings of dizziness upon sitting and standing. Dizzy feeling decreased following standing x1min. BP was 130/86 in sitting. SPO2 stayed above 88% during treatment    Pain: 0 VAS  Education: Provided education on importance of mobility and skilled verbal / tactile cueing throughout intervention.    Assessment: Jayden Bond presents with functional mobility impairments which indicate the need for skilled intervention. Tolerating session today without incident. Will continue to follow and progress as tolerated. Pt required max A for bed mobility, max A for sit-stand transfer with RW and increased v/c and time to initiate movement, and mod A with ambulation. Pt with dementia and required redirection during treatment. Recommend inpatient rehab as patient is unsafe to return home. Wife was in the room and does not wish him to go to inpatient rehab as she does not want him to feel isolated.     Plan/Recommendations:   Pt would benefit from Inpatient Rehabilitation placement at discharge from facility and requires no DME at discharge.   Pt desires Home with Home Health at discharge. Pt cooperative; agreeable to therapeutic recommendations and plan of care.     Basic Mobility 6-click:  Rollin = Total, A lot = 2, A little = 3; 4 = None  Supine>Sit:   1 = Total, A lot = 2, A little = 3; 4 = None   Sit>Stand with arms:  1 = Total, A lot = 2, A little = 3; 4 = None  Bed>Chair:   1 = Total, A lot = 2, A little = 3; 4 = None  Ambulate in room:  1 = Total, A lot = 2, A little = 3; 4 = None  3-5 Steps with railin = Total, A lot = 2, A little = 3; 4 = None  Score: 12    Modified [place]: 4 = Moderately severe disability  (Unable to attend to own bodily needs without assistance, and unable to walk unassisted)     Post-Tx Position: Up in Chair, Alarms activated and Call light and personal items within reach  PPE: gloves, surgical mask, eyewear protection

## 2021-09-15 NOTE — DISCHARGE PLACEMENT REQUEST
"StrasburgBarrington alcaraz (81 y.o. Male)     Date of Birth Social Security Number Address Home Phone MRN    1940  [id number]  [place] [address] [phone] [id number]    Judaism Marital Status          Scientologist        Admission Date Admission Type Admitting Provider Attending Provider Department, Room/Bed    9/13/21 Emergency Freddie Correa MD Gill, Ravi, MD [facility] NEURO HEART, 256/1    Discharge Date Discharge Disposition Discharge Destination         Home or Self Care              Attending Provider: Freddie Correa MD    Allergies: Trazodone, Doxycycline, Doxycycline, Quetiapine    Isolation: None   Infection: None   Code Status: CPR    Ht: 177.8 cm (70\")   Wt: 82.5 kg (181 lb 12.8 oz)    Admission Cmt: None   Principal Problem: 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 (CMS/[facility]) [G30.9,F02.80,F05]                 Active Insurance as of 9/13/2021     Primary Coverage     Payor Plan Insurance Group Employer/Plan Group    MEDICARE MEDICARE A & B      Payor Plan Address Payor Plan Phone Number Payor Plan Fax Number Effective Dates    [address] [phone]  8/1/2005 - None Entered    [facility] [id number]       Subscriber Name Subscriber Birth Date Member ID       BARRINGTON BOND 1940 [id number]           Secondary Coverage     Payor Plan Insurance Group Employer/Plan Group    STANDARD LIFE ACCIDENT STANDARD LIFE ACCIDENT      Payor Plan Address Payor Plan Phone Number Payor Plan Fax Number Effective Dates    [address]   9/27/2019 - None Entered    [place] [address]       Subscriber Name Subscriber Birth Date Member ID       BARRINGTON BOND 1940 [id number]                 Emergency Contacts      (Rel.) Home Phone Work Phone Mobile Phone    EDUARDOMATT ALCARAZ (Spouse) [phone] -- [phone]            Insurance Information                MEDICARE/MEDICARE A & B Phone: [phone]    Subscriber: Barrington Bond Subscriber#: [id number]    Group#:  Precert#:         " STANDARD LIFE ACCIDENT/STANDARD LIFE ACCIDENT Phone:     Subscriber: Jayden Bond Subscriber#: [id number]    Group#:  Precert#:

## 2021-09-15 NOTE — PLAN OF CARE
RN educated the patient and family about medications. RN provided the family with handout with information about new medication ordered by the physician. The patient's wife is refusing medications and interventions after continuous education from the hospital staff. Patient's vital signs have been stable,and the patient's mental status at baseline per his wife. The patient is at high risk for skin injury; he is on the turn team. He is a high falls risk;and bed alarm set.    Problem: Adjustment to Illness (Stroke, Ischemic/Transient Ischemic Attack)  Goal: Optimal Coping  Intervention: Support Patient/Family Psychosocial Response to Stroke  Recent Flowsheet Documentation  Taken 9/15/2021 0000 by Keira Gregg RN  Supportive Measures:   verbalization of feelings encouraged   active listening utilized   self-care encouraged  Taken 9/14/2021 2000 by Keira Gregg RN  Supportive Measures:   verbalization of feelings encouraged   active listening utilized   self-care encouraged  Family/Support System Care:   self-care encouraged   support provided     Problem: Cerebral Tissue Perfusion Risk (Stroke, Ischemic/Transient Ischemic Attack)  Goal: Optimal Cerebral Tissue Perfusion  Intervention: Protect and Optimize Cerebral Perfusion  Recent Flowsheet Documentation  Taken 9/15/2021 0000 by Keira Gregg RN  Sensory Stimulation Regulation: lighting decreased  Cerebral Perfusion Promotion: blood pressure monitored  Taken 9/14/2021 2000 by Keira Gregg RN  Sensory Stimulation Regulation: lighting decreased  Cerebral Perfusion Promotion: blood pressure monitored  Intervention: Optimize Oxygenation and Ventilation  Recent Flowsheet Documentation  Taken 9/15/2021 0000 by Keira Gregg RN  Airway/Ventilation Management: airway patency maintained  Taken 9/14/2021 2000 by Keira Gregg RN  Head of Bed (HOB): HOB at 30-45 degrees  Airway/Ventilation Management: airway patency maintained     Problem: Communication Impairment (Stroke,  Ischemic/Transient Ischemic Attack)  Goal: Improved Communication Skills  Intervention: Optimize Cognitive and Communication Skills  Recent Flowsheet Documentation  Taken 9/15/2021 0000 by Keira Gregg RN  Reorientation Measures: clock in view  Taken 9/14/2021 2000 by Keira Gregg RN  Reorientation Measures: clock in view  Communication Enhancement Strategies:   call light answered in person   device use encouraged   extra time allowed for response   family involved in communication plan   family/caregiver assisted with communication   repetition utilized     Problem: Eating/Swallowing Impairment (Stroke, Ischemic/Transient Ischemic Attack)  Goal: Oral Intake without Aspiration  Intervention: Optimize Eating and Swallowing  Recent Flowsheet Documentation  Taken 9/15/2021 0000 by Keira Gregg RN  Aspiration Precautions:   awake/alert before oral intake   oral hygiene care promoted   upright posture maintained  Taken 9/14/2021 2200 by Keira Gregg RN  Aspiration Precautions:   awake/alert before oral intake   upright posture maintained  Taken 9/14/2021 2000 by Keira Gregg RN  Aspiration Precautions:   awake/alert before oral intake   oral hygiene care promoted   upright posture maintained     Problem: Functional Ability Impaired (Stroke, Ischemic/Transient Ischemic Attack)  Goal: Optimal Functional Ability  Intervention: Optimize Functional Ability  Recent Flowsheet Documentation  Taken 9/15/2021 0000 by Keira Gregg RN  Activity Management: activity adjusted per tolerance  Self-Care Promotion: independence encouraged  Taken 9/14/2021 2000 by Keira Gregg RN  Activity Management:   activity adjusted per tolerance   bedrest  Self-Care Promotion: independence encouraged     Problem: Sensorimotor Impairment (Stroke, Ischemic/Transient Ischemic Attack)  Goal: Improved Sensorimotor Function  Intervention: Optimize Range of Motion, Motor Control and Function  Recent Flowsheet Documentation  Taken 9/15/2021 0000 by Cristino  BEBE Crockett  Range of Motion: active ROM (range of motion) encouraged  Taken 9/14/2021 2000 by Keira Gregg RN  Positioning/Transfer Devices:   pillows   in use  Range of Motion: active ROM (range of motion) encouraged  Intervention: Optimize Sensory and Perceptual Abilities  Recent Flowsheet Documentation  Taken 9/15/2021 0000 by Keira Gregg RN  Pressure Reduction Techniques:   frequent weight shift encouraged   heels elevated off bed   weight shift assistance provided  Taken 9/14/2021 2000 by Keira Gregg RN  Pressure Reduction Techniques: frequent weight shift encouraged  Pressure Reduction Devices: positioning supports utilized     Problem: Urinary Elimination Impaired (Stroke, Ischemic/Transient Ischemic Attack)  Goal: Effective Urinary Elimination  Intervention: Promote Effective Bladder Elimination  Recent Flowsheet Documentation  Taken 9/15/2021 0000 by Keira Gregg RN  Urinary Elimination Promotion: toileting device within reach  Taken 9/14/2021 2000 by Keira Gregg RN  Urinary Elimination Promotion: toileting device within reach     Problem: Skin Injury Risk Increased  Goal: Skin Health and Integrity  Intervention: Optimize Skin Protection  Recent Flowsheet Documentation  Taken 9/15/2021 0000 by Keira Gregg RN  Pressure Reduction Techniques:   frequent weight shift encouraged   heels elevated off bed   weight shift assistance provided  Skin Protection:   adhesive use limited   electrode sites changed   incontinence pads utilized   pulse oximeter probe site changed   skin-to-skin areas padded   transparent dressing maintained   tubing/devices free from skin contact  Taken 9/14/2021 2000 by Keira Gregg RN  Pressure Reduction Techniques: frequent weight shift encouraged  Head of Bed (HOB): HOB at 30-45 degrees  Pressure Reduction Devices: positioning supports utilized  Skin Protection:   adhesive use limited   tubing/devices free from skin contact   incontinence pads utilized   skin-to-device areas padded    transparent dressing maintained     Problem: Fall Injury Risk  Goal: Absence of Fall and Fall-Related Injury  Intervention: Identify and Manage Contributors to Fall Injury Risk  Recent Flowsheet Documentation  Taken 9/15/2021 0000 by Keira Gregg RN  Medication Review/Management: medications reviewed  Self-Care Promotion: independence encouraged  Taken 9/14/2021 2000 by Keira Gregg RN  Medication Review/Management: medications reviewed  Self-Care Promotion: independence encouraged  Intervention: Promote Injury-Free Environment  Recent Flowsheet Documentation  Taken 9/15/2021 0000 by Keira Gregg, RN  Safety Promotion/Fall Prevention:   assistive device/personal items within reach   clutter free environment maintained   fall prevention program maintained   lighting adjusted   nonskid shoes/slippers when out of bed   room organization consistent   safety round/check completed  Taken 9/14/2021 2000 by Keira Gregg, RN  Safety Promotion/Fall Prevention:   assistive device/personal items within reach   fall prevention program maintained   lighting adjusted   mobility aid in reach   nonskid shoes/slippers when out of bed   room organization consistent   safety round/check completed   Goal Outcome Evaluation:

## 2021-09-15 NOTE — DISCHARGE PLACEMENT REQUEST
"LasaraBarrington alcaraz (81 y.o. Male)     Date of Birth Social Security Number Address Home Phone MRN    1940  [id number]  [place] [address] [phone] [id number]    Anabaptism Marital Status          Hoahaoism        Admission Date Admission Type Admitting Provider Attending Provider Department, Room/Bed    9/13/21 Emergency Freddie Correa MD Gill, Ravi, MD [facility], 256/1    Discharge Date Discharge Disposition Discharge Destination         Home or Self Care              Attending Provider: Freddie Correa MD    Allergies: Trazodone, Doxycycline, Doxycycline, Quetiapine    Isolation: None   Infection: None   Code Status: CPR    Ht: 177.8 cm (70\")   Wt: 82.5 kg (181 lb 12.8 oz)    Admission Cmt: None   Principal Problem: 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 (CMS/[facility]) [G30.9,F02.80,F05]                 Active Insurance as of 9/13/2021     Primary Coverage     Payor Plan Insurance Group Employer/Plan Group    MEDICARE MEDICARE A & B      Payor Plan Address Payor Plan Phone Number Payor Plan Fax Number Effective Dates    [address] [phone]  8/1/2005 - None Entered    formerly [facility] [address]       Subscriber Name Subscriber Birth Date Member ID       BARRINGTON BOND 1940 [id number]           Secondary Coverage     Payor Plan Insurance Group Employer/Plan Group    STANDARD LIFE ACCIDENT STANDARD LIFE ACCIDENT      Payor Plan Address Payor Plan Phone Number Payor Plan Fax Number Effective Dates    [address]   9/27/2019 - None Entered    [place] [address]       Subscriber Name Subscriber Birth Date Member ID       BARRINGTON BOND 1940 [id number]                 Emergency Contacts      (Rel.) Home Phone Work Phone Mobile Phone    EDUARDOMATT ALCARAZ (Spouse) [phone] -- [phone]            Insurance Information                MEDICARE/MEDICARE A & B Phone: [phone]    Subscriber: Barrington Bond Subscriber#: [id number]    Group#:  Precert#:         " STANDARD LIFE ACCIDENT/STANDARD LIFE ACCIDENT Phone:     Subscriber: Jayden Bond Subscriber#: [id number]    Group#:  Precert#:

## 2021-09-16 NOTE — OUTREACH NOTE
Prep Survey      Responses   [facility] facility patient discharged from?  [facility]   Is LACE score < 7 ?  No   Emergency Room discharge w/ pulse ox?  No   Eligibility  Readm Mgmt   Discharge diagnosis  Stroke   Does the patient have one of the following disease processes/diagnoses(primary or secondary)?  Stroke (TIA)   Does the patient have Home health ordered?  Yes   What is the Home health agency?   [facility]   Is there a DME ordered?  No   Prep survey completed?  Yes          MINE Chatman RN

## 2021-09-21 ENCOUNTER — READMISSION MANAGEMENT (OUTPATIENT)
Dept: CALL CENTER | Facility: HOSPITAL | Age: 81
End: 2021-09-21

## 2021-09-21 NOTE — OUTREACH NOTE
Stroke Week 1 Survey      Responses   [facility] patient discharged from?  [facility]   Does the patient have one of the following disease processes/diagnoses(primary or secondary)?  Stroke (TIA)   Week 1 attempt successful?  Yes   Call start time  1542   Call end time  1550   Discharge diagnosis  Stroke   Is patient permission given to speak with other caregiver?  Yes   List who call center can speak with  spouse- Nava   Person spoke with today (if not patient) and relationship  Nava   Meds reviewed with patient/caregiver?  Yes   Is the patient having any side effects they believe may be caused by any medication additions or changes?  No   Does the patient have all medications ordered at discharge?  Yes   Is the patient taking all medications as directed (includes completed medication regime)?  Yes   Does the patient have a primary care provider?   Yes   Does the patient have an appointment with their PCP within 7 days of discharge?  No   Comments regarding PCP  wife has been in contact with PCP   Nursing Interventions  Advised patient to make appointment   Has the patient kept scheduled appointments due by today?  N/A   What is the Home health agency?   [facility]   Has home health visited the patient within 72 hours of discharge?  Yes   Does the patient require any assistance with activities of daily living such as eating, bathing, dressing, walking, etc.?  Yes   Does the patient have any residual symptoms from stroke/TIA?  Yes   Does the patient understand the diet ordered at discharge?  Yes   Did the patient receive a copy of their discharge instructions?  Yes   Nursing interventions  Reviewed instructions with patient   What is the patient's perception of their health status since discharge?  Worsening   Nursing interventions  Nurse provided patient education   Is the patient able to teach back FAST for Stroke?  Yes   Is the patient/caregiver able to teach back signs and symptoms related to disease process for  when to call PCP?  Yes   Is the patient/caregiver able to teach back signs and symptoms related to disease process for when to call 911?  Yes   Is the patient/caregiver able to teach back the hierarchy of who to call/visit for symptoms/problems? PCP, Specialist, Home health nurse, Urgent Care, ED, 911  Yes   Week 1 call completed?  Yes   Wrap up additional comments  Per spouse, she thinks patient may have pneumonia, they are waiting for an order for a chest xray.          Yaritza Garrett RN

## 2021-09-22 ENCOUNTER — HOSPITAL ENCOUNTER (EMERGENCY)
Facility: HOSPITAL | Age: 81
Discharge: LEFT BEFORE DISCHARGE | End: 2021-09-22

## 2021-09-22 VITALS
OXYGEN SATURATION: 95 % | HEIGHT: 70 IN | TEMPERATURE: 98.2 F | BODY MASS INDEX: 24.34 KG/M2 | RESPIRATION RATE: 18 BRPM | DIASTOLIC BLOOD PRESSURE: 60 MMHG | SYSTOLIC BLOOD PRESSURE: 99 MMHG | WEIGHT: 170 LBS | HEART RATE: 62 BPM

## 2021-09-22 PROCEDURE — 99211 OFF/OP EST MAY X REQ PHY/QHP: CPT

## 2021-09-28 ENCOUNTER — HOSPITAL ENCOUNTER (OUTPATIENT)
Facility: HOSPITAL | Age: 81
Setting detail: OBSERVATION
Discharge: HOME | End: 2021-09-29
Attending: EMERGENCY MEDICINE | Admitting: STUDENT IN AN ORGANIZED HEALTH CARE EDUCATION/TRAINING PROGRAM

## 2021-09-28 ENCOUNTER — APPOINTMENT (OUTPATIENT)
Dept: GENERAL RADIOLOGY | Facility: HOSPITAL | Age: 81
End: 2021-09-28

## 2021-09-28 ENCOUNTER — APPOINTMENT (OUTPATIENT)
Dept: CT IMAGING | Facility: HOSPITAL | Age: 81
End: 2021-09-28

## 2021-09-28 ENCOUNTER — READMISSION MANAGEMENT (OUTPATIENT)
Dept: CALL CENTER | Facility: HOSPITAL | Age: 81
End: 2021-09-28

## 2021-09-28 DIAGNOSIS — G45.9 TIA (TRANSIENT ISCHEMIC ATTACK): ICD-10-CM

## 2021-09-28 DIAGNOSIS — R53.1 WEAKNESS: Primary | ICD-10-CM

## 2021-09-28 PROBLEM — F01.518 VASCULAR DEMENTIA WITH BEHAVIOR DISTURBANCE: Chronic | Status: ACTIVE | Noted: 2021-09-19

## 2021-09-28 PROBLEM — G47.34 NOCTURNAL HYPOXEMIA: Chronic | Status: ACTIVE | Noted: 2021-09-19

## 2021-09-28 PROBLEM — G90.1 DYSAUTONOMIA (HCC): Status: ACTIVE | Noted: 2021-05-12

## 2021-09-28 PROBLEM — E78.2 MIXED HYPERLIPIDEMIA: Status: ACTIVE | Noted: 2021-05-12

## 2021-09-28 PROBLEM — E07.9 DISEASE OF THYROID GLAND: Status: ACTIVE | Noted: 2021-05-12

## 2021-09-28 PROBLEM — G47.34 NOCTURNAL HYPOXEMIA: Status: ACTIVE | Noted: 2021-09-19

## 2021-09-28 PROBLEM — F01.518 VASCULAR DEMENTIA WITH BEHAVIOR DISTURBANCE: Status: ACTIVE | Noted: 2021-09-19

## 2021-09-28 PROBLEM — G47.9 SLEEP DISTURBANCE: Chronic | Status: ACTIVE | Noted: 2021-09-19

## 2021-09-28 PROBLEM — R41.9 COGNITIVE SAFETY ISSUE: Status: ACTIVE | Noted: 2021-09-19

## 2021-09-28 PROBLEM — F03.90 DEMENTIA: Status: ACTIVE | Noted: 2021-05-12

## 2021-09-28 PROBLEM — F05: Status: ACTIVE | Noted: 2021-09-19

## 2021-09-28 PROBLEM — Z79.01 ANTICOAGULANT LONG-TERM USE: Chronic | Status: ACTIVE | Noted: 2019-04-24

## 2021-09-28 PROBLEM — I49.5 SSS (SICK SINUS SYNDROME) (HCC): Chronic | Status: ACTIVE | Noted: 2020-10-19

## 2021-09-28 PROBLEM — Z63.6 CAREGIVER STRESS: Status: ACTIVE | Noted: 2021-09-19

## 2021-09-28 PROBLEM — E78.2 MIXED HYPERLIPIDEMIA: Chronic | Status: ACTIVE | Noted: 2021-05-12

## 2021-09-28 PROBLEM — I47.29 NONSUSTAINED VENTRICULAR TACHYCARDIA: Chronic | Status: ACTIVE | Noted: 2019-11-05

## 2021-09-28 PROBLEM — G47.9 SLEEP DISTURBANCE: Status: ACTIVE | Noted: 2021-09-19

## 2021-09-28 PROBLEM — R41.9 COGNITIVE SAFETY ISSUE: Chronic | Status: ACTIVE | Noted: 2021-09-19

## 2021-09-28 PROBLEM — J44.9 COPD (CHRONIC OBSTRUCTIVE PULMONARY DISEASE): Chronic | Status: ACTIVE | Noted: 2019-02-06

## 2021-09-28 LAB
ALBUMIN SERPL-MCNC: 3.6 G/DL (ref 3.5–5.2)
ALBUMIN/GLOB SERPL: 1.4 G/DL
ALP SERPL-CCNC: 76 U/L (ref 39–117)
ALT SERPL W P-5'-P-CCNC: 22 U/L (ref 1–41)
ANION GAP SERPL CALCULATED.3IONS-SCNC: 12 MMOL/L (ref 5–15)
AST SERPL-CCNC: 28 U/L (ref 1–40)
BASOPHILS # BLD AUTO: 0 10*3/MM3 (ref 0–0.2)
BASOPHILS NFR BLD AUTO: 0.4 % (ref 0–1.5)
BILIRUB SERPL-MCNC: 0.3 MG/DL (ref 0–1.2)
BILIRUB UR QL STRIP: NEGATIVE
BUN SERPL-MCNC: 22 MG/DL (ref 8–23)
BUN/CREAT SERPL: 23.4 (ref 7–25)
CALCIUM SPEC-SCNC: 8.7 MG/DL (ref 8.6–10.5)
CHLORIDE SERPL-SCNC: 103 MMOL/L (ref 98–107)
CLARITY UR: CLEAR
CO2 SERPL-SCNC: 21 MMOL/L (ref 22–29)
COLOR UR: YELLOW
CREAT SERPL-MCNC: 0.94 MG/DL (ref 0.76–1.27)
DEPRECATED RDW RBC AUTO: 43.3 FL (ref 37–54)
EOSINOPHIL # BLD AUTO: 0.2 10*3/MM3 (ref 0–0.4)
EOSINOPHIL NFR BLD AUTO: 1.6 % (ref 0.3–6.2)
ERYTHROCYTE [DISTWIDTH] IN BLOOD BY AUTOMATED COUNT: 17.1 % (ref 12.3–15.4)
GFR SERPL CREATININE-BSD FRML MDRD: 77 ML/MIN/1.73
GLOBULIN UR ELPH-MCNC: 2.6 GM/DL
GLUCOSE BLDC GLUCOMTR-MCNC: 121 MG/DL (ref 70–105)
GLUCOSE SERPL-MCNC: 107 MG/DL (ref 65–99)
GLUCOSE UR STRIP-MCNC: NEGATIVE MG/DL
HCT VFR BLD AUTO: 33.6 % (ref 37.5–51)
HGB BLD-MCNC: 10.7 G/DL (ref 13–17.7)
HGB UR QL STRIP.AUTO: NEGATIVE
KETONES UR QL STRIP: NEGATIVE
LEUKOCYTE ESTERASE UR QL STRIP.AUTO: NEGATIVE
LYMPHOCYTES # BLD AUTO: 1.7 10*3/MM3 (ref 0.7–3.1)
LYMPHOCYTES NFR BLD AUTO: 17.1 % (ref 19.6–45.3)
MCH RBC QN AUTO: 23.2 PG (ref 26.6–33)
MCHC RBC AUTO-ENTMCNC: 31.7 G/DL (ref 31.5–35.7)
MCV RBC AUTO: 73.1 FL (ref 79–97)
MONOCYTES # BLD AUTO: 0.8 10*3/MM3 (ref 0.1–0.9)
MONOCYTES NFR BLD AUTO: 8.4 % (ref 5–12)
NEUTROPHILS NFR BLD AUTO: 7.3 10*3/MM3 (ref 1.7–7)
NEUTROPHILS NFR BLD AUTO: 72.5 % (ref 42.7–76)
NITRITE UR QL STRIP: NEGATIVE
NRBC BLD AUTO-RTO: 0 /100 WBC (ref 0–0.2)
PH UR STRIP.AUTO: <=5 [PH] (ref 5–8)
PLATELET # BLD AUTO: 319 10*3/MM3 (ref 140–450)
PMV BLD AUTO: 7.1 FL (ref 6–12)
POTASSIUM SERPL-SCNC: 4.4 MMOL/L (ref 3.5–5.2)
PROT SERPL-MCNC: 6.2 G/DL (ref 6–8.5)
PROT UR QL STRIP: NEGATIVE
RBC # BLD AUTO: 4.6 10*6/MM3 (ref 4.14–5.8)
SARS-COV-2 RNA PNL SPEC NAA+PROBE: NOT DETECTED
SODIUM SERPL-SCNC: 136 MMOL/L (ref 136–145)
SP GR UR STRIP: 1.02 (ref 1–1.03)
TROPONIN T SERPL-MCNC: <0.01 NG/ML (ref 0–0.03)
UROBILINOGEN UR QL STRIP: NORMAL
WBC # BLD AUTO: 10.1 10*3/MM3 (ref 3.4–10.8)

## 2021-09-28 PROCEDURE — 80053 COMPREHEN METABOLIC PANEL: CPT | Performed by: EMERGENCY MEDICINE

## 2021-09-28 PROCEDURE — G0378 HOSPITAL OBSERVATION PER HR: HCPCS

## 2021-09-28 PROCEDURE — 99284 EMERGENCY DEPT VISIT MOD MDM: CPT

## 2021-09-28 PROCEDURE — 70450 CT HEAD/BRAIN W/O DYE: CPT

## 2021-09-28 PROCEDURE — 96361 HYDRATE IV INFUSION ADD-ON: CPT

## 2021-09-28 PROCEDURE — C9803 HOPD COVID-19 SPEC COLLECT: HCPCS

## 2021-09-28 PROCEDURE — 93005 ELECTROCARDIOGRAM TRACING: CPT | Performed by: EMERGENCY MEDICINE

## 2021-09-28 PROCEDURE — 84484 ASSAY OF TROPONIN QUANT: CPT | Performed by: EMERGENCY MEDICINE

## 2021-09-28 PROCEDURE — 82962 GLUCOSE BLOOD TEST: CPT

## 2021-09-28 PROCEDURE — 99222 1ST HOSP IP/OBS MODERATE 55: CPT | Performed by: NURSE PRACTITIONER

## 2021-09-28 PROCEDURE — 71045 X-RAY EXAM CHEST 1 VIEW: CPT

## 2021-09-28 PROCEDURE — 87635 SARS-COV-2 COVID-19 AMP PRB: CPT | Performed by: EMERGENCY MEDICINE

## 2021-09-28 PROCEDURE — 85025 COMPLETE CBC W/AUTO DIFF WBC: CPT | Performed by: EMERGENCY MEDICINE

## 2021-09-28 PROCEDURE — 81003 URINALYSIS AUTO W/O SCOPE: CPT | Performed by: EMERGENCY MEDICINE

## 2021-09-28 RX ORDER — ACETAMINOPHEN 325 MG/1
650 TABLET ORAL EVERY 4 HOURS PRN
Status: DISCONTINUED | OUTPATIENT
Start: 2021-09-28 | End: 2021-09-29 | Stop reason: HOSPADM

## 2021-09-28 RX ORDER — ACETAMINOPHEN 160 MG/5ML
650 SOLUTION ORAL EVERY 4 HOURS PRN
Status: DISCONTINUED | OUTPATIENT
Start: 2021-09-28 | End: 2021-09-29 | Stop reason: HOSPADM

## 2021-09-28 RX ORDER — CETIRIZINE HYDROCHLORIDE 10 MG/1
5 TABLET ORAL DAILY
Status: DISCONTINUED | OUTPATIENT
Start: 2021-09-29 | End: 2021-09-29 | Stop reason: HOSPADM

## 2021-09-28 RX ORDER — ROSUVASTATIN CALCIUM 10 MG/1
20 TABLET, COATED ORAL NIGHTLY
Status: DISCONTINUED | OUTPATIENT
Start: 2021-09-29 | End: 2021-09-29 | Stop reason: HOSPADM

## 2021-09-28 RX ORDER — MULTIPLE VITAMINS W/ MINERALS TAB 9MG-400MCG
1 TAB ORAL NIGHTLY
Status: DISCONTINUED | OUTPATIENT
Start: 2021-09-29 | End: 2021-09-29 | Stop reason: HOSPADM

## 2021-09-28 RX ORDER — CLOPIDOGREL BISULFATE 75 MG/1
75 TABLET ORAL DAILY
Status: DISCONTINUED | OUTPATIENT
Start: 2021-09-29 | End: 2021-09-29 | Stop reason: HOSPADM

## 2021-09-28 RX ORDER — LANOLIN ALCOHOL/MO/W.PET/CERES
2000 CREAM (GRAM) TOPICAL DAILY
Status: DISCONTINUED | OUTPATIENT
Start: 2021-09-29 | End: 2021-09-29 | Stop reason: HOSPADM

## 2021-09-28 RX ORDER — NITROGLYCERIN 0.4 MG/1
0.4 TABLET SUBLINGUAL
Status: DISCONTINUED | OUTPATIENT
Start: 2021-09-28 | End: 2021-09-29 | Stop reason: HOSPADM

## 2021-09-28 RX ORDER — ACETAMINOPHEN 650 MG/1
650 SUPPOSITORY RECTAL EVERY 4 HOURS PRN
Status: DISCONTINUED | OUTPATIENT
Start: 2021-09-28 | End: 2021-09-29 | Stop reason: HOSPADM

## 2021-09-28 RX ORDER — CHOLECALCIFEROL (VITAMIN D3) 125 MCG
5 CAPSULE ORAL NIGHTLY PRN
Status: DISCONTINUED | OUTPATIENT
Start: 2021-09-28 | End: 2021-09-29 | Stop reason: HOSPADM

## 2021-09-28 RX ORDER — SODIUM CHLORIDE 0.9 % (FLUSH) 0.9 %
10 SYRINGE (ML) INJECTION AS NEEDED
Status: DISCONTINUED | OUTPATIENT
Start: 2021-09-28 | End: 2021-09-29 | Stop reason: HOSPADM

## 2021-09-28 RX ORDER — LEVOTHYROXINE SODIUM 0.03 MG/1
25 TABLET ORAL
Status: DISCONTINUED | OUTPATIENT
Start: 2021-09-29 | End: 2021-09-29 | Stop reason: HOSPADM

## 2021-09-28 RX ORDER — SODIUM CHLORIDE 9 MG/ML
75 INJECTION, SOLUTION INTRAVENOUS CONTINUOUS
Status: DISCONTINUED | OUTPATIENT
Start: 2021-09-28 | End: 2021-09-29 | Stop reason: HOSPADM

## 2021-09-28 RX ORDER — ALUMINA, MAGNESIA, AND SIMETHICONE 2400; 2400; 240 MG/30ML; MG/30ML; MG/30ML
15 SUSPENSION ORAL EVERY 6 HOURS PRN
Status: DISCONTINUED | OUTPATIENT
Start: 2021-09-28 | End: 2021-09-29 | Stop reason: HOSPADM

## 2021-09-28 RX ORDER — ESCITALOPRAM OXALATE 10 MG/1
10 TABLET ORAL DAILY
Status: DISCONTINUED | OUTPATIENT
Start: 2021-09-29 | End: 2021-09-29 | Stop reason: HOSPADM

## 2021-09-28 RX ORDER — AMOXICILLIN AND CLAVULANATE POTASSIUM 875; 125 MG/1; MG/1
1 TABLET, FILM COATED ORAL 2 TIMES DAILY
COMMUNITY
End: 2021-10-22

## 2021-09-28 RX ORDER — ONDANSETRON 2 MG/ML
4 INJECTION INTRAMUSCULAR; INTRAVENOUS EVERY 6 HOURS PRN
Status: DISCONTINUED | OUTPATIENT
Start: 2021-09-28 | End: 2021-09-29 | Stop reason: HOSPADM

## 2021-09-28 RX ORDER — PANTOPRAZOLE SODIUM 40 MG/1
40 TABLET, DELAYED RELEASE ORAL
Status: DISCONTINUED | OUTPATIENT
Start: 2021-09-29 | End: 2021-09-29 | Stop reason: HOSPADM

## 2021-09-28 RX ORDER — ONDANSETRON 4 MG/1
4 TABLET, FILM COATED ORAL EVERY 6 HOURS PRN
Status: DISCONTINUED | OUTPATIENT
Start: 2021-09-28 | End: 2021-09-29 | Stop reason: HOSPADM

## 2021-09-28 RX ORDER — BRIMONIDINE TARTRATE 2 MG/ML
1 SOLUTION/ DROPS OPHTHALMIC 2 TIMES DAILY
Status: DISCONTINUED | OUTPATIENT
Start: 2021-09-29 | End: 2021-09-29 | Stop reason: HOSPADM

## 2021-09-28 RX ORDER — ISOSORBIDE MONONITRATE 30 MG/1
30 TABLET, EXTENDED RELEASE ORAL DAILY
Status: DISCONTINUED | OUTPATIENT
Start: 2021-09-29 | End: 2021-09-29

## 2021-09-28 RX ORDER — METOPROLOL TARTRATE 5 MG/5ML
5 INJECTION INTRAVENOUS EVERY 4 HOURS PRN
Status: DISCONTINUED | OUTPATIENT
Start: 2021-09-28 | End: 2021-09-29 | Stop reason: HOSPADM

## 2021-09-28 RX ORDER — AMOXICILLIN AND CLAVULANATE POTASSIUM 875; 125 MG/1; MG/1
1 TABLET, FILM COATED ORAL EVERY 12 HOURS SCHEDULED
Status: DISCONTINUED | OUTPATIENT
Start: 2021-09-29 | End: 2021-09-29

## 2021-09-28 RX ADMIN — SODIUM CHLORIDE 125 ML/HR: 9 INJECTION, SOLUTION INTRAVENOUS at 20:06

## 2021-09-29 ENCOUNTER — APPOINTMENT (OUTPATIENT)
Dept: MRI IMAGING | Facility: HOSPITAL | Age: 81
End: 2021-09-29

## 2021-09-29 ENCOUNTER — APPOINTMENT (OUTPATIENT)
Dept: CARDIOLOGY | Facility: HOSPITAL | Age: 81
End: 2021-09-29

## 2021-09-29 VITALS
HEIGHT: 72 IN | SYSTOLIC BLOOD PRESSURE: 153 MMHG | RESPIRATION RATE: 18 BRPM | HEART RATE: 74 BPM | OXYGEN SATURATION: 95 % | TEMPERATURE: 97.8 F | WEIGHT: 160 LBS | DIASTOLIC BLOOD PRESSURE: 93 MMHG | BODY MASS INDEX: 21.67 KG/M2

## 2021-09-29 LAB
ANION GAP SERPL CALCULATED.3IONS-SCNC: 12 MMOL/L (ref 5–15)
BASOPHILS # BLD AUTO: 0 10*3/MM3 (ref 0–0.2)
BASOPHILS NFR BLD AUTO: 0.3 % (ref 0–1.5)
BUN SERPL-MCNC: 15 MG/DL (ref 8–23)
BUN/CREAT SERPL: 17.9 (ref 7–25)
CALCIUM SPEC-SCNC: 8.8 MG/DL (ref 8.6–10.5)
CHLORIDE SERPL-SCNC: 107 MMOL/L (ref 98–107)
CO2 SERPL-SCNC: 23 MMOL/L (ref 22–29)
CREAT SERPL-MCNC: 0.84 MG/DL (ref 0.76–1.27)
DEPRECATED RDW RBC AUTO: 43.8 FL (ref 37–54)
EOSINOPHIL # BLD AUTO: 0.5 10*3/MM3 (ref 0–0.4)
EOSINOPHIL NFR BLD AUTO: 4.4 % (ref 0.3–6.2)
ERYTHROCYTE [DISTWIDTH] IN BLOOD BY AUTOMATED COUNT: 16.9 % (ref 12.3–15.4)
GFR SERPL CREATININE-BSD FRML MDRD: 88 ML/MIN/1.73
GLUCOSE BLDC GLUCOMTR-MCNC: 97 MG/DL (ref 70–105)
GLUCOSE SERPL-MCNC: 97 MG/DL (ref 65–99)
HCT VFR BLD AUTO: 36.6 % (ref 37.5–51)
HGB BLD-MCNC: 11.6 G/DL (ref 13–17.7)
LYMPHOCYTES # BLD AUTO: 2 10*3/MM3 (ref 0.7–3.1)
LYMPHOCYTES NFR BLD AUTO: 15.9 % (ref 19.6–45.3)
MCH RBC QN AUTO: 23 PG (ref 26.6–33)
MCHC RBC AUTO-ENTMCNC: 31.7 G/DL (ref 31.5–35.7)
MCV RBC AUTO: 72.6 FL (ref 79–97)
MONOCYTES # BLD AUTO: 0.9 10*3/MM3 (ref 0.1–0.9)
MONOCYTES NFR BLD AUTO: 7 % (ref 5–12)
NEUTROPHILS NFR BLD AUTO: 72.4 % (ref 42.7–76)
NEUTROPHILS NFR BLD AUTO: 8.9 10*3/MM3 (ref 1.7–7)
NRBC BLD AUTO-RTO: 0.1 /100 WBC (ref 0–0.2)
PLATELET # BLD AUTO: 328 10*3/MM3 (ref 140–450)
PMV BLD AUTO: 7.4 FL (ref 6–12)
POTASSIUM SERPL-SCNC: 3.9 MMOL/L (ref 3.5–5.2)
RBC # BLD AUTO: 5.04 10*6/MM3 (ref 4.14–5.8)
SODIUM SERPL-SCNC: 142 MMOL/L (ref 136–145)
WBC # BLD AUTO: 12.4 10*3/MM3 (ref 3.4–10.8)

## 2021-09-29 PROCEDURE — 93306 TTE W/DOPPLER COMPLETE: CPT | Performed by: INTERNAL MEDICINE

## 2021-09-29 PROCEDURE — 99214 OFFICE O/P EST MOD 30 MIN: CPT | Performed by: PSYCHIATRY & NEUROLOGY

## 2021-09-29 PROCEDURE — 96361 HYDRATE IV INFUSION ADD-ON: CPT

## 2021-09-29 PROCEDURE — 85025 COMPLETE CBC W/AUTO DIFF WBC: CPT | Performed by: NURSE PRACTITIONER

## 2021-09-29 PROCEDURE — 94799 UNLISTED PULMONARY SVC/PX: CPT

## 2021-09-29 PROCEDURE — G0378 HOSPITAL OBSERVATION PER HR: HCPCS

## 2021-09-29 PROCEDURE — 70551 MRI BRAIN STEM W/O DYE: CPT

## 2021-09-29 PROCEDURE — 94640 AIRWAY INHALATION TREATMENT: CPT

## 2021-09-29 PROCEDURE — 99238 HOSP IP/OBS DSCHRG MGMT 30/<: CPT | Performed by: STUDENT IN AN ORGANIZED HEALTH CARE EDUCATION/TRAINING PROGRAM

## 2021-09-29 PROCEDURE — 36415 COLL VENOUS BLD VENIPUNCTURE: CPT | Performed by: NURSE PRACTITIONER

## 2021-09-29 PROCEDURE — 80048 BASIC METABOLIC PNL TOTAL CA: CPT | Performed by: NURSE PRACTITIONER

## 2021-09-29 PROCEDURE — 25010000002 AZITHROMYCIN PER 500 MG: Performed by: NURSE PRACTITIONER

## 2021-09-29 PROCEDURE — 93306 TTE W/DOPPLER COMPLETE: CPT

## 2021-09-29 PROCEDURE — 96374 THER/PROPH/DIAG INJ IV PUSH: CPT

## 2021-09-29 PROCEDURE — 82962 GLUCOSE BLOOD TEST: CPT

## 2021-09-29 PROCEDURE — 92610 EVALUATE SWALLOWING FUNCTION: CPT

## 2021-09-29 RX ORDER — ISOSORBIDE MONONITRATE 30 MG/1
30 TABLET, EXTENDED RELEASE ORAL EVERY 24 HOURS
Status: DISCONTINUED | OUTPATIENT
Start: 2021-09-29 | End: 2021-09-29 | Stop reason: HOSPADM

## 2021-09-29 RX ORDER — HYDRALAZINE HYDROCHLORIDE 20 MG/ML
10 INJECTION INTRAMUSCULAR; INTRAVENOUS EVERY 6 HOURS PRN
Status: DISCONTINUED | OUTPATIENT
Start: 2021-09-29 | End: 2021-09-29 | Stop reason: HOSPADM

## 2021-09-29 RX ADMIN — ROSUVASTATIN 20 MG: 10 TABLET, FILM COATED ORAL at 01:04

## 2021-09-29 RX ADMIN — ESCITALOPRAM OXALATE 10 MG: 10 TABLET ORAL at 10:18

## 2021-09-29 RX ADMIN — METOPROLOL TARTRATE 5 MG: 5 INJECTION INTRAVENOUS at 00:00

## 2021-09-29 RX ADMIN — IPRATROPIUM BROMIDE 0.5 MG: 0.5 SOLUTION RESPIRATORY (INHALATION) at 01:23

## 2021-09-29 RX ADMIN — AZITHROMYCIN 500 MG: 500 INJECTION, POWDER, LYOPHILIZED, FOR SOLUTION INTRAVENOUS at 07:40

## 2021-09-29 RX ADMIN — LEVOTHYROXINE SODIUM 25 MCG: 0.03 TABLET ORAL at 10:18

## 2021-09-29 RX ADMIN — CLOPIDOGREL BISULFATE 75 MG: 75 TABLET ORAL at 10:18

## 2021-09-29 RX ADMIN — APIXABAN 5 MG: 5 TABLET, FILM COATED ORAL at 01:04

## 2021-09-29 RX ADMIN — IPRATROPIUM BROMIDE 0.5 MG: 0.5 SOLUTION RESPIRATORY (INHALATION) at 10:40

## 2021-09-29 RX ADMIN — PANTOPRAZOLE SODIUM 40 MG: 40 TABLET, DELAYED RELEASE ORAL at 10:17

## 2021-09-29 RX ADMIN — ACETAMINOPHEN 650 MG: 325 TABLET, FILM COATED ORAL at 01:07

## 2021-09-29 RX ADMIN — IPRATROPIUM BROMIDE 0.5 MG: 0.5 SOLUTION RESPIRATORY (INHALATION) at 07:00

## 2021-09-29 RX ADMIN — CYANOCOBALAMIN TAB 1000 MCG 2000 MCG: 1000 TAB at 10:18

## 2021-09-29 RX ADMIN — MULTIPLE VITAMINS W/ MINERALS TAB 1 TABLET: TAB at 01:03

## 2021-09-29 RX ADMIN — CETIRIZINE HYDROCHLORIDE 5 MG: 10 TABLET, FILM COATED ORAL at 10:18

## 2021-09-29 NOTE — OUTREACH NOTE
Stroke Week 2 Survey      Responses   [facility] facility patient discharged from?  [facility]   Does the patient have one of the following disease processes/diagnoses(primary or secondary)?  Stroke (TIA)   Week 2 attempt successful?  No   Revoke  Readmitted          Sariah Dominguez RN

## 2021-09-30 ENCOUNTER — READMISSION MANAGEMENT (OUTPATIENT)
Dept: CALL CENTER | Facility: HOSPITAL | Age: 81
End: 2021-09-30

## 2021-09-30 LAB — QT INTERVAL: 398 MS

## 2021-09-30 NOTE — OUTREACH NOTE
Prep Survey      Responses   [facility] facility patient discharged from?  [facility]   Is LACE score < 7 ?  No   Emergency Room discharge w/ pulse ox?  No   Eligibility  Readm Mgmt   Discharge diagnosis  TIA   Does the patient have one of the following disease processes/diagnoses(primary or secondary)?  Stroke (TIA)   Does the patient have Home health ordered?  No   Is there a DME ordered?  No   Prep survey completed?  Yes          Marily Nick RN

## 2021-10-04 ENCOUNTER — READMISSION MANAGEMENT (OUTPATIENT)
Dept: CALL CENTER | Facility: HOSPITAL | Age: 81
End: 2021-10-04

## 2021-10-04 NOTE — OUTREACH NOTE
Stroke Week 1 Survey      Responses   [facility] patient discharged from?  [facility]   Does the patient have one of the following disease processes/diagnoses(primary or secondary)?  Stroke (TIA)   Week 1 attempt successful?  Yes   Call start time  1446   Call end time  1504   Discharge diagnosis  TIA   Person spoke with today (if not patient) and relationship  Nava-spouse    Meds reviewed with patient/caregiver?  Yes   Is the patient having any side effects they believe may be caused by any medication additions or changes?  No   Does the patient have all medications ordered at discharge?  N/A   Is the patient taking all medications as directed (includes completed medication regime)?  Yes   Comments regarding appointments  Appt with urology, Dr. Nick, is on 10/6/21   Does the patient have a primary care provider?   Yes   Does the patient have an appointment with their PCP within 7 days of discharge?  Greater than 7 days   Comments regarding PCP  10/11/21   What is preventing the patient from scheduling follow up appointments within 7 days of discharge?  -- [unsure]   Nursing Interventions  Verified appointment date/time/provider   Has the patient kept scheduled appointments due by today?  N/A   What is the Home health agency?   [facility]    Has home health visited the patient within 72 hours of discharge?  Yes   Psychosocial issues?  No   Does the patient require any assistance with activities of daily living such as eating, bathing, dressing, walking, etc.?  Yes   Does the patient have any residual symptoms from stroke/TIA?  No   Does the patient understand the diet ordered at discharge?  Yes   Did the patient receive a copy of their discharge instructions?  Yes   Nursing interventions  Reviewed instructions with patient   What is the patient's perception of their health status since discharge?  Same   Nursing interventions  Nurse provided patient education   Is the patient able to teach back FAST for Stroke?   Yes   Is the patient/caregiver able to teach back the risk factors for a stroke?  High blood pressure-goal below 120/80, Smoking, Diabetes, High Cholesterol   Week 1 call completed?  Yes   Wrap up additional comments  Wife is upset with the [facility] pt received from a previous admission. She refused to have CM call her.           Ly Cancino RN

## 2021-10-05 LAB
BH CV ECHO MEAS - % IVS THICK: 72.2 %
BH CV ECHO MEAS - % LVPW THICK: 47.3 %
BH CV ECHO MEAS - ACS: 1.9 CM
BH CV ECHO MEAS - AO MAX PG (FULL): 1.4 MMHG
BH CV ECHO MEAS - AO MAX PG: 4.3 MMHG
BH CV ECHO MEAS - AO MEAN PG (FULL): 0.86 MMHG
BH CV ECHO MEAS - AO MEAN PG: 2.6 MMHG
BH CV ECHO MEAS - AO ROOT AREA (BSA CORRECTED): 1.9
BH CV ECHO MEAS - AO ROOT AREA: 10.5 CM^2
BH CV ECHO MEAS - AO ROOT DIAM: 3.7 CM
BH CV ECHO MEAS - AO V2 MAX: 103.4 CM/SEC
BH CV ECHO MEAS - AO V2 MEAN: 77.9 CM/SEC
BH CV ECHO MEAS - AO V2 VTI: 20.5 CM
BH CV ECHO MEAS - AVA(I,A): 5.2 CM^2
BH CV ECHO MEAS - AVA(I,D): 5.2 CM^2
BH CV ECHO MEAS - AVA(V,A): 4.8 CM^2
BH CV ECHO MEAS - AVA(V,D): 4.8 CM^2
BH CV ECHO MEAS - BSA(HAYCOCK): 1.9 M^2
BH CV ECHO MEAS - BSA: 1.9 M^2
BH CV ECHO MEAS - BZI_BMI: 21.7 KILOGRAMS/M^2
BH CV ECHO MEAS - BZI_METRIC_HEIGHT: 182.9 CM
BH CV ECHO MEAS - BZI_METRIC_WEIGHT: 72.6 KG
BH CV ECHO MEAS - EDV(CUBED): 93.5 ML
BH CV ECHO MEAS - EDV(MOD-SP4): 70.9 ML
BH CV ECHO MEAS - EDV(TEICH): 94.3 ML
BH CV ECHO MEAS - EF(CUBED): 66.4 %
BH CV ECHO MEAS - EF(MOD-BP): 66 %
BH CV ECHO MEAS - EF(MOD-SP4): 66.2 %
BH CV ECHO MEAS - EF(TEICH): 58 %
BH CV ECHO MEAS - ESV(CUBED): 31.4 ML
BH CV ECHO MEAS - ESV(MOD-SP4): 24 ML
BH CV ECHO MEAS - ESV(TEICH): 39.6 ML
BH CV ECHO MEAS - FS: 30.5 %
BH CV ECHO MEAS - IVS/LVPW: 0.96
BH CV ECHO MEAS - IVSD: 0.98 CM
BH CV ECHO MEAS - IVSS: 1.7 CM
BH CV ECHO MEAS - LA DIMENSION(2D): 3.4 CM
BH CV ECHO MEAS - LV DIASTOLIC VOL/BSA (35-75): 36.6 ML/M^2
BH CV ECHO MEAS - LV MASS(C)D: 154.7 GRAMS
BH CV ECHO MEAS - LV MASS(C)DI: 79.8 GRAMS/M^2
BH CV ECHO MEAS - LV MASS(C)S: 185.9 GRAMS
BH CV ECHO MEAS - LV MASS(C)SI: 95.9 GRAMS/M^2
BH CV ECHO MEAS - LV MAX PG: 2.9 MMHG
BH CV ECHO MEAS - LV MEAN PG: 1.7 MMHG
BH CV ECHO MEAS - LV SYSTOLIC VOL/BSA (12-30): 12.4 ML/M^2
BH CV ECHO MEAS - LV V1 MAX: 85.1 CM/SEC
BH CV ECHO MEAS - LV V1 MEAN: 63.3 CM/SEC
BH CV ECHO MEAS - LV V1 VTI: 18 CM
BH CV ECHO MEAS - LVIDD: 4.5 CM
BH CV ECHO MEAS - LVIDS: 3.2 CM
BH CV ECHO MEAS - LVOT AREA: 5.9 CM^2
BH CV ECHO MEAS - LVOT DIAM: 2.7 CM
BH CV ECHO MEAS - LVPWD: 1 CM
BH CV ECHO MEAS - LVPWS: 1.5 CM
BH CV ECHO MEAS - MV A MAX VEL: 119.7 CM/SEC
BH CV ECHO MEAS - MV DEC SLOPE: 189.7 CM/SEC^2
BH CV ECHO MEAS - MV DEC TIME: 0.32 SEC
BH CV ECHO MEAS - MV E MAX VEL: 33.4 CM/SEC
BH CV ECHO MEAS - MV E/A: 0.28
BH CV ECHO MEAS - MV MAX PG: 5.9 MMHG
BH CV ECHO MEAS - MV MEAN PG: 2.1 MMHG
BH CV ECHO MEAS - MV V2 MAX: 121.5 CM/SEC
BH CV ECHO MEAS - MV V2 MEAN: 67.7 CM/SEC
BH CV ECHO MEAS - MV V2 VTI: 26 CM
BH CV ECHO MEAS - MVA(VTI): 4.1 CM^2
BH CV ECHO MEAS - PA ACC TIME: 0.04 SEC
BH CV ECHO MEAS - PA MAX PG (FULL): 1.5 MMHG
BH CV ECHO MEAS - PA MAX PG: 3 MMHG
BH CV ECHO MEAS - PA MEAN PG (FULL): 0.89 MMHG
BH CV ECHO MEAS - PA MEAN PG: 1.7 MMHG
BH CV ECHO MEAS - PA PR(ACCEL): 60.3 MMHG
BH CV ECHO MEAS - PA V2 MAX: 87 CM/SEC
BH CV ECHO MEAS - PA V2 MEAN: 62.7 CM/SEC
BH CV ECHO MEAS - PA V2 VTI: 17.6 CM
BH CV ECHO MEAS - RAP SYSTOLE: 3 MMHG
BH CV ECHO MEAS - RV MAX PG: 1.6 MMHG
BH CV ECHO MEAS - RV MEAN PG: 0.84 MMHG
BH CV ECHO MEAS - RV V1 MAX: 62.4 CM/SEC
BH CV ECHO MEAS - RV V1 MEAN: 43.7 CM/SEC
BH CV ECHO MEAS - RV V1 VTI: 12.5 CM
BH CV ECHO MEAS - RVDD: 2.5 CM
BH CV ECHO MEAS - RVSP: 35.4 MMHG
BH CV ECHO MEAS - SI(AO): 110.6 ML/M^2
BH CV ECHO MEAS - SI(CUBED): 32 ML/M^2
BH CV ECHO MEAS - SI(LVOT): 54.6 ML/M^2
BH CV ECHO MEAS - SI(MOD-SP4): 24.2 ML/M^2
BH CV ECHO MEAS - SI(TEICH): 28.2 ML/M^2
BH CV ECHO MEAS - SV(AO): 214.4 ML
BH CV ECHO MEAS - SV(CUBED): 62.1 ML
BH CV ECHO MEAS - SV(LVOT): 105.8 ML
BH CV ECHO MEAS - SV(MOD-SP4): 46.9 ML
BH CV ECHO MEAS - SV(TEICH): 54.7 ML
BH CV ECHO MEAS - TR MAX VEL: 284.5 CM/SEC
MAXIMAL PREDICTED HEART RATE: 139 BPM
STRESS TARGET HR: 118 BPM

## 2021-10-13 ENCOUNTER — OFFICE (OUTPATIENT)
Dept: URBAN - METROPOLITAN AREA CLINIC 64 | Facility: CLINIC | Age: 81
End: 2021-10-13

## 2021-10-13 ENCOUNTER — TRANSCRIBE ORDERS (OUTPATIENT)
Dept: ADMINISTRATIVE | Facility: HOSPITAL | Age: 81
End: 2021-10-13

## 2021-10-13 ENCOUNTER — READMISSION MANAGEMENT (OUTPATIENT)
Dept: CALL CENTER | Facility: HOSPITAL | Age: 81
End: 2021-10-13

## 2021-10-13 VITALS
DIASTOLIC BLOOD PRESSURE: 83 MMHG | HEIGHT: 70 IN | SYSTOLIC BLOOD PRESSURE: 135 MMHG | HEART RATE: 78 BPM | WEIGHT: 170 LBS

## 2021-10-13 DIAGNOSIS — R13.10 DYSPHAGIA, UNSPECIFIED: ICD-10-CM

## 2021-10-13 DIAGNOSIS — R10.9 UNSPECIFIED ABDOMINAL PAIN: ICD-10-CM

## 2021-10-13 DIAGNOSIS — R13.10 DYSPHAGIA, UNSPECIFIED TYPE: Primary | ICD-10-CM

## 2021-10-13 DIAGNOSIS — R19.8 OTHER SPECIFIED SYMPTOMS AND SIGNS INVOLVING THE DIGESTIVE S: ICD-10-CM

## 2021-10-13 PROCEDURE — 99214 OFFICE O/P EST MOD 30 MIN: CPT | Performed by: INTERNAL MEDICINE

## 2021-10-13 NOTE — OUTREACH NOTE
Stroke Week 2 Survey      Responses   [facility] [place] patient discharged from? [facility]   Does the patient have one of the following disease processes/diagnoses(primary or secondary)? Stroke (TIA)   Week 2 attempt successful? No   Unsuccessful attempts Attempt 1          Tamar Negrete RN

## 2021-10-18 ENCOUNTER — READMISSION MANAGEMENT (OUTPATIENT)
Dept: CALL CENTER | Facility: HOSPITAL | Age: 81
End: 2021-10-18

## 2021-10-18 NOTE — OUTREACH NOTE
Stroke Week 2 Survey      Responses   [facility] patient discharged from? [facility]   Does the patient have one of the following disease processes/diagnoses(primary or secondary)? Stroke (TIA)   Week 2 attempt successful? Yes   Call start time 1623   Call end time 1625   Discharge diagnosis TIA   Person spoke with today (if not patient) and relationship Nava-spouse    Has the patient kept scheduled appointments due by today? Yes   What is the Home health agency?  [facility]    Has home health visited the patient within 72 hours of discharge? Yes   Psychosocial issues? No   Does the patient require any assistance with activities of daily living such as eating, bathing, dressing, walking, etc.? Yes   Does the patient have any residual symptoms from stroke/TIA? No   Comments wife reports having chest pain yesterday but did not last long, MD is aware.    What is the patient's perception of their health status since discharge? Same   Is the patient/caregiver able to teach back signs and symptoms related to disease process for when to call 911? Yes   Week 2 call completed? Yes          Tamar Negrete RN

## 2021-10-20 NOTE — PROGRESS NOTES
Date of Office Visit: 10/21/2021  Encounter Provider: Dr. Jose Garcia  Place of Service: [facility] CARDIOLOG[facility] [place]  Patient Name: Jayden Bond  :1940  Ant Hines MD    Chief Complaint   Patient presents with   • Coronary Artery Disease     hospital follow up/stress test/echo   • Hypertension   • Hyperlipidemia   • Palpitations   • Transient Ischemic Attack     History of Present Illness    I am pleased to see Mr. Bond in my office today as a follow-up.    As you know, patient is 81 years old white gentleman whose past medical history significant for orthostatic hypotension, sick sinus syndrome, status post permanent dual-chamber pacemaker who came today for follow-up.    In 2020, patient underwent stress test which showed small amount of lateral wall ischemia.  However he was relatively asymptomatic and medical treatment was recommended.  Echocardiogram showed EF of 55 to 60%.    This was unscheduled visit.  Patient wife is concerned that he is very lethargic and and fatigue.  Patient denies any chest pain.  Patient denies any palpitation.  Patient is short of breath.  No orthopnea PND no leg edema.    Pacemaker is interrogated and it is functioning appropriately.    At this stage, his hemodynamics are stable.  He is not orthostatic.  He is doing well.  I showed the patient and his wife that patient is short of breath because of deconditioning.  He needs to increase his activity at home with a walker.  Patient wife is very anxious and tries to do too much for the patient.  Patient is old and weak and frail.      Past Medical History:   Diagnosis Date   • CAD (coronary artery disease)    • Coronary artery disease     [facility]   • Dementia ([facility])    • Depression    • Disease of thyroid gland    • Dysautonomia ([facility])    • Dyslipidemia    • GERD (gastroesophageal reflux disease)    • Head pain    • Hyperlipidemia    • Hypertension    • SSS (sick sinus syndrome) ([facility])    • Stroke ([facility])     x 3  in 2018         Past Surgical History:   Procedure Laterality Date   • APPENDECTOMY     • CHOLECYSTECTOMY     • CYSTOSCOPY     • ENDOSCOPY N/A 10/17/2019    Procedure: ESOPHAGOGASTRODUODENOSCOPY with biopsy x 2 areas;  Surgeon: Ashok Sanchez MD;  Location: [facility] ENDOSCOPY;  Service: Gastroenterology   • HERNIA REPAIR     • INSERT / REPLACE / REMOVE PACEMAKER     • LEFT HEART CATH     • PACEMAKER IMPLANTATION      [employer]tronic   • TEMPORAL ARTERY BIOPSY     • WRIST SURGERY      severed artery           Current Outpatient Medications:   •  amoxicillin-clavulanate (AUGMENTIN) 875-125 MG per tablet, Take 1 tablet by mouth 2 (Two) Times a Day., Disp: , Rfl:   •  apixaban (ELIQUIS) 5 MG tablet tablet, Take 5 mg by mouth 2 (Two) Times a Day., Disp: , Rfl:   •  brimonidine (ALPHAGAN) 0.2 % ophthalmic solution, Administer 1 drop to both eyes 2 (Two) Times a Day., Disp: 10 mL, Rfl: 12  •  Cholecalciferol (Vitamin D3) 50 MCG (2000 UT) tablet, Take 2,000 Units by mouth Daily., Disp: , Rfl:   •  clopidogrel (PLAVIX) 75 MG tablet, TAKE 1 TABLET BY MOUTH EVERY DAY, Disp: 90 tablet, Rfl: 1  •  escitalopram (LEXAPRO) 10 MG tablet, Take 10 mg by mouth Daily., Disp: , Rfl:   •  fluticasone (FLONASE) 50 MCG/ACT nasal spray, 2 sprays into the nostril(s) as directed by provider Daily., Disp: , Rfl:   •  Incruse Ellipta 62.5 MCG/INH aerosol powder , Take 1 puff by mouth Daily., Disp: , Rfl:   •  isosorbide mononitrate (IMDUR) 30 MG 24 hr tablet, Take 1 tablet by mouth Daily., Disp: 30 tablet, Rfl: 0  •  levothyroxine (SYNTHROID, LEVOTHROID) 25 MCG tablet, Take 25 mcg by mouth Every Morning., Disp: , Rfl:   •  loratadine (CLARITIN) 10 MG tablet, Take 10 mg by mouth Daily., Disp: , Rfl:   •  midodrine (PROAMATINE) 5 MG tablet, Take 5 mg by mouth 3 (Three) Times a Day As Needed (SBP <120)., Disp: , Rfl:   •  Multiple Vitamins-Minerals (MULTIVITAMIN WITH MINERALS) tablet tablet, Take 1 tablet by mouth Every Night., Disp: , Rfl:   •   "nitroglycerin (NITROSTAT) 0.4 MG SL tablet, Place 0.4 mg under the tongue Every 5 (Five) Minutes As Needed for Chest Pain. Take no more than 3 doses in 15 minutes., Disp: , Rfl:   •  pantoprazole (PROTONIX) 40 MG EC tablet, Take 40 mg by mouth 2 (two) times a day. At least 4 hours after synthroid, Disp: , Rfl:   •  predniSONE (DELTASONE) 5 MG tablet, Take 5 mg by mouth Daily., Disp: , Rfl:   •  rosuvastatin (CRESTOR) 20 MG tablet, Take 20 mg by mouth Every Evening., Disp: , Rfl:   •  vitamin B-12 (CYANOCOBALAMIN) 1000 MCG tablet, Take 2,000 mcg by mouth Daily., Disp: , Rfl:       Social History     Socioeconomic History   • Marital status:    Tobacco Use   • Smoking status: Former Smoker   • Smokeless tobacco: Never Used   • Tobacco comment: quit 25 yrs ago   Vaping Use   • Vaping Use: Never used   Substance and Sexual Activity   • Alcohol use: No   • Drug use: No   • Sexual activity: Defer         ROS    Procedures    Procedures    No orders to display           Objective:    Ht 182.9 cm (72.01\")   BMI 21.70 kg/m²         Physical Exam        Assessment:       Diagnosis Plan   1. Mixed hyperlipidemia     2. Essential hypertension     3. Coronary artery disease involving native coronary artery of native heart without angina pectoris     4. SSS (sick sinus syndrome) (HCC)     5. Cerebrovascular accident (CVA), unspecified mechanism (HCC)     6. Palpitations     7. Transient ischemic attack (TIA)     8. Nonsustained ventricular tachycardia (HCC)              Plan:         "

## 2021-10-21 ENCOUNTER — OFFICE VISIT (OUTPATIENT)
Dept: CARDIOLOGY | Facility: CLINIC | Age: 81
End: 2021-10-21

## 2021-10-21 ENCOUNTER — TELEPHONE (OUTPATIENT)
Dept: CARDIOLOGY | Facility: CLINIC | Age: 81
End: 2021-10-21

## 2021-10-21 ENCOUNTER — APPOINTMENT (OUTPATIENT)
Dept: CT IMAGING | Facility: HOSPITAL | Age: 81
End: 2021-10-21

## 2021-10-21 ENCOUNTER — HOSPITAL ENCOUNTER (INPATIENT)
Facility: HOSPITAL | Age: 81
LOS: 1 days | Discharge: HOME | End: 2021-10-22
Attending: EMERGENCY MEDICINE | Admitting: INTERNAL MEDICINE

## 2021-10-21 ENCOUNTER — APPOINTMENT (OUTPATIENT)
Dept: GENERAL RADIOLOGY | Facility: HOSPITAL | Age: 81
End: 2021-10-21

## 2021-10-21 VITALS — HEIGHT: 72 IN | BODY MASS INDEX: 21.7 KG/M2

## 2021-10-21 DIAGNOSIS — G45.9 TRANSIENT ISCHEMIC ATTACK (TIA): ICD-10-CM

## 2021-10-21 DIAGNOSIS — I10 ESSENTIAL HYPERTENSION: Chronic | ICD-10-CM

## 2021-10-21 DIAGNOSIS — R53.1 WEAKNESS: Primary | ICD-10-CM

## 2021-10-21 DIAGNOSIS — I63.9 CEREBROVASCULAR ACCIDENT (CVA), UNSPECIFIED MECHANISM (HCC): ICD-10-CM

## 2021-10-21 DIAGNOSIS — I25.10 CORONARY ARTERY DISEASE INVOLVING NATIVE CORONARY ARTERY OF NATIVE HEART WITHOUT ANGINA PECTORIS: Chronic | ICD-10-CM

## 2021-10-21 DIAGNOSIS — I47.29 NONSUSTAINED VENTRICULAR TACHYCARDIA (HCC): Chronic | ICD-10-CM

## 2021-10-21 DIAGNOSIS — J18.9 PNEUMONIA OF LEFT LOWER LOBE DUE TO INFECTIOUS ORGANISM: ICD-10-CM

## 2021-10-21 DIAGNOSIS — E86.0 DEHYDRATION: ICD-10-CM

## 2021-10-21 DIAGNOSIS — E78.2 MIXED HYPERLIPIDEMIA: Primary | Chronic | ICD-10-CM

## 2021-10-21 DIAGNOSIS — R00.2 PALPITATIONS: ICD-10-CM

## 2021-10-21 DIAGNOSIS — I49.5 SSS (SICK SINUS SYNDROME) (HCC): Chronic | ICD-10-CM

## 2021-10-21 PROBLEM — R41.82 ALTERED MENTAL STATUS: Status: ACTIVE | Noted: 2021-10-21

## 2021-10-21 PROBLEM — N39.0 ACUTE UTI (URINARY TRACT INFECTION): Status: ACTIVE | Noted: 2021-10-21

## 2021-10-21 LAB
ALBUMIN SERPL-MCNC: 3.9 G/DL (ref 3.5–5.2)
ALBUMIN/GLOB SERPL: 1.7 G/DL
ALP SERPL-CCNC: 63 U/L (ref 39–117)
ALT SERPL W P-5'-P-CCNC: 15 U/L (ref 1–41)
AMMONIA BLD-SCNC: 13 UMOL/L (ref 16–60)
ANION GAP SERPL CALCULATED.3IONS-SCNC: 10 MMOL/L (ref 5–15)
APTT PPP: 29.7 SECONDS (ref 24–31)
AST SERPL-CCNC: 27 U/L (ref 1–40)
BACTERIA UR QL AUTO: ABNORMAL /HPF
BASOPHILS # BLD AUTO: 0.1 10*3/MM3 (ref 0–0.2)
BASOPHILS NFR BLD AUTO: 0.9 % (ref 0–1.5)
BILIRUB SERPL-MCNC: 0.3 MG/DL (ref 0–1.2)
BILIRUB UR QL STRIP: NEGATIVE
BUN SERPL-MCNC: 22 MG/DL (ref 8–23)
BUN/CREAT SERPL: 21.2 (ref 7–25)
CALCIUM SPEC-SCNC: 9.1 MG/DL (ref 8.6–10.5)
CHLORIDE SERPL-SCNC: 106 MMOL/L (ref 98–107)
CLARITY UR: CLEAR
CO2 SERPL-SCNC: 23 MMOL/L (ref 22–29)
COLOR UR: YELLOW
CREAT SERPL-MCNC: 1.04 MG/DL (ref 0.76–1.27)
DEPRECATED RDW RBC AUTO: 46.4 FL (ref 37–54)
EOSINOPHIL # BLD AUTO: 0.1 10*3/MM3 (ref 0–0.4)
EOSINOPHIL NFR BLD AUTO: 1.2 % (ref 0.3–6.2)
ERYTHROCYTE [DISTWIDTH] IN BLOOD BY AUTOMATED COUNT: 17.9 % (ref 12.3–15.4)
GFR SERPL CREATININE-BSD FRML MDRD: 69 ML/MIN/1.73
GLOBULIN UR ELPH-MCNC: 2.3 GM/DL
GLUCOSE BLDC GLUCOMTR-MCNC: 121 MG/DL (ref 70–105)
GLUCOSE SERPL-MCNC: 121 MG/DL (ref 65–99)
GLUCOSE UR STRIP-MCNC: NEGATIVE MG/DL
HCT VFR BLD AUTO: 35.8 % (ref 37.5–51)
HGB BLD-MCNC: 11.1 G/DL (ref 13–17.7)
HGB UR QL STRIP.AUTO: NEGATIVE
HYALINE CASTS UR QL AUTO: ABNORMAL /LPF
INR PPP: 1.08 (ref 0.93–1.1)
KETONES UR QL STRIP: NEGATIVE
L PNEUMO1 AG UR QL IA: NEGATIVE
LEUKOCYTE ESTERASE UR QL STRIP.AUTO: ABNORMAL
LYMPHOCYTES # BLD AUTO: 1.7 10*3/MM3 (ref 0.7–3.1)
LYMPHOCYTES NFR BLD AUTO: 17 % (ref 19.6–45.3)
MAGNESIUM SERPL-MCNC: 2.1 MG/DL (ref 1.6–2.4)
MCH RBC QN AUTO: 22.8 PG (ref 26.6–33)
MCHC RBC AUTO-ENTMCNC: 30.9 G/DL (ref 31.5–35.7)
MCV RBC AUTO: 73.7 FL (ref 79–97)
MONOCYTES # BLD AUTO: 0.7 10*3/MM3 (ref 0.1–0.9)
MONOCYTES NFR BLD AUTO: 6.9 % (ref 5–12)
NEUTROPHILS NFR BLD AUTO: 7.5 10*3/MM3 (ref 1.7–7)
NEUTROPHILS NFR BLD AUTO: 74 % (ref 42.7–76)
NITRITE UR QL STRIP: NEGATIVE
NRBC BLD AUTO-RTO: 0.1 /100 WBC (ref 0–0.2)
PH UR STRIP.AUTO: 6 [PH] (ref 5–8)
PLATELET # BLD AUTO: 296 10*3/MM3 (ref 140–450)
PMV BLD AUTO: 7.5 FL (ref 6–12)
POTASSIUM SERPL-SCNC: 5 MMOL/L (ref 3.5–5.2)
PROT SERPL-MCNC: 6.2 G/DL (ref 6–8.5)
PROT UR QL STRIP: NEGATIVE
PROTHROMBIN TIME: 11.9 SECONDS (ref 9.6–11.7)
RBC # BLD AUTO: 4.86 10*6/MM3 (ref 4.14–5.8)
RBC # UR: ABNORMAL /HPF
REF LAB TEST METHOD: ABNORMAL
S PNEUM AG SPEC QL LA: NEGATIVE
SARS-COV-2 RNA PNL SPEC NAA+PROBE: NOT DETECTED
SODIUM SERPL-SCNC: 139 MMOL/L (ref 136–145)
SP GR UR STRIP: 1.02 (ref 1–1.03)
SQUAMOUS #/AREA URNS HPF: ABNORMAL /HPF
TSH SERPL DL<=0.05 MIU/L-ACNC: 2.83 UIU/ML (ref 0.27–4.2)
UROBILINOGEN UR QL STRIP: ABNORMAL
WBC # BLD AUTO: 10.1 10*3/MM3 (ref 3.4–10.8)
WBC UR QL AUTO: ABNORMAL /HPF

## 2021-10-21 PROCEDURE — 25010000002 AZITHROMYCIN PER 500 MG: Performed by: EMERGENCY MEDICINE

## 2021-10-21 PROCEDURE — 85610 PROTHROMBIN TIME: CPT | Performed by: EMERGENCY MEDICINE

## 2021-10-21 PROCEDURE — 85730 THROMBOPLASTIN TIME PARTIAL: CPT | Performed by: EMERGENCY MEDICINE

## 2021-10-21 PROCEDURE — 80053 COMPREHEN METABOLIC PANEL: CPT | Performed by: EMERGENCY MEDICINE

## 2021-10-21 PROCEDURE — U0003 INFECTIOUS AGENT DETECTION BY NUCLEIC ACID (DNA OR RNA); SEVERE ACUTE RESPIRATORY SYNDROME CORONAVIRUS 2 (SARS-COV-2) (CORONAVIRUS DISEASE [COVID-19]), AMPLIFIED PROBE TECHNIQUE, MAKING USE OF HIGH THROUGHPUT TECHNOLOGIES AS DESCRIBED BY CMS-2020-01-R: HCPCS | Performed by: EMERGENCY MEDICINE

## 2021-10-21 PROCEDURE — 71045 X-RAY EXAM CHEST 1 VIEW: CPT

## 2021-10-21 PROCEDURE — 87899 AGENT NOS ASSAY W/OPTIC: CPT | Performed by: INTERNAL MEDICINE

## 2021-10-21 PROCEDURE — 83735 ASSAY OF MAGNESIUM: CPT | Performed by: EMERGENCY MEDICINE

## 2021-10-21 PROCEDURE — 82962 GLUCOSE BLOOD TEST: CPT

## 2021-10-21 PROCEDURE — 87086 URINE CULTURE/COLONY COUNT: CPT | Performed by: EMERGENCY MEDICINE

## 2021-10-21 PROCEDURE — 84443 ASSAY THYROID STIM HORMONE: CPT | Performed by: EMERGENCY MEDICINE

## 2021-10-21 PROCEDURE — 81001 URINALYSIS AUTO W/SCOPE: CPT | Performed by: EMERGENCY MEDICINE

## 2021-10-21 PROCEDURE — 70450 CT HEAD/BRAIN W/O DYE: CPT

## 2021-10-21 PROCEDURE — 85025 COMPLETE CBC W/AUTO DIFF WBC: CPT | Performed by: EMERGENCY MEDICINE

## 2021-10-21 PROCEDURE — 82140 ASSAY OF AMMONIA: CPT | Performed by: EMERGENCY MEDICINE

## 2021-10-21 PROCEDURE — 99223 1ST HOSP IP/OBS HIGH 75: CPT | Performed by: INTERNAL MEDICINE

## 2021-10-21 PROCEDURE — 25010000002 CEFTRIAXONE PER 250 MG: Performed by: EMERGENCY MEDICINE

## 2021-10-21 PROCEDURE — U0005 INFEC AGEN DETEC AMPLI PROBE: HCPCS | Performed by: EMERGENCY MEDICINE

## 2021-10-21 PROCEDURE — 87040 BLOOD CULTURE FOR BACTERIA: CPT | Performed by: EMERGENCY MEDICINE

## 2021-10-21 PROCEDURE — 99284 EMERGENCY DEPT VISIT MOD MDM: CPT

## 2021-10-21 PROCEDURE — 93005 ELECTROCARDIOGRAM TRACING: CPT | Performed by: EMERGENCY MEDICINE

## 2021-10-21 RX ORDER — PANTOPRAZOLE SODIUM 40 MG/1
40 TABLET, DELAYED RELEASE ORAL NIGHTLY
Status: DISCONTINUED | OUTPATIENT
Start: 2021-10-21 | End: 2021-10-22 | Stop reason: HOSPADM

## 2021-10-21 RX ORDER — MULTIPLE VITAMINS W/ MINERALS TAB 9MG-400MCG
1 TAB ORAL DAILY
Status: DISCONTINUED | OUTPATIENT
Start: 2021-10-21 | End: 2021-10-22 | Stop reason: HOSPADM

## 2021-10-21 RX ORDER — SODIUM CHLORIDE 0.9 % (FLUSH) 0.9 %
10 SYRINGE (ML) INJECTION AS NEEDED
Status: DISCONTINUED | OUTPATIENT
Start: 2021-10-21 | End: 2021-10-22 | Stop reason: HOSPADM

## 2021-10-21 RX ORDER — NITROGLYCERIN 0.4 MG/1
0.4 TABLET SUBLINGUAL
Status: DISCONTINUED | OUTPATIENT
Start: 2021-10-21 | End: 2021-10-22 | Stop reason: HOSPADM

## 2021-10-21 RX ORDER — SODIUM CHLORIDE 0.9 % (FLUSH) 0.9 %
10 SYRINGE (ML) INJECTION EVERY 12 HOURS SCHEDULED
Status: DISCONTINUED | OUTPATIENT
Start: 2021-10-21 | End: 2021-10-22 | Stop reason: HOSPADM

## 2021-10-21 RX ORDER — MIDODRINE HYDROCHLORIDE 5 MG/1
2.5 TABLET ORAL 3 TIMES DAILY PRN
Qty: 90 TABLET | Refills: 1 | Status: SHIPPED | OUTPATIENT
Start: 2021-10-21 | End: 2021-10-22

## 2021-10-21 RX ORDER — ISOSORBIDE MONONITRATE 30 MG/1
30 TABLET, EXTENDED RELEASE ORAL DAILY
Status: DISCONTINUED | OUTPATIENT
Start: 2021-10-21 | End: 2021-10-22 | Stop reason: HOSPADM

## 2021-10-21 RX ORDER — ESCITALOPRAM OXALATE 10 MG/1
10 TABLET ORAL DAILY
Status: DISCONTINUED | OUTPATIENT
Start: 2021-10-21 | End: 2021-10-22 | Stop reason: HOSPADM

## 2021-10-21 RX ORDER — ACETAMINOPHEN 325 MG/1
650 TABLET ORAL EVERY 4 HOURS PRN
Status: DISCONTINUED | OUTPATIENT
Start: 2021-10-21 | End: 2021-10-22 | Stop reason: HOSPADM

## 2021-10-21 RX ORDER — ONDANSETRON 2 MG/ML
4 INJECTION INTRAMUSCULAR; INTRAVENOUS EVERY 6 HOURS PRN
Status: DISCONTINUED | OUTPATIENT
Start: 2021-10-21 | End: 2021-10-22 | Stop reason: HOSPADM

## 2021-10-21 RX ORDER — ROSUVASTATIN CALCIUM 10 MG/1
20 TABLET, COATED ORAL EVERY EVENING
Status: DISCONTINUED | OUTPATIENT
Start: 2021-10-21 | End: 2021-10-22 | Stop reason: HOSPADM

## 2021-10-21 RX ORDER — LANOLIN ALCOHOL/MO/W.PET/CERES
2000 CREAM (GRAM) TOPICAL DAILY
Status: DISCONTINUED | OUTPATIENT
Start: 2021-10-21 | End: 2021-10-22 | Stop reason: HOSPADM

## 2021-10-21 RX ORDER — PREDNISONE 10 MG/1
5 TABLET ORAL DAILY
Status: DISCONTINUED | OUTPATIENT
Start: 2021-10-21 | End: 2021-10-22 | Stop reason: HOSPADM

## 2021-10-21 RX ORDER — CLOPIDOGREL BISULFATE 75 MG/1
75 TABLET ORAL DAILY
Status: DISCONTINUED | OUTPATIENT
Start: 2021-10-21 | End: 2021-10-22 | Stop reason: HOSPADM

## 2021-10-21 RX ORDER — BRIMONIDINE TARTRATE 2 MG/ML
1 SOLUTION/ DROPS OPHTHALMIC 2 TIMES DAILY
Status: DISCONTINUED | OUTPATIENT
Start: 2021-10-21 | End: 2021-10-22 | Stop reason: HOSPADM

## 2021-10-21 RX ORDER — LEVOTHYROXINE SODIUM 0.03 MG/1
25 TABLET ORAL
Status: DISCONTINUED | OUTPATIENT
Start: 2021-10-22 | End: 2021-10-22 | Stop reason: HOSPADM

## 2021-10-21 RX ORDER — MIDODRINE HYDROCHLORIDE 5 MG/1
2.5 TABLET ORAL 3 TIMES DAILY PRN
Status: DISCONTINUED | OUTPATIENT
Start: 2021-10-21 | End: 2021-10-22 | Stop reason: HOSPADM

## 2021-10-21 RX ADMIN — ACETAMINOPHEN 650 MG: 325 TABLET, FILM COATED ORAL at 22:42

## 2021-10-21 RX ADMIN — ISOSORBIDE MONONITRATE 30 MG: 30 TABLET, EXTENDED RELEASE ORAL at 22:36

## 2021-10-21 RX ADMIN — AZITHROMYCIN 500 MG: 500 INJECTION, POWDER, LYOPHILIZED, FOR SOLUTION INTRAVENOUS at 20:17

## 2021-10-21 RX ADMIN — APIXABAN 5 MG: 5 TABLET, FILM COATED ORAL at 22:34

## 2021-10-21 RX ADMIN — ROSUVASTATIN 20 MG: 10 TABLET, FILM COATED ORAL at 22:38

## 2021-10-21 RX ADMIN — SODIUM CHLORIDE 500 ML: 9 INJECTION, SOLUTION INTRAVENOUS at 20:17

## 2021-10-21 RX ADMIN — WATER 1 G: 100 INJECTION, SOLUTION INTRAVENOUS at 20:16

## 2021-10-21 RX ADMIN — PANTOPRAZOLE SODIUM 40 MG: 40 TABLET, DELAYED RELEASE ORAL at 22:37

## 2021-10-21 RX ADMIN — MULTIPLE VITAMINS W/ MINERALS TAB 1 TABLET: TAB at 22:33

## 2021-10-21 NOTE — TELEPHONE ENCOUNTER
He needs midodrine 2.5 mg sent to [facility] on [address]  They have been trying to get this filled He is completely out  Needs sent tin today ASAP

## 2021-10-22 ENCOUNTER — READMISSION MANAGEMENT (OUTPATIENT)
Dept: CALL CENTER | Facility: HOSPITAL | Age: 81
End: 2021-10-22

## 2021-10-22 ENCOUNTER — APPOINTMENT (OUTPATIENT)
Dept: GENERAL RADIOLOGY | Facility: HOSPITAL | Age: 81
End: 2021-10-22

## 2021-10-22 VITALS
DIASTOLIC BLOOD PRESSURE: 75 MMHG | RESPIRATION RATE: 17 BRPM | OXYGEN SATURATION: 98 % | TEMPERATURE: 98.1 F | WEIGHT: 165.57 LBS | HEART RATE: 66 BPM | HEIGHT: 72 IN | SYSTOLIC BLOOD PRESSURE: 116 MMHG | BODY MASS INDEX: 22.43 KG/M2

## 2021-10-22 PROCEDURE — 92611 MOTION FLUOROSCOPY/SWALLOW: CPT

## 2021-10-22 PROCEDURE — 63710000001 PREDNISONE PER 5 MG: Performed by: INTERNAL MEDICINE

## 2021-10-22 PROCEDURE — 74230 X-RAY XM SWLNG FUNCJ C+: CPT

## 2021-10-22 PROCEDURE — 99222 1ST HOSP IP/OBS MODERATE 55: CPT | Performed by: PSYCHIATRY & NEUROLOGY

## 2021-10-22 PROCEDURE — 99239 HOSP IP/OBS DSCHRG MGMT >30: CPT | Performed by: INTERNAL MEDICINE

## 2021-10-22 RX ORDER — ISOSORBIDE MONONITRATE 30 MG/1
30 TABLET, EXTENDED RELEASE ORAL DAILY
Qty: 30 TABLET | Refills: 0 | Status: SHIPPED | OUTPATIENT
Start: 2021-10-22 | End: 2021-11-01

## 2021-10-22 RX ORDER — ISOSORBIDE MONONITRATE 30 MG/1
30 TABLET, EXTENDED RELEASE ORAL NIGHTLY
Status: CANCELLED | OUTPATIENT
Start: 2021-10-22

## 2021-10-22 RX ORDER — MIDODRINE HYDROCHLORIDE 5 MG/1
2.5 TABLET ORAL 3 TIMES DAILY PRN
Qty: 90 TABLET | Refills: 1 | Status: ON HOLD | OUTPATIENT
Start: 2021-10-22 | End: 2022-08-18

## 2021-10-22 RX ORDER — ACETAMINOPHEN 500 MG
1000 TABLET ORAL EVERY 6 HOURS PRN
COMMUNITY

## 2021-10-22 RX ORDER — ISOSORBIDE MONONITRATE 30 MG/1
30 TABLET, EXTENDED RELEASE ORAL NIGHTLY
COMMUNITY
End: 2021-10-22

## 2021-10-22 RX ORDER — VIBEGRON 75 MG/1
1 TABLET, FILM COATED ORAL
Status: ON HOLD | COMMUNITY
End: 2022-08-18

## 2021-10-22 RX ORDER — AMOXICILLIN AND CLAVULANATE POTASSIUM 875; 125 MG/1; MG/1
1 TABLET, FILM COATED ORAL 2 TIMES DAILY
Qty: 8 TABLET | Refills: 0 | Status: SHIPPED | OUTPATIENT
Start: 2021-10-22 | End: 2021-10-26

## 2021-10-22 RX ORDER — MIDODRINE HYDROCHLORIDE 2.5 MG/1
2.5 TABLET ORAL AS NEEDED
COMMUNITY

## 2021-10-22 RX ADMIN — PREDNISONE 5 MG: 10 TABLET ORAL at 09:27

## 2021-10-22 RX ADMIN — BARIUM SULFATE 183 ML: 960 POWDER, FOR SUSPENSION ORAL at 10:50

## 2021-10-22 RX ADMIN — CLOPIDOGREL BISULFATE 75 MG: 75 TABLET ORAL at 09:26

## 2021-10-22 RX ADMIN — ACETAMINOPHEN 650 MG: 325 TABLET, FILM COATED ORAL at 09:27

## 2021-10-22 RX ADMIN — APIXABAN 5 MG: 5 TABLET, FILM COATED ORAL at 09:27

## 2021-10-22 RX ADMIN — Medication 10 ML: at 03:12

## 2021-10-22 RX ADMIN — LEVOTHYROXINE SODIUM 25 MCG: 0.03 TABLET ORAL at 06:50

## 2021-10-22 RX ADMIN — BRIMONIDINE TARTRATE 1 DROP: 2 SOLUTION/ DROPS OPHTHALMIC at 09:27

## 2021-10-22 RX ADMIN — ESCITALOPRAM OXALATE 10 MG: 10 TABLET ORAL at 09:50

## 2021-10-22 NOTE — OUTREACH NOTE
Stroke Week 3 Survey      Responses   [facility] patient discharged from? [facility]   Does the patient have one of the following disease processes/diagnoses(primary or secondary)? Stroke (TIA)   Week 3 attempt successful? No   Revoke Readmitted          Judy Leung RN

## 2021-10-23 ENCOUNTER — READMISSION MANAGEMENT (OUTPATIENT)
Dept: CALL CENTER | Facility: HOSPITAL | Age: 81
End: 2021-10-23

## 2021-10-23 LAB — BACTERIA SPEC AEROBE CULT: NORMAL

## 2021-10-23 NOTE — OUTREACH NOTE
Prep Survey      Responses   [facility] facility patient discharged from? [facility]   Is LACE score < 7 ? No   Emergency Room discharge w/ pulse ox? No   Eligibility Readm Mgmt   Discharge diagnosis CAP (community acquired pneumonia   Does the patient have one of the following disease processes/diagnoses(primary or secondary)? COPD/Pneumonia   Does the patient have Home health ordered? Yes   What is the Home health agency?  [facility]    Is there a DME ordered? Yes   What DME was ordered? [facility] and [facility]    General alerts for this patient Please call home phone [phone] first    Prep survey completed? Yes          Justina Rae RN

## 2021-10-24 LAB — QT INTERVAL: 418 MS

## 2021-10-26 ENCOUNTER — READMISSION MANAGEMENT (OUTPATIENT)
Dept: CALL CENTER | Facility: HOSPITAL | Age: 81
End: 2021-10-26

## 2021-10-26 LAB
BACTERIA SPEC AEROBE CULT: NORMAL
BACTERIA SPEC AEROBE CULT: NORMAL

## 2021-10-26 NOTE — OUTREACH NOTE
COPD/PN Week 1 Survey      Responses   [facility] patient discharged from? [facility]   Does the patient have one of the following disease processes/diagnoses(primary or secondary)? COPD/Pneumonia   Was the primary reason for admission: Pneumonia   Week 1 attempt successful? Yes   Call start time 1448   General alerts for this patient Please call home phone [phone] first    Discharge diagnosis CAP (community acquired pneumonia   Is patient permission given to speak with other caregiver? Yes   List who call center can speak with spouse- Nava Childers reviewed with patient/caregiver? Yes   Is the patient having any side effects they believe may be caused by any medication additions or changes? No   Does the patient have all medications ordered at discharge? Yes   Is the patient taking all medications as directed (includes completed medication regime)? Yes   Comments regarding appointments Appt with urology, Dr. Nick, is on 10/6/21   Does the patient have a primary care provider?  Yes   Does the patient have an appointment with their PCP or specialist within 7 days of discharge? Yes   Comments Appt 10/28, wife feels might need additional antibiotic   What is the Home health agency?  [facility]    Has home health visited the patient within 72 hours of discharge? Yes   What DME was ordered? RW and BSC per [facility]    Has all DME been delivered? No   DME interventions Other   DME comments Wife states he has old walker and old BSC but needs new, the walker is very small and not functional for him   Pulse Ox monitoring Intermittent   Pulse Ox device source Patient   O2 Sat comments 93 % on room air. He is on 3 liters most of the time.   O2 Sat: education provided Sat levels,  Monitoring frequency,  When to seek care   Psychosocial issues? No   Notified Case Management DME   Did the patient receive a copy of their discharge instructions? Yes   Nursing interventions Reviewed instructions with patient   What is the  patient's perception of their health status since discharge? Same   Nursing Interventions Nurse provided patient education   Are the patient's immunizations up to date?  No   Nursing interventions Educated on importance of maintaining up to date immunizations as advised by provider   If the patient is a current smoker, are they able to teach back resources for cessation? Not a smoker   Is the patient/caregiver able to teach back the hierarchy of who to call/visit for symptoms/problems? PCP, Specialist, Home health nurse, Urgent Care, ED, 911 Yes   Additional teach back comments Needs his flu vaccine, has had covid and pneumonia vaccines   Is the patient able to teach back COPD zones? Yes   Nursing interventions Education provided on various zones   Patient reports what zone on this call? Green Zone   Green Zone Usual amount of phlegm/mucus without difficulty coughing up,  Usual activity and exercise level,  Appetite is good,  Breathing without shortness of breath   Green Zone interventions: Use oxygen as prescribed,  Take daily medications   Week 1 call completed? Yes   Wrap up additional comments Wife is very unhappy with his care.  Wife states no DME received this admission.  Will route to .  She plans to get his flu shot as soon as he is able.  n          Mary Echevarria, RN

## 2021-10-27 ENCOUNTER — APPOINTMENT (OUTPATIENT)
Dept: CT IMAGING | Facility: HOSPITAL | Age: 81
End: 2021-10-27

## 2021-10-27 ENCOUNTER — APPOINTMENT (OUTPATIENT)
Dept: GENERAL RADIOLOGY | Facility: HOSPITAL | Age: 81
End: 2021-10-27

## 2021-10-27 ENCOUNTER — HOSPITAL ENCOUNTER (EMERGENCY)
Facility: HOSPITAL | Age: 81
Discharge: HOME | End: 2021-10-27
Admitting: EMERGENCY MEDICINE

## 2021-10-27 VITALS
DIASTOLIC BLOOD PRESSURE: 75 MMHG | SYSTOLIC BLOOD PRESSURE: 147 MMHG | TEMPERATURE: 98 F | RESPIRATION RATE: 16 BRPM | HEART RATE: 61 BPM | HEIGHT: 65 IN | BODY MASS INDEX: 26.89 KG/M2 | OXYGEN SATURATION: 100 % | WEIGHT: 161.38 LBS

## 2021-10-27 DIAGNOSIS — Z03.818 ENCNTR FOR OBS FOR SUSP EXPSR TO OTH BIOLG AGENTS RULED OUT: Primary | ICD-10-CM

## 2021-10-27 DIAGNOSIS — R05.9 COUGH: ICD-10-CM

## 2021-10-27 LAB
ALBUMIN SERPL-MCNC: 4 G/DL (ref 3.5–5.2)
ALBUMIN/GLOB SERPL: 1.6 G/DL
ALP SERPL-CCNC: 62 U/L (ref 39–117)
ALT SERPL W P-5'-P-CCNC: 16 U/L (ref 1–41)
ANION GAP SERPL CALCULATED.3IONS-SCNC: 14 MMOL/L (ref 5–15)
ARTERIAL PATENCY WRIST A: POSITIVE
AST SERPL-CCNC: 19 U/L (ref 1–40)
ATMOSPHERIC PRESS: ABNORMAL MM[HG]
B PARAPERT DNA SPEC QL NAA+PROBE: NOT DETECTED
B PERT DNA SPEC QL NAA+PROBE: NOT DETECTED
BASE EXCESS BLDA CALC-SCNC: -1.5 MMOL/L (ref 0–3)
BASOPHILS # BLD AUTO: 0 10*3/MM3 (ref 0–0.2)
BASOPHILS NFR BLD AUTO: 0.3 % (ref 0–1.5)
BDY SITE: ABNORMAL
BILIRUB SERPL-MCNC: 0.3 MG/DL (ref 0–1.2)
BUN SERPL-MCNC: 22 MG/DL (ref 8–23)
BUN/CREAT SERPL: 23.4 (ref 7–25)
C PNEUM DNA NPH QL NAA+NON-PROBE: NOT DETECTED
CALCIUM SPEC-SCNC: 9 MG/DL (ref 8.6–10.5)
CHLORIDE SERPL-SCNC: 104 MMOL/L (ref 98–107)
CO2 BLDA-SCNC: 22.3 MMOL/L (ref 22–29)
CO2 SERPL-SCNC: 20 MMOL/L (ref 22–29)
CREAT SERPL-MCNC: 0.94 MG/DL (ref 0.76–1.27)
DEPRECATED RDW RBC AUTO: 46.8 FL (ref 37–54)
EOSINOPHIL # BLD AUTO: 0.1 10*3/MM3 (ref 0–0.4)
EOSINOPHIL NFR BLD AUTO: 0.7 % (ref 0.3–6.2)
ERYTHROCYTE [DISTWIDTH] IN BLOOD BY AUTOMATED COUNT: 18 % (ref 12.3–15.4)
FLUAV SUBTYP SPEC NAA+PROBE: NOT DETECTED
FLUBV RNA ISLT QL NAA+PROBE: NOT DETECTED
GFR SERPL CREATININE-BSD FRML MDRD: 77 ML/MIN/1.73
GLOBULIN UR ELPH-MCNC: 2.5 GM/DL
GLUCOSE SERPL-MCNC: 126 MG/DL (ref 65–99)
HADV DNA SPEC NAA+PROBE: NOT DETECTED
HCO3 BLDA-SCNC: 21.4 MMOL/L (ref 21–28)
HCOV 229E RNA SPEC QL NAA+PROBE: NOT DETECTED
HCOV HKU1 RNA SPEC QL NAA+PROBE: NOT DETECTED
HCOV NL63 RNA SPEC QL NAA+PROBE: NOT DETECTED
HCOV OC43 RNA SPEC QL NAA+PROBE: NOT DETECTED
HCT VFR BLD AUTO: 34.7 % (ref 37.5–51)
HEMODILUTION: NO
HGB BLD-MCNC: 10.8 G/DL (ref 13–17.7)
HMPV RNA NPH QL NAA+NON-PROBE: NOT DETECTED
HOLD SPECIMEN: NORMAL
HPIV1 RNA SPEC QL NAA+PROBE: NOT DETECTED
HPIV2 RNA SPEC QL NAA+PROBE: NOT DETECTED
HPIV3 RNA NPH QL NAA+PROBE: NOT DETECTED
HPIV4 P GENE NPH QL NAA+PROBE: NOT DETECTED
INHALED O2 CONCENTRATION: 21 %
LYMPHOCYTES # BLD AUTO: 1.8 10*3/MM3 (ref 0.7–3.1)
LYMPHOCYTES NFR BLD AUTO: 15.5 % (ref 19.6–45.3)
M PNEUMO IGG SER IA-ACNC: NOT DETECTED
MCH RBC QN AUTO: 22.6 PG (ref 26.6–33)
MCHC RBC AUTO-ENTMCNC: 31.1 G/DL (ref 31.5–35.7)
MCV RBC AUTO: 72.6 FL (ref 79–97)
MODALITY: ABNORMAL
MONOCYTES # BLD AUTO: 0.8 10*3/MM3 (ref 0.1–0.9)
MONOCYTES NFR BLD AUTO: 6.5 % (ref 5–12)
NEUTROPHILS NFR BLD AUTO: 77 % (ref 42.7–76)
NEUTROPHILS NFR BLD AUTO: 9 10*3/MM3 (ref 1.7–7)
NRBC BLD AUTO-RTO: 0.1 /100 WBC (ref 0–0.2)
NT-PROBNP SERPL-MCNC: 270.9 PG/ML (ref 0–1800)
PCO2 BLDA: 30 MM HG (ref 35–48)
PH BLDA: 7.46 PH UNITS (ref 7.35–7.45)
PLATELET # BLD AUTO: 314 10*3/MM3 (ref 140–450)
PMV BLD AUTO: 7.2 FL (ref 6–12)
PO2 BLDA: 55.9 MM HG (ref 83–108)
POTASSIUM SERPL-SCNC: 4.4 MMOL/L (ref 3.5–5.2)
PROCALCITONIN SERPL-MCNC: 0.1 NG/ML (ref 0–0.25)
PROT SERPL-MCNC: 6.5 G/DL (ref 6–8.5)
RBC # BLD AUTO: 4.78 10*6/MM3 (ref 4.14–5.8)
RHINOVIRUS RNA SPEC NAA+PROBE: NOT DETECTED
RSV RNA NPH QL NAA+NON-PROBE: NOT DETECTED
SAO2 % BLDCOA: 90.8 % (ref 94–98)
SARS-COV-2 RNA NPH QL NAA+NON-PROBE: NOT DETECTED
SODIUM SERPL-SCNC: 138 MMOL/L (ref 136–145)
WBC # BLD AUTO: 11.6 10*3/MM3 (ref 3.4–10.8)
WHOLE BLOOD HOLD SPECIMEN: NORMAL

## 2021-10-27 PROCEDURE — 83880 ASSAY OF NATRIURETIC PEPTIDE: CPT | Performed by: NURSE PRACTITIONER

## 2021-10-27 PROCEDURE — 87040 BLOOD CULTURE FOR BACTERIA: CPT | Performed by: NURSE PRACTITIONER

## 2021-10-27 PROCEDURE — 0202U NFCT DS 22 TRGT SARS-COV-2: CPT | Performed by: NURSE PRACTITIONER

## 2021-10-27 PROCEDURE — 71250 CT THORAX DX C-: CPT

## 2021-10-27 PROCEDURE — 84145 PROCALCITONIN (PCT): CPT | Performed by: NURSE PRACTITIONER

## 2021-10-27 PROCEDURE — 80053 COMPREHEN METABOLIC PANEL: CPT | Performed by: NURSE PRACTITIONER

## 2021-10-27 PROCEDURE — 99283 EMERGENCY DEPT VISIT LOW MDM: CPT

## 2021-10-27 PROCEDURE — 85025 COMPLETE CBC W/AUTO DIFF WBC: CPT | Performed by: NURSE PRACTITIONER

## 2021-10-27 PROCEDURE — 36600 WITHDRAWAL OF ARTERIAL BLOOD: CPT

## 2021-10-27 PROCEDURE — 82803 BLOOD GASES ANY COMBINATION: CPT

## 2021-10-27 RX ORDER — SODIUM CHLORIDE 0.9 % (FLUSH) 0.9 %
10 SYRINGE (ML) INJECTION AS NEEDED
Status: DISCONTINUED | OUTPATIENT
Start: 2021-10-27 | End: 2021-10-27 | Stop reason: HOSPADM

## 2021-10-29 RX ORDER — GALANTAMINE HYDROBROMIDE 8 MG/1
8 TABLET, FILM COATED ORAL 2 TIMES DAILY
COMMUNITY
Start: 2021-10-27

## 2021-11-01 ENCOUNTER — OFFICE VISIT (OUTPATIENT)
Dept: CARDIOLOGY | Facility: CLINIC | Age: 81
End: 2021-11-01

## 2021-11-01 VITALS
BODY MASS INDEX: 27.49 KG/M2 | HEIGHT: 65 IN | OXYGEN SATURATION: 97 % | HEART RATE: 65 BPM | WEIGHT: 165 LBS | DIASTOLIC BLOOD PRESSURE: 87 MMHG | SYSTOLIC BLOOD PRESSURE: 145 MMHG

## 2021-11-01 DIAGNOSIS — I10 ESSENTIAL HYPERTENSION: Chronic | ICD-10-CM

## 2021-11-01 DIAGNOSIS — I47.29 NONSUSTAINED VENTRICULAR TACHYCARDIA (HCC): Chronic | ICD-10-CM

## 2021-11-01 DIAGNOSIS — E78.2 MIXED HYPERLIPIDEMIA: Primary | Chronic | ICD-10-CM

## 2021-11-01 DIAGNOSIS — J44.9 CHRONIC OBSTRUCTIVE PULMONARY DISEASE, UNSPECIFIED COPD TYPE (HCC): Chronic | ICD-10-CM

## 2021-11-01 DIAGNOSIS — I49.5 SSS (SICK SINUS SYNDROME) (HCC): Chronic | ICD-10-CM

## 2021-11-01 DIAGNOSIS — I63.9 CEREBROVASCULAR ACCIDENT (CVA), UNSPECIFIED MECHANISM (HCC): ICD-10-CM

## 2021-11-01 DIAGNOSIS — G45.9 TRANSIENT ISCHEMIC ATTACK (TIA): ICD-10-CM

## 2021-11-01 DIAGNOSIS — I25.10 CORONARY ARTERY DISEASE INVOLVING NATIVE CORONARY ARTERY OF NATIVE HEART WITHOUT ANGINA PECTORIS: Chronic | ICD-10-CM

## 2021-11-01 DIAGNOSIS — Z95.0 CARDIAC PACEMAKER IN SITU: Chronic | ICD-10-CM

## 2021-11-01 DIAGNOSIS — Z98.61 HISTORY OF PTCA: Chronic | ICD-10-CM

## 2021-11-01 LAB
BACTERIA SPEC AEROBE CULT: NORMAL
BACTERIA SPEC AEROBE CULT: NORMAL

## 2021-11-01 PROCEDURE — 99213 OFFICE O/P EST LOW 20 MIN: CPT | Performed by: INTERNAL MEDICINE

## 2021-11-03 ENCOUNTER — READMISSION MANAGEMENT (OUTPATIENT)
Dept: CALL CENTER | Facility: HOSPITAL | Age: 81
End: 2021-11-03

## 2021-11-03 NOTE — OUTREACH NOTE
COPD/PN Week 2 Survey      Responses   [facility] patient discharged from? [facility]   Does the patient have one of the following disease processes/diagnoses(primary or secondary)? COPD/Pneumonia   Was the primary reason for admission: Pneumonia   Week 2 attempt successful? Yes   Call start time 1359   Call end time 1406   General alerts for this patient Please call home phone [phone] first    Discharge diagnosis CAP (community acquired pneumonia   Is patient permission given to speak with other caregiver? Yes   List who call center can speak with spouse- Nava   Person spoke with today (if not patient) and relationship Nava-spouse    Has the patient kept scheduled appointments due by today? N/A   Comments Going to DR hardy today   Psychosocial issues? No   What is the patient's perception of their health status since discharge? Same   Nursing Interventions Nurse provided patient education   Is the patient/caregiver able to teach back the hierarchy of who to call/visit for symptoms/problems? PCP, Specialist, Home health nurse, Urgent Care, ED, 911 Yes   Additional teach back comments Wife upset that pt did not have CT of head (she was in ED too at the same time and thoguht they were doing this as her friend aracelis texting her while she was also in ED)   Patient reports what zone on this call? Green Zone   Green Zone Reports doing well,  Breathing without shortness of breath   Green Zone interventions: Take daily medications,  Avoid indoor/outdoor triggers   Week 2 call completed? Yes          Tejal Dickens RN

## 2021-11-09 ENCOUNTER — READMISSION MANAGEMENT (OUTPATIENT)
Dept: CALL CENTER | Facility: HOSPITAL | Age: 81
End: 2021-11-09

## 2021-11-10 NOTE — OUTREACH NOTE
COPD/PN Week 3 Survey      Responses   [facility] patient discharged from? [facility]   Does the patient have one of the following disease processes/diagnoses(primary or secondary)? COPD/Pneumonia   Was the primary reason for admission: Pneumonia   Week 3 attempt successful? No   Unsuccessful attempts Attempt 1          Faiza Donis LPN

## 2021-11-11 ENCOUNTER — READMISSION MANAGEMENT (OUTPATIENT)
Dept: CALL CENTER | Facility: HOSPITAL | Age: 81
End: 2021-11-11

## 2021-11-11 NOTE — OUTREACH NOTE
COPD/PN Week 3 Survey      Responses   [facility] [place] patient discharged from? [facility]   Does the patient have one of the following disease processes/diagnoses(primary or secondary)? COPD/Pneumonia   Was the primary reason for admission: Pneumonia   Week 3 attempt successful? Yes   Call start time 1247   Call end time 1251   Person spoke with today (if not patient) and relationship Nava-spouse    Meds reviewed with patient/caregiver? Yes   Is the patient taking all medications as directed (includes completed medication regime)? Yes   Has the patient kept scheduled appointments due by today? N/A   Comments Pulmonary appt rescheduled for next week   What is the Home health agency?  [facility]   Home health comments SN comes weekly   Pulse Ox monitoring Intermittent   What is the patient's perception of their health status since discharge? Same   Is the patient able to teach back COPD zones? Yes   Patient reports what zone on this call? Green Zone   Week 3 call completed? Yes   Wrap up additional comments Brief call patient napping and spouse is not feeling well.          Brisa Munroe RN

## 2021-11-19 ENCOUNTER — READMISSION MANAGEMENT (OUTPATIENT)
Dept: CALL CENTER | Facility: HOSPITAL | Age: 81
End: 2021-11-19

## 2021-11-19 NOTE — OUTREACH NOTE
COPD/PN Week 4 Survey      Responses   [facility] patient discharged from? [facility]   Does the patient have one of the following disease processes/diagnoses(primary or secondary)? COPD/Pneumonia   Was the primary reason for admission: Pneumonia   Week 4 attempt successful? Yes   Call start time 1750   Call end time 1757   Discharge diagnosis CAP (community acquired pneumonia   Person spoke with today (if not patient) and relationship Nava-spouse    Meds reviewed with patient/caregiver? Yes   Is the patient having any side effects they believe may be caused by any medication additions or changes? No   Is the patient taking all medications as directed (includes completed medication regime)? Yes   Has the patient kept scheduled appointments due by today? Yes   Is the patient still receiving Home Health Services? Yes   Pulse Ox monitoring Intermittent   Pulse Ox device source Patient   O2 Sat comments 90-93% on 3L O2, has considered being evaluated by [facility] to increase settings   O2 Sat: education provided When to seek care   Psychosocial issues? No   What is the patient's perception of their health status since discharge? Improving   Nursing Interventions Nurse provided patient education   Is the patient/caregiver able to teach back the hierarchy of who to call/visit for symptoms/problems? PCP, Specialist, Home health nurse, Urgent Care, ED, 911 Yes   Additional teach back comments pt is starting to require more O2 than 3L, will be evaluated by O2 company   Is the patient/caregiver able to teach back signs and symptoms of worsening condition: Fever/chills,  Shortness of breath,  Chest pain   Is the patient/caregiver able to teach back importance of completing antibiotic course of treatment? Yes   Week 4 call completed? Yes   Would the patient like one additional call? No   Graduated Yes   Is the patient interested in additional calls from an ambulatory ?  NOTE:  applies to high risk patients requiring  additional follow-up. No   Did the patient feel the follow up calls were helpful during their recovery period? Yes   Was the number of calls appropriate? Yes          Damaris Bustamante RN

## 2021-12-10 ENCOUNTER — TELEPHONE (OUTPATIENT)
Dept: CARDIOLOGY | Facility: CLINIC | Age: 81
End: 2021-12-10

## 2021-12-10 NOTE — TELEPHONE ENCOUNTER
His blood pressure has been running high then goes back down  He hasn't been taking the medication that raises his blood pressure since October  The high is 195/114  That was yesterday heart rate was 72

## 2021-12-10 NOTE — TELEPHONE ENCOUNTER
Patient needs to call dr wise office and I called the wife and informed her   we don't have a doctor in the office . Patient was trying to tell me that dr cosme knew his b/p was high    I told the patient I couldn't determine what he told them and what he would want him to do   patient needs to call dr wise that has been treating him as well I informed the patient not to take the midrodrine

## 2022-01-31 RX ORDER — CLOPIDOGREL BISULFATE 75 MG/1
TABLET ORAL
Qty: 90 TABLET | Refills: 1 | Status: SHIPPED | OUTPATIENT
Start: 2022-01-31 | End: 2022-07-29

## 2022-04-09 ENCOUNTER — HOSPITAL ENCOUNTER (EMERGENCY)
Facility: HOSPITAL | Age: 82
Discharge: HOME | End: 2022-04-09
Attending: EMERGENCY MEDICINE | Admitting: EMERGENCY MEDICINE

## 2022-04-09 VITALS
WEIGHT: 165 LBS | HEART RATE: 87 BPM | HEIGHT: 70 IN | SYSTOLIC BLOOD PRESSURE: 134 MMHG | DIASTOLIC BLOOD PRESSURE: 101 MMHG | OXYGEN SATURATION: 99 % | TEMPERATURE: 97.9 F | RESPIRATION RATE: 16 BRPM | BODY MASS INDEX: 23.62 KG/M2

## 2022-04-09 DIAGNOSIS — R04.0 ACUTE ANTERIOR EPISTAXIS: Primary | ICD-10-CM

## 2022-04-09 LAB
BACTERIA UR QL AUTO: ABNORMAL /HPF
BASOPHILS # BLD AUTO: 0.1 10*3/MM3 (ref 0–0.2)
BASOPHILS NFR BLD AUTO: 0.5 % (ref 0–1.5)
BILIRUB UR QL STRIP: NEGATIVE
C3 FRG RBC-MCNC: NORMAL
CLARITY UR: CLEAR
COLOR UR: YELLOW
DEPRECATED RDW RBC AUTO: 70.4 FL (ref 37–54)
EOSINOPHIL # BLD AUTO: 0.2 10*3/MM3 (ref 0–0.4)
EOSINOPHIL NFR BLD AUTO: 1.5 % (ref 0.3–6.2)
ERYTHROCYTE [DISTWIDTH] IN BLOOD BY AUTOMATED COUNT: 25.5 % (ref 12.3–15.4)
GLUCOSE UR STRIP-MCNC: NEGATIVE MG/DL
HCT VFR BLD AUTO: 44.5 % (ref 37.5–51)
HGB BLD-MCNC: 14.7 G/DL (ref 13–17.7)
HGB UR QL STRIP.AUTO: NEGATIVE
HYALINE CASTS UR QL AUTO: ABNORMAL /LPF
KETONES UR QL STRIP: NEGATIVE
LEUKOCYTE ESTERASE UR QL STRIP.AUTO: ABNORMAL
LYMPHOCYTES # BLD AUTO: 1.7 10*3/MM3 (ref 0.7–3.1)
LYMPHOCYTES NFR BLD AUTO: 14.4 % (ref 19.6–45.3)
MCH RBC QN AUTO: 27.7 PG (ref 26.6–33)
MCHC RBC AUTO-ENTMCNC: 33 G/DL (ref 31.5–35.7)
MCV RBC AUTO: 83.8 FL (ref 79–97)
MONOCYTES # BLD AUTO: 1 10*3/MM3 (ref 0.1–0.9)
MONOCYTES NFR BLD AUTO: 8.2 % (ref 5–12)
NEUTROPHILS NFR BLD AUTO: 75.4 % (ref 42.7–76)
NEUTROPHILS NFR BLD AUTO: 8.8 10*3/MM3 (ref 1.7–7)
NITRITE UR QL STRIP: NEGATIVE
NRBC BLD AUTO-RTO: 0 /100 WBC (ref 0–0.2)
PH UR STRIP.AUTO: <=5 [PH] (ref 5–8)
PLATELET # BLD AUTO: 227 10*3/MM3 (ref 140–450)
PMV BLD AUTO: 7.3 FL (ref 6–12)
POIKILOCYTOSIS BLD QL SMEAR: NORMAL
PROT UR QL STRIP: NEGATIVE
RBC # BLD AUTO: 5.31 10*6/MM3 (ref 4.14–5.8)
RBC # UR STRIP: ABNORMAL /HPF
REF LAB TEST METHOD: ABNORMAL
SMALL PLATELETS BLD QL SMEAR: ADEQUATE
SP GR UR STRIP: 1.02 (ref 1–1.03)
SQUAMOUS #/AREA URNS HPF: ABNORMAL /HPF
UROBILINOGEN UR QL STRIP: ABNORMAL
WBC # UR STRIP: ABNORMAL /HPF
WBC MORPH BLD: NORMAL
WBC NRBC COR # BLD: 11.7 10*3/MM3 (ref 3.4–10.8)

## 2022-04-09 PROCEDURE — 85025 COMPLETE CBC W/AUTO DIFF WBC: CPT | Performed by: EMERGENCY MEDICINE

## 2022-04-09 PROCEDURE — 81001 URINALYSIS AUTO W/SCOPE: CPT | Performed by: EMERGENCY MEDICINE

## 2022-04-09 PROCEDURE — 99283 EMERGENCY DEPT VISIT LOW MDM: CPT

## 2022-04-09 PROCEDURE — 85007 BL SMEAR W/DIFF WBC COUNT: CPT | Performed by: EMERGENCY MEDICINE

## 2022-04-09 RX ADMIN — TRANEXAMIC ACID 500 MG: 100 INJECTION, SOLUTION INTRAVENOUS at 17:22

## 2022-04-09 RX ADMIN — PHENYLEPHRINE HYDROCHLORIDE 2 SPRAY: 0.5 SPRAY NASAL at 17:22

## 2022-04-09 NOTE — ED PROVIDER NOTES
Subjective   History of Present Illness  History Provided By: Patient    Chief Complaint: Epistaxis  Onset: 4:30 AM  Timing: Intermittent  Location: Right nare  Quality: Bleeding  Severity: Moderate  Modifying Factors: None    Other: 81-year-old male on Eliquis and Plavix presents for nosebleed since 4:30 AM this morning.  Family states he has a history of dry nose and gets drops for this from ENT.  Denies trauma to nose.  States it started spontaneously.  Has happened slightly but never this bad before.    Review of Systems   HENT: Positive for nosebleeds.    Genitourinary: Positive for dysuria and frequency.   Neurological: Negative for syncope.   All other systems reviewed and are negative.         Past Medical History:   Diagnosis Date   • CAD (coronary artery disease)    • Dementia ([facility])    • Depression    • Disease of thyroid gland    • Dysautonomia ([facility])    • Dyslipidemia    • GERD (gastroesophageal reflux disease)    • Head pain    • Hyperlipidemia    • Hypertension    • SSS (sick sinus syndrome) ([facility])    • Stroke ([facility])     x 3 in 2018       Allergies   Allergen Reactions   • Trazodone Hallucinations   • Doxycycline GI Intolerance   • Doxycycline Nausea And Vomiting   • Quetiapine Other (See Comments)     Having falls on it       Past Surgical History:   Procedure Laterality Date   • APPENDECTOMY     • CHOLECYSTECTOMY     • CYSTOSCOPY     • ENDOSCOPY N/A 10/17/2019    Procedure: ESOPHAGOGASTRODUODENOSCOPY with biopsy x 2 areas;  Surgeon: Ashok Sanchez MD;  Location: [facility] ENDOSCOPY;  Service: Gastroenterology   • HERNIA REPAIR     • INSERT / REPLACE / REMOVE PACEMAKER     • LEFT HEART CATH     • PACEMAKER IMPLANTATION      [employer]tr[employer]   • TEMPORAL ARTERY BIOPSY     • WRIST SURGERY      severed artery       Family History   Problem Relation Age of Onset   • Heart disease Father        Social History     Socioeconomic History   • Marital status:    Tobacco Use   • Smoking status: Former Smoker   •  "Smokeless tobacco: Never Used   • Tobacco comment: quit 25 yrs ago   Vaping Use   • Vaping Use: Never used   Substance and Sexual Activity   • Alcohol use: No   • Drug use: No   • Sexual activity: Defer           Objective   Physical Exam  Constitutional:  No acute distress.  Head:  Atraumatic.  Normocephalic.   Eyes:  No scleral icterus. Normal conjunctiva  ENT:  Moist mucosa.  No nasal discharge present.  Blood in right nare.  No active bleeding site identified.  Some blood in posterior oropharynx.  Cardiovascular:  Well perfused.  Equal pulses.  Regular rate.  Normal capillary refill.    Pulmonary/Chest:  No respiratory distress.  Airway patent.  No tachypnea.  No accessory muscle usage.    Abdominal:  Non-distended. Non-tender.   Extremities:  No peripheral edema.  No Deformity  Skin:  Warm, dry  Neurological:  Alert, awake, and appropriate.  Normal speech.      Procedures  PROCEDURE: EPISTAXIS MANAGEMENT     Performed by the emergency provider  Consent: Informed consent was obtained after discussion of the risks, benefits, and alternatives to the procedure.  Timeout: A timeout to verify the correct patient, procedure, and site was performed immediately prior to the procedure.  Indication: Nasal bleeding control  Location:RIGHT naris  Medication: Phenylephrine, TXA  Bleeding Source: Right anterior   Cautery: None  Packing: Anterior Rhino Rocket  Post-Procedure: Good hemostasis. The patient was observed following procedure and no repeat episode of bleeding was noted. Patient tolerated the procedure well with no immediate complications.              ED Course         BP (!) 134/101   Pulse 87   Temp 97.9 °F (36.6 °C) (Oral)   Resp 16   Ht 177.8 cm (70\")   Wt 74.8 kg (165 lb)   SpO2 99%   BMI 23.68 kg/m²   Labs Reviewed   URINALYSIS W/ CULTURE IF INDICATED - Abnormal; Notable for the following components:       Result Value    Leuk Esterase, UA Trace (*)     All other components within normal limits   CBC " WITH AUTO DIFFERENTIAL - Abnormal; Notable for the following components:    WBC 11.70 (*)     RDW 25.5 (*)     RDW-SD 70.4 (*)     Lymphocyte % 14.4 (*)     Neutrophils, Absolute 8.80 (*)     Monocytes, Absolute 1.00 (*)     All other components within normal limits    Narrative:     Appended report. These results have been appended to a previously verified report.   URINALYSIS, MICROSCOPIC ONLY - Abnormal; Notable for the following components:    RBC, UA 0-2 (*)     WBC, UA 0-2 (*)     All other components within normal limits   SCAN SLIDE   CBC AND DIFFERENTIAL    Narrative:     The following orders were created for panel order CBC & Differential.  Procedure                               Abnormality         Status                     ---------                               -----------         ------                     CBC Auto Differential[[id number]]        Abnormal            Final result               Scan Slide[[id number]]                                       Final result                 Please view results for these tests on the individual orders.     Medications   phenylephrine (DELMA-SYNEPHRINE) 0.5 % nasal spray 2 spray (2 sprays Nasal Given 4/9/22 1722)   Tranexamic Acid Sterile Solution 500 mg (500 mg Topical Given 4/9/22 1722)     No radiology results for the last day                                        MDM  Packed at approximately 5:40 PM.  Reassessed at 605 and no blood in posterior oropharynx, no anterior blood.  Bleeding seems to resolved at this point.  Blood counts reassuring.  States he has had a little bit dysuria but this happens frequently for him urine looks clean.  No fevers or chills.  Patient is already established with ENT for this problem will follow up with his ENT physician.    Final diagnoses:   Acute anterior epistaxis       ED Disposition  ED Disposition     ED Disposition   Discharge    Condition   Stable    Comment   --             Cecil Novak MD  [address]  [place] [address]  [phone]    In 3 days  For removal of nasal packing.         Medication List      No changes were made to your prescriptions during this visit.          Ye Major MD  04/09/22 1956

## 2022-04-16 ENCOUNTER — APPOINTMENT (OUTPATIENT)
Dept: GENERAL RADIOLOGY | Facility: HOSPITAL | Age: 82
End: 2022-04-16

## 2022-04-16 ENCOUNTER — HOSPITAL ENCOUNTER (EMERGENCY)
Facility: HOSPITAL | Age: 82
Discharge: HOME | End: 2022-04-16
Attending: EMERGENCY MEDICINE | Admitting: EMERGENCY MEDICINE

## 2022-04-16 ENCOUNTER — APPOINTMENT (OUTPATIENT)
Dept: CT IMAGING | Facility: HOSPITAL | Age: 82
End: 2022-04-16

## 2022-04-16 VITALS
DIASTOLIC BLOOD PRESSURE: 105 MMHG | WEIGHT: 163.4 LBS | SYSTOLIC BLOOD PRESSURE: 171 MMHG | OXYGEN SATURATION: 96 % | HEART RATE: 60 BPM | BODY MASS INDEX: 23.39 KG/M2 | TEMPERATURE: 98.4 F | HEIGHT: 70 IN | RESPIRATION RATE: 18 BRPM

## 2022-04-16 DIAGNOSIS — R06.00 DYSPNEA, UNSPECIFIED TYPE: Primary | ICD-10-CM

## 2022-04-16 LAB
ANION GAP SERPL CALCULATED.3IONS-SCNC: 13 MMOL/L (ref 5–15)
ANISOCYTOSIS BLD QL: NORMAL
APTT PPP: 28.7 SECONDS (ref 61–76.5)
BASOPHILS # BLD AUTO: 0.2 10*3/MM3 (ref 0–0.2)
BASOPHILS NFR BLD AUTO: 1.2 % (ref 0–1.5)
BUN SERPL-MCNC: 15 MG/DL (ref 8–23)
BUN/CREAT SERPL: 16 (ref 7–25)
C3 FRG RBC-MCNC: NORMAL
CALCIUM SPEC-SCNC: 9.2 MG/DL (ref 8.6–10.5)
CHLORIDE SERPL-SCNC: 103 MMOL/L (ref 98–107)
CO2 SERPL-SCNC: 24 MMOL/L (ref 22–29)
CREAT SERPL-MCNC: 0.94 MG/DL (ref 0.76–1.27)
DEPRECATED RDW RBC AUTO: 66.5 FL (ref 37–54)
EGFRCR SERPLBLD CKD-EPI 2021: 81.4 ML/MIN/1.73
EOSINOPHIL # BLD AUTO: 0.2 10*3/MM3 (ref 0–0.4)
EOSINOPHIL NFR BLD AUTO: 1.9 % (ref 0.3–6.2)
ERYTHROCYTE [DISTWIDTH] IN BLOOD BY AUTOMATED COUNT: 24.5 % (ref 12.3–15.4)
GLUCOSE SERPL-MCNC: 97 MG/DL (ref 65–99)
HCT VFR BLD AUTO: 46 % (ref 37.5–51)
HGB BLD-MCNC: 15.8 G/DL (ref 13–17.7)
HOLD SPECIMEN: NORMAL
HOLD SPECIMEN: NORMAL
INR PPP: 1.04 (ref 0.93–1.1)
LYMPHOCYTES # BLD AUTO: 2.1 10*3/MM3 (ref 0.7–3.1)
LYMPHOCYTES NFR BLD AUTO: 17.2 % (ref 19.6–45.3)
MCH RBC QN AUTO: 28.3 PG (ref 26.6–33)
MCHC RBC AUTO-ENTMCNC: 34.3 G/DL (ref 31.5–35.7)
MCV RBC AUTO: 82.4 FL (ref 79–97)
MONOCYTES # BLD AUTO: 1.1 10*3/MM3 (ref 0.1–0.9)
MONOCYTES NFR BLD AUTO: 9.1 % (ref 5–12)
NEUTROPHILS NFR BLD AUTO: 70.6 % (ref 42.7–76)
NEUTROPHILS NFR BLD AUTO: 8.5 10*3/MM3 (ref 1.7–7)
NRBC BLD AUTO-RTO: 0.2 /100 WBC (ref 0–0.2)
NT-PROBNP SERPL-MCNC: 105.2 PG/ML (ref 0–1800)
PLAT MORPH BLD: NORMAL
PLATELET # BLD AUTO: 259 10*3/MM3 (ref 140–450)
PMV BLD AUTO: 7.7 FL (ref 6–12)
POIKILOCYTOSIS BLD QL SMEAR: NORMAL
POTASSIUM SERPL-SCNC: 4.4 MMOL/L (ref 3.5–5.2)
PROTHROMBIN TIME: 10.7 SECONDS (ref 9.6–11.7)
RBC # BLD AUTO: 5.58 10*6/MM3 (ref 4.14–5.8)
SODIUM SERPL-SCNC: 140 MMOL/L (ref 136–145)
TROPONIN T SERPL-MCNC: <0.01 NG/ML (ref 0–0.03)
WBC MORPH BLD: NORMAL
WBC NRBC COR # BLD: 12.1 10*3/MM3 (ref 3.4–10.8)
WHOLE BLOOD HOLD SPECIMEN: NORMAL

## 2022-04-16 PROCEDURE — 85730 THROMBOPLASTIN TIME PARTIAL: CPT | Performed by: EMERGENCY MEDICINE

## 2022-04-16 PROCEDURE — 93005 ELECTROCARDIOGRAM TRACING: CPT | Performed by: EMERGENCY MEDICINE

## 2022-04-16 PROCEDURE — 99283 EMERGENCY DEPT VISIT LOW MDM: CPT

## 2022-04-16 PROCEDURE — 83880 ASSAY OF NATRIURETIC PEPTIDE: CPT | Performed by: EMERGENCY MEDICINE

## 2022-04-16 PROCEDURE — 70450 CT HEAD/BRAIN W/O DYE: CPT

## 2022-04-16 PROCEDURE — 85610 PROTHROMBIN TIME: CPT | Performed by: EMERGENCY MEDICINE

## 2022-04-16 PROCEDURE — 93005 ELECTROCARDIOGRAM TRACING: CPT

## 2022-04-16 PROCEDURE — 85025 COMPLETE CBC W/AUTO DIFF WBC: CPT | Performed by: EMERGENCY MEDICINE

## 2022-04-16 PROCEDURE — 36415 COLL VENOUS BLD VENIPUNCTURE: CPT

## 2022-04-16 PROCEDURE — 85007 BL SMEAR W/DIFF WBC COUNT: CPT | Performed by: EMERGENCY MEDICINE

## 2022-04-16 PROCEDURE — 80048 BASIC METABOLIC PNL TOTAL CA: CPT | Performed by: EMERGENCY MEDICINE

## 2022-04-16 PROCEDURE — 84484 ASSAY OF TROPONIN QUANT: CPT | Performed by: EMERGENCY MEDICINE

## 2022-04-16 PROCEDURE — 71045 X-RAY EXAM CHEST 1 VIEW: CPT

## 2022-04-16 RX ORDER — SODIUM CHLORIDE 0.9 % (FLUSH) 0.9 %
10 SYRINGE (ML) INJECTION AS NEEDED
Status: DISCONTINUED | OUTPATIENT
Start: 2022-04-16 | End: 2022-04-17 | Stop reason: HOSPADM

## 2022-04-17 NOTE — ED PROVIDER NOTES
Subjective   Chief complaint: Shortness of breath    81-year-old male presents with shortness of breath.  Symptoms started today.  He has had a mild nonproductive cough.  He denies any fever.  He has had no chest pain.  He denies any alleviating or exacerbating factors.  Symptoms described as mild to moderate.  He is on 3 L nasal cannula at all times and has had no change in oxygen requirements.      History provided by:  Patient      Review of Systems   Constitutional: Negative for fever.   HENT: Negative for congestion and sore throat.    Eyes: Negative for redness.   Respiratory: Positive for cough and shortness of breath.    Cardiovascular: Negative for chest pain.   Gastrointestinal: Negative for abdominal pain, diarrhea and vomiting.   Genitourinary: Negative for dysuria.   Musculoskeletal: Negative for back pain.   Skin: Negative for rash.   Neurological: Negative for dizziness and headaches.   Psychiatric/Behavioral: Negative for confusion.       Past Medical History:   Diagnosis Date   • CAD (coronary artery disease)    • Dementia ([facility])    • Depression    • Disease of thyroid gland    • Dysautonomia ([facility])    • Dyslipidemia    • GERD (gastroesophageal reflux disease)    • Head pain    • Hyperlipidemia    • Hypertension    • SSS (sick sinus syndrome) ([facility])    • Stroke ([facility])     x 3 in 2018       Allergies   Allergen Reactions   • Trazodone Hallucinations   • Doxycycline GI Intolerance   • Doxycycline Nausea And Vomiting   • Quetiapine Other (See Comments)     Having falls on it       Past Surgical History:   Procedure Laterality Date   • APPENDECTOMY     • CHOLECYSTECTOMY     • CYSTOSCOPY     • ENDOSCOPY N/A 10/17/2019    Procedure: ESOPHAGOGASTRODUODENOSCOPY with biopsy x 2 areas;  Surgeon: Ashok Sanchez MD;  Location: [facility] ENDOSCOPY;  Service: Gastroenterology   • HERNIA REPAIR     • INSERT / REPLACE / REMOVE PACEMAKER     • LEFT HEART CATH     • PACEMAKER IMPLANTATION      Medtronic   • TEMPORAL ARTERY  "BIOPSY     • WRIST SURGERY      severed artery       Family History   Problem Relation Age of Onset   • Heart disease Father        Social History     Socioeconomic History   • Marital status:    Tobacco Use   • Smoking status: Former Smoker   • Smokeless tobacco: Never Used   • Tobacco comment: quit 25 yrs ago   Vaping Use   • Vaping Use: Never used   Substance and Sexual Activity   • Alcohol use: No   • Drug use: No   • Sexual activity: Defer       BP (!) 187/111   Pulse 60   Temp 98.4 °F (36.9 °C) (Temporal)   Resp 16   Ht 177.8 cm (70\")   Wt 74.1 kg (163 lb 6.4 oz)   SpO2 99%   BMI 23.45 kg/m²       Objective   Physical Exam  Vitals and nursing note reviewed.   Constitutional:       Appearance: He is well-developed.   HENT:      Head: Normocephalic and atraumatic.   Eyes:      Pupils: Pupils are equal, round, and reactive to light.   Cardiovascular:      Rate and Rhythm: Normal rate and regular rhythm.      Heart sounds: Normal heart sounds.   Pulmonary:      Effort: Pulmonary effort is normal. No respiratory distress.      Breath sounds: Normal breath sounds.   Abdominal:      General: Abdomen is flat. Bowel sounds are normal.      Palpations: Abdomen is soft.      Tenderness: There is no abdominal tenderness.   Skin:     General: Skin is warm and dry.   Neurological:      General: No focal deficit present.      Mental Status: He is alert.         Procedures           ED Course      My interpretation of EKG shows sinus rhythm, rate of 60, no ST elevation     Results for orders placed or performed during the hospital encounter of 04/16/22   Basic Metabolic Panel    Specimen: Blood   Result Value Ref Range    Glucose 97 65 - 99 mg/dL    BUN 15 8 - 23 mg/dL    Creatinine 0.94 0.76 - 1.27 mg/dL    Sodium 140 136 - 145 mmol/L    Potassium 4.4 3.5 - 5.2 mmol/L    Chloride 103 98 - 107 mmol/L    CO2 24.0 22.0 - 29.0 mmol/L    Calcium 9.2 8.6 - 10.5 mg/dL    BUN/Creatinine Ratio 16.0 7.0 - 25.0    Anion " Gap 13.0 5.0 - 15.0 mmol/L    eGFR 81.4 >60.0 mL/min/1.73   Troponin    Specimen: Blood   Result Value Ref Range    Troponin T <0.010 0.000 - 0.030 ng/mL   BNP    Specimen: Blood   Result Value Ref Range    proBNP 105.2 0.0 - 1,800.0 pg/mL   Protime-INR    Specimen: Blood   Result Value Ref Range    Protime 10.7 9.6 - 11.7 Seconds    INR 1.04 0.93 - 1.10   aPTT    Specimen: Blood   Result Value Ref Range    PTT 28.7 (L) 61.0 - 76.5 seconds   CBC Auto Differential    Specimen: Blood   Result Value Ref Range    WBC 12.10 (H) 3.40 - 10.80 10*3/mm3    RBC 5.58 4.14 - 5.80 10*6/mm3    Hemoglobin 15.8 13.0 - 17.7 g/dL    Hematocrit 46.0 37.5 - 51.0 %    MCV 82.4 79.0 - 97.0 fL    MCH 28.3 26.6 - 33.0 pg    MCHC 34.3 31.5 - 35.7 g/dL    RDW 24.5 (H) 12.3 - 15.4 %    RDW-SD 66.5 (H) 37.0 - 54.0 fl    MPV 7.7 6.0 - 12.0 fL    Platelets 259 140 - 450 10*3/mm3    Neutrophil % 70.6 42.7 - 76.0 %    Lymphocyte % 17.2 (L) 19.6 - 45.3 %    Monocyte % 9.1 5.0 - 12.0 %    Eosinophil % 1.9 0.3 - 6.2 %    Basophil % 1.2 0.0 - 1.5 %    Neutrophils, Absolute 8.50 (H) 1.70 - 7.00 10*3/mm3    Lymphocytes, Absolute 2.10 0.70 - 3.10 10*3/mm3    Monocytes, Absolute 1.10 (H) 0.10 - 0.90 10*3/mm3    Eosinophils, Absolute 0.20 0.00 - 0.40 10*3/mm3    Basophils, Absolute 0.20 0.00 - 0.20 10*3/mm3    nRBC 0.2 0.0 - 0.2 /100 WBC   Scan Slide    Specimen: Blood   Result Value Ref Range    Anisocytosis Slight/1+ None Seen    Poikilocytes Slight/1+ None Seen    RBC Fragments Slight/1+ None Seen    WBC Morphology Normal Normal    Platelet Morphology Normal Normal   ECG 12 Lead   Result Value Ref Range    QT Interval 420 ms   Green Top (Gel)   Result Value Ref Range    Extra Tube Done    Gold Top - SST   Result Value Ref Range    Extra Tube Hold for add-ons.    Light Blue Top   Result Value Ref Range    Extra Tube hold for add-on      CT Head Without Contrast    Result Date: 4/16/2022  1. No acute intracranial process. MRI is more sensitive for the  detection of acute nonhemorrhagic infarct. 2. Severe changes small vessel ischemic disease of indeterminate age, presumably mostly chronic. Volume loss. Atherosclerosis. Old infarcts as described above. 3. Paranasal sinus disease. Fluid could represent acute sinusitis. Mastoid effusions.  Electronically signed by:  Rj Aj M.D.  4/16/2022 8:11 PM                          Chest x-ray reviewed by me shows no acute disease, pending radiology review.                MDM   Patient had the above evaluation.  Results were discussed with the patient.  Work-up has been unremarkable.  The patient's wife apparently told nursing staff that she was concerned that the patient has been generally weak recently.  A CT head was obtained which was unremarkable.  Chest x-ray shows no acute disease.  EKG shows no acute ischemia.  Troponin is negative.  BNP is normal.  Patient is in no distress.  He is oxygenating well on his normal nasal cannula.  He will be discharged to follow-up with his primary doctor.  He is eager to go home.      Final diagnoses:   Dyspnea, unspecified type       ED Disposition  ED Disposition     ED Disposition   Discharge    Condition   Stable    Comment   --             Ant Hines MD  [address] [place] [address]  [phone]    Call in 2 days           Medication List      No changes were made to your prescriptions during this visit.          Jm Narvaez MD  04/16/22 7917

## 2022-04-17 NOTE — ED NOTES
Pt reports intermittent periods of SOA. Pt reports no pain. Pt reports coughing in the mornings. Pt has baseline dementia a/o to person and place. Pt friend/ Appointment  reports the pt was taken off of his blood thinners in the beginning of April due to recurring nose bleeds.

## 2022-04-19 LAB — QT INTERVAL: 420 MS

## 2022-07-15 ENCOUNTER — OFFICE (OUTPATIENT)
Dept: URBAN - METROPOLITAN AREA CLINIC 64 | Facility: CLINIC | Age: 82
End: 2022-07-15

## 2022-07-15 VITALS
DIASTOLIC BLOOD PRESSURE: 76 MMHG | SYSTOLIC BLOOD PRESSURE: 117 MMHG | HEIGHT: 70 IN | HEART RATE: 66 BPM | WEIGHT: 163 LBS

## 2022-07-15 DIAGNOSIS — R13.10 DYSPHAGIA, UNSPECIFIED: ICD-10-CM

## 2022-07-15 PROCEDURE — 99214 OFFICE O/P EST MOD 30 MIN: CPT | Performed by: NURSE PRACTITIONER

## 2022-07-29 RX ORDER — CLOPIDOGREL BISULFATE 75 MG/1
TABLET ORAL
Qty: 90 TABLET | Refills: 1 | Status: SHIPPED | OUTPATIENT
Start: 2022-07-29 | End: 2023-01-26

## 2022-08-17 ENCOUNTER — HOSPITAL ENCOUNTER (OUTPATIENT)
Facility: HOSPITAL | Age: 82
Setting detail: OBSERVATION
LOS: 1 days | Discharge: HOME HEALTH | End: 2022-08-18
Attending: EMERGENCY MEDICINE | Admitting: HOSPITALIST

## 2022-08-17 ENCOUNTER — APPOINTMENT (OUTPATIENT)
Dept: CT IMAGING | Facility: HOSPITAL | Age: 82
End: 2022-08-17

## 2022-08-17 ENCOUNTER — APPOINTMENT (OUTPATIENT)
Dept: GENERAL RADIOLOGY | Facility: HOSPITAL | Age: 82
End: 2022-08-17

## 2022-08-17 DIAGNOSIS — R30.0 DYSURIA: ICD-10-CM

## 2022-08-17 DIAGNOSIS — R53.1 GENERALIZED WEAKNESS: ICD-10-CM

## 2022-08-17 DIAGNOSIS — D72.829 LEUKOCYTOSIS, UNSPECIFIED TYPE: ICD-10-CM

## 2022-08-17 DIAGNOSIS — N39.0 ACUTE UTI: Primary | ICD-10-CM

## 2022-08-17 PROBLEM — H02.403 ACQUIRED BLEPHAROPTOSIS OF BOTH EYES: Status: RESOLVED | Noted: 2017-03-15 | Resolved: 2022-08-17

## 2022-08-17 PROBLEM — H02.59 LAXITY OF EYELID: Status: RESOLVED | Noted: 2017-03-15 | Resolved: 2022-08-17

## 2022-08-17 PROBLEM — G47.9 SLEEP DISTURBANCE: Chronic | Status: RESOLVED | Noted: 2021-09-19 | Resolved: 2022-08-17

## 2022-08-17 PROBLEM — H02.535: Status: RESOLVED | Noted: 2017-03-15 | Resolved: 2022-08-17

## 2022-08-17 LAB
ALBUMIN SERPL-MCNC: 3.8 G/DL (ref 3.5–5.2)
ALBUMIN/GLOB SERPL: 1.4 G/DL
ALP SERPL-CCNC: 87 U/L (ref 39–117)
ALT SERPL W P-5'-P-CCNC: 16 U/L (ref 1–41)
ANION GAP SERPL CALCULATED.3IONS-SCNC: 13 MMOL/L (ref 5–15)
AST SERPL-CCNC: 23 U/L (ref 1–40)
BACTERIA UR QL AUTO: ABNORMAL /HPF
BASOPHILS # BLD AUTO: 0.2 10*3/MM3 (ref 0–0.2)
BASOPHILS NFR BLD AUTO: 1.5 % (ref 0–1.5)
BILIRUB SERPL-MCNC: 0.3 MG/DL (ref 0–1.2)
BILIRUB UR QL STRIP: NEGATIVE
BUN SERPL-MCNC: 15 MG/DL (ref 8–23)
BUN/CREAT SERPL: 17.4 (ref 7–25)
CALCIUM SPEC-SCNC: 9.8 MG/DL (ref 8.6–10.5)
CHLORIDE SERPL-SCNC: 103 MMOL/L (ref 98–107)
CLARITY UR: ABNORMAL
CO2 SERPL-SCNC: 23 MMOL/L (ref 22–29)
COLOR UR: YELLOW
CREAT SERPL-MCNC: 0.86 MG/DL (ref 0.76–1.27)
DEPRECATED RDW RBC AUTO: 48.6 FL (ref 37–54)
EGFRCR SERPLBLD CKD-EPI 2021: 86.5 ML/MIN/1.73
EOSINOPHIL # BLD AUTO: 0.3 10*3/MM3 (ref 0–0.4)
EOSINOPHIL NFR BLD AUTO: 2.3 % (ref 0.3–6.2)
ERYTHROCYTE [DISTWIDTH] IN BLOOD BY AUTOMATED COUNT: 15.6 % (ref 12.3–15.4)
GLOBULIN UR ELPH-MCNC: 2.7 GM/DL
GLUCOSE BLDC GLUCOMTR-MCNC: 119 MG/DL (ref 70–105)
GLUCOSE SERPL-MCNC: 106 MG/DL (ref 65–99)
GLUCOSE UR STRIP-MCNC: NEGATIVE MG/DL
HCT VFR BLD AUTO: 44.2 % (ref 37.5–51)
HGB BLD-MCNC: 14.2 G/DL (ref 13–17.7)
HGB UR QL STRIP.AUTO: ABNORMAL
HOLD SPECIMEN: NORMAL
HOLD SPECIMEN: NORMAL
HYALINE CASTS UR QL AUTO: ABNORMAL /LPF
INR PPP: 1.06 (ref 0.93–1.1)
KETONES UR QL STRIP: ABNORMAL
LEUKOCYTE ESTERASE UR QL STRIP.AUTO: ABNORMAL
LYMPHOCYTES # BLD AUTO: 2 10*3/MM3 (ref 0.7–3.1)
LYMPHOCYTES NFR BLD AUTO: 14.9 % (ref 19.6–45.3)
MCH RBC QN AUTO: 28.7 PG (ref 26.6–33)
MCHC RBC AUTO-ENTMCNC: 32 G/DL (ref 31.5–35.7)
MCV RBC AUTO: 89.5 FL (ref 79–97)
MONOCYTES # BLD AUTO: 0.8 10*3/MM3 (ref 0.1–0.9)
MONOCYTES NFR BLD AUTO: 6.3 % (ref 5–12)
NEUTROPHILS NFR BLD AUTO: 75 % (ref 42.7–76)
NEUTROPHILS NFR BLD AUTO: 9.9 10*3/MM3 (ref 1.7–7)
NITRITE UR QL STRIP: NEGATIVE
NRBC BLD AUTO-RTO: 0 /100 WBC (ref 0–0.2)
PH UR STRIP.AUTO: <=5 [PH] (ref 5–8)
PLATELET # BLD AUTO: 305 10*3/MM3 (ref 140–450)
PMV BLD AUTO: 8.3 FL (ref 6–12)
POTASSIUM SERPL-SCNC: 4.3 MMOL/L (ref 3.5–5.2)
PROT SERPL-MCNC: 6.5 G/DL (ref 6–8.5)
PROT UR QL STRIP: ABNORMAL
PROTHROMBIN TIME: 10.9 SECONDS (ref 9.6–11.7)
RBC # BLD AUTO: 4.94 10*6/MM3 (ref 4.14–5.8)
RBC # UR STRIP: ABNORMAL /HPF
REF LAB TEST METHOD: ABNORMAL
SARS-COV-2 RNA PNL SPEC NAA+PROBE: NOT DETECTED
SODIUM SERPL-SCNC: 139 MMOL/L (ref 136–145)
SP GR UR STRIP: 1.02 (ref 1–1.03)
SQUAMOUS #/AREA URNS HPF: ABNORMAL /HPF
TROPONIN T SERPL-MCNC: <0.01 NG/ML (ref 0–0.03)
UROBILINOGEN UR QL STRIP: ABNORMAL
WBC # UR STRIP: ABNORMAL /HPF
WBC NRBC COR # BLD: 13.2 10*3/MM3 (ref 3.4–10.8)
WHOLE BLOOD HOLD COAG: NORMAL
WHOLE BLOOD HOLD SPECIMEN: NORMAL

## 2022-08-17 PROCEDURE — 96374 THER/PROPH/DIAG INJ IV PUSH: CPT

## 2022-08-17 PROCEDURE — 74177 CT ABD & PELVIS W/CONTRAST: CPT

## 2022-08-17 PROCEDURE — 87086 URINE CULTURE/COLONY COUNT: CPT | Performed by: EMERGENCY MEDICINE

## 2022-08-17 PROCEDURE — 85025 COMPLETE CBC W/AUTO DIFF WBC: CPT | Performed by: PHYSICIAN ASSISTANT

## 2022-08-17 PROCEDURE — C9803 HOPD COVID-19 SPEC COLLECT: HCPCS

## 2022-08-17 PROCEDURE — 81001 URINALYSIS AUTO W/SCOPE: CPT | Performed by: EMERGENCY MEDICINE

## 2022-08-17 PROCEDURE — 99219 PR INITIAL OBSERVATION CARE/DAY 50 MINUTES: CPT

## 2022-08-17 PROCEDURE — 70450 CT HEAD/BRAIN W/O DYE: CPT

## 2022-08-17 PROCEDURE — 25010000002 AZITHROMYCIN PER 500 MG: Performed by: PHYSICIAN ASSISTANT

## 2022-08-17 PROCEDURE — 84484 ASSAY OF TROPONIN QUANT: CPT | Performed by: PHYSICIAN ASSISTANT

## 2022-08-17 PROCEDURE — 0 IOPAMIDOL PER 1 ML: Performed by: EMERGENCY MEDICINE

## 2022-08-17 PROCEDURE — 85610 PROTHROMBIN TIME: CPT | Performed by: PHYSICIAN ASSISTANT

## 2022-08-17 PROCEDURE — 99284 EMERGENCY DEPT VISIT MOD MDM: CPT

## 2022-08-17 PROCEDURE — 25010000002 CEFTRIAXONE PER 250 MG: Performed by: PHYSICIAN ASSISTANT

## 2022-08-17 PROCEDURE — 80053 COMPREHEN METABOLIC PANEL: CPT | Performed by: PHYSICIAN ASSISTANT

## 2022-08-17 PROCEDURE — 82962 GLUCOSE BLOOD TEST: CPT

## 2022-08-17 PROCEDURE — 93005 ELECTROCARDIOGRAM TRACING: CPT | Performed by: PHYSICIAN ASSISTANT

## 2022-08-17 PROCEDURE — 71045 X-RAY EXAM CHEST 1 VIEW: CPT

## 2022-08-17 PROCEDURE — 87635 SARS-COV-2 COVID-19 AMP PRB: CPT | Performed by: EMERGENCY MEDICINE

## 2022-08-17 RX ORDER — ACETAMINOPHEN 325 MG/1
650 TABLET ORAL EVERY 4 HOURS PRN
Status: DISCONTINUED | OUTPATIENT
Start: 2022-08-17 | End: 2022-08-18 | Stop reason: HOSPADM

## 2022-08-17 RX ORDER — ALUMINA, MAGNESIA, AND SIMETHICONE 2400; 2400; 240 MG/30ML; MG/30ML; MG/30ML
15 SUSPENSION ORAL EVERY 6 HOURS PRN
Status: DISCONTINUED | OUTPATIENT
Start: 2022-08-17 | End: 2022-08-18

## 2022-08-17 RX ORDER — CHOLECALCIFEROL (VITAMIN D3) 125 MCG
5 CAPSULE ORAL NIGHTLY PRN
Status: DISCONTINUED | OUTPATIENT
Start: 2022-08-17 | End: 2022-08-18 | Stop reason: HOSPADM

## 2022-08-17 RX ORDER — SODIUM CHLORIDE 0.9 % (FLUSH) 0.9 %
10 SYRINGE (ML) INJECTION EVERY 12 HOURS SCHEDULED
Status: DISCONTINUED | OUTPATIENT
Start: 2022-08-17 | End: 2022-08-18 | Stop reason: HOSPADM

## 2022-08-17 RX ORDER — ONDANSETRON 4 MG/1
4 TABLET, FILM COATED ORAL EVERY 6 HOURS PRN
Status: DISCONTINUED | OUTPATIENT
Start: 2022-08-17 | End: 2022-08-18 | Stop reason: HOSPADM

## 2022-08-17 RX ORDER — SODIUM CHLORIDE 0.9 % (FLUSH) 0.9 %
10 SYRINGE (ML) INJECTION AS NEEDED
Status: DISCONTINUED | OUTPATIENT
Start: 2022-08-17 | End: 2022-08-18 | Stop reason: HOSPADM

## 2022-08-17 RX ORDER — ONDANSETRON 2 MG/ML
4 INJECTION INTRAMUSCULAR; INTRAVENOUS EVERY 6 HOURS PRN
Status: DISCONTINUED | OUTPATIENT
Start: 2022-08-17 | End: 2022-08-18 | Stop reason: HOSPADM

## 2022-08-17 RX ORDER — ACETAMINOPHEN 650 MG/1
650 SUPPOSITORY RECTAL EVERY 4 HOURS PRN
Status: DISCONTINUED | OUTPATIENT
Start: 2022-08-17 | End: 2022-08-18 | Stop reason: HOSPADM

## 2022-08-17 RX ORDER — ACETAMINOPHEN 160 MG/5ML
650 SOLUTION ORAL EVERY 4 HOURS PRN
Status: DISCONTINUED | OUTPATIENT
Start: 2022-08-17 | End: 2022-08-18 | Stop reason: HOSPADM

## 2022-08-17 RX ADMIN — AZITHROMYCIN MONOHYDRATE 500 MG: 500 INJECTION, POWDER, LYOPHILIZED, FOR SOLUTION INTRAVENOUS at 22:39

## 2022-08-17 RX ADMIN — CEFTRIAXONE 1 G: 1 INJECTION, POWDER, FOR SOLUTION INTRAMUSCULAR; INTRAVENOUS at 22:31

## 2022-08-17 RX ADMIN — SODIUM CHLORIDE 500 ML: 9 INJECTION, SOLUTION INTRAVENOUS at 21:26

## 2022-08-17 RX ADMIN — IOPAMIDOL 100 ML: 755 INJECTION, SOLUTION INTRAVENOUS at 21:09

## 2022-08-17 NOTE — ED PROVIDER NOTES
Subjective       Patient is an 82-year-old male comes in complaining of generalized weakness for the past several days.  Family at bedside states that patient has a history of recurrent UTIs and had laser surgery on his prostate on 7/29/2022.  Patient states he had frequent UTIs leading up to this procedure as well as persistent UTI since the surgery.  Patient and family at bedside states that they follow with Dr. Lima with urology.  Patient has reported some pain with urination that has been intermittent as well.  Patient is overall poor historian has a history of dementia and is alert and oriented x2 currently.  Most of patient's history was provided by family at bedside.  Family states that patient is also had a productive cough with clear sputum the last 2 to 3 days that is new for patient.  Patient and family otherwise denied any fever, vomiting, or diarrhea, abdominal pain.  Family states that patient has had a hard time ambulating due to generalized weakness that has severely worsened over the past few days.  Patient family denies any falls or head injury.  Family also states that patient was started on fosfomycin for UTI about 3 days ago and has had a few doses of this and the last time patient received doses of this he has had increased confusion and generalized weakness.          Review of Systems   Constitutional: Positive for fatigue. Negative for chills and fever.   HENT: Negative for congestion, sore throat, tinnitus and trouble swallowing.    Eyes: Negative for photophobia, discharge and visual disturbance.   Respiratory: Negative for cough, shortness of breath and wheezing.    Cardiovascular: Negative for chest pain, palpitations and leg swelling.   Gastrointestinal: Negative for abdominal pain, blood in stool, diarrhea, nausea and vomiting.   Genitourinary: Positive for dysuria. Negative for flank pain, frequency, hematuria and urgency.   Musculoskeletal: Negative for arthralgias and myalgias.    Skin: Negative for rash.   Neurological: Negative for dizziness, syncope, light-headedness and headaches.        Generalized weakness.   Psychiatric/Behavioral: Negative for confusion.       Past Medical History:   Diagnosis Date   • CAD (coronary artery disease)    • Dementia ([facility])    • Depression    • Disease of thyroid gland    • Dysautonomia ([facility])    • Dyslipidemia    • GERD (gastroesophageal reflux disease)    • Head pain    • Hyperlipidemia    • Hypertension    • SSS (sick sinus syndrome) ([facility])    • Stroke ([facility])     x 3 in 2018       Allergies   Allergen Reactions   • Trazodone Hallucinations   • Doxycycline GI Intolerance   • Ciprofloxacin Other (See Comments)     Breathing problems   • Doxycycline Nausea And Vomiting   • Fosfomycin Tromethamine Confusion     Reports weakness and confusion   • Gemtesa [Vibegron] Other (See Comments)     Reports urinary retention   • Keppra [Levetiracetam] GI Intolerance   • Macrobid [Nitrofurantoin] Other (See Comments)     Unknown reaction   • Quetiapine Other (See Comments)     Having falls on it   • Sulfa Antibiotics Other (See Comments)     Unknown reaction       Past Surgical History:   Procedure Laterality Date   • APPENDECTOMY     • CHOLECYSTECTOMY     • CYSTOSCOPY     • ENDOSCOPY N/A 10/17/2019    Procedure: ESOPHAGOGASTRODUODENOSCOPY with biopsy x 2 areas;  Surgeon: Ashok Sanchez MD;  Location: [facility] ENDOSCOPY;  Service: Gastroenterology   • HERNIA REPAIR     • INSERT / REPLACE / REMOVE PACEMAKER     • LEFT HEART CATH     • PACEMAKER IMPLANTATION      [employer]   • TEMPORAL ARTERY BIOPSY     • WRIST SURGERY      severed artery       Family History   Problem Relation Age of Onset   • Heart disease Father        Social History     Socioeconomic History   • Marital status:    Tobacco Use   • Smoking status: Former Smoker   • Smokeless tobacco: Never Used   • Tobacco comment: quit 25 yrs ago   Vaping Use   • Vaping Use: Never used   Substance and Sexual  Activity   • Alcohol use: No   • Drug use: No   • Sexual activity: Defer           Objective   Physical Exam  Vitals and nursing note reviewed.   Constitutional:       General: He is not in acute distress.     Appearance: He is well-developed. He is not diaphoretic.   HENT:      Head: Normocephalic and atraumatic.      Right Ear: External ear normal.      Left Ear: External ear normal.      Nose: Nose normal.      Mouth/Throat:      Pharynx: No oropharyngeal exudate.   Eyes:      Extraocular Movements: Extraocular movements intact.      Conjunctiva/sclera: Conjunctivae normal.      Pupils: Pupils are equal, round, and reactive to light.   Cardiovascular:      Rate and Rhythm: Normal rate and regular rhythm.      Pulses: Normal pulses.      Heart sounds: Normal heart sounds.      Comments: S1, S2 audible.  Pulmonary:      Effort: Pulmonary effort is normal. No respiratory distress.      Breath sounds: Normal breath sounds. No wheezing or rales.      Comments: On 3 L nasal cannula which is baseline for patient  Abdominal:      General: Bowel sounds are normal. There is no distension.      Palpations: Abdomen is soft.      Tenderness: There is no abdominal tenderness.   Musculoskeletal:         General: No tenderness or deformity. Normal range of motion.      Cervical back: Normal range of motion.   Skin:     General: Skin is warm.      Capillary Refill: Capillary refill takes less than 2 seconds.      Findings: No erythema or rash.   Neurological:      General: No focal deficit present.      Mental Status: He is alert.      Cranial Nerves: No cranial nerve deficit.      Sensory: No sensory deficit.      Motor: No weakness.      Comments: Patient alert and oriented x2 out of 3 and is currently at baseline mentation per family at bedside.  Patient denies any numbness or tingling of extremities.  No focal weakness on exam and patient able to move all 4 extremities without issue.   Psychiatric:         Mood and Affect:  "Mood normal.         Behavior: Behavior normal.         Procedures           ED Course      /95   Pulse 69   Temp 97.8 °F (36.6 °C) (Oral)   Resp 20   Ht 177.8 cm (70\")   Wt 73.5 kg (162 lb)   SpO2 98%   BMI 23.24 kg/m²   Labs Reviewed   COMPREHENSIVE METABOLIC PANEL - Abnormal; Notable for the following components:       Result Value    Glucose 106 (*)     All other components within normal limits    Narrative:     GFR Normal >60  Chronic Kidney Disease <60  Kidney Failure <15     CBC WITH AUTO DIFFERENTIAL - Abnormal; Notable for the following components:    WBC 13.20 (*)     RDW 15.6 (*)     Lymphocyte % 14.9 (*)     Neutrophils, Absolute 9.90 (*)     All other components within normal limits   URINALYSIS W/ CULTURE IF INDICATED - Abnormal; Notable for the following components:    Appearance, UA Cloudy (*)     Ketones, UA Trace (*)     Blood, UA Large (3+) (*)     Protein, UA Trace (*)     Leuk Esterase, UA Moderate (2+) (*)     All other components within normal limits    Narrative:     In absence of clinical symptoms, the presence of pyuria, bacteria, and/or nitrites on the urinalysis result does not correlate with infection.   URINALYSIS, MICROSCOPIC ONLY - Abnormal; Notable for the following components:    RBC, UA 31-50 (*)     WBC, UA 31-50 (*)     All other components within normal limits   POCT GLUCOSE FINGERSTICK - Abnormal; Notable for the following components:    Glucose 119 (*)     All other components within normal limits   COVID-19,CEPHEID/SEBASTIAN,COR/DEEPA/PAD/SUSANA IN-HOUSE,NP SWAB IN TRANSPORT MEDIA 3-4 HR TAT, RT-PCR - Normal    Narrative:     Fact sheet for providers: https://www.fda.gov/media/841551/download     Fact sheet for patients: https://www.fda.gov/media/323535/download  Fact sheet for providers: https://www.fda.gov/media/447654/download    Fact sheet for patients: https://www.fda.gov/media/824511/download    Test performed by PCR.   PROTIME-INR - Normal   TROPONIN (IN-HOUSE) - " Normal    Narrative:     Troponin T Reference Range:  <= 0.03 ng/mL-   Negative for AMI  >0.03 ng/mL-     Abnormal for myocardial necrosis.  Clinicians would have to utilize clinical acumen, EKG, Troponin and serial changes to determine if it is an Acute Myocardial Infarction or myocardial injury due to an underlying chronic condition.       Results may be falsely decreased if patient taking Biotin.     COVID PRE-OP / PRE-PROCEDURE SCREENING ORDER (NO ISOLATION)    Narrative:     The following orders were created for panel order COVID PRE-OP / PRE-PROCEDURE SCREENING ORDER (NO ISOLATION) - Swab, Nasopharynx.  Procedure                               Abnormality         Status                     ---------                               -----------         ------                     COVID-19,CEPHEID/SEBASTIAN,CO...[[id number]]  Normal              Final result                 Please view results for these tests on the individual orders.   URINE CULTURE   RAINBOW DRAW    Narrative:     The following orders were created for panel order [place] Draw.  Procedure                               Abnormality         Status                     ---------                               -----------         ------                     Green Top (Gel)[[id number]]                                  Final result               Lavender Top[[id number]]                                     Final result               Gold Top - SST[[id number]]                                   Final result               Light Blue Top[[id number]]                                   Final result                 Please view results for these tests on the individual orders.   POCT GLUCOSE FINGERSTICK   GREEN TOP   LAVENDER TOP   GOLD TOP - SST   LIGHT BLUE TOP   CBC AND DIFFERENTIAL    Narrative:     The following orders were created for panel order CBC & Differential.  Procedure                               Abnormality         Status                     ---------                                -----------         ------                     CBC Auto Differential[[id number]]        Abnormal            Final result                 Please view results for these tests on the individual orders.     CT Head Without Contrast    Result Date: 8/17/2022  No acute intracranial abnormality. No change from previous CT. Multiple remote infarcts with diffuse volume loss and severe chronic small vessel ischemic change. Brain MRI is more sensitive to evaluate for acute or subacute infarcts and to evaluate for intracranial metastatic disease.  Electronically Signed By-Daniella Gates MD On:8/17/2022 9:03 PM This report was finalized on [id number] by  Daniella Gates MD.    CT Abdomen Pelvis With Contrast    Result Date: 8/17/2022  1.     Enlarged prostate gland with wall thickening and diverticula of urinary bladder likely due to chronic bladder outlet obstruction. Prostate enlargement could be due to hyperplasia or neoplasia. 2.     Diverticulosis without diverticulitis. 3.     Limited evaluation of the kidneys due to respiratory motion. Small renal cysts are suspected. No obstruction by CT. 4.     Mild hepatic steatosis. 5.     Small hiatal hernia.    Electronically Signed By-Daniella Gates MD On:8/17/2022 9:26 PM This report was finalized on [id number] by  Daniella Gates MD.    XR Chest 1 View    Result Date: 8/17/2022  Left lower lobe airspace opacity atelectasis versus pneumonia. Cardiomegaly and chronic changes of the chest.  Electronically Signed By-Daniella Gates MD On:8/17/2022 8:33 PM This report was finalized on [id number] by  Daniella Gates MD.                                         [facility]       Chart review: Allergies reviewed.  EKG: EKG reviewed by myself interpreted by , shows sinus rhythm 60 bpm, no ST elevation apparent.  Imaging:    CT Abdomen Pelvis With Contrast   Final Result   1.     Enlarged prostate gland with wall thickening and diverticula of   urinary bladder likely due to  "chronic bladder outlet obstruction.   Prostate enlargement could be due to hyperplasia or neoplasia.   2.     Diverticulosis without diverticulitis.   3.     Limited evaluation of the kidneys due to respiratory motion.   Small renal cysts are suspected. No obstruction by CT.   4.     Mild hepatic steatosis.   5.     Small hiatal hernia.               Electronically Signed By-Daniella Gates MD On:8/17/2022 9:26 PM   This report was finalized on [id number] by  Daniella Gates MD.      CT Head Without Contrast   Final Result   No acute intracranial abnormality. No change from previous CT. Multiple   remote infarcts with diffuse volume loss and severe chronic small vessel   ischemic change.   Brain MRI is more sensitive to evaluate for acute or subacute infarcts   and to evaluate for intracranial metastatic disease.       Electronically Signed By-Daniella Gates MD On:8/17/2022 9:03 PM   This report was finalized on [id number] by  Daniella Gates MD.      XR Chest 1 View   Final Result   Left lower lobe airspace opacity atelectasis versus pneumonia.   Cardiomegaly and chronic changes of the chest.       Electronically Signed By-Daniella Gates MD On:8/17/2022 8:33 PM   This report was finalized on [id number] by  Daniella Gates MD.          Labs: White blood cell count elevated at 13.2 otherwise largely unremarkable CBC and CMP.  UA shows 2+ blood, 2+ leukocyte Estrace and 31-50 WBCs.  COVID-19 swab negative.  Initial troponin negative.  Vitals:  /95   Pulse 69   Temp 97.8 °F (36.6 °C) (Oral)   Resp 20   Ht 177.8 cm (70\")   Wt 73.5 kg (162 lb)   SpO2 98%   BMI 23.24 kg/m²     Medications given:    Medications   sodium chloride 0.9 % flush 10 mL (has no administration in time range)   sodium chloride 0.9 % bolus 500 mL (500 mL Intravenous New Bag 8/17/22 2126)   iopamidol (ISOVUE-370) 76 % injection 100 mL (100 mL Intravenous Given 8/17/22 2109)   azithromycin (ZITHROMAX) 500 mg in sodium chloride 0.9 % 250 mL IVPB-VTB " (500 mg Intravenous Given 8/17/22 2239)   cefTRIAXone (ROCEPHIN) 1 g in sodium chloride 0.9 % 100 mL IVPB (1 g Intravenous New Bag 8/17/22 2231)       Procedures:    MDM: Patient is an 82-year-old male comes in complaining of generalized weakness and recurrent UTIs.  White blood cell count elevated at 13.2 otherwise largely unremarkable CBC and CMP.  UA shows 2+ blood, 2+ leukocyte Estrace and 31-50 WBCs.  COVID-19 swab negative.  Initial troponin negative.  EKG shows no acute findings.  Chest x-ray shows questionable pneumonia versus atelectasis in left lower lobe.  CT head unremarkable for acute findings.  CT abdomen pelvis with contrast shows enlarged prostate gland with wall thickening of the diverticula of urinary bladder likely due to chronic outlet obstruction.  Prostate enlargement could be due to hyperplasia or neoplasia.  Patient started on Rocephin for apparent UTI as well as azithromycin for pneumonia coverage given patient's white blood cell count.  Patient to be admitted to hospitalist team given patient's generalized weakness and apparent infection.  Patient not triggering sepsis at this time and vital signs stable and patient afebrile.  Patient is on baseline 3 L nasal cannula.  Spoke with CORNELIUS Worley, who accepted patient behalf hospitalist team and Dr. Powell for admission to hospital further work-up and treatment.  Results and plan discussed with patient and is agreeable with plan.    Final diagnoses:   Acute UTI   Generalized weakness   Dysuria   Leukocytosis, unspecified type       ED Disposition  ED Disposition     ED Disposition   Decision to Admit    Condition   --    Comment   Level of Care: Telemetry [5]   Admitting Physician: CHANTELL POWELL [[id number]]   Attending Physician: CHANTELL POWELL [[id number]]               No follow-up provider specified.       Medication List      No changes were made to your prescriptions during this visit.          Noé Henderson PA  08/17/22 9914

## 2022-08-18 ENCOUNTER — READMISSION MANAGEMENT (OUTPATIENT)
Dept: CALL CENTER | Facility: HOSPITAL | Age: 82
End: 2022-08-18

## 2022-08-18 VITALS
TEMPERATURE: 97.9 F | HEIGHT: 70 IN | SYSTOLIC BLOOD PRESSURE: 143 MMHG | RESPIRATION RATE: 18 BRPM | HEART RATE: 69 BPM | WEIGHT: 161.6 LBS | OXYGEN SATURATION: 96 % | DIASTOLIC BLOOD PRESSURE: 93 MMHG | BODY MASS INDEX: 23.13 KG/M2

## 2022-08-18 PROBLEM — R41.3 POOR SHORT-TERM MEMORY: Status: ACTIVE | Noted: 2022-07-01

## 2022-08-18 PROBLEM — J18.9 PNEUMONIA: Status: ACTIVE | Noted: 2022-08-18

## 2022-08-18 PROBLEM — N39.0 ACUTE UTI: Status: RESOLVED | Noted: 2022-08-17 | Resolved: 2022-08-18

## 2022-08-18 PROBLEM — N39.0 ACUTE UTI (URINARY TRACT INFECTION): Status: RESOLVED | Noted: 2021-10-21 | Resolved: 2022-08-18

## 2022-08-18 PROBLEM — R82.90 ABNORMAL FINDING ON URINALYSIS: Status: ACTIVE | Noted: 2022-08-18

## 2022-08-18 LAB
ANION GAP SERPL CALCULATED.3IONS-SCNC: 8 MMOL/L (ref 5–15)
BASOPHILS # BLD AUTO: 0.1 10*3/MM3 (ref 0–0.2)
BASOPHILS NFR BLD AUTO: 0.7 % (ref 0–1.5)
BUN SERPL-MCNC: 16 MG/DL (ref 8–23)
BUN/CREAT SERPL: 19.3 (ref 7–25)
CALCIUM SPEC-SCNC: 8.9 MG/DL (ref 8.6–10.5)
CHLORIDE SERPL-SCNC: 105 MMOL/L (ref 98–107)
CO2 SERPL-SCNC: 27 MMOL/L (ref 22–29)
CREAT SERPL-MCNC: 0.83 MG/DL (ref 0.76–1.27)
DEPRECATED RDW RBC AUTO: 47.3 FL (ref 37–54)
EGFRCR SERPLBLD CKD-EPI 2021: 87.4 ML/MIN/1.73
EOSINOPHIL # BLD AUTO: 0.2 10*3/MM3 (ref 0–0.4)
EOSINOPHIL NFR BLD AUTO: 1.8 % (ref 0.3–6.2)
ERYTHROCYTE [DISTWIDTH] IN BLOOD BY AUTOMATED COUNT: 15.4 % (ref 12.3–15.4)
GLUCOSE SERPL-MCNC: 88 MG/DL (ref 65–99)
HCT VFR BLD AUTO: 43.1 % (ref 37.5–51)
HGB BLD-MCNC: 14.2 G/DL (ref 13–17.7)
LYMPHOCYTES # BLD AUTO: 2.4 10*3/MM3 (ref 0.7–3.1)
LYMPHOCYTES NFR BLD AUTO: 18.2 % (ref 19.6–45.3)
MCH RBC QN AUTO: 28.9 PG (ref 26.6–33)
MCHC RBC AUTO-ENTMCNC: 32.9 G/DL (ref 31.5–35.7)
MCV RBC AUTO: 87.8 FL (ref 79–97)
MONOCYTES # BLD AUTO: 1 10*3/MM3 (ref 0.1–0.9)
MONOCYTES NFR BLD AUTO: 7.5 % (ref 5–12)
NEUTROPHILS NFR BLD AUTO: 71.8 % (ref 42.7–76)
NEUTROPHILS NFR BLD AUTO: 9.7 10*3/MM3 (ref 1.7–7)
NRBC BLD AUTO-RTO: 0 /100 WBC (ref 0–0.2)
PLATELET # BLD AUTO: 264 10*3/MM3 (ref 140–450)
PMV BLD AUTO: 7.8 FL (ref 6–12)
POTASSIUM SERPL-SCNC: 4 MMOL/L (ref 3.5–5.2)
RBC # BLD AUTO: 4.91 10*6/MM3 (ref 4.14–5.8)
SARS-COV-2 RNA RESP QL NAA+PROBE: NOT DETECTED
SODIUM SERPL-SCNC: 140 MMOL/L (ref 136–145)
WBC NRBC COR # BLD: 13.5 10*3/MM3 (ref 3.4–10.8)

## 2022-08-18 PROCEDURE — 85025 COMPLETE CBC W/AUTO DIFF WBC: CPT

## 2022-08-18 PROCEDURE — G0378 HOSPITAL OBSERVATION PER HR: HCPCS

## 2022-08-18 PROCEDURE — 87040 BLOOD CULTURE FOR BACTERIA: CPT

## 2022-08-18 PROCEDURE — 97530 THERAPEUTIC ACTIVITIES: CPT

## 2022-08-18 PROCEDURE — 94664 DEMO&/EVAL PT USE INHALER: CPT

## 2022-08-18 PROCEDURE — 36415 COLL VENOUS BLD VENIPUNCTURE: CPT

## 2022-08-18 PROCEDURE — 97165 OT EVAL LOW COMPLEX 30 MIN: CPT

## 2022-08-18 PROCEDURE — 63710000001 PREDNISONE PER 5 MG: Performed by: HOSPITALIST

## 2022-08-18 PROCEDURE — 94640 AIRWAY INHALATION TREATMENT: CPT

## 2022-08-18 PROCEDURE — 80048 BASIC METABOLIC PNL TOTAL CA: CPT

## 2022-08-18 PROCEDURE — U0005 INFEC AGEN DETEC AMPLI PROBE: HCPCS | Performed by: HOSPITALIST

## 2022-08-18 PROCEDURE — U0003 INFECTIOUS AGENT DETECTION BY NUCLEIC ACID (DNA OR RNA); SEVERE ACUTE RESPIRATORY SYNDROME CORONAVIRUS 2 (SARS-COV-2) (CORONAVIRUS DISEASE [COVID-19]), AMPLIFIED PROBE TECHNIQUE, MAKING USE OF HIGH THROUGHPUT TECHNOLOGIES AS DESCRIBED BY CMS-2020-01-R: HCPCS | Performed by: HOSPITALIST

## 2022-08-18 PROCEDURE — 94799 UNLISTED PULMONARY SVC/PX: CPT

## 2022-08-18 PROCEDURE — 99217 PR OBSERVATION CARE DISCHARGE MANAGEMENT: CPT | Performed by: HOSPITALIST

## 2022-08-18 RX ORDER — DOCUSATE SODIUM 100 MG/1
100 CAPSULE, LIQUID FILLED ORAL NIGHTLY
COMMUNITY

## 2022-08-18 RX ORDER — ESCITALOPRAM OXALATE 10 MG/1
10 TABLET ORAL DAILY
Status: DISCONTINUED | OUTPATIENT
Start: 2022-08-18 | End: 2022-08-18 | Stop reason: HOSPADM

## 2022-08-18 RX ORDER — LEVOTHYROXINE SODIUM 0.03 MG/1
25 TABLET ORAL
Status: DISCONTINUED | OUTPATIENT
Start: 2022-08-18 | End: 2022-08-18 | Stop reason: HOSPADM

## 2022-08-18 RX ORDER — CLOPIDOGREL BISULFATE 75 MG/1
75 TABLET ORAL DAILY
Status: DISCONTINUED | OUTPATIENT
Start: 2022-08-18 | End: 2022-08-18 | Stop reason: HOSPADM

## 2022-08-18 RX ORDER — ECHINACEA PURPUREA EXTRACT 125 MG
1 TABLET ORAL AS NEEDED
COMMUNITY

## 2022-08-18 RX ORDER — DOCUSATE SODIUM 100 MG/1
100 CAPSULE, LIQUID FILLED ORAL NIGHTLY
Status: DISCONTINUED | OUTPATIENT
Start: 2022-08-18 | End: 2022-08-18 | Stop reason: HOSPADM

## 2022-08-18 RX ORDER — GALANTAMINE HYDROBROMIDE 4 MG/1
8 TABLET, FILM COATED ORAL 2 TIMES DAILY
Status: DISCONTINUED | OUTPATIENT
Start: 2022-08-18 | End: 2022-08-18 | Stop reason: HOSPADM

## 2022-08-18 RX ORDER — GUAIFENESIN 600 MG/1
300 TABLET, EXTENDED RELEASE ORAL AS NEEDED
COMMUNITY

## 2022-08-18 RX ORDER — FAMOTIDINE 40 MG/1
40 TABLET, FILM COATED ORAL DAILY
COMMUNITY

## 2022-08-18 RX ORDER — ROSUVASTATIN CALCIUM 10 MG/1
20 TABLET, COATED ORAL EVERY EVENING
Status: DISCONTINUED | OUTPATIENT
Start: 2022-08-18 | End: 2022-08-18 | Stop reason: HOSPADM

## 2022-08-18 RX ORDER — BRIMONIDINE TARTRATE 2 MG/ML
1 SOLUTION/ DROPS OPHTHALMIC 2 TIMES DAILY
Status: DISCONTINUED | OUTPATIENT
Start: 2022-08-18 | End: 2022-08-18 | Stop reason: HOSPADM

## 2022-08-18 RX ORDER — GUAIFENESIN 600 MG/1
600 TABLET, EXTENDED RELEASE ORAL AS NEEDED
Status: DISCONTINUED | OUTPATIENT
Start: 2022-08-18 | End: 2022-08-18 | Stop reason: HOSPADM

## 2022-08-18 RX ORDER — CLOPIDOGREL BISULFATE 75 MG/1
75 TABLET ORAL DAILY
Status: DISCONTINUED | OUTPATIENT
Start: 2022-08-18 | End: 2022-08-18

## 2022-08-18 RX ORDER — PREDNISONE 1 MG/1
5 TABLET ORAL DAILY
Status: DISCONTINUED | OUTPATIENT
Start: 2022-08-18 | End: 2022-08-18 | Stop reason: HOSPADM

## 2022-08-18 RX ORDER — BUDESONIDE AND FORMOTEROL FUMARATE DIHYDRATE 160; 4.5 UG/1; UG/1
2 AEROSOL RESPIRATORY (INHALATION)
Status: DISCONTINUED | OUTPATIENT
Start: 2022-08-18 | End: 2022-08-18 | Stop reason: HOSPADM

## 2022-08-18 RX ORDER — CEFDINIR 300 MG/1
300 CAPSULE ORAL 2 TIMES DAILY
Qty: 10 CAPSULE | Refills: 0 | Status: SHIPPED | OUTPATIENT
Start: 2022-08-18 | End: 2022-08-23

## 2022-08-18 RX ORDER — MIDODRINE HYDROCHLORIDE 2.5 MG/1
2.5 TABLET ORAL
Status: DISCONTINUED | OUTPATIENT
Start: 2022-08-18 | End: 2022-08-18 | Stop reason: HOSPADM

## 2022-08-18 RX ORDER — ISOSORBIDE MONONITRATE 30 MG/1
15 TABLET, EXTENDED RELEASE ORAL AS NEEDED
COMMUNITY

## 2022-08-18 RX ORDER — FAMOTIDINE 20 MG/1
40 TABLET, FILM COATED ORAL DAILY
Status: DISCONTINUED | OUTPATIENT
Start: 2022-08-18 | End: 2022-08-18 | Stop reason: HOSPADM

## 2022-08-18 RX ADMIN — ESCITALOPRAM OXALATE 10 MG: 10 TABLET ORAL at 09:26

## 2022-08-18 RX ADMIN — Medication 10 ML: at 09:33

## 2022-08-18 RX ADMIN — BRIMONIDINE TARTRATE 1 DROP: 2 SOLUTION/ DROPS OPHTHALMIC at 15:45

## 2022-08-18 RX ADMIN — PREDNISONE 5 MG: 5 TABLET ORAL at 09:26

## 2022-08-18 RX ADMIN — LEVOTHYROXINE SODIUM 25 MCG: 0.03 TABLET ORAL at 09:26

## 2022-08-18 RX ADMIN — CLOPIDOGREL BISULFATE 75 MG: 75 TABLET ORAL at 09:26

## 2022-08-18 RX ADMIN — GALANTAMINE 8 MG: 4 TABLET, FILM COATED ORAL at 09:30

## 2022-08-18 RX ADMIN — IPRATROPIUM BROMIDE 0.5 MG: 0.5 SOLUTION RESPIRATORY (INHALATION) at 11:40

## 2022-08-18 NOTE — PLAN OF CARE
Goal Outcome Evaluation:                   Patient admitted from ED for UTI. Patient is from home with wife.

## 2022-08-18 NOTE — SIGNIFICANT NOTE
Attempt x 2 this date.  1st attempt pt declined secondary to just getting back to bed after mobilizing with OT.  2nd attempt- refused secondary to eating lunch.

## 2022-08-18 NOTE — PLAN OF CARE
Goal Outcome Evaluation:  Plan of Care Reviewed With: patient, spouse        Progress: improving   Alert and oriented x 2. Hard of hearing. Able to verbalize needs and wants. Takes medication whole and tolerates well. Continues to require O2 therapy at 3 LPM and tolerating well. Discharge orders in place. Patient and spouse agreeable. No c/o pain/discomfort/SOB noted. IV removed, tip intact. COVID swab collected and tolerated well. Currently in bed, awake. Call bell in place, wife at bedside.

## 2022-08-18 NOTE — CONSULTS
The patient shared why he was here and how much better he was feeling.  His wife was present and clarified as the patient shared his story.  The pt became quiet and listen to his wife share about their/her manuela.  He joined in at times but he seemed tired.     The pt's wife explain her spiritual journey as a watkins and each day she wars against the things that come against her/them.  We discussed the theology of her thoughts and reasoning.     The pt is being discharged today - confirmed by Rn.

## 2022-08-18 NOTE — DISCHARGE PLACEMENT REQUEST
"Barrington Bond (82 y.o. Male)             Date of Birth   1940    Social Security Number       Address   [address] [place] [facility]    Home Phone   [phone]    MRN   [id number]       P[employer]ec[employer]al   Faith    Marital Status                               Admission Date   8/17/22    Admission Type   Emergency    Admitting Provider   Stephen Bryant DO    Attending Provider   Stephen Bryant DO    Department, Room/Bed   [facility] 3C MEDICAL INPATIENT, 377/1       Discharge Date       Discharge Disposition   Home or Self Care    Discharge Destination                               Attending Provider: Stephen Bryant DO    Allergies: Trazodone, Doxycycline, Ciprofloxacin, Doxycycline, Fosfomycin Tromethamine, Gemtesa [Vibegron], Keppra [Levetiracetam], Macrobid [Nitrofurantoin], Quetiapine, Sulfa Antibiotics    Isolation: None   Infection: COVID Screen (preop/placement) (08/18/22)   Code Status: CPR   Advance Care Planning Activity    Ht: 177.8 cm (70\")   Wt: 73.3 kg (161 lb 9.6 oz)    Admission Cmt: None   Principal Problem: CAP (community acquired pneumonia) [J18.9]                 Active Insurance as of 8/17/2022     Primary Coverage     Payor Plan Insurance Group Employer/Plan Group    MEDICARE MEDICARE A & B      Payor Plan Address Payor Plan Phone Number Payor Plan Fax Number Effective Dates    [address] [phone]  8/1/2005 - None Entered    [facility] [id number]       Subscriber Name Subscriber Birth Date Member ID       BARRINGTON BOND HAMLET 1940 [id number]           Secondary Coverage     Payor Plan Insurance Group Employer/Plan Group    STANDARD LIFE ACCIDENT STANDARD LIFE ACCIDENT      Payor Plan Address Payor Plan Phone Number Payor Plan Fax Number Effective Dates    [address]   9/27/2019 - None Entered    [place] [id number]       Subscriber Name Subscriber Birth Date Member BARRINGTON JARVIS HAMLET 1940 [id number]           "       Emergency Contacts      (Rel.) Home Phone Work Phone Mobile Phone    MATT CARREON (Spouse) [phone] -- [phone]

## 2022-08-18 NOTE — CASE MANAGEMENT/SOCIAL WORK
Discharge Planning Assessment   [facility]     Patient Name: Jayden Bond  MRN: [id number]  Today's Date: 8/18/2022    Admit Date: 8/17/2022     Discharge Needs Assessment     Row Name 08/18/22 1425       Living Environment    People in Home spouse    Name(s) of People in Home [place]    Current Living Arrangements home    Primary Care Provided by spouse/significant other;self    Provides Primary Care For no one, unable/limited ability to care for self    Family Caregiver if Needed spouse;child(luisa), adult;friend(s)    Family Caregiver Names Daughter-Cande, Son-Celestino, Friend-Emma    Quality of Family Relationships helpful;involved;supportive    Able to Return to Prior Arrangements yes       Resource/Environmental Concerns    Resource/Environmental Concerns none    Transportation Concerns none       Transition Planning    Patient/Family Anticipates Transition to home with family;home with help/services    Patient/Family Anticipated Services at Transition home health care    Transportation Anticipated family or friend will provide       Discharge Needs Assessment    Readmission Within the Last 30 Days no previous admission in last 30 days    Current Outpatient/Agency/Support Group homecare agency  [facility]    Equipment Currently Used at Home rollator;oxygen;ramp;commode    Concerns to be Addressed denies needs/concerns at this time;no discharge needs identified    Anticipated Changes Related to Illness none    Equipment Needed After Discharge none    Outpatient/Agency/Support Group Needs homecare agency    Provided Post Acute Provider List? N/A    Provided Post Acute Provider Quality & Resource List? N/A               Discharge Plan     Row Name 08/18/22 1433       Plan    Plan DC plan: Home with wife and [facility] (current, no TAM order needed at this time). Home O2 w/ [facility].    Patient/Family in Agreement with Plan yes    Provided Post Acute Provider List? N/A    Provided Post Acute Provider Quality &  Resource List? N/A    Plan Comments Met with patient and wife Nava at bedside. They live at home together. Pt does not drive and requires assistance with ADL's. PCP and pharmacy confirmed. Pt uses a rollator, BSC, and 3L O2 (supplied by [facility]). Pt is currently getting set up with a transfer wheelchair. Pt is current with [facility]. Notified liaison Sara and she verified services. Pt has a family friend (Emma) who provides transportation. She will be picking up for discharge. Wife is requesting a repeat Covid swab today in preparation for outpatient EGD. Dr Mendiola is aware. No additional needs/services identified at this time.              Continued Care and Services - Admitted Since 8/17/2022     Home Medical Care Coordination complete.    Service Provider Request Status Selected Services Address Phone Fax Patient Preferred    [facility]   Selected [facility] [address] [place] [address] [place] [address] [phone] -- --               Demographic Summary     Row Name 08/18/22 1424       General Information    Admission Type observation    Arrived From emergency department    Referral Source admission list    Reason for Consult discharge planning;care coordination/care conference    Preferred Language English       Contact Information    Permission Granted to Share Info With                Functional Status     Row Name 08/18/22 1425       Functional Status    Usual Activity Tolerance fair    Current Activity Tolerance fair       Functional Status, IADL    Medications assistive equipment and person    Meal Preparation assistive equipment and person    Housekeeping assistive equipment and person    Laundry assistive equipment and person    Shopping assistive equipment and person              Met with patient in room wearing PPE: mask, face shield/goggles.      Maintained distance greater than six feet and spent less than 15 minutes in the  room.      Megan Naegele, RN      Office Phone: [phone]  Office Cell: [phone]

## 2022-08-18 NOTE — H&P
[facility] Medicine Services      Patient Name: Jayden Bond  : 1940  MRN: [id number]  Primary Care Physician:  Ant Hines MD  Date of admission: 2022      Subjective      Chief Complaint: Weakness and a productive cough    History of Present Illness: Jayden Bond is a 82 y.o. male with a past medical history of CAD, sick sinus syndrome s/p pacemaker placement, CVA, dementia, depression, hypothyroidism, overactive bladder, BPH s/p greenlight procedure on 2022, recurrent UTIs, hypotension, GERD, hyperlipidemia who presented to [facility] on 2022 complaining of generalized weakness and a productive cough.  Family at the bedside states that the patient was started on fosfomycin on Monday, 8/15/2022.  They report that he has an EGD scheduled with Dr. Schuster next week.  This was previously canceled due to the patient having a UTI and the doctor gave them the fosfomycin to prevent another cancellation.  The family also reports that the last time the patient took fosfomycin he also became weak and confused.    In the ED, a UA showed 2+ leukocytes.  A urine culture is pending.  Chest x-ray showed left lower lobe airspace opacity atelectasis versus pneumonia.  A CT of the abdomen and pelvis with contrast showed enlarged prostate gland with wall thickening and diverticula of the urinary bladder likely due to chronic bladder outlet obstruction.  Diverticulosis without diverticulitis.  A CT of the head without contrast shows no acute intracranial abnormality and no changes from the previous CT.  EKG shows sinus rhythm, heart rate 60.  Troponin is negative, WBC 13.2.  All other labs are unremarkable.Patient is afebrile, all vital signs are stable.  SPO2 is 97% on O2 at 2 L per NC.  Hospitalist was consulted for further care and management.    Review of Systems   HENT: Negative.    Eyes: Negative.    Cardiovascular: Negative.    Respiratory: Positive for cough.     Hematologic/Lymphatic: Negative.    Skin: Negative.    Musculoskeletal: Negative.    Gastrointestinal: Negative.    Genitourinary: Negative.    Neurological: Positive for weakness.   Psychiatric/Behavioral: Positive for altered mental status.   Allergic/Immunologic: Negative.       Personal History     Past Medical History:   Diagnosis Date   • CAD (coronary artery disease)    • Dementia (HCC)    • Depression    • Disease of thyroid gland    • Dysautonomia (HCC)    • Dyslipidemia    • GERD (gastroesophageal reflux disease)    • Head pain    • Hyperlipidemia    • Hypertension    • SSS (sick sinus syndrome) (HCC)    • Stroke (HCC)     x 3 in 2018       Past Surgical History:   Procedure Laterality Date   • APPENDECTOMY     • CHOLECYSTECTOMY     • CYSTOSCOPY     • ENDOSCOPY N/A 10/17/2019    Procedure: ESOPHAGOGASTRODUODENOSCOPY with biopsy x 2 areas;  Surgeon: Ashok Sanchez MD;  Location: [facility] ENDOSCOPY;  Service: Gastroenterology   • HERNIA REPAIR     • INSERT / REPLACE / REMOVE PACEMAKER     • LEFT HEART CATH     • PACEMAKER IMPLANTATION      [employer]   • TEMPORAL ARTERY BIOPSY     • WRIST SURGERY      severed artery       Family History: family history includes Heart disease in his father. Otherwise pertinent FHx was reviewed and not pertinent to current issue.    Social History:  reports that he has quit smoking. He has never used smokeless tobacco. He reports that he does not drink alcohol and does not use drugs.    Home Medications:  Prior to Admission Medications     Prescriptions Last Dose Informant Patient Reported? Taking?    acetaminophen (TYLENOL) 500 MG tablet   Yes No    Take 1,000 mg by mouth Every 6 (Six) Hours As Needed for Mild Pain .    apixaban (ELIQUIS) 5 MG tablet tablet   Yes No    Take 5 mg by mouth 2 (Two) Times a Day.    brimonidine (ALPHAGAN) 0.2 % ophthalmic solution   No No    Administer 1 drop to both eyes 2 (Two) Times a Day.    clopidogrel (PLAVIX) 75 MG tablet   No No    TAKE 1  TABLET BY MOUTH EVERY DAY    escitalopram (LEXAPRO) 10 MG tablet   Yes No    Take 10 mg by mouth Daily.    fluticasone (FLONASE) 50 MCG/ACT nasal spray   Yes No    1 spray into the nostril(s) as directed by provider Every Morning.    galantamine (RAZADYNE) 4 MG tablet   Yes No    Take 4 mg by mouth.    Incruse Ellipta 62.5 MCG/INH aerosol powder   Spouse/Significant Other Yes No    Take 1 puff by mouth Daily.    levothyroxine (SYNTHROID, LEVOTHROID) 25 MCG tablet   Yes No    Take 25 mcg by mouth Every Morning.    loratadine (CLARITIN) 10 MG tablet   Yes No    Take 10 mg by mouth Daily.    midodrine (PROAMATINE) 2.5 MG tablet   Yes No    Take 2.5 mg by mouth 3 (Three) Times a Day As Needed. SBP <120    midodrine (PROAMATINE) 5 MG tablet   No No    Take 0.5 tablets by mouth 3 (Three) Times a Day As Needed (SBP <120).    Multiple Vitamins-Minerals (MULTIVITAMIN WITH MINERALS) tablet tablet   Yes No    Take 1 tablet by mouth Every Night.    nitroglycerin (NITROSTAT) 0.4 MG SL tablet   Yes No    Place 0.4 mg under the tongue Every 5 (Five) Minutes As Needed for Chest Pain. Take no more than 3 doses in 15 minutes.    pantoprazole (PROTONIX) 40 MG EC tablet   Yes No    Take 40 mg by mouth 2 (two) times a day. At least 4 hours after synthroid    predniSONE (DELTASONE) 5 MG tablet   Yes No    Take 5 mg by mouth Daily.    rosuvastatin (CRESTOR) 20 MG tablet   Yes No    Take 20 mg by mouth Every Evening.    Vibegron (Gemtesa) 75 MG tablet   Yes No    Take 1 tablet by mouth Every Morning.        Allergies:  Allergies   Allergen Reactions   • Trazodone Hallucinations   • Doxycycline GI Intolerance   • Ciprofloxacin Other (See Comments)     Breathing problems   • Doxycycline Nausea And Vomiting   • Fosfomycin Tromethamine Confusion     Reports weakness and confusion   • Gemtesa [Vibegron] Other (See Comments)     Reports urinary retention   • Keppra [Levetiracetam] GI Intolerance   • Macrobid [Nitrofurantoin] Other (See Comments)      Unknown reaction   • Quetiapine Other (See Comments)     Having falls on it   • Sulfa Antibiotics Other (See Comments)     Unknown reaction       Objective      Vitals:   Temp:  [97.8 °F (36.6 °C)] 97.8 °F (36.6 °C)  Heart Rate:  [60-70] 64  Resp:  [20] 20  BP: (129-174)/(79-99) 159/97  Flow (L/min):  [3] 3    Physical Exam  Vitals and nursing note reviewed.   Constitutional:       Appearance: Normal appearance.   HENT:      Head: Normocephalic and atraumatic.      Nose: Nose normal.      Mouth/Throat:      Mouth: Mucous membranes are moist.   Eyes:      Extraocular Movements: Extraocular movements intact.      Pupils: Pupils are equal, round, and reactive to light.   Cardiovascular:      Rate and Rhythm: Normal rate and regular rhythm.      Pulses: Normal pulses.      Heart sounds: Normal heart sounds.   Pulmonary:      Effort: Pulmonary effort is normal.      Breath sounds: Normal breath sounds.   Abdominal:      General: Bowel sounds are normal.      Palpations: Abdomen is soft.   Musculoskeletal:         General: Normal range of motion.      Cervical back: Normal range of motion.   Skin:     General: Skin is warm and dry.   Neurological:      General: No focal deficit present.      Mental Status: He is alert. He is disoriented.      Comments: Patient alert to self, and place.  He understands he is in the hospital.  He continues to pick at wires and needs to be redirected often and reminded why the wires are there.   Psychiatric:         Mood and Affect: Mood normal.         Behavior: Behavior normal. Behavior is cooperative.       Result Review    Result Review:  I have personally reviewed the results from the time of this admission to 8/18/2022 01:29 EDT and agree with these findings:  [x]  Laboratory  [x]  Microbiology  [x]  Radiology  [x]  EKG/Telemetry   []  Cardiology/Vascular   []  Pathology  []  Old records  []  Other:  Most notable findings include: As above    Assessment & Plan      Active Hospital  Problems:  Active Hospital Problems    Diagnosis    • **CAP (community acquired pneumonia)    • Abnormal finding on urinalysis    • Mixed hyperlipidemia    • SSS (sick sinus syndrome) ([facility])    • Dementia ([facility])    • Coronary artery disease involving native coronary artery of native heart without angina pectoris    • Anticoagulant long-term use    • COPD (chronic obstructive pulmonary disease) ([facility])    • History of CVA (cerebrovascular accident)    • Dysautonomia orthostatic hypotension syndrome    • Cardiac pacemaker in situ      Plan:     Medications not verified at the time of assessment and plan    Pneumonia  COPD   Chronic respiratory failure with hypoxia  -Productive cough and weakness  -X-ray of the chest reviewed  -COVID-negative  -WBC 13. 2, monitor  -Procalcitonin pending  -Blood cultures pending  -Respiratory culture ordered  -DuoNebs ordered  -Rocephin and doxycycline given in ED  -Continue Rocephin and doxycycline  -Patient is on O2 at 3 L per NC baseline  -Supplemental oxygen to keep sats greater than 92%  -Incentive spirometry  -Cough and deep breathe  -No COPD exacerbation  -Continue home Ellipta    Abnormal findings on UA  -2+ leukocytes noted  -Likely an inflammatory process related to undergoing a greenlight procedure 7/29/2022 rather than infectious  -No bacteria, negative nitrites  -Patient reports no dysuria, abdominal pain, flank pain  -No need for antibiotics for UTI at this point     Dementia  -Chronic, stable  -Continue home galantamine    Hypothyroidism  -Continue home levothyroxine    CAD with hyperlipidemia  History of sick sinus syndrome  S/p pacemaker placement  History of CVA  -EKG reviewed  -Continue home apixaban, Plavix, Nitrostat, rosuvastatin     Dysautonomic orthostatic hypotension syndrome  -Chronic, stable  -Continue home midodrine    DVT prophylaxis:  Medical DVT prophylaxis orders are present.    CODE STATUS:    Code Status (Patient has no pulse and is not breathing): CPR  (Attempt to Resuscitate)  Medical Interventions (Patient has pulse or is breathing): Full Support    Admission Status:  I believe this patient meets inpatient status.    I discussed the patient's findings and my recommendations with patient.    This patient has been examined wearing appropriate Personal Protective Equipment  08/18/22      Signature: Electronically signed by Brunilda Haas DNP, DEANDRA, 08/18/22, 1:27 AM EDT.

## 2022-08-18 NOTE — PROGRESS NOTES
[facility] Medicine Services Daily Progress Note    Patient Name: Jayden Bond  : 1940  MRN: [id number]  Primary Care Physician:  Ant Hines MD  Date of admission: 2022      Subjective      Chief Complaint: Weakness      Patient Reports     22: Wife at the bedside.  She was concerned about the patient's weakness at home.  Patient back to baseline.  Ambulated with physical therapy.  No history of aspiration syndrome.  Will discharge home.  Plan for outpatient EGD with esophageal dilation.  Wife wants the patient tested for COVID    Review of Systems   All other systems reviewed and are negative.         Objective      Vitals:   Temp:  [97.5 °F (36.4 °C)-97.8 °F (36.6 °C)] 97.5 °F (36.4 °C)  Heart Rate:  [60-70] 68  Resp:  [17-20] 18  BP: (129-174)/() 152/103  Flow (L/min):  [2-3] 2    Physical Exam  HENT:      Head: Normocephalic.      Mouth/Throat:      Mouth: Mucous membranes are moist.   Eyes:      Extraocular Movements: Extraocular movements intact.      Pupils: Pupils are equal, round, and reactive to light.   Cardiovascular:      Rate and Rhythm: Normal rate and regular rhythm.   Pulmonary:      Effort: Pulmonary effort is normal.   Abdominal:      General: Bowel sounds are normal.   Musculoskeletal:         General: Normal range of motion.      Cervical back: Normal range of motion.   Skin:     General: Skin is warm.   Neurological:      Mental Status: He is alert. Mental status is at baseline.   Psychiatric:         Mood and Affect: Mood normal.          Result Review    Result Review:  I have personally reviewed the results from the time of this admission to 2022 13:44 EDT and agree with these findings:  [x]  Laboratory  []  Microbiology  [x]  Radiology  []  EKG/Telemetry   []  Cardiology/Vascular   []  Pathology  [x]  Old records  []  Other:            Assessment & Plan      Brief Patient Summary:        brimonidine, 1 drop, Both Eyes,  BID  budesonide-formoterol, 2 puff, Inhalation, BID - RT  cefTRIAXone, 1 g, Intravenous, Q24H  clopidogrel, 75 mg, Oral, Daily  docusate sodium, 100 mg, Oral, Nightly  doxycycline, 100 mg, Intravenous, Q12H  escitalopram, 10 mg, Oral, Daily  famotidine, 40 mg, Oral, Daily  galantamine, 8 mg, Oral, BID  ipratropium, 0.5 mg, Nebulization, 4x Daily - RT  levothyroxine, 25 mcg, Oral, Q AM  midodrine, 2.5 mg, Oral, TID AC  predniSONE, 5 mg, Oral, Daily  rosuvastatin, 20 mg, Oral, Q PM  sodium chloride, 10 mL, Intravenous, Q12H             Active Hospital Problems:  Active Hospital Problems    Diagnosis    • **CAP (community acquired pneumonia)    • Abnormal finding on urinalysis    • Pneumonia    • Mixed hyperlipidemia    • SSS (sick sinus syndrome) ([facility])    • Dementia ([facility])    • Coronary artery disease involving native coronary artery of native heart without angina pectoris    • Anticoagulant long-term use    • COPD (chronic obstructive pulmonary disease) ([facility])    • History of CVA (cerebrovascular accident)    • Dysautonomia orthostatic hypotension syndrome    • Cardiac pacemaker in situ      Pneumonia:  -Continue antibiotics    Dementia:  -Wife cares for him    CAD:  -Denies chest pain.  -On Plavix, nitro as needed and atorvastatin at home    SSS s/p pacemaker    Autonomic dysfunction causing orthostatic hypotension:  -Continue midodrine    DVT prophylaxis:  No DVT prophylaxis order currently exists.    CODE STATUS:    Code Status (Patient has no pulse and is not breathing): CPR (Attempt to Resuscitate)  Medical Interventions (Patient has pulse or is breathing): Full Support      Disposition:  I expect patient to be discharged home with wife.    This patient has been examined wearing appropriate Personal Protective Equipment and discussed with nursing. 08/18/22      Electronically signed by Stephen Bryant DO, 08/18/22, 13:44 EDT.  [facility] Hospitalist Team

## 2022-08-18 NOTE — PLAN OF CARE
Goal Outcome Evaluation:  Plan of Care Reviewed With: patient        Progress: no change  Outcome Evaluation: Pt. is 83 y/o admit w/ UTI and d/o PNA. Pt. lives at home w/ spouse, she provides max A bathing/dressing ADL support/care, states that pt. is able to ambulate to and from bathroom with assist. Pt. provided CGA for sit to stand transfer this date, completes long ambulatory transfer w/ rollator support EOB to armchair. Pt. provided complete assist for all LB ADLs including donning/doffing socks. Recommend d/c home w/ HH therapy to assess home safety and develope ADL skill/independence.

## 2022-08-18 NOTE — THERAPY EVALUATION
Patient Name: Jayden Bond  : 1940    MRN: [id number]                              Today's Date: 2022       Admit Date: 2022    Visit Dx:     ICD-10-CM ICD-9-CM   1. Acute UTI  N39.0 599.0   2. Generalized weakness  R53.1 780.79   3. Dysuria  R30.0 788.1   4. Leukocytosis, unspecified type  D72.829 288.60     Patient Active Problem List   Diagnosis   • Chest pain   • Essential hypertension   • Stroke (Formerly [facility])   • Disease of thyroid gland   • Coronary artery disease involving native coronary artery of native heart without angina pectoris   • Dysautonomia (Formerly [facility])   • Dementia (Formerly [facility])   • Anticoagulant long-term use   • Cardiac pacemaker in situ   • COPD (chronic obstructive pulmonary disease) (Formerly [facility])   • Degenerative cervical spinal stenosis   • Dysautonomia orthostatic hypotension syndrome   • Epiphora due to insufficient drainage of both sides   • History of CVA (cerebrovascular accident)   • History of PTCA   • Punctal stenosis, acquired   • Nonsustained ventricular tachycardia ([facility]ly [facility])   • Pseudophakia, both eyes   • SSS (sick sinus syndrome) (Formerly [facility])   • Palpitations   • 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 (Formerly [facility])   • Weakness   • Vascular dementia with behavior disturbance (Formerly [facility])   • Nocturnal hypoxemia   • Delirium due to multiple etiologies, persistent, hypoactive   • Cognitive safety issue   • Caregiver stress   • Dementia (Formerly [facility])   • Disease of thyroid gland   • Dysautonomia (Formerly [facility])   • Mixed hyperlipidemia   • Transient ischemic attack (TIA)   • Altered mental status   • CAP (community acquired pneumonia)   • Abnormal finding on urinalysis   • Poor short-term memory   • Pneumonia     Past Medical History:   Diagnosis Date   • CAD (coronary artery disease)    • Dementia (Formerly [facility])    • Depression    • Disease of thyroid gland    • Dysautonomia (Formerly [facility])    • Dyslipidemia    • GERD (gastroesophageal reflux disease)    • Head pain    • Hyperlipidemia    • Hypertension    • SSS (sick sinus syndrome) (Formerly [facility])    •  Stroke (HCC)     x 3 in 2018     Past Surgical History:   Procedure Laterality Date   • APPENDECTOMY     • CHOLECYSTECTOMY     • CYSTOSCOPY     • ENDOSCOPY N/A 10/17/2019    Procedure: ESOPHAGOGASTRODUODENOSCOPY with biopsy x 2 areas;  Surgeon: Ashok Sanchez MD;  Location: [facility] ENDOSCOPY;  Service: Gastroenterology   • HERNIA REPAIR     • INSERT / REPLACE / REMOVE PACEMAKER     • LEFT HEART CATH     • PACEMAKER IMPLANTATION      [employer]   • TEMPORAL ARTERY BIOPSY     • WRIST SURGERY      severed artery      General Information     Row Name 08/18/22 1625          OT Time and Intention    Document Type evaluation  -     Mode of Treatment individual therapy  -     Row Name 08/18/22 1625          General Information    Patient Profile Reviewed yes  -MP     Prior Level of Function max assist:;ADL's  -     Row Name 08/18/22 1625          Living Environment    People in Home spouse  -     Row Name 08/18/22 1625          Cognition    Orientation Status (Cognition) oriented to;person;place  -     Row Name 08/18/22 1625          Safety Issues, Functional Mobility    Impairments Affecting Function (Mobility) balance;strength;endurance/activity tolerance  -           User Key  (r) = Recorded By, (t) = Taken By, (c) = Cosigned By    Initials Name Provider Type    MP Yong Moon OT Occupational Therapist                 Mobility/ADL's     Row Name 08/18/22 1629          Bed Mobility    Bed Mobility bed mobility (all) activities  -     All Activities, [place] (Bed Mobility) contact guard  -     Row Name 08/18/22 1629          Transfers    Transfers sit-stand transfer  -     Sit-Stand [place] (Transfers) contact guard;1 person assist  -     Row Name 08/18/22 1629          Functional Mobility    Functional Mobility- Ind. Level contact guard assist;1 person  -     Row Name 08/18/22 1629          Activities of Daily Living    BADL Assessment/Intervention lower body dressing  -     Row  Name 08/18/22 1629          Lower Body Dressing Assessment/Training    [place] Level (Lower Body Dressing) doff;don;socks;dependent (less than 25% patient effort)  -MP           User Key  (r) = Recorded By, (t) = Taken By, (c) = Cosigned By    Initials Name Provider Type    Yong Metcalf OT Occupational Therapist               Obj/Interventions     Row Name 08/18/22 1631          Range of Motion Comprehensive    Comment, General Range of Motion B shoulder AROM impacted 25-50%  -MP     Row Name 08/18/22 1631          Strength Comprehensive (MMT)    Comment, General Manual Muscle Testing (MMT) Assessment BUE 4-/5  -MP     Row Name 08/18/22 1631          Balance    Balance Interventions sitting;standing;sit to stand;supported;static;dynamic  -MP           User Key  (r) = Recorded By, (t) = Taken By, (c) = Cosigned By    Initials Name Provider Type    Yong Metcalf OT Occupational Therapist               Goals/Plan     Row Name 08/18/22 1638          Bed Mobility Goal 1 (OT)    Activity/Assistive Device (Bed Mobility Goal 1, OT) bed mobility activities, all  -MP     [place] Level/Cues Needed (Bed Mobility Goal 1, OT) supervision required  -MP     Time Frame (Bed Mobility Goal 1, OT) 2 weeks;long term goal (LTG)  -MP     Row Name 08/18/22 1638          Transfer Goal 1 (OT)    Activity/Assistive Device (Transfer Goal 1, OT) sit-to-stand/stand-to-sit;toilet  -MP     [place] Level/Cues Needed (Transfer Goal 1, OT) supervision required  -MP     Time Frame (Transfer Goal 1, OT) 2 weeks;long term goal (LTG)  -MP     Row Name 08/18/22 1638          Dressing Goal 1 (OT)    Activity/Device (Dressing Goal 1, OT) lower body dressing  -MP     [place]/Cues Needed (Dressing Goal 1, OT) moderate assist (50-74% patient effort)  -MP     Time Frame (Dressing Goal 1, OT) long term goal (LTG);2 weeks  -MP           User Key  (r) = Recorded By, (t) = Taken By, (c) = Cosigned By    Initials Name Provider  Type    Yong Metcalf OT Occupational Therapist               Clinical Impression     Row Name 08/18/22 1634          Pain Assessment    Pretreatment Pain Rating 0/10 - no pain  -MP     Posttreatment Pain Rating 0/10 - no pain  -MP     Row Name 08/18/22 1634          Plan of Care Review    Plan of Care Reviewed With patient  -MP     Progress no change  -MP     Outcome Evaluation Pt. is 83 y/o admit w/ UTI and d/o PNA. Pt. lives at home w/ spouse, she provides max A bathing/dressing ADL support/care, states that pt. is able to ambulate to and from bathroom with assist. Pt. provided CGA for sit to stand transfer this date, completes long ambulatory transfer w/ rollator support EOB to armchair. Pt. provided complete assist for all LB ADLs including donning/doffing socks. Recommend d/c home w/ HH therapy to assess home safety and develope ADL skill/independence.  -MP     Row Name 08/18/22 1634          Therapy Assessment/Plan (OT)    Rehab Potential (OT) good, to achieve stated therapy goals  -MP     Criteria for Skilled Therapeutic Interventions Met (OT) yes;skilled treatment is necessary  -MP     Therapy Frequency (OT) 3 times/wk  -MP     Row Name 08/18/22 1634          Therapy Plan Review/Discharge Plan (OT)    Anticipated Discharge Disposition (OT) home with home health;home with 24/7 care  -MP     Row Name 08/18/22 1634          Vital Signs    Pre Patient Position Supine  -MP     Intra Patient Position Standing  -MP     Post Patient Position Sitting  -MP     Row Name 08/18/22 1634          Positioning and Restraints    Pre-Treatment Position in bed  -MP     Post Treatment Position chair  -MP     In Chair sitting;call light within reach;encouraged to call for assist;exit alarm on  -MP           User Key  (r) = Recorded By, (t) = Taken By, (c) = Cosigned By    Initials Name Provider Type    Yong Metcalf OT Occupational Therapist               Outcome Measures    No documentation.                  Occupational Therapy Education                 Title: PT OT SLP Therapies (In Progress)     Topic: Occupational Therapy (In Progress)     Point: ADL training (Not Started)     Description:   Instruct learner(s) on proper safety adaptation and remediation techniques during self care or transfers.   Instruct in proper use of assistive devices.              Learner Progress:  Not documented in this visit.          Point: Home exercise program (Not Started)     Description:   Instruct learner(s) on appropriate technique for monitoring, assisting and/or progressing therapeutic exercises/activities.              Learner Progress:  Not documented in this visit.          Point: Precautions (Not Started)     Description:   Instruct learner(s) on prescribed precautions during self-care and functional transfers.              Learner Progress:  Not documented in this visit.          Point: Body mechanics (Done)     Description:   Instruct learner(s) on proper positioning and spine alignment during self-care, functional mobility activities and/or exercises.              Learning Progress Summary           Patient Acceptance, E,TB, VU by EVIE at 8/18/2022 [id number]                               User Key     Initials Effective Dates Name Provider Type Discipline    EVIE 06/16/21 -  Yong Moon OT Occupational Therapist OT              OT Recommendation and Plan  Therapy Frequency (OT): 3 times/wk  Plan of Care Review  Plan of Care Reviewed With: patient  Progress: no change  Outcome Evaluation: Pt. is 81 y/o admit w/ UTI and d/o PNA. Pt. lives at home w/ spouse, she provides max A bathing/dressing ADL support/care, states that pt. is able to ambulate to and from bathroom with assist. Pt. provided CGA for sit to stand transfer this date, completes long ambulatory transfer w/ rollator support EOB to armchair. Pt. provided complete assist for all LB ADLs including donning/doffing socks. Recommend d/c home w/ HH therapy to assess  home safety and develope ADL skill/independence.     Time Calculation:    Time Calculation- OT     Row Name 08/18/22 1640             Time Calculation- OT    OT Start Time 1000  -MP      OT Stop Time 1050  -MP      OT Time Calculation (min) 50 min  -      Total Timed Code Minutes- OT 10 minute(s)  -      OT Received On 08/18/22  -      OT - Next Appointment 08/20/22  -      OT Goal Re-Cert Due Date 09/01/22  -            User Key  (r) = Recorded By, (t) = Taken By, (c) = Cosigned By    Initials Name Provider Type    MP Yong Moon OT Occupational Therapist              Therapy Charges for Today     Code Description Service Date Service Provider Modifiers Qty    [id number]  OT EVAL LOW COMPLEXITY 4 8/18/2022 Yong Moon OT GO 1    [id number]  OT THERAPEUTIC ACT EA 15 MIN 8/18/2022 Yong Moon OT GO 1               Yong Moon OT  8/18/2022

## 2022-08-19 LAB
BACTERIA SPEC AEROBE CULT: ABNORMAL
QT INTERVAL: 404 MS

## 2022-08-19 NOTE — DISCHARGE SUMMARY
[facility] Medicine Services  DISCHARGE SUMMARY    Patient Name: Jayden Bond  : 1940  MRN: [id number]    Date of Admission: 2022  Date of Discharge:  2022  Primary Care Physician: Ant Hines MD      Presenting Problem:   Dysuria [R30.0]  Acute UTI [N39.0]  Generalized weakness [R53.1]  Leukocytosis, unspecified type [D72.829]  Pneumonia [J18.9]    Active and Resolved Hospital Problems:  Active Hospital Problems    Diagnosis POA   • **CAP (community acquired pneumonia) [J18.9] Yes     Priority: High   • Pneumonia [J18.9] Yes     Priority: Medium   • Dementia (HCC) [F03.90] Yes     Priority: Medium   • Coronary artery disease involving native coronary artery of native heart without angina pectoris [I25.10] Yes     Priority: Medium   • Anticoagulant long-term use [Z79.01] Not Applicable     Priority: Medium   • COPD (chronic obstructive pulmonary disease) ([facility]) [J44.9] Yes     Priority: Medium   • History of CVA (cerebrovascular accident) [Z86.73] Not Applicable     Priority: Medium   • Dysautonomia orthostatic hypotension syndrome [I95.1] Yes     Priority: Medium   • Cardiac pacemaker in situ [Z95.0] Yes     Priority: Medium   • Mixed hyperlipidemia [E78.2] Yes   • SSS (sick sinus syndrome) ([facility]) [I49.5] Yes      Resolved Hospital Problems    Diagnosis POA   • Acute UTI [N39.0] Yes   • Acute UTI (urinary tract infection) [N39.0] Yes         Hospital Course     Hospital Course:    The patient is an 82-year-old male with history of dementia, SSS s/p pacemaker placement, CVA, depression, hypothyroidism, recurrent UTI, GERD, hyperlipidemia and  BPH s/p recent greenlight procedure as an outpatient on 2022.    Apparently the patient has been having generalized weakness and cough at home thus was brought to the emergency room by his wife on 22.  The patient is supposed to have EGD with esophageal dilation next week by Dr. Sanchez.    In the ED, CT of the head  ruled out acute intracranial pathology.  CT of the abdomen showed enlarged prostate gland.  Chest x-ray showed pneumonia.    The patient was then placed on observation.  He was continued on antibiotics for pneumonia.  The patient was back to his baseline functional state and was able to ambulate in his room.  The patient was discharged home with his wife in the afternoon of 8/18/2022.      DISCHARGE Follow Up Recommendations for labs and diagnostics:       Reasons For Change In Medications and Indications for New Medications:      Day of Discharge     Vital Signs:  Temp:  [97.9 °F (36.6 °C)] 97.9 °F (36.6 °C)  Heart Rate:  [69] 69  Resp:  [18] 18  BP: (143)/(93) 143/93  Flow (L/min):  [2-3] 2    Physical Exam:    HENT:      Head: Normocephalic.      Mouth/Throat:      Mouth: Mucous membranes are moist.   Eyes:      Extraocular Movements: Extraocular movements intact.      Pupils: Pupils are equal, round, and reactive to light.   Cardiovascular:      Rate and Rhythm: Normal rate and regular rhythm.   Pulmonary:      Effort: Pulmonary effort is normal.   Abdominal:      General: Bowel sounds are normal.   Musculoskeletal:         General: Normal range of motion.      Cervical back: Normal range of motion.   Skin:     General: Skin is warm.   Neurological:      Mental Status: He is alert. Mental status is at baseline.   Psychiatric:         Mood and Affect: Mood normal.       Pertinent  and/or Most Recent Results     LAB RESULTS:      Lab 08/18/22  0542 08/17/22 2126 08/17/22 1936   WBC 13.50*  --  13.20*   HEMOGLOBIN 14.2  --  14.2   HEMATOCRIT 43.1  --  44.2   PLATELETS 264  --  305   NEUTROS ABS 9.70*  --  9.90*   LYMPHS ABS 2.40  --  2.00   MONOS ABS 1.00*  --  0.80   EOS ABS 0.20  --  0.30   MCV 87.8  --  89.5   PROTIME  --  10.9  --          Lab 08/18/22  0542 08/17/22 1936   SODIUM 140 139   POTASSIUM 4.0 4.3   CHLORIDE 105 103   CO2 27.0 23.0   ANION GAP 8.0 13.0   BUN 16 15   CREATININE 0.83 0.86   EGFR  87.4 86.5   GLUCOSE 88 106*   CALCIUM 8.9 9.8         Lab 08/17/22 1936   TOTAL PROTEIN 6.5   ALBUMIN 3.80   GLOBULIN 2.7   ALT (SGPT) 16   AST (SGOT) 23   BILIRUBIN 0.3   ALK PHOS 87         Lab 08/17/22 2126 08/17/22 1936   TROPONIN T  --  <0.010   PROTIME 10.9  --    INR 1.06  --                  Brief Urine Lab Results  (Last result in the past 365 days)      Color   Clarity   Blood   Leuk Est   Nitrite   Protein   CREAT   Urine HCG        08/17/22 2126 Yellow   Cloudy   Large (3+)   Moderate (2+)   Negative   Trace               Microbiology Results (last 10 days)     Procedure Component Value - Date/Time    COVID-19,CEPHEID/SEBASTIAN,COR/DEEPA/PAD/SUSANA IN-HOUSE(OR EMERGENT/ADD-ON),NP SWAB IN TRANSPORT MEDIA 3-4 HR TAT, RT-PCR - Swab, Nasopharynx [[id number]]  (Normal) Collected: 08/18/22 1543    Lab Status: Final result Specimen: Swab from Nasopharynx Updated: 08/18/22 1650     COVID19 Not Detected    Narrative:      Fact sheet for providers: https://www.fda.gov/media/272008/download     Fact sheet for patients: https://www.fda.gov/media/534309/download  Fact sheet for providers: https://www.fda.gov/media/085577/download     Fact sheet for patients: https://www.fda.gov/media/322912/download    Blood Culture - Blood, Hand, Right [[id number]]  (Normal) Collected: 08/18/22 0542    Lab Status: Preliminary result Specimen: Blood from Hand, Right Updated: 08/19/22 0617     Blood Culture No growth at 24 hours    Blood Culture - Blood, Arm, Left [[id number]]  (Normal) Collected: 08/18/22 0542    Lab Status: Preliminary result Specimen: Blood from Arm, Left Updated: 08/19/22 0617     Blood Culture No growth at 24 hours    Urine Culture - Urine, Urine, Clean Catch [[id number]]  (Abnormal) Collected: 08/17/22 2126    Lab Status: Final result Specimen: Urine, Clean Catch Updated: 08/19/22 1051     Urine Culture >100,000 CFU/mL Mixed Gram Negative Marixa    Narrative:      Colonization of the urinary tract without infection is  common. Treatment is discouraged unless the patient is symptomatic, pregnant, or undergoing an invasive urologic procedure.  Specimen contains mixed organisms of questionable pathogenicity suggestive of contamination. If symptoms persist, suggest recollection.    COVID PRE-OP / PRE-PROCEDURE SCREENING ORDER (NO ISOLATION) - Swab, Nasopharynx [[id number]]  (Normal) Collected: 08/17/22 1939    Lab Status: Final result Specimen: Swab from Nasopharynx Updated: 08/17/22 2025    Narrative:      The following orders were created for panel order COVID PRE-OP / PRE-PROCEDURE SCREENING ORDER (NO ISOLATION) - Swab, Nasopharynx.  Procedure                               Abnormality         Status                     ---------                               -----------         ------                     COVID-19,CEPHEID/SEBASTIAN,CO...[[id number]]  Normal              Final result                 Please view results for these tests on the individual orders.    COVID-19,CEPHEID/SEBASTIAN,COR/DEEPA/PAD/SUSANA IN-HOUSE(OR EMERGENT/ADD-ON),NP SWAB IN TRANSPORT MEDIA 3-4 HR TAT, RT-PCR - Swab, Nasopharynx [[id number]]  (Normal) Collected: 08/17/22 1939    Lab Status: Final result Specimen: Swab from Nasopharynx Updated: 08/17/22 2025     COVID19 Not Detected    Narrative:      Fact sheet for providers: https://www.fda.gov/media/765288/download     Fact sheet for patients: https://www.fda.gov/media/915500/download  Fact sheet for providers: https://www.fda.gov/media/427330/download    Fact sheet for patients: https://www.fda.gov/media/802996/download    Test performed by PCR.          CT Head Without Contrast    Result Date: 8/17/2022  Impression: No acute intracranial abnormality. No change from previous CT. Multiple remote infarcts with diffuse volume loss and severe chronic small vessel ischemic change. Brain MRI is more sensitive to evaluate for acute or subacute infarcts and to evaluate for intracranial metastatic disease.  Electronically Signed  By-Daniella Gates MD On:8/17/2022 9:03 PM This report was finalized on [id number] by  Daniella Gates MD.    CT Abdomen Pelvis With Contrast    Result Date: 8/17/2022  Impression: 1.     Enlarged prostate gland with wall thickening and diverticula of urinary bladder likely due to chronic bladder outlet obstruction. Prostate enlargement could be due to hyperplasia or neoplasia. 2.     Diverticulosis without diverticulitis. 3.     Limited evaluation of the kidneys due to respiratory motion. Small renal cysts are suspected. No obstruction by CT. 4.     Mild hepatic steatosis. 5.     Small hiatal hernia.    Electronically Signed By-Daniella Gates MD On:8/17/2022 9:26 PM This report was finalized on [id number] by  Daniella Gates MD.    XR Chest 1 View    Result Date: 8/17/2022  Impression: Left lower lobe airspace opacity atelectasis versus pneumonia. Cardiomegaly and chronic changes of the chest.  Electronically Signed By-Daniella Gates MD On:8/17/2022 8:33 PM This report was finalized on [id number] by  Daniella Gates MD.      Results for orders placed during the hospital encounter of 03/04/20    Duplex Carotid Ultrasound CAR    Interpretation Summary  · Proximal right internal carotid artery plaque without significant stenosis.  · Proximal left internal carotid artery plaque without significant stenosis.      Results for orders placed during the hospital encounter of 03/04/20    Duplex Carotid Ultrasound CAR    Interpretation Summary  · Proximal right internal carotid artery plaque without significant stenosis.  · Proximal left internal carotid artery plaque without significant stenosis.      Results for orders placed during the hospital encounter of 09/28/21    Adult Transthoracic Echo Complete W/ Cont if Necessary Per Protocol    Interpretation Summary  · Left ventricular systolic function is normal.  · Left ventricular ejection fraction is 60 to 65%  · Left ventricular wall thickness is consistent with mild concentric  hypertrophy.  · Left ventricular diastolic function is consistent with (grade I) impaired relaxation.  · Saline test results are negative.      Labs Pending at Discharge:  Pending Labs     Order Current Status    Blood Culture - Blood, Arm, Left Preliminary result    Blood Culture - Blood, Hand, Right Preliminary result          Procedures Performed           Consults:   Consults     No orders found from 7/19/2022 to 8/18/2022.            Discharge Details        Discharge Medications      New Medications      Instructions Start Date   cefdinir 300 MG capsule  Commonly known as: OMNICEF   300 mg, Oral, 2 Times Daily         Continue These Medications      Instructions Start Date   acetaminophen 500 MG tablet  Commonly known as: TYLENOL   1,000 mg, Oral, Every 6 Hours PRN      apixaban 5 MG tablet tablet  Commonly known as: ELIQUIS   5 mg, Oral, 2 Times Daily, Eloquis is on hold.      brimonidine 0.2 % ophthalmic solution  Commonly known as: ALPHAGAN   1 drop, Both Eyes, 2 Times Daily      clopidogrel 75 MG tablet  Commonly known as: PLAVIX   TAKE 1 TABLET BY MOUTH EVERY DAY      docusate sodium 100 MG capsule  Commonly known as: COLACE   100 mg, Oral, Nightly      escitalopram 10 MG tablet  Commonly known as: LEXAPRO   10 mg, Oral, Daily      famotidine 40 MG tablet  Commonly known as: PEPCID   40 mg, Oral, Daily      galantamine 8 MG tablet  Commonly known as: RAZADYNE   8 mg, Oral, 2 Times Daily      guaiFENesin 600 MG 12 hr tablet  Commonly known as: MUCINEX   300 mg, Oral, As Needed, Patient takes half of the 600mg tablet      Incruse Ellipta 62.5 MCG/INH aerosol powder   Generic drug: Umeclidinium Bromide   1 puff, Oral, Daily      isosorbide mononitrate 30 MG 24 hr tablet  Commonly known as: IMDUR   15 mg, Oral, As Needed, If SBP continues to be >150 for multiple measurements      levothyroxine 25 MCG tablet  Commonly known as: SYNTHROID, LEVOTHROID   25 mcg, Oral, Every Early Morning      midodrine 2.5 MG  tablet  Commonly known as: PROAMATINE   2.5 mg, Oral, As Needed, SBP <105      multivitamin with minerals tablet tablet   1 tablet, Oral, Nightly      mupirocin 2 % ointment  Commonly known as: BACTROBAN   1 application, Topical, 3 Times Daily, On 2 Big toes, cuticle      nitroglycerin 0.4 MG SL tablet  Commonly known as: NITROSTAT   0.4 mg, Sublingual, Every 5 Minutes PRN, Take no more than 3 doses in 15 minutes.       predniSONE 5 MG tablet  Commonly known as: DELTASONE   5 mg, Oral, Daily      rosuvastatin 20 MG tablet  Commonly known as: CRESTOR   20 mg, Oral, Every Evening      sodium chloride 0.65 % nasal spray   1 spray, Nasal, As Needed             Allergies   Allergen Reactions   • Trazodone Hallucinations   • Doxycycline GI Intolerance   • Ciprofloxacin Other (See Comments)     Breathing problems   • Doxycycline Nausea And Vomiting   • Fosfomycin Tromethamine Confusion     Reports weakness and confusion   • Gemtesa [Vibegron] Other (See Comments)     Reports urinary retention   • Keppra [Levetiracetam] GI Intolerance   • Macrobid [Nitrofurantoin] Other (See Comments)     Unknown reaction   • Quetiapine Other (See Comments)     Having falls on it   • Sulfa Antibiotics Other (See Comments)     Unknown reaction         Discharge Disposition:   Home or Self Care    Diet:  Hospital:No active diet order        Discharge Activity: as tolerated      Discharge Condition: stable      CODE STATUS:  Code Status and Medical Interventions:   Ordered at: 08/17/22 6715     Code Status (Patient has no pulse and is not breathing):    CPR (Attempt to Resuscitate)     Medical Interventions (Patient has pulse or is breathing):    Full Support         No future appointments.    Additional Instructions for the Follow-ups that You Need to Schedule     Discharge Follow-up with PCP   As directed       Currently Documented PCP:    Ant Hines MD    PCP Phone Number:    [phone]     Follow Up Details: 2 weeks                Time spent on Discharge including face to face service: 15 minutes    This patient has been examined wearing appropriate Personal Protective Equipment and discussed with nursing. 08/19/22      Signature: Electronically signed by Stephen Bryant DO, 08/19/22, 3:10 PM EDT.

## 2022-08-19 NOTE — OUTREACH NOTE
Prep Survey    Flowsheet Row Responses   [facility] facility patient discharged from? [facility]   Is LACE score < 7 ? No   Emergency Room discharge w/ pulse ox? No   Eligibility Readm Mgmt   Discharge diagnosis PNA   Does the patient have one of the following disease processes/diagnoses(primary or secondary)? COPD/Pneumonia   Does the patient have Home health ordered? Yes   What is the Home health agency?  [facility]   Is there a DME ordered? Yes   What DME was ordered? [facility] [place] [facility]   Prep survey completed? Yes          EARNEST GEE - Registered Nurse

## 2022-08-22 NOTE — CASE MANAGEMENT/SOCIAL WORK
Case Management Discharge Note      Final Note: [facility].    Selected Continued Care - Discharged on 8/18/2022 Admission date: 8/17/2022 - Discharge disposition: Home or Self Care       Home Medical Care Coordination complete.    Service Provider Selected Services Address Phone Fax Patient Preferred    [facility] [address] [place] [address] [place] [id number] [phone] -- --           Transportation Services  Private: Car    Final Discharge Disposition Code: 06 - home with home health care

## 2022-08-23 ENCOUNTER — READMISSION MANAGEMENT (OUTPATIENT)
Dept: CALL CENTER | Facility: HOSPITAL | Age: 82
End: 2022-08-23

## 2022-08-23 LAB
BACTERIA SPEC AEROBE CULT: NORMAL
BACTERIA SPEC AEROBE CULT: NORMAL

## 2022-08-23 NOTE — OUTREACH NOTE
COPD/PN Week 1 Survey    Flowsheet Row Responses   [facility] facility patient discharged from? [facility]   Does the patient have one of the following disease processes/diagnoses(primary or secondary)? COPD/Pneumonia   Was the primary reason for admission: Pneumonia   Week 1 attempt successful? No   Unsuccessful attempts Attempt 1          CORONA PEREZ - Registered Nurse

## 2022-08-26 ENCOUNTER — READMISSION MANAGEMENT (OUTPATIENT)
Dept: CALL CENTER | Facility: HOSPITAL | Age: 82
End: 2022-08-26

## 2022-08-26 NOTE — OUTREACH NOTE
COPD/PN Week 1 Survey    Flowsheet Row Responses   Spiritism facility patient discharged from? [facility]   Does the patient have one of the following disease processes/diagnoses(primary or secondary)? COPD/Pneumonia   Was the primary reason for admission: Pneumonia   Week 1 attempt successful? No   Unsuccessful attempts Attempt 2          RAVIN CONNOR - Registered Nurse

## 2022-08-30 ENCOUNTER — READMISSION MANAGEMENT (OUTPATIENT)
Dept: CALL CENTER | Facility: HOSPITAL | Age: 82
End: 2022-08-30

## 2022-08-30 NOTE — OUTREACH NOTE
COPD/PN Week 1 Survey    Flowsheet Row Responses   [facility] facility patient discharged from? [facility]   Does the patient have one of the following disease processes/diagnoses(primary or secondary)? Pneumonia   Week 1 attempt successful? Yes   Call start time 1258   Revoke Decline to participate   Call end time 1301   Is patient permission given to speak with other caregiver? Yes   List who call center can speak with MATT CARREON Spouse    Person spoke with today (if not patient) and relationship MATT CARREON Spouse           TARAH TRIVEDI - Registered Nurse

## 2022-11-16 ENCOUNTER — ON CAMPUS - OUTPATIENT (OUTPATIENT)
Dept: URBAN - METROPOLITAN AREA HOSPITAL 77 | Facility: HOSPITAL | Age: 82
End: 2022-11-16

## 2022-11-16 DIAGNOSIS — K57.92 DIVERTICULITIS OF INTESTINE, PART UNSPECIFIED, WITHOUT PERFO: ICD-10-CM

## 2022-11-16 DIAGNOSIS — R93.3 ABNORMAL FINDINGS ON DIAGNOSTIC IMAGING OF OTHER PARTS OF DI: ICD-10-CM

## 2022-11-16 DIAGNOSIS — R13.10 DYSPHAGIA, UNSPECIFIED: ICD-10-CM

## 2022-11-16 DIAGNOSIS — K85.90 ACUTE PANCREATITIS WITHOUT NECROSIS OR INFECTION, UNSPECIFIE: ICD-10-CM

## 2022-11-16 PROCEDURE — 99214 OFFICE O/P EST MOD 30 MIN: CPT | Mod: FS | Performed by: NURSE PRACTITIONER

## 2023-01-01 ENCOUNTER — APPOINTMENT (OUTPATIENT)
Dept: CARDIOLOGY | Facility: HOSPITAL | Age: 83
DRG: 064 | End: 2023-01-01
Payer: MEDICARE

## 2023-01-01 ENCOUNTER — HOSPITAL ENCOUNTER (INPATIENT)
Facility: HOSPITAL | Age: 83
LOS: 3 days | DRG: 064 | End: 2023-11-27
Attending: EMERGENCY MEDICINE | Admitting: INTERNAL MEDICINE
Payer: MEDICARE

## 2023-01-01 ENCOUNTER — APPOINTMENT (OUTPATIENT)
Dept: MRI IMAGING | Facility: HOSPITAL | Age: 83
DRG: 064 | End: 2023-01-01
Payer: MEDICARE

## 2023-01-01 ENCOUNTER — HOSPITAL ENCOUNTER (INPATIENT)
Facility: HOSPITAL | Age: 83
LOS: 2 days | DRG: 951 | End: 2023-11-29
Attending: INTERNAL MEDICINE | Admitting: INTERNAL MEDICINE
Payer: COMMERCIAL

## 2023-01-01 ENCOUNTER — APPOINTMENT (OUTPATIENT)
Dept: NEUROLOGY | Facility: HOSPITAL | Age: 83
DRG: 064 | End: 2023-01-01
Payer: MEDICARE

## 2023-01-01 ENCOUNTER — APPOINTMENT (OUTPATIENT)
Dept: CT IMAGING | Facility: HOSPITAL | Age: 83
DRG: 064 | End: 2023-01-01
Payer: MEDICARE

## 2023-01-01 ENCOUNTER — APPOINTMENT (OUTPATIENT)
Dept: GENERAL RADIOLOGY | Facility: HOSPITAL | Age: 83
DRG: 064 | End: 2023-01-01
Payer: MEDICARE

## 2023-01-01 VITALS
BODY MASS INDEX: 18.62 KG/M2 | RESPIRATION RATE: 16 BRPM | TEMPERATURE: 97.3 F | WEIGHT: 130.07 LBS | HEIGHT: 70 IN | SYSTOLIC BLOOD PRESSURE: 147 MMHG | DIASTOLIC BLOOD PRESSURE: 80 MMHG | HEART RATE: 75 BPM | OXYGEN SATURATION: 97 %

## 2023-01-01 VITALS — OXYGEN SATURATION: 92 % | TEMPERATURE: 98.3 F | DIASTOLIC BLOOD PRESSURE: 74 MMHG | SYSTOLIC BLOOD PRESSURE: 108 MMHG

## 2023-01-01 DIAGNOSIS — F03.C0 SEVERE DEMENTIA, UNSPECIFIED DEMENTIA TYPE, UNSPECIFIED WHETHER BEHAVIORAL, PSYCHOTIC, OR MOOD DISTURBANCE OR ANXIETY: ICD-10-CM

## 2023-01-01 DIAGNOSIS — R41.82 ALTERED MENTAL STATUS, UNSPECIFIED ALTERED MENTAL STATUS TYPE: Primary | ICD-10-CM

## 2023-01-01 DIAGNOSIS — D68.9 COAGULOPATHY: ICD-10-CM

## 2023-01-01 LAB
ALBUMIN SERPL-MCNC: 3.3 G/DL (ref 3.5–5.2)
ALBUMIN SERPL-MCNC: 3.6 G/DL (ref 3.5–5.2)
ALBUMIN SERPL-MCNC: 3.7 G/DL (ref 3.5–5.2)
ALBUMIN/GLOB SERPL: 1.3 G/DL
ALBUMIN/GLOB SERPL: 1.4 G/DL
ALBUMIN/GLOB SERPL: 1.5 G/DL
ALP SERPL-CCNC: 75 U/L (ref 39–117)
ALP SERPL-CCNC: 79 U/L (ref 39–117)
ALP SERPL-CCNC: 86 U/L (ref 39–117)
ALT SERPL W P-5'-P-CCNC: 11 U/L (ref 1–41)
ALT SERPL W P-5'-P-CCNC: 13 U/L (ref 1–41)
ALT SERPL W P-5'-P-CCNC: 17 U/L (ref 1–41)
ANION GAP SERPL CALCULATED.3IONS-SCNC: 12.2 MMOL/L (ref 5–15)
ANION GAP SERPL CALCULATED.3IONS-SCNC: 4.8 MMOL/L (ref 5–15)
ANION GAP SERPL CALCULATED.3IONS-SCNC: 8.6 MMOL/L (ref 5–15)
ANION GAP SERPL CALCULATED.3IONS-SCNC: 9.1 MMOL/L (ref 5–15)
AORTIC DIMENSIONLESS INDEX: 1 (DI)
ASCENDING AORTA: 3.6 CM
AST SERPL-CCNC: 19 U/L (ref 1–40)
AST SERPL-CCNC: 19 U/L (ref 1–40)
AST SERPL-CCNC: 21 U/L (ref 1–40)
B PARAPERT DNA SPEC QL NAA+PROBE: NOT DETECTED
B PERT DNA SPEC QL NAA+PROBE: NOT DETECTED
BACTERIA SPEC AEROBE CULT: NO GROWTH
BACTERIA SPEC AEROBE CULT: NORMAL
BACTERIA SPEC AEROBE CULT: NORMAL
BACTERIA UR QL AUTO: ABNORMAL /HPF
BASOPHILS # BLD AUTO: 0.03 10*3/MM3 (ref 0–0.2)
BASOPHILS NFR BLD AUTO: 0.3 % (ref 0–1.5)
BH CV ECHO MEAS - ACS: 2.14 CM
BH CV ECHO MEAS - AO MAX PG: 4.8 MMHG
BH CV ECHO MEAS - AO MEAN PG: 2.6 MMHG
BH CV ECHO MEAS - AO ROOT DIAM: 4 CM
BH CV ECHO MEAS - AO V2 MAX: 109.9 CM/SEC
BH CV ECHO MEAS - AO V2 VTI: 20 CM
BH CV ECHO MEAS - AVA(I,D): 4.2 CM2
BH CV ECHO MEAS - EDV(CUBED): 13.8 ML
BH CV ECHO MEAS - EDV(MOD-SP2): 45 ML
BH CV ECHO MEAS - EDV(MOD-SP4): 73 ML
BH CV ECHO MEAS - EF(MOD-BP): 53.7 %
BH CV ECHO MEAS - EF(MOD-SP2): 57.8 %
BH CV ECHO MEAS - EF(MOD-SP4): 53.4 %
BH CV ECHO MEAS - ESV(CUBED): 4.9 ML
BH CV ECHO MEAS - ESV(MOD-SP2): 19 ML
BH CV ECHO MEAS - ESV(MOD-SP4): 34 ML
BH CV ECHO MEAS - FS: 29.3 %
BH CV ECHO MEAS - IVS/LVPW: 1 CM
BH CV ECHO MEAS - IVSD: 1.09 CM
BH CV ECHO MEAS - LV MASS(C)D: 69.2 GRAMS
BH CV ECHO MEAS - LV MAX PG: 4.8 MMHG
BH CV ECHO MEAS - LV MEAN PG: 2.36 MMHG
BH CV ECHO MEAS - LV V1 MAX: 109.9 CM/SEC
BH CV ECHO MEAS - LV V1 VTI: 21 CM
BH CV ECHO MEAS - LVIDD: 2.4 CM
BH CV ECHO MEAS - LVIDS: 1.7 CM
BH CV ECHO MEAS - LVOT AREA: 4 CM2
BH CV ECHO MEAS - LVOT DIAM: 2.25 CM
BH CV ECHO MEAS - LVPWD: 1.09 CM
BH CV ECHO MEAS - MV A DUR: 0.18 SEC
BH CV ECHO MEAS - MV A MAX VEL: 119.3 CM/SEC
BH CV ECHO MEAS - MV DEC SLOPE: 833.8 CM/SEC2
BH CV ECHO MEAS - MV DEC TIME: 0.41 SEC
BH CV ECHO MEAS - MV E MAX VEL: 82 CM/SEC
BH CV ECHO MEAS - MV E/A: 0.69
BH CV ECHO MEAS - MV MAX PG: 7.2 MMHG
BH CV ECHO MEAS - MV MEAN PG: 1.91 MMHG
BH CV ECHO MEAS - MV P1/2T: 35.8 MSEC
BH CV ECHO MEAS - MV V2 VTI: 35.9 CM
BH CV ECHO MEAS - MVA(P1/2T): 6.2 CM2
BH CV ECHO MEAS - MVA(VTI): 2.32 CM2
BH CV ECHO MEAS - PA ACC TIME: 0.16 SEC
BH CV ECHO MEAS - PA V2 MAX: 67.9 CM/SEC
BH CV ECHO MEAS - PULM A REVS DUR: 0.15 SEC
BH CV ECHO MEAS - PULM A REVS VEL: 28.1 CM/SEC
BH CV ECHO MEAS - PULM DIAS VEL: 18.4 CM/SEC
BH CV ECHO MEAS - PULM S/D: 1.66
BH CV ECHO MEAS - PULM SYS VEL: 30.6 CM/SEC
BH CV ECHO MEAS - QP/QS: 0.24
BH CV ECHO MEAS - RAP SYSTOLE: 3 MMHG
BH CV ECHO MEAS - RV MAX PG: 0.73 MMHG
BH CV ECHO MEAS - RV V1 MAX: 42.8 CM/SEC
BH CV ECHO MEAS - RV V1 VTI: 9 CM
BH CV ECHO MEAS - RVOT DIAM: 1.69 CM
BH CV ECHO MEAS - RVSP: 23 MMHG
BH CV ECHO MEAS - SV(LVOT): 83.2 ML
BH CV ECHO MEAS - SV(MOD-SP2): 26 ML
BH CV ECHO MEAS - SV(MOD-SP4): 39 ML
BH CV ECHO MEAS - SV(RVOT): 20.2 ML
BH CV ECHO MEAS - TR MAX PG: 19.3 MMHG
BH CV ECHO MEAS - TR MAX VEL: 219.9 CM/SEC
BH CV ECHO SHUNT ASSESSMENT PERFORMED (HIDDEN SCRIPTING): 1
BH CV UPPER VENOUS LEFT AXILLARY AUGMENT: NORMAL
BH CV UPPER VENOUS LEFT AXILLARY COMPRESS: NORMAL
BH CV UPPER VENOUS LEFT AXILLARY PHASIC: NORMAL
BH CV UPPER VENOUS LEFT AXILLARY SPONT: NORMAL
BH CV UPPER VENOUS LEFT BASILIC FOREARM COMPRESS: NORMAL
BH CV UPPER VENOUS LEFT BASILIC UPPER COMPRESS: NORMAL
BH CV UPPER VENOUS LEFT BRACHIAL COMPRESS: NORMAL
BH CV UPPER VENOUS LEFT CEPHALIC FOREARM COMPRESS: NORMAL
BH CV UPPER VENOUS LEFT CEPHALIC UPPER COMPRESS: NORMAL
BH CV UPPER VENOUS LEFT INTERNAL JUGULAR AUGMENT: NORMAL
BH CV UPPER VENOUS LEFT INTERNAL JUGULAR COMPRESS: NORMAL
BH CV UPPER VENOUS LEFT INTERNAL JUGULAR PHASIC: NORMAL
BH CV UPPER VENOUS LEFT INTERNAL JUGULAR SPONT: NORMAL
BH CV UPPER VENOUS LEFT RADIAL COMPRESS: NORMAL
BH CV UPPER VENOUS LEFT SUBCLAVIAN AUGMENT: NORMAL
BH CV UPPER VENOUS LEFT SUBCLAVIAN COMPRESS: NORMAL
BH CV UPPER VENOUS LEFT SUBCLAVIAN PHASIC: NORMAL
BH CV UPPER VENOUS LEFT SUBCLAVIAN SPONT: NORMAL
BH CV UPPER VENOUS LEFT ULNAR COMPRESS: NORMAL
BH CV UPPER VENOUS RIGHT INTERNAL JUGULAR AUGMENT: NORMAL
BH CV UPPER VENOUS RIGHT INTERNAL JUGULAR COMPRESS: NORMAL
BH CV UPPER VENOUS RIGHT INTERNAL JUGULAR PHASIC: NORMAL
BH CV UPPER VENOUS RIGHT INTERNAL JUGULAR SPONT: NORMAL
BH CV UPPER VENOUS RIGHT SUBCLAVIAN AUGMENT: NORMAL
BH CV UPPER VENOUS RIGHT SUBCLAVIAN COMPRESS: NORMAL
BH CV UPPER VENOUS RIGHT SUBCLAVIAN PHASIC: NORMAL
BH CV UPPER VENOUS RIGHT SUBCLAVIAN SPONT: NORMAL
BILIRUB SERPL-MCNC: 0.3 MG/DL (ref 0–1.2)
BILIRUB SERPL-MCNC: 0.4 MG/DL (ref 0–1.2)
BILIRUB SERPL-MCNC: 0.7 MG/DL (ref 0–1.2)
BILIRUB UR QL STRIP: NEGATIVE
BILIRUB UR QL STRIP: NEGATIVE
BUN SERPL-MCNC: 13 MG/DL (ref 8–23)
BUN SERPL-MCNC: 13 MG/DL (ref 8–23)
BUN SERPL-MCNC: 14 MG/DL (ref 8–23)
BUN SERPL-MCNC: 18 MG/DL (ref 8–23)
BUN/CREAT SERPL: 18.3 (ref 7–25)
BUN/CREAT SERPL: 20 (ref 7–25)
BUN/CREAT SERPL: 23 (ref 7–25)
BUN/CREAT SERPL: 24.7 (ref 7–25)
C PNEUM DNA NPH QL NAA+NON-PROBE: NOT DETECTED
CALCIUM SPEC-SCNC: 8.7 MG/DL (ref 8.6–10.5)
CALCIUM SPEC-SCNC: 9.1 MG/DL (ref 8.6–10.5)
CALCIUM SPEC-SCNC: 9.2 MG/DL (ref 8.6–10.5)
CALCIUM SPEC-SCNC: 9.3 MG/DL (ref 8.6–10.5)
CHLORIDE SERPL-SCNC: 104 MMOL/L (ref 98–107)
CHLORIDE SERPL-SCNC: 106 MMOL/L (ref 98–107)
CHLORIDE SERPL-SCNC: 106 MMOL/L (ref 98–107)
CHLORIDE SERPL-SCNC: 109 MMOL/L (ref 98–107)
CHOLEST SERPL-MCNC: 118 MG/DL (ref 0–200)
CLARITY UR: CLEAR
CLARITY UR: CLEAR
CO2 SERPL-SCNC: 20.8 MMOL/L (ref 22–29)
CO2 SERPL-SCNC: 24.4 MMOL/L (ref 22–29)
CO2 SERPL-SCNC: 26.9 MMOL/L (ref 22–29)
CO2 SERPL-SCNC: 29.2 MMOL/L (ref 22–29)
COLOR UR: YELLOW
COLOR UR: YELLOW
CREAT SERPL-MCNC: 0.61 MG/DL (ref 0.76–1.27)
CREAT SERPL-MCNC: 0.65 MG/DL (ref 0.76–1.27)
CREAT SERPL-MCNC: 0.71 MG/DL (ref 0.76–1.27)
CREAT SERPL-MCNC: 0.73 MG/DL (ref 0.76–1.27)
D-LACTATE SERPL-SCNC: 1.8 MMOL/L (ref 0.5–2)
DEPRECATED RDW RBC AUTO: 36.8 FL (ref 37–54)
DEPRECATED RDW RBC AUTO: 39.1 FL (ref 37–54)
DEPRECATED RDW RBC AUTO: 39.2 FL (ref 37–54)
DEPRECATED RDW RBC AUTO: 39.4 FL (ref 37–54)
EGFRCR SERPLBLD CKD-EPI 2021: 90.3 ML/MIN/1.73
EGFRCR SERPLBLD CKD-EPI 2021: 91 ML/MIN/1.73
EGFRCR SERPLBLD CKD-EPI 2021: 93.5 ML/MIN/1.73
EGFRCR SERPLBLD CKD-EPI 2021: 95.3 ML/MIN/1.73
EOSINOPHIL # BLD AUTO: 0.18 10*3/MM3 (ref 0–0.4)
EOSINOPHIL NFR BLD AUTO: 1.7 % (ref 0.3–6.2)
ERYTHROCYTE [DISTWIDTH] IN BLOOD BY AUTOMATED COUNT: 11.5 % (ref 12.3–15.4)
ERYTHROCYTE [DISTWIDTH] IN BLOOD BY AUTOMATED COUNT: 11.6 % (ref 12.3–15.4)
ERYTHROCYTE [DISTWIDTH] IN BLOOD BY AUTOMATED COUNT: 11.7 % (ref 12.3–15.4)
ERYTHROCYTE [DISTWIDTH] IN BLOOD BY AUTOMATED COUNT: 11.8 % (ref 12.3–15.4)
FLUAV SUBTYP SPEC NAA+PROBE: NOT DETECTED
FLUBV RNA ISLT QL NAA+PROBE: NOT DETECTED
GEN 5 2HR TROPONIN T REFLEX: 20 NG/L
GLOBULIN UR ELPH-MCNC: 2.4 GM/DL
GLOBULIN UR ELPH-MCNC: 2.6 GM/DL
GLOBULIN UR ELPH-MCNC: 2.6 GM/DL
GLUCOSE BLDC GLUCOMTR-MCNC: 115 MG/DL (ref 70–130)
GLUCOSE BLDC GLUCOMTR-MCNC: 137 MG/DL (ref 70–130)
GLUCOSE BLDC GLUCOMTR-MCNC: 141 MG/DL (ref 70–130)
GLUCOSE BLDC GLUCOMTR-MCNC: 79 MG/DL (ref 70–130)
GLUCOSE BLDC GLUCOMTR-MCNC: 79 MG/DL (ref 70–130)
GLUCOSE BLDC GLUCOMTR-MCNC: 81 MG/DL (ref 70–130)
GLUCOSE BLDC GLUCOMTR-MCNC: 91 MG/DL (ref 70–130)
GLUCOSE SERPL-MCNC: 144 MG/DL (ref 65–99)
GLUCOSE SERPL-MCNC: 85 MG/DL (ref 65–99)
GLUCOSE SERPL-MCNC: 85 MG/DL (ref 65–99)
GLUCOSE SERPL-MCNC: 99 MG/DL (ref 65–99)
GLUCOSE UR STRIP-MCNC: NEGATIVE MG/DL
GLUCOSE UR STRIP-MCNC: NEGATIVE MG/DL
HADV DNA SPEC NAA+PROBE: NOT DETECTED
HBA1C MFR BLD: 5.8 % (ref 4.8–5.6)
HCOV 229E RNA SPEC QL NAA+PROBE: NOT DETECTED
HCOV HKU1 RNA SPEC QL NAA+PROBE: NOT DETECTED
HCOV NL63 RNA SPEC QL NAA+PROBE: NOT DETECTED
HCOV OC43 RNA SPEC QL NAA+PROBE: NOT DETECTED
HCT VFR BLD AUTO: 35.8 % (ref 37.5–51)
HCT VFR BLD AUTO: 39.9 % (ref 37.5–51)
HCT VFR BLD AUTO: 40.2 % (ref 37.5–51)
HCT VFR BLD AUTO: 40.3 % (ref 37.5–51)
HDLC SERPL-MCNC: 45 MG/DL (ref 40–60)
HGB BLD-MCNC: 11.7 G/DL (ref 13–17.7)
HGB BLD-MCNC: 13.1 G/DL (ref 13–17.7)
HGB BLD-MCNC: 13.1 G/DL (ref 13–17.7)
HGB BLD-MCNC: 13.2 G/DL (ref 13–17.7)
HGB UR QL STRIP.AUTO: NEGATIVE
HGB UR QL STRIP.AUTO: NEGATIVE
HMPV RNA NPH QL NAA+NON-PROBE: NOT DETECTED
HPIV1 RNA ISLT QL NAA+PROBE: NOT DETECTED
HPIV2 RNA SPEC QL NAA+PROBE: NOT DETECTED
HPIV3 RNA NPH QL NAA+PROBE: NOT DETECTED
HPIV4 P GENE NPH QL NAA+PROBE: NOT DETECTED
HYALINE CASTS UR QL AUTO: ABNORMAL /LPF
IMM GRANULOCYTES # BLD AUTO: 0.08 10*3/MM3 (ref 0–0.05)
IMM GRANULOCYTES NFR BLD AUTO: 0.7 % (ref 0–0.5)
INR PPP: 1.09 (ref 0.9–1.1)
KETONES UR QL STRIP: ABNORMAL
KETONES UR QL STRIP: NEGATIVE
LDLC SERPL CALC-MCNC: 55 MG/DL (ref 0–100)
LDLC/HDLC SERPL: 1.19 {RATIO}
LEUKOCYTE ESTERASE UR QL STRIP.AUTO: ABNORMAL
LEUKOCYTE ESTERASE UR QL STRIP.AUTO: NEGATIVE
LYMPHOCYTES # BLD AUTO: 1.27 10*3/MM3 (ref 0.7–3.1)
LYMPHOCYTES NFR BLD AUTO: 11.9 % (ref 19.6–45.3)
M PNEUMO IGG SER IA-ACNC: NOT DETECTED
MAGNESIUM SERPL-MCNC: 2.2 MG/DL (ref 1.6–2.4)
MCH RBC QN AUTO: 29.5 PG (ref 26.6–33)
MCH RBC QN AUTO: 29.9 PG (ref 26.6–33)
MCH RBC QN AUTO: 30.1 PG (ref 26.6–33)
MCH RBC QN AUTO: 30.4 PG (ref 26.6–33)
MCHC RBC AUTO-ENTMCNC: 32.6 G/DL (ref 31.5–35.7)
MCHC RBC AUTO-ENTMCNC: 32.7 G/DL (ref 31.5–35.7)
MCHC RBC AUTO-ENTMCNC: 32.8 G/DL (ref 31.5–35.7)
MCHC RBC AUTO-ENTMCNC: 32.8 G/DL (ref 31.5–35.7)
MCV RBC AUTO: 90.2 FL (ref 79–97)
MCV RBC AUTO: 91.7 FL (ref 79–97)
MCV RBC AUTO: 91.8 FL (ref 79–97)
MCV RBC AUTO: 93 FL (ref 79–97)
MONOCYTES # BLD AUTO: 0.77 10*3/MM3 (ref 0.1–0.9)
MONOCYTES NFR BLD AUTO: 7.2 % (ref 5–12)
NEUTROPHILS NFR BLD AUTO: 78.2 % (ref 42.7–76)
NEUTROPHILS NFR BLD AUTO: 8.34 10*3/MM3 (ref 1.7–7)
NITRITE UR QL STRIP: NEGATIVE
NITRITE UR QL STRIP: NEGATIVE
NRBC BLD AUTO-RTO: 0 /100 WBC (ref 0–0.2)
NT-PROBNP SERPL-MCNC: 211 PG/ML (ref 0–1800)
PH UR STRIP.AUTO: 6 [PH] (ref 5–8)
PH UR STRIP.AUTO: 6.5 [PH] (ref 5–8)
PLATELET # BLD AUTO: 165 10*3/MM3 (ref 140–450)
PLATELET # BLD AUTO: 203 10*3/MM3 (ref 140–450)
PLATELET # BLD AUTO: 208 10*3/MM3 (ref 140–450)
PLATELET # BLD AUTO: 243 10*3/MM3 (ref 140–450)
PMV BLD AUTO: 10 FL (ref 6–12)
PMV BLD AUTO: 10.1 FL (ref 6–12)
PMV BLD AUTO: 10.1 FL (ref 6–12)
PMV BLD AUTO: 10.3 FL (ref 6–12)
POTASSIUM SERPL-SCNC: 3.6 MMOL/L (ref 3.5–5.2)
POTASSIUM SERPL-SCNC: 3.7 MMOL/L (ref 3.5–5.2)
POTASSIUM SERPL-SCNC: 3.8 MMOL/L (ref 3.5–5.2)
POTASSIUM SERPL-SCNC: 4.5 MMOL/L (ref 3.5–5.2)
PROCALCITONIN SERPL-MCNC: 0.23 NG/ML (ref 0–0.25)
PROT SERPL-MCNC: 5.9 G/DL (ref 6–8.5)
PROT SERPL-MCNC: 6.1 G/DL (ref 6–8.5)
PROT SERPL-MCNC: 6.2 G/DL (ref 6–8.5)
PROT UR QL STRIP: NEGATIVE
PROT UR QL STRIP: NEGATIVE
PROTHROMBIN TIME: 14.3 SECONDS (ref 11.7–14.2)
RBC # BLD AUTO: 3.85 10*6/MM3 (ref 4.14–5.8)
RBC # BLD AUTO: 4.35 10*6/MM3 (ref 4.14–5.8)
RBC # BLD AUTO: 4.38 10*6/MM3 (ref 4.14–5.8)
RBC # BLD AUTO: 4.47 10*6/MM3 (ref 4.14–5.8)
RBC # UR STRIP: ABNORMAL /HPF
REF LAB TEST METHOD: ABNORMAL
RHINOVIRUS RNA SPEC NAA+PROBE: NOT DETECTED
RSV RNA NPH QL NAA+NON-PROBE: NOT DETECTED
SARS-COV-2 RNA NPH QL NAA+NON-PROBE: NOT DETECTED
SINUS: 3.6 CM
SODIUM SERPL-SCNC: 139 MMOL/L (ref 136–145)
SODIUM SERPL-SCNC: 140 MMOL/L (ref 136–145)
SODIUM SERPL-SCNC: 140 MMOL/L (ref 136–145)
SODIUM SERPL-SCNC: 142 MMOL/L (ref 136–145)
SP GR UR STRIP: 1.02 (ref 1–1.03)
SP GR UR STRIP: >=1.03 (ref 1–1.03)
SQUAMOUS #/AREA URNS HPF: ABNORMAL /HPF
STJ: 2.7 CM
T4 FREE SERPL-MCNC: 1.21 NG/DL (ref 0.93–1.7)
TRIGL SERPL-MCNC: 97 MG/DL (ref 0–150)
TROPONIN T DELTA: 1 NG/L
TROPONIN T SERPL HS-MCNC: 19 NG/L
TSH SERPL DL<=0.05 MIU/L-ACNC: 4.5 UIU/ML (ref 0.27–4.2)
UROBILINOGEN UR QL STRIP: ABNORMAL
UROBILINOGEN UR QL STRIP: ABNORMAL
VLDLC SERPL-MCNC: 18 MG/DL (ref 5–40)
WBC # UR STRIP: ABNORMAL /HPF
WBC NRBC COR # BLD AUTO: 10.67 10*3/MM3 (ref 3.4–10.8)
WBC NRBC COR # BLD AUTO: 10.71 10*3/MM3 (ref 3.4–10.8)
WBC NRBC COR # BLD AUTO: 11.67 10*3/MM3 (ref 3.4–10.8)
WBC NRBC COR # BLD AUTO: 20.86 10*3/MM3 (ref 3.4–10.8)

## 2023-01-01 PROCEDURE — 95819 EEG AWAKE AND ASLEEP: CPT

## 2023-01-01 PROCEDURE — 93306 TTE W/DOPPLER COMPLETE: CPT | Performed by: INTERNAL MEDICINE

## 2023-01-01 PROCEDURE — 82948 REAGENT STRIP/BLOOD GLUCOSE: CPT

## 2023-01-01 PROCEDURE — 94664 DEMO&/EVAL PT USE INHALER: CPT

## 2023-01-01 PROCEDURE — 80053 COMPREHEN METABOLIC PANEL: CPT | Performed by: STUDENT IN AN ORGANIZED HEALTH CARE EDUCATION/TRAINING PROGRAM

## 2023-01-01 PROCEDURE — 94799 UNLISTED PULMONARY SVC/PX: CPT

## 2023-01-01 PROCEDURE — 80053 COMPREHEN METABOLIC PANEL: CPT | Performed by: EMERGENCY MEDICINE

## 2023-01-01 PROCEDURE — G0378 HOSPITAL OBSERVATION PER HR: HCPCS

## 2023-01-01 PROCEDURE — 85027 COMPLETE CBC AUTOMATED: CPT | Performed by: INTERNAL MEDICINE

## 2023-01-01 PROCEDURE — 85027 COMPLETE CBC AUTOMATED: CPT | Performed by: STUDENT IN AN ORGANIZED HEALTH CARE EDUCATION/TRAINING PROGRAM

## 2023-01-01 PROCEDURE — 36415 COLL VENOUS BLD VENIPUNCTURE: CPT | Performed by: EMERGENCY MEDICINE

## 2023-01-01 PROCEDURE — 94761 N-INVAS EAR/PLS OXIMETRY MLT: CPT

## 2023-01-01 PROCEDURE — 86147 CARDIOLIPIN ANTIBODY EA IG: CPT | Performed by: NURSE PRACTITIONER

## 2023-01-01 PROCEDURE — 94760 N-INVAS EAR/PLS OXIMETRY 1: CPT

## 2023-01-01 PROCEDURE — 25010000002 CEFTRIAXONE PER 250 MG: Performed by: STUDENT IN AN ORGANIZED HEALTH CARE EDUCATION/TRAINING PROGRAM

## 2023-01-01 PROCEDURE — 84439 ASSAY OF FREE THYROXINE: CPT | Performed by: STUDENT IN AN ORGANIZED HEALTH CARE EDUCATION/TRAINING PROGRAM

## 2023-01-01 PROCEDURE — 92526 ORAL FUNCTION THERAPY: CPT

## 2023-01-01 PROCEDURE — 0042T HC CT CEREBRAL PERFUSION W/WO CONTRAST: CPT

## 2023-01-01 PROCEDURE — 99232 SBSQ HOSP IP/OBS MODERATE 35: CPT | Performed by: NURSE PRACTITIONER

## 2023-01-01 PROCEDURE — 70498 CT ANGIOGRAPHY NECK: CPT

## 2023-01-01 PROCEDURE — 81003 URINALYSIS AUTO W/O SCOPE: CPT | Performed by: STUDENT IN AN ORGANIZED HEALTH CARE EDUCATION/TRAINING PROGRAM

## 2023-01-01 PROCEDURE — 83036 HEMOGLOBIN GLYCOSYLATED A1C: CPT | Performed by: INTERNAL MEDICINE

## 2023-01-01 PROCEDURE — 25810000003 SODIUM CHLORIDE 0.9 % SOLUTION: Performed by: INTERNAL MEDICINE

## 2023-01-01 PROCEDURE — 25010000002 ENOXAPARIN PER 10 MG: Performed by: STUDENT IN AN ORGANIZED HEALTH CARE EDUCATION/TRAINING PROGRAM

## 2023-01-01 PROCEDURE — 70496 CT ANGIOGRAPHY HEAD: CPT

## 2023-01-01 PROCEDURE — 81001 URINALYSIS AUTO W/SCOPE: CPT | Performed by: EMERGENCY MEDICINE

## 2023-01-01 PROCEDURE — 97162 PT EVAL MOD COMPLEX 30 MIN: CPT

## 2023-01-01 PROCEDURE — 93971 EXTREMITY STUDY: CPT

## 2023-01-01 PROCEDURE — 85732 THROMBOPLASTIN TIME PARTIAL: CPT | Performed by: NURSE PRACTITIONER

## 2023-01-01 PROCEDURE — 70450 CT HEAD/BRAIN W/O DYE: CPT

## 2023-01-01 PROCEDURE — 83605 ASSAY OF LACTIC ACID: CPT | Performed by: STUDENT IN AN ORGANIZED HEALTH CARE EDUCATION/TRAINING PROGRAM

## 2023-01-01 PROCEDURE — 87086 URINE CULTURE/COLONY COUNT: CPT | Performed by: STUDENT IN AN ORGANIZED HEALTH CARE EDUCATION/TRAINING PROGRAM

## 2023-01-01 PROCEDURE — 83735 ASSAY OF MAGNESIUM: CPT | Performed by: EMERGENCY MEDICINE

## 2023-01-01 PROCEDURE — 72125 CT NECK SPINE W/O DYE: CPT

## 2023-01-01 PROCEDURE — 85613 RUSSELL VIPER VENOM DILUTED: CPT | Performed by: NURSE PRACTITIONER

## 2023-01-01 PROCEDURE — 25810000003 SODIUM CHLORIDE 0.9 % SOLUTION: Performed by: STUDENT IN AN ORGANIZED HEALTH CARE EDUCATION/TRAINING PROGRAM

## 2023-01-01 PROCEDURE — 85610 PROTHROMBIN TIME: CPT | Performed by: NURSE PRACTITIONER

## 2023-01-01 PROCEDURE — 99222 1ST HOSP IP/OBS MODERATE 55: CPT | Performed by: NURSE PRACTITIONER

## 2023-01-01 PROCEDURE — 25510000001 PERFLUTREN (DEFINITY) 8.476 MG IN SODIUM CHLORIDE (PF) 0.9 % 10 ML INJECTION: Performed by: INTERNAL MEDICINE

## 2023-01-01 PROCEDURE — 25510000001 IOPAMIDOL PER 1 ML: Performed by: STUDENT IN AN ORGANIZED HEALTH CARE EDUCATION/TRAINING PROGRAM

## 2023-01-01 PROCEDURE — 84443 ASSAY THYROID STIM HORMONE: CPT | Performed by: INTERNAL MEDICINE

## 2023-01-01 PROCEDURE — 71045 X-RAY EXAM CHEST 1 VIEW: CPT

## 2023-01-01 PROCEDURE — 71250 CT THORAX DX C-: CPT

## 2023-01-01 PROCEDURE — 80061 LIPID PANEL: CPT | Performed by: INTERNAL MEDICINE

## 2023-01-01 PROCEDURE — 85610 PROTHROMBIN TIME: CPT | Performed by: EMERGENCY MEDICINE

## 2023-01-01 PROCEDURE — 70551 MRI BRAIN STEM W/O DYE: CPT

## 2023-01-01 PROCEDURE — 84484 ASSAY OF TROPONIN QUANT: CPT | Performed by: EMERGENCY MEDICINE

## 2023-01-01 PROCEDURE — 80048 BASIC METABOLIC PNL TOTAL CA: CPT | Performed by: STUDENT IN AN ORGANIZED HEALTH CARE EDUCATION/TRAINING PROGRAM

## 2023-01-01 PROCEDURE — 83880 ASSAY OF NATRIURETIC PEPTIDE: CPT | Performed by: EMERGENCY MEDICINE

## 2023-01-01 PROCEDURE — 99233 SBSQ HOSP IP/OBS HIGH 50: CPT | Performed by: NURSE PRACTITIONER

## 2023-01-01 PROCEDURE — 85598 HEXAGNAL PHOSPH PLTLT NEUTRL: CPT | Performed by: NURSE PRACTITIONER

## 2023-01-01 PROCEDURE — 97110 THERAPEUTIC EXERCISES: CPT

## 2023-01-01 PROCEDURE — 87040 BLOOD CULTURE FOR BACTERIA: CPT | Performed by: NURSE PRACTITIONER

## 2023-01-01 PROCEDURE — 97166 OT EVAL MOD COMPLEX 45 MIN: CPT

## 2023-01-01 PROCEDURE — 80053 COMPREHEN METABOLIC PANEL: CPT | Performed by: INTERNAL MEDICINE

## 2023-01-01 PROCEDURE — 97530 THERAPEUTIC ACTIVITIES: CPT

## 2023-01-01 PROCEDURE — 25010000002 LABETALOL 5 MG/ML SOLUTION: Performed by: STUDENT IN AN ORGANIZED HEALTH CARE EDUCATION/TRAINING PROGRAM

## 2023-01-01 PROCEDURE — 95819 EEG AWAKE AND ASLEEP: CPT | Performed by: STUDENT IN AN ORGANIZED HEALTH CARE EDUCATION/TRAINING PROGRAM

## 2023-01-01 PROCEDURE — 85670 THROMBIN TIME PLASMA: CPT | Performed by: NURSE PRACTITIONER

## 2023-01-01 PROCEDURE — 84145 PROCALCITONIN (PCT): CPT | Performed by: STUDENT IN AN ORGANIZED HEALTH CARE EDUCATION/TRAINING PROGRAM

## 2023-01-01 PROCEDURE — 0202U NFCT DS 22 TRGT SARS-COV-2: CPT | Performed by: EMERGENCY MEDICINE

## 2023-01-01 PROCEDURE — 25010000002 MORPHINE PER 10 MG: Performed by: STUDENT IN AN ORGANIZED HEALTH CARE EDUCATION/TRAINING PROGRAM

## 2023-01-01 PROCEDURE — 99285 EMERGENCY DEPT VISIT HI MDM: CPT

## 2023-01-01 PROCEDURE — 85025 COMPLETE CBC W/AUTO DIFF WBC: CPT | Performed by: EMERGENCY MEDICINE

## 2023-01-01 PROCEDURE — 86146 BETA-2 GLYCOPROTEIN ANTIBODY: CPT | Performed by: NURSE PRACTITIONER

## 2023-01-01 PROCEDURE — 92610 EVALUATE SWALLOWING FUNCTION: CPT

## 2023-01-01 PROCEDURE — 94640 AIRWAY INHALATION TREATMENT: CPT

## 2023-01-01 PROCEDURE — 93306 TTE W/DOPPLER COMPLETE: CPT

## 2023-01-01 PROCEDURE — 85730 THROMBOPLASTIN TIME PARTIAL: CPT | Performed by: NURSE PRACTITIONER

## 2023-01-01 RX ORDER — GLYCOPYRROLATE 0.2 MG/ML
0.2 INJECTION INTRAMUSCULAR; INTRAVENOUS
Status: DISCONTINUED | OUTPATIENT
Start: 2023-01-01 | End: 2023-11-29 | Stop reason: HOSPADM

## 2023-01-01 RX ORDER — ACETAMINOPHEN 650 MG/1
650 SUPPOSITORY RECTAL EVERY 4 HOURS PRN
Status: DISCONTINUED | OUTPATIENT
Start: 2023-01-01 | End: 2023-01-01 | Stop reason: HOSPADM

## 2023-01-01 RX ORDER — NEOMYCIN POLYMYXIN B SULFATES AND DEXAMETHASONE 3.5; 10000; 1 MG/ML; [USP'U]/ML; MG/ML
1 SUSPENSION/ DROPS OPHTHALMIC EVERY 8 HOURS SCHEDULED
Qty: 9 DROP | Refills: 0 | Status: DISPENSED | OUTPATIENT
Start: 2023-01-01 | End: 2023-01-01

## 2023-01-01 RX ORDER — MORPHINE SULFATE 4 MG/ML
4 INJECTION, SOLUTION INTRAMUSCULAR; INTRAVENOUS EVERY 4 HOURS PRN
Status: DISCONTINUED | OUTPATIENT
Start: 2023-01-01 | End: 2023-11-29 | Stop reason: HOSPADM

## 2023-01-01 RX ORDER — LORAZEPAM 4 MG/ML
2 INJECTION, SOLUTION INTRAMUSCULAR; INTRAVENOUS
Status: DISCONTINUED | OUTPATIENT
Start: 2023-01-01 | End: 2023-11-29 | Stop reason: HOSPADM

## 2023-01-01 RX ORDER — LORAZEPAM 4 MG/ML
0.5 INJECTION, SOLUTION INTRAMUSCULAR; INTRAVENOUS
Status: DISCONTINUED | OUTPATIENT
Start: 2023-01-01 | End: 2023-11-29 | Stop reason: HOSPADM

## 2023-01-01 RX ORDER — LORAZEPAM 2 MG/ML
2 CONCENTRATE ORAL
Status: DISCONTINUED | OUTPATIENT
Start: 2023-01-01 | End: 2023-11-29 | Stop reason: HOSPADM

## 2023-01-01 RX ORDER — MORPHINE SULFATE 2 MG/ML
6 INJECTION, SOLUTION INTRAMUSCULAR; INTRAVENOUS EVERY 6 HOURS PRN
Status: DISCONTINUED | OUTPATIENT
Start: 2023-01-01 | End: 2023-01-01 | Stop reason: HOSPADM

## 2023-01-01 RX ORDER — GLYCOPYRROLATE 0.2 MG/ML
0.4 INJECTION INTRAMUSCULAR; INTRAVENOUS
Status: DISCONTINUED | OUTPATIENT
Start: 2023-01-01 | End: 2023-11-29 | Stop reason: HOSPADM

## 2023-01-01 RX ORDER — GLYCOPYRROLATE 0.2 MG/ML
0.4 INJECTION INTRAMUSCULAR; INTRAVENOUS
Status: DISCONTINUED | OUTPATIENT
Start: 2023-01-01 | End: 2023-01-01 | Stop reason: HOSPADM

## 2023-01-01 RX ORDER — LORAZEPAM 4 MG/ML
0.5 INJECTION, SOLUTION INTRAMUSCULAR; INTRAVENOUS
Status: DISCONTINUED | OUTPATIENT
Start: 2023-01-01 | End: 2023-01-01 | Stop reason: HOSPADM

## 2023-01-01 RX ORDER — LORAZEPAM 4 MG/ML
1 INJECTION, SOLUTION INTRAMUSCULAR; INTRAVENOUS
Status: DISCONTINUED | OUTPATIENT
Start: 2023-01-01 | End: 2023-01-01 | Stop reason: HOSPADM

## 2023-01-01 RX ORDER — SCOLOPAMINE TRANSDERMAL SYSTEM 1 MG/1
1 PATCH, EXTENDED RELEASE TRANSDERMAL
Status: DISCONTINUED | OUTPATIENT
Start: 2023-01-01 | End: 2023-11-29 | Stop reason: HOSPADM

## 2023-01-01 RX ORDER — BISACODYL 10 MG
10 SUPPOSITORY, RECTAL RECTAL DAILY PRN
Status: DISCONTINUED | OUTPATIENT
Start: 2023-01-01 | End: 2023-01-01 | Stop reason: HOSPADM

## 2023-01-01 RX ORDER — MORPHINE SULFATE 20 MG/ML
20 SOLUTION ORAL EVERY 6 HOURS PRN
Status: DISCONTINUED | OUTPATIENT
Start: 2023-01-01 | End: 2023-01-01 | Stop reason: HOSPADM

## 2023-01-01 RX ORDER — NYSTATIN 100000 U/G
1 CREAM TOPICAL EVERY 12 HOURS SCHEDULED
Status: DISCONTINUED | OUTPATIENT
Start: 2023-01-01 | End: 2023-01-01

## 2023-01-01 RX ORDER — TRIAMCINOLONE ACETONIDE 1 MG/G
1 OINTMENT TOPICAL EVERY 12 HOURS SCHEDULED
Status: DISCONTINUED | OUTPATIENT
Start: 2023-01-01 | End: 2023-01-01

## 2023-01-01 RX ORDER — ENOXAPARIN SODIUM 100 MG/ML
1 INJECTION SUBCUTANEOUS EVERY 12 HOURS
Qty: 9.6 ML | Refills: 0 | Status: SHIPPED | OUTPATIENT
Start: 2023-01-01

## 2023-01-01 RX ORDER — LORAZEPAM 2 MG/ML
1 CONCENTRATE ORAL
Status: CANCELLED | OUTPATIENT
Start: 2023-01-01 | End: 2023-12-03

## 2023-01-01 RX ORDER — AMLODIPINE BESYLATE 5 MG/1
5 TABLET ORAL
Status: DISCONTINUED | OUTPATIENT
Start: 2023-01-01 | End: 2023-01-01

## 2023-01-01 RX ORDER — FAMOTIDINE 20 MG/1
20 TABLET, FILM COATED ORAL 2 TIMES DAILY
Status: DISCONTINUED | OUTPATIENT
Start: 2023-01-01 | End: 2023-01-01

## 2023-01-01 RX ORDER — DIPHENOXYLATE HYDROCHLORIDE AND ATROPINE SULFATE 2.5; .025 MG/1; MG/1
1 TABLET ORAL
Status: DISCONTINUED | OUTPATIENT
Start: 2023-01-01 | End: 2023-01-01 | Stop reason: HOSPADM

## 2023-01-01 RX ORDER — ROSUVASTATIN CALCIUM 20 MG/1
20 TABLET, COATED ORAL NIGHTLY
Status: DISCONTINUED | OUTPATIENT
Start: 2023-01-01 | End: 2023-01-01

## 2023-01-01 RX ORDER — GLYCOPYRROLATE 0.2 MG/ML
0.2 INJECTION INTRAMUSCULAR; INTRAVENOUS
Status: DISCONTINUED | OUTPATIENT
Start: 2023-01-01 | End: 2023-01-01 | Stop reason: HOSPADM

## 2023-01-01 RX ORDER — BRIMONIDINE TARTRATE 2 MG/ML
1 SOLUTION/ DROPS OPHTHALMIC 2 TIMES DAILY
Status: DISCONTINUED | OUTPATIENT
Start: 2023-01-01 | End: 2023-01-01

## 2023-01-01 RX ORDER — BISACODYL 10 MG
10 SUPPOSITORY, RECTAL RECTAL DAILY PRN
Status: CANCELLED | OUTPATIENT
Start: 2023-01-01

## 2023-01-01 RX ORDER — CEFDINIR 300 MG/1
300 CAPSULE ORAL 2 TIMES DAILY
Qty: 6 CAPSULE | Refills: 0 | Status: SHIPPED | OUTPATIENT
Start: 2023-01-01

## 2023-01-01 RX ORDER — LORAZEPAM 2 MG/ML
0.5 CONCENTRATE ORAL
Status: CANCELLED | OUTPATIENT
Start: 2023-01-01 | End: 2023-12-03

## 2023-01-01 RX ORDER — LORAZEPAM 2 MG/ML
1 CONCENTRATE ORAL
Status: DISCONTINUED | OUTPATIENT
Start: 2023-01-01 | End: 2023-01-01 | Stop reason: HOSPADM

## 2023-01-01 RX ORDER — SODIUM CHLORIDE 0.9 % (FLUSH) 0.9 %
10 SYRINGE (ML) INJECTION AS NEEDED
Status: DISCONTINUED | OUTPATIENT
Start: 2023-01-01 | End: 2023-11-29 | Stop reason: HOSPADM

## 2023-01-01 RX ORDER — ACETAMINOPHEN 160 MG/5ML
650 SOLUTION ORAL EVERY 4 HOURS PRN
Status: DISCONTINUED | OUTPATIENT
Start: 2023-01-01 | End: 2023-11-29 | Stop reason: HOSPADM

## 2023-01-01 RX ORDER — ONDANSETRON 2 MG/ML
4 INJECTION INTRAMUSCULAR; INTRAVENOUS EVERY 6 HOURS PRN
Status: DISCONTINUED | OUTPATIENT
Start: 2023-01-01 | End: 2023-11-29 | Stop reason: HOSPADM

## 2023-01-01 RX ORDER — LORAZEPAM 2 MG/ML
1 CONCENTRATE ORAL
Status: DISCONTINUED | OUTPATIENT
Start: 2023-01-01 | End: 2023-11-29 | Stop reason: HOSPADM

## 2023-01-01 RX ORDER — ATORVASTATIN CALCIUM 80 MG/1
80 TABLET, FILM COATED ORAL NIGHTLY
Status: DISCONTINUED | OUTPATIENT
Start: 2023-01-01 | End: 2023-01-01

## 2023-01-01 RX ORDER — MORPHINE SULFATE 10 MG/ML
6 INJECTION INTRAMUSCULAR; INTRAVENOUS; SUBCUTANEOUS EVERY 6 HOURS PRN
Status: DISCONTINUED | OUTPATIENT
Start: 2023-01-01 | End: 2023-11-29 | Stop reason: HOSPADM

## 2023-01-01 RX ORDER — MORPHINE SULFATE 4 MG/ML
4 INJECTION, SOLUTION INTRAMUSCULAR; INTRAVENOUS EVERY 4 HOURS PRN
Status: CANCELLED | OUTPATIENT
Start: 2023-01-01 | End: 2023-12-03

## 2023-01-01 RX ORDER — DIPHENOXYLATE HYDROCHLORIDE AND ATROPINE SULFATE 2.5; .025 MG/1; MG/1
1 TABLET ORAL
Status: DISCONTINUED | OUTPATIENT
Start: 2023-01-01 | End: 2023-11-29 | Stop reason: HOSPADM

## 2023-01-01 RX ORDER — GUAIFENESIN 200 MG/10ML
400 LIQUID ORAL EVERY 8 HOURS SCHEDULED
Status: DISCONTINUED | OUTPATIENT
Start: 2023-01-01 | End: 2023-01-01

## 2023-01-01 RX ORDER — LABETALOL HYDROCHLORIDE 5 MG/ML
5 INJECTION, SOLUTION INTRAVENOUS EVERY 4 HOURS PRN
Status: DISCONTINUED | OUTPATIENT
Start: 2023-01-01 | End: 2023-11-29 | Stop reason: HOSPADM

## 2023-01-01 RX ORDER — ACETAMINOPHEN 650 MG/1
650 SUPPOSITORY RECTAL EVERY 4 HOURS PRN
Status: CANCELLED | OUTPATIENT
Start: 2023-01-01

## 2023-01-01 RX ORDER — SODIUM CHLORIDE 0.9 % (FLUSH) 0.9 %
10 SYRINGE (ML) INJECTION AS NEEDED
Status: CANCELLED | OUTPATIENT
Start: 2023-01-01

## 2023-01-01 RX ORDER — ACETAMINOPHEN 325 MG/1
650 TABLET ORAL EVERY 4 HOURS PRN
Status: CANCELLED | OUTPATIENT
Start: 2023-01-01

## 2023-01-01 RX ORDER — LIDOCAINE 4 G/G
1 PATCH TOPICAL
Status: DISCONTINUED | OUTPATIENT
Start: 2023-01-01 | End: 2023-01-01

## 2023-01-01 RX ORDER — LORAZEPAM 2 MG/ML
2 CONCENTRATE ORAL
Status: DISCONTINUED | OUTPATIENT
Start: 2023-01-01 | End: 2023-01-01 | Stop reason: HOSPADM

## 2023-01-01 RX ORDER — LORAZEPAM 4 MG/ML
0.5 INJECTION, SOLUTION INTRAMUSCULAR; INTRAVENOUS
Status: CANCELLED | OUTPATIENT
Start: 2023-01-01 | End: 2023-12-03

## 2023-01-01 RX ORDER — ACETAMINOPHEN 325 MG/1
650 TABLET ORAL EVERY 4 HOURS PRN
Status: DISCONTINUED | OUTPATIENT
Start: 2023-01-01 | End: 2023-01-01 | Stop reason: HOSPADM

## 2023-01-01 RX ORDER — TACROLIMUS 1 MG/G
1 OINTMENT TOPICAL 2 TIMES DAILY
COMMUNITY

## 2023-01-01 RX ORDER — LABETALOL HYDROCHLORIDE 5 MG/ML
5 INJECTION, SOLUTION INTRAVENOUS EVERY 4 HOURS PRN
Status: DISCONTINUED | OUTPATIENT
Start: 2023-01-01 | End: 2023-01-01 | Stop reason: HOSPADM

## 2023-01-01 RX ORDER — ACETAMINOPHEN 650 MG/1
650 SUPPOSITORY RECTAL EVERY 4 HOURS PRN
Status: DISCONTINUED | OUTPATIENT
Start: 2023-01-01 | End: 2023-11-29 | Stop reason: HOSPADM

## 2023-01-01 RX ORDER — ENOXAPARIN SODIUM 100 MG/ML
1 INJECTION SUBCUTANEOUS EVERY 12 HOURS
Status: DISCONTINUED | OUTPATIENT
Start: 2023-01-01 | End: 2023-01-01

## 2023-01-01 RX ORDER — ACETAMINOPHEN 160 MG/5ML
650 SOLUTION ORAL EVERY 4 HOURS PRN
Status: DISCONTINUED | OUTPATIENT
Start: 2023-01-01 | End: 2023-01-01 | Stop reason: HOSPADM

## 2023-01-01 RX ORDER — LORAZEPAM 2 MG/ML
0.5 CONCENTRATE ORAL
Status: DISCONTINUED | OUTPATIENT
Start: 2023-01-01 | End: 2023-11-29 | Stop reason: HOSPADM

## 2023-01-01 RX ORDER — GALANTAMINE HYDROBROMIDE 4 MG/1
8 TABLET, FILM COATED ORAL 2 TIMES DAILY
Status: DISCONTINUED | OUTPATIENT
Start: 2023-01-01 | End: 2023-01-01

## 2023-01-01 RX ORDER — GLYCOPYRROLATE 0.2 MG/ML
0.2 INJECTION INTRAMUSCULAR; INTRAVENOUS
Status: CANCELLED | OUTPATIENT
Start: 2023-01-01

## 2023-01-01 RX ORDER — LORAZEPAM 4 MG/ML
2 INJECTION, SOLUTION INTRAMUSCULAR; INTRAVENOUS
Status: DISCONTINUED | OUTPATIENT
Start: 2023-01-01 | End: 2023-01-01 | Stop reason: HOSPADM

## 2023-01-01 RX ORDER — LEVOTHYROXINE SODIUM 0.03 MG/1
25 TABLET ORAL
Status: DISCONTINUED | OUTPATIENT
Start: 2023-01-01 | End: 2023-01-01

## 2023-01-01 RX ORDER — LORAZEPAM 4 MG/ML
2 INJECTION, SOLUTION INTRAMUSCULAR; INTRAVENOUS
Status: CANCELLED | OUTPATIENT
Start: 2023-01-01 | End: 2023-12-03

## 2023-01-01 RX ORDER — MORPHINE SULFATE 20 MG/ML
10 SOLUTION ORAL
Status: DISCONTINUED | OUTPATIENT
Start: 2023-01-01 | End: 2023-01-01 | Stop reason: HOSPADM

## 2023-01-01 RX ORDER — GUAIFENESIN 200 MG/1
400 TABLET ORAL EVERY 4 HOURS PRN
COMMUNITY

## 2023-01-01 RX ORDER — ASPIRIN 325 MG
325 TABLET ORAL DAILY
Status: DISCONTINUED | OUTPATIENT
Start: 2023-01-01 | End: 2023-01-01

## 2023-01-01 RX ORDER — MORPHINE SULFATE 20 MG/ML
20 SOLUTION ORAL EVERY 6 HOURS PRN
Status: CANCELLED | OUTPATIENT
Start: 2023-01-01 | End: 2023-12-01

## 2023-01-01 RX ORDER — MORPHINE SULFATE 20 MG/ML
20 SOLUTION ORAL EVERY 6 HOURS PRN
Status: DISCONTINUED | OUTPATIENT
Start: 2023-01-01 | End: 2023-11-29 | Stop reason: HOSPADM

## 2023-01-01 RX ORDER — TRIAMCINOLONE ACETONIDE 1 MG/G
1 CREAM TOPICAL 2 TIMES DAILY
COMMUNITY

## 2023-01-01 RX ORDER — ALBUTEROL SULFATE 2.5 MG/3ML
2.5 SOLUTION RESPIRATORY (INHALATION)
Status: DISCONTINUED | OUTPATIENT
Start: 2023-01-01 | End: 2023-01-01

## 2023-01-01 RX ORDER — BISACODYL 10 MG
10 SUPPOSITORY, RECTAL RECTAL DAILY PRN
Status: DISCONTINUED | OUTPATIENT
Start: 2023-01-01 | End: 2023-11-29 | Stop reason: HOSPADM

## 2023-01-01 RX ORDER — CETIRIZINE HYDROCHLORIDE 10 MG/1
10 TABLET ORAL DAILY
Status: DISCONTINUED | OUTPATIENT
Start: 2023-01-01 | End: 2023-01-01

## 2023-01-01 RX ORDER — LIDOCAINE 40 MG/G
1 CREAM TOPICAL 3 TIMES DAILY PRN
COMMUNITY

## 2023-01-01 RX ORDER — ONDANSETRON 2 MG/ML
4 INJECTION INTRAMUSCULAR; INTRAVENOUS EVERY 6 HOURS PRN
Status: DISCONTINUED | OUTPATIENT
Start: 2023-01-01 | End: 2023-01-01 | Stop reason: HOSPADM

## 2023-01-01 RX ORDER — MULTIPLE VITAMINS W/ MINERALS TAB 9MG-400MCG
1 TAB ORAL NIGHTLY
Status: DISCONTINUED | OUTPATIENT
Start: 2023-01-01 | End: 2023-01-01

## 2023-01-01 RX ORDER — LORAZEPAM 4 MG/ML
1 INJECTION, SOLUTION INTRAMUSCULAR; INTRAVENOUS
Status: DISCONTINUED | OUTPATIENT
Start: 2023-01-01 | End: 2023-11-29 | Stop reason: HOSPADM

## 2023-01-01 RX ORDER — ACETAMINOPHEN 325 MG/1
650 TABLET ORAL EVERY 4 HOURS PRN
Status: DISCONTINUED | OUTPATIENT
Start: 2023-01-01 | End: 2023-11-29 | Stop reason: HOSPADM

## 2023-01-01 RX ORDER — GLYCOPYRROLATE 0.2 MG/ML
0.4 INJECTION INTRAMUSCULAR; INTRAVENOUS
Status: CANCELLED | OUTPATIENT
Start: 2023-01-01

## 2023-01-01 RX ORDER — LISINOPRIL 20 MG/1
20 TABLET ORAL
Status: DISCONTINUED | OUTPATIENT
Start: 2023-01-01 | End: 2023-01-01

## 2023-01-01 RX ORDER — MORPHINE SULFATE 10 MG/ML
6 INJECTION INTRAMUSCULAR; INTRAVENOUS; SUBCUTANEOUS EVERY 6 HOURS PRN
Status: CANCELLED | OUTPATIENT
Start: 2023-01-01 | End: 2023-12-01

## 2023-01-01 RX ORDER — SODIUM CHLORIDE 9 MG/ML
150 INJECTION, SOLUTION INTRAVENOUS CONTINUOUS
Status: DISCONTINUED | OUTPATIENT
Start: 2023-01-01 | End: 2023-01-01

## 2023-01-01 RX ORDER — SCOLOPAMINE TRANSDERMAL SYSTEM 1 MG/1
1 PATCH, EXTENDED RELEASE TRANSDERMAL
Status: CANCELLED | OUTPATIENT
Start: 2023-01-01

## 2023-01-01 RX ORDER — HYDROMORPHONE HYDROCHLORIDE 2 MG/ML
1.5 INJECTION, SOLUTION INTRAMUSCULAR; INTRAVENOUS; SUBCUTANEOUS EVERY 6 HOURS PRN
Status: CANCELLED | OUTPATIENT
Start: 2023-01-01 | End: 2023-12-01

## 2023-01-01 RX ORDER — ACETAMINOPHEN 160 MG/5ML
650 SOLUTION ORAL EVERY 4 HOURS PRN
Status: CANCELLED | OUTPATIENT
Start: 2023-01-01

## 2023-01-01 RX ORDER — LIDOCAINE 4 G/G
1 PATCH TOPICAL
Status: DISCONTINUED | OUTPATIENT
Start: 2023-01-01 | End: 2023-11-29 | Stop reason: HOSPADM

## 2023-01-01 RX ORDER — LISINOPRIL 20 MG/1
20 TABLET ORAL
Qty: 30 TABLET | Refills: 0 | Status: SHIPPED | OUTPATIENT
Start: 2023-01-01

## 2023-01-01 RX ORDER — ASPIRIN 300 MG/1
300 SUPPOSITORY RECTAL DAILY
Status: DISCONTINUED | OUTPATIENT
Start: 2023-01-01 | End: 2023-01-01

## 2023-01-01 RX ORDER — LORAZEPAM 4 MG/ML
1 INJECTION, SOLUTION INTRAMUSCULAR; INTRAVENOUS
Status: CANCELLED | OUTPATIENT
Start: 2023-01-01 | End: 2023-12-03

## 2023-01-01 RX ORDER — MORPHINE SULFATE 20 MG/ML
10 SOLUTION ORAL
Status: DISCONTINUED | OUTPATIENT
Start: 2023-01-01 | End: 2023-11-29 | Stop reason: HOSPADM

## 2023-01-01 RX ORDER — SCOLOPAMINE TRANSDERMAL SYSTEM 1 MG/1
1 PATCH, EXTENDED RELEASE TRANSDERMAL
Status: DISCONTINUED | OUTPATIENT
Start: 2023-01-01 | End: 2023-01-01 | Stop reason: HOSPADM

## 2023-01-01 RX ORDER — DOCUSATE SODIUM 100 MG/1
100 CAPSULE, LIQUID FILLED ORAL NIGHTLY
Status: DISCONTINUED | OUTPATIENT
Start: 2023-01-01 | End: 2023-01-01

## 2023-01-01 RX ORDER — HYDROMORPHONE HYDROCHLORIDE 2 MG/ML
1.5 INJECTION, SOLUTION INTRAMUSCULAR; INTRAVENOUS; SUBCUTANEOUS EVERY 6 HOURS PRN
Status: DISCONTINUED | OUTPATIENT
Start: 2023-01-01 | End: 2023-11-29 | Stop reason: HOSPADM

## 2023-01-01 RX ORDER — MORPHINE SULFATE 20 MG/ML
10 SOLUTION ORAL
Status: CANCELLED | OUTPATIENT
Start: 2023-01-01 | End: 2023-12-03

## 2023-01-01 RX ORDER — ESCITALOPRAM OXALATE 10 MG/1
10 TABLET ORAL DAILY
Status: DISCONTINUED | OUTPATIENT
Start: 2023-01-01 | End: 2023-01-01

## 2023-01-01 RX ORDER — ONDANSETRON 2 MG/ML
4 INJECTION INTRAMUSCULAR; INTRAVENOUS EVERY 6 HOURS PRN
Status: CANCELLED | OUTPATIENT
Start: 2023-01-01

## 2023-01-01 RX ORDER — LABETALOL HYDROCHLORIDE 5 MG/ML
5 INJECTION, SOLUTION INTRAVENOUS EVERY 4 HOURS PRN
Status: CANCELLED | OUTPATIENT
Start: 2023-01-01

## 2023-01-01 RX ORDER — SODIUM CHLORIDE 9 MG/ML
75 INJECTION, SOLUTION INTRAVENOUS CONTINUOUS
Status: DISCONTINUED | OUTPATIENT
Start: 2023-01-01 | End: 2023-01-01

## 2023-01-01 RX ORDER — SODIUM CHLORIDE FOR INHALATION 3 %
4 VIAL, NEBULIZER (ML) INHALATION
Status: DISCONTINUED | OUTPATIENT
Start: 2023-01-01 | End: 2023-01-01

## 2023-01-01 RX ORDER — NEOMYCIN POLYMYXIN B SULFATES AND DEXAMETHASONE 3.5; 10000; 1 MG/ML; [USP'U]/ML; MG/ML
1 SUSPENSION/ DROPS OPHTHALMIC EVERY 8 HOURS SCHEDULED
Status: DISCONTINUED | OUTPATIENT
Start: 2023-01-01 | End: 2023-01-01

## 2023-01-01 RX ORDER — SODIUM CHLORIDE 0.9 % (FLUSH) 0.9 %
10 SYRINGE (ML) INJECTION AS NEEDED
Status: DISCONTINUED | OUTPATIENT
Start: 2023-01-01 | End: 2023-01-01 | Stop reason: HOSPADM

## 2023-01-01 RX ORDER — LORAZEPAM 2 MG/ML
0.5 CONCENTRATE ORAL
Status: DISCONTINUED | OUTPATIENT
Start: 2023-01-01 | End: 2023-01-01 | Stop reason: HOSPADM

## 2023-01-01 RX ORDER — LORAZEPAM 2 MG/ML
2 CONCENTRATE ORAL
Status: CANCELLED | OUTPATIENT
Start: 2023-01-01 | End: 2023-12-03

## 2023-01-01 RX ORDER — MORPHINE SULFATE 2 MG/ML
4 INJECTION, SOLUTION INTRAMUSCULAR; INTRAVENOUS EVERY 4 HOURS PRN
Status: DISCONTINUED | OUTPATIENT
Start: 2023-01-01 | End: 2023-01-01 | Stop reason: HOSPADM

## 2023-01-01 RX ORDER — DIPHENOXYLATE HYDROCHLORIDE AND ATROPINE SULFATE 2.5; .025 MG/1; MG/1
1 TABLET ORAL
Status: CANCELLED | OUTPATIENT
Start: 2023-01-01

## 2023-01-01 RX ADMIN — ALBUTEROL SULFATE 2.5 MG: 2.5 SOLUTION RESPIRATORY (INHALATION) at 06:41

## 2023-01-01 RX ADMIN — BRIMONIDINE TARTRATE 1 DROP: 2 SOLUTION OPHTHALMIC at 20:37

## 2023-01-01 RX ADMIN — BRIMONIDINE TARTRATE 1 DROP: 2 SOLUTION OPHTHALMIC at 08:47

## 2023-01-01 RX ADMIN — TRIAMCINOLONE ACETONIDE 1 APPLICATION: 1 OINTMENT TOPICAL at 22:50

## 2023-01-01 RX ADMIN — ENOXAPARIN SODIUM 60 MG: 100 INJECTION SUBCUTANEOUS at 02:42

## 2023-01-01 RX ADMIN — BRIMONIDINE TARTRATE 1 DROP: 2 SOLUTION OPHTHALMIC at 10:21

## 2023-01-01 RX ADMIN — GLYCOPYRROLATE 0.2 MG: 0.2 INJECTION INTRAMUSCULAR; INTRAVENOUS at 14:33

## 2023-01-01 RX ADMIN — TRIAMCINOLONE ACETONIDE 1 APPLICATION: 1 OINTMENT TOPICAL at 10:38

## 2023-01-01 RX ADMIN — LIDOCAINE 1 PATCH: 4 PATCH TOPICAL at 17:19

## 2023-01-01 RX ADMIN — TRIAMCINOLONE ACETONIDE 1 APPLICATION: 1 OINTMENT TOPICAL at 08:10

## 2023-01-01 RX ADMIN — SODIUM CHLORIDE 30 MG/ML INHALATION SOLUTION 4 ML: 30 SOLUTION INHALANT at 21:02

## 2023-01-01 RX ADMIN — SODIUM CHLORIDE 75 ML/HR: 9 INJECTION, SOLUTION INTRAVENOUS at 14:17

## 2023-01-01 RX ADMIN — ALBUTEROL SULFATE 2.5 MG: 2.5 SOLUTION RESPIRATORY (INHALATION) at 08:41

## 2023-01-01 RX ADMIN — LORAZEPAM 0.5 MG: 2 CONCENTRATE ORAL at 10:33

## 2023-01-01 RX ADMIN — TRIAMCINOLONE ACETONIDE 1 APPLICATION: 1 OINTMENT TOPICAL at 23:38

## 2023-01-01 RX ADMIN — LIDOCAINE 1 PATCH: 4 PATCH TOPICAL at 08:47

## 2023-01-01 RX ADMIN — SODIUM CHLORIDE 150 ML/HR: 9 INJECTION, SOLUTION INTRAVENOUS at 11:46

## 2023-01-01 RX ADMIN — MORPHINE SULFATE 4 MG: 2 INJECTION, SOLUTION INTRAMUSCULAR; INTRAVENOUS at 17:35

## 2023-01-01 RX ADMIN — ALBUTEROL SULFATE 2.5 MG: 2.5 SOLUTION RESPIRATORY (INHALATION) at 20:50

## 2023-01-01 RX ADMIN — NEOMYCIN SULFATE, POLYMYXIN B SULFATE AND DEXAMETHASONE 1 DROP: 3.5; 10000; 1 SUSPENSION OPHTHALMIC at 15:22

## 2023-01-01 RX ADMIN — NEOMYCIN SULFATE, POLYMYXIN B SULFATE AND DEXAMETHASONE 1 DROP: 3.5; 10000; 1 SUSPENSION OPHTHALMIC at 23:37

## 2023-01-01 RX ADMIN — ALBUTEROL SULFATE 2.5 MG: 2.5 SOLUTION RESPIRATORY (INHALATION) at 13:02

## 2023-01-01 RX ADMIN — SODIUM CHLORIDE 30 MG/ML INHALATION SOLUTION 4 ML: 30 SOLUTION INHALANT at 06:27

## 2023-01-01 RX ADMIN — SODIUM CHLORIDE 30 MG/ML INHALATION SOLUTION 4 ML: 30 SOLUTION INHALANT at 13:03

## 2023-01-01 RX ADMIN — NEOMYCIN SULFATE, POLYMYXIN B SULFATE AND DEXAMETHASONE 1 DROP: 3.5; 10000; 1 SUSPENSION OPHTHALMIC at 11:40

## 2023-01-01 RX ADMIN — SODIUM CHLORIDE 30 MG/ML INHALATION SOLUTION 4 ML: 30 SOLUTION INHALANT at 08:43

## 2023-01-01 RX ADMIN — NYSTATIN 1 APPLICATION: 100000 CREAM TOPICAL at 23:38

## 2023-01-01 RX ADMIN — NEOMYCIN SULFATE, POLYMYXIN B SULFATE AND DEXAMETHASONE 1 DROP: 3.5; 10000; 1 SUSPENSION OPHTHALMIC at 23:36

## 2023-01-01 RX ADMIN — SODIUM CHLORIDE 75 ML/HR: 9 INJECTION, SOLUTION INTRAVENOUS at 23:32

## 2023-01-01 RX ADMIN — TRIAMCINOLONE ACETONIDE 1 APPLICATION: 1 OINTMENT TOPICAL at 21:35

## 2023-01-01 RX ADMIN — MORPHINE SULFATE 4 MG: 2 INJECTION, SOLUTION INTRAMUSCULAR; INTRAVENOUS at 16:01

## 2023-01-01 RX ADMIN — LIDOCAINE 1 PATCH: 4 PATCH TOPICAL at 08:09

## 2023-01-01 RX ADMIN — NYSTATIN 1 APPLICATION: 100000 CREAM TOPICAL at 08:10

## 2023-01-01 RX ADMIN — ALBUTEROL SULFATE 2.5 MG: 2.5 SOLUTION RESPIRATORY (INHALATION) at 06:27

## 2023-01-01 RX ADMIN — MORPHINE SULFATE 10 MG: 100 SOLUTION ORAL at 17:19

## 2023-01-01 RX ADMIN — GLYCOPYRROLATE 0.2 MG: 0.2 INJECTION INTRAMUSCULAR; INTRAVENOUS at 01:31

## 2023-01-01 RX ADMIN — BRIMONIDINE TARTRATE 1 DROP: 2 SOLUTION OPHTHALMIC at 22:50

## 2023-01-01 RX ADMIN — NYSTATIN 1 APPLICATION: 100000 CREAM TOPICAL at 10:20

## 2023-01-01 RX ADMIN — NEOMYCIN SULFATE, POLYMYXIN B SULFATE AND DEXAMETHASONE 1 DROP: 3.5; 10000; 1 SUSPENSION OPHTHALMIC at 05:27

## 2023-01-01 RX ADMIN — TIOTROPIUM BROMIDE INHALATION SPRAY 2 PUFF: 3.12 SPRAY, METERED RESPIRATORY (INHALATION) at 08:23

## 2023-01-01 RX ADMIN — ASPIRIN 300 MG: 300 SUPPOSITORY RECTAL at 11:34

## 2023-01-01 RX ADMIN — SODIUM CHLORIDE 30 MG/ML INHALATION SOLUTION 4 ML: 30 SOLUTION INHALANT at 06:41

## 2023-01-01 RX ADMIN — MORPHINE SULFATE 4 MG: 2 INJECTION, SOLUTION INTRAMUSCULAR; INTRAVENOUS at 05:03

## 2023-01-01 RX ADMIN — BRIMONIDINE TARTRATE 1 DROP: 2 SOLUTION OPHTHALMIC at 21:18

## 2023-01-01 RX ADMIN — LORAZEPAM 0.5 MG: 2 CONCENTRATE ORAL at 17:19

## 2023-01-01 RX ADMIN — SODIUM CHLORIDE 150 ML/HR: 9 INJECTION, SOLUTION INTRAVENOUS at 02:23

## 2023-01-01 RX ADMIN — CEFTRIAXONE 2000 MG: 2 INJECTION, POWDER, FOR SOLUTION INTRAMUSCULAR; INTRAVENOUS at 15:53

## 2023-01-01 RX ADMIN — GLYCOPYRROLATE 0.2 MG: 0.2 INJECTION INTRAMUSCULAR; INTRAVENOUS at 10:33

## 2023-01-01 RX ADMIN — NYSTATIN 1 APPLICATION: 100000 CREAM TOPICAL at 22:50

## 2023-01-01 RX ADMIN — MORPHINE SULFATE 10 MG: 100 SOLUTION ORAL at 14:33

## 2023-01-01 RX ADMIN — NEOMYCIN SULFATE, POLYMYXIN B SULFATE AND DEXAMETHASONE 1 DROP: 3.5; 10000; 1 SUSPENSION OPHTHALMIC at 21:19

## 2023-01-01 RX ADMIN — ALBUTEROL SULFATE 2.5 MG: 2.5 SOLUTION RESPIRATORY (INHALATION) at 20:58

## 2023-01-01 RX ADMIN — NEOMYCIN SULFATE, POLYMYXIN B SULFATE AND DEXAMETHASONE 1 DROP: 3.5; 10000; 1 SUSPENSION OPHTHALMIC at 06:32

## 2023-01-01 RX ADMIN — ENOXAPARIN SODIUM 60 MG: 100 INJECTION SUBCUTANEOUS at 14:42

## 2023-01-01 RX ADMIN — ENOXAPARIN SODIUM 60 MG: 100 INJECTION SUBCUTANEOUS at 12:19

## 2023-01-01 RX ADMIN — MORPHINE SULFATE 10 MG: 100 SOLUTION ORAL at 10:33

## 2023-01-01 RX ADMIN — BRIMONIDINE TARTRATE 1 DROP: 2 SOLUTION OPHTHALMIC at 08:09

## 2023-01-01 RX ADMIN — TRIAMCINOLONE ACETONIDE 1 APPLICATION: 1 OINTMENT TOPICAL at 20:39

## 2023-01-01 RX ADMIN — LORAZEPAM 0.5 MG: 2 CONCENTRATE ORAL at 14:33

## 2023-01-01 RX ADMIN — BRIMONIDINE TARTRATE 1 DROP: 2 SOLUTION OPHTHALMIC at 10:13

## 2023-01-01 RX ADMIN — CEFTRIAXONE 2000 MG: 2 INJECTION, POWDER, FOR SOLUTION INTRAMUSCULAR; INTRAVENOUS at 16:29

## 2023-01-01 RX ADMIN — NYSTATIN 1 APPLICATION: 100000 CREAM TOPICAL at 20:39

## 2023-01-01 RX ADMIN — BRIMONIDINE TARTRATE 1 DROP: 2 SOLUTION OPHTHALMIC at 00:50

## 2023-01-01 RX ADMIN — SODIUM CHLORIDE 30 MG/ML INHALATION SOLUTION 4 ML: 30 SOLUTION INHALANT at 20:54

## 2023-01-01 RX ADMIN — MORPHINE SULFATE 4 MG: 2 INJECTION, SOLUTION INTRAMUSCULAR; INTRAVENOUS at 20:57

## 2023-01-01 RX ADMIN — BRIMONIDINE TARTRATE 1 DROP: 2 SOLUTION OPHTHALMIC at 21:35

## 2023-01-01 RX ADMIN — SODIUM CHLORIDE 75 ML/HR: 9 INJECTION, SOLUTION INTRAVENOUS at 02:46

## 2023-01-01 RX ADMIN — TRIAMCINOLONE ACETONIDE 1 APPLICATION: 1 OINTMENT TOPICAL at 10:20

## 2023-01-01 RX ADMIN — MORPHINE SULFATE 4 MG: 2 INJECTION, SOLUTION INTRAMUSCULAR; INTRAVENOUS at 09:26

## 2023-01-01 RX ADMIN — ENOXAPARIN SODIUM 60 MG: 100 INJECTION SUBCUTANEOUS at 00:50

## 2023-01-01 RX ADMIN — TRIAMCINOLONE ACETONIDE 1 APPLICATION: 1 OINTMENT TOPICAL at 21:18

## 2023-01-01 RX ADMIN — GLYCOPYRROLATE 0.2 MG: 0.2 INJECTION INTRAMUSCULAR; INTRAVENOUS at 17:19

## 2023-01-01 RX ADMIN — NYSTATIN 1 APPLICATION: 100000 CREAM TOPICAL at 10:38

## 2023-01-01 RX ADMIN — LABETALOL HYDROCHLORIDE 5 MG: 5 INJECTION, SOLUTION INTRAVENOUS at 17:59

## 2023-01-01 RX ADMIN — ENOXAPARIN SODIUM 60 MG: 100 INJECTION SUBCUTANEOUS at 16:28

## 2023-01-01 RX ADMIN — TRIAMCINOLONE ACETONIDE 1 APPLICATION: 1 OINTMENT TOPICAL at 10:14

## 2023-01-01 RX ADMIN — MORPHINE SULFATE 4 MG: 2 INJECTION, SOLUTION INTRAMUSCULAR; INTRAVENOUS at 00:52

## 2023-01-01 RX ADMIN — CEFTRIAXONE 2000 MG: 2 INJECTION, POWDER, FOR SOLUTION INTRAMUSCULAR; INTRAVENOUS at 18:15

## 2023-01-01 RX ADMIN — NYSTATIN 1 APPLICATION: 100000 CREAM TOPICAL at 10:13

## 2023-01-01 RX ADMIN — NEOMYCIN SULFATE, POLYMYXIN B SULFATE AND DEXAMETHASONE 1 DROP: 3.5; 10000; 1 SUSPENSION OPHTHALMIC at 16:29

## 2023-01-01 RX ADMIN — NYSTATIN 1 APPLICATION: 100000 CREAM TOPICAL at 21:17

## 2023-01-01 RX ADMIN — ENOXAPARIN SODIUM 60 MG: 100 INJECTION SUBCUTANEOUS at 01:43

## 2023-01-01 RX ADMIN — IOPAMIDOL 150 ML: 755 INJECTION, SOLUTION INTRAVENOUS at 22:30

## 2023-01-01 RX ADMIN — CEFTRIAXONE 2000 MG: 2 INJECTION, POWDER, FOR SOLUTION INTRAMUSCULAR; INTRAVENOUS at 15:22

## 2023-01-01 RX ADMIN — SCOPALAMINE 1 PATCH: 1 PATCH, EXTENDED RELEASE TRANSDERMAL at 17:19

## 2023-01-01 RX ADMIN — SODIUM CHLORIDE 150 ML/HR: 9 INJECTION, SOLUTION INTRAVENOUS at 19:25

## 2023-01-01 RX ADMIN — PERFLUTREN 3 ML: 6.52 INJECTION, SUSPENSION INTRAVENOUS at 09:42

## 2023-01-01 RX ADMIN — LIDOCAINE 1 PATCH: 4 PATCH TOPICAL at 10:37

## 2023-01-01 RX ADMIN — LIDOCAINE 1 PATCH: 4 PATCH TOPICAL at 10:12

## 2023-01-01 RX ADMIN — BRIMONIDINE TARTRATE 1 DROP: 2 SOLUTION OPHTHALMIC at 10:37

## 2023-01-10 ENCOUNTER — OFFICE (OUTPATIENT)
Dept: URBAN - METROPOLITAN AREA CLINIC 64 | Facility: CLINIC | Age: 83
End: 2023-01-10

## 2023-01-10 VITALS
WEIGHT: 165 LBS | HEART RATE: 84 BPM | HEIGHT: 70 IN | SYSTOLIC BLOOD PRESSURE: 133 MMHG | DIASTOLIC BLOOD PRESSURE: 93 MMHG

## 2023-01-10 DIAGNOSIS — K59.00 CONSTIPATION, UNSPECIFIED: ICD-10-CM

## 2023-01-10 DIAGNOSIS — R13.10 DYSPHAGIA, UNSPECIFIED: ICD-10-CM

## 2023-01-10 DIAGNOSIS — D50.9 IRON DEFICIENCY ANEMIA, UNSPECIFIED: ICD-10-CM

## 2023-01-10 PROCEDURE — 99213 OFFICE O/P EST LOW 20 MIN: CPT

## 2023-01-26 RX ORDER — CLOPIDOGREL BISULFATE 75 MG/1
TABLET ORAL
Qty: 30 TABLET | Refills: 0 | Status: SHIPPED | OUTPATIENT
Start: 2023-01-26

## 2023-02-20 RX ORDER — CLOPIDOGREL BISULFATE 75 MG/1
TABLET ORAL
Qty: 30 TABLET | Refills: 0 | OUTPATIENT
Start: 2023-02-20

## 2023-04-10 ENCOUNTER — HOSPITAL ENCOUNTER (OUTPATIENT)
Facility: HOSPITAL | Age: 83
Setting detail: OBSERVATION
Discharge: HOME | End: 2023-04-12
Attending: EMERGENCY MEDICINE | Admitting: HOSPITALIST
Payer: MEDICARE

## 2023-04-10 ENCOUNTER — APPOINTMENT (OUTPATIENT)
Dept: CT IMAGING | Facility: HOSPITAL | Age: 83
End: 2023-04-10
Payer: MEDICARE

## 2023-04-10 ENCOUNTER — APPOINTMENT (OUTPATIENT)
Dept: GENERAL RADIOLOGY | Facility: HOSPITAL | Age: 83
End: 2023-04-10
Payer: MEDICARE

## 2023-04-10 DIAGNOSIS — R41.82 ALTERED MENTAL STATUS, UNSPECIFIED ALTERED MENTAL STATUS TYPE: Primary | ICD-10-CM

## 2023-04-10 DIAGNOSIS — R53.1 WEAKNESS: ICD-10-CM

## 2023-04-10 PROBLEM — Z91.81 HISTORY OF FALLING: Status: ACTIVE | Noted: 2023-01-01

## 2023-04-10 LAB
ALBUMIN SERPL-MCNC: 4.3 G/DL (ref 3.5–5.2)
ALBUMIN/GLOB SERPL: 1.7 G/DL
ALP SERPL-CCNC: 91 U/L (ref 39–117)
ALT SERPL W P-5'-P-CCNC: 14 U/L (ref 1–41)
ANION GAP SERPL CALCULATED.3IONS-SCNC: 9 MMOL/L (ref 5–15)
AST SERPL-CCNC: 20 U/L (ref 1–40)
B PARAPERT DNA SPEC QL NAA+PROBE: NOT DETECTED
B PERT DNA SPEC QL NAA+PROBE: NOT DETECTED
BACTERIA UR QL AUTO: ABNORMAL /HPF
BASOPHILS # BLD AUTO: 0.1 10*3/MM3 (ref 0–0.2)
BASOPHILS NFR BLD AUTO: 0.5 % (ref 0–1.5)
BILIRUB SERPL-MCNC: 0.4 MG/DL (ref 0–1.2)
BILIRUB UR QL STRIP: NEGATIVE
BUN SERPL-MCNC: 16 MG/DL (ref 8–23)
BUN/CREAT SERPL: 18.8 (ref 7–25)
C PNEUM DNA NPH QL NAA+NON-PROBE: NOT DETECTED
CALCIUM SPEC-SCNC: 10.2 MG/DL (ref 8.6–10.5)
CHLORIDE SERPL-SCNC: 106 MMOL/L (ref 98–107)
CLARITY UR: CLEAR
CO2 SERPL-SCNC: 28 MMOL/L (ref 22–29)
COLOR UR: YELLOW
CREAT SERPL-MCNC: 0.85 MG/DL (ref 0.76–1.27)
DEPRECATED RDW RBC AUTO: 47.3 FL (ref 37–54)
EGFRCR SERPLBLD CKD-EPI 2021: 86.8 ML/MIN/1.73
EOSINOPHIL # BLD AUTO: 0.1 10*3/MM3 (ref 0–0.4)
EOSINOPHIL NFR BLD AUTO: 1.3 % (ref 0.3–6.2)
ERYTHROCYTE [DISTWIDTH] IN BLOOD BY AUTOMATED COUNT: 13.8 % (ref 12.3–15.4)
FLUAV SUBTYP SPEC NAA+PROBE: NOT DETECTED
FLUBV RNA ISLT QL NAA+PROBE: NOT DETECTED
GEN 5 2HR TROPONIN T REFLEX: 15 NG/L
GLOBULIN UR ELPH-MCNC: 2.6 GM/DL
GLUCOSE BLDC GLUCOMTR-MCNC: 100 MG/DL (ref 70–105)
GLUCOSE SERPL-MCNC: 87 MG/DL (ref 65–99)
GLUCOSE UR STRIP-MCNC: NEGATIVE MG/DL
HADV DNA SPEC NAA+PROBE: NOT DETECTED
HCOV 229E RNA SPEC QL NAA+PROBE: NOT DETECTED
HCOV HKU1 RNA SPEC QL NAA+PROBE: NOT DETECTED
HCOV NL63 RNA SPEC QL NAA+PROBE: NOT DETECTED
HCOV OC43 RNA SPEC QL NAA+PROBE: NOT DETECTED
HCT VFR BLD AUTO: 49.8 % (ref 37.5–51)
HGB BLD-MCNC: 16.1 G/DL (ref 13–17.7)
HGB UR QL STRIP.AUTO: NEGATIVE
HMPV RNA NPH QL NAA+NON-PROBE: NOT DETECTED
HPIV1 RNA ISLT QL NAA+PROBE: NOT DETECTED
HPIV2 RNA SPEC QL NAA+PROBE: NOT DETECTED
HPIV3 RNA NPH QL NAA+PROBE: NOT DETECTED
HPIV4 P GENE NPH QL NAA+PROBE: NOT DETECTED
HYALINE CASTS UR QL AUTO: ABNORMAL /LPF
KETONES UR QL STRIP: NEGATIVE
LEUKOCYTE ESTERASE UR QL STRIP.AUTO: ABNORMAL
LYMPHOCYTES # BLD AUTO: 1.5 10*3/MM3 (ref 0.7–3.1)
LYMPHOCYTES NFR BLD AUTO: 12.8 % (ref 19.6–45.3)
M PNEUMO IGG SER IA-ACNC: NOT DETECTED
MCH RBC QN AUTO: 30.2 PG (ref 26.6–33)
MCHC RBC AUTO-ENTMCNC: 32.4 G/DL (ref 31.5–35.7)
MCV RBC AUTO: 93.2 FL (ref 79–97)
MONOCYTES # BLD AUTO: 0.8 10*3/MM3 (ref 0.1–0.9)
MONOCYTES NFR BLD AUTO: 7.2 % (ref 5–12)
NEUTROPHILS NFR BLD AUTO: 78.2 % (ref 42.7–76)
NEUTROPHILS NFR BLD AUTO: 8.9 10*3/MM3 (ref 1.7–7)
NITRITE UR QL STRIP: NEGATIVE
NRBC BLD AUTO-RTO: 0.1 /100 WBC (ref 0–0.2)
PH UR STRIP.AUTO: <=5 [PH] (ref 5–8)
PLATELET # BLD AUTO: 266 10*3/MM3 (ref 140–450)
PMV BLD AUTO: 8 FL (ref 6–12)
POTASSIUM SERPL-SCNC: 4.4 MMOL/L (ref 3.5–5.2)
PROT SERPL-MCNC: 6.9 G/DL (ref 6–8.5)
PROT UR QL STRIP: ABNORMAL
RBC # BLD AUTO: 5.34 10*6/MM3 (ref 4.14–5.8)
RBC # UR STRIP: ABNORMAL /HPF
REF LAB TEST METHOD: ABNORMAL
RHINOVIRUS RNA SPEC NAA+PROBE: NOT DETECTED
RSV RNA NPH QL NAA+NON-PROBE: NOT DETECTED
SARS-COV-2 RNA NPH QL NAA+NON-PROBE: NOT DETECTED
SODIUM SERPL-SCNC: 143 MMOL/L (ref 136–145)
SP GR UR STRIP: 1.02 (ref 1–1.03)
SQUAMOUS #/AREA URNS HPF: ABNORMAL /HPF
TROPONIN T DELTA: -5 NG/L
TROPONIN T SERPL HS-MCNC: 20 NG/L
UROBILINOGEN UR QL STRIP: ABNORMAL
WBC # UR STRIP: ABNORMAL /HPF
WBC NRBC COR # BLD: 11.4 10*3/MM3 (ref 3.4–10.8)

## 2023-04-10 PROCEDURE — 70450 CT HEAD/BRAIN W/O DYE: CPT

## 2023-04-10 PROCEDURE — 0202U NFCT DS 22 TRGT SARS-COV-2: CPT | Performed by: NURSE PRACTITIONER

## 2023-04-10 PROCEDURE — 80053 COMPREHEN METABOLIC PANEL: CPT | Performed by: PHYSICIAN ASSISTANT

## 2023-04-10 PROCEDURE — G0378 HOSPITAL OBSERVATION PER HR: HCPCS

## 2023-04-10 PROCEDURE — 81001 URINALYSIS AUTO W/SCOPE: CPT | Performed by: PHYSICIAN ASSISTANT

## 2023-04-10 PROCEDURE — 85025 COMPLETE CBC W/AUTO DIFF WBC: CPT | Performed by: PHYSICIAN ASSISTANT

## 2023-04-10 PROCEDURE — 84484 ASSAY OF TROPONIN QUANT: CPT | Performed by: PHYSICIAN ASSISTANT

## 2023-04-10 PROCEDURE — 99285 EMERGENCY DEPT VISIT HI MDM: CPT

## 2023-04-10 PROCEDURE — 87086 URINE CULTURE/COLONY COUNT: CPT | Performed by: PHYSICIAN ASSISTANT

## 2023-04-10 PROCEDURE — 82962 GLUCOSE BLOOD TEST: CPT

## 2023-04-10 PROCEDURE — 93005 ELECTROCARDIOGRAM TRACING: CPT | Performed by: PHYSICIAN ASSISTANT

## 2023-04-10 PROCEDURE — 71045 X-RAY EXAM CHEST 1 VIEW: CPT

## 2023-04-10 RX ORDER — GALANTAMINE HYDROBROMIDE 4 MG/1
8 TABLET, FILM COATED ORAL 2 TIMES DAILY
Status: DISCONTINUED | OUTPATIENT
Start: 2023-04-10 | End: 2023-04-12 | Stop reason: HOSPADM

## 2023-04-10 RX ORDER — ONDANSETRON 2 MG/ML
4 INJECTION INTRAMUSCULAR; INTRAVENOUS EVERY 6 HOURS PRN
Status: DISCONTINUED | OUTPATIENT
Start: 2023-04-10 | End: 2023-04-12 | Stop reason: HOSPADM

## 2023-04-10 RX ORDER — BISACODYL 10 MG
10 SUPPOSITORY, RECTAL RECTAL DAILY PRN
Status: DISCONTINUED | OUTPATIENT
Start: 2023-04-10 | End: 2023-04-12 | Stop reason: HOSPADM

## 2023-04-10 RX ORDER — DOCUSATE SODIUM 100 MG/1
100 CAPSULE, LIQUID FILLED ORAL 2 TIMES DAILY
Status: DISCONTINUED | OUTPATIENT
Start: 2023-04-10 | End: 2023-04-12 | Stop reason: HOSPADM

## 2023-04-10 RX ORDER — ACETAMINOPHEN 325 MG/1
650 TABLET ORAL EVERY 4 HOURS PRN
Status: DISCONTINUED | OUTPATIENT
Start: 2023-04-10 | End: 2023-04-12 | Stop reason: HOSPADM

## 2023-04-10 RX ORDER — NITROGLYCERIN 0.4 MG/1
0.4 TABLET SUBLINGUAL
Status: DISCONTINUED | OUTPATIENT
Start: 2023-04-10 | End: 2023-04-12 | Stop reason: HOSPADM

## 2023-04-10 RX ORDER — SODIUM CHLORIDE 0.9 % (FLUSH) 0.9 %
10 SYRINGE (ML) INJECTION EVERY 12 HOURS SCHEDULED
Status: DISCONTINUED | OUTPATIENT
Start: 2023-04-10 | End: 2023-04-12 | Stop reason: HOSPADM

## 2023-04-10 RX ORDER — POLYETHYLENE GLYCOL 3350 17 G/17G
17 POWDER, FOR SOLUTION ORAL DAILY PRN
Status: DISCONTINUED | OUTPATIENT
Start: 2023-04-10 | End: 2023-04-12 | Stop reason: HOSPADM

## 2023-04-10 RX ORDER — ONDANSETRON 4 MG/1
4 TABLET, FILM COATED ORAL EVERY 6 HOURS PRN
Status: DISCONTINUED | OUTPATIENT
Start: 2023-04-10 | End: 2023-04-12 | Stop reason: HOSPADM

## 2023-04-10 RX ORDER — SODIUM CHLORIDE 0.9 % (FLUSH) 0.9 %
10 SYRINGE (ML) INJECTION AS NEEDED
Status: DISCONTINUED | OUTPATIENT
Start: 2023-04-10 | End: 2023-04-12 | Stop reason: HOSPADM

## 2023-04-10 RX ORDER — ACETAMINOPHEN 160 MG/5ML
650 SOLUTION ORAL EVERY 4 HOURS PRN
Status: DISCONTINUED | OUTPATIENT
Start: 2023-04-10 | End: 2023-04-12 | Stop reason: HOSPADM

## 2023-04-10 RX ORDER — BISACODYL 5 MG/1
5 TABLET, DELAYED RELEASE ORAL DAILY PRN
Status: DISCONTINUED | OUTPATIENT
Start: 2023-04-10 | End: 2023-04-12 | Stop reason: HOSPADM

## 2023-04-10 RX ORDER — ROSUVASTATIN CALCIUM 10 MG/1
20 TABLET, COATED ORAL NIGHTLY
Status: DISCONTINUED | OUTPATIENT
Start: 2023-04-10 | End: 2023-04-11

## 2023-04-10 RX ORDER — CHOLECALCIFEROL (VITAMIN D3) 125 MCG
5 CAPSULE ORAL NIGHTLY PRN
Status: DISCONTINUED | OUTPATIENT
Start: 2023-04-10 | End: 2023-04-12 | Stop reason: HOSPADM

## 2023-04-10 RX ORDER — ACETAMINOPHEN 650 MG/1
650 SUPPOSITORY RECTAL EVERY 4 HOURS PRN
Status: DISCONTINUED | OUTPATIENT
Start: 2023-04-10 | End: 2023-04-12 | Stop reason: HOSPADM

## 2023-04-10 RX ORDER — SODIUM CHLORIDE 9 MG/ML
40 INJECTION, SOLUTION INTRAVENOUS AS NEEDED
Status: DISCONTINUED | OUTPATIENT
Start: 2023-04-10 | End: 2023-04-12 | Stop reason: HOSPADM

## 2023-04-10 RX ADMIN — SODIUM CHLORIDE 1000 ML: 9 INJECTION, SOLUTION INTRAVENOUS at 18:06

## 2023-04-10 NOTE — ED PROVIDER NOTES
"Subjective    Provider in Triage Note  Patient is an 82-year-old male presents to the ED with family at bedside with complaints of increased confusion and decreased responsiveness over the past 3 days.  Patient family states they first noticed it on Friday and has progressively gotten worse.  They report he normally has some intermittent confusion but not nearly this bad.  They also report over the past week he has had increased weakness of his left arm.  Patient is currently alert to self only denying any current pain.      History of Present Illness  Agree with Pit note.  Patient is a poor historian and HPI was provided by the wife at bedside.  Wife is concerned because patient became \"catatonic\" on Friday after receiving several shots of antibiotics for UTI-which he has completed full course. Patient wife reports that the patient is unable to converse or do things for himself which is not his baseline.  Also reports patient started coughing \"like a dog bark.\"  Upon assessment patient is hemodynamically stable but does present with some focal deficits in the left arm  which is new according to family at bedside.  Patient is also slow to respond when asked his name and date of birth but does give correct answers.  Patient is not situationally aware.     Of note: Patient's wife expressed that the patient's PCP told them due to patient's vascular dementia he is likely to get delirious with acute infection.        Review of Systems    Past Medical History:   Diagnosis Date   • CAD (coronary artery disease)    • Dementia    • Depression    • Disease of thyroid gland    • Dysautonomia    • Dyslipidemia    • GERD (gastroesophageal reflux disease)    • Head pain    • Hyperlipidemia    • Hypertension    • SSS (sick sinus syndrome)    • Stroke     x 3 in 2018       Allergies   Allergen Reactions   • Trazodone Hallucinations   • Doxycycline GI Intolerance   • Ciprofloxacin Other (See Comments)     Breathing problems   • " Doxycycline Nausea And Vomiting   • Fosfomycin Tromethamine Confusion     Reports weakness and confusion   • Gemtesa [Vibegron] Other (See Comments)     Reports urinary retention   • Keppra [Levetiracetam] GI Intolerance   • Macrobid [Nitrofurantoin] Other (See Comments)     Unknown reaction   • Quetiapine Other (See Comments)     Having falls on it   • Sulfa Antibiotics Other (See Comments)     Unknown reaction       Past Surgical History:   Procedure Laterality Date   • APPENDECTOMY     • CHOLECYSTECTOMY     • CYSTOSCOPY     • ENDOSCOPY N/A 10/17/2019    Procedure: ESOPHAGOGASTRODUODENOSCOPY with biopsy x 2 areas;  Surgeon: Ashok Sanchez MD;  Location: [facility] ENDOSCOPY;  Service: Gastroenterology   • HERNIA REPAIR     • INSERT / REPLACE / REMOVE PACEMAKER     • LEFT HEART CATH     • PACEMAKER IMPLANTATION      [employer]   • TEMPORAL ARTERY BIOPSY     • WRIST SURGERY      severed artery       Family History   Problem Relation Age of Onset   • Heart disease Father        Social History     Socioeconomic History   • Marital status:    Tobacco Use   • Smoking status: Former   • Smokeless tobacco: Never   • Tobacco comments:     quit 25 yrs ago   Vaping Use   • Vaping Use: Never used   Substance and Sexual Activity   • Alcohol use: No   • Drug use: No   • Sexual activity: Defer           Objective   Physical Exam  Vital signs and triage nurse note reviewed.  Constitutional: Awake, alert; well-developed and well-nourished. No acute distress is noted.  HEENT: Normocephalic, atraumatic; pupils are PERRL with intact EOM; oropharynx is pink and moist without exudate or erythema.  No drooling or pooling of oral secretions.  Neck: Supple, full range of motion without pain; no cervical lymphadenopathy. Normal phonation.  Cardiovascular: Regular rate and rhythm, normal S1-S2.   Pulmonary: Respiratory effort regular nonlabored, breath sounds clear to auscultation all fields.  Abdomen: Soft, nontender, nondistended with  normoactive bowel sounds; no rebound or guarding.  Musculoskeletal: Independent range of motion of all extremities.  There is noticeable weakness noted of the left arm with weaker grasp.  Does appear to be dressing left arm.  Symmetric weakness in the lower extremities.  Neuro: Alert oriented to person, speech is clear, GCS 15.    Skin: Flesh tone, warm, dry, intact; no erythematous or petechial rash or lesion.      Procedures           ED Course      Labs Reviewed   TROPONIN - Abnormal; Notable for the following components:       Result Value    HS Troponin T 20 (*)     All other components within normal limits    Narrative:     High Sensitive Troponin T Reference Range:  <10.0 ng/L- Negative Female for AMI  <15.0 ng/L- Negative Male for AMI  >=10 - Abnormal Female indicating possible myocardial injury.  >=15 - Abnormal Male indicating possible myocardial injury.   Clinicians would have to utilize clinical acumen, EKG, Troponin, and serial changes to determine if it is an Acute Myocardial Infarction or myocardial injury due to an underlying chronic condition.        URINALYSIS W/ CULTURE IF INDICATED - Abnormal; Notable for the following components:    Protein, UA 30 mg/dL (1+) (*)     Leuk Esterase, UA Trace (*)     All other components within normal limits    Narrative:     In absence of clinical symptoms, the presence of pyuria, bacteria, and/or nitrites on the urinalysis result does not correlate with infection.   CBC WITH AUTO DIFFERENTIAL - Abnormal; Notable for the following components:    WBC 11.40 (*)     Neutrophil % 78.2 (*)     Lymphocyte % 12.8 (*)     Neutrophils, Absolute 8.90 (*)     All other components within normal limits   HIGH SENSITIVITIY TROPONIN T 2HR - Abnormal; Notable for the following components:    HS Troponin T 15 (*)     Troponin T Delta -5 (*)     All other components within normal limits    Narrative:     High Sensitive Troponin T Reference Range:  <10.0 ng/L- Negative Female for  AMI  <15.0 ng/L- Negative Male for AMI  >=10 - Abnormal Female indicating possible myocardial injury.  >=15 - Abnormal Male indicating possible myocardial injury.   Clinicians would have to utilize clinical acumen, EKG, Troponin, and serial changes to determine if it is an Acute Myocardial Infarction or myocardial injury due to an underlying chronic condition.        URINALYSIS, MICROSCOPIC ONLY - Abnormal; Notable for the following components:    RBC, UA 0-2 (*)     WBC, UA 6-12 (*)     All other components within normal limits   RESPIRATORY PANEL PCR W/ COVID-19 (SARS-COV-2) DANIEL/KHUSHBU/DEEPA/PAD/[facility]/M[facility] IN-HOUSE, NP SWAB IN UTM/VTP, 3-4 HR TAT - Normal    Narrative:     In the setting of a positive respiratory panel with a viral infection PLUS a negative procalcitonin without other underlying concern for bacterial infection, consider observing off antibiotics or discontinuation of antibiotics and continue supportive care. If the respiratory panel is positive for atypical bacterial infection (Bordetella pertussis, Chlamydophila pneumoniae, or Mycoplasma pneumoniae), consider antibiotic de-escalation to target atypical bacterial infection.   POCT GLUCOSE FINGERSTICK - Normal   URINE CULTURE   COMPREHENSIVE METABOLIC PANEL    Narrative:     GFR Normal >60  Chronic Kidney Disease <60  Kidney Failure <15    The GFR formula is only valid for adults with stable renal function between ages 18 and 70.   BASIC METABOLIC PANEL   CBC WITH AUTO DIFFERENTIAL   CBC AND DIFFERENTIAL    Narrative:     The following orders were created for panel order CBC & Differential.  Procedure                               Abnormality         Status                     ---------                               -----------         ------                     CBC Auto Differential[[id number]]        Abnormal            Final result                 Please view results for these tests on the individual orders.   CBC AND DIFFERENTIAL    Narrative:      The following orders were created for panel order CBC & Differential.  Procedure                               Abnormality         Status                     ---------                               -----------         ------                     CBC Auto Differential[[id number]]                                                         Please view results for these tests on the individual orders.     CT Head Without Contrast    Result Date: 4/10/2023  Advanced chronic and age-related changes of the brain as above, otherwise without evidence of acute intracranial abnormality.  Electronically Signed: Santiago Fountain  4/10/2023 8:25 PM EDT  Workstation ID: [id number]    XR Chest 1 View    Result Date: 4/10/2023  Impression: Advanced emphysema with multifocal areas of parenchymal scarring similar to the prior study. Electronically Signed: Иван Mouser  4/10/2023 6:13 PM EDT  Workstation ID: [id number]    Medications   sodium chloride 0.9 % flush 10 mL (has no administration in time range)   galantamine (RAZADYNE) tablet 8 mg (has no administration in time range)   rosuvastatin (CRESTOR) tablet 20 mg (has no administration in time range)   docusate sodium (COLACE) capsule 100 mg (has no administration in time range)   nitroglycerin (NITROSTAT) SL tablet 0.4 mg (has no administration in time range)   sodium chloride 0.9 % flush 10 mL (has no administration in time range)   sodium chloride 0.9 % flush 10 mL (has no administration in time range)   sodium chloride 0.9 % infusion 40 mL (has no administration in time range)   acetaminophen (TYLENOL) tablet 650 mg (has no administration in time range)     Or   acetaminophen (TYLENOL) 160 MG/5ML solution 650 mg (has no administration in time range)     Or   acetaminophen (TYLENOL) suppository 650 mg (has no administration in time range)   polyethylene glycol (MIRALAX) packet 17 g (has no administration in time range)     And   bisacodyl (DULCOLAX) EC tablet 5 mg (has no administration in  time range)     And   bisacodyl (DULCOLAX) suppository 10 mg (has no administration in time range)   ondansetron (ZOFRAN) tablet 4 mg (has no administration in time range)     Or   ondansetron (ZOFRAN) injection 4 mg (has no administration in time range)   melatonin tablet 5 mg (has no administration in time range)   sodium chloride 0.9 % bolus 1,000 mL (1,000 mL Intravenous New Bag 4/10/23 3600)                                          Medical Decision Making  Patient presents today accompanied by his significant other with reports of increased confusion and weakness for the last several days.    Patient had above exam evaluation.  He is placed on continuous cardiac monitor.  IV was established.  Labs EKG chest x-ray CT head were obtained.    Work-up: EKG reviewed and interpreted by myself but corroborated by Dr. Rivera shows sinus rhythm with ventricular rate of 60, no acute ST or T wave changes.  CBC and metabolic panel are grossly unremarkable.  High-sensitivity troponin 20.  Urinalysis shows 30 protein, trace leukocytes, 0-2 RBCs, 6-12 WBCs.  Viral respiratory panel is negative.  Chest x-ray interpretation shows no infiltrate or other acute abnormality, this is corroborated by the radiologist.  CT of the head reviewed by me interpreted by the radiologist shows no acute abnormality.    On reexamination patient resting comfortably in no distress.  He is sitting upright eating dinner without difficulty.  He remains awake and alert hemodynamically stable.  He is now moving his left arm without much difficulty.    Findings were discussed with the spouse at the bedside.  He will be admitted to hospital for further management of his weakness and altered mental status.  Case and plan discussed with hospitalist nurse practitioner.    Altered mental status, unspecified altered mental status type: acute illness or injury  Weakness: acute illness or injury  Amount and/or Complexity of Data Reviewed  Labs:  ordered.  Radiology: ordered.  ECG/medicine tests: ordered.      Risk  Prescription drug management.  Decision regarding hospitalization.          Final diagnoses:   Altered mental status, unspecified altered mental status type   Weakness       ED Disposition  ED Disposition     ED Disposition   Decision to Admit    Condition   --    Comment   Level of Care: Telemetry [5]   Diagnosis: Altered mental status, unspecified altered mental status type [3456850]   Admitting Physician: PALMA COATS [[id number]]   Attending Physician: PALMA COATS [[id number]]               No follow-up provider specified.       Medication List      No changes were made to your prescriptions during this visit.          Monica Rodriguez, APRN  04/10/23 6052

## 2023-04-10 NOTE — Clinical Note
Level of Care: Telemetry [5]   Admitting Physician: PALMA COATS [[id number]]   Attending Physician: PALMA COATS [[id number]]

## 2023-04-11 ENCOUNTER — APPOINTMENT (OUTPATIENT)
Dept: MRI IMAGING | Facility: HOSPITAL | Age: 83
End: 2023-04-11
Payer: MEDICARE

## 2023-04-11 LAB
ANION GAP SERPL CALCULATED.3IONS-SCNC: 5 MMOL/L (ref 5–15)
BACTERIA SPEC AEROBE CULT: NO GROWTH
BASOPHILS # BLD AUTO: 0.1 10*3/MM3 (ref 0–0.2)
BASOPHILS NFR BLD AUTO: 0.6 % (ref 0–1.5)
BUN SERPL-MCNC: 16 MG/DL (ref 8–23)
BUN/CREAT SERPL: 18.6 (ref 7–25)
CALCIUM SPEC-SCNC: 9.1 MG/DL (ref 8.6–10.5)
CHLORIDE SERPL-SCNC: 109 MMOL/L (ref 98–107)
CHOLEST SERPL-MCNC: 120 MG/DL (ref 0–200)
CO2 SERPL-SCNC: 29 MMOL/L (ref 22–29)
CREAT SERPL-MCNC: 0.86 MG/DL (ref 0.76–1.27)
DEPRECATED RDW RBC AUTO: 45.5 FL (ref 37–54)
EGFRCR SERPLBLD CKD-EPI 2021: 86.5 ML/MIN/1.73
EOSINOPHIL # BLD AUTO: 0.3 10*3/MM3 (ref 0–0.4)
EOSINOPHIL NFR BLD AUTO: 2.5 % (ref 0.3–6.2)
ERYTHROCYTE [DISTWIDTH] IN BLOOD BY AUTOMATED COUNT: 13.7 % (ref 12.3–15.4)
GLUCOSE SERPL-MCNC: 117 MG/DL (ref 65–99)
HBA1C MFR BLD: 5.8 % (ref 4.8–5.6)
HCT VFR BLD AUTO: 42.6 % (ref 37.5–51)
HDLC SERPL-MCNC: 41 MG/DL (ref 40–60)
HGB BLD-MCNC: 13.6 G/DL (ref 13–17.7)
LDLC SERPL CALC-MCNC: 57 MG/DL (ref 0–100)
LDLC/HDLC SERPL: 1.34 {RATIO}
LYMPHOCYTES # BLD AUTO: 1.8 10*3/MM3 (ref 0.7–3.1)
LYMPHOCYTES NFR BLD AUTO: 18 % (ref 19.6–45.3)
MAGNESIUM SERPL-MCNC: 2.1 MG/DL (ref 1.6–2.4)
MCH RBC QN AUTO: 30.4 PG (ref 26.6–33)
MCHC RBC AUTO-ENTMCNC: 32 G/DL (ref 31.5–35.7)
MCV RBC AUTO: 94.8 FL (ref 79–97)
MONOCYTES # BLD AUTO: 0.9 10*3/MM3 (ref 0.1–0.9)
MONOCYTES NFR BLD AUTO: 8.6 % (ref 5–12)
NEUTROPHILS NFR BLD AUTO: 7 10*3/MM3 (ref 1.7–7)
NEUTROPHILS NFR BLD AUTO: 70.3 % (ref 42.7–76)
NRBC BLD AUTO-RTO: 0 /100 WBC (ref 0–0.2)
PHOSPHATE SERPL-MCNC: 2.7 MG/DL (ref 2.5–4.5)
PLATELET # BLD AUTO: 237 10*3/MM3 (ref 140–450)
PMV BLD AUTO: 7.9 FL (ref 6–12)
POTASSIUM SERPL-SCNC: 3.7 MMOL/L (ref 3.5–5.2)
RBC # BLD AUTO: 4.49 10*6/MM3 (ref 4.14–5.8)
SODIUM SERPL-SCNC: 143 MMOL/L (ref 136–145)
TRIGL SERPL-MCNC: 121 MG/DL (ref 0–150)
TSH SERPL DL<=0.05 MIU/L-ACNC: 4.14 UIU/ML (ref 0.27–4.2)
VLDLC SERPL-MCNC: 22 MG/DL (ref 5–40)
WBC NRBC COR # BLD: 9.9 10*3/MM3 (ref 3.4–10.8)

## 2023-04-11 PROCEDURE — 36415 COLL VENOUS BLD VENIPUNCTURE: CPT

## 2023-04-11 PROCEDURE — G0378 HOSPITAL OBSERVATION PER HR: HCPCS

## 2023-04-11 PROCEDURE — 80061 LIPID PANEL: CPT | Performed by: NURSE PRACTITIONER

## 2023-04-11 PROCEDURE — 96361 HYDRATE IV INFUSION ADD-ON: CPT

## 2023-04-11 PROCEDURE — 84443 ASSAY THYROID STIM HORMONE: CPT | Performed by: NURSE PRACTITIONER

## 2023-04-11 PROCEDURE — 63710000001 PREDNISONE PER 5 MG: Performed by: HOSPITALIST

## 2023-04-11 PROCEDURE — 83735 ASSAY OF MAGNESIUM: CPT | Performed by: NURSE PRACTITIONER

## 2023-04-11 PROCEDURE — 85025 COMPLETE CBC W/AUTO DIFF WBC: CPT

## 2023-04-11 PROCEDURE — 70551 MRI BRAIN STEM W/O DYE: CPT

## 2023-04-11 PROCEDURE — 80048 BASIC METABOLIC PNL TOTAL CA: CPT

## 2023-04-11 PROCEDURE — 92610 EVALUATE SWALLOWING FUNCTION: CPT

## 2023-04-11 PROCEDURE — 84100 ASSAY OF PHOSPHORUS: CPT | Performed by: NURSE PRACTITIONER

## 2023-04-11 PROCEDURE — 83036 HEMOGLOBIN GLYCOSYLATED A1C: CPT | Performed by: NURSE PRACTITIONER

## 2023-04-11 PROCEDURE — 99223 1ST HOSP IP/OBS HIGH 75: CPT | Performed by: NURSE PRACTITIONER

## 2023-04-11 RX ORDER — PREDNISONE 1 MG/1
5 TABLET ORAL DAILY
Status: DISCONTINUED | OUTPATIENT
Start: 2023-04-11 | End: 2023-04-12 | Stop reason: HOSPADM

## 2023-04-11 RX ORDER — LEVOTHYROXINE SODIUM 0.03 MG/1
25 TABLET ORAL
Status: DISCONTINUED | OUTPATIENT
Start: 2023-04-11 | End: 2023-04-12 | Stop reason: HOSPADM

## 2023-04-11 RX ORDER — DOCUSATE SODIUM 100 MG/1
100 CAPSULE, LIQUID FILLED ORAL NIGHTLY
Status: DISCONTINUED | OUTPATIENT
Start: 2023-04-11 | End: 2023-04-11 | Stop reason: SDUPTHER

## 2023-04-11 RX ORDER — ESCITALOPRAM OXALATE 10 MG/1
10 TABLET ORAL DAILY
Status: DISCONTINUED | OUTPATIENT
Start: 2023-04-11 | End: 2023-04-12 | Stop reason: HOSPADM

## 2023-04-11 RX ORDER — ROSUVASTATIN CALCIUM 10 MG/1
10 TABLET, COATED ORAL NIGHTLY
Status: DISCONTINUED | OUTPATIENT
Start: 2023-04-11 | End: 2023-04-12

## 2023-04-11 RX ORDER — BRIMONIDINE TARTRATE 2 MG/ML
1 SOLUTION/ DROPS OPHTHALMIC 2 TIMES DAILY
Status: DISCONTINUED | OUTPATIENT
Start: 2023-04-11 | End: 2023-04-12 | Stop reason: HOSPADM

## 2023-04-11 RX ORDER — SODIUM CHLORIDE 9 MG/ML
75 INJECTION, SOLUTION INTRAVENOUS CONTINUOUS
Status: DISCONTINUED | OUTPATIENT
Start: 2023-04-11 | End: 2023-04-12

## 2023-04-11 RX ORDER — CLOPIDOGREL BISULFATE 75 MG/1
75 TABLET ORAL DAILY
Status: DISCONTINUED | OUTPATIENT
Start: 2023-04-11 | End: 2023-04-12

## 2023-04-11 RX ORDER — MIDODRINE HYDROCHLORIDE 2.5 MG/1
2.5 TABLET ORAL 3 TIMES DAILY PRN
Status: DISCONTINUED | OUTPATIENT
Start: 2023-04-11 | End: 2023-04-12 | Stop reason: HOSPADM

## 2023-04-11 RX ADMIN — ESCITALOPRAM OXALATE 10 MG: 10 TABLET ORAL at 11:06

## 2023-04-11 RX ADMIN — SODIUM CHLORIDE 75 ML/HR: 9 INJECTION, SOLUTION INTRAVENOUS at 11:33

## 2023-04-11 RX ADMIN — ROSUVASTATIN 10 MG: 10 TABLET, FILM COATED ORAL at 21:16

## 2023-04-11 RX ADMIN — APIXABAN 5 MG: 5 TABLET, FILM COATED ORAL at 21:16

## 2023-04-11 RX ADMIN — Medication 10 ML: at 21:06

## 2023-04-11 RX ADMIN — ROSUVASTATIN 20 MG: 10 TABLET, FILM COATED ORAL at 00:36

## 2023-04-11 RX ADMIN — CLOPIDOGREL BISULFATE 75 MG: 75 TABLET ORAL at 11:06

## 2023-04-11 RX ADMIN — PREDNISONE 5 MG: 10 TABLET ORAL at 11:06

## 2023-04-11 RX ADMIN — GALANTAMINE 8 MG: 4 TABLET, FILM COATED ORAL at 10:03

## 2023-04-11 RX ADMIN — LEVOTHYROXINE SODIUM 25 MCG: 0.03 TABLET ORAL at 11:06

## 2023-04-11 RX ADMIN — Medication 10 ML: at 00:38

## 2023-04-11 RX ADMIN — DOCUSATE SODIUM 100 MG: 100 CAPSULE, LIQUID FILLED ORAL at 21:16

## 2023-04-11 RX ADMIN — BRIMONIDINE TARTRATE 1 DROP: 2 SOLUTION/ DROPS OPHTHALMIC at 21:16

## 2023-04-11 RX ADMIN — Medication 10 ML: at 09:00

## 2023-04-11 RX ADMIN — BRIMONIDINE TARTRATE 1 DROP: 2 SOLUTION/ DROPS OPHTHALMIC at 12:51

## 2023-04-11 RX ADMIN — DOCUSATE SODIUM 100 MG: 100 CAPSULE, LIQUID FILLED ORAL at 10:03

## 2023-04-11 RX ADMIN — DOCUSATE SODIUM 100 MG: 100 CAPSULE, LIQUID FILLED ORAL at 00:36

## 2023-04-11 RX ADMIN — GALANTAMINE 8 MG: 4 TABLET, FILM COATED ORAL at 21:16

## 2023-04-11 NOTE — PLAN OF CARE
Pt just got up here from ER pt is A&O x1. Wife is at bed side.  Problem: Pain Acute  Goal: Acceptable Pain Control and Functional Ability  Outcome: Ongoing, Progressing     Problem: Fall Injury Risk  Goal: Absence of Fall and Fall-Related Injury  Outcome: Ongoing, Progressing   Goal Outcome Evaluation:

## 2023-04-11 NOTE — PROGRESS NOTES
[facility] Medicine [facility]   Daily Progress Note    Patient Name: Jayden Bond  : 1940  MRN: [id number]  Primary Care Physician:  Ant Hines MD  Date of admission: 4/10/2023  Date and Time of Service:  at     Subjective      Chief Complaint:     No complaints.   Wife at bedside.   Denies any pain  No fevers or chills.   Not back to baseline per wife       Objective      Vitals:   Temp:  [98 °F (36.7 °C)-98.3 °F (36.8 °C)] 98 °F (36.7 °C)  Heart Rate:  [60-74] 74  Resp:  [14-17] 15  BP: (142-167)/(82-98) 142/88  Flow (L/min):  [3] 3    Physical Exam   General: No acute distress  HEENT: neck supple, normal oral mucosa, no masses, no lymphadenopathy  Lungs: Clear bilaterally, no wheezing, No crackles, No Rhonchi. Equal excursions.   CV - Normal S1/S2, no murmur, Regular rate and rhythm   Abdomin - Soft, non-tender, non-distended, normal bowel sounds  Extremities - no edema, no erythema  Neuro - Generalized weakness, left facial droop, left arm weakness. Bilateral LE weakness with muscle wasting   Psych - Alert , not oriented   Skin - no wounds or lesions.            Result Review    Result Review:  I have personally reviewed the results from the time of this admission to 2023 17:29 EDT and agree with these findings:  [x]  Laboratory  [x]  Microbiology  [x]  Radiology  [x]  EKG/Telemetry   [x]  Cardiology/Vascular   []  Pathology  [x]  Old records  []  Other:  Most notable findings include:           Assessment & Plan      Brief Patient Summary:  Jayden Bond is a 82 y.o. male who       apixaban, 5 mg, Oral, Q12H  brimonidine, 1 drop, Both Eyes, BID  clopidogrel, 75 mg, Oral, Daily  docusate sodium, 100 mg, Oral, BID  escitalopram, 10 mg, Oral, Daily  galantamine, 8 mg, Oral, BID  levothyroxine, 25 mcg, Oral, Q AM  predniSONE, 5 mg, Oral, Daily  rosuvastatin, 10 mg, Oral, Nightly  sodium chloride, 10 mL, Intravenous, Q12H       sodium chloride, 75 mL/hr, Last Rate: 75 mL/hr (23  1133)         Active Hospital Problems:  Active Hospital Problems    Diagnosis    • **Altered mental status, unspecified altered mental status type    • Vascular dementia with behavior disturbance (HCC)    • Cognitive safety issue    • 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    • Mixed hyperlipidemia    • SSS (sick sinus syndrome) (HCC)    • Essential hypertension    • Disease of thyroid gland    • Coronary artery disease involving native coronary artery of native heart without angina pectoris    • Anticoagulant long-term use    • Dysautonomia orthostatic hypotension syndrome    • Cardiac pacemaker in situ    • Degenerative cervical spinal stenosis      Plan:     Acute CVA  - seen on MRI  - Follow Echo and Carotid U/S  - Seen by Neurology  - Continue Plavix and resume Eliquis     Recent UTI  - s/p 7 days Rocephin      Hypothyroidism  -Continue home levothyroxine     COPD  -Not in exacerbation  -On home O2 of 3L per NC  -Continue home Ellipta     CAD with hyperlipidemia  History of sick sinus syndrome  S/p pacemaker placement  History of CVA  -EKG reviewed  -Continue home apixaban, Plavix, Nitrostat, rosuvastatin, Imdur      Dysautonomic orthostatic hypotension syndrome  -Chronic, stable  -Continue home midodrine    On Chronic prednisone for unknown reason  - ? COPD  - continue        DVT prophylaxis:  Medical and mechanical DVT prophylaxis orders are present.    CODE STATUS:    Code Status (Patient has no pulse and is not breathing): CPR (Attempt to Resuscitate)  Medical Interventions (Patient has pulse or is breathing): Full Support      Disposition:  I expect patient to be discharged .    This patient has been  and discussed with . 04/11/23      Electronically signed by Rios Macedo MD, 04/11/23, 17:29 EDT.  [facility] Hospitalist Team

## 2023-04-11 NOTE — DISCHARGE PLACEMENT REQUEST
"Barrington Bond (82 y.o. Male)     Date of Birth   1940    Social Security Number       Address   [address] [place] [address]    Home Phone   [phone]    MRN   [id number]       Shinto   [place]    Marital Status                               Admission Date   4/10/23    Admission Type   Emergency    Admitting Provider   Gilda Talbot DO    Attending Provider   Rios Macedo MD    Department, Room/Bed   [facility] EMERGENCY DEPARTMENT, 31/31       Discharge Date       Discharge Disposition       Discharge Destination                               Attending Provider: Rios Macedo MD    Allergies: Trazodone, Doxycycline, Ciprofloxacin, Doxycycline, Fosfomycin Tromethamine, Gemtesa [Vibegron], Keppra [Levetiracetam], Macrobid [Nitrofurantoin], Quetiapine, Sulfa Antibiotics    Isolation: None   Infection: None   Code Status: CPR    Ht: 177.8 cm (70\")   Wt: 63.5 kg (140 lb)    Admission Cmt: None   Principal Problem: Altered mental status, unspecified altered mental status type [R41.82]                 Active Insurance as of 4/10/2023     Primary Coverage     Payor Plan Insurance Group Employer/Plan Group    MEDICARE MEDICARE A & B      Payor Plan Address Payor Plan Phone Number Payor Plan Fax Number Effective Dates    [address] [phone]  8/1/2005 - None Entered    [facility] [id number]       Subscriber Name Subscriber Birth Date Member ID       BARRINGTON BOND 1940 [id number]           Secondary Coverage     Payor Plan Insurance Group Employer/Plan Group    STANDARD LIFE ACCIDENT STANDARD LIFE ACCIDENT      Payor Plan Address Payor Plan Phone Number Payor Plan Fax Number Effective Dates    [address]   9/27/2019 - None Entered    [place] [id number]       Subscriber Name Subscriber Birth Date Member ID       BARRINGTON BOND 1940 [id number]                 Emergency Contacts      (Rel.) Home Phone Work Phone Mobile Phone    MATT BOND (Spouse) " [phone] -- [phone]

## 2023-04-11 NOTE — NURSING NOTE
Pt has bilateral weakness in arms and legs. Per wife, this is a chronic issue. Patient also has dementia and is confused at baseline, worse in the evenings. Patient NIH is 9, but wife reports all of the areas he scored in are issues he has all the time.

## 2023-04-11 NOTE — H&P
[facility] Medicine Services  History & Physical    Patient Name: Jayden Bond  : 1940  MRN: [id number]  Primary Care Physician:  Ant Hines MD  Date of admission: 4/10/2023  Date and Time of Service: 4/10/23 at 2215    Subjective      Chief Complaint: altered mental status    History of Present Illness: Jayden Bond is a 82 y.o. male with a past medical history of vascular dementia, depression, CAD, sick sinus syndrome with a pacemaker placed, hyperlipidemia, GERD, COPD, and hypothyroidism who presented to [facility] on 4/10/2023 complaining of altered mental status, increased confusion and decreased responsiveness over the past 3 days.  He does have dementia at baseline but his wife states that it is not usually this bad.  Due to his dementia, HPI was taken from wife at bedside.  His wife states that he received a 7-day course of IM Rocephin for UTI that was completed on Friday.  She reports after this that he has developed increased confusion and decreased responsiveness.  On exam, the patient is alert to self only.  He did also have visual hallucinations.  His wife states that his status worsens in the evenings.    In the ED, troponin was 20, WBC 11.4.  Otherwise, labs are unremarkable.  UA showed no bacteria, trace leukocytes.  CT of the head showed no acute abnormality.  EKG showed sinus rhythm.  He is afebrile, all vital signs are stable.    Unable to obtain ROS due to dementia    Personal History     Past Medical History:   Diagnosis Date   • CAD (coronary artery disease)    • Dementia    • Depression    • Disease of thyroid gland    • Dysautonomia    • Dyslipidemia    • GERD (gastroesophageal reflux disease)    • Head pain    • Hyperlipidemia    • Hypertension    • SSS (sick sinus syndrome)    • Stroke     x 3 in 2018       Past Surgical History:   Procedure Laterality Date   • APPENDECTOMY     • CHOLECYSTECTOMY     • CYSTOSCOPY     • ENDOSCOPY N/A 10/17/2019     Procedure: ESOPHAGOGASTRODUODENOSCOPY with biopsy x 2 areas;  Surgeon: Ashok Sanchez MD;  Location: [facility] ENDOSCOPY;  Service: Gastroenterology   • HERNIA REPAIR     • INSERT / REPLACE / REMOVE PACEMAKER     • LEFT HEART CATH     • PACEMAKER IMPLANTATION      Medtronic   • TEMPORAL ARTERY BIOPSY     • WRIST SURGERY      severed artery       Family History: family history includes Heart disease in his father. Otherwise pertinent FHx was reviewed and not pertinent to current issue.    Social History:  reports that he has quit smoking. He has never used smokeless tobacco. He reports that he does not drink alcohol and does not use drugs.    Home Medications:  Prior to Admission Medications     Prescriptions Last Dose Informant Patient Reported? Taking?    acetaminophen (TYLENOL) 500 MG tablet   Yes No    Take 1,000 mg by mouth Every 6 (Six) Hours As Needed for Mild Pain .    apixaban (ELIQUIS) 5 MG tablet tablet   Yes No    Take 5 mg by mouth 2 (Two) Times a Day. Eloquis is on hold.    brimonidine (ALPHAGAN) 0.2 % ophthalmic solution   No No    Administer 1 drop to both eyes 2 (Two) Times a Day.    clopidogrel (PLAVIX) 75 MG tablet   No No    TAKE 1 TABLET BY MOUTH EVERY DAY    docusate sodium (COLACE) 100 MG capsule   Yes No    Take 100 mg by mouth Every Night.    escitalopram (LEXAPRO) 10 MG tablet   Yes No    Take 10 mg by mouth Daily.    famotidine (PEPCID) 40 MG tablet   Yes No    Take 40 mg by mouth Daily.    galantamine (RAZADYNE) 8 MG tablet   Yes No    Take 8 mg by mouth 2 (Two) Times a Day.    guaiFENesin (MUCINEX) 600 MG 12 hr tablet   Yes No    Take 300 mg by mouth As Needed for Cough or Congestion. Patient takes half of the 600mg tablet    Incruse Ellipta 62.5 MCG/INH aerosol powder   Spouse/Significant Other Yes No    Take 1 puff by mouth Daily.    isosorbide mononitrate (IMDUR) 30 MG 24 hr tablet   Yes No    Take 15 mg by mouth As Needed. If SBP continues to be >150 for multiple measurements     levothyroxine (SYNTHROID, LEVOTHROID) 25 MCG tablet   Yes No    Take 25 mcg by mouth Every Morning.    midodrine (PROAMATINE) 2.5 MG tablet   Yes No    Take 2.5 mg by mouth As Needed. SBP <105    Multiple Vitamins-Minerals (MULTIVITAMIN WITH MINERALS) tablet tablet   Yes No    Take 1 tablet by mouth Every Night.    mupirocin (BACTROBAN) 2 % ointment   Yes No    Apply 1 application topically to the appropriate area as directed 3 (Three) Times a Day. On 2 Big toes, cuticle    nitroglycerin (NITROSTAT) 0.4 MG SL tablet   Yes No    Place 0.4 mg under the tongue Every 5 (Five) Minutes As Needed for Chest Pain. Take no more than 3 doses in 15 minutes.    predniSONE (DELTASONE) 5 MG tablet   Yes No    Take 5 mg by mouth Daily.    rosuvastatin (CRESTOR) 20 MG tablet   Yes No    Take 20 mg by mouth Every Evening.    sodium chloride 0.65 % nasal spray   Yes No    1 spray into the nostril(s) as directed by provider As Needed for Congestion.            Allergies:  Allergies   Allergen Reactions   • Trazodone Hallucinations   • Doxycycline GI Intolerance   • Ciprofloxacin Other (See Comments)     Breathing problems   • Doxycycline Nausea And Vomiting   • Fosfomycin Tromethamine Confusion     Reports weakness and confusion   • Gemtesa [Vibegron] Other (See Comments)     Reports urinary retention   • Keppra [Levetiracetam] GI Intolerance   • Macrobid [Nitrofurantoin] Other (See Comments)     Unknown reaction   • Quetiapine Other (See Comments)     Having falls on it   • Sulfa Antibiotics Other (See Comments)     Unknown reaction       Objective      Vitals:   Temp:  [98.3 °F (36.8 °C)] 98.3 °F (36.8 °C)  Heart Rate:  [64-71] 66  Resp:  [16] 16  BP: (145-167)/(82-93) 157/93  Flow (L/min):  [3] 3    Physical Exam  Vitals and nursing note reviewed.   Constitutional:       Appearance: Normal appearance.   HENT:      Head: Normocephalic and atraumatic.      Nose: Nose normal.      Mouth/Throat:      Mouth: Mucous membranes are moist.    Eyes:      Extraocular Movements: Extraocular movements intact.      Pupils: Pupils are equal, round, and reactive to light.   Cardiovascular:      Rate and Rhythm: Normal rate and regular rhythm.      Pulses: Normal pulses.      Heart sounds: Normal heart sounds.   Pulmonary:      Effort: Pulmonary effort is normal.      Breath sounds: Normal breath sounds.   Abdominal:      General: Bowel sounds are normal.      Palpations: Abdomen is soft.   Musculoskeletal:         General: Normal range of motion.      Cervical back: Normal range of motion.   Skin:     General: Skin is warm and dry.   Neurological:      General: No focal deficit present.      Mental Status: He is disoriented and confused.      GCS: GCS eye subscore is 4. GCS verbal subscore is 3. GCS motor subscore is 6.   Psychiatric:         Attention and Perception: He perceives visual hallucinations.         Mood and Affect: Mood normal.         Speech: Speech is delayed.         Behavior: Behavior is slowed.         Cognition and Memory: Cognition is impaired. Memory is impaired.         Judgment: Judgment is impulsive.         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    • Mixed hyperlipidemia    • SSS (sick sinus syndrome) (HCC)    • Essential hypertension    • Disease of thyroid gland    • Coronary artery disease involving native coronary artery of native heart without  angina pectoris    • Anticoagulant long-term use    • Dysautonomia orthostatic hypotension syndrome    • Cardiac pacemaker in situ    • Degenerative cervical spinal stenosis      Plan:     Altered Mental Status  Vascular Dementia  -CT of the head reviewed, no acute abnormality  -Unable to have an MRI of the brain due to pacemaker  -Visual hallucinations noted on exam  -UA negative for infection  -Neuro checks  -Neurology (General) consulted  -Continue home galantamine, Lexapro  -Fall precautions     Hypothyroidism  -Continue home levothyroxine    COPD  -Not in exacerbation  -On home O2 of 3L per NC  -Continue home Ellipta     CAD with hyperlipidemia  History of sick sinus syndrome  S/p pacemaker placement  History of CVA  -EKG reviewed  -Continue home apixaban, Plavix, Nitrostat, rosuvastatin, Imdur      Dysautonomic orthostatic hypotension syndrome  -Chronic, stable  -Continue home midodrine    DVT prophylaxis:  Mechanical DVT prophylaxis orders are present.    CODE STATUS:    Code Status (Patient has no pulse and is not breathing): CPR (Attempt to Resuscitate)  Medical Interventions (Patient has pulse or is breathing): Full Support    Admission Status:  I believe this patient meets observation status.    I discussed the patient's findings and my recommendations with patient and family.    This patient has been examined wearing appropriate Personal Protective Equipment  04/10/23      Signature: Electronically signed by Brunilda Haas DNP, APRN, 04/10/23, 23:18 EDT.  [facility] Hospitalist Team

## 2023-04-11 NOTE — SIGNIFICANT NOTE
04/11/23 1535   Rehab Time/Intention   Session Not Performed patient unavailable for evaluation   Recommendation   OT - Next Appointment 04/12/23

## 2023-04-11 NOTE — THERAPY EVALUATION
Acute Care - Speech Language Pathology   Swallow Initial Evaluation  [facility]     Patient Name: Jayden Bond  : 1940  MRN: [id number]  Today's Date: 2023               Admit Date: 4/10/2023    Visit Dx:     ICD-10-CM ICD-9-CM   1. Altered mental status, unspecified altered mental status type  R41.82 780.97   2. Weakness  R53.1 780.79     Patient Active Problem List   Diagnosis   • Chest pain   • Essential hypertension   • Stroke   • Disease of thyroid gland   • Coronary artery disease involving native coronary artery of native heart without angina pectoris   • Dysautonomia   • Dementia (HCC)   • Anticoagulant long-term use   • Cardiac pacemaker in situ   • COPD (chronic obstructive pulmonary disease)   • Degenerative cervical spinal stenosis   • Dysautonomia orthostatic hypotension syndrome   • Epiphora due to insufficient drainage of both sides   • History of CVA (cerebrovascular accident)   • History of PTCA   • Punctal stenosis, acquired   • Nonsustained ventricular tachycardia   • Pseudophakia, both eyes   • SSS (sick sinus syndrome) ([facility])   • Palpitations   • 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   • Weakness   • Vascular dementia with behavior disturbance (HCC)   • Nocturnal hypoxemia   • Delirium due to multiple etiologies, persistent, hypoactive   • Cognitive safety issue   • Caregiver stress   • Dementia (HCC)   • Disease of thyroid gland   • Dysautonomia (HCC)   • Mixed hyperlipidemia   • Transient ischemic attack (TIA)   • Altered mental status   • CAP (community acquired pneumonia)   • Abnormal finding on urinalysis   • Poor short-term memory   • Pneumonia   • Altered mental status, unspecified altered mental status type   • History of falling     Past Medical History:   Diagnosis Date   • CAD (coronary artery disease)    • Dementia    • Depression    • Disease of thyroid gland    • Dysautonomia    • Dyslipidemia    • GERD (gastroesophageal reflux disease)    • Head pain    •  Hyperlipidemia    • Hypertension    • SSS (sick sinus syndrome)    • Stroke     x 3 in 2018     Past Surgical History:   Procedure Laterality Date   • APPENDECTOMY     • CHOLECYSTECTOMY     • CYSTOSCOPY     • ENDOSCOPY N/A 10/17/2019    Procedure: ESOPHAGOGASTRODUODENOSCOPY with biopsy x 2 areas;  Surgeon: Ashok Sanchez MD;  Location: [facility] ENDOSCOPY;  Service: Gastroenterology   • HERNIA REPAIR     • INSERT / REPLACE / REMOVE PACEMAKER     • LEFT HEART CATH     • PACEMAKER IMPLANTATION      Medtronic   • TEMPORAL ARTERY BIOPSY     • WRIST SURGERY      severed artery       SLP Recommendation and Plan  SLP Swallowing Diagnosis: mild, oral dysphagia, pharyngeal dysphagia (04/11/23 1500)  SLP Diet Recommendation: regular textures, thin liquids (04/11/23 1500)  Recommended Precautions and Strategies: upright posture during/after eating, small bites of food and sips of liquid, alternate between small bites of food and sips of liquid, general aspiration precautions, reflux precautions (04/11/23 1500)  SLP Rec. for Method of Medication Administration: meds whole, with thin liquids, as tolerated (04/11/23 1500)     Monitor for Signs of Aspiration: yes, notify SLP if any concerns, cough, gurgly voice, throat clearing (04/11/23 1500)     Swallow Criteria for Skilled Therapeutic Interventions Met: demonstrates skilled criteria (04/11/23 1500)  Anticipated Discharge Disposition (SLP): home with assist (04/11/23 1500)  Rehab Potential/Prognosis, Swallowing: good, to achieve stated therapy goals (04/11/23 1500)  Therapy Frequency (Swallow): PRN (04/11/23 1500)  Predicted Duration Therapy Intervention (Days): until discharge (04/11/23 1500)        SWALLOW EVALUATION (last 72 hours)     SLP Adult Swallow Evaluation     Row Name 04/11/23 1500          Document Type evaluation  -CP    Subjective Information no complaints  -CP    Patient Observations alert;cooperative  -CP    Patient/Family/Caregiver Comments/Observations Pt's wife  present and is always present during pt's meals.  -CP    Patient Effort good  -CP          Patient Profile Reviewed yes  -CP    Pertinent History Of Current Problem Jayden Bond is a 82 y.o. male with a past medical history of vascular dementia, depression, CAD, sick sinus syndrome with a pacemaker placed, hyperlipidemia, GERD, COPD, and hypothyroidism who presented to [facility] on 4/10/2023 complaining of altered mental status, increased confusion and decreased responsiveness over the past 3 days.  He does have dementia at baseline but his wife states that it is not usually this bad.  Due to his dementia, HPI was taken from wife at bedside.  His wife states that he received a 7-day course of IM Rocephin for UTI that was completed on Friday.  She reports after this that he has developed increased confusion and decreased responsiveness.  On exam, the patient is alert to self only.  He did also have visual hallucinations.  His wife states that his status worsens in the evenings. Swallow eval ordered due to pt's history of dysphagia. Pt seen by this dept in the past with a diet rec of ech soft/thin liquids  -CP    Current Method of Nutrition regular textures;thin liquids  -CP    Prior Level of Function-Swallowing regular textures;thin liquids;compensations (maneuvers, postures) needed;other (see comments)  Pt does not use straws  -CP    Plans/Goals Discussed with patient;spouse/S.O.  -CP    Barriers to Rehab medically complex  -CP          Additional Documentation Pain Scale: FACES Pre/Post-Treatment (Group)  -CP          Pain: FACES Scale, Pretreatment 0-->no hurt  -CP    Posttreatment Pain Rating 0-->no hurt  -CP          Dentition Assessment upper dentures/partial in place;lower dentures/partial in place  -CP    Secretion Management WNL/WFL  -CP    Mucosal Quality moist, healthy  -CP          Oral Motor General Assessment generalized oral motor weakness  -CP          Respiratory Support Currently in Use  "nasal cannula  -CP    O2 Liters 3L  -CP    Eating/Swallowing Skills fed by staff/caregiver;appropriate self-feeding skills observed  -CP    Positioning During Eating upright in bed  -CP    Utensils Used spoon;adapted cup  -CP    Consistencies Trialed thin liquids;pureed;soft to chew textures  -CP          Clinical Swallow Evaluation Summary Bedside swallow evaluation completed. Pt is known to this dept and had VFSS on 10/22/21 and a regular diet with thin liquids was rec. Pt's wife also reported that he had another VFSS in Nov 2022 with the same diet recs. She reported that pt \"coughs when using straws\" and she has stopped using them. Pt now uses a sippy cup. Pt's wife feeds him all meals. Pt was alert but mostly verbally unresponsive. He was seen sitting up in bed. Pt was fed by his wife given trials of water from a sippy cup, applesauce, and a Fig Lan. Mastication was slow but functional. Oral transit timely for puree and liquids. No pocketing or oral residual occurred, however pt's wife reports occasional oral residual when she reoves his dentures in the evening, Digital palpation suggests timely swallow initiation/functional pharyngeal phase of swallow. No overt s/s of aspiration appreciated throughout assessment. Pt appears to have mild oral and pharyngeal dysphagia. Soft to chew was rec for this pt due to slow chewing, but pt's wife stated that she would like him to stay on regular. It is rec that pt continue regular diet with thin liquids. Pt should sippy cup for all liquids per pt's wife's request.  He should be upright at 90 degrees for all PO and be fed at a slow rate. Education provided to pt and his wife on safe swallow strategies. They verbalized understanding. Monitor closely for s/s of aspiration and if pt exhibits any of these s/s, make NPO and contact ST.  ST will follow for meal assessment to assure tolerance of diet and make further recs as indicated.  -CP          Education and counseling " provided Signs of aspiration;Risks of aspiration;Safest diet options;Oral care recommendations and rationale;Aspiration precautions;Compensatory strategy recommendations and rationale;Feeding assistance and techniques  -CP          SLP Swallowing Diagnosis mild;oral dysphagia;pharyngeal dysphagia  -CP    Functional Impact risk of aspiration/pneumonia;risk of malnutrition  -CP    Rehab Potential/Prognosis, Swallowing good, to achieve stated therapy goals  -CP    Swallow Criteria for Skilled Therapeutic Interventions Met demonstrates skilled criteria  -CP          Care Plan Review evaluation/treatment results reviewed;care plan/treatment goals reviewed;risks/benefits reviewed;patient/other agree to care plan  -CP    Care Plan Review, Other Participant(s) spouse  -CP          Therapy Frequency (Swallow) PRN  -CP    Predicted Duration Therapy Intervention (Days) until discharge  -CP    SLP Diet Recommendation regular textures;thin liquids  -CP    Recommended Precautions and Strategies upright posture during/after eating;small bites of food and sips of liquid;alternate between small bites of food and sips of liquid;general aspiration precautions;reflux precautions  -CP    Oral Care Recommendations Oral Care before breakfast, after meals and PRN  -CP    SLP Rec. for Method of Medication Administration meds whole;with thin liquids;as tolerated  -CP    Monitor for Signs of Aspiration yes;notify SLP if any concerns;cough;gurgly voice;throat clearing  -CP    Anticipated Discharge Disposition (SLP) home with assist  -CP          Swallow LTGs Swallow Long Term Goal (free text)  -CP    Swallow STGs diet tolerance goal selection (SLP)  -CP    Diet Tolerance Goal Selection (SLP) Swallow Short Term Goal 1;Swallow Short Term Goal 2  -CP          (LTG) Swallow Pt will maximize swallow function for least restrictive PO diet, exhibiting no complication associated with dysphagia, adequate PO intake, and demonstrating independent use of  swallow compensations  -CP    Time Frame (Swallow Long Term Goal) by discharge  -CP          (STG) Swallow 1 Pt will have full meal assessment with further pt/caregiver teaching.  -CP    Time Frame (Swallow Short Term Goal 1) 1 week  -CP          (STG) Swallow 2 Pt will participate in ongoing swallow assessment to include VFSS if indicated, and caregiver teaching.  -CP    Time Frame (Swallow Short Term Goal 2) 1 week  -CP          User Key  (r) = Recorded By, (t) = Taken By, (c) = Cosigned By    Initials Name Effective Dates    Jenny Serrano SLP 06/16/21 -                 EDUCATION  The patient has been educated in the following areas:   Dysphagia (Swallowing Impairment).        SLP GOALS     Row Name 04/11/23 1500             (LTG) Swallow    (LTG) Swallow Pt will maximize swallow function for least restrictive PO diet, exhibiting no complication associated with dysphagia, adequate PO intake, and demonstrating independent use of swallow compensations  -CP      Time Frame (Swallow Long Term Goal) by discharge  -CP         (STG) Swallow 1    (STG) Swallow 1 Pt will have full meal assessment with further pt/caregiver teaching.  -CP      Time Frame (Swallow Short Term Goal 1) 1 week  -CP         (STG) Swallow 2    (STG) Swallow 2 Pt will participate in ongoing swallow assessment to include VFSS if indicated, and caregiver teaching.  -CP      Time Frame (Swallow Short Term Goal 2) 1 week  -CP            User Key  (r) = Recorded By, (t) = Taken By, (c) = Cosigned By    Initials Name Provider Type    Jenny Serrano SLP Speech and Language Pathologist                   Time Calculation:                ALEKSANDER Elizondo  4/11/2023

## 2023-04-11 NOTE — CASE MANAGEMENT/SOCIAL WORK
Discharge Planning Assessment   [facility]     Patient Name: Jayden Bond  MRN: [id number]  Today's Date: 4/11/2023    Admit Date: 4/10/2023    Plan: From Home with Wife, Primary caregiver, Referral to [facility] pending accepteance. Pending pt/ot and neurology eval   Discharge Needs Assessment     Row Name 04/11/23 1338       Living Environment    People in Home spouse    Current Living Arrangements home    Primary Care Provided by spouse/significant other    Provides Primary Care For no one;no one, unable/limited ability to care for self    Family Caregiver if Needed spouse    Family Caregiver Names wife Nava    Quality of Family Relationships supportive    Able to Return to Prior Arrangements yes       Resource/Environmental Concerns    Resource/Environmental Concerns none    Transportation Concerns none       Food Insecurity    Within the past 12 months, you worried that your food would run out before you got the money to buy more. Never true    Within the past 12 months, the food you bought just didn't last and you didn't have money to get more. Never true       Transition Planning    Patient/Family Anticipates Transition to home with family    Patient/Family Anticipated Services at Transition home health care    Transportation Anticipated family or friend will provide       Discharge Needs Assessment    Readmission Within the Last 30 Days no previous admission in last 30 days    Equipment Currently Used at Home oxygen;wheelchair;walker, rolling    Concerns to be Addressed denies needs/concerns at this time    Anticipated Changes Related to Illness none    Equipment Needed After Discharge none    Provided Post Acute Provider List? N/A    N/A Provider List Comment Wife requested referral to [facility]               Discharge Plan     Row Name 04/11/23 1339       Plan    Plan From Home with Wife, Primary caregiver, Referral to [facility] pending accepteance. Pending pt/ot and neurology eval    Plan Comments  Met with patient and wife at bedside Lives at home with wife who is primary caregiver. PCP and Pharmacy verified, able to afford medications. Family or friend will rpovide transportation. Per wife she was suppose to get home health set up with some company that she anando osmarer name. Stated they came and had her sign paperwork 2 weeks ago and never came back she requested referral to [facility] referral sent to Hannah Sanabria. Acceptance pending. D/C Barriers: Pt/OT eval, Neurology eval              Continued Care and Services - Admitted Since 4/10/2023     Home Medical Care     Service Provider Request Status Selected Services Address Phone Fax Patient Preferred    [facility],[place] Pending - Request Sent N/A [address] [facility], [place] [address] [phone] --              Expected Discharge Date and Time     Expected Discharge Date Expected Discharge Time    Apr 12, 2023          Demographic Summary     Row Name 04/11/23 1332       General Information    Admission Type observation    Arrived From emergency department    Required Notices Provided Observation Status Notice    Referral Source admission list    Preferred Language English               Functional Status     Row Name 04/11/23 1338       Functional Status    Usual Activity Tolerance fair    Current Activity Tolerance poor       Functional Status, IADL    Medications assistive person    Meal Preparation assistive person    Housekeeping assistive person    Laundry assistive person    Shopping assistive person       Mental Status    General Appearance WDL WDL       Mental Status Summary    Recent Changes in Mental Status/Cognitive Functioning no changes                      Patient Forms     Row Name 04/11/23 1338       Patient Forms    Important Message from Medicare (IMM) --  [place] 4/10 per reg                  Mackenzie Lowery, RN

## 2023-04-11 NOTE — CONSULTS
"Primary Care Provider: Ant Hines MD     Consult requested by:  DEANDRA Caicedo    Reason for Consultation: Neurological evaluation     History taken from: patient chart family RN    Chief complaint: confusion, decreased responsiveness       SUBJECTIVE:    History of present illness: Background per H&P: Jayden Bond is a 82 y.o. year old male who presented to [facility] on 4/10/2023 complaining of altered mental status, increased confusion, and decreased responsiveness over the past 3 days.  He does have dementia at baseline, but his wife states that it is not usually this bad.  Due to his dementia, HPI was taken from wife at bedside.  His wife states that he received a 7-day course of IM Rocephin for UTI that was completed on Friday.  She reports that after this, he has developed increased confusion and decreased responsiveness.  He did also have visual hallucinations.  His wife states that his status worsens in the evenings.     In the ED, troponin was 20, WBC 11.4.  Otherwise, labs are unremarkable.  UA showed no bacteria, trace leukocytes.  CT of the head showed no acute abnormality.  EKG showed sinus rhythm.  He is afebrile, all vital signs are stable.    Wife reported pt developed a \"dog-like\" cough over the past few days, but it mostly only bothers him at night. T-max over the past week was 99.8.     Wife states pt was started back on a medication for urinary urgency last week and it notoriously causes confusion for him.     Wife states he fell back in November and had left sided weakness and she took him to the ED, but she states no neuro workup was completed. He has had persistent left sided weakness since that time. She does feel the weakness is worse now, but not new.     Past medical history of vascular dementia, depression, CAD, sick sinus syndrome with a pacemaker placed, hyperlipidemia, GERD, COPD, and hypothyroidism   - Portions of the above HPI were copied from previous " encounters and edited as appropriate. PMH as detailed below.     Review of Systems   Unable to perform ROS: Mental status change          PATIENT HISTORY:  Past Medical History:   Diagnosis Date   • CAD (coronary artery disease)    • Dementia    • Depression    • Disease of thyroid gland    • Dysautonomia    • Dyslipidemia    • GERD (gastroesophageal reflux disease)    • Head pain    • Hyperlipidemia    • Hypertension    • SSS (sick sinus syndrome)    • Stroke     x 3 in 2018   ,   Past Surgical History:   Procedure Laterality Date   • APPENDECTOMY     • CHOLECYSTECTOMY     • CYSTOSCOPY     • ENDOSCOPY N/A 10/17/2019    Procedure: ESOPHAGOGASTRODUODENOSCOPY with biopsy x 2 areas;  Surgeon: Ashok Sanchez MD;  Location: [facility] ENDOSCOPY;  Service: Gastroenterology   • HERNIA REPAIR     • INSERT / REPLACE / REMOVE PACEMAKER     • LEFT HEART CATH     • PACEMAKER IMPLANTATION      [employer]   • TEMPORAL ARTERY BIOPSY     • WRIST SURGERY      severed artery   ,   Family History   Problem Relation Age of Onset   • Heart disease Father    ,   Social History     Tobacco Use   • Smoking status: Former   • Smokeless tobacco: Never   • Tobacco comments:     quit 25 yrs ago   Vaping Use   • Vaping Use: Never used   Substance Use Topics   • Alcohol use: No   • Drug use: No   ,   Prior to Admission medications    Medication Sig Start Date End Date Taking? Authorizing Provider   acetaminophen (TYLENOL) 500 MG tablet Take 2 tablets by mouth Every 6 (Six) Hours As Needed for Mild Pain.   Yes ProviderJuan MD   brimonidine (ALPHAGAN) 0.2 % ophthalmic solution Administer 1 drop to both eyes 2 (Two) Times a Day. 2/4/20  Yes Romelia Cornejo   clopidogrel (PLAVIX) 75 MG tablet TAKE 1 TABLET BY MOUTH EVERY DAY 1/26/23  Yes Jose Garcia MD   docusate sodium (COLACE) 100 MG capsule Take 1 capsule by mouth Every Night.   Yes ProviderJuan MD   escitalopram (LEXAPRO) 10 MG tablet Take 1 tablet by mouth Daily.   Yes Provider  MD Juan   galantamine (RAZADYNE) 8 MG tablet Take 1 tablet by mouth 2 (Two) Times a Day. 10/27/21  Yes Juan Bateman MD   guaiFENesin (MUCINEX) 600 MG 12 hr tablet Take 300 mg by mouth As Needed for Cough or Congestion. Patient takes half of the 600mg tablet   Yes Juan Bateman MD   Incruse Ellipta 62.5 MCG/INH aerosol powder  Take 1 puff by mouth Daily. 3/5/21  Yes Juan Bateman MD   levothyroxine (SYNTHROID, LEVOTHROID) 25 MCG tablet Take 1 tablet by mouth Every Morning.   Yes Juan Bateman MD   midodrine (PROAMATINE) 2.5 MG tablet Take 1 tablet by mouth As Needed. SBP <105   Yes Juan Bateman MD   Multiple Vitamins-Minerals (MULTIVITAMIN WITH MINERALS) tablet tablet Take 1 tablet by mouth Every Night.   Yes Juan Bateman MD   mupirocin (BACTROBAN) 2 % ointment Apply 1 application topically to the appropriate area as directed 3 (Three) Times a Day. On 2 Big toes, cuticle   Yes Juan Bateman MD   nitroglycerin (NITROSTAT) 0.4 MG SL tablet Place 1 tablet under the tongue Every 5 (Five) Minutes As Needed for Chest Pain. Take no more than 3 doses in 15 minutes.   Yes Juan Bateman MD   predniSONE (DELTASONE) 5 MG tablet Take 1 tablet by mouth Daily.   Yes Juan Bateman MD   rosuvastatin (CRESTOR) 20 MG tablet Take 1 tablet by mouth Every Evening.   Yes Juan Bateman MD   sodium chloride 0.65 % nasal spray 1 spray into the nostril(s) as directed by provider As Needed for Congestion.   Yes Juan Bateman MD   apixaban (ELIQUIS) 5 MG tablet tablet Take 5 mg by mouth 2 (Two) Times a Day. Eloquis is on hold.    Juan Bateman MD   famotidine (PEPCID) 40 MG tablet Take 1 tablet by mouth Daily.    Juan Bateman MD   isosorbide mononitrate (IMDUR) 30 MG 24 hr tablet Take 15 mg by mouth As Needed. If SBP continues to be >150 for multiple measurements    Juan Bateman MD    Allergies:  Trazodone, Doxycycline,  Ciprofloxacin, Doxycycline, Fosfomycin tromethamine, Gemtesa [vibegron], Keppra [levetiracetam], Macrobid [nitrofurantoin], Quetiapine, and Sulfa antibiotics    Current Facility-Administered Medications   Medication Dose Route Frequency Provider Last Rate Last Admin   • acetaminophen (TYLENOL) tablet 650 mg  650 mg Oral Q4H PRN Brunilda Haas APRN        Or   • acetaminophen (TYLENOL) 160 MG/5ML solution 650 mg  650 mg Oral Q4H PRN Brunilda Haas APRN        Or   • acetaminophen (TYLENOL) suppository 650 mg  650 mg Rectal Q4H PRN Brunilda Haas APRROMI       • polyethylene glycol (MIRALAX) packet 17 g  17 g Oral Daily PRN Brunilda Haas APRN        And   • bisacodyl (DULCOLAX) EC tablet 5 mg  5 mg Oral Daily PRN Brunilda Haas APRN        And   • bisacodyl (DULCOLAX) suppository 10 mg  10 mg Rectal Daily PRN Brunilda Haas APRN       • brimonidine (ALPHAGAN) 0.2 % ophthalmic solution 1 drop  1 drop Both Eyes BID Rios Macedo MD   1 drop at 04/11/23 1251   • clopidogrel (PLAVIX) tablet 75 mg  75 mg Oral Daily Rios Macedo MD   75 mg at 04/11/23 1106   • docusate sodium (COLACE) capsule 100 mg  100 mg Oral BID Brunilda Haas APRN   100 mg at 04/11/23 1003   • escitalopram (LEXAPRO) tablet 10 mg  10 mg Oral Daily Rios Macedo MD   10 mg at 04/11/23 1106   • galantamine (RAZADYNE) tablet 8 mg  8 mg Oral BID Brunilda Haas APRN   8 mg at 04/11/23 1003   • levothyroxine (SYNTHROID, LEVOTHROID) tablet 25 mcg  25 mcg Oral Q AM Rios Macedo MD   25 mcg at 04/11/23 1106   • melatonin tablet 5 mg  5 mg Oral Nightly PRN Brunilda Haas APRN       • midodrine (PROAMATINE) tablet 2.5 mg  2.5 mg Oral TID PRN Rios Macedo MD       • nitroglycerin (NITROSTAT) SL tablet 0.4 mg  0.4 mg Sublingual Q5 Min PRN Brunilda Haas APRN       • ondansetron (ZOFRAN) tablet 4 mg  4 mg Oral Q6H PRN Brunilda Haas APRN        Or   • ondansetron (ZOFRAN) injection 4 mg  4 mg Intravenous Q6H PRN Samina,  DEANDRA Jacinto       • predniSONE (DELTASONE) tablet 5 mg  5 mg Oral Daily Rios Macedo MD   5 mg at 04/11/23 1106   • rosuvastatin (CRESTOR) tablet 20 mg  20 mg Oral Nightly Brunilda Haas APRN   20 mg at 04/11/23 0036   • sodium chloride 0.9 % flush 10 mL  10 mL Intravenous PRN Jed Watts PA       • sodium chloride 0.9 % flush 10 mL  10 mL Intravenous Q12H Brunilda Haas APRN   10 mL at 04/11/23 0900   • sodium chloride 0.9 % flush 10 mL  10 mL Intravenous PRN Brunilda Haas APRN       • sodium chloride 0.9 % infusion 40 mL  40 mL Intravenous PRN Brunilda Haas APRN       • sodium chloride 0.9 % infusion  75 mL/hr Intravenous Continuous Rios Macedo MD 75 mL/hr at 04/11/23 1133 75 mL/hr at 04/11/23 1133     Current Outpatient Medications   Medication Sig Dispense Refill   • acetaminophen (TYLENOL) 500 MG tablet Take 2 tablets by mouth Every 6 (Six) Hours As Needed for Mild Pain.     • brimonidine (ALPHAGAN) 0.2 % ophthalmic solution Administer 1 drop to both eyes 2 (Two) Times a Day. 10 mL 12   • clopidogrel (PLAVIX) 75 MG tablet TAKE 1 TABLET BY MOUTH EVERY DAY 30 tablet 0   • docusate sodium (COLACE) 100 MG capsule Take 1 capsule by mouth Every Night.     • escitalopram (LEXAPRO) 10 MG tablet Take 1 tablet by mouth Daily.     • galantamine (RAZADYNE) 8 MG tablet Take 1 tablet by mouth 2 (Two) Times a Day.     • guaiFENesin (MUCINEX) 600 MG 12 hr tablet Take 300 mg by mouth As Needed for Cough or Congestion. Patient takes half of the 600mg tablet     • Incruse Ellipta 62.5 MCG/INH aerosol powder  Take 1 puff by mouth Daily.     • levothyroxine (SYNTHROID, LEVOTHROID) 25 MCG tablet Take 1 tablet by mouth Every Morning.     • midodrine (PROAMATINE) 2.5 MG tablet Take 1 tablet by mouth As Needed. SBP <105     • Multiple Vitamins-Minerals (MULTIVITAMIN WITH MINERALS) tablet tablet Take 1 tablet by mouth Every Night.     • mupirocin (BACTROBAN) 2 % ointment Apply 1 application topically to  the appropriate area as directed 3 (Three) Times a Day. On 2 Big toes, cuticle     • nitroglycerin (NITROSTAT) 0.4 MG SL tablet Place 1 tablet under the tongue Every 5 (Five) Minutes As Needed for Chest Pain. Take no more than 3 doses in 15 minutes.     • predniSONE (DELTASONE) 5 MG tablet Take 1 tablet by mouth Daily.     • rosuvastatin (CRESTOR) 20 MG tablet Take 1 tablet by mouth Every Evening.     • sodium chloride 0.65 % nasal spray 1 spray into the nostril(s) as directed by provider As Needed for Congestion.     • apixaban (ELIQUIS) 5 MG tablet tablet Take 5 mg by mouth 2 (Two) Times a Day. Eloquis is on hold.     • famotidine (PEPCID) 40 MG tablet Take 1 tablet by mouth Daily.     • isosorbide mononitrate (IMDUR) 30 MG 24 hr tablet Take 15 mg by mouth As Needed. If SBP continues to be >150 for multiple measurements          ________________________________________________________        OBJECTIVE:  Upon today's exam, pt is drowsy. He does arouse when spoke to, but falls asleep quickly and has difficulty hearing which limits exam.      Neurologic Exam  PHYSICAL EXAM:    Constitutional: The patient is in no apparent distress, lethargic, encephalopathic     Head: Normocephalic, atraumatic.     Chest: No respiratory distress.    Cardiac: Regular rate and rhythm.     Extremities:  No clubbing, cyanosis, or edema.     NEUROLOGICAL:    Cognition:   Lethargic  States his name. Otherwise, does not seem to understand questions   Opens eyes to verbal stimulation  Follows simple commands  Exam limited given mental status      Cranial nerves;  Slight left facial asymmetry   Pupils equal and reactive  Tracking   Exam limited given mental status    Sensory:  Unable to fully assess due to mental status    Motor: Spontaneous movement in all extremities with generalized weakness, though appears to have more weakness on the left. Exam limited given mental status    Cerebellar and gait not tested given patient's mental  status    Physical exam performed by JOSSELINE Vaughan      ________________________________________________________   RESULTS REVIEW:    VITAL SIGNS:   Temp:  [98 °F (36.7 °C)-98.3 °F (36.8 °C)] 98 °F (36.7 °C)  Heart Rate:  [60-74] 74  Resp:  [14-17] 15  BP: (142-167)/(82-98) 142/88     LABS:      Lab 04/11/23  0423 04/10/23  1803   WBC 9.90 11.40*   HEMOGLOBIN 13.6 16.1   HEMATOCRIT 42.6 49.8   PLATELETS 237 266   NEUTROS ABS 7.00 8.90*   LYMPHS ABS 1.80 1.50   MONOS ABS 0.90 0.80   EOS ABS 0.30 0.10   MCV 94.8 93.2         Lab 04/11/23  0423 04/10/23  1803   SODIUM 143 143   POTASSIUM 3.7 4.4   CHLORIDE 109* 106   CO2 29.0 28.0   ANION GAP 5.0 9.0   BUN 16 16   CREATININE 0.86 0.85   EGFR 86.5 86.8   GLUCOSE 117* 87   CALCIUM 9.1 10.2         Lab 04/10/23  1803   TOTAL PROTEIN 6.9   ALBUMIN 4.3   GLOBULIN 2.6   ALT (SGPT) 14   AST (SGOT) 20   BILIRUBIN 0.4   ALK PHOS 91         Lab 04/10/23  2254 04/10/23  1803   HSTROP T 15* 20*                 UA        8/17/2022    21:26 4/10/2023    19:12   Urinalysis   Squamous Epithelial Cells, UA 0-2   0-2     Specific Gravity, UA 1.025   1.021     Ketones, UA Trace   Negative     Blood, UA Large (3+)   Negative     Leukocytes, UA Moderate (2+)   Trace     Nitrite, UA Negative   Negative     RBC, UA 31-50   0-2     WBC, UA 31-50   6-12     Bacteria, UA None Seen   None Seen         Lab Results   Component Value Date    TSH 2.830 10/21/2021    LDL 23 09/14/2021    HGBA1C 6.00 (H) 09/14/2021    [id number] >2,000 (H) 09/14/2021       IMAGING STUDIES:  CT Head Without Contrast    Result Date: 4/10/2023  Advanced chronic and age-related changes of the brain as above, otherwise without evidence of acute intracranial abnormality.  Electronically Signed: Santiago Fountain  4/10/2023 8:25 PM EDT  Workstation ID: [id number]    XR Chest 1 View    Result Date: 4/10/2023  Impression: Advanced emphysema with multifocal areas of parenchymal scarring similar to the prior study. Electronically  Signed: Иван Hall  4/10/2023 6:13 PM EDT  Workstation ID: [id number]      I reviewed the patient's new clinical results.    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 Worsening left sided weakness in pt with chronic strokes, baseline dementia and chronic immobility. Pt is noted to have chronic left sided weakness that has been present since a fall in November. At that time, it does not appear any neuro imaging was completed, but pt has had numerous old strokes previously. Pt was previously placed on Eliquis after failing dual antiplatelet therapy, but his wife states the Eliquis was stopped several months ago. She is not sure who stopped it or why and she denies any bleeding events. There is concern for possible new right hemispheric stroke vs unmasking of previous strokes.   - CT head: Advanced chronic and age-related changes of the brain as above, otherwise without evidence of acute intracranial abnormality.  - CTA head is not likely to change POC. Pt would not be an intervention candidate and he will already be on maximum medical therapy.   - MRI brain: pending   - Carotid duplex: pending   - Echo: pending   - EKG: Sinus rhythm, Inferior infarct, old  - Labs: A1C: 5.8, B12: P, LDL: 57, TSH: 4.14  - Antithrombotics: Previously failed DAPT. Strongly recommend restarting Eliquis. It is unclear why Eliquis was  ever stopped.   - Statin: Crestor 10mg qhs (LDL 57)  - PT/OT/ST as appropriate, fall precautions as appropriate, Neuro checks per protocol, DVT prophylaxis, Stroke education  - Very detailed discussing with pt's wife at BS. Previous medical records discussed in detail including previous stroke evaluations and imaging studies. Medications, treatment options, and POC discussed. Questions answered.     2. Progressive cognitive decline. Pt with known vascular dementia, but has declined significantly over the past week. Differentials including encephalopathy second to recent infection and medications (restarted urinary medication which has previously caused him confusion), possible stroke, seizures, or progression of degenerative neurologic condition   - workup ongoing   - Monitor for seizure activity, no witnessed seizures    3. Recent UTI  - Treated with 7 day course of IM rocephin injections.   - Repeat UA negative     Modification of stroke risk factors:   - Blood pressure should be less than 130/80 outpatient, HbA1c less than 6.5, LDL less than 70; b12>500 and smoking cessation if applicable. We would be grateful if the primary team / primary care physician would keep a close watch on the above targets.  - Stroke education  - Follow up with neurologist of choice    I spent 62 total minutes, face-to-face, caring for Jayden today. 60% of this time involved counseling and/or coordination of care as documented within this note.    Will follow     I discussed the patient's findings and my recommendations with patient, nursing staff and consulting provider    DEANDRA Parish  04/11/23  13:00 EDT

## 2023-04-11 NOTE — PLAN OF CARE
"Goal Outcome Evaluation:   Bedside swallow evaluation completed. Pt is known to this dept and had VFSS on 10/22/21 and a regular diet with thin liquids was rec. Pt's wife also reported that he had another VFSS in Nov 2022 with the same diet recs. She reported that pt \"coughs when using straws\" and she has stopped using them. Pt now uses a sippy cup. Pt's wife feeds him all meals. Pt was alert but mostly verbally unresponsive. He was seen sitting up in bed. Pt was fed by his wife given trials of water from a sippy cup, applesauce, and a Fig Lan. Mastication was slow but functional. Oral transit timely for puree and liquids. No pocketing or oral residual occurred, however pt's wife reports occasional oral residual when she reoves his dentures in the evening, Digital palpation suggests timely swallow initiation/functional pharyngeal phase of swallow. No overt s/s of aspiration appreciated throughout assessment.     Pt appears to have mild oral and pharyngeal dysphagia. Soft to chew was rec for this pt due to slow chewing, but pt's wife stated that she would like him to stay on regular. It is rec that pt continue regular diet with thin liquids. Pt should sippy cup for all liquids per pt's wife's request.  He should be upright at 90 degrees for all PO and be fed at a slow rate. Education provided to pt and his wife on safe swallow strategies. They verbalized understanding. Monitor closely for s/s of aspiration and if pt exhibits any of these s/s, make NPO and contact ST.  ST will follow for meal assessment to assure tolerance of diet and make further recs as indicated.               "

## 2023-04-12 ENCOUNTER — READMISSION MANAGEMENT (OUTPATIENT)
Dept: CALL CENTER | Facility: HOSPITAL | Age: 83
End: 2023-04-12
Payer: MEDICARE

## 2023-04-12 ENCOUNTER — APPOINTMENT (OUTPATIENT)
Dept: CARDIOLOGY | Facility: HOSPITAL | Age: 83
End: 2023-04-12
Payer: MEDICARE

## 2023-04-12 VITALS
RESPIRATION RATE: 16 BRPM | HEIGHT: 70 IN | HEART RATE: 61 BPM | OXYGEN SATURATION: 100 % | DIASTOLIC BLOOD PRESSURE: 87 MMHG | TEMPERATURE: 97.5 F | WEIGHT: 140 LBS | BODY MASS INDEX: 20.04 KG/M2 | SYSTOLIC BLOOD PRESSURE: 148 MMHG

## 2023-04-12 LAB
BH CV ECHO MEAS - ACS: 2.3 CM
BH CV ECHO MEAS - AI P1/2T: 1107 MSEC
BH CV ECHO MEAS - AO MAX PG: 5.2 MMHG
BH CV ECHO MEAS - AO MEAN PG: 3 MMHG
BH CV ECHO MEAS - AO ROOT DIAM: 4.2 CM
BH CV ECHO MEAS - AO V2 MAX: 114 CM/SEC
BH CV ECHO MEAS - AO V2 VTI: 26.9 CM
BH CV ECHO MEAS - AVA(I,D): 3 CM2
BH CV ECHO MEAS - EDV(CUBED): 54.9 ML
BH CV ECHO MEAS - EDV(MOD-SP2): 59.3 ML
BH CV ECHO MEAS - EDV(MOD-SP4): 42.2 ML
BH CV ECHO MEAS - EF(MOD-BP): 59.1 %
BH CV ECHO MEAS - EF(MOD-SP2): 72 %
BH CV ECHO MEAS - EF(MOD-SP4): 46.4 %
BH CV ECHO MEAS - ESV(MOD-SP2): 16.6 ML
BH CV ECHO MEAS - ESV(MOD-SP4): 22.6 ML
BH CV ECHO MEAS - FS: 52.6 %
BH CV ECHO MEAS - IVS/LVPW: 0.8 CM
BH CV ECHO MEAS - IVSD: 0.8 CM
BH CV ECHO MEAS - LA DIMENSION: 3 CM
BH CV ECHO MEAS - LAT PEAK E' VEL: 3.6 CM/SEC
BH CV ECHO MEAS - LV DIASTOLIC VOL/BSA (35-75): 23.5 CM2
BH CV ECHO MEAS - LV MASS(C)D: 101.1 GRAMS
BH CV ECHO MEAS - LV MAX PG: 4.2 MMHG
BH CV ECHO MEAS - LV MEAN PG: 2 MMHG
BH CV ECHO MEAS - LV SYSTOLIC VOL/BSA (12-30): 12.6 CM2
BH CV ECHO MEAS - LV V1 MAX: 102 CM/SEC
BH CV ECHO MEAS - LV V1 VTI: 23.3 CM
BH CV ECHO MEAS - LVIDD: 3.8 CM
BH CV ECHO MEAS - LVIDS: 1.8 CM
BH CV ECHO MEAS - LVOT AREA: 3.5 CM2
BH CV ECHO MEAS - LVOT DIAM: 2.1 CM
BH CV ECHO MEAS - LVPWD: 1 CM
BH CV ECHO MEAS - MED PEAK E' VEL: 3.2 CM/SEC
BH CV ECHO MEAS - MR MAX PG: 81.7 MMHG
BH CV ECHO MEAS - MR MAX VEL: 452 CM/SEC
BH CV ECHO MEAS - MV A DUR: 0.14 SEC
BH CV ECHO MEAS - MV A MAX VEL: 107 CM/SEC
BH CV ECHO MEAS - MV DEC SLOPE: 445 CM/SEC2
BH CV ECHO MEAS - MV DEC TIME: 0.26 MSEC
BH CV ECHO MEAS - MV E MAX VEL: 73.4 CM/SEC
BH CV ECHO MEAS - MV E/A: 0.69
BH CV ECHO MEAS - MV MAX PG: 5.6 MMHG
BH CV ECHO MEAS - MV MEAN PG: 2 MMHG
BH CV ECHO MEAS - MV P1/2T: 54 MSEC
BH CV ECHO MEAS - MV V2 VTI: 30.7 CM
BH CV ECHO MEAS - MVA(P1/2T): 4.1 CM2
BH CV ECHO MEAS - MVA(VTI): 2.6 CM2
BH CV ECHO MEAS - PA V2 MAX: 78.4 CM/SEC
BH CV ECHO MEAS - RAP SYSTOLE: 3 MMHG
BH CV ECHO MEAS - RV MAX PG: 0.95 MMHG
BH CV ECHO MEAS - RV V1 MAX: 48.7 CM/SEC
BH CV ECHO MEAS - RV V1 VTI: 10.5 CM
BH CV ECHO MEAS - RVSP: 36 MMHG
BH CV ECHO MEAS - SI(MOD-SP2): 23.8 ML/M2
BH CV ECHO MEAS - SI(MOD-SP4): 10.9 ML/M2
BH CV ECHO MEAS - SV(LVOT): 80.7 ML
BH CV ECHO MEAS - SV(MOD-SP2): 42.7 ML
BH CV ECHO MEAS - SV(MOD-SP4): 19.6 ML
BH CV ECHO MEAS - TAPSE (>1.6): 2.6 CM
BH CV ECHO MEAS - TR MAX PG: 39.9 MMHG
BH CV ECHO MEAS - TR MAX VEL: 316 CM/SEC
BH CV ECHO MEASUREMENTS AVERAGE E/E' RATIO: 21.59
BH CV ECHO SHUNT ASSESSMENT PERFORMED (HIDDEN SCRIPTING): 1
BH CV XLRA - RV BASE: 3 CM
BH CV XLRA - RV LENGTH: 8.2 CM
BH CV XLRA - RV MID: 2.6 CM
BH CV XLRA - TDI S': 8.4 CM/SEC
BH CV XLRA MEAS LEFT DIST CCA EDV: 11 CM/SEC
BH CV XLRA MEAS LEFT DIST CCA PSV: 51.4 CM/SEC
BH CV XLRA MEAS LEFT DIST ICA EDV: -9.6 CM/SEC
BH CV XLRA MEAS LEFT DIST ICA PSV: -43.5 CM/SEC
BH CV XLRA MEAS LEFT ICA/CCA RATIO: -0.63
BH CV XLRA MEAS LEFT PROX CCA EDV: 10.1 CM/SEC
BH CV XLRA MEAS LEFT PROX CCA PSV: 68.9 CM/SEC
BH CV XLRA MEAS LEFT PROX ECA PSV: -95.5 CM/SEC
BH CV XLRA MEAS LEFT PROX ICA EDV: -7.5 CM/SEC
BH CV XLRA MEAS LEFT PROX ICA PSV: -35.6 CM/SEC
BH CV XLRA MEAS LEFT PROX SCLA PSV: 50.9 CM/SEC
BH CV XLRA MEAS LEFT VERTEBRAL A EDV: -6.7 CM/SEC
BH CV XLRA MEAS LEFT VERTEBRAL A PSV: -31.8 CM/SEC
BH CV XLRA MEAS RIGHT DIST CCA EDV: -9.9 CM/SEC
BH CV XLRA MEAS RIGHT DIST CCA PSV: -70.8 CM/SEC
BH CV XLRA MEAS RIGHT DIST ICA EDV: -6.3 CM/SEC
BH CV XLRA MEAS RIGHT DIST ICA PSV: -41 CM/SEC
BH CV XLRA MEAS RIGHT ICA/CCA RATIO: -0.44
BH CV XLRA MEAS RIGHT PROX CCA EDV: 14.9 CM/SEC
BH CV XLRA MEAS RIGHT PROX CCA PSV: 101 CM/SEC
BH CV XLRA MEAS RIGHT PROX ECA PSV: -87 CM/SEC
BH CV XLRA MEAS RIGHT PROX ICA EDV: -7.4 CM/SEC
BH CV XLRA MEAS RIGHT PROX ICA PSV: -44.2 CM/SEC
BH CV XLRA MEAS RIGHT PROX SCLA PSV: 56.8 CM/SEC
BH CV XLRA MEAS RIGHT VERTEBRAL A EDV: 5.1 CM/SEC
BH CV XLRA MEAS RIGHT VERTEBRAL A PSV: 32.1 CM/SEC
MAXIMAL PREDICTED HEART RATE: 138 BPM
MAXIMAL PREDICTED HEART RATE: 138 BPM
SINUS: 3.9 CM
STJ: 3.4 CM
STRESS TARGET HR: 117 BPM
STRESS TARGET HR: 117 BPM
VIT B12 BLD-MCNC: 298 PG/ML (ref 211–946)

## 2023-04-12 PROCEDURE — 97165 OT EVAL LOW COMPLEX 30 MIN: CPT

## 2023-04-12 PROCEDURE — G0378 HOSPITAL OBSERVATION PER HR: HCPCS

## 2023-04-12 PROCEDURE — 96361 HYDRATE IV INFUSION ADD-ON: CPT

## 2023-04-12 PROCEDURE — 63710000001 PREDNISONE PER 5 MG: Performed by: HOSPITALIST

## 2023-04-12 PROCEDURE — 36415 COLL VENOUS BLD VENIPUNCTURE: CPT | Performed by: NURSE PRACTITIONER

## 2023-04-12 PROCEDURE — 25010000002 HYDRALAZINE PER 20 MG: Performed by: STUDENT IN AN ORGANIZED HEALTH CARE EDUCATION/TRAINING PROGRAM

## 2023-04-12 PROCEDURE — 93306 TTE W/DOPPLER COMPLETE: CPT

## 2023-04-12 PROCEDURE — 92526 ORAL FUNCTION THERAPY: CPT

## 2023-04-12 PROCEDURE — 96374 THER/PROPH/DIAG INJ IV PUSH: CPT

## 2023-04-12 PROCEDURE — 93880 EXTRACRANIAL BILAT STUDY: CPT

## 2023-04-12 PROCEDURE — 97162 PT EVAL MOD COMPLEX 30 MIN: CPT

## 2023-04-12 PROCEDURE — 82607 VITAMIN B-12: CPT | Performed by: NURSE PRACTITIONER

## 2023-04-12 PROCEDURE — 99232 SBSQ HOSP IP/OBS MODERATE 35: CPT | Performed by: NURSE PRACTITIONER

## 2023-04-12 RX ORDER — ROSUVASTATIN CALCIUM 10 MG/1
20 TABLET, COATED ORAL NIGHTLY
Status: DISCONTINUED | OUTPATIENT
Start: 2023-04-12 | End: 2023-04-12 | Stop reason: HOSPADM

## 2023-04-12 RX ORDER — ASPIRIN 81 MG/1
81 TABLET ORAL DAILY
Status: DISCONTINUED | OUTPATIENT
Start: 2023-04-13 | End: 2023-04-12 | Stop reason: HOSPADM

## 2023-04-12 RX ORDER — HYDRALAZINE HYDROCHLORIDE 20 MG/ML
5 INJECTION INTRAMUSCULAR; INTRAVENOUS EVERY 4 HOURS PRN
Status: DISCONTINUED | OUTPATIENT
Start: 2023-04-12 | End: 2023-04-12 | Stop reason: HOSPADM

## 2023-04-12 RX ORDER — ASPIRIN 81 MG/1
81 TABLET ORAL DAILY
Qty: 100 TABLET | Refills: 11 | Status: SHIPPED | OUTPATIENT
Start: 2023-04-13

## 2023-04-12 RX ADMIN — PREDNISONE 5 MG: 10 TABLET ORAL at 09:07

## 2023-04-12 RX ADMIN — DOCUSATE SODIUM 100 MG: 100 CAPSULE, LIQUID FILLED ORAL at 09:07

## 2023-04-12 RX ADMIN — BRIMONIDINE TARTRATE 1 DROP: 2 SOLUTION/ DROPS OPHTHALMIC at 09:08

## 2023-04-12 RX ADMIN — SODIUM CHLORIDE 75 ML/HR: 9 INJECTION, SOLUTION INTRAVENOUS at 00:58

## 2023-04-12 RX ADMIN — HYDRALAZINE HYDROCHLORIDE 5 MG: 20 INJECTION INTRAMUSCULAR; INTRAVENOUS at 03:44

## 2023-04-12 RX ADMIN — GALANTAMINE 8 MG: 4 TABLET, FILM COATED ORAL at 09:07

## 2023-04-12 RX ADMIN — LEVOTHYROXINE SODIUM 25 MCG: 0.03 TABLET ORAL at 05:00

## 2023-04-12 RX ADMIN — CLOPIDOGREL BISULFATE 75 MG: 75 TABLET ORAL at 09:06

## 2023-04-12 RX ADMIN — APIXABAN 5 MG: 5 TABLET, FILM COATED ORAL at 09:07

## 2023-04-12 RX ADMIN — Medication 10 ML: at 09:08

## 2023-04-12 RX ADMIN — ESCITALOPRAM OXALATE 10 MG: 10 TABLET ORAL at 09:06

## 2023-04-12 NOTE — PLAN OF CARE
Goal Outcome Evaluation:     Pt is an 83 y/o male who presents to [facility] from home with spouse with spouse reporting AMS, increased confusion and decreased responsiveness over the past three days. PMH significant for vascular dementia, PM, and COPD. MRI showed acute ischemia in the high R frontal lobe near vertex. Per spouse she has been independently transferring the patient. He has not ambulated since a fall resulting in an L3 fracture in November of 2021. At this time pt requires min A for bed mobility and max A for SPS transfer. It was difficult to assess LE strength d/t confusion and dementia, though pt appears to present with BLE drop foot and 2/5 LE strength. Spouse reports concerns with the patient's previous level of acute care and that home health therapy did not follow up following his fall. Pt would benefit from transitioning to a long term care facility, though spouse would like to continue with care at home. If patient returns home he would benefit from HHPT to address transfer deficits.

## 2023-04-12 NOTE — PLAN OF CARE
Problem: Pain Acute  Goal: Acceptable Pain Control and Functional Ability  Intervention: Prevent or Manage Pain  Recent Flowsheet Documentation  Taken 4/12/2023 1600 by Lia Mcgarry LPN  Sleep/Rest Enhancement:   awakenings minimized   regular sleep/rest pattern promoted   consistent schedule promoted  Taken 4/12/2023 1200 by Lia Mcgarry LPN  Sleep/Rest Enhancement:   awakenings minimized   consistent schedule promoted   regular sleep/rest pattern promoted   noise level reduced  Intervention: Optimize Psychosocial Wellbeing  Recent Flowsheet Documentation  Taken 4/12/2023 1600 by Lia Mcgarry LPN  Supportive Measures: active listening utilized  Diversional Activities: television  Taken 4/12/2023 1200 by Lia Mcgarry LPN  Supportive Measures: active listening utilized  Diversional Activities: television     Problem: Adjustment to Illness (Stroke, Ischemic/Transient Ischemic Attack)  Goal: Optimal Coping  Intervention: Support Psychosocial Response to Stroke  Recent Flowsheet Documentation  Taken 4/12/2023 1600 by Lia Mcgarry LPN  Supportive Measures: active listening utilized  Family/Support System Care:   involvement promoted   support provided  Taken 4/12/2023 1200 by Lia Mcgarry LPN  Supportive Measures: active listening utilized  Family/Support System Care:   support provided   involvement promoted   Goal Outcome Evaluation:

## 2023-04-12 NOTE — DISCHARGE SUMMARY
[facility]  Discharge Summary    Date of Service: 23  Patient condition: Stable    Patient Name: Jayden Bond  : 1940  MRN: [id number]    Date of Admission: 4/10/2023  Discharge Diagnosis: Acute CVA  Date of Discharge:  23    Primary Care Physician: Ant Hines MD      Presenting Problem:   Weakness [R53.1]  Altered mental status, unspecified altered mental status type [R41.82]    Active and Resolved Hospital Problems:  Active Hospital Problems    Diagnosis POA   • **Altered mental status, unspecified altered mental status type [R41.82] Yes   • Vascular dementia with behavior disturbance (HCC) [F01.518] Yes   • Cognitive safety issue [R41.9] Yes   • 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         Hospital Course     Hospital Course:  Jayden Bond is a 82 y.o. male With multiple medical issues who presented with altered mental status.  He was admitted and had a work-up and was found to have an acute CVA.  Patient was seen by urology had CVA work-up and per neurology recommended starting aspirin and Eliquis.  Discussed with wife at bedside she would like to take him home.  Patient will be discharged follow-up with regular physician as an outpatient        DISCHARGE Follow Up Recommendations for labs and diagnostics:       Reasons For Change In Medications and Indications for New  Medications:      Day of Discharge     Vital Signs:  Temp:  [97.2 °F (36.2 °C)-97.5 °F (36.4 °C)] 97.5 °F (36.4 °C)  Heart Rate:  [60-62] 61  Resp:  [15-24] 16  BP: (126-173)/() 148/87  Flow (L/min):  [3] 3    Physical Exam:  Physical Exam   General: No acute distress  HEENT: neck supple, normal oral mucosa, no masses, no lymphadenopathy  Lungs: Clear bilaterally, no wheezing, No crackles, No Rhonchi. Equal excursions.   CV - Normal S1/S2, no murmur, Regular rate and rhythm   Abdomin - Soft, non-tender, non-distended, normal bowel sounds  Extremities - no edema, no erythema  Neuro - Generalized weakness, left facial droop, left arm weakness. Bilateral LE weakness with muscle wasting   Psych - Alert , not oriented   Skin - no wounds or lesions.       Pertinent  and/or Most Recent Results     LAB RESULTS:      Lab 04/11/23 0423 04/10/23  1803   WBC 9.90 11.40*   HEMOGLOBIN 13.6 16.1   HEMATOCRIT 42.6 49.8   PLATELETS 237 266   NEUTROS ABS 7.00 8.90*   LYMPHS ABS 1.80 1.50   MONOS ABS 0.90 0.80   EOS ABS 0.30 0.10   MCV 94.8 93.2         Lab 04/11/23  0423 04/10/23  1803   SODIUM 143 143   POTASSIUM 3.7 4.4   CHLORIDE 109* 106   CO2 29.0 28.0   ANION GAP 5.0 9.0   BUN 16 16   CREATININE 0.86 0.85   EGFR 86.5 86.8   GLUCOSE 117* 87   CALCIUM 9.1 10.2   MAGNESIUM 2.1  --    PHOSPHORUS 2.7  --    HEMOGLOBIN A1C 5.80*  --    TSH 4.140  --          Lab 04/10/23  1803   TOTAL PROTEIN 6.9   ALBUMIN 4.3   GLOBULIN 2.6   ALT (SGPT) 14   AST (SGOT) 20   BILIRUBIN 0.4   ALK PHOS 91         Lab 04/10/23  2254 04/10/23  1803   HSTROP T 15* 20*         Lab 04/11/23  0423   CHOLESTEROL 120   LDL CHOL 57   HDL CHOL 41   TRIGLYCERIDES 121         Lab 04/12/23  0633   VITAMIN B 12 298         Brief Urine Lab Results  (Last result in the past 365 days)      Color   Clarity   Blood   Leuk Est   Nitrite   Protein   CREAT   Urine HCG        04/10/23 1912 Yellow   Clear   Negative   Trace   Negative   30 mg/dL (1+)                Microbiology Results (last 10 days)     Procedure Component Value - Date/Time    Respiratory Panel PCR w/COVID-19(SARS-CoV-2) DANIEL/KHUSHBU/DEEPA/PAD/COR/MAD/DIPIKA In-House, NP Swab in UTM/VTM, 3-4 HR TAT - Swab, Nasopharynx [[id number]]  (Normal) Collected: 04/10/23 1943    Lab Status: Final result Specimen: Swab from Nasopharynx Updated: 04/10/23 2042     ADENOVIRUS, PCR Not Detected     Coronavirus 229E Not Detected     Coronavirus HKU1 Not Detected     Coronavirus NL63 Not Detected     Coronavirus OC43 Not Detected     COVID19 Not Detected     Human Metapneumovirus Not Detected     Human Rhinovirus/Enterovirus Not Detected     Influenza A PCR Not Detected     Influenza B PCR Not Detected     Parainfluenza Virus 1 Not Detected     Parainfluenza Virus 2 Not Detected     Parainfluenza Virus 3 Not Detected     Parainfluenza Virus 4 Not Detected     RSV, PCR Not Detected     Bordetella pertussis pcr Not Detected     Bordetella parapertussis PCR Not Detected     Chlamydophila pneumoniae PCR Not Detected     Mycoplasma pneumo by PCR Not Detected    Narrative:      In the setting of a positive respiratory panel with a viral infection PLUS a negative procalcitonin without other underlying concern for bacterial infection, consider observing off antibiotics or discontinuation of antibiotics and continue supportive care. If the respiratory panel is positive for atypical bacterial infection (Bordetella pertussis, Chlamydophila pneumoniae, or Mycoplasma pneumoniae), consider antibiotic de-escalation to target atypical bacterial infection.    Urine Culture - Urine, Urine, Clean Catch [[id number]]  (Normal) Collected: 04/10/23 1912    Lab Status: Final result Specimen: Urine, Clean Catch Updated: 04/11/23 2115     Urine Culture No growth          CT Head Without Contrast    Result Date: 4/10/2023  Impression: Advanced chronic and age-related changes of the brain as above, otherwise without evidence of acute intracranial abnormality.   Electronically Signed: Santiago Fountain  4/10/2023 8:25 PM EDT  Workstation ID: [id number]    MRI Brain Without Contrast    Result Date: 4/11/2023  Impression: Impression: 1. Small cortical area of acute ischemia involving high right frontal lobe near vertex. 2. Generalized cerebral volume loss with extensive white matter findings most suggestive of advanced chronic microvascular disease. 3. 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 4. Bilateral mastoid effusions. I called findings to nurse Cueto in ER at 4:10 p.m. on 4/11/2023. Electronically Signed: Иван Mousedat  4/11/2023 4:11 PM EDT  Workstation ID: [id number]    XR Chest 1 View    Result Date: 4/10/2023  Impression: Impression: Advanced emphysema with multifocal areas of parenchymal scarring similar to the prior study. Electronically Signed: Иван Mouser  4/10/2023 6:13 PM EDT  Workstation ID: [id number]      Results for orders placed during the hospital encounter of 04/10/23    Duplex Carotid Ultrasound CAR    Interpretation Summary  •  Minimal atherosclerotic plaque proximal right and left internal carotid arteries, with less than 50% ICA stenosis bilaterally. Antegrade vertebral bilaterally.      Results for orders placed during the hospital encounter of 04/10/23    Duplex Carotid Ultrasound CAR    Interpretation Summary  •  Minimal atherosclerotic plaque proximal right and left internal carotid arteries, with less than 50% ICA stenosis bilaterally. Antegrade vertebral bilaterally.      Results for orders placed during the hospital encounter of 04/10/23    Adult Transthoracic Echo Complete W/ Cont if Necessary Per Protocol    Interpretation Summary  •  Left ventricular systolic function is normal. Left ventricular ejection fraction appears to be 61 - 65%.  •  Left ventricular diastolic function is  consistent with (grade Ia w/high LAP) impaired relaxation.  •  Estimated right ventricular systolic pressure from tricuspid regurgitation is mildly elevated (35-45 mmHg).      Labs Pending at Discharge:      Procedures Performed           Consults:   Consults     Date and Time Order Name Status Description    4/11/2023 12:34 AM Inpatient Neurology Consult General Completed     4/10/2023  9:21 PM Hospitalist (on-call MD unless specified)              Discharge Details        Discharge Medications      New Medications      Instructions Start Date   aspirin 81 MG EC tablet   81 mg, Oral, Daily   Start Date: April 13, 2023        Continue These Medications      Instructions Start Date   acetaminophen 500 MG tablet  Commonly known as: TYLENOL   1,000 mg, Oral, Every 6 Hours PRN      apixaban 5 MG tablet tablet  Commonly known as: ELIQUIS   5 mg, Oral, 2 Times Daily, Eloquis is on hold.      brimonidine 0.2 % ophthalmic solution  Commonly known as: ALPHAGAN   1 drop, Both Eyes, 2 Times Daily      docusate sodium 100 MG capsule  Commonly known as: COLACE   100 mg, Oral, Nightly      escitalopram 10 MG tablet  Commonly known as: LEXAPRO   10 mg, Oral, Daily      famotidine 40 MG tablet  Commonly known as: PEPCID   40 mg, Oral, Daily      galantamine 8 MG tablet  Commonly known as: RAZADYNE   8 mg, Oral, 2 Times Daily      guaiFENesin 600 MG 12 hr tablet  Commonly known as: MUCINEX   300 mg, Oral, As Needed, Patient takes half of the 600mg tablet      Incruse Ellipta 62.5 MCG/ACT aerosol powder   Generic drug: Umeclidinium Bromide   1 puff, Oral, Daily      isosorbide mononitrate 30 MG 24 hr tablet  Commonly known as: IMDUR   15 mg, Oral, As Needed, If SBP continues to be >150 for multiple measurements      levothyroxine 25 MCG tablet  Commonly known as: SYNTHROID, LEVOTHROID   25 mcg, Oral, Every Early Morning      midodrine 2.5 MG tablet  Commonly known as: PROAMATINE   2.5 mg, Oral, As Needed, SBP <105      multivitamin  with minerals tablet tablet   1 tablet, Oral, Nightly      mupirocin 2 % ointment  Commonly known as: BACTROBAN   1 application, Topical, 3 Times Daily, On 2 Big toes, cuticle      nitroglycerin 0.4 MG SL tablet  Commonly known as: NITROSTAT   0.4 mg, Sublingual, Every 5 Minutes PRN, Take no more than 3 doses in 15 minutes.       predniSONE 5 MG tablet  Commonly known as: DELTASONE   5 mg, Oral, Daily      rosuvastatin 20 MG tablet  Commonly known as: CRESTOR   20 mg, Oral, Every Evening      sodium chloride 0.65 % nasal spray   1 spray, Nasal, As Needed         Stop These Medications    clopidogrel 75 MG tablet  Commonly known as: PLAVIX            Allergies   Allergen Reactions   • Trazodone Hallucinations   • Doxycycline GI Intolerance   • Ciprofloxacin Other (See Comments)     Breathing problems   • Doxycycline Nausea And Vomiting   • Fosfomycin Tromethamine Confusion     Reports weakness and confusion   • Gemtesa [Vibegron] Other (See Comments)     Reports urinary retention   • Keppra [Levetiracetam] GI Intolerance   • Macrobid [Nitrofurantoin] Other (See Comments)     Unknown reaction   • Quetiapine Other (See Comments)     Having falls on it   • Sulfa Antibiotics Other (See Comments)     Unknown reaction         Discharge Disposition: home with OP follow up  Home or Self Care    Diet:  Hospital:  Diet Order   Procedures   • Diet: Cardiac Diets; Healthy Heart (2-3 Na+); Texture: Regular Texture (IDDSI 7); Fluid Consistency: Thin (IDDSI 0)         Discharge Activity:         CODE STATUS:  Code Status and Medical Interventions:   Ordered at: 04/10/23 6152     Code Status (Patient has no pulse and is not breathing):    CPR (Attempt to Resuscitate)     Medical Interventions (Patient has pulse or is breathing):    Full Support         No future appointments.        Time spent on Discharge including face to face service:  33 minutes    This patient has been  and discussed with . 04/12/23      Signature:  Electronically signed by Rios Macedo MD, 04/12/23, 17:51 EDT.  [facility] Hospitalist Team  Patient is a 82-year-old gentleman

## 2023-04-12 NOTE — THERAPY EVALUATION
Patient Name: Jayden Bond  : 1940    MRN: [id number]                              Today's Date: 2023       Admit Date: 4/10/2023    Visit Dx:     ICD-10-CM ICD-9-CM   1. Altered mental status, unspecified altered mental status type  R41.82 780.97   2. Weakness  R53.1 780.79     Patient Active Problem List   Diagnosis   • Chest pain   • Essential hypertension   • Stroke   • Disease of thyroid gland   • Coronary artery disease involving native coronary artery of native heart without angina pectoris   • Dysautonomia   • Dementia (HCC)   • Anticoagulant long-term use   • Cardiac pacemaker in situ   • COPD (chronic obstructive pulmonary disease)   • Degenerative cervical spinal stenosis   • Dysautonomia orthostatic hypotension syndrome   • Epiphora due to insufficient drainage of both sides   • History of CVA (cerebrovascular accident)   • History of PTCA   • Punctal stenosis, acquired   • Nonsustained ventricular tachycardia   • Pseudophakia, both eyes   • SSS (sick sinus syndrome) (HCC)   • Palpitations   • 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   • Weakness   • Vascular dementia with behavior disturbance (HCC)   • Nocturnal hypoxemia   • Delirium due to multiple etiologies, persistent, hypoactive   • Cognitive safety issue   • Caregiver stress   • Dementia (HCC)   • Disease of thyroid gland   • Dysautonomia (HCC)   • Mixed hyperlipidemia   • Transient ischemic attack (TIA)   • Altered mental status   • CAP (community acquired pneumonia)   • Abnormal finding on urinalysis   • Poor short-term memory   • Pneumonia   • Altered mental status, unspecified altered mental status type   • History of falling     Past Medical History:   Diagnosis Date   • CAD (coronary artery disease)    • Dementia    • Depression    • Disease of thyroid gland    • Dysautonomia    • Dyslipidemia    • GERD (gastroesophageal reflux disease)    • Head pain    • Hyperlipidemia    • Hypertension    • SSS (sick sinus syndrome)    •  Stroke     x 3 in 2018     Past Surgical History:   Procedure Laterality Date   • APPENDECTOMY     • CHOLECYSTECTOMY     • CYSTOSCOPY     • ENDOSCOPY N/A 10/17/2019    Procedure: ESOPHAGOGASTRODUODENOSCOPY with biopsy x 2 areas;  Surgeon: Ashok Sanchez MD;  Location: [facility] ENDOSCOPY;  Service: Gastroenterology   • HERNIA REPAIR     • INSERT / REPLACE / REMOVE PACEMAKER     • LEFT HEART CATH     • PACEMAKER IMPLANTATION      [employer]   • TEMPORAL ARTERY BIOPSY     • WRIST SURGERY      severed artery      General Information     Row Name 04/12/23 1633          Physical Therapy Time and Intention    Document Type evaluation  -     Mode of Treatment physical therapy  -     Row Name 04/12/23 1633          General Information    Prior Level of Function max assist:;dependent:;transfer;w/c or scooter;bed mobility  spouse assists with transfers and wheelchair propulsion  -     Existing Precautions/Restrictions fall  -     Barriers to Rehab medically complex;previous functional deficit;cognitive status  -     Row Name 04/12/23 1633          Living Environment    People in Home spouse  -     Row Name 04/12/23 1633          Home Main Entrance    Number of Stairs, Main Entrance none  -     Row Name 04/12/23 1633          Stairs Within Home, Primary    Number of Stairs, Within Home, Primary none  -     Row Name 04/12/23 1633          Safety Issues, Functional Mobility    Impairments Affecting Function (Mobility) balance;endurance/activity tolerance;grasp;cognition;strength;motor planning;shortness of breath;pain  -           User Key  (r) = Recorded By, (t) = Taken By, (c) = Cosigned By    Initials Name Provider Type     Alka Gomez PT Physical Therapist               Mobility     Row Name 04/12/23 1635          Bed Mobility    Bed Mobility bed mobility (all) activities  -     All Activities, [place] (Bed Mobility) minimum assist (75% patient effort);verbal cues  -     Assistive Device (Bed  Mobility) bed rails;head of bed elevated  -     Comment, (Bed Mobility) Pt requires increased time to complete/understand what is being asked  -     Row Name 04/12/23 1635          Bed-Chair Transfer    Bed-Chair [place] (Transfers) moderate assist (50% patient effort);maximum assist (25% patient effort);verbal cues  -     Comment, (Bed-Chair Transfer) SPS from EOB to recliner chair  -     Row Name 04/12/23 1635          Gait/Stairs (Locomotion)    Distance in Feet (Gait) Did not assess as pt utilizes w/c at baseline  -           User Key  (r) = Recorded By, (t) = Taken By, (c) = Cosigned By    Initials Name Provider Type    ANKIT Alka Gomez PT Physical Therapist               Obj/Interventions     Row Name 04/12/23 1636          Range of Motion Comprehensive    General Range of Motion bilateral lower extremity ROM WFL  -[facility] Name 04/12/23 1636          Strength Comprehensive (MMT)    Comment, General Manual Muscle Testing (MMT) Assessment BLE ankle 0/5; knee 2-/5; difficulty assessing d/t pt confusion  -     Row Name 04/12/23 1636          Balance    Balance Assessment sitting static balance;sitting dynamic balance  -     Static Sitting Balance contact guard  -     Dynamic Sitting Balance moderate assist  -     Position, Sitting Balance sitting edge of bed  -     Row Name 04/12/23 1636          Sensory Assessment (Somatosensory)    Sensory Assessment (Somatosensory) unable/difficult to assess  -           User Key  (r) = Recorded By, (t) = Taken By, (c) = Cosigned By    Initials Name Provider Type    Alka Loving PT Physical Therapist               Goals/Plan     Row Name 04/12/23 1638          Bed Mobility Goal 1 (PT)    Activity/Assistive Device (Bed Mobility Goal 1, PT) bed mobility activities, all  -     [place] Level/Cues Needed (Bed Mobility Goal 1, PT) standby assist  -     Time Frame (Bed Mobility Goal 1, PT) long term goal (LTG);2 weeks  -     Row Name  04/12/23 1638          Transfer Goal 1 (PT)    Activity/Assistive Device (Transfer Goal 1, PT) wheelchair transfer;bed-to-chair/chair-to-bed;sit-to-stand/stand-to-sit  -ANKIT     [place] Level/Cues Needed (Transfer Goal 1, PT) minimum assist (75% or more patient effort)  -ANKIT     Time Frame (Transfer Goal 1, PT) long term goal (LTG);2 weeks  -ANKIT     Row Name 04/12/23 1634          Therapy Assessment/Plan (PT)    Planned Therapy Interventions (PT) balance training;bed mobility training;home exercise program;neuromuscular re-education;patient/family education;strengthening;transfer training;postural re-education  -           User Key  (r) = Recorded By, (t) = Taken By, (c) = Cosigned By    Initials Name Provider Type    Alka Loving, PT Physical Therapist               Clinical Impression     Row Name 04/12/23 1639          Pain    Additional Documentation Pain Scale: FACES Pre/Post-Treatment (Group)  -ANKIT     Row Name 04/12/23 1637          Pain Scale: FACES Pre/Post-Treatment    Pain: FACES Scale, Pretreatment 0-->no hurt  -ANKIT     Posttreatment Pain Rating 0-->no hurt  -ANKIT     Row Name 04/12/23 1634          Plan of Care Review    Plan of Care Reviewed With patient  -     Outcome Evaluation Pt is an 81 y/o male who presents to [facility] from home with spouse with spouse reporting AMS, increased confusion and decreased responsiveness over the past three days. PMH significant for vascular dementia, PM, and COPD. MRI showed acute ischemia in the high R frontal lobe near vertex. Per spouse she has been independently transferring the patient. He has not ambulated since a fall resulting in an L3 fracture in November of 2021. At this time pt requires min A for bed mobility and max A for SPS transfer. It was difficult to assess LE strength d/t confusion and dementia, though pt appears to present with BLE drop foot and 2/5 LE strength. Spouse reports concerns with the patient's previous level of acute care and that home  health therapy did not follow up following his fall. Pt would benefit from transitioning to a long term care facility, though spouse would like to continue with care at home. If patient returns home he would benefit from HHPT to address transfer deficits.  -     Row Name 04/12/23 1637          Therapy Assessment/Plan (PT)    Criteria for Skilled Interventions Met (PT) yes;meets criteria  -     Therapy Frequency (PT) 5 times/wk  -     Predicted Duration of Therapy Intervention (PT) Until d/c  -     Row Name 04/12/23 1637          Vital Signs    O2 Delivery Pre Treatment supplemental O2  3L  -     O2 Delivery Intra Treatment supplemental O2  -ANKIT     O2 Delivery Post Treatment supplemental O2  -     Row Name 04/12/23 1637          Positioning and Restraints    Pre-Treatment Position in bed  -     Post Treatment Position chair  -ANKIT     In Chair notified nsg;reclined;call light within reach;encouraged to call for assist;exit alarm on;with family/caregiver  -ANKIT           User Key  (r) = Recorded By, (t) = Taken By, (c) = Cosigned By    Initials Name Provider Type    Alka Loving, SOTERO Physical Therapist               Outcome Measures     Row Name 04/12/23 1639          Modified [place] Scale    Pre-Stroke Modified [place] Scale 6 - Unable to determine (UTD) from the medical record documentation  -     Modified [place] Scale 5 - Severe disability.  Bedridden, incontinent, and requiring constant nursing care and attention.  -     Row Name 04/12/23 1639          Functional Assessment    Outcome Measure Options Modified [place]  -           User Key  (r) = Recorded By, (t) = Taken By, (c) = Cosigned By    Initials Name Provider Type    Alka Loving PT Physical Therapist                             Physical Therapy Education     Title: PT OT SLP Therapies (In Progress)     Topic: Physical Therapy (In Progress)     Point: Mobility training (In Progress)     Learning Progress Summary           Patient  Acceptance, E,TB, NR by  at 4/12/2023 1640                   Point: Home exercise program (Not Started)     Learner Progress:  Not documented in this visit.          Point: Body mechanics (In Progress)     Learning Progress Summary           Patient Acceptance, E,TB, NR by  at 4/12/2023 1640                   Point: Precautions (In Progress)     Learning Progress Summary           Patient Acceptance, E,TB, NR by  at 4/12/2023 1640                               User Key     Initials Effective Dates Name Provider Type Discipline    ANKIT 08/23/21 -  Alka Gomez, PT Physical Therapist PT              PT Recommendation and Plan  Planned Therapy Interventions (PT): balance training, bed mobility training, home exercise program, neuromuscular re-education, patient/family education, strengthening, transfer training, postural re-education  Plan of Care Reviewed With: patient  Outcome Evaluation: Pt is an 83 y/o male who presents to [facility] from home with spouse with spouse reporting AMS, increased confusion and decreased responsiveness over the past three days. PMH significant for vascular dementia, PM, and COPD. MRI showed acute ischemia in the high R frontal lobe near vertex. Per spouse she has been independently transferring the patient. He has not ambulated since a fall resulting in an L3 fracture in November of 2021. At this time pt requires min A for bed mobility and max A for SPS transfer. It was difficult to assess LE strength d/t confusion and dementia, though pt appears to present with BLE drop foot and 2/5 LE strength. Spouse reports concerns with the patient's previous level of acute care and that home health therapy did not follow up following his fall. Pt would benefit from transitioning to a long term care facility, though spouse would like to continue with care at home. If patient returns home he would benefit from HHPT to address transfer deficits.     Time Calculation:    PT Charges     Row Name  04/12/23 1640             Time Calculation    Start Time 1335  -ANKIT      Stop Time 1415  -ANKIT      Time Calculation (min) 40 min  -ANKIT      PT Received On 04/12/23  -ANKIT      PT - Next Appointment 04/13/23  -ANKIT      PT Goal Re-Cert Due Date 04/26/23  -ANKIT            User Key  (r) = Recorded By, (t) = Taken By, (c) = Cosigned By    Initials Name Provider Type    Alka Loving, PT Physical Therapist              Therapy Charges for Today     Code Description Service Date Service Provider Modifiers Qty    [id number] HC PT EVAL MOD COMPLEXITY 4 4/12/2023 Alka Gomez, PT GP 1          PT G-Codes  Outcome Measure Options: Modified [place]  Modified [place] Scale: 5 - Severe disability.  Bedridden, incontinent, and requiring constant nursing care and attention.  PT Discharge Summary  Anticipated Discharge Disposition (PT): home with home health, home with 24/7 care    Alka Gomez, PT  4/12/2023

## 2023-04-12 NOTE — THERAPY EVALUATION
Patient Name: Jayden Bond  : 1940    MRN: [id number]                              Today's Date: 2023       Admit Date: 4/10/2023    Visit Dx:     ICD-10-CM ICD-9-CM   1. Altered mental status, unspecified altered mental status type  R41.82 780.97   2. Weakness  R53.1 780.79     Patient Active Problem List   Diagnosis   • Chest pain   • Essential hypertension   • Stroke   • Disease of thyroid gland   • Coronary artery disease involving native coronary artery of native heart without angina pectoris   • Dysautonomia   • Dementia (HCC)   • Anticoagulant long-term use   • Cardiac pacemaker in situ   • COPD (chronic obstructive pulmonary disease)   • Degenerative cervical spinal stenosis   • Dysautonomia orthostatic hypotension syndrome   • Epiphora due to insufficient drainage of both sides   • History of CVA (cerebrovascular accident)   • History of PTCA   • Punctal stenosis, acquired   • Nonsustained ventricular tachycardia   • Pseudophakia, both eyes   • SSS (sick sinus syndrome) (HCC)   • Palpitations   • 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   • Weakness   • Vascular dementia with behavior disturbance (HCC)   • Nocturnal hypoxemia   • Delirium due to multiple etiologies, persistent, hypoactive   • Cognitive safety issue   • Caregiver stress   • Dementia (HCC)   • Disease of thyroid gland   • Dysautonomia (HCC)   • Mixed hyperlipidemia   • Transient ischemic attack (TIA)   • Altered mental status   • CAP (community acquired pneumonia)   • Abnormal finding on urinalysis   • Poor short-term memory   • Pneumonia   • Altered mental status, unspecified altered mental status type   • History of falling     Past Medical History:   Diagnosis Date   • CAD (coronary artery disease)    • Dementia    • Depression    • Disease of thyroid gland    • Dysautonomia    • Dyslipidemia    • GERD (gastroesophageal reflux disease)    • Head pain    • Hyperlipidemia    • Hypertension    • SSS (sick sinus syndrome)    •  Stroke     x 3 in 2018     Past Surgical History:   Procedure Laterality Date   • APPENDECTOMY     • CHOLECYSTECTOMY     • CYSTOSCOPY     • ENDOSCOPY N/A 10/17/2019    Procedure: ESOPHAGOGASTRODUODENOSCOPY with biopsy x 2 areas;  Surgeon: Ashok Sanchez MD;  Location: [facility] ENDOSCOPY;  Service: Gastroenterology   • HERNIA REPAIR     • INSERT / REPLACE / REMOVE PACEMAKER     • LEFT HEART CATH     • PACEMAKER IMPLANTATION      [employer]   • TEMPORAL ARTERY BIOPSY     • WRIST SURGERY      severed artery      General Information     Row Name 04/12/23 1642          OT Time and Intention    Document Type evaluation  -MP     Mode of Treatment occupational therapy  -MP     Row Name 04/12/23 1642          General Information    Patient Profile Reviewed yes  -MP     Prior Level of Function max assist:;ADL's  -MP     Existing Precautions/Restrictions fall  -MP     Row Name 04/12/23 1642          Living Environment    People in Home spouse  -MP     Row Name 04/12/23 1642          Cognition    Orientation Status (Cognition) oriented to;person;place  -MP     Row Name 04/12/23 1642          Safety Issues, Functional Mobility    Safety Issues Affecting Function (Mobility) ability to follow commands;insight into deficits/self-awareness  -MP     Impairments Affecting Function (Mobility) balance;endurance/activity tolerance;grasp;cognition;strength;motor planning;shortness of breath;pain  -MP           User Key  (r) = Recorded By, (t) = Taken By, (c) = Cosigned By    Initials Name Provider Type    MP Yong Moon OT Occupational Therapist                 Mobility/ADL's     Row Name 04/12/23 1643          Bed Mobility    Bed Mobility bed mobility (all) activities  -MP     All Activities, [place] (Bed Mobility) minimum assist (75% patient effort);verbal cues  -MP     Assistive Device (Bed Mobility) bed rails;head of bed elevated  -MP     Row Name 04/12/23 1643          Bed-Chair Transfer    Bed-Chair [place]  (Transfers) moderate assist (50% patient effort);maximum assist (25% patient effort);verbal cues  -MP     Row Name 04/12/23 1643          Activities of Daily Living    BADL Assessment/Intervention lower body dressing  -MP     [facility] Name 04/12/23 1643          Lower Body Dressing Assessment/Training    [place] Level (Lower Body Dressing) doff;don;socks;maximum assist (25% patient effort)  -MP           User Key  (r) = Recorded By, (t) = Taken By, (c) = Cosigned By    Initials Name Provider Type    Yong Metcalf OT Occupational Therapist               Obj/Interventions     Row Name 04/12/23 1643          Range of Motion Comprehensive    Comment, General Range of Motion B shoulder AROM impacted 25-50%  -[facility] Name 04/12/23 1643          Strength Comprehensive (MMT)    Comment, General Manual Muscle Testing (MMT) Assessment BUE 4-/5  -MP     [facility] Name 04/12/23 1643          Balance    Balance Interventions sitting;standing;sit to stand;supported;static;dynamic  -MP           User Key  (r) = Recorded By, (t) = Taken By, (c) = Cosigned By    Initials Name Provider Type    Yong Metcalf OT Occupational Therapist               Goals/Plan     [facility] Name 04/12/23 1647          Bed Mobility Goal 1 (OT)    Activity/Assistive Device (Bed Mobility Goal 1, OT) bed mobility activities, all  -MP     [place] Level/Cues Needed (Bed Mobility Goal 1, OT) contact guard required  -MP     Time Frame (Bed Mobility Goal 1, OT) long term goal (LTG);2 weeks  -MP     [facility] Name 04/12/23 1647          Transfer Goal 1 (OT)    Activity/Assistive Device (Transfer Goal 1, OT) sit-to-stand/stand-to-sit;toilet  -MP     [place] Level/Cues Needed (Transfer Goal 1, OT) moderate assist (50-74% patient effort)  -MP     Time Frame (Transfer Goal 1, OT) long term goal (LTG);2 weeks  -MP     [facility] Name 04/12/23 1647          Dressing Goal 1 (OT)    Activity/Device (Dressing Goal 1, OT) lower body dressing  -MP      [place]/Cues Needed (Dressing Goal 1, OT) moderate assist (50-74% patient effort)  -MP     Time Frame (Dressing Goal 1, OT) long term goal (LTG);2 weeks  -MP           User Key  (r) = Recorded By, (t) = Taken By, (c) = Cosigned By    Initials Name Provider Type    MP Yong Moon, VAUGHN Occupational Therapist               Clinical Impression     Row Name 04/12/23 4608          Pain Scale: FACES Pre/Post-Treatment    Pain: FACES Scale, Pretreatment 0-->no hurt  -MP     Posttreatment Pain Rating 0-->no hurt  -MP     Row Name 04/12/23 0984          Plan of Care Review    Plan of Care Reviewed With patient  -MP     Progress no change  -MP     Outcome Evaluation Pt is an 81 y/o male who presents to [facility] from home with spouse with spouse reporting AMS, increased confusion and decreased responsiveness over the past three days. PMH significant for vascular dementia, PM, and COPD. MRI showed acute ischemia in the high R frontal lobe near vertex. Per spouse she has been independently transferring the patient, provides max A for all ADLs and limited to bed baths secondary to inability to transfer in and out of shower. Pt. provided min A for bed mobility this date and mod-max A for SPS transfer EOB to armchair. Max A provided for donning socks this date. Spouse reports confidence in ability to continue patient care, recommending HH therapy to assess home safety and improve ADL transfers/participation. Will follow up w/ pt. 1-3x per week at [facility].  -MP     Row Name 04/12/23 5316          Therapy Assessment/Plan (OT)    Rehab Potential (OT) good, to achieve stated therapy goals  -MP     Criteria for Skilled Therapeutic Interventions Met (OT) yes;meets criteria;skilled treatment is necessary  -MP     Therapy Frequency (OT) 3 times/wk  -MP     Row Name 04/12/23 2782          Therapy Plan Review/Discharge Plan (OT)    Anticipated Discharge Disposition (OT) home with 24/7 care;home with home health  -MP     Row Name 04/12/23  1644          Vital Signs    Pre Patient Position Supine  -MP     Intra Patient Position Standing  -MP     Post Patient Position Sitting  -MP     Row Name 04/12/23 1644          Positioning and Restraints    Pre-Treatment Position in bed  -MP     Post Treatment Position chair  -MP     In Chair sitting;call light within reach;encouraged to call for assist;exit alarm on  -MP           User Key  (r) = Recorded By, (t) = Taken By, (c) = Cosigned By    Initials Name Provider Type    Yong Metcalf OT Occupational Therapist               Outcome Measures     Row Name 04/12/23 1639          Modified Posey Scale    Pre-Stroke Modified Posey Scale 6 - Unable to determine (UTD) from the medical record documentation  -     Modified Posey Scale 5 - Severe disability.  Bedridden, incontinent, and requiring constant nursing care and attention.  -     Row Name 04/12/23 1639          Functional Assessment    Outcome Measure Options Modified Jayjay  -           User Key  (r) = Recorded By, (t) = Taken By, (c) = Cosigned By    Initials Name Provider Type    Alka Loving, PT Physical Therapist                Occupational Therapy Education     Title: PT OT SLP Therapies (In Progress)     Topic: Occupational Therapy (In Progress)     Point: ADL training (Not Started)     Description:   Instruct learner(s) on proper safety adaptation and remediation techniques during self care or transfers.   Instruct in proper use of assistive devices.              Learner Progress:  Not documented in this visit.          Point: Home exercise program (Not Started)     Description:   Instruct learner(s) on appropriate technique for monitoring, assisting and/or progressing therapeutic exercises/activities.              Learner Progress:  Not documented in this visit.          Point: Precautions (Not Started)     Description:   Instruct learner(s) on prescribed precautions during self-care and functional transfers.               Learner Progress:  Not documented in this visit.          Point: Body mechanics (Done)     Description:   Instruct learner(s) on proper positioning and spine alignment during self-care, functional mobility activities and/or exercises.              Learning Progress Summary           Patient Acceptance, E,TB, VU,NR by  at 4/12/2023 1865                               User Key     Initials Effective Dates Name Provider Type Discipline     06/16/21 -  Yong Moon OT Occupational Therapist OT              OT Recommendation and Plan  Therapy Frequency (OT): 3 times/wk  Plan of Care Review  Plan of Care Reviewed With: patient  Progress: no change  Outcome Evaluation: Pt is an 83 y/o male who presents to [facility] from home with spouse with spouse reporting AMS, increased confusion and decreased responsiveness over the past three days. PMH significant for vascular dementia, PM, and COPD. MRI showed acute ischemia in the high R frontal lobe near vertex. Per spouse she has been independently transferring the patient, provides max A for all ADLs and limited to bed baths secondary to inability to transfer in and out of shower. Pt. provided min A for bed mobility this date and mod-max A for SPS transfer EOB to armchair. Max A provided for donning socks this date. Spouse reports confidence in ability to continue patient care, recommending HH therapy to assess home safety and improve ADL transfers/participation. Will follow up w/ pt. 1-3x per week at [facility].     Time Calculation:    Time Calculation- OT     Row Name 04/12/23 1526             Time Calculation- OT    OT Start Time 1335  -      OT Stop Time 1414  -      OT Time Calculation (min) 39 min  -      Total Timed Code Minutes- OT 0 minute(s)  -      OT Received On 04/12/23  -      OT - Next Appointment 04/14/23  -      OT Goal Re-Cert Due Date 04/26/23  -            User Key  (r) = Recorded By, (t) = Taken By, (c) = Cosigned By    Initials Name Provider Type     MP Yong Moon OT Occupational Therapist              Therapy Charges for Today     Code Description Service Date Service Provider Modifiers Qty    [id number] HC OT EVAL LOW COMPLEXITY 4 4/12/2023 Yong Moon OT GO 1               Yong Moon OT  4/12/2023

## 2023-04-12 NOTE — NURSING NOTE
Second nurse skin assessment completed with the primary nurse at the time of admission.  This writer agrees with the findings of the primary nurse.

## 2023-04-12 NOTE — PLAN OF CARE
Problem: Pain Acute  Goal: Acceptable Pain Control and Functional Ability  Outcome: Ongoing, Progressing  Intervention: Prevent or Manage Pain  Recent Flowsheet Documentation  Taken 4/12/2023 0352 by Gracie Ozuna RN  Medication Review/Management: medications reviewed  Taken 4/12/2023 0000 by Gracie Ozuna RN  Sleep/Rest Enhancement:   awakenings minimized   relaxation techniques promoted  Medication Review/Management: medications reviewed  Taken 4/11/2023 2000 by Gracie Ozuna RN  Sensory Stimulation Regulation:   care clustered   lighting decreased  Sleep/Rest Enhancement:   relaxation techniques promoted   regular sleep/rest pattern promoted   noise level reduced   family presence promoted   awakenings minimized   consistent schedule promoted  Intervention: Optimize Psychosocial Wellbeing  Recent Flowsheet Documentation  Taken 4/12/2023 0352 by Gracie Ozuna RN  Supportive Measures: self-care encouraged  Taken 4/12/2023 0000 by Gracie Ozuna RN  Supportive Measures:   verbalization of feelings encouraged   active listening utilized   positive reinforcement provided   self-care encouraged  Taken 4/11/2023 2000 by Gracie Ozuna RN  Supportive Measures:   verbalization of feelings encouraged   self-responsibility promoted   self-care encouraged   positive reinforcement provided   active listening utilized  Diversional Activities: television     Problem: Fall Injury Risk  Goal: Absence of Fall and Fall-Related Injury  Outcome: Ongoing, Progressing  Intervention: Identify and Manage Contributors  Recent Flowsheet Documentation  Taken 4/12/2023 0352 by Gracie Ozuna RN  Medication Review/Management: medications reviewed  Taken 4/12/2023 0000 by Gracie Ozuna RN  Medication Review/Management: medications reviewed  Intervention: Promote Injury-Free Environment  Recent Flowsheet Documentation  Taken 4/12/2023 0352 by Gracie Ozuna RN  Safety Promotion/Fall Prevention:   safety round/check completed   nonskid  shoes/slippers when out of bed   fall prevention program maintained   clutter free environment maintained   assistive device/personal items within reach   activity supervised  Taken 4/12/2023 0200 by Gracie Ozuna RN  Safety Promotion/Fall Prevention:   safety round/check completed   nonskid shoes/slippers when out of bed   fall prevention program maintained   clutter free environment maintained   assistive device/personal items within reach   activity supervised  Taken 4/12/2023 0000 by Gracie Ozuna RN  Safety Promotion/Fall Prevention:   safety round/check completed   nonskid shoes/slippers when out of bed   fall prevention program maintained   clutter free environment maintained   activity supervised   assistive device/personal items within reach  Taken 4/11/2023 2132 by Gracie Ozuna RN  Safety Promotion/Fall Prevention:   safety round/check completed   nonskid shoes/slippers when out of bed   fall prevention program maintained   clutter free environment maintained   assistive device/personal items within reach   activity supervised   lighting adjusted  Taken 4/11/2023 2000 by Gracie Ozuna RN  Safety Promotion/Fall Prevention:   safety round/check completed   room organization consistent   nonskid shoes/slippers when out of bed   lighting adjusted   fall prevention program maintained   elopement precautions   clutter free environment maintained   assistive device/personal items within reach   activity supervised     Problem: Skin Injury Risk Increased  Goal: Skin Health and Integrity  Outcome: Ongoing, Progressing  Intervention: Optimize Skin Protection  Recent Flowsheet Documentation  Taken 4/12/2023 0352 by Gracie Ozuna RN  Pressure Reduction Techniques:   frequent weight shift encouraged   weight shift assistance provided  Pressure Reduction Devices: pressure-redistributing mattress utilized  Skin Protection:   adhesive use limited   transparent dressing maintained   tubing/devices free from skin  contact  Taken 4/11/2023 2000 by Gracie Ozuna RN  Pressure Reduction Techniques:   weight shift assistance provided   frequent weight shift encouraged  Pressure Reduction Devices: pressure-redistributing mattress utilized  Skin Protection:   adhesive use limited   incontinence pads utilized   transparent dressing maintained   tubing/devices free from skin contact     Problem: Adjustment to Illness (Stroke, Ischemic/Transient Ischemic Attack)  Goal: Optimal Coping  Outcome: Ongoing, Progressing  Intervention: Support Psychosocial Response to Stroke  Recent Flowsheet Documentation  Taken 4/12/2023 0352 by Gracie Ozuna RN  Supportive Measures: self-care encouraged  Taken 4/12/2023 0000 by Gracie Ozuna RN  Supportive Measures:   verbalization of feelings encouraged   active listening utilized   positive reinforcement provided   self-care encouraged  Taken 4/11/2023 2000 by Gracie Ozuna RN  Supportive Measures:   verbalization of feelings encouraged   self-responsibility promoted   self-care encouraged   positive reinforcement provided   active listening utilized  Family/Support System Care:   self-care encouraged   support provided   caregiver stress acknowledged     Problem: Bowel Elimination Impaired (Stroke, Ischemic/Transient Ischemic Attack)  Goal: Effective Bowel Elimination  Outcome: Ongoing, Progressing     Problem: Cerebral Tissue Perfusion (Stroke, Ischemic/Transient Ischemic Attack)  Goal: Optimal Cerebral Tissue Perfusion  Outcome: Ongoing, Progressing  Intervention: Protect and Optimize Cerebral Perfusion  Recent Flowsheet Documentation  Taken 4/11/2023 2000 by Gracie Ozuna RN  Fluid/Electrolyte Management:   fluids provided   intravenous fluid replacement initiated  Sensory Stimulation Regulation:   care clustered   lighting decreased     Problem: Cognitive Impairment (Stroke, Ischemic/Transient Ischemic Attack)  Goal: Optimal Cognitive Function  Outcome: Ongoing, Progressing  Intervention: Optimize  Cognitive Function  Recent Flowsheet Documentation  Taken 4/11/2023 2000 by Gracie Ozuna RN  Sensory Stimulation Regulation:   care clustered   lighting decreased  Reorientation Measures: reorientation provided  Environment Familiarity/Consistency: familiar objects from home provided     Problem: Communication Impairment (Stroke, Ischemic/Transient Ischemic Attack)  Goal: Improved Communication Skills  Outcome: Ongoing, Progressing  Intervention: Optimize Communication Skills  Recent Flowsheet Documentation  Taken 4/11/2023 2000 by Gracie Ozuna RN  Communication Enhancement Strategies:   repetition utilized   one-step directions provided   family/caregiver assisted with communication   family involved in communication plan   extra time allowed for response   call light answered in person   verbal and visual cues paired     Problem: Functional Ability Impaired (Stroke, Ischemic/Transient Ischemic Attack)  Goal: Optimal Functional Ability  Outcome: Ongoing, Progressing  Intervention: Optimize Functional Ability  Recent Flowsheet Documentation  Taken 4/11/2023 2000 by Gracie Ozuna RN  Activity Management: bedrest     Problem: Respiratory Compromise (Stroke, Ischemic/Transient Ischemic Attack)  Goal: Effective Oxygenation and Ventilation  Outcome: Ongoing, Progressing     Problem: Sensorimotor Impairment (Stroke, Ischemic/Transient Ischemic Attack)  Goal: Improved Sensorimotor Function  Outcome: Ongoing, Progressing  Intervention: Optimize Range of Motion, Motor Control and Function  Recent Flowsheet Documentation  Taken 4/12/2023 0352 by Gracie Ozuna RN  Positioning/Transfer Devices: pillows  Taken 4/12/2023 0000 by Gracie Ozuna RN  Positioning/Transfer Devices: pillows  Taken 4/11/2023 2000 by Gracie Ozuna RN  Positioning/Transfer Devices:   pillows   in use  Intervention: Optimize Sensory and Perceptual Ability  Recent Flowsheet Documentation  Taken 4/12/2023 0352 by Gracie Ozuna RN  Pressure Reduction  Techniques:   frequent weight shift encouraged   weight shift assistance provided  Pressure Reduction Devices: pressure-redistributing mattress utilized  Taken 4/11/2023 2000 by Gracie Ozuna RN  Pressure Reduction Techniques:   weight shift assistance provided   frequent weight shift encouraged  Pressure Reduction Devices: pressure-redistributing mattress utilized     Problem: Swallowing Impairment (Stroke, Ischemic/Transient Ischemic Attack)  Goal: Optimal Eating and Swallowing without Aspiration  Outcome: Ongoing, Progressing  Intervention: Optimize Eating and Swallowing  Recent Flowsheet Documentation  Taken 4/11/2023 2000 by Gracie Ozuna RN  Feeding/Eating Techniques:   close supervision provided   rest periods provided     Problem: Urinary Elimination Impaired (Stroke, Ischemic/Transient Ischemic Attack)  Goal: Effective Urinary Elimination  Outcome: Ongoing, Progressing   Goal Outcome Evaluation:

## 2023-04-12 NOTE — THERAPY TREATMENT NOTE
Acute Care - Speech Language Pathology   Swallow Treatment Note TYLER Obregon     Patient Name: Jayden Bond  : 1940  MRN: [id number]  Today's Date: 2023               Admit Date: 4/10/2023    Visit Dx:     ICD-10-CM ICD-9-CM   1. Altered mental status, unspecified altered mental status type  R41.82 780.97   2. Weakness  R53.1 780.79     Patient Active Problem List   Diagnosis   • Chest pain   • Essential hypertension   • Stroke   • Disease of thyroid gland   • Coronary artery disease involving native coronary artery of native heart without angina pectoris   • Dysautonomia   • Dementia (HCC)   • Anticoagulant long-term use   • Cardiac pacemaker in situ   • COPD (chronic obstructive pulmonary disease)   • Degenerative cervical spinal stenosis   • Dysautonomia orthostatic hypotension syndrome   • Epiphora due to insufficient drainage of both sides   • History of CVA (cerebrovascular accident)   • History of PTCA   • Punctal stenosis, acquired   • Nonsustained ventricular tachycardia   • Pseudophakia, both eyes   • SSS (sick sinus syndrome) ([facility])   • Palpitations   • 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   • Weakness   • Vascular dementia with behavior disturbance (HCC)   • Nocturnal hypoxemia   • Delirium due to multiple etiologies, persistent, hypoactive   • Cognitive safety issue   • Caregiver stress   • Dementia (HCC)   • Disease of thyroid gland   • Dysautonomia (HCC)   • Mixed hyperlipidemia   • Transient ischemic attack (TIA)   • Altered mental status   • CAP (community acquired pneumonia)   • Abnormal finding on urinalysis   • Poor short-term memory   • Pneumonia   • Altered mental status, unspecified altered mental status type   • History of falling     Past Medical History:   Diagnosis Date   • CAD (coronary artery disease)    • Dementia    • Depression    • Disease of thyroid gland    • Dysautonomia    • Dyslipidemia    • GERD (gastroesophageal reflux disease)    • Head pain    •  Hyperlipidemia    • Hypertension    • SSS (sick sinus syndrome)    • Stroke     x 3 in 2018     Past Surgical History:   Procedure Laterality Date   • APPENDECTOMY     • CHOLECYSTECTOMY     • CYSTOSCOPY     • ENDOSCOPY N/A 10/17/2019    Procedure: ESOPHAGOGASTRODUODENOSCOPY with biopsy x 2 areas;  Surgeon: Ashok Sacnhez MD;  Location: [facility] ENDOSCOPY;  Service: Gastroenterology   • HERNIA REPAIR     • INSERT / REPLACE / REMOVE PACEMAKER     • LEFT HEART CATH     • PACEMAKER IMPLANTATION      Medtronic   • TEMPORAL ARTERY BIOPSY     • WRIST SURGERY      severed artery         EDUCATION  The patient has been educated in the following areas:   Oral Care/Hydration.     Pt seen at bedside for meal assessment (bfast tray: omelet, muffin, and tea via sippy cup) of swallow to determine tolerance of current diet. Pt alert and upright in bed and agreeable to session. Wife was present and provided full meal assist across trials. Pt demonstrated prolonged but functional mastication across 8/8  trials of omelet. Semi-adequate labial seal, pull from spoon, timely oral transit noted across trials. Pt cleared oral cavity and did not demonstrate any overt s/s of aspiration across trials. Pt w/ functional labial seal, timely oral transit, and no overt s/s of aspiration across trials of thin tea via sippy cup. Rec continuation of current diet w/ continued 1:1 meal assist for all PO.     SLP GOALS     Row Name 04/12/23 1100          (LTG) Swallow Pt will maximize swallow function for least restrictive PO diet, exhibiting no complication associated with dysphagia, adequate PO intake, and demonstrating independent use of swallow compensations  -    Time Frame (Swallow Long Term Goal) by discharge  -KL          (STG) Swallow 1 Pt will have full meal assessment with further pt/caregiver teaching.         Goal met. See above note.     -KL    Time Frame (Swallow Short Term Goal 1) 1 week  -KL          (STG) Swallow 2 Pt will participate  in ongoing swallow assessment to include VFSS if indicated, and caregiver teaching.  -DONNA    Time Frame (Swallow Short Term Goal 2) 1 week  -DONNA          User Key  (r) = Recorded By, (t) = Taken By, (c) = Cosigned By    Initials Name Provider Type    Luanne Hart Speech and Language Pathologist                Time Calculation:       Therapy Charges for Today     Code Description Service Date Service Provider Modifiers Qty    [id number]  ST TREATMENT SWALLOW 4 4/12/2023 Luanne Rodriguez GN 1          Luanne Rodriguez  4/12/2023

## 2023-04-12 NOTE — PLAN OF CARE
Goal Outcome Evaluation:  Plan of Care Reviewed With: patient        Progress: no change  Outcome Evaluation: Pt is an 81 y/o male who presents to [facility] from home with spouse with spouse reporting AMS, increased confusion and decreased responsiveness over the past three days. PMH significant for vascular dementia, PM, and COPD. MRI showed acute ischemia in the high R frontal lobe near vertex. Per spouse she has been independently transferring the patient, provides max A for all ADLs and limited to bed baths secondary to inability to transfer in and out of shower. Pt. provided min A for bed mobility this date and mod-max A for SPS transfer EOB to armchair. Max A provided for donning socks this date. Spouse reports confidence in ability to continue patient care, recommending HH therapy to assess home safety and improve ADL transfers/participation. Will follow up w/ pt. 1-3x per week at [facility].

## 2023-04-12 NOTE — PROGRESS NOTES
LOS: 0 days     Chief Complaint: worsening left side weakness        SUBJECTIVE:    History taken from: patient chart RN    Interval History: No change, pt tired but otherwise no complaints       Review of Systems   Unable to perform ROS: Dementia          Pertinent PMH:  has a past medical history of CAD (coronary artery disease), Dementia, Depression, Disease of thyroid gland, Dysautonomia, Dyslipidemia, GERD (gastroesophageal reflux disease), Head pain, Hyperlipidemia, Hypertension, SSS (sick sinus syndrome), and Stroke.   ________________________________________________     OBJECTIVE:    On exam:  GENERAL: NAD  CARDIO: RRR  NEURO:  Awake, sitting up in the chair, very flat response   Oriented to name and hospital  Generalized weakness, L>R  Limited exam due to participation    ________________________________________________   RESULTS REVIEW    VITAL SIGNS:  Temp:  [97.2 °F (36.2 °C)-98 °F (36.7 °C)] 97.2 °F (36.2 °C)  Heart Rate:  [60-74] 60  Resp:  [15-24] 18  BP: (126-173)/() 146/97    LABS:       Lab 04/11/23  0423 04/10/23  1803   WBC 9.90 11.40*   HEMOGLOBIN 13.6 16.1   HEMATOCRIT 42.6 49.8   PLATELETS 237 266   NEUTROS ABS 7.00 8.90*   LYMPHS ABS 1.80 1.50   MONOS ABS 0.90 0.80   EOS ABS 0.30 0.10   MCV 94.8 93.2         Lab 04/11/23  0423 04/10/23  1803   SODIUM 143 143   POTASSIUM 3.7 4.4   CHLORIDE 109* 106   CO2 29.0 28.0   ANION GAP 5.0 9.0   BUN 16 16   CREATININE 0.86 0.85   EGFR 86.5 86.8   GLUCOSE 117* 87   CALCIUM 9.1 10.2   MAGNESIUM 2.1  --    PHOSPHORUS 2.7  --    HEMOGLOBIN A1C 5.80*  --    TSH 4.140  --          Lab 04/10/23  1803   TOTAL PROTEIN 6.9   ALBUMIN 4.3   GLOBULIN 2.6   ALT (SGPT) 14   AST (SGOT) 20   BILIRUBIN 0.4   ALK PHOS 91         Lab 04/10/23  2254 04/10/23  1803   HSTROP T 15* 20*         Lab 04/11/23  0423   CHOLESTEROL 120   LDL CHOL 57   HDL CHOL 41   TRIGLYCERIDES 121             UA        8/17/2022    21:26 4/10/2023    19:12   Urinalysis   Squamous  Epithelial Cells, UA 0-2   0-2     Specific Gravity, UA 1.025   1.021     Ketones, UA Trace   Negative     Blood, UA Large (3+)   Negative     Leukocytes, UA Moderate (2+)   Trace     Nitrite, UA Negative   Negative     RBC, UA 31-50   0-2     WBC, UA 31-50   6-12     Bacteria, UA None Seen   None Seen         Lab Results   Component Value Date    TSH 4.140 04/11/2023    LDL 57 04/11/2023    HGBA1C 5.80 (H) 04/11/2023    [id number] >2,000 (H) 09/14/2021         IMAGING STUDIES:  CT Head Without Contrast    Result Date: 4/10/2023  Advanced chronic and age-related changes of the brain as above, otherwise without evidence of acute intracranial abnormality.  Electronically Signed: Santiago Fountain  4/10/2023 8:25 PM EDT  Workstation ID: [id number]    MRI Brain Without Contrast    Result Date: 4/11/2023  Impression: 1. Small cortical area of acute ischemia involving high right frontal lobe near vertex. 2. Generalized cerebral volume loss with extensive white matter findings most suggestive of advanced chronic microvascular disease. 3. 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 4. Bilateral mastoid effusions. I called findings to nurse Cueto in ER at 4:10 p.m. on 4/11/2023. Electronically Signed: Иван Hall  4/11/2023 4:11 PM EDT  Workstation ID: [id number]    XR Chest 1 View    Result Date: 4/10/2023  Impression: Advanced emphysema with multifocal areas of parenchymal scarring similar to the prior study. Electronically Signed: Иван Hall  4/10/2023 6:13 PM EDT  Workstation ID: [id number]      I reviewed the patient's new clinical results.    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 Acute right frontal lobe cortical stroke, embolic.   - CT head: Advanced chronic and age-related changes of the brain as above, otherwise without evidence of acute intracranial abnormality.  - CTA head is not likely to change POC. Pt would not be an intervention candidate and he will already be on maximum medical therapy.   - MRI brain reviewed, small critical area of stroke right frontal lobe near vertex .   - Carotid duplex: pending read   - Echo: pending read   - EKG: Sinus rhythm, Inferior infarct, old  - Labs: A1C: 5.8, B12: P, LDL: 57, TSH: 4.14  - Antithrombotics: Eliquis resumed   - Statin: Crestor 20mg qhs    2. Progressive cognitive decline. Pt with known vascular dementia, but has declined significantly over the past week. Differentials including encephalopathy second to recent infection and medications (restarted urinary medication which has previously caused him confusion), possible stroke, seizures, or progression of degenerative neurologic condition   - workup ongoing   - Monitor for seizure activity, no witnessed seizures     Plan:   - Check Echo and Carotid duplex- follow up prior to d/c   - Resume Eliquis if there are no contraindications, unsure why it was stopped  - D/c Plavix, Ok to start ASA 81 mg (pt may not be able to tolerate due to nose bleeds)   - Pt can be discharged once testing is complete          DEANDRA Qureshi  04/12/23  10:47 EDT

## 2023-04-13 NOTE — OUTREACH NOTE
Prep Survey    Flowsheet Row Responses   [facility] facility patient discharged from? [facility]   Is LACE score < 7 ? No   Eligibility Readm Mgmt   Discharge diagnosis  Acute CVA   Does the patient have one of the following disease processes/diagnoses(primary or secondary)? Stroke   Does the patient have Home health ordered? Yes   What is the Home health agency?  [facility]    Is there a DME ordered? No   Prep survey completed? Yes          Tomeka GEE - Registered Nurse

## 2023-04-13 NOTE — CASE MANAGEMENT/SOCIAL WORK
Case Management Discharge Note      Final Note: Home with [facility]    Provided Post Acute Provider List?: N/A  N/A Provider List Comment: Wife requested referral to [facility]    Selected Continued Care - Discharged on 4/12/2023 Admission date: 4/10/2023 - Discharge disposition: Home or Self Care        Home Medical Care Coordination complete.    Service Provider Selected Services Address Phone Fax Patient Preferred    [facility] [address] [facility], [place] [address] [phone] --               Transportation Services  Private: Car    Final Discharge Disposition Code: 06 - home with home health care

## 2023-04-17 LAB — QT INTERVAL: 445 MS

## 2023-05-03 ENCOUNTER — READMISSION MANAGEMENT (OUTPATIENT)
Dept: CALL CENTER | Facility: HOSPITAL | Age: 83
End: 2023-05-03
Payer: MEDICARE

## 2023-05-03 NOTE — OUTREACH NOTE
Stroke Week 3 Survey    Flowsheet Row Responses   [facility] facility patient discharged from? [facility]   Does the patient have one of the following disease processes/diagnoses(primary or secondary)? Stroke   Week 3 attempt successful? Yes   Call start time 1614   Revoke Decline to participate   Call end time 1614   Person spoke with today (if not patient) and relationship wife          Tamar NIKOLAS - Registered Nurse

## 2023-05-11 ENCOUNTER — APPOINTMENT (OUTPATIENT)
Dept: GENERAL RADIOLOGY | Facility: HOSPITAL | Age: 83
End: 2023-05-11
Payer: MEDICARE

## 2023-05-11 ENCOUNTER — HOSPITAL ENCOUNTER (EMERGENCY)
Facility: HOSPITAL | Age: 83
Discharge: HOME | End: 2023-05-11
Attending: EMERGENCY MEDICINE | Admitting: EMERGENCY MEDICINE
Payer: MEDICARE

## 2023-05-11 ENCOUNTER — APPOINTMENT (OUTPATIENT)
Dept: CT IMAGING | Facility: HOSPITAL | Age: 83
End: 2023-05-11
Payer: MEDICARE

## 2023-05-11 VITALS
SYSTOLIC BLOOD PRESSURE: 163 MMHG | TEMPERATURE: 97.5 F | OXYGEN SATURATION: 96 % | RESPIRATION RATE: 22 BRPM | HEIGHT: 70 IN | WEIGHT: 140 LBS | HEART RATE: 61 BPM | DIASTOLIC BLOOD PRESSURE: 106 MMHG | BODY MASS INDEX: 20.04 KG/M2

## 2023-05-11 DIAGNOSIS — J18.9 PNEUMONIA OF LEFT LOWER LOBE DUE TO INFECTIOUS ORGANISM: Primary | ICD-10-CM

## 2023-05-11 LAB
ALBUMIN SERPL-MCNC: 3.7 G/DL (ref 3.5–5.2)
ALBUMIN/GLOB SERPL: 1.5 G/DL
ALP SERPL-CCNC: 81 U/L (ref 39–117)
ALT SERPL W P-5'-P-CCNC: 14 U/L (ref 1–41)
ANION GAP SERPL CALCULATED.3IONS-SCNC: 9 MMOL/L (ref 5–15)
AST SERPL-CCNC: 20 U/L (ref 1–40)
BACTERIA UR QL AUTO: ABNORMAL /HPF
BASOPHILS # BLD AUTO: 0.1 10*3/MM3 (ref 0–0.2)
BASOPHILS NFR BLD AUTO: 0.5 % (ref 0–1.5)
BILIRUB SERPL-MCNC: 0.4 MG/DL (ref 0–1.2)
BILIRUB UR QL STRIP: NEGATIVE
BUN SERPL-MCNC: 23 MG/DL (ref 8–23)
BUN/CREAT SERPL: 25.8 (ref 7–25)
CALCIUM SPEC-SCNC: 9.6 MG/DL (ref 8.6–10.5)
CHLORIDE SERPL-SCNC: 107 MMOL/L (ref 98–107)
CLARITY UR: CLEAR
CO2 SERPL-SCNC: 26 MMOL/L (ref 22–29)
COLOR UR: YELLOW
CREAT SERPL-MCNC: 0.89 MG/DL (ref 0.76–1.27)
DEPRECATED RDW RBC AUTO: 44.6 FL (ref 37–54)
EGFRCR SERPLBLD CKD-EPI 2021: 85.6 ML/MIN/1.73
EOSINOPHIL # BLD AUTO: 0.1 10*3/MM3 (ref 0–0.4)
EOSINOPHIL NFR BLD AUTO: 1.2 % (ref 0.3–6.2)
ERYTHROCYTE [DISTWIDTH] IN BLOOD BY AUTOMATED COUNT: 13.4 % (ref 12.3–15.4)
GEN 5 2HR TROPONIN T REFLEX: 16 NG/L
GLOBULIN UR ELPH-MCNC: 2.5 GM/DL
GLUCOSE SERPL-MCNC: 124 MG/DL (ref 65–99)
GLUCOSE UR STRIP-MCNC: NEGATIVE MG/DL
HCT VFR BLD AUTO: 45.8 % (ref 37.5–51)
HGB BLD-MCNC: 14.8 G/DL (ref 13–17.7)
HGB UR QL STRIP.AUTO: NEGATIVE
HYALINE CASTS UR QL AUTO: ABNORMAL /LPF
KETONES UR QL STRIP: NEGATIVE
LEUKOCYTE ESTERASE UR QL STRIP.AUTO: ABNORMAL
LYMPHOCYTES # BLD AUTO: 1.5 10*3/MM3 (ref 0.7–3.1)
LYMPHOCYTES NFR BLD AUTO: 13.8 % (ref 19.6–45.3)
MAGNESIUM SERPL-MCNC: 2.2 MG/DL (ref 1.6–2.4)
MCH RBC QN AUTO: 31.1 PG (ref 26.6–33)
MCHC RBC AUTO-ENTMCNC: 32.4 G/DL (ref 31.5–35.7)
MCV RBC AUTO: 95.9 FL (ref 79–97)
MONOCYTES # BLD AUTO: 0.8 10*3/MM3 (ref 0.1–0.9)
MONOCYTES NFR BLD AUTO: 7.2 % (ref 5–12)
NEUTROPHILS NFR BLD AUTO: 77.3 % (ref 42.7–76)
NEUTROPHILS NFR BLD AUTO: 8.5 10*3/MM3 (ref 1.7–7)
NITRITE UR QL STRIP: NEGATIVE
NRBC BLD AUTO-RTO: 0 /100 WBC (ref 0–0.2)
PH UR STRIP.AUTO: <=5 [PH] (ref 5–8)
PLATELET # BLD AUTO: 244 10*3/MM3 (ref 140–450)
PMV BLD AUTO: 8.2 FL (ref 6–12)
POTASSIUM SERPL-SCNC: 4.3 MMOL/L (ref 3.5–5.2)
PROT SERPL-MCNC: 6.2 G/DL (ref 6–8.5)
PROT UR QL STRIP: ABNORMAL
RBC # BLD AUTO: 4.77 10*6/MM3 (ref 4.14–5.8)
RBC # UR STRIP: ABNORMAL /HPF
REF LAB TEST METHOD: ABNORMAL
SODIUM SERPL-SCNC: 142 MMOL/L (ref 136–145)
SP GR UR STRIP: 1.03 (ref 1–1.03)
SQUAMOUS #/AREA URNS HPF: ABNORMAL /HPF
TROPONIN T DELTA: -3 NG/L
TROPONIN T SERPL HS-MCNC: 19 NG/L
TSH SERPL DL<=0.05 MIU/L-ACNC: 3.06 UIU/ML (ref 0.27–4.2)
UROBILINOGEN UR QL STRIP: ABNORMAL
WBC # UR STRIP: ABNORMAL /HPF
WBC NRBC COR # BLD: 10.9 10*3/MM3 (ref 3.4–10.8)

## 2023-05-11 PROCEDURE — 71045 X-RAY EXAM CHEST 1 VIEW: CPT

## 2023-05-11 PROCEDURE — 84443 ASSAY THYROID STIM HORMONE: CPT | Performed by: NURSE PRACTITIONER

## 2023-05-11 PROCEDURE — 80053 COMPREHEN METABOLIC PANEL: CPT | Performed by: NURSE PRACTITIONER

## 2023-05-11 PROCEDURE — 96365 THER/PROPH/DIAG IV INF INIT: CPT

## 2023-05-11 PROCEDURE — 25010000002 CEFTRIAXONE PER 250 MG: Performed by: EMERGENCY MEDICINE

## 2023-05-11 PROCEDURE — 36415 COLL VENOUS BLD VENIPUNCTURE: CPT

## 2023-05-11 PROCEDURE — 83735 ASSAY OF MAGNESIUM: CPT | Performed by: NURSE PRACTITIONER

## 2023-05-11 PROCEDURE — 93005 ELECTROCARDIOGRAM TRACING: CPT | Performed by: NURSE PRACTITIONER

## 2023-05-11 PROCEDURE — 85025 COMPLETE CBC W/AUTO DIFF WBC: CPT | Performed by: NURSE PRACTITIONER

## 2023-05-11 PROCEDURE — 70450 CT HEAD/BRAIN W/O DYE: CPT

## 2023-05-11 PROCEDURE — 81001 URINALYSIS AUTO W/SCOPE: CPT | Performed by: NURSE PRACTITIONER

## 2023-05-11 PROCEDURE — 99283 EMERGENCY DEPT VISIT LOW MDM: CPT

## 2023-05-11 PROCEDURE — 87086 URINE CULTURE/COLONY COUNT: CPT | Performed by: EMERGENCY MEDICINE

## 2023-05-11 PROCEDURE — 84484 ASSAY OF TROPONIN QUANT: CPT | Performed by: NURSE PRACTITIONER

## 2023-05-11 RX ORDER — SODIUM CHLORIDE 0.9 % (FLUSH) 0.9 %
10 SYRINGE (ML) INJECTION AS NEEDED
Status: DISCONTINUED | OUTPATIENT
Start: 2023-05-11 | End: 2023-05-11 | Stop reason: HOSPADM

## 2023-05-11 RX ORDER — AZITHROMYCIN 200 MG/5ML
POWDER, FOR SUSPENSION ORAL
Qty: 50 ML | Refills: 0 | Status: SHIPPED | OUTPATIENT
Start: 2023-05-11 | End: 2023-05-11 | Stop reason: SDUPTHER

## 2023-05-11 RX ORDER — CEFDINIR 250 MG/5ML
300 POWDER, FOR SUSPENSION ORAL 2 TIMES DAILY
Qty: 84 ML | Refills: 0 | Status: SHIPPED | OUTPATIENT
Start: 2023-05-11 | End: 2023-05-11 | Stop reason: SDUPTHER

## 2023-05-11 RX ORDER — CEFDINIR 250 MG/5ML
300 POWDER, FOR SUSPENSION ORAL 2 TIMES DAILY
Qty: 84 ML | Refills: 0 | Status: SHIPPED | OUTPATIENT
Start: 2023-05-11 | End: 2023-05-18

## 2023-05-11 RX ORDER — AZITHROMYCIN 200 MG/5ML
POWDER, FOR SUSPENSION ORAL
Qty: 50 ML | Refills: 0 | Status: SHIPPED | OUTPATIENT
Start: 2023-05-11

## 2023-05-11 RX ADMIN — CEFTRIAXONE 1 G: 1 INJECTION, POWDER, FOR SOLUTION INTRAMUSCULAR; INTRAVENOUS at 19:55

## 2023-05-11 RX ADMIN — SODIUM CHLORIDE 250 ML: 0.9 INJECTION, SOLUTION INTRAVENOUS at 17:33

## 2023-05-11 NOTE — ED NOTES
Patient wife states patient needs to be changed, offered to change patient and place in hospital brief. Wife states she prefers to use brief from home but will have to get them out of the car. Educated to hit call button if they need assistance

## 2023-05-11 NOTE — ED PROVIDER NOTES
Subjective      Provider in Triage Note  Patient is an 82-year-old white male with a history of hypertension, CAD, dementia, prior stroke.  He is brought in by his wife reports she has been less active and more altered than usual over the last couple days.      History of Present Illness  Agree with above unless otherwise noted.  Family stating me that patient has not been more altered but has just been less active and more fatigued over the last 2 days.  States he has had a mildly productive cough.  Has had a mild headache as well.  Recently had his left ear cleaned out and has been on antibiotic drops for this (ofloxacin) as he had some bleeding from his ear after it happened.  No fevers.    Family asking that he not be admitted as with his dementia he does not do well in the hospital.    Review of Systems  Positive for mild headache, productive cough.  Past Medical History:   Diagnosis Date   • CAD (coronary artery disease)    • Dementia    • Depression    • Disease of thyroid gland    • Dysautonomia    • Dyslipidemia    • GERD (gastroesophageal reflux disease)    • Head pain    • Hyperlipidemia    • Hypertension    • SSS (sick sinus syndrome)    • Stroke     x 3 in 2018       Allergies   Allergen Reactions   • Trazodone Hallucinations   • Doxycycline GI Intolerance   • Ciprofloxacin Other (See Comments)     Breathing problems   • Doxycycline Nausea And Vomiting   • Fosfomycin Tromethamine Confusion     Reports weakness and confusion   • Gemtesa [Vibegron] Other (See Comments)     Reports urinary retention   • Keppra [Levetiracetam] GI Intolerance   • Macrobid [Nitrofurantoin] Other (See Comments)     Unknown reaction   • Quetiapine Other (See Comments)     Having falls on it   • Sulfa Antibiotics Other (See Comments)     Unknown reaction       Past Surgical History:   Procedure Laterality Date   • APPENDECTOMY     • CHOLECYSTECTOMY     • CYSTOSCOPY     • ENDOSCOPY N/A 10/17/2019    Procedure:  "ESOPHAGOGASTRODUODENOSCOPY with biopsy x 2 areas;  Surgeon: Ashok Sanchez MD;  Location: [facility] ENDOSCOPY;  Service: Gastroenterology   • HERNIA REPAIR     • INSERT / REPLACE / REMOVE PACEMAKER     • LEFT HEART CATH     • PACEMAKER IMPLANTATION      Medtronic   • TEMPORAL ARTERY BIOPSY     • WRIST SURGERY      severed artery       Family History   Problem Relation Age of Onset   • Heart disease Father        Social History     Socioeconomic History   • Marital status:    Tobacco Use   • Smoking status: Former   • Smokeless tobacco: Never   • Tobacco comments:     quit 25 yrs ago   Vaping Use   • Vaping Use: Never used   Substance and Sexual Activity   • Alcohol use: No   • Drug use: No   • Sexual activity: Defer           Objective   Physical Exam  Constitutional:  No acute distress.  Head:  Atraumatic.  Normocephalic.   Eyes:  No scleral icterus. Normal conjunctivae  ENT:  Moist mucosa.  No nasal discharge present.  Significant cerumen in the left ear but no drainage and no blood.  Neck: No managements.  Cardiovascular:  Well perfused.  Equal pulses.  Regular rhythm and normal rate.  Normal capillary refill.    Pulmonary/Chest:  No respiratory distress.  Airway patent.  No tachypnea.  No accessory muscle usage.  Mildly diminished breath sounds at bases.  Abdominal:  Nondistended. Nontender.   Extremities:  No peripheral edema.  No Deformity  Skin:  Warm, dry  Neurological:  Alert, awake, and appropriate.  Not oriented.  Normal speech.  Left facial droop and left arm weakness.    Procedures           ED Course      BP (!) 163/106   Pulse 61   Temp 97.5 °F (36.4 °C)   Resp 22   Ht 177.8 cm (70\")   Wt 63.5 kg (140 lb)   SpO2 96%   BMI 20.09 kg/m²   Labs Reviewed   COMPREHENSIVE METABOLIC PANEL - Abnormal; Notable for the following components:       Result Value    Glucose 124 (*)     BUN/Creatinine Ratio 25.8 (*)     All other components within normal limits    Narrative:     GFR Normal >60  Chronic " Kidney Disease <60  Kidney Failure <15    The GFR formula is only valid for adults with stable renal function between ages 18 and 70.   URINALYSIS W/ MICROSCOPIC IF INDICATED (NO CULTURE) - Abnormal; Notable for the following components:    Protein, UA Trace (*)     Leuk Esterase, UA Trace (*)     All other components within normal limits   TROPONIN - Abnormal; Notable for the following components:    HS Troponin T 19 (*)     All other components within normal limits    Narrative:     High Sensitive Troponin T Reference Range:  <10.0 ng/L- Negative Female for AMI  <15.0 ng/L- Negative Male for AMI  >=10 - Abnormal Female indicating possible myocardial injury.  >=15 - Abnormal Male indicating possible myocardial injury.   Clinicians would have to utilize clinical acumen, EKG, Troponin, and serial changes to determine if it is an Acute Myocardial Infarction or myocardial injury due to an underlying chronic condition.        CBC WITH AUTO DIFFERENTIAL - Abnormal; Notable for the following components:    WBC 10.90 (*)     Neutrophil % 77.3 (*)     Lymphocyte % 13.8 (*)     Neutrophils, Absolute 8.50 (*)     All other components within normal limits   HIGH SENSITIVITIY TROPONIN T 2HR - Abnormal; Notable for the following components:    HS Troponin T 16 (*)     All other components within normal limits    Narrative:     High Sensitive Troponin T Reference Range:  <10.0 ng/L- Negative Female for AMI  <15.0 ng/L- Negative Male for AMI  >=10 - Abnormal Female indicating possible myocardial injury.  >=15 - Abnormal Male indicating possible myocardial injury.   Clinicians would have to utilize clinical acumen, EKG, Troponin, and serial changes to determine if it is an Acute Myocardial Infarction or myocardial injury due to an underlying chronic condition.        URINALYSIS, MICROSCOPIC ONLY - Abnormal; Notable for the following components:    RBC, UA 0-2 (*)     WBC, UA 6-12 (*)     All other components within normal limits    MAGNESIUM - Normal   TSH - Normal   URINE CULTURE   CBC AND DIFFERENTIAL    Narrative:     The following orders were created for panel order CBC & Differential.  Procedure                               Abnormality         Status                     ---------                               -----------         ------                     CBC Auto Differential[[id number]]        Abnormal            Final result                 Please view results for these tests on the individual orders.     Medications   sodium chloride 0.9 % flush 10 mL (has no administration in time range)   sodium chloride 0.9 % bolus 250 mL (0 mL Intravenous Stopped 5/11/23 1825)   cefTRIAXone (ROCEPHIN) 1 g in sodium chloride 0.9 % 100 mL IVPB (0 g Intravenous Stopped 5/11/23 2041)     CT Head Without Contrast    Result Date: 5/11/2023  Impression: 1. No intracranial hemorrhage or acute intracranial abnormality. 2. Generalized cerebral volume loss with sequela of remote infarcts and extensive white matter findings most compatible with chronic microvascular disease similar to the prior study. 3. Bilateral mastoid effusions similar to prior study. Electronically Signed: Иван Hall  5/11/2023 3:37 PM EDT  Workstation ID: [id number]    XR Chest 1 View    Result Date: 5/11/2023  Impression: 1. Left lower lobe infiltrate, increased from the previous film. 2. Postop changes of prior transvenous pacemaker placement. Electronically Signed: Desmond Dwyer  5/11/2023 3:29 PM EDT  Workstation ID: [id number]                                         [facility]  EKG # 1  Signed and interpreted by the EP.  Time Interpreted: 2:55 PM  Rate: 64  Rhythm: Normal sinus rhythm  Axis: Borderline left axis deviation  Intervals: Normal intervals  ST Segments: No acute ischemic changes     Comparison: Nonspecific ST changes unchanged from April 10, 2023 EKG including T wave inversion in aVL.    Patient with reassuring vital signs.  Appears to have worsening left lower lobe infiltrate.   Family does not want patient admitted because he does not do well in patient with his dementia.  We will give outpatient antibiotics and treat for community-acquired pneumonia.  Some whites in urine so will send for culture since he has history of recurrent UTIs.  Given 1 dose of antibiotics here since they will not be able to fill prescription until tomorrow.  Return ER precautions discussed.  Family agreeable with plan.    Final diagnoses:   Pneumonia of left lower lobe due to infectious organism       ED Disposition  ED Disposition     ED Disposition   Discharge    Condition   Stable    Comment   --             Ant Hines MD  [address] [place] [address] [phone]    In 3 days           Medication List      New Prescriptions    azithromycin 200 MG/5ML suspension  Commonly known as: ZITHROMAX  Give the patient 636 mg (16 ml) by mouth the first day then 316 mg (8 ml) by mouth daily for 4 days.     cefdinir 250 MG/5ML suspension  Commonly known as: OMNICEF  Take 6 mL by mouth 2 (Two) Times a Day for 7 days.           Where to Get Your Medications      These medications were sent to [facility]/pharmacy #[id number] - [place], [place] - [address] - [phone]  - [phone]  [address] [place] [address]    Phone: [phone]   · azithromycin 200 MG/5ML suspension  · cefdinir 250 MG/5ML suspension          Ye Major MD  05/11/23 7395

## 2023-05-11 NOTE — ED NOTES
"Family member that is with patient was not happy that his social security number is in the chart. She stated \"you need to take that out I asked for it to be deleted, you have his medicare card you shouldn't need his social, take it out.\" I let her know that I just needed second identifier for him, she was still upset about it being in his chart, triage nurse notified.   "

## 2023-05-13 LAB — BACTERIA SPEC AEROBE CULT: NO GROWTH

## 2023-05-15 LAB — QT INTERVAL: 402 MS

## 2023-07-03 PROBLEM — R47.9 DIFFICULTY SPEAKING: Status: ACTIVE | Noted: 2023-07-03

## 2023-07-05 PROBLEM — R62.7 FAILURE TO THRIVE IN ADULT: Status: ACTIVE | Noted: 2023-07-05

## 2023-07-05 PROBLEM — R13.10 DYSPHAGIA: Status: ACTIVE | Noted: 2023-07-05

## 2023-07-05 PROBLEM — J69.0 ASPIRATION PNEUMONIA: Status: ACTIVE | Noted: 2023-07-05

## 2023-07-10 ENCOUNTER — HOSPITAL ENCOUNTER (INPATIENT)
Facility: HOSPITAL | Age: 83
LOS: 1 days | Discharge: HOME HEALTH | DRG: 178 | End: 2023-07-12
Attending: EMERGENCY MEDICINE | Admitting: INTERNAL MEDICINE
Payer: MEDICARE

## 2023-07-10 ENCOUNTER — APPOINTMENT (OUTPATIENT)
Dept: GENERAL RADIOLOGY | Facility: HOSPITAL | Age: 83
DRG: 178 | End: 2023-07-10
Payer: MEDICARE

## 2023-07-10 DIAGNOSIS — J69.0 ASPIRATION PNEUMONIA OF BOTH LUNGS, UNSPECIFIED ASPIRATION PNEUMONIA TYPE, UNSPECIFIED PART OF LUNG: Primary | ICD-10-CM

## 2023-07-10 LAB
ALBUMIN SERPL-MCNC: 3.8 G/DL (ref 3.5–5.2)
ALBUMIN/GLOB SERPL: 1.5 G/DL
ALP SERPL-CCNC: 66 U/L (ref 39–117)
ALT SERPL W P-5'-P-CCNC: 27 U/L (ref 1–41)
ANION GAP SERPL CALCULATED.3IONS-SCNC: 10 MMOL/L (ref 5–15)
AST SERPL-CCNC: 30 U/L (ref 1–40)
BASOPHILS # BLD AUTO: 0.1 10*3/MM3 (ref 0–0.2)
BASOPHILS NFR BLD AUTO: 0.7 % (ref 0–1.5)
BILIRUB SERPL-MCNC: 0.4 MG/DL (ref 0–1.2)
BUN SERPL-MCNC: 21 MG/DL (ref 8–23)
BUN/CREAT SERPL: 24.1 (ref 7–25)
CALCIUM SPEC-SCNC: 9.5 MG/DL (ref 8.6–10.5)
CHLORIDE SERPL-SCNC: 102 MMOL/L (ref 98–107)
CO2 SERPL-SCNC: 29 MMOL/L (ref 22–29)
CREAT SERPL-MCNC: 0.87 MG/DL (ref 0.76–1.27)
D-LACTATE SERPL-SCNC: 1 MMOL/L (ref 0.3–2)
DEPRECATED RDW RBC AUTO: 44.6 FL (ref 37–54)
EGFRCR SERPLBLD CKD-EPI 2021: 86.1 ML/MIN/1.73
EOSINOPHIL # BLD AUTO: 0.1 10*3/MM3 (ref 0–0.4)
EOSINOPHIL NFR BLD AUTO: 1.1 % (ref 0.3–6.2)
ERYTHROCYTE [DISTWIDTH] IN BLOOD BY AUTOMATED COUNT: 13.5 % (ref 12.3–15.4)
GLOBULIN UR ELPH-MCNC: 2.6 GM/DL
GLUCOSE SERPL-MCNC: 93 MG/DL (ref 65–99)
HCT VFR BLD AUTO: 45.5 % (ref 37.5–51)
HGB BLD-MCNC: 14.7 G/DL (ref 13–17.7)
LYMPHOCYTES # BLD AUTO: 1.3 10*3/MM3 (ref 0.7–3.1)
LYMPHOCYTES NFR BLD AUTO: 10.6 % (ref 19.6–45.3)
MCH RBC QN AUTO: 30.8 PG (ref 26.6–33)
MCHC RBC AUTO-ENTMCNC: 32.4 G/DL (ref 31.5–35.7)
MCV RBC AUTO: 95.1 FL (ref 79–97)
MONOCYTES # BLD AUTO: 0.9 10*3/MM3 (ref 0.1–0.9)
MONOCYTES NFR BLD AUTO: 7.4 % (ref 5–12)
NEUTROPHILS NFR BLD AUTO: 10.1 10*3/MM3 (ref 1.7–7)
NEUTROPHILS NFR BLD AUTO: 80.2 % (ref 42.7–76)
NRBC BLD AUTO-RTO: 0 /100 WBC (ref 0–0.2)
PLATELET # BLD AUTO: 247 10*3/MM3 (ref 140–450)
PMV BLD AUTO: 8.1 FL (ref 6–12)
POTASSIUM SERPL-SCNC: 4 MMOL/L (ref 3.5–5.2)
PROCALCITONIN SERPL-MCNC: 0.08 NG/ML (ref 0–0.25)
PROT SERPL-MCNC: 6.4 G/DL (ref 6–8.5)
RBC # BLD AUTO: 4.78 10*6/MM3 (ref 4.14–5.8)
SODIUM SERPL-SCNC: 141 MMOL/L (ref 136–145)
WBC NRBC COR # BLD: 12.6 10*3/MM3 (ref 3.4–10.8)

## 2023-07-10 PROCEDURE — 25010000002 PIPERACILLIN SOD-TAZOBACTAM PER 1 G: Performed by: EMERGENCY MEDICINE

## 2023-07-10 PROCEDURE — 36415 COLL VENOUS BLD VENIPUNCTURE: CPT

## 2023-07-10 PROCEDURE — 71045 X-RAY EXAM CHEST 1 VIEW: CPT

## 2023-07-10 PROCEDURE — 84145 PROCALCITONIN (PCT): CPT | Performed by: EMERGENCY MEDICINE

## 2023-07-10 PROCEDURE — 85025 COMPLETE CBC W/AUTO DIFF WBC: CPT | Performed by: EMERGENCY MEDICINE

## 2023-07-10 PROCEDURE — 99284 EMERGENCY DEPT VISIT MOD MDM: CPT

## 2023-07-10 PROCEDURE — 80053 COMPREHEN METABOLIC PANEL: CPT | Performed by: EMERGENCY MEDICINE

## 2023-07-10 PROCEDURE — 83605 ASSAY OF LACTIC ACID: CPT

## 2023-07-10 PROCEDURE — 87040 BLOOD CULTURE FOR BACTERIA: CPT | Performed by: EMERGENCY MEDICINE

## 2023-07-10 RX ORDER — SODIUM CHLORIDE 0.9 % (FLUSH) 0.9 %
10 SYRINGE (ML) INJECTION AS NEEDED
Status: DISCONTINUED | OUTPATIENT
Start: 2023-07-10 | End: 2023-07-12 | Stop reason: HOSPADM

## 2023-07-10 RX ADMIN — PIPERACILLIN AND TAZOBACTAM 3.38 G: 3; .375 INJECTION, POWDER, LYOPHILIZED, FOR SOLUTION INTRAVENOUS at 22:16

## 2023-07-10 NOTE — Clinical Note
Level of Care: Med/Surg [1]   Admitting Physician: MARISSA BEST [[id number]]   Attending Physician: MARISSA BEST [[id number]]

## 2023-07-11 ENCOUNTER — APPOINTMENT (OUTPATIENT)
Dept: GENERAL RADIOLOGY | Facility: HOSPITAL | Age: 83
DRG: 178 | End: 2023-07-11
Payer: MEDICARE

## 2023-07-11 PROBLEM — J69.0 ASPIRATION PNEUMONIA OF BOTH LUNGS: Status: ACTIVE | Noted: 2023-07-11

## 2023-07-11 LAB
ANION GAP SERPL CALCULATED.3IONS-SCNC: 11 MMOL/L (ref 5–15)
BASOPHILS # BLD AUTO: 0.1 10*3/MM3 (ref 0–0.2)
BASOPHILS NFR BLD AUTO: 0.5 % (ref 0–1.5)
BUN SERPL-MCNC: 19 MG/DL (ref 8–23)
BUN/CREAT SERPL: 20.7 (ref 7–25)
CALCIUM SPEC-SCNC: 9.2 MG/DL (ref 8.6–10.5)
CHLORIDE SERPL-SCNC: 103 MMOL/L (ref 98–107)
CO2 SERPL-SCNC: 27 MMOL/L (ref 22–29)
CREAT SERPL-MCNC: 0.92 MG/DL (ref 0.76–1.27)
DEPRECATED RDW RBC AUTO: 45.9 FL (ref 37–54)
EGFRCR SERPLBLD CKD-EPI 2021: 83.1 ML/MIN/1.73
EOSINOPHIL # BLD AUTO: 0.3 10*3/MM3 (ref 0–0.4)
EOSINOPHIL NFR BLD AUTO: 2.6 % (ref 0.3–6.2)
ERYTHROCYTE [DISTWIDTH] IN BLOOD BY AUTOMATED COUNT: 13.9 % (ref 12.3–15.4)
GLUCOSE SERPL-MCNC: 89 MG/DL (ref 65–99)
HCT VFR BLD AUTO: 42.8 % (ref 37.5–51)
HGB BLD-MCNC: 14 G/DL (ref 13–17.7)
LYMPHOCYTES # BLD AUTO: 1.6 10*3/MM3 (ref 0.7–3.1)
LYMPHOCYTES NFR BLD AUTO: 15 % (ref 19.6–45.3)
MCH RBC QN AUTO: 31 PG (ref 26.6–33)
MCHC RBC AUTO-ENTMCNC: 32.8 G/DL (ref 31.5–35.7)
MCV RBC AUTO: 94.4 FL (ref 79–97)
MONOCYTES # BLD AUTO: 0.9 10*3/MM3 (ref 0.1–0.9)
MONOCYTES NFR BLD AUTO: 8.5 % (ref 5–12)
MRSA DNA SPEC QL NAA+PROBE: NORMAL
NEUTROPHILS NFR BLD AUTO: 73.4 % (ref 42.7–76)
NEUTROPHILS NFR BLD AUTO: 8 10*3/MM3 (ref 1.7–7)
NRBC BLD AUTO-RTO: 0 /100 WBC (ref 0–0.2)
PLATELET # BLD AUTO: 218 10*3/MM3 (ref 140–450)
PMV BLD AUTO: 8.2 FL (ref 6–12)
POTASSIUM SERPL-SCNC: 3.7 MMOL/L (ref 3.5–5.2)
RBC # BLD AUTO: 4.53 10*6/MM3 (ref 4.14–5.8)
SODIUM SERPL-SCNC: 141 MMOL/L (ref 136–145)
WBC NRBC COR # BLD: 11 10*3/MM3 (ref 3.4–10.8)

## 2023-07-11 PROCEDURE — 99497 ADVNCD CARE PLAN 30 MIN: CPT | Performed by: NURSE PRACTITIONER

## 2023-07-11 PROCEDURE — 87641 MR-STAPH DNA AMP PROBE: CPT | Performed by: INTERNAL MEDICINE

## 2023-07-11 PROCEDURE — 92526 ORAL FUNCTION THERAPY: CPT

## 2023-07-11 PROCEDURE — 99222 1ST HOSP IP/OBS MODERATE 55: CPT | Performed by: NURSE PRACTITIONER

## 2023-07-11 PROCEDURE — 85025 COMPLETE CBC W/AUTO DIFF WBC: CPT

## 2023-07-11 PROCEDURE — 74230 X-RAY XM SWLNG FUNCJ C+: CPT

## 2023-07-11 PROCEDURE — 25010000002 PIPERACILLIN SOD-TAZOBACTAM PER 1 G: Performed by: INTERNAL MEDICINE

## 2023-07-11 PROCEDURE — 92610 EVALUATE SWALLOWING FUNCTION: CPT

## 2023-07-11 PROCEDURE — 25010000002 PIPERACILLIN SOD-TAZOBACTAM PER 1 G

## 2023-07-11 PROCEDURE — 92611 MOTION FLUOROSCOPY/SWALLOW: CPT

## 2023-07-11 PROCEDURE — 80048 BASIC METABOLIC PNL TOTAL CA: CPT

## 2023-07-11 PROCEDURE — 63710000001 PREDNISONE PER 5 MG: Performed by: INTERNAL MEDICINE

## 2023-07-11 RX ORDER — LEVOTHYROXINE SODIUM 0.03 MG/1
25 TABLET ORAL
Status: DISCONTINUED | OUTPATIENT
Start: 2023-07-12 | End: 2023-07-12 | Stop reason: HOSPADM

## 2023-07-11 RX ORDER — BISACODYL 10 MG
10 SUPPOSITORY, RECTAL RECTAL DAILY PRN
Status: DISCONTINUED | OUTPATIENT
Start: 2023-07-11 | End: 2023-07-12 | Stop reason: HOSPADM

## 2023-07-11 RX ORDER — FAMOTIDINE 20 MG/1
40 TABLET, FILM COATED ORAL DAILY
Status: DISCONTINUED | OUTPATIENT
Start: 2023-07-11 | End: 2023-07-11

## 2023-07-11 RX ORDER — PREDNISONE 5 MG/1
5 TABLET ORAL DAILY
Status: DISCONTINUED | OUTPATIENT
Start: 2023-07-11 | End: 2023-07-12 | Stop reason: HOSPADM

## 2023-07-11 RX ORDER — ESCITALOPRAM OXALATE 10 MG/1
10 TABLET ORAL DAILY
Status: DISCONTINUED | OUTPATIENT
Start: 2023-07-11 | End: 2023-07-12 | Stop reason: HOSPADM

## 2023-07-11 RX ORDER — BRIMONIDINE TARTRATE 2 MG/ML
1 SOLUTION/ DROPS OPHTHALMIC 2 TIMES DAILY
Status: DISCONTINUED | OUTPATIENT
Start: 2023-07-11 | End: 2023-07-12 | Stop reason: HOSPADM

## 2023-07-11 RX ORDER — CETIRIZINE HYDROCHLORIDE 10 MG/1
10 TABLET ORAL DAILY
Status: DISCONTINUED | OUTPATIENT
Start: 2023-07-11 | End: 2023-07-12 | Stop reason: HOSPADM

## 2023-07-11 RX ORDER — LABETALOL HYDROCHLORIDE 5 MG/ML
10 INJECTION, SOLUTION INTRAVENOUS EVERY 6 HOURS PRN
Status: DISCONTINUED | OUTPATIENT
Start: 2023-07-11 | End: 2023-07-12 | Stop reason: HOSPADM

## 2023-07-11 RX ORDER — ASPIRIN 81 MG/1
81 TABLET ORAL DAILY
Status: DISCONTINUED | OUTPATIENT
Start: 2023-07-11 | End: 2023-07-12 | Stop reason: HOSPADM

## 2023-07-11 RX ORDER — SODIUM CHLORIDE 0.9 % (FLUSH) 0.9 %
10 SYRINGE (ML) INJECTION EVERY 12 HOURS SCHEDULED
Status: DISCONTINUED | OUTPATIENT
Start: 2023-07-11 | End: 2023-07-12 | Stop reason: HOSPADM

## 2023-07-11 RX ORDER — LORATADINE 10 MG/1
10 TABLET ORAL DAILY
COMMUNITY

## 2023-07-11 RX ORDER — GUAIFENESIN 200 MG/10ML
400 LIQUID ORAL EVERY 8 HOURS SCHEDULED
Status: DISCONTINUED | OUTPATIENT
Start: 2023-07-11 | End: 2023-07-12 | Stop reason: HOSPADM

## 2023-07-11 RX ORDER — DOCUSATE SODIUM 100 MG/1
100 CAPSULE, LIQUID FILLED ORAL NIGHTLY
Status: DISCONTINUED | OUTPATIENT
Start: 2023-07-11 | End: 2023-07-12 | Stop reason: HOSPADM

## 2023-07-11 RX ORDER — GALANTAMINE HYDROBROMIDE 4 MG/1
8 TABLET, FILM COATED ORAL 2 TIMES DAILY
Status: DISCONTINUED | OUTPATIENT
Start: 2023-07-11 | End: 2023-07-12 | Stop reason: HOSPADM

## 2023-07-11 RX ORDER — POLYETHYLENE GLYCOL 3350 17 G/17G
17 POWDER, FOR SOLUTION ORAL DAILY PRN
Status: DISCONTINUED | OUTPATIENT
Start: 2023-07-11 | End: 2023-07-12 | Stop reason: HOSPADM

## 2023-07-11 RX ORDER — SODIUM CHLORIDE 0.9 % (FLUSH) 0.9 %
10 SYRINGE (ML) INJECTION AS NEEDED
Status: DISCONTINUED | OUTPATIENT
Start: 2023-07-11 | End: 2023-07-12 | Stop reason: HOSPADM

## 2023-07-11 RX ORDER — NITROGLYCERIN 0.4 MG/1
0.4 TABLET SUBLINGUAL
Status: DISCONTINUED | OUTPATIENT
Start: 2023-07-11 | End: 2023-07-12 | Stop reason: HOSPADM

## 2023-07-11 RX ORDER — ACETAMINOPHEN 325 MG/1
650 TABLET ORAL EVERY 4 HOURS PRN
Status: DISCONTINUED | OUTPATIENT
Start: 2023-07-11 | End: 2023-07-12 | Stop reason: HOSPADM

## 2023-07-11 RX ORDER — ROSUVASTATIN CALCIUM 10 MG/1
20 TABLET, COATED ORAL EVERY EVENING
Status: DISCONTINUED | OUTPATIENT
Start: 2023-07-11 | End: 2023-07-12 | Stop reason: HOSPADM

## 2023-07-11 RX ORDER — FAMOTIDINE 20 MG/1
20 TABLET, FILM COATED ORAL 2 TIMES DAILY
Status: DISCONTINUED | OUTPATIENT
Start: 2023-07-11 | End: 2023-07-12 | Stop reason: HOSPADM

## 2023-07-11 RX ORDER — SODIUM CHLORIDE 9 MG/ML
40 INJECTION, SOLUTION INTRAVENOUS AS NEEDED
Status: DISCONTINUED | OUTPATIENT
Start: 2023-07-11 | End: 2023-07-12 | Stop reason: HOSPADM

## 2023-07-11 RX ORDER — ONDANSETRON 2 MG/ML
4 INJECTION INTRAMUSCULAR; INTRAVENOUS EVERY 6 HOURS PRN
Status: DISCONTINUED | OUTPATIENT
Start: 2023-07-11 | End: 2023-07-12 | Stop reason: HOSPADM

## 2023-07-11 RX ORDER — BISACODYL 5 MG/1
5 TABLET, DELAYED RELEASE ORAL DAILY PRN
Status: DISCONTINUED | OUTPATIENT
Start: 2023-07-11 | End: 2023-07-12 | Stop reason: HOSPADM

## 2023-07-11 RX ORDER — ACETAMINOPHEN 500 MG
1000 TABLET ORAL EVERY 6 HOURS PRN
Status: DISCONTINUED | OUTPATIENT
Start: 2023-07-11 | End: 2023-07-11 | Stop reason: SDUPTHER

## 2023-07-11 RX ORDER — BENZONATATE 100 MG/1
100 CAPSULE ORAL 3 TIMES DAILY PRN
Status: DISCONTINUED | OUTPATIENT
Start: 2023-07-11 | End: 2023-07-12 | Stop reason: HOSPADM

## 2023-07-11 RX ORDER — AMOXICILLIN 250 MG
2 CAPSULE ORAL 2 TIMES DAILY
Status: DISCONTINUED | OUTPATIENT
Start: 2023-07-11 | End: 2023-07-12 | Stop reason: HOSPADM

## 2023-07-11 RX ADMIN — ESCITALOPRAM OXALATE 10 MG: 10 TABLET ORAL at 16:52

## 2023-07-11 RX ADMIN — Medication 10 ML: at 08:26

## 2023-07-11 RX ADMIN — GUAIFENESIN 200 MG: 200 SOLUTION ORAL at 16:52

## 2023-07-11 RX ADMIN — BRIMONIDINE TARTRATE 1 DROP: 2 SOLUTION/ DROPS OPHTHALMIC at 22:40

## 2023-07-11 RX ADMIN — FAMOTIDINE 20 MG: 20 TABLET, FILM COATED ORAL at 22:40

## 2023-07-11 RX ADMIN — ASPIRIN 81 MG: 81 TABLET, COATED ORAL at 16:52

## 2023-07-11 RX ADMIN — PIPERACILLIN AND TAZOBACTAM 3.38 G: 3; .375 INJECTION, POWDER, LYOPHILIZED, FOR SOLUTION INTRAVENOUS at 12:59

## 2023-07-11 RX ADMIN — ROSUVASTATIN 20 MG: 10 TABLET, FILM COATED ORAL at 22:44

## 2023-07-11 RX ADMIN — GALANTAMINE 8 MG: 4 TABLET, FILM COATED ORAL at 22:40

## 2023-07-11 RX ADMIN — Medication 10 MG: at 06:21

## 2023-07-11 RX ADMIN — FAMOTIDINE 40 MG: 20 TABLET, FILM COATED ORAL at 08:26

## 2023-07-11 RX ADMIN — GUAIFENESIN 400 MG: 200 SOLUTION ORAL at 22:41

## 2023-07-11 RX ADMIN — PIPERACILLIN AND TAZOBACTAM 3.38 G: 3; .375 INJECTION, POWDER, LYOPHILIZED, FOR SOLUTION INTRAVENOUS at 04:50

## 2023-07-11 RX ADMIN — CETIRIZINE HYDROCHLORIDE 10 MG: 10 TABLET, FILM COATED ORAL at 16:52

## 2023-07-11 RX ADMIN — Medication 10 ML: at 22:40

## 2023-07-11 RX ADMIN — PREDNISONE 5 MG: 5 TABLET ORAL at 16:52

## 2023-07-11 RX ADMIN — PIPERACILLIN AND TAZOBACTAM 3.38 G: 3; .375 INJECTION, POWDER, LYOPHILIZED, FOR SOLUTION INTRAVENOUS at 20:09

## 2023-07-11 RX ADMIN — APIXABAN 5 MG: 5 TABLET, FILM COATED ORAL at 22:40

## 2023-07-11 RX ADMIN — SENNOSIDES AND DOCUSATE SODIUM 2 TABLET: 50; 8.6 TABLET ORAL at 08:26

## 2023-07-11 NOTE — CONSULTS
Urology Consult Note    Patient:Jayden Bond :1940  Room:[facility]  Admit Date7/10/2023  Age:82 y.o.     SEX:male     DOS:2023     MR:[id number]     Visit:[id number]       Attending: Leandra Carrero MD  Referring Provider: Dr Carrero  Reason for Consultation: freq    Patient Care Team:  Tamia Meneses NP as PCP - General (Family Medicine)  Deam, Ashutosh Santana MD as Consulting Physician (Cardiac Electrophysiology)    Chief complaint freq    Subjective .     History of present illness:  pt is 82 yowm poor historian who has had one year of urinary freq despite gemtesa which put him in urinary retention.    Review of Systems  12 point review of systems were reviewed and are negative except for what is in HPI.    History  Past Medical History:   Diagnosis Date    CAD (coronary artery disease)     Dementia     Depression     Disease of thyroid gland     Dysautonomia     Dyslipidemia     GERD (gastroesophageal reflux disease)     Head pain     Hyperlipidemia     Hypertension     SSS (sick sinus syndrome)     Stroke     x 3 in 2018     Past Surgical History:   Procedure Laterality Date    APPENDECTOMY      CHOLECYSTECTOMY      CYSTOSCOPY      ENDOSCOPY N/A 10/17/2019    Procedure: ESOPHAGOGASTRODUODENOSCOPY with biopsy x 2 areas;  Surgeon: Ashok Sanchez MD;  Location: [facility] ENDOSCOPY;  Service: Gastroenterology    HERNIA REPAIR      INSERT / REPLACE / REMOVE PACEMAKER      LEFT HEART CATH      PACEMAKER IMPLANTATION      Medtronic    TEMPORAL ARTERY BIOPSY      WRIST SURGERY      severed artery     Social History     Socioeconomic History    Marital status:    Tobacco Use    Smoking status: Former    Smokeless tobacco: Never    Tobacco comments:     quit 25 yrs ago   Vaping Use    Vaping Use: Never used   Substance and Sexual Activity    Alcohol use: No    Drug use: No    Sexual activity: Defer     Family History   Problem Relation Age of Onset    Heart disease Father      Allergies   Allergen Reactions     Trazodone Hallucinations    Doxycycline GI Intolerance    Ciprofloxacin Other (See Comments)     Breathing problems    Doxycycline Nausea And Vomiting    Fosfomycin Tromethamine Confusion     Reports weakness and confusion    Gemtesa [Vibegron] Other (See Comments)     Reports urinary retention    Hydralazine Hives    Keppra [Levetiracetam] GI Intolerance    Macrobid [Nitrofurantoin] Other (See Comments)     Unknown reaction    Quetiapine Other (See Comments)     Having falls on it    Sulfa Antibiotics Other (See Comments)     Unknown reaction     Prior to Admission medications    Medication Sig Start Date End Date Taking? Authorizing Provider   acetaminophen (TYLENOL) 500 MG tablet Take 2 tablets by mouth Every 6 (Six) Hours As Needed for Mild Pain.   Yes Juan Bateman MD   amoxicillin-clavulanate (Augmentin ES-600) 600-42.9 MG/5ML suspension Take 7 mL by mouth 2 (Two) Times a Day for 7 days Discard remainder. 7/5/23 7/12/23 Yes Leandra Carrero MD   apixaban (ELIQUIS) 5 MG tablet tablet Take 1 tablet by mouth 2 (Two) Times a Day.   Yes Juan Bateman MD   aspirin 81 MG EC tablet Take 1 tablet by mouth Daily. 4/13/23  Yes Rios Macedo MD   brimonidine (ALPHAGAN) 0.2 % ophthalmic solution Administer 1 drop to both eyes 2 (Two) Times a Day. 2/4/20  Yes Romelia Cornejo DO   docusate sodium (COLACE) 100 MG capsule Take 1 capsule by mouth Every Night.   Yes Juan Bateman MD   escitalopram (LEXAPRO) 10 MG tablet Take 1 tablet by mouth Daily.   Yes Juan Bateman MD   famotidine (Pepcid) 20 MG tablet Take 1 tablet by mouth 2 (Two) Times a Day. 7/5/23  Yes Leandra Carrero MD   galantamine (RAZADYNE) 8 MG tablet Take 1 tablet by mouth 2 (Two) Times a Day. 10/27/21  Yes Juan Bateman MD   guaifenesin (ROBITUSSIN) 100 MG/5ML liquid Take 20 mL by mouth Every 8 (Eight) Hours. 7/5/23  Yes Leandra Carrero MD   Incruse Ellipta 62.5 MCG/INH aerosol powder  Take 1 puff by mouth Daily. 3/5/21  Yes  ProviderJuan MD   levothyroxine (SYNTHROID, LEVOTHROID) 25 MCG tablet Take 1 tablet by mouth Every Morning.   Yes Juan Bateman MD   lidocaine (LIDODERM) 5 % Place 1 patch on the skin as directed by provider Daily. Remove & Discard patch within 12 hours or as directed by MD 23  Yes Leandra Carrero MD   loratadine (Claritin) 10 MG tablet Take 1 tablet by mouth Daily.   Yes Juan Bateman MD   midodrine (PROAMATINE) 2.5 MG tablet Take 1 tablet by mouth As Needed. SBP <105   Yes Juan Bateman MD   Multiple Vitamins-Minerals (MULTIVITAMIN WITH MINERALS) tablet tablet Take 1 tablet by mouth Every Night.   Yes Juan Bateman MD   predniSONE (DELTASONE) 5 MG tablet Take 1 tablet by mouth Daily.   Yes Juan Bateman MD   rosuvastatin (CRESTOR) 20 MG tablet Take 1 tablet by mouth Every Evening.   Yes Juan Bateman MD   nitroglycerin (NITROSTAT) 0.4 MG SL tablet Place 1 tablet under the tongue Every 5 (Five) Minutes As Needed for Chest Pain. Take no more than 3 doses in 15 minutes.    Juan Bateman MD         Objective     tMax 24 hours:  Temp (24hrs), Av.4 °F (36.9 °C), Min:98.1 °F (36.7 °C), Max:98.7 °F (37.1 °C)    Vital Sign Ranges:  Temp:  [98.1 °F (36.7 °C)-98.7 °F (37.1 °C)] 98.1 °F (36.7 °C)  Heart Rate:  [60-70] 66  Resp:  [14-18] 14  BP: (107-179)/() 117/77  Intake and Output Last 3 Shifts:  I/O last 3 completed shifts:  In: 100 [IV Piggyback:100]  Out: -       Physical Exam:     General Appearance:    Alert, cooperative, in no acute distress   Head:    Normocephalic, without obvious abnormality, atraumatic   Eyes:            Lids and lashes normal, conjunctivae and sclerae normal, no   icterus, no pallor, corneas clear, PERRLA   Ears:    Ears appear intact with no abnormalities noted   Throat:   No oral lesions, no thrush, oral mucosa moist   Neck:   No adenopathy, supple, trachea midline, no thyromegaly, no   carotid bruit, no JVD   Back:      No kyphosis present, no scoliosis present, no skin lesions,      erythema or scars, no tenderness to percussion or                   palpation,   range of motion normal   Lungs:     Clear to auscultation,respirations regular, even and                  unlabored    Heart:    Regular rhythm and normal rate, normal S1 and S2, no            murmur, no gallop, no rub, no click   Chest Wall:    No abnormalities observed   Abdomen:     Normal bowel sounds, no masses, no organomegaly, soft        non-tender, non-distended, no guarding, no rebound                tenderness   Rectal:     Deferred   Extremities:   Moves all extremities well, no edema, no cyanosis, no             redness   Pulses:   Pulses palpable and equal bilaterally   Skin:   No bleeding, bruising or rash   Lymph nodes:   No palpable adenopathy   Neurologic:   Cranial nerves 2 - 12 grossly intact, sensation intact, DTR       present and equal bilaterally       Results Review:     Lab Results (last 24 hours)       Procedure Component Value Units Date/Time    MRSA Screen, PCR (Inpatient) - Swab, Nares [[id number]]  (Normal) Collected: 07/11/23 1033    Specimen: Swab from Nares Updated: 07/11/23 1304     MRSA PCR No MRSA Detected    Narrative:      The negative predictive value of this diagnostic test is high and should only be used to consider de-escalating anti-MRSA therapy. A positive result may indicate colonization with MRSA and must be correlated clinically.    Basic Metabolic Panel [[id number]]  (Normal) Collected: 07/11/23 0225    Specimen: Blood Updated: 07/11/23 0324     Glucose 89 mg/dL      BUN 19 mg/dL      Creatinine 0.92 mg/dL      Sodium 141 mmol/L      Potassium 3.7 mmol/L      Comment: Slight hemolysis detected by analyzer. Results may be affected.        Chloride 103 mmol/L      CO2 27.0 mmol/L      Calcium 9.2 mg/dL      BUN/Creatinine Ratio 20.7     Anion Gap 11.0 mmol/L      eGFR 83.1 mL/min/1.73     Narrative:      GFR Normal >60  Chronic  Kidney Disease <60  Kidney Failure <15    The GFR formula is only valid for adults with stable renal function between ages 18 and 70.    CBC & Differential [[id number]]  (Abnormal) Collected: 07/11/23 0225    Specimen: Blood Updated: 07/11/23 0252    Narrative:      The following orders were created for panel order CBC & Differential.  Procedure                               Abnormality         Status                     ---------                               -----------         ------                     CBC Auto Differential[[id number]]        Abnormal            Final result                 Please view results for these tests on the individual orders.    CBC Auto Differential [[id number]]  (Abnormal) Collected: 07/11/23 0225    Specimen: Blood Updated: 07/11/23 0252     WBC 11.00 10*3/mm3      RBC 4.53 10*6/mm3      Hemoglobin 14.0 g/dL      Hematocrit 42.8 %      MCV 94.4 fL      MCH 31.0 pg      MCHC 32.8 g/dL      RDW 13.9 %      RDW-SD 45.9 fl      MPV 8.2 fL      Platelets 218 10*3/mm3      Neutrophil % 73.4 %      Lymphocyte % 15.0 %      Monocyte % 8.5 %      Eosinophil % 2.6 %      Basophil % 0.5 %      Neutrophils, Absolute 8.00 10*3/mm3      Lymphocytes, Absolute 1.60 10*3/mm3      Monocytes, Absolute 0.90 10*3/mm3      Eosinophils, Absolute 0.30 10*3/mm3      Basophils, Absolute 0.10 10*3/mm3      nRBC 0.0 /100 WBC     Comprehensive Metabolic Panel [[id number]] Collected: 07/10/23 2206    Specimen: Blood from Arm, Right Updated: 07/10/23 2240     Glucose 93 mg/dL      BUN 21 mg/dL      Creatinine 0.87 mg/dL      Sodium 141 mmol/L      Potassium 4.0 mmol/L      Chloride 102 mmol/L      CO2 29.0 mmol/L      Calcium 9.5 mg/dL      Total Protein 6.4 g/dL      Albumin 3.8 g/dL      ALT (SGPT) 27 U/L      AST (SGOT) 30 U/L      Alkaline Phosphatase 66 U/L      Total Bilirubin 0.4 mg/dL      Globulin 2.6 gm/dL      A/G Ratio 1.5 g/dL      BUN/Creatinine Ratio 24.1     Anion Gap 10.0 mmol/L      eGFR 86.1  "mL/min/1.73     Narrative:      GFR Normal >60  Chronic Kidney Disease <60  Kidney Failure <15    The GFR formula is only valid for adults with stable renal function between ages 18 and 70.    Blood Culture - Blood, Arm, Left [[id number]] Collected: 07/10/23 2151    Specimen: Blood from Arm, Left Updated: 07/10/23 2154    Procalcitonin [[id number]]  (Normal) Collected: 07/10/23 2105    Specimen: Blood from Arm, Right Updated: 07/10/23 2151     Procalcitonin 0.08 ng/mL     Narrative:      As a Marker for Sepsis (Non-Neonates):    1. <0.5 ng/mL represents a low risk of severe sepsis and/or septic shock.  2. >2 ng/mL represents a high risk of severe sepsis and/or septic shock.    As a Marker for Lower Respiratory Tract Infections that require antibiotic therapy:    PCT on Admission    Antibiotic Therapy       6-12 Hrs later    >0.5                Strongly Recommended  >0.25 - <0.5        Recommended   0.1 - 0.25          Discouraged              Remeasure/reassess PCT  <0.1                Strongly Discouraged     Remeasure/reassess PCT    As 28 day mortality risk marker: \"Change in Procalcitonin Result\" (>80% or <=80%) if Day 0 (or Day 1) and Day 4 values are available. Refer to http://www.[employer]-pct-calculator.com    Change in PCT <=80%  A decrease of PCT levels below or equal to 80% defines a positive change in PCT test result representing a higher risk for 28-day all-cause mortality of patients diagnosed with severe sepsis for septic shock.    Change in PCT >80%  A decrease of PCT levels of more than 80% defines a negative change in PCT result representing a lower risk for 28-day all-cause mortality of patients diagnosed with severe sepsis or septic shock.       CBC & Differential [[id number]]  (Abnormal) Collected: 07/10/23 2105    Specimen: Blood from Arm, Right Updated: 07/10/23 2116    Narrative:      The following orders were created for panel order CBC & Differential.  Procedure                               " Abnormality         Status                     ---------                               -----------         ------                     CBC Auto Differential[[id number]]        Abnormal            Final result                 Please view results for these tests on the individual orders.    CBC Auto Differential [[id number]]  (Abnormal) Collected: 07/10/23 2105    Specimen: Blood from Arm, Right Updated: 07/10/23 2116     WBC 12.60 10*3/mm3      RBC 4.78 10*6/mm3      Hemoglobin 14.7 g/dL      Hematocrit 45.5 %      MCV 95.1 fL      MCH 30.8 pg      MCHC 32.4 g/dL      RDW 13.5 %      RDW-SD 44.6 fl      MPV 8.1 fL      Platelets 247 10*3/mm3      Neutrophil % 80.2 %      Lymphocyte % 10.6 %      Monocyte % 7.4 %      Eosinophil % 1.1 %      Basophil % 0.7 %      Neutrophils, Absolute 10.10 10*3/mm3      Lymphocytes, Absolute 1.30 10*3/mm3      Monocytes, Absolute 0.90 10*3/mm3      Eosinophils, Absolute 0.10 10*3/mm3      Basophils, Absolute 0.10 10*3/mm3      nRBC 0.0 /100 WBC     Blood Culture - Blood, Arm, Right [[id number]] Collected: 07/10/23 2105    Specimen: Blood from Arm, Right Updated: 07/10/23 2112    POC Lactate [[id number]]  (Normal) Collected: 07/10/23 2036    Specimen: Blood Updated: 07/10/23 2037     Lactate 1.0 mmol/L      Comment: Serial Number: [id number]:  [id number]              No results found for: URINECX     Imaging Results (Last 7 Days)       Procedure Component Value Units Date/Time    FL Video Swallow With Speech Single Contrast [[id number]] Resulted: 07/11/23 1138     Updated: 07/11/23 1138    Narrative:      This procedure was auto-finalized with no dictation required.    XR Chest 1 View [[id number]] Collected: 07/10/23 2048     Updated: 07/10/23 2052    Narrative:        XR CHEST 1 VW    Date of Exam: 7/10/2023 8:36 PM EDT    Indication: pna    Comparison: July 5, 2023, July 3, 2023, May 11, 2023.    FINDINGS:  Pacemaker is present. Heart size appears unchanged. No pneumothorax or  significant pleural effusion. There are left basilar opacities, similar to the prior exam. There are subtle opacities in the right midlung which appear to be new. No other definite   new infiltrate.      Impression:      1.Left basilar opacities, as before, suspicious for pneumonia.  2.New right midlung opacities which may represent developing multifocal pneumonia.      Electronically Signed: Prasanna Tere    7/10/2023 8:50 PM EDT    Workstation ID: [id number]            Inpatient Meds:   Scheduled Meds:apixaban, 5 mg, Oral, Q12H  aspirin, 81 mg, Oral, Daily  brimonidine, 1 drop, Both Eyes, BID  cetirizine, 10 mg, Oral, Daily  docusate sodium, 100 mg, Oral, Nightly  escitalopram, 10 mg, Oral, Daily  famotidine, 20 mg, Oral, BID  galantamine, 8 mg, Oral, BID  guaifenesin, 400 mg, Oral, Q8H  [START ON 7/12/2023] levothyroxine, 25 mcg, Oral, Q AM  piperacillin-tazobactam, 3.375 g, Intravenous, Q8H  predniSONE, 5 mg, Oral, Daily  rosuvastatin, 20 mg, Oral, Q PM  senna-docusate sodium, 2 tablet, Oral, BID  sodium chloride, 10 mL, Intravenous, Q12H       Continuous Infusions:Pharmacy to Dose Zosyn,        PRN Meds:.  acetaminophen    benzonatate    senna-docusate sodium **AND** polyethylene glycol **AND** bisacodyl **AND** bisacodyl    labetalol    nitroglycerin    ondansetron    Pharmacy to Dose Zosyn    [COMPLETED] Insert Peripheral IV **AND** sodium chloride    sodium chloride    sodium chloride      Assessment & Plan     Urinary freq for one year  Pvr is normal.   Not much to add while in hospital. Pt is going to see DR Nick as planned and possibly start Myrbetriq 25 mg samples from the office    I discussed the patient's findings and my recommendations with patient.    Noam Chaudhry MD  07/11/23  18:17 EDT

## 2023-07-11 NOTE — ED PROVIDER NOTES
Subjective   History of Present Illness  82-year-old male with history of sick sinus syndrome, dementia, CAD with recent admission for suspected aspiration pneumonia presents for decline at home.  Caretaker states he has not been doing well.  Increasing lethargy.  Poor p.o. intake.  Patient only complaint is that he feels sore all over.      Review of Systems  Positive for cough, body aches.  Past Medical History:   Diagnosis Date    CAD (coronary artery disease)     Dementia     Depression     Disease of thyroid gland     Dysautonomia     Dyslipidemia     GERD (gastroesophageal reflux disease)     Head pain     Hyperlipidemia     Hypertension     SSS (sick sinus syndrome)     Stroke     x 3 in 2018       Allergies   Allergen Reactions    Trazodone Hallucinations    Doxycycline GI Intolerance    Ciprofloxacin Other (See Comments)     Breathing problems    Doxycycline Nausea And Vomiting    Fosfomycin Tromethamine Confusion     Reports weakness and confusion    Gemtesa [Vibegron] Other (See Comments)     Reports urinary retention    Hydralazine Hives    Keppra [Levetiracetam] GI Intolerance    Macrobid [Nitrofurantoin] Other (See Comments)     Unknown reaction    Quetiapine Other (See Comments)     Having falls on it    Sulfa Antibiotics Other (See Comments)     Unknown reaction       Past Surgical History:   Procedure Laterality Date    APPENDECTOMY      CHOLECYSTECTOMY      CYSTOSCOPY      ENDOSCOPY N/A 10/17/2019    Procedure: ESOPHAGOGASTRODUODENOSCOPY with biopsy x 2 areas;  Surgeon: Ashok Sanchez MD;  Location: [facility] ENDOSCOPY;  Service: Gastroenterology    HERNIA REPAIR      INSERT / REPLACE / REMOVE PACEMAKER      LEFT HEART CATH      PACEMAKER IMPLANTATION      Medtronic    TEMPORAL ARTERY BIOPSY      WRIST SURGERY      severed artery       Family History   Problem Relation Age of Onset    Heart disease Father        Social History     Socioeconomic History    Marital status:    Tobacco Use     "Smoking status: Former    Smokeless tobacco: Never    Tobacco comments:     quit 25 yrs ago   Vaping Use    Vaping Use: Never used   Substance and Sexual Activity    Alcohol use: No    Drug use: No    Sexual activity: Defer           Objective   Physical Exam  Constitutional: Chronically ill-appearing  Head:  Atraumatic.  Normocephalic.   Eyes:  No scleral icterus. Normal conjunctivae  ENT:  Moist mucosa.  No nasal discharge present.  Cardiovascular:  Well perfused.  Equal pulses.  Regular rhythm and normal rate.  Normal capillary refill.    Pulmonary/Chest: Diminished breath sounds bilaterally.  Nasal cannula in place.  Abdominal:  Nondistended. Nontender.   Extremities:    No Deformity  Skin:  Warm, dry  Neurological:  Alert, awake.  Will answer basic questions.    Procedures           ED Course      /94   Pulse 64   Temp 98.7 °F (37.1 °C)   Resp 18   Ht 177.8 cm (70\")   Wt 61.4 kg (135 lb 5.8 oz)   SpO2 98%   BMI 19.42 kg/m²   Labs Reviewed   CBC WITH AUTO DIFFERENTIAL - Abnormal; Notable for the following components:       Result Value    WBC 12.60 (*)     Neutrophil % 80.2 (*)     Lymphocyte % 10.6 (*)     Neutrophils, Absolute 10.10 (*)     All other components within normal limits   PROCALCITONIN - Normal    Narrative:     As a Marker for Sepsis (Non-Neonates):    1. <0.5 ng/mL represents a low risk of severe sepsis and/or septic shock.  2. >2 ng/mL represents a high risk of severe sepsis and/or septic shock.    As a Marker for Lower Respiratory Tract Infections that require antibiotic therapy:    PCT on Admission    Antibiotic Therapy       6-12 Hrs later    >0.5                Strongly Recommended  >0.25 - <0.5        Recommended   0.1 - 0.25          Discouraged              Remeasure/reassess PCT  <0.1                Strongly Discouraged     Remeasure/reassess PCT    As 28 day mortality risk marker: \"Change in Procalcitonin Result\" (>80% or <=80%) if Day 0 (or Day 1) and Day 4 values are " available. Refer to http://www.[facility]-pct-calculator.com    Change in PCT <=80%  A decrease of PCT levels below or equal to 80% defines a positive change in PCT test result representing a higher risk for 28-day all-cause mortality of patients diagnosed with severe sepsis for septic shock.    Change in PCT >80%  A decrease of PCT levels of more than 80% defines a negative change in PCT result representing a lower risk for 28-day all-cause mortality of patients diagnosed with severe sepsis or septic shock.      POC LACTATE - Normal   BLOOD CULTURE   BLOOD CULTURE   COMPREHENSIVE METABOLIC PANEL    Narrative:     GFR Normal >60  Chronic Kidney Disease <60  Kidney Failure <15    The GFR formula is only valid for adults with stable renal function between ages 18 and 70.   POC LACTATE   CBC AND DIFFERENTIAL    Narrative:     The following orders were created for panel order CBC & Differential.  Procedure                               Abnormality         Status                     ---------                               -----------         ------                     CBC Auto Differential[[id number]]        Abnormal            Final result                 Please view results for these tests on the individual orders.     Medications   sodium chloride 0.9 % flush 10 mL (has no administration in time range)   piperacillin-tazobactam (ZOSYN) IVPB 3.375 g in 100 mL NS (CD) (3.375 g Intravenous New Bag 7/10/23 2216)     XR Chest 1 View    Result Date: 7/10/2023  1.Left basilar opacities, as before, suspicious for pneumonia. 2.New right midlung opacities which may represent developing multifocal pneumonia. Electronically Signed: Prasanna Carranza  7/10/2023 8:50 PM EDT  Workstation ID: [id number]                                        Medical Decision Making  Problems Addressed:  Aspiration pneumonia of both lungs, unspecified aspiration pneumonia type, unspecified part of lung: complicated acute illness or injury    Amount and/or  Complexity of Data Reviewed  Labs: ordered.  Radiology: ordered.    Risk  Prescription drug management.  Decision regarding hospitalization.    My interpretation of chest x-ray concerning for right middle lobe infiltrate.  This appears new from most recent x-ray.    We will treat for aspiration pneumonia.  Given dose of IV antibiotics here.  Lactate reassuring.  Excepted by Dr. Best.    Final diagnoses:   Aspiration pneumonia of both lungs, unspecified aspiration pneumonia type, unspecified part of lung       ED Disposition  ED Disposition       ED Disposition   Decision to Admit    Condition   --    Comment   Level of Care: Med/Surg [1]   Admitting Physician: MARISSA BEST [[id number]]   Attending Physician: MARISSA BEST [[id number]]                 No follow-up provider specified.       Medication List      No changes were made to your prescriptions during this visit.            Ye Major MD  07/10/23 7695

## 2023-07-11 NOTE — ED NOTES
Nursing report ED to floor  Jayden Bond  82 y.o.  male    HPI:   Chief Complaint   Patient presents with    Weakness - Generalized       Admitting doctor:   Leandra Carrero MD    Admitting diagnosis:   The encounter diagnosis was Aspiration pneumonia of both lungs, unspecified aspiration pneumonia type, unspecified part of lung.    Code status:   Current Code Status       Date Active Code Status Order ID Comments User Context       Prior            Allergies:   Trazodone, Doxycycline, Ciprofloxacin, Doxycycline, Fosfomycin tromethamine, Gemtesa [vibegron], Hydralazine, Keppra [levetiracetam], Macrobid [nitrofurantoin], Quetiapine, and Sulfa antibiotics    Isolation:  No active isolations     Fall Risk:  Fall Risk Assessment was completed, and patient is at high risk for falls.   Predictive Model Details         33 (Low) Factor Value    Calculated 7/10/2023 22:07 Age 82    Risk of Fall Model John Coma Scale 14     Musculoskeletal Assessment WDL     Active Peripheral IV Present     Imaging order in this encounter Present     Respiratory Rate 18     Magnesium not on file     Financial Class Medicare     Thaddeus Scale 17     Drug Use No     Tobacco Use Quit     Calcium not on file     Clinically Relevant Sex Not Female     Gastrointestinal Assessment WDL     Albumin not on file     Diastolic BP 89     Cardiac Assessment WDL     Skin Assessment X     Peripheral Vascular Assessment X     Total Bilirubin not on file     Days after Admission 0.106     Chloride not on file     Potassium not on file     Creatinine not on file     ALT not on file         Weight:       07/10/23  1929   Weight: 61.4 kg (135 lb 5.8 oz)       Intake and Output    Intake/Output Summary (Last 24 hours) at 7/11/2023 0033  Last data filed at 7/11/2023 0024  Gross per 24 hour   Intake 100 ml   Output --   Net 100 ml       Diet:        Most recent vitals:   Vitals:    07/10/23 2201 07/10/23 2216 07/10/23 2231 07/11/23 0016   BP: 148/95 (!) 159/101 157/94  "142/90   Pulse: 61 66 64 68   Resp:       Temp:       SpO2: 97% 98% 98% 98%   Weight:       Height:           Active LDAs/IV Access:   Lines, Drains & Airways       Active LDAs       Name Placement date Placement time Site Days    Peripheral IV 07/10/23 2109 Right Hand 07/10/23  2109  Hand  less than 1                    Skin Condition:   Skin Assessments (last day)       None             Labs (abnormal labs have a star):   Labs Reviewed   CBC WITH AUTO DIFFERENTIAL - Abnormal; Notable for the following components:       Result Value    WBC 12.60 (*)     Neutrophil % 80.2 (*)     Lymphocyte % 10.6 (*)     Neutrophils, Absolute 10.10 (*)     All other components within normal limits   PROCALCITONIN - Normal    Narrative:     As a Marker for Sepsis (Non-Neonates):    1. <0.5 ng/mL represents a low risk of severe sepsis and/or septic shock.  2. >2 ng/mL represents a high risk of severe sepsis and/or septic shock.    As a Marker for Lower Respiratory Tract Infections that require antibiotic therapy:    PCT on Admission    Antibiotic Therapy       6-12 Hrs later    >0.5                Strongly Recommended  >0.25 - <0.5        Recommended   0.1 - 0.25          Discouraged              Remeasure/reassess PCT  <0.1                Strongly Discouraged     Remeasure/reassess PCT    As 28 day mortality risk marker: \"Change in Procalcitonin Result\" (>80% or <=80%) if Day 0 (or Day 1) and Day 4 values are available. Refer to http://www.[employer]-pct-calculator.com    Change in PCT <=80%  A decrease of PCT levels below or equal to 80% defines a positive change in PCT test result representing a higher risk for 28-day all-cause mortality of patients diagnosed with severe sepsis for septic shock.    Change in PCT >80%  A decrease of PCT levels of more than 80% defines a negative change in PCT result representing a lower risk for 28-day all-cause mortality of patients diagnosed with severe sepsis or septic shock.      POC LACTATE - " Normal   BLOOD CULTURE   BLOOD CULTURE   COMPREHENSIVE METABOLIC PANEL    Narrative:     GFR Normal >60  Chronic Kidney Disease <60  Kidney Failure <15    The GFR formula is only valid for adults with stable renal function between ages 18 and 70.   POC LACTATE   CBC AND DIFFERENTIAL    Narrative:     The following orders were created for panel order CBC & Differential.  Procedure                               Abnormality         Status                     ---------                               -----------         ------                     CBC Auto Differential[[id number]]        Abnormal            Final result                 Please view results for these tests on the individual orders.       LOC:  Patient not responding to questions     Telemetry:  Med/Surg    Cardiac Monitoring Ordered: yes    EKG:   No orders to display       Medications Given in the ED:   Medications   sodium chloride 0.9 % flush 10 mL (has no administration in time range)   piperacillin-tazobactam (ZOSYN) IVPB 3.375 g in 100 mL NS (CD) (0 g Intravenous Stopped 7/11/23 0024)       Imaging results:  XR Chest 1 View    Result Date: 7/10/2023  1.Left basilar opacities, as before, suspicious for pneumonia. 2.New right midlung opacities which may represent developing multifocal pneumonia. Electronically Signed: Prasanna Carranza  7/10/2023 8:50 PM EDT  Workstation ID: [id number]     Social issues:   Social History     Socioeconomic History    Marital status:    Tobacco Use    Smoking status: Former    Smokeless tobacco: Never    Tobacco comments:     quit 25 yrs ago   Vaping Use    Vaping Use: Never used   Substance and Sexual Activity    Alcohol use: No    Drug use: No    Sexual activity: Defer       NIH Stroke Scale:  Interval: (not recorded)  1a. Level of Consciousness: (not recorded)  1b. LOC Questions: (not recorded)  1c. LOC Commands: (not recorded)  2. Best Gaze: (not recorded)  3. Visual: (not recorded)  4. Facial Palsy: (not recorded)  5a.  Motor Arm, Left: (not recorded)  5b. Motor Arm, Right: (not recorded)  6a. Motor Leg, Left: (not recorded)  6b. Motor Leg, Right: (not recorded)  7. Limb Ataxia: (not recorded)  8. Sensory: (not recorded)  9. Best Language: (not recorded)  10. Dysarthria: (not recorded)  11. Extinction and Inattention (formerly Neglect): (not recorded)    Total (NIH Stroke Scale): (not recorded)     Additional notable assessment information:     Nursing report ED to floor:  [id number]    Tess Vallejo RN   07/11/23 00:33 EDT

## 2023-07-11 NOTE — PLAN OF CARE
Goal Outcome Evaluation:  VFSS completed this date w/ recommendations of puree and nectar thick liquids. Pt should be upright at 90 degrees for all PO, full feed, and small bites/sips. ST will continue to follow to ensure diet tolerance and make further recs as indicated.

## 2023-07-11 NOTE — CASE MANAGEMENT/SOCIAL WORK
Discharge Planning Assessment   [place]     Patient Name: Jayden Bnod  MRN: [id number]  Today's Date: 7/11/2023    Admit Date: 7/10/2023    Plan: Return home with wife. [facility] agency pending. [facility] following. 3L home O2 w/ [place].   Discharge Needs Assessment       Row Name 07/11/23 1500       Living Environment    People in Home spouse    Name(s) of People in Home Fahad    Current Living Arrangements home    Potentially Unsafe Housing Conditions none    Primary Care Provided by spouse/significant other    Provides Primary Care For no one, unable/limited ability to care for self    Family Caregiver if Needed spouse    Family Caregiver Names Wife-Nava    Quality of Family Relationships helpful;involved;supportive    Able to Return to Prior Arrangements yes       Resource/Environmental Concerns    Resource/Environmental Concerns none    Transportation Concerns none       Transition Planning    Patient/Family Anticipates Transition to home with family;home with help/services    Patient/Family Anticipated Services at Transition home health care    Transportation Anticipated family or friend will provide       Discharge Needs Assessment    Readmission Within the Last 30 Days previous discharge plan unsuccessful  Previous hospitalization 7/3-7/8    Equipment Currently Used at Home wheelchair;oxygen;commode;ramp    Concerns to be Addressed discharge planning;care coordination/care conferences    Anticipated Changes Related to Illness none    Equipment Needed After Discharge none    Outpatient/Agency/Support Group Needs homecare agency    Discharge Facility/Level of Care Needs home with home health    Provided Post Acute Provider List? Yes    Post Acute Provider List Home Health    Delivered To Support Person    Support Person Fahad    Method of Delivery In person                   Discharge Plan       Row Name 07/11/23 1502       Plan    Plan Return home with wife. [facility] agency pending. [facility] following. 3L home O2  w/ [facility].    Patient/Family in Agreement with Plan yes    Plan Comments CM completed chart review. Added to [facility] in baskets and message sent to liaken Rolle. Pt discharged 7/8 home with [facility]. Added to in basket. Message received from [facility] liaison Elly that they attempted a visit with patient on Sun 7/9 but wife refused. CM met with patient and wife Nava at bedside. Pt lives at home and receives total care from wife. PCP and pharmacy confirmed- enrolled pt in [facility]. DME includes w/c, BSC, ramp, and 3L O2 through [facility]. Informed wife of [facility] not being able to accept pt for their services including not having speech therapy. Wife voices frustration with [facility] and [facility]. CM contacted [facility], spoke to aKlli. They do not have speech therapy at this time. Will need to follow up with wife on additional HH choices. Plan is for urology to see patient tomorrow 7/12 and dc home.                  Continued Care and Services - Admitted Since 7/10/2023       Home Medical Care       Service Provider Request Status Selected Services Address Phone Fax Patient Preferred    [facility] Accepted N/A [address] [facility] [place] [id number] [phone] --    [facility] Pending - Request Sent N/A [address] [facility] [place] [address] [phone] --    [facility] Pending - Request Sent N/A [address], [facility] [address] [phone] --    [facility] - Request Sent N/A [address] [place] [address] [phone] --    [facility]  Cannot meet patient's needs N/A [address] [facility] [place] [address] [phone] --               Expected Discharge Date and Time       Expected Discharge Date Expected Discharge Time    Jul 12, 2023             Demographic Summary       Row Name 07/11/23 1459       General Information    Admission Type inpatient    Arrived From emergency department    Referral Source admission list    Reason for Consult discharge planning;care coordination/care conference    Preferred Language English       Contact Information    Permission Granted to Share Info With                    Functional Status       Row Name 07/11/23 1459       Functional Status    Usual Activity Tolerance fair    Current Activity Tolerance fair       Functional Status, IADL    Medications assistive equipment and person    Meal Preparation assistive equipment and person    Housekeeping assistive equipment and person    Laundry assistive equipment and person    Shopping assistive equipment and person             Megan Naegele, RN     Office Phone: [phone]  Office Cell: [phone]

## 2023-07-11 NOTE — PLAN OF CARE
Goal Outcome Evaluation:         Patient was seen for dysphagia evaluation per MD order. Patient is familiar with this department as patient was previously seen for therapy on previous visit. Most chest x-ray indicated left basilar opacities, as before, suspicious for pneumonia. New right midlung opacities which  may represent developing mutifocal pneumonia. Patient has upper and lower dentures/partial. Oral mechanism examination revealed generalized weakness. No formal examination was conducted as patient does not consistently follow directives d/t dementia. Noted slower oral transit.  Patient was properly positioned upright in bed prior to meal. Patient's wife fed patient portion of his breakfast. Wife provides a very small bite of puree x 3. No overt s/s of aspiration were observed. Patient clears between bites appropriately. No anterior loss was observed as adequate labial seal around the spoon was evident.  Oral transit is slightly slower. Intermittent coughing with thins via sippy cup but not consistent.  Minimal voicing was demonstrated which made it difficult to ascertain wet vocal quality following trials. Patient's level of alertness varies as he sometimes requires to be aroused to participate in meal. Since patient has repeated pneumonia, decrease level of alertness at times and is demonstrating below baseline, it is recommended that patient  remain NPO until further instrumental assessment can be conducted to r/o aspiration. It is recommended that patient receive VFSS at this time to establish safest and least restrictive PO diet.

## 2023-07-11 NOTE — DISCHARGE PLACEMENT REQUEST
"Barrington Carreon (82 y.o. Male)       Date of Birth   1940    Social Security Number       Address   [address] [place] [facility]    Home Phone   [phone]    MRN   [id number]       Orthodoxy   Jain    Marital Status                               Admission Date   7/10/23    Admission Type   Emergency    Admitting Provider   Leandra Carrero MD    Attending Provider   Leandra Carrero MD    Department, Room/Bed   [facility] 3C MEDICAL INPATIENT, 373/1       Discharge Date       Discharge Disposition       Discharge Destination                                 Attending Provider: Leandra Carrero MD    Allergies: Trazodone, Doxycycline, Ciprofloxacin, Doxycycline, Fosfomycin Tromethamine, Gemtesa [Vibegron], Hydralazine, Keppra [Levetiracetam], Macrobid [Nitrofurantoin], Quetiapine, Sulfa Antibiotics    Isolation: None   Infection: None   Code Status: CPR    Ht: 177.8 cm (70\")   Wt: 61.4 kg (135 lb 5.8 oz)    Admission Cmt: None   Principal Problem: Aspiration pneumonia of both lungs [J69.0]                   Active Insurance as of 7/10/2023       Primary Coverage       Payor Plan Insurance Group Employer/Plan Group    MEDICARE MEDICARE A & B        Payor Plan Address Payor Plan Phone Number Payor Plan Fax Number Effective Dates    [address] [phone]  8/1/2005 - None Entered    [facility] [id number]         Subscriber Name Subscriber Birth Date Member ID       BARRINGTON CARREON 1940 [id number]               Secondary Coverage       Payor Plan Insurance Group Employer/Plan Group    STANDARD LIFE ACCIDENT STANDARD LIFE ACCIDENT        Payor Plan Address Payor Plan Phone Number Payor Plan Fax Number Effective Dates    [address]   9/27/2019 - None Entered    [place] [address]         Subscriber Name Subscriber Birth Date Member ID       BARRINGTON CARREON 1940 [id number]                     Emergency Contacts        (Rel.) Home Phone Work Phone Mobile Phone    MATT CARREON " (Spouse) [phone] -- [phone]

## 2023-07-11 NOTE — THERAPY EVALUATION
Acute Care - Speech Language Pathology   Swallow Initial Evaluation TYLER Sabas     Patient Name: Jayden Bond  : 1940  MRN: [id number]  Today's Date: 2023               Admit Date: 7/10/2023    Visit Dx:     ICD-10-CM ICD-9-CM   1. Aspiration pneumonia of both lungs, unspecified aspiration pneumonia type, unspecified part of lung  J69.0 507.0     Patient Active Problem List   Diagnosis    Chest pain    Essential hypertension    Stroke    Disease of thyroid gland    Coronary artery disease involving native coronary artery of native heart without angina pectoris    Dysautonomia    Dementia    Anticoagulant long-term use    Cardiac pacemaker in situ    COPD (chronic obstructive pulmonary disease)    Degenerative cervical spinal stenosis    Dysautonomia orthostatic hypotension syndrome    Epiphora due to insufficient drainage of both sides    History of CVA (cerebrovascular accident)    History of PTCA    Punctal stenosis, acquired    Nonsustained ventricular tachycardia    Pseudophakia, both eyes    SSS (sick sinus syndrome)    Palpitations    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    Weakness    Vascular dementia with behavior disturbance    Nocturnal hypoxemia    Delirium due to multiple etiologies, persistent, hypoactive    Cognitive safety issue    Caregiver stress    Dementia    Disease of thyroid gland    Dysautonomia    Mixed hyperlipidemia    Transient ischemic attack (TIA)    Altered mental status    CAP (community acquired pneumonia)    Abnormal finding on urinalysis    Poor short-term memory    Pneumonia    Altered mental status, unspecified altered mental status type    History of falling    Difficulty speaking    Dysphagia    Failure to thrive in adult    Aspiration pneumonia    Aspiration pneumonia of both lungs     Past Medical History:   Diagnosis Date    CAD (coronary artery disease)     Dementia     Depression     Disease of thyroid gland     Dysautonomia     Dyslipidemia     GERD  (gastroesophageal reflux disease)     Head pain     Hyperlipidemia     Hypertension     SSS (sick sinus syndrome)     Stroke     x 3 in 2018     Past Surgical History:   Procedure Laterality Date    APPENDECTOMY      CHOLECYSTECTOMY      CYSTOSCOPY      ENDOSCOPY N/A 10/17/2019    Procedure: ESOPHAGOGASTRODUODENOSCOPY with biopsy x 2 areas;  Surgeon: Ashok Sanchez MD;  Location: [facility] ENDOSCOPY;  Service: Gastroenterology    HERNIA REPAIR      INSERT / REPLACE / REMOVE PACEMAKER      LEFT HEART CATH      PACEMAKER IMPLANTATION      Medtr[employer]    TEMPORAL ARTERY BIOPSY      WRIST SURGERY      severed artery       SLP Recommendation and Plan  SLP Swallowing Diagnosis: moderate, oral dysphagia, R/O pharyngeal dysphagia (07/11/23 1000)  SLP Diet Recommendation: NPO (07/11/23 1000)     SLP Rec. for Method of Medication Administration: meds via alternate route (07/11/23 1000)     Monitor for Signs of Aspiration: yes, notify SLP if any concerns (07/11/23 1000)  Recommended Diagnostics: reassess via VFSS (MBS) (07/11/23 1000)  Swallow Criteria for Skilled Therapeutic Interventions Met: demonstrates skilled criteria (07/11/23 1000)     Rehab Potential/Prognosis, Swallowing: good, to achieve stated therapy goals (07/11/23 1000)  Therapy Frequency (Swallow): PRN (07/11/23 1000)  Predicted Duration Therapy Intervention (Days): until discharge (07/11/23 1000)          SWALLOW EVALUATION (last 72 hours)       SLP Adult Swallow Evaluation       Row Name 07/11/23 1000       Rehab Evaluation    Document Type evaluation  -CB    Subjective Information no complaints  -CB    Patient Observations alert;cooperative  -CB    Patient/Family/Caregiver Comments/Observations Patient was lethargic but does arouse for breakfast. Patient inconsistent with following directives as patient has dementia.  -CB    Patient Effort adequate  -CB       General Information    Patient Profile Reviewed yes  -CB    Pertinent History Of Current Problem Patient  is a 82 y.o. male with history of sick sinus syndrome, dementia, CAD with recent admission for aspiration pneumonia with discharge 2 days ago who presents with increased decline at home. Caretaker at home reports he has been lethargic, decreased oral intake, and complains feeling sore all over.   CXR shows right middle lobe infiltrate, which appears new from most recent. CBC 12.6, CMP unremarkable.Started on Zosyn to cover for aspiration pneumonia.  -CB    Current Method of Nutrition pureed;thin liquids  -CB       Pain    Additional Documentation Pain Scale: FACES Pre/Post-Treatment (Group)  -CB       Pain Scale: FACES Pre/Post-Treatment    Pain: FACES Scale, Pretreatment 0-->no hurt  -CB    Posttreatment Pain Rating 0-->no hurt  -CB       Oral Motor Structure and Function    Dentition Assessment upper dentures/partial in place;lower dentures/partial in place  -CB    Secretion Management WNL/WFL  -CB    Mucosal Quality moist, healthy  -CB    Gag Response --  not attempted.  -CB       Oral Musculature and Cranial Nerve Assessment    Oral Motor General Assessment generalized oral motor weakness  -CB    Oral Motor, Comment Oral mechanism examination revealed patient generalized weakness with lingual and labial structures. No formal oral mechanism was completed d/t not following directives consistently. Oral transit is slow.  -CB       General Eating/Swallowing Observations    Respiratory Support Currently in Use nasal cannula  -CB    O2 Liters 3L  -CB    Eating/Swallowing Skills fed by staff/caregiver  -CB    Positioning During Eating upright in bed  -CB    Utensils Used spoon;adapted cup  sippy cup  -CB    Consistencies Trialed pureed;thin liquids  -CB       Clinical Swallow Eval    Clinical Swallow Evaluation Summary Patient was seen for dysphagia evaluation per MD order. Patient is familiar with this department as patient was previously seen for therapy on previous visit. Most chest x-ray indicated left basilar  opacities, as before, suspicious for pneumonia. New right midlung opacities which  may represent developing mutifocal pneumonia. Patient has upper and lower dentures/partial. Oral mechanism examination revealed generalized weakness. No formal examination was conducted as patient does not consistently follow directives d/t dementia. Noted slower oral transit.  Patient was properly positioned upright in bed prior to meal. Patient's wife fed patient portion of his breakfast. Wife provides a very small bite of puree x 3. No overt s/s of aspiration were observed. Patient clears between bites appropriately. No anterior loss was observed as adequate labial seal around the spoon was evident.  Oral transit is slightly slower. Intermittent coughing with thins via sippy cup but not consistent.  Minimal voicing was demonstrated which made it difficult to ascertain wet vocal quality following trials. Patient's level of alertness varies as he sometimes requires to be aroused to participate in meal. Since patient has repeated pneumonia, decrease level of alertness at times and is demonstrating below baseline, it is recommended that patient  remain NPO until further instrumental assessment can be conducted to r/o aspiration. It is recommended that patient receive VFSS at this time to establish safest and least restrictive PO diet.  -CB       SLP Evaluation Clinical Impression    SLP Swallowing Diagnosis moderate;oral dysphagia;R/O pharyngeal dysphagia  -CB    Functional Impact risk of aspiration/pneumonia  -CB    Rehab Potential/Prognosis, Swallowing good, to achieve stated therapy goals  -CB    Swallow Criteria for Skilled Therapeutic Interventions Met demonstrates skilled criteria  -CB       Recommendations    Therapy Frequency (Swallow) PRN  -CB    Predicted Duration Therapy Intervention (Days) until discharge  -CB    SLP Diet Recommendation NPO  -CB    Recommended Diagnostics reassess via VFSS (MBS)  -CB    Oral Care  Recommendations Oral Care BID/PRN;Swab  -CB    SLP Rec. for Method of Medication Administration meds via alternate route  -CB    Monitor for Signs of Aspiration yes;notify SLP if any concerns  -CB       Swallow Goals (SLP)    Swallow LTGs Swallow Long Term Goal (free text)  -CB    Swallow STGs diet tolerance goal selection (SLP)  -CB    Diet Tolerance Goal Selection (SLP) Swallow Short Term Goal 1  -CB       (LTG) Swallow    (LTG) Swallow Patient will tolerate safest and least restrictive diet without complications from aspiration.  -CB    Time Frame (Swallow Long Term Goal) by discharge  -CB       (STG) Swallow 1    (STG) Swallow 1 Patient will participate ongoing swallow assessment to include VFSS if indicated and careover teaching.  -CB    Time Frame (Swallow Short Term Goal 1) 1 week  -CB              User Key  (r) = Recorded By, (t) = Taken By, (c) = Cosigned By      Initials Name Effective Dates    Ivy Villa SLP 09/21/21 -                     EDUCATION  The patient has been educated in the following areas:   Dysphagia (Swallowing Impairment) Oral Care/Hydration.        SLP GOALS       Row Name 07/11/23 1000       (LTG) Swallow    (LTG) Swallow Patient will tolerate safest and least restrictive diet without complications from aspiration.  -CB    Time Frame (Swallow Long Term Goal) by discharge  -CB       (STG) Swallow 1    (STG) Swallow 1 Patient will participate ongoing swallow assessment to include VFSS if indicated and careover teaching.  -CB    Time Frame (Swallow Short Term Goal 1) 1 week  -CB              User Key  (r) = Recorded By, (t) = Taken By, (c) = Cosigned By      Initials Name Provider Type    Ivy Villa SLP Speech and Language Pathologist                       Time Calculation:                ALEKSANDER Wolf  7/11/2023

## 2023-07-11 NOTE — CONSULTS
"Palliative Care Consultation    Patient Name: Jayden Bond  : 1940  MRN: [id number]  Allergies: Trazodone, Doxycycline, Ciprofloxacin, Doxycycline, Fosfomycin tromethamine, Gemtesa [vibegron], Hydralazine, Keppra [levetiracetam], Macrobid [nitrofurantoin], Quetiapine, and Sulfa antibiotics    Requesting clinician:  Alise  Reason for consult: Consultation for clarification of goals of care and code status.      Patient Code Status:   Code Status and Medical Interventions:   Ordered at: 23 0109     Level Of Support Discussed With:    Patient     Code Status (Patient has no pulse and is not breathing):    CPR (Attempt to Resuscitate)     Medical Interventions (Patient has pulse or is breathing):    Full Support           Chief Complaint:    aspiration    History of Present Illness    Jayden Bond is a 82 y.o. male who presented to [facility] ED on 7/10 with reports of a possible aspiration episode. Per caretaker, patient has \"not been doing well\" and has had increased lethargy and poor po intake. Patient is minimally able to participate in HPI due to baseline dementia, but states that he is \"sore all over.\"    In ED: WBC 12.6, Glucose 93, Na 141, Creatinine 0.87    XR Chest 1 View  Result Date: 7/10/2023  1.Left basilar opacities, as before, suspicious for pneumonia. 2.New right midlung opacities which may represent developing multifocal pneumonia.    Patient started on IV antibiotics for pneumonia.      Palliative consult for goals of care discussion in an acutely ill elderly patient with baseline dementia.     VITAL SIGNS:   Temp:  [98.1 °F (36.7 °C)-98.7 °F (37.1 °C)] 98.1 °F (36.7 °C)  Heart Rate:  [60-70] 66  Resp:  [14-18] 17  BP: (107-179)/() 142/95       PMH: SSS, Dementia, CAD, HLD, HTN    Past Surgical History:   Procedure Laterality Date    APPENDECTOMY      CHOLECYSTECTOMY      CYSTOSCOPY      ENDOSCOPY N/A 10/17/2019    Procedure: ESOPHAGOGASTRODUODENOSCOPY with biopsy x 2 areas;  Surgeon: " Ashok Sanchez MD;  Location: [facility] ENDOSCOPY;  Service: Gastroenterology    HERNIA REPAIR      INSERT / REPLACE / REMOVE PACEMAKER      LEFT HEART CATH      PACEMAKER IMPLANTATION      Medtronic    TEMPORAL ARTERY BIOPSY      WRIST SURGERY      severed artery       Family History   Problem Relation Age of Onset    Heart disease Father        Social History     Tobacco Use    Smoking status: Former    Smokeless tobacco: Never    Tobacco comments:     quit 25 yrs ago   Vaping Use    Vaping Use: Never used   Substance Use Topics    Alcohol use: No    Drug use: No           LABS:    Results from last 7 days   Lab Units 07/11/23  0225   WBC 10*3/mm3 11.00*   HEMOGLOBIN g/dL 14.0   HEMATOCRIT % 42.8   PLATELETS 10*3/mm3 218     Results from last 7 days   Lab Units 07/11/23  0225   SODIUM mmol/L 141   POTASSIUM mmol/L 3.7   CHLORIDE mmol/L 103   CO2 mmol/L 27.0   BUN mg/dL 19   CREATININE mg/dL 0.92   GLUCOSE mg/dL 89   CALCIUM mg/dL 9.2     Results from last 7 days   Lab Units 07/11/23  0225 07/10/23  2206   SODIUM mmol/L 141 141   POTASSIUM mmol/L 3.7 4.0   CHLORIDE mmol/L 103 102   CO2 mmol/L 27.0 29.0   BUN mg/dL 19 21   CREATININE mg/dL 0.92 0.87   CALCIUM mg/dL 9.2 9.5   BILIRUBIN mg/dL  --  0.4   ALK PHOS U/L  --  66   ALT (SGPT) U/L  --  27   AST (SGOT) U/L  --  30   GLUCOSE mg/dL 89 93         IMAGING STUDIES:  XR Chest 1 View    Result Date: 7/10/2023  1.Left basilar opacities, as before, suspicious for pneumonia. 2.New right midlung opacities which may represent developing multifocal pneumonia. Electronically Signed: Prasanna Carranza  7/10/2023 8:50 PM EDT  Workstation ID: [id number]       I reviewed the patient's new clinical results including labs, imaging, and vitals.        Scheduled Meds:  famotidine, 40 mg, Oral, Daily  piperacillin-tazobactam, 3.375 g, Intravenous, Q8H  senna-docusate sodium, 2 tablet, Oral, BID  sodium chloride, 10 mL, Intravenous, Q12H      Continuous Infusions:  Pharmacy to Dose Zosyn,          I have reviewed patient's current medication list.     Review of Systems   Constitutional:  Positive for appetite change and fatigue.   HENT:  Positive for trouble swallowing.    Neurological:  Positive for weakness.   All other systems reviewed and are negative.      Physical Exam  Vitals and nursing note reviewed.   HENT:      Head: Normocephalic and atraumatic.   Neurological:      Mental Status: He is alert. He is disoriented.           PROBLEM LIST:    Aspiration pneumonia of both lungs    Essential hypertension    Dementia    Anticoagulant long-term use    SSS (sick sinus syndrome)    Weakness    Cognitive safety issue    Caregiver stress    Mixed hyperlipidemia    Altered mental status    Failure to thrive in adult    Aspiration pneumonia          ASSESSMENT/PLAN:    Aspiration pneumonia: Noted on chest x-ray at admission. Started on IV antibiotics.     Dementia/SSS/HLD/CVA: chronic conditions per primary.     These illnesses and their management contribute to the need for a palliative consult and advanced care planning.      ADVANCED CARE PLANNIN/11 Patient being taken off the floor for swallow study at time of visit. I spoke with wife in detail regarding the patients current clinical condition. She tells me that the patient has had difficulty swallowing for several months and has had a steady decline since  after a fall. She states that she recommended the swallow study, but feels like his pneumonia is not related to aspiration. She believes he has pneumonia secondary to the cleaning staff using Clorox on his bed while he was ill on a previous admission. She states that his fall was also a result of negligent nursing staff and feels like he is declining because of inadequate care, not related to his advancing dementia or difficulty swallowing. We discussed continuation of aggressive treatment vs hospice services. She's states that she spoke with our  on a previous admission  "and was adamant that we do not discuss hospice anymore. She tells me that she utilized their services for 24 hours and that they sent out a  to \"stand over him and breath\" and told her she could not take the patient to doctors appointments, so she immediately revoked services. I expressed the benefits of hospice services in allowing him to stay at home and continue with pleasure feeds, but she tells me that she is \"frustrated\" and does not want to hear about hospice. She is agreeable to meeting with [facility] which was referred on the patients previous admission. Sariah to meet with patient this afternoon. She wants the patient to remain a full code with full intervention, but repeatedly tells me that she does not want the patient to have a feeding tube. Patient able to pass a swallow study with pureed diet recommendation. This information was shared with MD. We will continue to follow.       Advanced Directives: Patient does not have advance directive  Health Care Directive on file: No  Health Care Surrogate:      Palliative Performance Scale Score:    Comments:           Decisional Capacity: no  Patient's understanding of illness: unknown  Patient goals of care:  Full code, full intervention      Thank you for this consult and allowing us to participate in patient's plan of care. Palliative Care Team will continue to follow patient.       I spent 54 minutes reviewing providers documentation, medication records, assessing and examining patient, discussing with family, answering questions, providing guidance about a plan of care, and coordinating care with other healthcare members. More than 50% of time spent face to face discussing disease education, current clinical status, and medication management.     I spent an additional 28 minutes on advanced care planning, goals of care, and code status discussion.  I obtained consent for ACP discussion.     Jacqueline De La Rosa, APRN  7/11/2023  "

## 2023-07-11 NOTE — THERAPY EVALUATION
Acute Care - Speech Language Pathology   Swallow Initial Evaluation TYLER Sabas     Patient Name: Jayden Bond  : 1940  MRN: [id number]  Today's Date: 2023               Admit Date: 7/10/2023    Visit Dx:     ICD-10-CM ICD-9-CM   1. Aspiration pneumonia of both lungs, unspecified aspiration pneumonia type, unspecified part of lung  J69.0 507.0     Patient Active Problem List   Diagnosis    Chest pain    Essential hypertension    Stroke    Disease of thyroid gland    Coronary artery disease involving native coronary artery of native heart without angina pectoris    Dysautonomia    Dementia    Anticoagulant long-term use    Cardiac pacemaker in situ    COPD (chronic obstructive pulmonary disease)    Degenerative cervical spinal stenosis    Dysautonomia orthostatic hypotension syndrome    Epiphora due to insufficient drainage of both sides    History of CVA (cerebrovascular accident)    History of PTCA    Punctal stenosis, acquired    Nonsustained ventricular tachycardia    Pseudophakia, both eyes    SSS (sick sinus syndrome)    Palpitations    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    Weakness    Vascular dementia with behavior disturbance    Nocturnal hypoxemia    Delirium due to multiple etiologies, persistent, hypoactive    Cognitive safety issue    Caregiver stress    Dementia    Disease of thyroid gland    Dysautonomia    Mixed hyperlipidemia    Transient ischemic attack (TIA)    Altered mental status    CAP (community acquired pneumonia)    Abnormal finding on urinalysis    Poor short-term memory    Pneumonia    Altered mental status, unspecified altered mental status type    History of falling    Difficulty speaking    Dysphagia    Failure to thrive in adult    Aspiration pneumonia    Aspiration pneumonia of both lungs     Past Medical History:   Diagnosis Date    CAD (coronary artery disease)     Dementia     Depression     Disease of thyroid gland     Dysautonomia     Dyslipidemia     GERD  (gastroesophageal reflux disease)     Head pain     Hyperlipidemia     Hypertension     SSS (sick sinus syndrome)     Stroke     x 3 in 2018     Past Surgical History:   Procedure Laterality Date    APPENDECTOMY      CHOLECYSTECTOMY      CYSTOSCOPY      ENDOSCOPY N/A 10/17/2019    Procedure: ESOPHAGOGASTRODUODENOSCOPY with biopsy x 2 areas;  Surgeon: Ashok Sanchez MD;  Location: [facility] ENDOSCOPY;  Service: Gastroenterology    HERNIA REPAIR      INSERT / REPLACE / REMOVE PACEMAKER      LEFT HEART CATH      PACEMAKER IMPLANTATION      [employer]    TEMPORAL ARTERY BIOPSY      WRIST SURGERY      severed artery       SLP Recommendation and Plan  SLP Swallowing Diagnosis: mod-severe, oral dysphagia, pharyngeal dysphagia (07/11/23 1200)  SLP Diet Recommendation: puree, nectar thick liquids (07/11/23 1200)     SLP Rec. for Method of Medication Administration: meds whole, meds crushed, with puree, as tolerated (07/11/23 1200)     Monitor for Signs of Aspiration: yes, notify SLP if any concerns (07/11/23 1200)     Swallow Criteria for Skilled Therapeutic Interventions Met: demonstrates skilled criteria (07/11/23 1200)     Rehab Potential/Prognosis, Swallowing: good, to achieve stated therapy goals (07/11/23 1200)  Therapy Frequency (Swallow): PRN (07/11/23 1200)  Predicted Duration Therapy Intervention (Days): until discharge (07/11/23 1200)           SWALLOW EVALUATION (last 72 hours)       SLP Adult Swallow Evaluation       Row Name 07/11/23 1200       Rehab Evaluation    Document Type evaluation      Subjective Information no complaints    Patient Observations alert;cooperative    Patient Effort adequate         General Information    Patient Profile Reviewed yes      Pertinent History Of Current Problem Patient is a 82 y.o. male with history of sick sinus syndrome, dementia, CAD with recent admission for aspiration pneumonia with discharge 2 days ago who presents with increased decline at home. Caretaker at home reports  he has been lethargic, decreased oral intake, and complains feeling sore all over.   CXR shows right middle lobe infiltrate, which appears new from most recent. CBC 12.6, CMP unremarkable.Started on Zosyn to cover for aspiration pneumonia.     Current Method of Nutrition pureed;thin liquids  -AD       Pain    Additional Documentation Pain Scale: FACES Pre/Post-Treatment (Group)       Pain Scale: FACES Pre/Post-Treatment    Pain: FACES Scale, Pretreatment 0-->no hurt     Posttreatment Pain Rating 0-->no hurt        Oral Motor Structure and Function    Dentition Assessment upper dentures/partial in place;lower dentures/partial in place    Secretion Management WNL/WFL    Mucosal Quality moist, healthy    Gag Response --       Oral Musculature and Cranial Nerve Assessment    Oral Motor General Assessment generalized oral motor weakness    Oral Motor, Comment Prior to Video pt was unable perform an oral mech. Pt was unable to protrude his tongue with max visual and verbal cues.          General Eating/Swallowing Observations    Respiratory Support Currently in Use nasal cannula  -AD    O2 Liters 3L  -AD    Eating/Swallowing Skills fed by staff/caregiver  -AD    Positioning During Eating upright in chair  -AD    Utensils Used --    Consistencies Trialed --       Clinical Swallow Eval    Clinical Swallow Evaluation Summary --       MBS/VFSS    Utensils Used spoon;cup;straw  -AD    Consistencies Trialed nectar/syrup-thick liquids;pureed;thin liquids  -AD       MBS/VFSS Interpretation    VFSS Summary Video Swallow Study completed with the patient seated upright. Pt's swallow viewed in the lateral projection. The study was conducted in conjunction with the SLP, SLP student, SLP tech and Radiology Tech. The patient was fed by SLP tech during the trials which included: nectar thick by spoon x2, nectar thick by cup x1, nectar thick by straw x2, barium coated applesauce x2, thin liquid by cup x1, thin liquid by straw x2.  Pt had  good to good labial seal around spoon, straw and cup with no anterior spillage present. Mastication was not witnessed. Pt had good bolus control in the oral cavity with intermittent premature spillage to the vallecula w/ thin liquids. Pt's oral transit was mod. to severely delayed on all trials. For trials of nectar oral trasit was delayed an average of 7 seconds. For trials of applesauce oral trasit was delayed an average of 9 seconds. For trials of thins oral trasit was delayed an average of 10 seconds. During these prolonged delays, SLP tech and radiologist cued pt to swallowed. These cues appeared to be ineffective and pt was unable to follow the command. Larger boluses of NT and thins required multiple swallows to clear oral residue. Once pharyngeal swallow was intiated the swallow was timely and he was able to do a hard swallow that cleared the pharynx.  Oral and pharyngeal residue into the vallecula or pyriform sinuses was appreciated on the second trial of applesauce and on thins. This is explained due to fatigue pt experienced as the study was coming to an end.  Solids and mechanical soft was not trialed due to delayed oral transit, bolus hold and fatigue. Adequate hyolaryngeal elevation w/ mildly reduced epiglottic deflection. Upon first trial of thin liquids by cup, there was trace penetration that was cleared and suspected trace aspiration from incomplete laryngeal vestibular closure w/ second trial of thin straw.    Pt presented with moderate to severe oral and pharyngeal dysphagia characterized by delayed oral transit, bolus holds over 5 seconds, trace penetration and aspiration, and fatigue resulting in residue. Recommend be placed on puree diet with nectar thick liquids. Results and recommendations were discussed with pt and pt's spouse. Both expressed understanding.     Recommendations:  - Puree Diet  - Nectar Liquids    Other connsiderations:  - upright at 90 degrees for all PO  - full feed  -  Remain upright 30 minutes after a meal  - Eat small bite and sips during meals          -AD       SLP Evaluation Clinical Impression    SLP Swallowing Diagnosis mod-severe;oral dysphagia;pharyngeal dysphagia  -AD    Functional Impact risk of aspiration/pneumonia  -AD    Rehab Potential/Prognosis, Swallowing good, to achieve stated therapy goals  -AD    Swallow Criteria for Skilled Therapeutic Interventions Met demonstrates skilled criteria  -AD       Recommendations    Therapy Frequency (Swallow) PRN  -AD    Predicted Duration Therapy Intervention (Days) until discharge  -AD    SLP Diet Recommendation puree;nectar thick liquids  -AD    Recommended Diagnostics --    Oral Care Recommendations Oral Care BID/PRN;Swab  -AD    SLP Rec. for Method of Medication Administration meds whole;meds crushed;with puree;as tolerated  -AD    Monitor for Signs of Aspiration yes;notify SLP if any concerns  -AD       Swallow Goals (SLP)    Swallow LTGs Swallow Long Term Goal (free text)  -AD    Swallow STGs diet tolerance goal selection (SLP)  -AD    Diet Tolerance Goal Selection (SLP) Swallow Short Term Goal 1  -AD       (LTG) Swallow    (LTG) Swallow Patient will tolerate safest and least restrictive diet without complications from aspiration.  -AD    Time Frame (Swallow Long Term Goal) by discharge  -AD    Progress/Outcomes (Swallow Long Term Goal) goal ongoing  -AD       (STG) Swallow 1    (STG) Swallow 1 Patient will participate ongoing swallow assessment to include VFSS if indicated and careover teaching.  -AD    Time Frame (Swallow Short Term Goal 1) 1 week  -AD    Progress/Outcomes (Swallow Short Term Goal 1) goal ongoing  -AD              User Key  (r) = Recorded By, (t) = Taken By, (c) = Cosigned By      Initials Name Effective Dates    Salma Werner, Speech Therapy Student 06/09/23 -                     EDUCATION  The patient has been educated in the following areas:   Dysphagia (Swallowing Impairment) Modified Diet  Instruction.        SLP GOALS       Row Name 07/11/23 1200       (LTG) Swallow    (LTG) Swallow Patient will tolerate safest and least restrictive diet without complications from aspiration.  -AD    Time Frame (Swallow Long Term Goal) by discharge  -AD    Progress/Outcomes (Swallow Long Term Goal) goal ongoing  -AD       (STG) Swallow 1    (STG) Swallow 1 Patient will participate ongoing swallow assessment to include VFSS if indicated and careover teaching.  -AD    Time Frame (Swallow Short Term Goal 1) 1 week  -AD    Progress/Outcomes (Swallow Short Term Goal 1) goal ongoing  -AD              User Key  (r) = Recorded By, (t) = Taken By, (c) = Cosigned By      Initials Name Provider Type    AD Salma Espinal Speech Therapy Student SLP Student                       Time Calculation:                Salma Espinal Speech Therapy Student  7/11/2023

## 2023-07-11 NOTE — PLAN OF CARE
Goal Outcome Evaluation:              Outcome Evaluation: Patient resting in bed. Spouse at bedside. patient is alert to self. No complaints of pain. Urine sample needed for UA, spouse refused for patient to be straight cathed. Urology consulted for urinary frequency. will discharge home when ready. will continue to monitor.

## 2023-07-11 NOTE — PROGRESS NOTES
LOS: 0 days   Patient Care Team:  Tamia Meneses NP as PCP - General (Family Medicine)  Deam, Ashutosh Santana MD as Consulting Physician (Cardiac Electrophysiology)    Subjective     Interval History: Stable overnight    Patient Complaints: Patient is unable to provide history.  Wife provides most of the history.  He is actually much more alert and interactive today than he was during his last hospitalization.  Wife states that she brought him to the emergency room only because she herself was being evaluated for possible atrial fib and left rib pain.  She did not have anybody to stay with him at home so he came to the ER as well and she decided to have him checked out while he was here.  She agrees that he is better than he was during his last hospitalization.  He had been tolerating a puréed diet at home.    Wife is concerned that he will miss his scheduled appointment with Dr. Nick, his urologist that is scheduled for tomorrow.  He is followed by Dr. Nick for urinary frequency, BPH.     History taken from: patient    Review of Systems   Unable to perform ROS: Dementia         Objective     Vital Signs  Temp:  [98.1 °F (36.7 °C)-98.7 °F (37.1 °C)] 98.1 °F (36.7 °C)  Heart Rate:  [60-70] 66  Resp:  [14-18] 14  BP: (107-179)/() 117/77    Physical Exam:     General Appearance:    Alert, frail, cachectic, smiling, able to answer simple questions in a soft voice   Head:    Normocephalic, without obvious abnormality, atraumatic   Eyes:            Lids and lashes normal, conjunctivae and sclerae normal, no   icterus, no pallor, corneas clear, PERRLA   Ears:    Ears appear intact with no abnormalities noted   Throat:   No oral lesions, no thrush, oral mucosa moist   Neck:   No adenopathy, supple, trachea midline, no thyromegaly, no   carotid bruit, no JVD   Lungs:     Clear to auscultation,respirations regular, even and                  unlabored    Heart:    Regular rhythm and normal rate, normal S1 and  S2, no            murmur, no gallop, no rub, no click   Chest Wall:    No abnormalities observed   Abdomen:     Normal bowel sounds, no masses, no organomegaly, soft        Non-tender non-distended, no guarding,   Extremities:   Moves all extremities well, no edema, no cyanosis, no             Redness   Pulses:   Pulses palpable and equal bilaterally   Skin:   No bleeding, bruising or rash   Lymph nodes:   No palpable adenopathy   Neurologic: Does not reliably follow commands        Results Review:    Lab Results (last 24 hours)       Procedure Component Value Units Date/Time    MRSA Screen, PCR (Inpatient) - Swab, Nares [[id number]]  (Normal) Collected: 07/11/23 1033    Specimen: Swab from Nares Updated: 07/11/23 1304     MRSA PCR No MRSA Detected    Narrative:      The negative predictive value of this diagnostic test is high and should only be used to consider de-escalating anti-MRSA therapy. A positive result may indicate colonization with MRSA and must be correlated clinically.    Basic Metabolic Panel [[id number]]  (Normal) Collected: 07/11/23 0225    Specimen: Blood Updated: 07/11/23 0324     Glucose 89 mg/dL      BUN 19 mg/dL      Creatinine 0.92 mg/dL      Sodium 141 mmol/L      Potassium 3.7 mmol/L      Comment: Slight hemolysis detected by analyzer. Results may be affected.        Chloride 103 mmol/L      CO2 27.0 mmol/L      Calcium 9.2 mg/dL      BUN/Creatinine Ratio 20.7     Anion Gap 11.0 mmol/L      eGFR 83.1 mL/min/1.73     Narrative:      GFR Normal >60  Chronic Kidney Disease <60  Kidney Failure <15    The GFR formula is only valid for adults with stable renal function between ages 18 and 70.    CBC & Differential [[id number]]  (Abnormal) Collected: 07/11/23 0225    Specimen: Blood Updated: 07/11/23 0252    Narrative:      The following orders were created for panel order CBC & Differential.  Procedure                               Abnormality         Status                     ---------                                -----------         ------                     CBC Auto Differential[[id number]]        Abnormal            Final result                 Please view results for these tests on the individual orders.    CBC Auto Differential [[id number]]  (Abnormal) Collected: 07/11/23 0225    Specimen: Blood Updated: 07/11/23 0252     WBC 11.00 10*3/mm3      RBC 4.53 10*6/mm3      Hemoglobin 14.0 g/dL      Hematocrit 42.8 %      MCV 94.4 fL      MCH 31.0 pg      MCHC 32.8 g/dL      RDW 13.9 %      RDW-SD 45.9 fl      MPV 8.2 fL      Platelets 218 10*3/mm3      Neutrophil % 73.4 %      Lymphocyte % 15.0 %      Monocyte % 8.5 %      Eosinophil % 2.6 %      Basophil % 0.5 %      Neutrophils, Absolute 8.00 10*3/mm3      Lymphocytes, Absolute 1.60 10*3/mm3      Monocytes, Absolute 0.90 10*3/mm3      Eosinophils, Absolute 0.30 10*3/mm3      Basophils, Absolute 0.10 10*3/mm3      nRBC 0.0 /100 WBC     Comprehensive Metabolic Panel [[id number]] Collected: 07/10/23 2206    Specimen: Blood from Arm, Right Updated: 07/10/23 2240     Glucose 93 mg/dL      BUN 21 mg/dL      Creatinine 0.87 mg/dL      Sodium 141 mmol/L      Potassium 4.0 mmol/L      Chloride 102 mmol/L      CO2 29.0 mmol/L      Calcium 9.5 mg/dL      Total Protein 6.4 g/dL      Albumin 3.8 g/dL      ALT (SGPT) 27 U/L      AST (SGOT) 30 U/L      Alkaline Phosphatase 66 U/L      Total Bilirubin 0.4 mg/dL      Globulin 2.6 gm/dL      A/G Ratio 1.5 g/dL      BUN/Creatinine Ratio 24.1     Anion Gap 10.0 mmol/L      eGFR 86.1 mL/min/1.73     Narrative:      GFR Normal >60  Chronic Kidney Disease <60  Kidney Failure <15    The GFR formula is only valid for adults with stable renal function between ages 18 and 70.    Blood Culture - Blood, Arm, Left [[id number]] Collected: 07/10/23 2151    Specimen: Blood from Arm, Left Updated: 07/10/23 2154    Procalcitonin [[id number]]  (Normal) Collected: 07/10/23 2105    Specimen: Blood from Arm, Right Updated: 07/10/23 6489      "Procalcitonin 0.08 ng/mL     Narrative:      As a Marker for Sepsis (Non-Neonates):    1. <0.5 ng/mL represents a low risk of severe sepsis and/or septic shock.  2. >2 ng/mL represents a high risk of severe sepsis and/or septic shock.    As a Marker for Lower Respiratory Tract Infections that require antibiotic therapy:    PCT on Admission    Antibiotic Therapy       6-12 Hrs later    >0.5                Strongly Recommended  >0.25 - <0.5        Recommended   0.1 - 0.25          Discouraged              Remeasure/reassess PCT  <0.1                Strongly Discouraged     Remeasure/reassess PCT    As 28 day mortality risk marker: \"Change in Procalcitonin Result\" (>80% or <=80%) if Day 0 (or Day 1) and Day 4 values are available. Refer to [email or web address]    Change in PCT <=80%  A decrease of PCT levels below or equal to 80% defines a positive change in PCT test result representing a higher risk for 28-day all-cause mortality of patients diagnosed with severe sepsis for septic shock.    Change in PCT >80%  A decrease of PCT levels of more than 80% defines a negative change in PCT result representing a lower risk for 28-day all-cause mortality of patients diagnosed with severe sepsis or septic shock.       CBC & Differential [[id number]]  (Abnormal) Collected: 07/10/23 2105    Specimen: Blood from Arm, Right Updated: 07/10/23 2116    Narrative:      The following orders were created for panel order CBC & Differential.  Procedure                               Abnormality         Status                     ---------                               -----------         ------                     CBC Auto Differential[[id number]]        Abnormal            Final result                 Please view results for these tests on the individual orders.    CBC Auto Differential [[id number]]  (Abnormal) Collected: 07/10/23 2105    Specimen: Blood from Arm, Right Updated: 07/10/23 2116     WBC 12.60 10*3/mm3      RBC " 4.78 10*6/mm3      Hemoglobin 14.7 g/dL      Hematocrit 45.5 %      MCV 95.1 fL      MCH 30.8 pg      MCHC 32.4 g/dL      RDW 13.5 %      RDW-SD 44.6 fl      MPV 8.1 fL      Platelets 247 10*3/mm3      Neutrophil % 80.2 %      Lymphocyte % 10.6 %      Monocyte % 7.4 %      Eosinophil % 1.1 %      Basophil % 0.7 %      Neutrophils, Absolute 10.10 10*3/mm3      Lymphocytes, Absolute 1.30 10*3/mm3      Monocytes, Absolute 0.90 10*3/mm3      Eosinophils, Absolute 0.10 10*3/mm3      Basophils, Absolute 0.10 10*3/mm3      nRBC 0.0 /100 WBC     Blood Culture - Blood, Arm, Right [[id number]] Collected: 07/10/23 2105    Specimen: Blood from Arm, Right Updated: 07/10/23 2112    POC Lactate [[id number]]  (Normal) Collected: 07/10/23 2036    Specimen: Blood Updated: 07/10/23 2037     Lactate 1.0 mmol/L      Comment: Serial Number: [id number]:  [id number]                Imaging Results (Last 24 Hours)       Procedure Component Value Units Date/Time    FL Video Swallow With Speech Single Contrast [[id number]] Resulted: 07/11/23 1138     Updated: 07/11/23 1138    Narrative:      This procedure was auto-finalized with no dictation required.    XR Chest 1 View [[id number]] Collected: 07/10/23 2048     Updated: 07/10/23 2052    Narrative:        XR CHEST 1 VW    Date of Exam: 7/10/2023 8:36 PM EDT    Indication: pna    Comparison: July 5, 2023, July 3, 2023, May 11, 2023.    FINDINGS:  Pacemaker is present. Heart size appears unchanged. No pneumothorax or significant pleural effusion. There are left basilar opacities, similar to the prior exam. There are subtle opacities in the right midlung which appear to be new. No other definite   new infiltrate.      Impression:      1.Left basilar opacities, as before, suspicious for pneumonia.  2.New right midlung opacities which may represent developing multifocal pneumonia.      Electronically Signed: Prasanna Carranza    7/10/2023 8:50 PM EDT    Workstation ID: [id number]                 I  reviewed the patient's new clinical results.    Medication Review:   Scheduled Meds:apixaban, 5 mg, Oral, Q12H  aspirin, 81 mg, Oral, Daily  brimonidine, 1 drop, Both Eyes, BID  cetirizine, 10 mg, Oral, Daily  docusate sodium, 100 mg, Oral, Nightly  escitalopram, 10 mg, Oral, Daily  famotidine, 20 mg, Oral, BID  galantamine, 8 mg, Oral, BID  guaifenesin, 400 mg, Oral, Q8H  [START ON 7/12/2023] levothyroxine, 25 mcg, Oral, Q AM  piperacillin-tazobactam, 3.375 g, Intravenous, Q8H  predniSONE, 5 mg, Oral, Daily  rosuvastatin, 20 mg, Oral, Q PM  senna-docusate sodium, 2 tablet, Oral, BID  sodium chloride, 10 mL, Intravenous, Q12H      Continuous Infusions:Pharmacy to Dose Zosyn,       PRN Meds:.  acetaminophen    benzonatate    senna-docusate sodium **AND** polyethylene glycol **AND** bisacodyl **AND** bisacodyl    labetalol    nitroglycerin    ondansetron    Pharmacy to Dose Zosyn    [COMPLETED] Insert Peripheral IV **AND** sodium chloride    sodium chloride    sodium chloride     Assessment & Plan       Aspiration pneumonia of both lungs    Essential hypertension    Dementia    Anticoagulant long-term use    SSS (sick sinus syndrome)    Weakness    Cognitive safety issue    Caregiver stress    Mixed hyperlipidemia    Altered mental status    Failure to thrive in adult    Aspiration pneumonia  Dysphagia  Vascular dementia  Prior CVA  BPH  Urinary frequency    Patient is doing well with ongoing treatment for aspiration pneumonia.  He underwent video swallow today and has been cleared for puréed diet with thickened liquids.  Wife is agreeable to this at home.  Anticipate discharge tomorrow home to complete his course of previously prescribed oral Augmentin.  We will ask urologist to evaluate prior to discharge at family request.    Plan for disposition: Home tomorrow    Leandra Carrero MD  07/11/23  17:21 EDT

## 2023-07-11 NOTE — H&P
Patient Care Team:  Tamia Meneses NP as PCP - General (Family Medicine)  Deam, Ashutosh Santana MD as Consulting Physician (Cardiac Electrophysiology)    Chief complaint overall decline at home    Subjective     Patient is a 82 y.o. male with history of sick sinus syndrome, dementia, CAD with recent admission for aspiration pneumonia with discharge 2 days ago who presents with increased decline at home. Caretaker at home reports he has been lethargic, decreased oral intake, and complains feeling sore all over.   CXR shows right middle lobe infiltrate, which appears new from most recent. CBC 12.6, CMP unremarkable.Started on Zosyn to cover for aspiration pneumonia.       Onset of symptoms was ongoing.      Review of Systems   Constitutional:  Positive for activity change, appetite change and fatigue.   HENT: Negative.     Eyes: Negative.    Respiratory: Negative.     Cardiovascular: Negative.    Gastrointestinal: Negative.    Endocrine: Negative.    Genitourinary: Negative.    Musculoskeletal:  Positive for arthralgias and myalgias.   Skin: Negative.    Neurological: Negative.    Psychiatric/Behavioral: Negative.          History  Past Medical History:   Diagnosis Date    CAD (coronary artery disease)     Dementia     Depression     Disease of thyroid gland     Dysautonomia     Dyslipidemia     GERD (gastroesophageal reflux disease)     Head pain     Hyperlipidemia     Hypertension     SSS (sick sinus syndrome)     Stroke     x 3 in 2018     Past Surgical History:   Procedure Laterality Date    APPENDECTOMY      CHOLECYSTECTOMY      CYSTOSCOPY      ENDOSCOPY N/A 10/17/2019    Procedure: ESOPHAGOGASTRODUODENOSCOPY with biopsy x 2 areas;  Surgeon: Ashok Sanchez MD;  Location: [facility] ENDOSCOPY;  Service: Gastroenterology    HERNIA REPAIR      INSERT / REPLACE / REMOVE PACEMAKER      LEFT HEART CATH      PACEMAKER IMPLANTATION      [employer]    TEMPORAL ARTERY BIOPSY      WRIST SURGERY      severed artery      Family History   Problem Relation Age of Onset    Heart disease Father      Social History     Tobacco Use    Smoking status: Former    Smokeless tobacco: Never    Tobacco comments:     quit 25 yrs ago   Vaping Use    Vaping Use: Never used   Substance Use Topics    Alcohol use: No    Drug use: No     (Not in a hospital admission)    Allergies:  Trazodone, Doxycycline, Ciprofloxacin, Doxycycline, Fosfomycin tromethamine, Gemtesa [vibegron], Hydralazine, Keppra [levetiracetam], Macrobid [nitrofurantoin], Quetiapine, and Sulfa antibiotics    Objective     Vital Signs  Temp:  [98.7 °F (37.1 °C)] 98.7 °F (37.1 °C)  Heart Rate:  [60-70] 68  Resp:  [18] 18  BP: (107-168)/() 142/90     Physical Exam:      General Appearance:    Alert, cooperative, in no acute distress   Head:    Normocephalic, without obvious abnormality, atraumatic   Eyes:            Lids and lashes normal, conjunctivae and sclerae normal, no   icterus, no pallor, corneas clear, PERRLA   Ears:    Ears appear intact with no abnormalities noted   Throat:   No oral lesions, no thrush, oral mucosa moist   Neck:   No adenopathy, supple, trachea midline, no thyromegaly, no   carotid bruit, no JVD   Lungs:     Diminished breath sounds,respirations regular, even and     unlabored    Heart:    Regular rhythm and normal rate, normal S1 and S2, no            murmur, no gallop, no rub, no click   Chest Wall:    No abnormalities observed   Abdomen:     Normal bowel sounds, no masses, no organomegaly, soft        non-tender, non-distended, no guarding, no rebound                tenderness   Extremities:   Moves all extremities well, no edema, no cyanosis, no             redness   Pulses:   Pulses palpable and equal bilaterally   Skin:   No bleeding, bruising or rash   Lymph nodes:   No palpable adenopathy   Neurologic:   No focal deficits noted, fatigue,        Results Review:     Imaging Results (Last 24 Hours)       Procedure Component Value Units Date/Time     XR Chest 1 View [[id number]] Collected: 07/10/23 2048     Updated: 07/10/23 2052    Narrative:        XR CHEST 1 VW    Date of Exam: 7/10/2023 8:36 PM EDT    Indication: pna    Comparison: July 5, 2023, July 3, 2023, May 11, 2023.    FINDINGS:  Pacemaker is present. Heart size appears unchanged. No pneumothorax or significant pleural effusion. There are left basilar opacities, similar to the prior exam. There are subtle opacities in the right midlung which appear to be new. No other definite   new infiltrate.      Impression:      1.Left basilar opacities, as before, suspicious for pneumonia.  2.New right midlung opacities which may represent developing multifocal pneumonia.      Electronically Signed: Prasanna Carranza    7/10/2023 8:50 PM EDT    Workstation ID: [id number]             Lab Results (last 24 hours)       Procedure Component Value Units Date/Time    Comprehensive Metabolic Panel [[id number]] Collected: 07/10/23 2206    Specimen: Blood from Arm, Right Updated: 07/10/23 2240     Glucose 93 mg/dL      BUN 21 mg/dL      Creatinine 0.87 mg/dL      Sodium 141 mmol/L      Potassium 4.0 mmol/L      Chloride 102 mmol/L      CO2 29.0 mmol/L      Calcium 9.5 mg/dL      Total Protein 6.4 g/dL      Albumin 3.8 g/dL      ALT (SGPT) 27 U/L      AST (SGOT) 30 U/L      Alkaline Phosphatase 66 U/L      Total Bilirubin 0.4 mg/dL      Globulin 2.6 gm/dL      A/G Ratio 1.5 g/dL      BUN/Creatinine Ratio 24.1     Anion Gap 10.0 mmol/L      eGFR 86.1 mL/min/1.73     Narrative:      GFR Normal >60  Chronic Kidney Disease <60  Kidney Failure <15    The GFR formula is only valid for adults with stable renal function between ages 18 and 70.    Blood Culture - Blood, Arm, Left [[id number]] Collected: 07/10/23 2151    Specimen: Blood from Arm, Left Updated: 07/10/23 2154    Procalcitonin [[id number]]  (Normal) Collected: 07/10/23 2105    Specimen: Blood from Arm, Right Updated: 07/10/23 2151     Procalcitonin 0.08 ng/mL     Narrative:    "   As a Marker for Sepsis (Non-Neonates):    1. <0.5 ng/mL represents a low risk of severe sepsis and/or septic shock.  2. >2 ng/mL represents a high risk of severe sepsis and/or septic shock.    As a Marker for Lower Respiratory Tract Infections that require antibiotic therapy:    PCT on Admission    Antibiotic Therapy       6-12 Hrs later    >0.5                Strongly Recommended  >0.25 - <0.5        Recommended   0.1 - 0.25          Discouraged              Remeasure/reassess PCT  <0.1                Strongly Discouraged     Remeasure/reassess PCT    As 28 day mortality risk marker: \"Change in Procalcitonin Result\" (>80% or <=80%) if Day 0 (or Day 1) and Day 4 values are available. Refer to http://www.Clear[employer] SolutionsTul[facility] – [place]saEverySignalpct-calculator.com    Change in PCT <=80%  A decrease of PCT levels below or equal to 80% defines a positive change in PCT test result representing a higher risk for 28-day all-cause mortality of patients diagnosed with severe sepsis for septic shock.    Change in PCT >80%  A decrease of PCT levels of more than 80% defines a negative change in PCT result representing a lower risk for 28-day all-cause mortality of patients diagnosed with severe sepsis or septic shock.       CBC & Differential [[id number]]  (Abnormal) Collected: 07/10/23 2105    Specimen: Blood from Arm, Right Updated: 07/10/23 2116    Narrative:      The following orders were created for panel order CBC & Differential.  Procedure                               Abnormality         Status                     ---------                               -----------         ------                     CBC Auto Differential[[id number]]        Abnormal            Final result                 Please view results for these tests on the individual orders.    CBC Auto Differential [[id number]]  (Abnormal) Collected: 07/10/23 2105    Specimen: Blood from Arm, Right Updated: 07/10/23 2116     WBC 12.60 10*3/mm3      RBC 4.78 10*6/mm3      Hemoglobin 14.7 g/dL     "  Hematocrit 45.5 %      MCV 95.1 fL      MCH 30.8 pg      MCHC 32.4 g/dL      RDW 13.5 %      RDW-SD 44.6 fl      MPV 8.1 fL      Platelets 247 10*3/mm3      Neutrophil % 80.2 %      Lymphocyte % 10.6 %      Monocyte % 7.4 %      Eosinophil % 1.1 %      Basophil % 0.7 %      Neutrophils, Absolute 10.10 10*3/mm3      Lymphocytes, Absolute 1.30 10*3/mm3      Monocytes, Absolute 0.90 10*3/mm3      Eosinophils, Absolute 0.10 10*3/mm3      Basophils, Absolute 0.10 10*3/mm3      nRBC 0.0 /100 WBC     Blood Culture - Blood, Arm, Right [[id number]] Collected: 07/10/23 2105    Specimen: Blood from Arm, Right Updated: 07/10/23 2112    POC Lactate [[id number]]  (Normal) Collected: 07/10/23 2036    Specimen: Blood Updated: 07/10/23 2037     Lactate 1.0 mmol/L      Comment: Serial Number: [id number]:  [id number]                I reviewed the patient's new clinical results.    Assessment & Plan       Essential hypertension    Dementia    Anticoagulant long-term use    SSS (sick sinus syndrome)    Weakness    Cognitive safety issue    Caregiver stress    Mixed hyperlipidemia    Altered mental status    Failure to thrive in adult    Aspiration pneumonia  -  started on Zosyn      DVT prophylaxis- SCD's  GI prophylaxis- protonix    I discussed the patient's findings and my recommendations with patient.     Caterina Watts, APRN  07/11/23  00:41 EDT

## 2023-07-11 NOTE — PLAN OF CARE
Goal Outcome Evaluation:              Outcome Evaluation: new admit from ER; spouse at bedside; IV zosyn; continue to monitor

## 2023-07-12 VITALS
OXYGEN SATURATION: 96 % | WEIGHT: 141.09 LBS | HEIGHT: 70 IN | RESPIRATION RATE: 18 BRPM | BODY MASS INDEX: 20.2 KG/M2 | HEART RATE: 66 BPM | DIASTOLIC BLOOD PRESSURE: 90 MMHG | TEMPERATURE: 97.6 F | SYSTOLIC BLOOD PRESSURE: 152 MMHG

## 2023-07-12 LAB
ANION GAP SERPL CALCULATED.3IONS-SCNC: 8 MMOL/L (ref 5–15)
BACTERIA UR QL AUTO: ABNORMAL /HPF
BASOPHILS # BLD AUTO: 0 10*3/MM3 (ref 0–0.2)
BASOPHILS NFR BLD AUTO: 0.3 % (ref 0–1.5)
BILIRUB UR QL STRIP: NEGATIVE
BUN SERPL-MCNC: 19 MG/DL (ref 8–23)
BUN/CREAT SERPL: 20 (ref 7–25)
CALCIUM SPEC-SCNC: 9.1 MG/DL (ref 8.6–10.5)
CHLORIDE SERPL-SCNC: 107 MMOL/L (ref 98–107)
CLARITY UR: ABNORMAL
CO2 SERPL-SCNC: 28 MMOL/L (ref 22–29)
COLOR UR: YELLOW
CREAT SERPL-MCNC: 0.95 MG/DL (ref 0.76–1.27)
DEPRECATED RDW RBC AUTO: 43.8 FL (ref 37–54)
EGFRCR SERPLBLD CKD-EPI 2021: 79.9 ML/MIN/1.73
EOSINOPHIL # BLD AUTO: 0.1 10*3/MM3 (ref 0–0.4)
EOSINOPHIL NFR BLD AUTO: 0.5 % (ref 0.3–6.2)
ERYTHROCYTE [DISTWIDTH] IN BLOOD BY AUTOMATED COUNT: 13.4 % (ref 12.3–15.4)
GLUCOSE SERPL-MCNC: 172 MG/DL (ref 65–99)
GLUCOSE UR STRIP-MCNC: NEGATIVE MG/DL
HCT VFR BLD AUTO: 40.2 % (ref 37.5–51)
HGB BLD-MCNC: 13.5 G/DL (ref 13–17.7)
HGB UR QL STRIP.AUTO: NEGATIVE
HYALINE CASTS UR QL AUTO: ABNORMAL /LPF
KETONES UR QL STRIP: ABNORMAL
LEUKOCYTE ESTERASE UR QL STRIP.AUTO: ABNORMAL
LYMPHOCYTES # BLD AUTO: 0.9 10*3/MM3 (ref 0.7–3.1)
LYMPHOCYTES NFR BLD AUTO: 7.8 % (ref 19.6–45.3)
MCH RBC QN AUTO: 31.7 PG (ref 26.6–33)
MCHC RBC AUTO-ENTMCNC: 33.6 G/DL (ref 31.5–35.7)
MCV RBC AUTO: 94.4 FL (ref 79–97)
MONOCYTES # BLD AUTO: 0.6 10*3/MM3 (ref 0.1–0.9)
MONOCYTES NFR BLD AUTO: 5.4 % (ref 5–12)
NEUTROPHILS NFR BLD AUTO: 86 % (ref 42.7–76)
NEUTROPHILS NFR BLD AUTO: 9.7 10*3/MM3 (ref 1.7–7)
NITRITE UR QL STRIP: NEGATIVE
NRBC BLD AUTO-RTO: 0 /100 WBC (ref 0–0.2)
PH UR STRIP.AUTO: <=5 [PH] (ref 5–8)
PLATELET # BLD AUTO: 232 10*3/MM3 (ref 140–450)
PMV BLD AUTO: 8.3 FL (ref 6–12)
POTASSIUM SERPL-SCNC: 3.7 MMOL/L (ref 3.5–5.2)
PROT UR QL STRIP: ABNORMAL
RBC # BLD AUTO: 4.26 10*6/MM3 (ref 4.14–5.8)
RBC # UR STRIP: ABNORMAL /HPF
REF LAB TEST METHOD: ABNORMAL
SODIUM SERPL-SCNC: 143 MMOL/L (ref 136–145)
SP GR UR STRIP: 1.03 (ref 1–1.03)
SQUAMOUS #/AREA URNS HPF: ABNORMAL /HPF
URATE CRY URNS QL MICRO: ABNORMAL /HPF
UROBILINOGEN UR QL STRIP: ABNORMAL
WBC # UR STRIP: ABNORMAL /HPF
WBC NRBC COR # BLD: 11.2 10*3/MM3 (ref 3.4–10.8)

## 2023-07-12 PROCEDURE — 63710000001 PREDNISONE PER 5 MG: Performed by: INTERNAL MEDICINE

## 2023-07-12 PROCEDURE — 25010000002 PIPERACILLIN SOD-TAZOBACTAM PER 1 G: Performed by: INTERNAL MEDICINE

## 2023-07-12 PROCEDURE — 80048 BASIC METABOLIC PNL TOTAL CA: CPT | Performed by: INTERNAL MEDICINE

## 2023-07-12 PROCEDURE — 85025 COMPLETE CBC W/AUTO DIFF WBC: CPT | Performed by: INTERNAL MEDICINE

## 2023-07-12 PROCEDURE — 81001 URINALYSIS AUTO W/SCOPE: CPT | Performed by: INTERNAL MEDICINE

## 2023-07-12 RX ADMIN — APIXABAN 5 MG: 5 TABLET, FILM COATED ORAL at 08:58

## 2023-07-12 RX ADMIN — PIPERACILLIN AND TAZOBACTAM 3.38 G: 3; .375 INJECTION, POWDER, LYOPHILIZED, FOR SOLUTION INTRAVENOUS at 12:01

## 2023-07-12 RX ADMIN — FAMOTIDINE 20 MG: 20 TABLET, FILM COATED ORAL at 08:58

## 2023-07-12 RX ADMIN — LEVOTHYROXINE SODIUM 25 MCG: 0.03 TABLET ORAL at 06:14

## 2023-07-12 RX ADMIN — PREDNISONE 5 MG: 5 TABLET ORAL at 08:57

## 2023-07-12 RX ADMIN — Medication 10 ML: at 09:06

## 2023-07-12 RX ADMIN — GUAIFENESIN 400 MG: 200 SOLUTION ORAL at 06:14

## 2023-07-12 RX ADMIN — BRIMONIDINE TARTRATE 1 DROP: 2 SOLUTION/ DROPS OPHTHALMIC at 08:58

## 2023-07-12 RX ADMIN — CETIRIZINE HYDROCHLORIDE 10 MG: 10 TABLET, FILM COATED ORAL at 08:58

## 2023-07-12 RX ADMIN — ESCITALOPRAM OXALATE 10 MG: 10 TABLET ORAL at 08:58

## 2023-07-12 RX ADMIN — ASPIRIN 81 MG: 81 TABLET, COATED ORAL at 08:58

## 2023-07-12 RX ADMIN — PIPERACILLIN AND TAZOBACTAM 3.38 G: 3; .375 INJECTION, POWDER, LYOPHILIZED, FOR SOLUTION INTRAVENOUS at 04:33

## 2023-07-12 RX ADMIN — GALANTAMINE 8 MG: 4 TABLET, FILM COATED ORAL at 08:58

## 2023-07-12 NOTE — CASE MANAGEMENT/SOCIAL WORK
Case Management Discharge Note      Final Note: Routine home.    Selected Continued Care - Discharged on 7/12/2023 Admission date: 7/10/2023 - Discharge disposition: Home or Self Care      Home Medical Care Coordination complete.      Service Provider Selected Services Address Phone Fax Patient Preferred    [facility] [address] [facility] [facility] [place] [address] [phone] --               Transportation Services  Private: Car    Final Discharge Disposition Code: 01 - home or self-care

## 2023-07-12 NOTE — DISCHARGE SUMMARY
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]   Mixed hyperlipidemia [E78.2]   SSS (sick sinus syndrome) [I49.5]   Dementia [F03.90]   Essential hypertension [I10]   Coronary artery disease involving native coronary artery of native heart without angina pectoris [I25.10]   Dysautonomia [G90.1]   Anticoagulant long-term use [Z79.01]   COPD (chronic obstructive pulmonary disease) [J44.9]   Cardiac pacemaker in situ [Z95.0]   Degenerative cervical spinal stenosis [M48.02]       Presenting Problem/History of Present Illness  Active Hospital Problems    Diagnosis  POA    **Aspiration pneumonia of both lungs [J69.0]  Yes    Aspiration pneumonia [J69.0]  Yes    Failure to thrive in adult [R62.7]  Yes    Altered mental status [R41.82]  Yes    Weakness [R53.1]  Yes    Caregiver stress [Z63.6]  Not Applicable    Cognitive safety issue [R41.9]  Yes    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          Hospital Course  Patient is a 82 y.o. male with advanced dementia, immobility and dysphagia who presented with cough to ER.  He was recently treated for aspiration  pneumonia with wife and was discharged home on oral Augmentin.  Wife provides 24/7 care and was giving him pureed diet with thin liquids.  She brought him to the ER when she herself needed to be evaluated for left rib pain because she had no caregivers.  ER physician was concerned that he had worsening aspiration pneumonia, so he was admitted.  Clinically he had improved since discharge, although admittedly he is weak, cachectic and likely continues to aspirate. Wife refuses SNF.  He had VFSS and wife is now agreeable to thickened liquids in addition to pureed diet, which she had previously refused.  He is being discharged home to her care and will complete course of Augmentin..      Procedures Performed         Consults:   Consults       Date and Time Order Name Status Description    7/11/2023  3:09 PM Inpatient Urology Consult              Pertinent Test Results:    Lab Results (most recent)       Procedure Component Value Units Date/Time    Basic Metabolic Panel [[id number]]  (Abnormal) Collected: 07/12/23 0252    Specimen: Blood Updated: 07/12/23 0348     Glucose 172 mg/dL      BUN 19 mg/dL      Creatinine 0.95 mg/dL      Sodium 143 mmol/L      Potassium 3.7 mmol/L      Chloride 107 mmol/L      CO2 28.0 mmol/L      Calcium 9.1 mg/dL      BUN/Creatinine Ratio 20.0     Anion Gap 8.0 mmol/L      eGFR 79.9 mL/min/1.73     Narrative:      GFR Normal >60  Chronic Kidney Disease <60  Kidney Failure <15    The GFR formula is only valid for adults with stable renal function between ages 18 and 70.    CBC & Differential [[id number]]  (Abnormal) Collected: 07/12/23 0252    Specimen: Blood Updated: 07/12/23 0334    Narrative:      The following orders were created for panel order CBC & Differential.  Procedure                               Abnormality         Status                     ---------                               -----------         ------                     CBC Auto Differential[[id number]]        Abnormal             Final result                 Please view results for these tests on the individual orders.    CBC Auto Differential [[id number]]  (Abnormal) Collected: 07/12/23 0252    Specimen: Blood Updated: 07/12/23 0334     WBC 11.20 10*3/mm3      RBC 4.26 10*6/mm3      Hemoglobin 13.5 g/dL      Hematocrit 40.2 %      MCV 94.4 fL      MCH 31.7 pg      MCHC 33.6 g/dL      RDW 13.4 %      RDW-SD 43.8 fl      MPV 8.3 fL      Platelets 232 10*3/mm3      Neutrophil % 86.0 %      Lymphocyte % 7.8 %      Monocyte % 5.4 %      Eosinophil % 0.5 %      Basophil % 0.3 %      Neutrophils, Absolute 9.70 10*3/mm3      Lymphocytes, Absolute 0.90 10*3/mm3      Monocytes, Absolute 0.60 10*3/mm3      Eosinophils, Absolute 0.10 10*3/mm3      Basophils, Absolute 0.00 10*3/mm3      nRBC 0.0 /100 WBC     Urinalysis, Microscopic Only - Urine, Clean Catch [[id number]]  (Abnormal) Collected: 07/12/23 0220    Specimen: Urine, Clean Catch Updated: 07/12/23 0305     RBC, UA None Seen /HPF      WBC, UA 0-2 /HPF      Comment: Urine culture not indicated.        Bacteria, UA None Seen /HPF      Squamous Epithelial Cells, UA 0-2 /HPF      Hyaline Casts, UA None Seen /LPF      Uric Acid Crystals, UA Large/3+ /HPF      Methodology Manual Light Microscopy    Urinalysis With Culture If Indicated - Urine, Clean Catch [[id number]]  (Abnormal) Collected: 07/12/23 0220    Specimen: Urine, Clean Catch Updated: 07/12/23 0247     Color, UA Yellow     Appearance, UA Cloudy     pH, UA <=5.0     Specific Gravity, UA 1.028     Glucose, UA Negative     Ketones, UA Trace     Bilirubin, UA Negative     Blood, UA Negative     Protein, UA 30 mg/dL (1+)     Leuk Esterase, UA Small (1+)     Nitrite, UA Negative     Urobilinogen, UA 1.0 E.U./dL    Narrative:      In absence of clinical symptoms, the presence of pyuria, bacteria, and/or nitrites on the urinalysis result does not correlate with infection.    Blood Culture - Blood, Arm, Left [[id number]]  (Normal) Collected: 07/10/23  2151    Specimen: Blood from Arm, Left Updated: 07/11/23 2200     Blood Culture No growth at 24 hours    Narrative:      Less than seven (7) mL's of blood was collected.  Insufficient quantity may yield false negative results.    Blood Culture - Blood, Arm, Right [[id number]]  (Normal) Collected: 07/10/23 2105    Specimen: Blood from Arm, Right Updated: 07/11/23 2115     Blood Culture No growth at 24 hours    Narrative:      Less than seven (7) mL's of blood was collected.  Insufficient quantity may yield false negative results.    MRSA Screen, PCR (Inpatient) - Swab, Nares [[id number]]  (Normal) Collected: 07/11/23 1033    Specimen: Swab from Nares Updated: 07/11/23 1304     MRSA PCR No MRSA Detected    Narrative:      The negative predictive value of this diagnostic test is high and should only be used to consider de-escalating anti-MRSA therapy. A positive result may indicate colonization with MRSA and must be correlated clinically.    Basic Metabolic Panel [[id number]]  (Normal) Collected: 07/11/23 0225    Specimen: Blood Updated: 07/11/23 0324     Glucose 89 mg/dL      BUN 19 mg/dL      Creatinine 0.92 mg/dL      Sodium 141 mmol/L      Potassium 3.7 mmol/L      Comment: Slight hemolysis detected by analyzer. Results may be affected.        Chloride 103 mmol/L      CO2 27.0 mmol/L      Calcium 9.2 mg/dL      BUN/Creatinine Ratio 20.7     Anion Gap 11.0 mmol/L      eGFR 83.1 mL/min/1.73     Narrative:      GFR Normal >60  Chronic Kidney Disease <60  Kidney Failure <15    The GFR formula is only valid for adults with stable renal function between ages 18 and 70.    CBC & Differential [[id number]]  (Abnormal) Collected: 07/11/23 0225    Specimen: Blood Updated: 07/11/23 0252    Narrative:      The following orders were created for panel order CBC & Differential.  Procedure                               Abnormality         Status                     ---------                               -----------         ------                      CBC Auto Differential[[id number]]        Abnormal            Final result                 Please view results for these tests on the individual orders.    CBC Auto Differential [[id number]]  (Abnormal) Collected: 07/11/23 0225    Specimen: Blood Updated: 07/11/23 0252     WBC 11.00 10*3/mm3      RBC 4.53 10*6/mm3      Hemoglobin 14.0 g/dL      Hematocrit 42.8 %      MCV 94.4 fL      MCH 31.0 pg      MCHC 32.8 g/dL      RDW 13.9 %      RDW-SD 45.9 fl      MPV 8.2 fL      Platelets 218 10*3/mm3      Neutrophil % 73.4 %      Lymphocyte % 15.0 %      Monocyte % 8.5 %      Eosinophil % 2.6 %      Basophil % 0.5 %      Neutrophils, Absolute 8.00 10*3/mm3      Lymphocytes, Absolute 1.60 10*3/mm3      Monocytes, Absolute 0.90 10*3/mm3      Eosinophils, Absolute 0.30 10*3/mm3      Basophils, Absolute 0.10 10*3/mm3      nRBC 0.0 /100 WBC     Comprehensive Metabolic Panel [[id number]] Collected: 07/10/23 2206    Specimen: Blood from Arm, Right Updated: 07/10/23 2240     Glucose 93 mg/dL      BUN 21 mg/dL      Creatinine 0.87 mg/dL      Sodium 141 mmol/L      Potassium 4.0 mmol/L      Chloride 102 mmol/L      CO2 29.0 mmol/L      Calcium 9.5 mg/dL      Total Protein 6.4 g/dL      Albumin 3.8 g/dL      ALT (SGPT) 27 U/L      AST (SGOT) 30 U/L      Alkaline Phosphatase 66 U/L      Total Bilirubin 0.4 mg/dL      Globulin 2.6 gm/dL      A/G Ratio 1.5 g/dL      BUN/Creatinine Ratio 24.1     Anion Gap 10.0 mmol/L      eGFR 86.1 mL/min/1.73     Narrative:      GFR Normal >60  Chronic Kidney Disease <60  Kidney Failure <15    The GFR formula is only valid for adults with stable renal function between ages 18 and 70.    Procalcitonin [[id number]]  (Normal) Collected: 07/10/23 2105    Specimen: Blood from Arm, Right Updated: 07/10/23 2151     Procalcitonin 0.08 ng/mL     Narrative:      As a Marker for Sepsis (Non-Neonates):    1. <0.5 ng/mL represents a low risk of severe sepsis and/or septic shock.  2. >2 ng/mL represents a  "high risk of severe sepsis and/or septic shock.    As a Marker for Lower Respiratory Tract Infections that require antibiotic therapy:    PCT on Admission    Antibiotic Therapy       6-12 Hrs later    >0.5                Strongly Recommended  >0.25 - <0.5        Recommended   0.1 - 0.25          Discouraged              Remeasure/reassess PCT  <0.1                Strongly Discouraged     Remeasure/reassess PCT    As 28 day mortality risk marker: \"Change in Procalcitonin Result\" (>80% or <=80%) if Day 0 (or Day 1) and Day 4 values are available. Refer to [email or web address]-[email or web address]-[email or web address]    Change in PCT <=80%  A decrease of PCT levels below or equal to 80% defines a positive change in PCT test result representing a higher risk for 28-day all-cause mortality of patients diagnosed with severe sepsis for septic shock.    Change in PCT >80%  A decrease of PCT levels of more than 80% defines a negative change in PCT result representing a lower risk for 28-day all-cause mortality of patients diagnosed with severe sepsis or septic shock.       POC Lactate [[id number]]  (Normal) Collected: 07/10/23 2036    Specimen: Blood Updated: 07/10/23 2037     Lactate 1.0 mmol/L      Comment: Serial Number: [id number]:  [id number]                Results for orders placed during the hospital encounter of 04/10/23    Adult Transthoracic Echo Complete W/ Cont if Necessary Per Protocol    Interpretation Summary    Left ventricular systolic function is normal. Left ventricular ejection fraction appears to be 61 - 65%.    Left ventricular diastolic function is consistent with (grade Ia w/high LAP) impaired relaxation.    Estimated right ventricular systolic pressure from tricuspid regurgitation is mildly elevated (35-45 mmHg).              Condition on Discharge:  Frail, poor    Vital Signs  Temp:  [97 °F (36.1 °C)-97.9 °F (36.6 °C)] 97.6 °F (36.4 °C)  Heart Rate:  [63-79] 66  Resp:  [14-21] 18  BP: (117-170)/() " 152/90    Physical Exam:     General Appearance:    Alert, thin, cachectic, able to answer simple questions; does nnot reliably follow commands.    Head:    Normocephalic, without obvious abnormality, atraumatic   Eyes:            Lids and lashes normal, conjunctivae and sclerae normal, no   icterus, no pallor, corneas clear, PERRLA   Ears:    Ears appear intact with no abnormalities noted   Throat:   No oral lesions, no thrush, oral mucosa moist   Neck:   No adenopathy, supple, trachea midline, no thyromegaly, no   carotid bruit, no JVD   Lungs:     Few scattered rhonchi, essentially clear    Heart:    Regular rhythm and normal rate, normal S1 and S2, no            murmur, no gallop, no rub, no click   Chest Wall:    No abnormalities observed   Abdomen:     Normal bowel sounds, no masses, no organomegaly, soft        non-tender, non-distended, no guarding, no rebound                tenderness   Extremities:   Moves all extremities well, no edema, no cyanosis, no             redness   Pulses:   Pulses palpable and equal bilaterally   Skin:   No bleeding, bruising or rash   Lymph nodes:   No palpable adenopathy   Neurologic:  No new, focal deficits       Discharge Disposition  Home or Self Care    Discharge Medications     Discharge Medications        Continue These Medications        Instructions Start Date   acetaminophen 500 MG tablet  Commonly known as: TYLENOL   1,000 mg, Oral, Every 6 Hours PRN      amoxicillin-clavulanate 600-42.9 MG/5ML suspension  Commonly known as: Augmentin ES-600   Take 7 mL by mouth 2 (Two) Times a Day for 7 days Discard remainder.      apixaban 5 MG tablet tablet  Commonly known as: ELIQUIS   5 mg, Oral, 2 Times Daily      aspirin 81 MG EC tablet   81 mg, Oral, Daily      brimonidine 0.2 % ophthalmic solution  Commonly known as: ALPHAGAN   1 drop, Both Eyes, 2 Times Daily      Claritin 10 MG tablet  Generic drug: loratadine   10 mg, Oral, Daily      docusate sodium 100 MG  capsule  Commonly known as: COLACE   100 mg, Oral, Nightly      escitalopram 10 MG tablet  Commonly known as: LEXAPRO   10 mg, Oral, Daily      galantamine 8 MG tablet  Commonly known as: RAZADYNE   8 mg, Oral, 2 Times Daily      Anette-Tussin 100 MG/5ML liquid  Generic drug: guaifenesin   400 mg, Oral, Every 8 Hours Scheduled      Heartburn Relief Max St 20 MG tablet  Generic drug: famotidine   20 mg, Oral, 2 Times Daily      Incruse Ellipta 62.5 MCG/ACT aerosol powder   Generic drug: Umeclidinium Bromide   1 puff, Oral, Daily      levothyroxine 25 MCG tablet  Commonly known as: SYNTHROID, LEVOTHROID   25 mcg, Oral, Every Early Morning      lidocaine 5 %  Commonly known as: LIDODERM   1 patch, Transdermal, Every 24 Hours Scheduled, Remove & Discard patch within 12 hours or as directed by MD      midodrine 2.5 MG tablet  Commonly known as: PROAMATINE   2.5 mg, Oral, As Needed, SBP <105      multivitamin with minerals tablet tablet   1 tablet, Oral, Nightly      nitroglycerin 0.4 MG SL tablet  Commonly known as: NITROSTAT   0.4 mg, Sublingual, Every 5 Minutes PRN, Take no more than 3 doses in 15 minutes.       predniSONE 5 MG tablet  Commonly known as: DELTASONE   5 mg, Oral, Daily      rosuvastatin 20 MG tablet  Commonly known as: CRESTOR   20 mg, Oral, Every Evening               Discharge Diet:   Diet Instructions       Diet: Regular/House Diet; Pureed (NDD 1); [place] Thick      Discharge Diet: Regular/House Diet    Texture: Pureed (NDD 1)    Fluid Consistency: Nectar Thick            Activity at Discharge:     Follow-up Appointments  No future appointments.  Additional Instructions for the Follow-ups that You Need to Schedule       Discharge Follow-up with PCP   As directed       Currently Documented PCP:    Tamia Meneses NP    PCP Phone Number:    [phone]     Follow Up Details: [facility] will contact you with follow up appointment details.         Discharge Follow-up with Specified Provider: Dr. Nick; 3  Weeks   As directed      To: Dr. Nick    Follow Up: 3 Weeks                 Test Results Pending at Discharge  Pending Labs       Order Current Status    Blood Culture - Blood, Arm, Left Preliminary result    Blood Culture - Blood, Arm, Right Preliminary result             Leandra Carrero MD  07/12/23  12:39 EDT    Time: Discharge 25 min

## 2023-07-12 NOTE — CASE MANAGEMENT/SOCIAL WORK
Continued Stay Note   [facility]     Patient Name: Jayden Bond  MRN: [id number]  Today's Date: 7/12/2023    Admit Date: 7/10/2023    Plan: Return home with wife. No HHC agency at this time. [facility] to follow. 3L home O2 w/ [facility].   Discharge Plan       Row Name 07/12/23 1543       Plan    Plan Return home with wife. No HHC agency at this time. [facility] to follow. 3L home O2 w/ [facility].    Patient/Family in Agreement with Plan yes    Plan Comments CM followed up with patient's wife this afternoon regarding additional HHC choices. Wife had not reviewed list further and understands many do not have speech therapy available. Advised that she can review once she returns home and can notify PCP for order. [facility] will continue to follow pt.             Case Management Readmission Assessment Note    Case Management Readmission Assessment (all recorded)       Readmission Interview       Row Name 07/12/23 1544             Readmission Indications    Is this hospitalization related to the prior hospital diagnosis? Yes      What was the reason you were admitted? Aspiration PNA, general weakness.        Row Name 07/12/23 1544             Recommendation for rehospitalization    Did you speak with your physician prior to coming to the hospital No      Who recommended you return to the hospital? Other (comment)  No one. Wife wanted to be evaluated so had pt also evaluated in ED.      Did you seek care elsewhere prior to coming to the hospital? No        Row Name 07/12/23 1544             Follow up appointment    Do you have a PCP? Yes  Piero NP      Did you have an appointment with PCP/specialist after hospitalization within 7 days? No      Did you keep your appointment? No      If you did not keep appointment, why? Other (comment)  Pt returned to hospital. Was supposed to schedule appt on Mon. 7/10        Row Name 07/12/23 1544             Medications    Did you have newly prescribed medications at discharge? Yes   amoxicillin-clavulanate (Augmentin ES-600), Anette-Tussin (guaifenesin), Heartburn Relief Max St (famotidine), lidocaine (LIDODERM)      Did you understand the reasons for your medications at discharge and how to take them? Yes      Did you understand the side effects of your medications? Yes      Are you taking all of you prescribed medications? Yes        Row Name 07/12/23 1544             Discharge Instructions    Did you understand your discharge instructions? Yes      Did your family/caregiver hear your instructions? Yes      Were you told to eat a special diet? Yes  Pureed      Did you adhere to the diet? Yes      Were you given a number of someone to call if you had questions or concerns? Yes        Row Name 07/12/23 1544             Index discharge location/services    Where did you go upon discharge? Home with services      Do you have supportive family or friends in the home? Yes      What services were arranged at discharge[facility]. Declined visit on Sun. 7/9.        Row Name 07/12/23 1544             Discharge Readiness    On a scale of 1-5 (5 being well prepared), how ready were you for discharge 3      Recommendation based on interview Goals of care discussion/advanced care planning                    Megan Naegele, RN     Office Phone: [phone]  Office Cell: [phone]

## 2023-07-15 LAB
BACTERIA SPEC AEROBE CULT: NORMAL
BACTERIA SPEC AEROBE CULT: NORMAL

## 2023-07-27 ENCOUNTER — READMISSION MANAGEMENT (OUTPATIENT)
Dept: CALL CENTER | Facility: HOSPITAL | Age: 83
End: 2023-07-27
Payer: MEDICARE

## 2023-07-27 NOTE — OUTREACH NOTE
COPD/PN Week 2 Survey      Flowsheet Row Responses   [facility] patient discharged from? [facility]   Does the patient have one of the following disease processes/diagnoses(primary or secondary)? Pneumonia   Week 2 attempt successful? Yes   Call start time 1625   Call end time 1634   Discharge diagnosis Aspiration pneumonia of both lungs   Person spoke with today (if not patient) and relationship Spouse   Is the patient taking all medications as directed (includes completed medication regime)? Yes   Does the patient have a primary care provider?  Yes   Does the patient have an appointment with their PCP or specialist within 7 days of discharge? Yes   Has the patient kept scheduled appointments due by today? N/A   [facility] agency[facility]    Has home health visited the patient within 72 hours of discharge? Yes   Psychosocial issues? No   What is the patient's perception of their health status since discharge? New symptoms unrelated to diagnosis   Is the patient/caregiver able to teach back the hierarchy of who to call/visit for symptoms/problems? PCP, Specialist, Home health nurse, Urgent Care, ED, 911 Yes   Week 2 call completed? Yes   Wrap up additional comments Wife is sending in urine sample due to possible UTI.  Unable to ask questions due to wife needed to end call.   Call end time 1634            Faiza FRANCISCO - Licensed Nurse

## 2023-08-08 ENCOUNTER — READMISSION MANAGEMENT (OUTPATIENT)
Dept: CALL CENTER | Facility: HOSPITAL | Age: 83
End: 2023-08-08
Payer: MEDICARE

## 2023-08-08 NOTE — OUTREACH NOTE
COPD/PN Week 3 Survey      Flowsheet Row Responses   [facility] facility patient discharged from? [facility]   Does the patient have one of the following disease processes/diagnoses(primary or secondary)? Pneumonia   Week 3 attempt successful? Yes   Call start time 1132   Call end time 1143   Week 3 call completed? Yes   Revoked No further contact(revokes)-requires comment   Graduated/Revoked comments Spouse reports being very upset concerning pt's care. Spouse spoke at length about poor care. No other informations was discussed. Pt prefers no more calls.   Call end time 1143            Mary LEIVA - Registered Nurse

## 2023-11-20 NOTE — ED PROVIDER NOTES
EMERGENCY DEPARTMENT ENCOUNTER    Room Number:  17/17  Date of encounter:  11/20/2023  PCP: Radha Santos MD  Historian: Patient, spouse, close friend  Relevant information and history provided by sources other than the patient will be included below and in the ED Course.  Review of pertinent past medical records may also be included in record below and ED Course.    HPI:  Chief Complaint: Concerns he had a stroke  A complete HPI/ROS/PMH/PSH/SH/FH are unobtainable due to: Patient is not able to provide any history.  He is got significant chronic dementia.  Context: Jayden Bond is a 83 y.o. male who presents to the ED c/o spouse brings him here because she feels that he might of had another stroke.  He is leaning to the right.  She thinks at times he is weaker in his right arm than normal.  He does not ambulate or stand independently at baseline.  She also reported that she feels as if there was some mild right facial droop.  This always been something this been occurring for a couple days.  She also mentions that his eyes will look ahead and appeared to roll in his head and sometimes look up and sometimes look to the side.  Sometimes he will give a blank stare and he will not respond.  He is not able to provide any history.  He is able to tell me his name.  He is unaware of why he is here.  He will follow simple commands.  He usually does not answer questions he has no knowledge about his past history or his current medicines.  His wife states that he has been compliant with all his medicines he is been compliant with Eliquis with his last dose today.  Also reports that he has had a temperature low-grade for him at 99.9.  It has not been higher than that.  She also reports that he has a cough that is more prominent at night.  He has had cough and aspiration in the past.  No definitive choking episodes.        Previous Episodes: Yes in the past.  Current Symptoms: See above    MEDICAL HISTORY REVIEWED  I  reviewed old records on this gentleman.  I can see this gentleman has a history of failure to thrive and dysphagia in the past.  Said history of altered mental status and history of aspiration pneumonia.  Has history of vascular dementia and TIAs in the past.  I can see that he has been on Eliquis history of COPD and history of coronary artery disease.  I did review his medicine list.    I can see that he was admitted with aspiration pneumonia to [facility] on 7/10/2023.  I again reviewed his diagnosis on that admission his medicine list.  They mention he has advanced dementia and immobility and dysphagia it appears that he refused skilled nursing facility on his discharge from [facility] on this admission    I can see where he was seen at [facility] emergency department October 19 for hematuria.  I reviewed the CT scan of the abdomen and pelvis.  Again there is mention of significant vascular dementia in these records he saw urology and he has severely dysfunctional bladder.  I reviewed the report of the CT of the abdomen pelvis from [facility] on 10/18/2023.  This was done for hematuria.  He had an enlarged prostate with severe hypertrophic bladder wall changes extensive diverticulosis there was a compression deformity at L4 endplate.  Has coronary artery atherosclerosis and chronic fibrotic changes in the lung bases bilaterally.  PAST MEDICAL HISTORY  Active Ambulatory Problems     Diagnosis Date Noted    Chest pain 02/03/2020    Essential hypertension     Stroke     Disease of thyroid gland     Coronary artery disease involving native coronary artery of native heart without angina pectoris     Dysautonomia     Dementia     Anticoagulant long-term use 04/24/2019    Cardiac pacemaker in situ 09/30/2015    COPD (chronic obstructive pulmonary disease) 02/06/2019    Degenerative cervical spinal stenosis 02/02/2015    Dysautonomia orthostatic hypotension syndrome 09/22/2016    Epiphora due  to insufficient drainage of both sides 03/15/2017    History of CVA (cerebrovascular accident) 09/22/2016    History of PTCA 09/30/2015    Punctal stenosis, acquired 03/15/2017    Nonsustained ventricular tachycardia 11/05/2019    Pseudophakia, both eyes 03/15/2017    SSS (sick sinus syndrome) 10/19/2020    Palpitations 03/11/2021    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 09/14/2021    Weakness 09/28/2021    Vascular dementia with behavior disturbance 09/19/2021    Nocturnal hypoxemia 09/19/2021    Delirium due to multiple etiologies, persistent, hypoactive 09/19/2021    Cognitive safety issue 09/19/2021    Caregiver stress 09/19/2021    Dementia 05/12/2021    Disease of thyroid gland 05/12/2021    Dysautonomia 05/12/2021    Mixed hyperlipidemia 05/12/2021    Transient ischemic attack (TIA) 09/28/2021    Altered mental status 10/21/2021    CAP (community acquired pneumonia) 10/21/2021    Abnormal finding on urinalysis 08/18/2022    Poor short-term memory 07/01/2022    Pneumonia 08/18/2022    Altered mental status, unspecified altered mental status type 04/10/2023    History of falling 01/01/2023    Difficulty speaking 07/03/2023    Dysphagia 07/05/2023    Failure to thrive in adult 07/05/2023    Aspiration pneumonia 07/05/2023    Aspiration pneumonia of both lungs 07/11/2023     Resolved Ambulatory Problems     Diagnosis Date Noted    Syncope 03/04/2020    Mixed hyperlipidemia     Coronary artery disease     Syncope 05/11/2020    Acquired blepharoptosis of both eyes 03/15/2017    Ataxia due to recent cerebrovascular accident 11/13/2014    Benign essential hypertension 06/30/2014    Laxity of eyelid 03/15/2017    Lower eyelid retraction of left eye 03/15/2017    Sleep disturbance 09/19/2021    Acute UTI (urinary tract infection) 10/21/2021    Acute UTI 08/17/2022     Past Medical History:   Diagnosis Date    CAD (coronary artery disease)     Depression     Dyslipidemia     GERD (gastroesophageal reflux  disease)     Head pain     Hyperlipidemia     Hypertension          PAST SURGICAL HISTORY  Past Surgical History:   Procedure Laterality Date    APPENDECTOMY      CHOLECYSTECTOMY      CYSTOSCOPY      ENDOSCOPY N/A 10/17/2019    Procedure: ESOPHAGOGASTRODUODENOSCOPY with biopsy x 2 areas;  Surgeon: Ashok Sanchez MD;  Location: [facility] ENDOSCOPY;  Service: Gastroenterology    HERNIA REPAIR      INSERT / REPLACE / REMOVE PACEMAKER      LEFT HEART CATH      PACEMAKER IMPLANTATION      [employer]    TEMPORAL ARTERY BIOPSY      WRIST SURGERY      severed artery         FAMILY HISTORY  Family History   Problem Relation Age of Onset    Heart disease Father          SOCIAL HISTORY  Social History     Socioeconomic History    Marital status:    Tobacco Use    Smoking status: Former    Smokeless tobacco: Never    Tobacco comments:     quit 25 yrs ago   Vaping Use    Vaping Use: Never used   Substance and Sexual Activity    Alcohol use: No    Drug use: No    Sexual activity: Defer         ALLERGIES  Trazodone, Doxycycline, Ciprofloxacin, Doxycycline, Fosfomycin tromethamine, Gemtesa [vibegron], Hydralazine, Keppra [levetiracetam], Macrobid [nitrofurantoin], Quetiapine, and Sulfa antibiotics        REVIEW OF SYSTEMS  Review of Systems     All systems reviewed and negative except for those discussed in HPI.       PHYSICAL EXAM    I have reviewed the triage vital signs and nursing notes.    ED Triage Vitals   Temp Heart Rate Resp BP SpO2   11/20/23 1608 11/20/23 1608 11/20/23 1623 11/20/23 1623 11/20/23 1608   97 °F (36.1 °C) 67 18 (!) 168/110 97 %      Temp src Heart Rate Source Patient Position BP Location FiO2 (%)   11/20/23 1608 11/20/23 1608 11/20/23 1623 11/20/23 1623 --   Tympanic Monitor Lying Right arm        GENERAL: This is an elderly male that is frail and chronically ill.  He appears chronically malnourished.  No acute distress.Vital signs on my initial evaluation he is chronically on 3 L of oxygen his O2 sat is  99%.  He is afebrile.  Blood pressure and heart rate are unremarkable  HENT: nares patent  Head/neck/ face are symmetric without gross deformity, signs of trauma, or swelling  EYES: no scleral icterus, no conjunctival pallor.  NECK: Supple, no meningismus  CV: regular rhythm, regular rate with intact distal pulses.  RESPIRATORY: normal effort and no respiratory distress.  Decreased breath sounds in the bases bilaterally.  ABDOMEN: soft and nontender.  MUSCULOSKELETAL: no deformity.  Intact distal pulses to upper and lower extremities bilaterally.  No cyanosis or coolness.  Has very mild nonspecific swelling to his left ankle.  No other swelling appreciated in extremities otherwise appear symmetric  NEURO: alert to name only.  He will follow simple commands.  I do not appreciate any obvious facial droop spouse feels that on the right he is lip is a little depressed and there is a little droop from baseline.  He has diffuse weakness.  He is usually weaker on the left but he is able to raise both arms off the bed.  I do not see any definitive droop or drift to his upper extremities.  He has minimal movement at baseline to his lower extremities.  Again a lot of times he will give a blank stare and will not answer.  He will follow simple commands.  SKIN: warm, dry    Vital signs and nursing notes reviewed.        LAB RESULTS  Recent Results (from the past 24 hour(s))   Comprehensive Metabolic Panel    Collection Time: 11/20/23  5:43 PM    Specimen: Blood   Result Value Ref Range    Glucose 99 65 - 99 mg/dL    BUN 18 8 - 23 mg/dL    Creatinine 0.73 (L) 0.76 - 1.27 mg/dL    Sodium 140 136 - 145 mmol/L    Potassium 4.5 3.5 - 5.2 mmol/L    Chloride 106 98 - 107 mmol/L    CO2 29.2 (H) 22.0 - 29.0 mmol/L    Calcium 9.2 8.6 - 10.5 mg/dL    Total Protein 6.2 6.0 - 8.5 g/dL    Albumin 3.6 3.5 - 5.2 g/dL    ALT (SGPT) 13 1 - 41 U/L    AST (SGOT) 19 1 - 40 U/L    Alkaline Phosphatase 86 39 - 117 U/L    Total Bilirubin 0.3 0.0 - 1.2  mg/dL    Globulin 2.6 gm/dL    A/G Ratio 1.4 g/dL    BUN/Creatinine Ratio 24.7 7.0 - 25.0    Anion Gap 4.8 (L) 5.0 - 15.0 mmol/L    eGFR 90.3 >60.0 mL/min/1.73   Protime-INR    Collection Time: 11/20/23  5:43 PM    Specimen: Blood   Result Value Ref Range    Protime 14.3 (H) 11.7 - 14.2 Seconds    INR 1.09 0.90 - 1.10   High Sensitivity Troponin T    Collection Time: 11/20/23  5:43 PM    Specimen: Blood   Result Value Ref Range    HS Troponin T 19 <22 ng/L   BNP    Collection Time: 11/20/23  5:43 PM    Specimen: Blood   Result Value Ref Range    proBNP 211.0 0.0 - 1,800.0 pg/mL   Magnesium    Collection Time: 11/20/23  5:43 PM    Specimen: Blood   Result Value Ref Range    Magnesium 2.2 1.6 - 2.4 mg/dL   CBC Auto Differential    Collection Time: 11/20/23  5:43 PM    Specimen: Blood   Result Value Ref Range    WBC 10.67 3.40 - 10.80 10*3/mm3    RBC 4.47 4.14 - 5.80 10*6/mm3    Hemoglobin 13.2 13.0 - 17.7 g/dL    Hematocrit 40.3 37.5 - 51.0 %    MCV 90.2 79.0 - 97.0 fL    MCH 29.5 26.6 - 33.0 pg    MCHC 32.8 31.5 - 35.7 g/dL    RDW 11.5 (L) 12.3 - 15.4 %    RDW-SD 36.8 (L) 37.0 - 54.0 fl    MPV 10.1 6.0 - 12.0 fL    Platelets 203 140 - 450 10*3/mm3    Neutrophil % 78.2 (H) 42.7 - 76.0 %    Lymphocyte % 11.9 (L) 19.6 - 45.3 %    Monocyte % 7.2 5.0 - 12.0 %    Eosinophil % 1.7 0.3 - 6.2 %    Basophil % 0.3 0.0 - 1.5 %    Immature Grans % 0.7 (H) 0.0 - 0.5 %    Neutrophils, Absolute 8.34 (H) 1.70 - 7.00 10*3/mm3    Lymphocytes, Absolute 1.27 0.70 - 3.10 10*3/mm3    Monocytes, Absolute 0.77 0.10 - 0.90 10*3/mm3    Eosinophils, Absolute 0.18 0.00 - 0.40 10*3/mm3    Basophils, Absolute 0.03 0.00 - 0.20 10*3/mm3    Immature Grans, Absolute 0.08 (H) 0.00 - 0.05 10*3/mm3    nRBC 0.0 0.0 - 0.2 /100 WBC   Respiratory Panel PCR w/COVID-19(SARS-CoV-2) DANIEL/KHUSHBU/DEEPA/PAD/COR/DIPIKA In-House, NP Swab in UT/VTM, 2 HR TAT - Swab, Nasopharynx    Collection Time: 11/20/23  5:43 PM    Specimen: Nasopharynx; Swab   Result Value Ref Range     ADENOVIRUS, PCR Not Detected Not Detected    Coronavirus 229E Not Detected Not Detected    Coronavirus HKU1 Not Detected Not Detected    Coronavirus NL63 Not Detected Not Detected    Coronavirus OC43 Not Detected Not Detected    COVID19 Not Detected Not Detected - Ref. Range    Human Metapneumovirus Not Detected Not Detected    Human Rhinovirus/Enterovirus Not Detected Not Detected    Influenza A PCR Not Detected Not Detected    Influenza B PCR Not Detected Not Detected    Parainfluenza Virus 1 Not Detected Not Detected    Parainfluenza Virus 2 Not Detected Not Detected    Parainfluenza Virus 3 Not Detected Not Detected    Parainfluenza Virus 4 Not Detected Not Detected    RSV, PCR Not Detected Not Detected    Bordetella pertussis pcr Not Detected Not Detected    Bordetella parapertussis PCR Not Detected Not Detected    Chlamydophila pneumoniae PCR Not Detected Not Detected    Mycoplasma pneumo by PCR Not Detected Not Detected       Ordered the above labs and independently reviewed the results.        RADIOLOGY  CT Chest Without Contrast Diagnostic    Result Date: 11/20/2023  CT OF THE CHEST WITHOUT CONTRAST 11/20/2023  HISTORY: Possible aspiration.  TECHNIQUE: Spiral images were obtained from the lung apices to the upper abdomen. No intravenous contrast was given.  FINDINGS: There are ill-defined areas of increased density in the periphery of the bilateral lower lobes left more severe than right. These findings appear similar to the previous study of 10/27/2021. There are some mild similar changes in the periphery of the right upper lobe and superior segment right lower lobe also similar to the previous study. These findings are therefore thought to be chronic in nature.  No definite acute infiltrates are seen. There is fluid in pericardial recesses. A few shotty mediastinal nodes are seen. There is mild dilatation of the ascending aorta measuring up to 4.2 cm. Aortic arch measures approximately 4.0 cm in diameter.  A tiny subpleural nodule measuring 3 mm is seen in the right upper lobe on image 38, not well seen on the previous study.      1. Ill-defined peripheral areas of largely interstitial disease in the bilateral lungs as described. These appear relatively stable since the 10/27/2021 study and therefore thought to be related to chronic parenchymal change/interstitial fibrosis. 2. No definite acute infiltrates are seen. 4. Tiny subpleural 3 mm nodule in the right upper lobe. This is not clearly seen on the previous study of 10/27/2021 but still may be benign. 5. If clinically indicated a short-term follow-up CT of the chest in approximately 4 months to 6 months could be obtained to continue assessing stability of these findings.  Radiation dose reduction techniques were utilized, including automated exposure control and exposure modulation based on body size.       CT Head Without Contrast    Result Date: 11/20/2023  CT OF THE BRAIN WITHOUT CONTRAST 11/20/2023  HISTORY: Confusion. Possible facial droop.  TECHNIQUE: Axial images were obtained through the brain without intravenous contrast.  FINDINGS: There is moderately severe diffuse atrophy. There is decreased attenuation of the periventricular white matter bilaterally consistent with small vessel white matter ischemic disease. There is no evidence of acute infarction, hemorrhage, midline shift or mass effect. There is a small old infarction in the posterior right parieto-occipital region. Another small area of old infarct is probably present in the posterior left parieto-occipital region on image 28.  No bony abnormalities are seen. There is some ectasia of the left vertebral and basilar arteries and also of the internal carotid arteries stable since the 07/03/2023 study.      1. Atrophy and chronic ischemic disease. 2. No acute process identified.   Radiation dose reduction techniques were utilized, including automated exposure control and exposure modulation based on  body size.       XR Chest 1 View    Result Date: 11/20/2023  XR CHEST 1 VW-11/20/2023  HISTORY: Confusion. Cough.  Heart size is at the upper limits of normal. The patient is rotated slightly to the right. There is some bandlike probable scarring in the right lower lung similar to the 7/10/2023 study. No acute infiltrates are seen. There is some aortic calcification. Cardiac pacemaker is seen.      1. No acute process. 2. There is mild parenchymal scarring in the right lower lung.   This report was finalized on 11/20/2023 5:32 PM by Dr. Praveen Schwartz M.D on Workstation: [id number]       I ordered the above noted radiological studies. Reviewed by me and discussed with radiologist.  See dictation for official radiology interpretation.      PROCEDURES    Procedures      MEDICATIONS GIVEN IN ER    Medications   sodium chloride 0.9 % flush 10 mL (has no administration in time range)         All labs have been independently reviewed by me.  All radiology studies have been reviewed by me and I discussed with radiologist dictating the report when indicated below.  All EKG's independently viewed and interpreted by me.  Discussion below represents my analysis of pertinent findings related to patient's condition, differential diagnosis, treatment plan and final disposition.        PROGRESS, DATA ANALYSIS, CONSULTS, AND MEDICAL DECISION MAKING    This is a gentleman who is got chronic significant morbidities.  In looking at records this gentleman has chronic dementia in the past.  It is hard to know whether this gentleman has had an acute neurologic event but it seems unlikely.  Regardless he is not a thrombolytic candidate.  He is not an interventional candidate.  We did do a CT scan of his head without contrast.  We will also get a CT his chest.  Regular check lab work on him.  Discussed this plan with the patient and spouse at bedside.  All questions answered      ED Course as of 11/20/23 1920   Mon Nov 20, 2023   6592 My  own independent TURP rotation of the EKG that was done at 4:54 PM reveals a rate of 73 it is sinus rhythm there is some interventional conduction delay normal axis there is appearance of a right bundle branch block  I do not appreciate any definitive acute injury pattern  I compared this to the previous EKG on 7/23/2022 and it looks fairly similar. [MM]   1840 Patient's brief was wet and the nurse checked.  Patient's spouse adamantly denies him having any catheter for his urine.  She states that he will bleed and has had problems with hematuria after catheter in the past. [MM]   1851 I did discuss the case with Dr. Gamboa who is on for A.  She agrees to admit the patient to the hospital. [MM]   1852 I reviewed the CT scan of the head report from radiologist.  There is some chronic changes no acute intracranial abnormality no obvious swelling or hemorrhage.  Please see complete dictated report from radiologist [MM]   1852 I reviewed the CT scan report from the radiologist concerning the CT scan of the chest.  There is ill-defined peripheral areas of largely interstitial disease in the bilateral lungs this appears to be relatively stable when compared to previous CT scan October 27, 2021 no definitive acute infiltrates.  There is some nodules seen that need later follow-up.  Please see complete dictated report from radiologist [MM]   1853 I informed the patient as well as the spouse the results of the CT scan.  Informed the results of the lab work.  All questions answered. [MM]   1856 The etiology of his symptoms are not real clear.  Talked at length with the patient and family.  This gentleman has severe dementia.  Uncertain if this gentleman has had a definitive stroke.  I do not think clinically anything is going to change she is not a thrombolytic candidate or an interventional candidate.  He has advanced dementia at baseline.  He is on Eliquis with his last dose this morning. [MM]      ED Course User  Index  [MM] Ajay Varghese MD       AS OF 19:20 EST VITALS:    BP - (!) 168/110  HR - 64  TEMP - 97 °F (36.1 °C) (Tympanic)  02 SATS - 97%    SOCIAL DETERMINANTS OF HEALTH THAT IMPACT OR LIMIT CARE (For example..Homelessness,safe discharge, inability to obtain care, follow up, or prescriptions):      DIAGNOSIS  Final diagnoses:   Altered mental status, unspecified altered mental status type   Severe dementia, unspecified dementia type, unspecified whether behavioral, psychotic, or mood disturbance or anxiety   Coagulopathy: Eliquis induced         DISPOSITION  I have reviewed the test results with my patient and explained the current treatment plan.  I answered all of the patient's questions.  The patient will be admitted to monitor bed at this time.  The patient is not hypotensive and is tolerating their current disease condition well enough for a monitored bed at this time.  The patient's current condition does not require intensive care treatment at this time.            DICTATED UTILIZING DRAGON DICTATION    Note Disclaimer: At [facility], we believe that sharing information builds trust and better relationships. You are receiving this note because you recently visited [facility]. It is possible you will see health information before a provider has talked with you about it. This kind of information can be easy to misunderstand. To help you fully understand what it means for your health, we urge you to discuss this note with your provider.     Ajay Varghese MD  11/20/23 1920

## 2023-11-20 NOTE — ED TRIAGE NOTES
Pt was brought in by family for ams. Wife states that it has gotten progressively worse over the last couple weeks. Pt is oriented to self

## 2023-11-21 PROBLEM — E43 SEVERE MALNUTRITION: Status: ACTIVE | Noted: 2023-01-01

## 2023-11-21 NOTE — THERAPY EVALUATION
Patient Name: Jayden Bond  : 1940    MRN: [id number]                              Today's Date: 2023       Admit Date: 2023    Visit Dx:     ICD-10-CM ICD-9-CM   1. Altered mental status, unspecified altered mental status type  R41.82 780.97   2. Severe dementia, unspecified dementia type, unspecified whether behavioral, psychotic, or mood disturbance or anxiety  F03.C0 294.20   3. Coagulopathy: Eliquis induced  D68.9 286.9     Patient Active Problem List   Diagnosis    Chest pain    Essential hypertension    Stroke    Disease of thyroid gland    Coronary artery disease involving native coronary artery of native heart without angina pectoris    Dysautonomia    Dementia    Anticoagulant long-term use    Cardiac pacemaker in situ    COPD (chronic obstructive pulmonary disease)    Degenerative cervical spinal stenosis    Dysautonomia orthostatic hypotension syndrome    Epiphora due to insufficient drainage of both sides    History of CVA (cerebrovascular accident)    History of PTCA    Punctal stenosis, acquired    Nonsustained ventricular tachycardia    Pseudophakia, both eyes    SSS (sick sinus syndrome)    Palpitations    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    Weakness    Vascular dementia with behavior disturbance    Nocturnal hypoxemia    Delirium due to multiple etiologies, persistent, hypoactive    Cognitive safety issue    Caregiver stress    Dementia    Disease of thyroid gland    Dysautonomia    Mixed hyperlipidemia    Transient ischemic attack (TIA)    Altered mental status    CAP (community acquired pneumonia)    Abnormal finding on urinalysis    Poor short-term memory    Pneumonia    Altered mental status, unspecified altered mental status type    History of falling    Difficulty speaking    Dysphagia    Failure to thrive in adult    Aspiration pneumonia    Aspiration pneumonia of both lungs    Severe malnutrition     Past Medical History:   Diagnosis Date    CAD (coronary  artery disease)     Dementia     Depression     Disease of thyroid gland     Dysautonomia     Dyslipidemia     GERD (gastroesophageal reflux disease)     Head pain     Hyperlipidemia     Hypertension     Orthostatic hypotension     SSS (sick sinus syndrome)     Stroke     x 3 in 2018     Past Surgical History:   Procedure Laterality Date    APPENDECTOMY      CHOLECYSTECTOMY      CYSTOSCOPY      ENDOSCOPY N/A 10/17/2019    Procedure: ESOPHAGOGASTRODUODENOSCOPY with biopsy x 2 areas;  Surgeon: Ashok Sanchez MD;  Location: [facility];  Service: Gastroenterology    HERNIA REPAIR      INSERT / REPLACE / REMOVE PACEMAKER      LEFT HEART CATH      PACEMAKER IMPLANTATION      [employer]    TEMPORAL ARTERY BIOPSY      WRIST SURGERY      severed artery      General Information       Row Name 11/21/23 1331          OT Time and Intention    Document Type evaluation  -     Mode of Treatment individual therapy;occupational therapy  -       Row Name 11/21/23 1331          General Information    Patient Profile Reviewed yes  -     Prior Level of Function mod assist:;max assist:  with self care and transfers  -     Existing Precautions/Restrictions fall  -     Barriers to Rehab medically complex;previous functional deficit;cognitive status  -       Row Name 11/21/23 1331          Living Environment    People in Home spouse  -       Row Name 11/21/23 1331          Cognition    Orientation Status (Cognition) unable/difficult to assess  -       Row Name 11/21/23 1331          Safety Issues, Functional Mobility    Safety Issues Affecting Function (Mobility) ability to follow commands;safety precautions follow-through/compliance;awareness of need for assistance;friction/shear risk  -     Impairments Affecting Function (Mobility) balance;cognition;strength;endurance/activity tolerance  -     Cognitive Impairments, Mobility Safety/Performance attention;insight into  deficits/self-awareness;problem-solving/reasoning;safety precaution awareness  -               User Key  (r) = Recorded By, (t) = Taken By, (c) = Cosigned By      Initials Name Provider Type     Armida Eddy OT Occupational Therapist                     Mobility/ADL's       Row Name 11/21/23 1332          Bed Mobility    Bed Mobility bed mobility (all) activities;rolling right  -     All Activities, Karval (Bed Mobility) maximum assist (25% patient effort)  -     Rolling Right Karval (Bed Mobility) maximum assist (25% patient effort)  -     Bed Mobility, Safety Issues cognitive deficits limit understanding  -       Row Name 11/21/23 1332          Functional Mobility    Patient was able to Ambulate no, other medical factors prevent ambulation  -     Reason Patient was unable to Ambulate Non-Ambulatory at Baseline  -               User Key  (r) = Recorded By, (t) = Taken By, (c) = Cosigned By      Initials Name Provider Type     Armida Eddy OT Occupational Therapist                   Obj/Interventions       Row Name 11/21/23 1333          Vision Assessment/Intervention    Visual Impairment/Limitations WFL  -       Row Name 11/21/23 1333          Range of Motion Comprehensive    General Range of Motion bilateral upper extremity ROM [facility]  [facility] Name 11/21/23 1333          Strength Comprehensive (MMT)    Comment, General Manual Muscle Testing (MMT) Assessment BUE MMT 3/5  -       Row Name 11/21/23 1333          Motor Skills    Motor Skills functional endurance  -     Functional Endurance poor  -               User Key  (r) = Recorded By, (t) = Taken By, (c) = Cosigned By      Initials Name Provider Type     Armida Eddy OT Occupational Therapist                   Goals/Plan       Row Name 11/21/23 1446          Bed Mobility Goal 1 (OT)    Activity/Assistive Device (Bed Mobility Goal 1, OT) bed mobility activities, all  -     Karval Level/Cues Needed (Bed Mobility  Goal 1, OT) minimum assist (75% or more patient effort)  -     Time Frame (Bed Mobility Goal 1, OT) short term goal (STG);2 weeks  -     Progress/Outcomes (Bed Mobility Goal 1, OT) new goal  -       Row Name 11/21/23 1446          Transfer Goal 1 (OT)    Activity/Assistive Device (Transfer Goal 1, OT) wheelchair transfer  -     Lares Level/Cues Needed (Transfer Goal 1, OT) minimum assist (75% or more patient effort)  -     Time Frame (Transfer Goal 1, OT) short term goal (STG);2 weeks  -     Progress/Outcome (Transfer Goal 1, OT) new goal  -       Row Name 11/21/23 1446          Dressing Goal 1 (OT)    Activity/Device (Dressing Goal 1, OT) dressing skills, all;upper body dressing;lower body dressing  -     Lares/Cues Needed (Dressing Goal 1, OT) minimum assist (75% or more patient effort)  -     Time Frame (Dressing Goal 1, OT) short term goal (STG);2 weeks  -     Progress/Outcome (Dressing Goal 1, OT) new goal  -       Row Name 11/21/23 1446          Toileting Goal 1 (OT)    Activity/Device (Toileting Goal 1, OT) toileting skills, all  -     Lares Level/Cues Needed (Toileting Goal 1, OT) minimum assist (75% or more patient effort)  -     Time Frame (Toileting Goal 1, OT) short term goal (STG);2 weeks  -     Progress/Outcome (Toileting Goal 1, OT) new goal  -       Row Name 11/21/23 1446          Self-Feeding Goal 1 (OT)    Activity/Device (Self-Feeding Goal 1, OT) self-feeding skills, all;finger foods;liquids to mouth  -     Lares Level/Cues Needed (Self-Feeding Goal 1, OT) set-up required  -     Time Frame (Self-Feeding Goal 1, OT) short term goal (STG);2 weeks  -     Progress/Outcomes (Self-Feeding Goal 1, OT) new goal  -       Row Name 11/21/23 1446          Therapy Assessment/Plan (OT)    Planned Therapy Interventions (OT) activity tolerance training;BADL retraining;transfer/mobility retraining;patient/caregiver education/training;strengthening  "exercise;ROM/therapeutic exercise;occupation/activity based interventions  -               User Key  (r) = Recorded By, (t) = Taken By, (c) = Cosigned By      Initials Name Provider Type     Armida Eddy, VAUGHN Occupational Therapist                   Clinical Impression       Row Name 11/21/23 6475          Pain Assessment    Pretreatment Pain Rating 0/10 - no pain  -     Posttreatment Pain Rating 0/10 - no pain  -     Pre/Posttreatment Pain Comment no c/o pain, wife states patient is \"holding neck in odd position\", neck in extension supported in bed  -       Row Name 11/21/23 1717          Plan of Care Review    Outcome Evaluation Patient is an 83 year old male admitted to [facility] with AMS, also with advanced dementia. Spouse reports patient is very anxious when he is not with spouse. Patient only says a few words during eval, and history obtained from spouse. Spouse states patient is primarily wc bound and requires assist of another to propel chair. Spouse states she assists with bed mobility, and ADL transfers, as well as ADLs toileting, dressing, bathing, and grooming/hygiene, and sets up patient for meals. She also states patient had a few home health visits with physical therapy providing mobility training and she would like to resume that. At this time, patient is requiring max A with self care and bed mobility, transfer not attempted at eval. Spouse would benefit from training with adaptive techniques with self care and transfer techniques to reduce injury to both spouse and patient. OT will follow for therapeutic exercises and activity tolerance training during hospital stay to reduce risk for deconditioning, and recommend home with HH at [place] as spouse states she will not consider rehab stay as patient gets too anxious outside of home.  -       Row Name 11/21/23 1831          Therapy Assessment/Plan (OT)    Rehab Potential (OT) fair, will monitor progress closely  -     Criteria for Skilled " Therapeutic Interventions Met (OT) yes;skilled treatment is necessary;meets criteria  [facility]     Therapy Frequency (OT) 2 times/wk  -       Row Name 11/21/23 1333          Therapy Plan Review/Discharge Plan (OT)    Anticipated Discharge Disposition (OT) home;home with home health;home with 24/7 care  -       Row Name 11/21/23 1333          Positioning and Restraints    Pre-Treatment Position in bed  -     Post Treatment Position bed  -     In Bed side lying right;call light within reach;encouraged to call for assist;exit alarm on;with family/caregiver  -               User Key  (r) = Recorded By, (t) = Taken By, (c) = Cosigned By      Initials Name Provider Type     Armida Eddy OT Occupational Therapist                   Outcome Measures       Row Name 11/21/23 1447          How much help from another is currently needed...    Putting on and taking off regular lower body clothing? 1  -LH     Bathing (including washing, rinsing, and drying) 1  -LH     Toileting (which includes using toilet bed pan or urinal) 1  -     Putting on and taking off regular upper body clothing 2  -     Taking care of personal grooming (such as brushing teeth) 2  -LH     Eating meals 2  -     AM-PAC 6 Clicks Score (OT) 9  -       Row Name 11/21/23 1447          Functional Assessment    Outcome Measure Options AM-PAC 6 Clicks Daily Activity (OT)  -               User Key  (r) = Recorded By, (t) = Taken By, (c) = Cosigned By      Initials Name Provider Type     Armida Eddy OT Occupational Therapist                    Occupational Therapy Education       Title: PT OT SLP Therapies (Done)       Topic: Occupational Therapy (Done)       Point: ADL training (Done)       Description:   Instruct learner(s) on proper safety adaptation and remediation techniques during self care or transfers.   Instruct in proper use of assistive devices.                  Learning Progress Summary             Patient Acceptance, E,TB, VU,NR by   at 11/21/2023 1448    Comment: Instructed in role of OT, home health education and training, discharge planning   Significant Other Acceptance, E,TB, VU,NR by  at 11/21/2023 1448    Comment: Instructed in role of OT, home health education and training, discharge planning                         Point: Home exercise program (Done)       Description:   Instruct learner(s) on appropriate technique for monitoring, assisting and/or progressing therapeutic exercises/activities.                  Learning Progress Summary             Patient Acceptance, E,TB, VU,NR by  at 11/21/2023 1448    Comment: Instructed in role of OT, home health education and training, discharge planning   Significant Other Acceptance, E,TB, VU,NR by  at 11/21/2023 1448    Comment: Instructed in role of OT, home health education and training, discharge planning                         Point: Precautions (Done)       Description:   Instruct learner(s) on prescribed precautions during self-care and functional transfers.                  Learning Progress Summary             Patient Acceptance, E,TB, VU,NR by  at 11/21/2023 1448    Comment: Instructed in role of OT, home health education and training, discharge planning   Significant Other Acceptance, E,TB, VU,NR by  at 11/21/2023 1448    Comment: Instructed in role of OT, home health education and training, discharge planning                         Point: Body mechanics (Done)       Description:   Instruct learner(s) on proper positioning and spine alignment during self-care, functional mobility activities and/or exercises.                  Learning Progress Summary             Patient Acceptance, E,TB, VU,NR by  at 11/21/2023 1448    Comment: Instructed in role of OT, home health education and training, discharge planning   Significant Other Acceptance, E,TB, VU,NR by  at 11/21/2023 1448    Comment: Instructed in role of OT, home health education and training, discharge planning                                          User Key       Initials Effective Dates Name Provider Type Discipline     08/31/23 -  Armida Eddy, VAUGHN Occupational Therapist OT                  OT Recommendation and Plan  Planned Therapy Interventions (OT): activity tolerance training, BADL retraining, transfer/mobility retraining, patient/caregiver education/training, strengthening exercise, ROM/therapeutic exercise, occupation/activity based interventions  Therapy Frequency (OT): 2 times/wk  Plan of Care Review  Outcome Evaluation: Patient is an 83 year old male admitted to [facility] with AMS, also with advanced dementia. Spouse reports patient is very anxious when he is not with spouse. Patient only says a few words during eval, and history obtained from spouse. Spouse states patient is primarily wc bound and requires assist of another to propel chair. Spouse states she assists with bed mobility, and ADL transfers, as well as ADLs toileting, dressing, bathing, and grooming/hygiene, and sets up patient for meals. She also states patient had a few home health visits with physical therapy providing mobility training and she would like to resume that. At this time, patient is requiring max A with self care and bed mobility, transfer not attempted at eval. Spouse would benefit from training with adaptive techniques with self care and transfer techniques to reduce injury to both spouse and patient. OT will follow for therapeutic exercises and activity tolerance training during hospital stay to reduce risk for deconditioning, and recommend home with HH at [place] as spouse states she will not consider rehab stay as patient gets too anxious outside of home.     Time Calculation:   Evaluation Complexity (OT)  Review Occupational Profile/Medical/Therapy History Complexity: expanded/moderate complexity  Assessment, Occupational Performance/Identification of Deficit Complexity: 3-5 performance deficits  Clinical Decision Making Complexity (OT):  detailed assessment/moderate complexity  Overall Complexity of Evaluation (OT): moderate complexity     Time Calculation- OT       Row Name 11/21/23 1449             Time Calculation- OT    OT Start Time 1137  -      OT Stop Time 1151  -      OT Time Calculation (min) 14 min  -      OT Non-Billable Time (min) 14 min  -      OT Received On 11/21/23  -      OT - Next Appointment 11/24/23  -      OT Goal Re-Cert Due Date 12/05/23  -         Untimed Charges    OT Eval/Re-eval Minutes 14  -         Total Minutes    Untimed Charges Total Minutes 14  -       Total Minutes 14  -                User Key  (r) = Recorded By, (t) = Taken By, (c) = Cosigned By      Initials Name Provider Type     Armida Eddy, OT Occupational Therapist                  Therapy Charges for Today       Code Description Service Date Service Provider Modifiers Qty    [id number] HC OT EVAL MOD COMPLEXITY 2 11/21/2023 Armida Eddy OT GO 1                 Armida Eddy OT  11/21/2023

## 2023-11-21 NOTE — SIGNIFICANT NOTE
11/21/23 1506   OTHER   Discipline physical therapist   Rehab Time/Intention   Session Not Performed patient/family declined evaluation  (Spouse declined treatment this PM stating that the patient had been through a lot, was fatigued, and not appropriate to eval this date. PT will attempt f/u next date time permits.)   Recommendation   PT - Next Appointment 11/22/23

## 2023-11-21 NOTE — PLAN OF CARE
Goal Outcome Evaluation:              Outcome Evaluation: Patient is an 83 year old male admitted to [facility] with AMS, also with advanced dementia. Spouse reports patient is very anxious when he is not with spouse. Patient only says a few words during eval, and history obtained from spouse. Spouse states patient is primarily wc bound and requires assist of another to propel chair. Spouse states she assists with bed mobility, and ADL transfers, as well as ADLs toileting, dressing, bathing, and grooming/hygiene, and sets up patient for meals. She also states patient had a few home health visits with physical therapy providing mobility training and she would like to resume that. At this time, patient is requiring max A with self care and bed mobility, transfer not attempted at eval. Spouse would benefit from training with adaptive techniques with self care and transfer techniques to reduce injury to both spouse and patient. OT will follow for therapeutic exercises and activity tolerance training during hospital stay to reduce risk for deconditioning, and recommend home with HH at [place] as spouse states she will not consider rehab stay as patient gets too anxious outside of home.      Anticipated Discharge Disposition (OT): home, home with home health, home with 24/7 care

## 2023-11-21 NOTE — PLAN OF CARE
Goal Outcome Evaluation:  Plan of Care Reviewed With: patient        Progress: no change       EEG complete. MRI scheduled with radiology tomorrow. Patient having difficulty handling oral secretions oral care and suction provided. Remains NPO. Frequent turn and reposition. Low air loss mattress ordered.

## 2023-11-21 NOTE — THERAPY EVALUATION
Acute Care - Speech Language Pathology   Swallow Initial Evaluation [facility]     Patient Name: Jayden Bond  : 1940  MRN: [id number]  Today's Date: 2023               Admit Date: 2023    Visit Dx:     ICD-10-CM ICD-9-CM   1. Altered mental status, unspecified altered mental status type  R41.82 780.97   2. Severe dementia, unspecified dementia type, unspecified whether behavioral, psychotic, or mood disturbance or anxiety  F03.C0 294.20   3. Coagulopathy: Eliquis induced  D68.9 286.9     Patient Active Problem List   Diagnosis    Chest pain    Essential hypertension    Stroke    Disease of thyroid gland    Coronary artery disease involving native coronary artery of native heart without angina pectoris    Dysautonomia    Dementia    Anticoagulant long-term use    Cardiac pacemaker in situ    COPD (chronic obstructive pulmonary disease)    Degenerative cervical spinal stenosis    Dysautonomia orthostatic hypotension syndrome    Epiphora due to insufficient drainage of both sides    History of CVA (cerebrovascular accident)    History of PTCA    Punctal stenosis, acquired    Nonsustained ventricular tachycardia    Pseudophakia, both eyes    SSS (sick sinus syndrome)    Palpitations    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    Weakness    Vascular dementia with behavior disturbance    Nocturnal hypoxemia    Delirium due to multiple etiologies, persistent, hypoactive    Cognitive safety issue    Caregiver stress    Dementia    Disease of thyroid gland    Dysautonomia    Mixed hyperlipidemia    Transient ischemic attack (TIA)    Altered mental status    CAP (community acquired pneumonia)    Abnormal finding on urinalysis    Poor short-term memory    Pneumonia    Altered mental status, unspecified altered mental status type    History of falling    Difficulty speaking    Dysphagia    Failure to thrive in adult    Aspiration pneumonia    Aspiration pneumonia of both lungs    Severe malnutrition      Past Medical History:   Diagnosis Date    CAD (coronary artery disease)     Dementia     Depression     Disease of thyroid gland     Dysautonomia     Dyslipidemia     GERD (gastroesophageal reflux disease)     Head pain     Hyperlipidemia     Hypertension     Orthostatic hypotension     SSS (sick sinus syndrome)     Stroke     x 3 in 2018     Past Surgical History:   Procedure Laterality Date    APPENDECTOMY      CHOLECYSTECTOMY      CYSTOSCOPY      ENDOSCOPY N/A 10/17/2019    Procedure: ESOPHAGOGASTRODUODENOSCOPY with biopsy x 2 areas;  Surgeon: Ashok Sanchez MD;  Location: [facility] ENDOSCOPY;  Service: Gastroenterology    HERNIA REPAIR      INSERT / REPLACE / REMOVE PACEMAKER      LEFT HEART CATH      PACEMAKER IMPLANTATION      [employer]    TEMPORAL ARTERY BIOPSY      WRIST SURGERY      severed artery       SLP Recommendation and Plan  SLP Swallowing Diagnosis: oral dysphagia, pharyngeal dysphagia (11/21/23 1500)  SLP Diet Recommendation: NPO, other (see comments) (moist swabs for comfort) (11/21/23 1500)     SLP Rec. for Method of Medication Administration: meds via alternate route (11/21/23 1500)     Monitor for Signs of Aspiration: yes, notify SLP if any concerns (11/21/23 1500)  Recommended Diagnostics: reassess via clinical swallow evaluation (11/21/23 1500)  Swallow Criteria for Skilled Therapeutic Interventions Met: demonstrates skilled criteria (11/21/23 1500)  Anticipated Discharge Disposition (SLP): unknown (11/21/23 1500)  Rehab Potential/Prognosis, Swallowing: adequate, monitor progress closely (11/21/23 1500)  Therapy Frequency (Swallow): PRN (11/21/23 1500)  Predicted Duration Therapy Intervention (Days): until discharge (11/21/23 1500)  Oral Care Recommendations: Suction toothbrush (11/21/23 1500)                                      Oral Care Recommendations: Suction toothbrush (11/21/23 1500)    Plan of Care Reviewed With: patient  Outcome Evaluation: Swallow eval completed. Recommend NPO with  "meds alternate route. Patient demosntrated wet vocal quality and coughing on secretions at time of eval. SLP to follow for re-eval at bedside.      SWALLOW EVALUATION (last 72 hours)       SLP Adult Swallow Evaluation       Row Name 11/21/23 1500                   General Information    Pertinent History Of Current Problem Patient admitted with AMS. MRI pending. \"This is an 83-year-old man with history of CVA, previous admissions for aspiration pneumonia, documentation of advanced dementia and mobility and dysphagia who was brought to the hospital because his spouse thought that he had another CVA.  There was some concern that he had a right-sided facial droop.  He underwent a CT scan in the emergency department that showed chronic changes. \"  -OC        Precautions/Limitations, Vision WFL  -OC        Precautions/Limitations, Hearing WFL  -OC        Prior Level of Function-Communication cognitive-linguistic impairment  -OC        Prior Level of Function-Swallowing puree;nectar thick liquids;other (see comments)  VFSS 7/2023  -OC        Plans/Goals Discussed with patient;spouse/S.O.  -OC        Barriers to Rehab medically complex  -OC        Patient's Goals for Discharge patient did not state  -OC        Family Goals for Discharge patient able to return to PO diet  -OC           Pain Scale: Numbers Pre/Post-Treatment    Pretreatment Pain Rating 0/10 - no pain  -OC        Posttreatment Pain Rating 0/10 - no pain  -OC           Oral Motor Structure and Function    Dentition Assessment edentulous  -OC        Secretion Management problems swallowing secretions;wet vocal quality  -OC        Mucosal Quality dry  -OC        Volitional Swallow unable to elicit  -OC        Volitional Cough weak  -OC           Oral Musculature and Cranial Nerve Assessment    Oral Motor General Assessment other (see comments)  unable to follow commands  -OC           Clinical Swallow Eval    Clinical Swallow Evaluation Summary Patient " demonstrated throat clearing with secretions when SLP sat HOB upright. Patient demonstrated wet vocal qualiy and coughing, consistent coughing and throat clearing. Unable to expectorate thick secretions. Patient demonstrated absent swallow with nectar thick via spoon. Minimal laryngeal movement. eventual swallow with wet vocal quality. Patient provided suction, patient gaged with yaunker. aided in coughing and removing thick secretions from pharynx. RN reports suctioning thick secretions 1 hour prior. No further trials provided.  -OC           SLP Evaluation Clinical Impression    SLP Swallowing Diagnosis oral dysphagia;pharyngeal dysphagia  -OC        Functional Impact risk of aspiration/pneumonia  -OC        Rehab Potential/Prognosis, Swallowing adequate, monitor progress closely  -OC        Swallow Criteria for Skilled Therapeutic Interventions Met demonstrates skilled criteria  -OC           Recommendations    Therapy Frequency (Swallow) PRN  -OC        Predicted Duration Therapy Intervention (Days) until discharge  -OC        SLP Diet Recommendation NPO;other (see comments)  moist swabs for comfort  -OC        Recommended Diagnostics reassess via clinical swallow evaluation  -OC        Oral Care Recommendations Suction toothbrush  -OC        SLP Rec. for Method of Medication Administration meds via alternate route  -OC        Monitor for Signs of Aspiration yes;notify SLP if any concerns  -OC        Anticipated Discharge Disposition (SLP) unknown  -OC                  User Key  (r) = Recorded By, (t) = Taken By, (c) = Cosigned By      Initials Name Effective Dates    OC Card, ALEKSANDER Morelos 08/28/23 -                     EDUCATION  The patient has been educated in the following areas:   Dysphagia (Swallowing Impairment).              Time Calculation:    Time Calculation- SLP       Row Name 11/21/23 0883             Time Calculation- SLP    SLP Start Time 1500  -OC      SLP Received On 11/21/23  -OC          Untimed Charges    SLP Eval/Re-eval  ST Eval Oral Pharyng Swallow - 72274  -OC      [id number] Eval Oral Pharyng Swallow Minutes 60  -OC         Total Minutes    Untimed Charges Total Minutes 60  -OC       Total Minutes 60  -OC                User Key  (r) = Recorded By, (t) = Taken By, (c) = Cosigned By      Initials Name Provider Type    Tamy Rocha SLP Speech and Language Pathologist                    Therapy Charges for Today       Code Description Service Date Service Provider Modifiers Qty    [id number]  ST EVAL ORAL PHARYNG SWALLOW 4 11/21/2023 Tamy Prater SLP GN 1                 ALEKSANDER Ballard  11/21/2023

## 2023-11-21 NOTE — ED NOTES
"Nursing report ED to floor  Jayden Bond  83 y.o.  male    HPI :   Chief Complaint   Patient presents with    Altered Mental Status       Admitting doctor:   Fay Gamboa MD    Admitting diagnosis:   The primary encounter diagnosis was Altered mental status, unspecified altered mental status type. Diagnoses of Severe dementia, unspecified dementia type, unspecified whether behavioral, psychotic, or mood disturbance or anxiety and Coagulopathy: Eliquis induced were also pertinent to this visit.    Code status:   Current Code Status       Date Active Code Status Order ID Comments User Context       Prior            Allergies:   Trazodone, Doxycycline, Ciprofloxacin, Doxycycline, Fosfomycin tromethamine, Gemtesa [vibegron], Hydralazine, Keppra [levetiracetam], Macrobid [nitrofurantoin], Quetiapine, and Sulfa antibiotics    Isolation:   No active isolations    Intake and Output  No intake or output data in the 24 hours ending 11/20/23 1919    Weight:   There were no vitals filed for this visit.    Most recent vitals:   Vitals:    11/20/23 1608 11/20/23 1623 11/20/23 1743   BP:  (!) 168/110    BP Location:  Right arm    Patient Position:  Lying    Pulse: 67 61 64   Resp:  18    Temp: 97 °F (36.1 °C)     TempSrc: Tympanic     SpO2: 97% 97% 97%   Height:  177.8 cm (70\")        Active LDAs/IV Access:   Lines, Drains & Airways       Active LDAs       None                    Labs (abnormal labs have a star):   Labs Reviewed   COMPREHENSIVE METABOLIC PANEL - Abnormal; Notable for the following components:       Result Value    Creatinine 0.73 (*)     CO2 29.2 (*)     Anion Gap 4.8 (*)     All other components within normal limits    Narrative:     GFR Normal >60  Chronic Kidney Disease <60  Kidney Failure <15    The GFR formula is only valid for adults with stable renal function between ages 18 and 70.   PROTIME-INR - Abnormal; Notable for the following components:    Protime 14.3 (*)     All other components within " normal limits   CBC WITH AUTO DIFFERENTIAL - Abnormal; Notable for the following components:    RDW 11.5 (*)     RDW-SD 36.8 (*)     Neutrophil % 78.2 (*)     Lymphocyte % 11.9 (*)     Immature Grans % 0.7 (*)     Neutrophils, Absolute 8.34 (*)     Immature Grans, Absolute 0.08 (*)     All other components within normal limits   RESPIRATORY PANEL PCR W/ COVID-19 (SARS-COV-2), NP SWAB IN UTM/VTP, 2 HR TAT - Normal    Narrative:     In the setting of a positive respiratory panel with a viral infection PLUS a negative procalcitonin without other underlying concern for bacterial infection, consider observing off antibiotics or discontinuation of antibiotics and continue supportive care. If the respiratory panel is positive for atypical bacterial infection (Bordetella pertussis, Chlamydophila pneumoniae, or Mycoplasma pneumoniae), consider antibiotic de-escalation to target atypical bacterial infection.   TROPONIN - Normal    Narrative:     High Sensitive Troponin T Reference Range:  <14.0 ng/L- Negative Female for AMI  <22.0 ng/L- Negative Male for AMI  >=14 - Abnormal Female indicating possible myocardial injury.  >=22 - Abnormal Male indicating possible myocardial injury.   Clinicians would have to utilize clinical acumen, EKG, Troponin, and serial changes to determine if it is an Acute Myocardial Infarction or myocardial injury due to an underlying chronic condition.        BNP (IN-HOUSE) - Normal    Narrative:     This assay is used as an aid in the diagnosis of individuals suspected of having heart failure. It can be used as an aid in the diagnosis of acute decompensated heart failure (ADHF) in patients presenting with signs and symptoms of ADHF to the emergency department (ED). In addition, NT-proBNP of <300 pg/mL indicates ADHF is not likely.    Age Range Result Interpretation  NT-proBNP Concentration (pg/mL:      <50             Positive            >450                   Gray                 300-450                     Negative             <300    50-75           Positive            >900                  Gray                300-900                  Negative            <300      >75             Positive            >1800                  Gray                300-1800                  Negative            <300   MAGNESIUM - Normal   URINALYSIS W/ MICROSCOPIC IF INDICATED (NO CULTURE)   HIGH SENSITIVITIY TROPONIN T 2HR   POCT GLUCOSE FINGERSTICK   CBC AND DIFFERENTIAL    Narrative:     The following orders were created for panel order CBC & Differential.  Procedure                               Abnormality         Status                     ---------                               -----------         ------                     CBC Auto Differential[[id number]]        Abnormal            Final result                 Please view results for these tests on the individual orders.       EKG:   No orders to display       Meds given in ED:   Medications   sodium chloride 0.9 % flush 10 mL (has no administration in time range)       Imaging results:  CT Chest Without Contrast Diagnostic    Result Date: 11/20/2023  1. Ill-defined peripheral areas of largely interstitial disease in the bilateral lungs as described. These appear relatively stable since the 10/27/2021 study and therefore thought to be related to chronic parenchymal change/interstitial fibrosis. 2. No definite acute infiltrates are seen. 4. Tiny subpleural 3 mm nodule in the right upper lobe. This is not clearly seen on the previous study of 10/27/2021 but still may be benign. 5. If clinically indicated a short-term follow-up CT of the chest in approximately 4 months to 6 months could be obtained to continue assessing stability of these findings.  Radiation dose reduction techniques were utilized, including automated exposure control and exposure modulation based on body size.       CT Head Without Contrast    Result Date: 11/20/2023  1. Atrophy and chronic ischemic disease. 2.  No acute process identified.   Radiation dose reduction techniques were utilized, including automated exposure control and exposure modulation based on body size.       XR Chest 1 View    Result Date: 11/20/2023  1. No acute process. 2. There is mild parenchymal scarring in the right lower lung.   This report was finalized on 11/20/2023 5:32 PM by Dr. Praveen Schwartz M.D on Workstation: [id number]       Ambulatory status:   -     Social issues:   Social History     Socioeconomic History    Marital status:    Tobacco Use    Smoking status: Former    Smokeless tobacco: Never    Tobacco comments:     quit 25 yrs ago   Vaping Use    Vaping Use: Never used   Substance and Sexual Activity    Alcohol use: No    Drug use: No    Sexual activity: Defer       NIH Stroke Scale:       Nakul Crawford RN  11/20/23 19:19 EST

## 2023-11-21 NOTE — CASE MANAGEMENT/SOCIAL WORK
Discharge Planning Assessment  [facility]     Patient Name: Jayden Bond  MRN: [id number]  Today's Date: 11/21/2023    Admit Date: 11/20/2023    Plan: Home with wife, via wife transport   Discharge Needs Assessment       Row Name 11/21/23 1534       Living Environment    People in Home spouse    Current Living Arrangements home    Potentially Unsafe Housing Conditions none    Primary Care Provided by spouse/significant other    Provides Primary Care For no one, unable/limited ability to care for self    Family Caregiver if Needed spouse;other (see comments);child(luisa), adult    Quality of Family Relationships helpful;involved;supportive    Able to Return to Prior Arrangements yes       Resource/Environmental Concerns    Resource/Environmental Concerns none    Transportation Concerns none       Transition Planning    Patient/Family Anticipates Transition to home with family    Patient/Family Anticipated Services at Transition none    Transportation Anticipated family or friend will provide       Discharge Needs Assessment    Readmission Within the Last 30 Days no previous admission in last 30 days    Equipment Currently Used at Home wheelchair;oxygen;commode    Concerns to be Addressed decision-making;discharge planning;denies needs/concerns at this time;coping/stress    Anticipated Changes Related to Illness none    Equipment Needed After Discharge none    Current Discharge Risk chronically ill;cognitively impaired;dependent with mobility/activities of daily living;physical impairment                   Discharge Plan       Row Name 11/21/23 1533       Plan    Plan Home with wife, via wife transport    Roadmap to Recovery Yes    Patient/Family in Agreement with Plan yes    Provided Post Acute Provider List? Yes    Provided Post Acute Provider Quality & Resource List? Refused    Plan Comments CCP spoke with pt's wife Nava [phone] at bedside, pt has dementia. Introduced self and explained CCP role. Verified  facesheet and confirmed local pharmacy is [facility] in [facility] [place]. Wife reports they receive assistance from [employer] program based off of income, they get a discount. She denies advance directive. PCP will be new, and it will be Radha Santos. Wife states pt was supposed to see her today to initiate care. Pt lives at home with wife, who provides all of pts care needs. Pt has a transport chair, bsc, nebulizer and Continuous o2 at [facility]. Nava states pt sleeps in their bed, and she has pillows behind him. Pt has used [facility] in the past and currently goes to a wound center in [place] off of [address]. She declines any SNF hx or future SNF placement. CCP discussed dc planning and she is anxious for pt to be discharged, due to increased delirium risk the longer he is in the hospital. Wife reports they have assistance with transportation home and running groceries. Offered wife, private caregiver list. CCP contact provided, at this time she denies any dc needs. Roxie SPENCE/CCP                  Continued Care and Services - Admitted Since 11/20/2023    Coordination has not been started for this encounter.       Expected Discharge Date and Time       Expected Discharge Date Expected Discharge Time    Nov 23, 2023            Demographic Summary       Row Name 11/21/23 1534       General Information    Admission Type observation    Referral Source admission list    Reason for Consult discharge planning    Preferred Language English       Contact Information    Permission Granted to Share Info With facility ;family/designee                   Functional Status       Row Name 11/21/23 1534       Functional Status    Usual Activity Tolerance fair    Current Activity Tolerance poor       Functional Status, IADL    Medications completely dependent    Meal Preparation completely dependent    Housekeeping completely dependent    Laundry completely dependent    Shopping completely dependent       Mental Status  Summary    Recent Changes in Mental Status/Cognitive Functioning unable to assess       Employment/    Employment Status retired                   Psychosocial    No documentation.                  Abuse/Neglect    No documentation.                  Legal    No documentation.                  Substance Abuse    No documentation.                  Patient Forms    No documentation.                     Erika Vigil RN

## 2023-11-21 NOTE — H&P
HISTORY AND PHYSICAL   [facility]        Date of Admission: 2023  Patient Identification:  Name: Jayden Bond  Age: 83 y.o.  Sex: male  :  1940  MRN: [id number]                     Primary Care Physician: Radha Santos MD    Chief Complaint:  83 year old gentleman was brought in due to decreased use of his right arm as well as decreased verbal and general interaction; he has a history of dementia and is usually more talkative; two days ago he started balling up his right hand and he has not been able to feed himself according to his wife; she is concerned that he may have had a stroke; the patient is not able to give any history; he was seen at another hospital recently but no imaging of his brain was done according to the wife; she also noted a transient facial droop and he has been leaning to the right    History of Present Illness:   As above    Past Medical History:  Past Medical History:   Diagnosis Date    CAD (coronary artery disease)     Dementia     Depression     Disease of thyroid gland     Dysautonomia     Dyslipidemia     GERD (gastroesophageal reflux disease)     Head pain     Hyperlipidemia     Hypertension     SSS (sick sinus syndrome)     Stroke     x 3 in 2018     Past Surgical History:  Past Surgical History:   Procedure Laterality Date    APPENDECTOMY      CHOLECYSTECTOMY      CYSTOSCOPY      ENDOSCOPY N/A 10/17/2019    Procedure: ESOPHAGOGASTRODUODENOSCOPY with biopsy x 2 areas;  Surgeon: Ashok Sanchez MD;  Location: [facility] ENDOSCOPY;  Service: Gastroenterology    HERNIA REPAIR      INSERT / REPLACE / REMOVE PACEMAKER      LEFT HEART CATH      PACEMAKER IMPLANTATION      [employer]    TEMPORAL ARTERY BIOPSY      WRIST SURGERY      severed artery      Home Meds:  Medications Prior to Admission   Medication Sig Dispense Refill Last Dose    acetaminophen (TYLENOL) 500 MG tablet Take 2 tablets by mouth Every 6 (Six) Hours As Needed for Mild Pain.       apixaban  (ELIQUIS) 5 MG tablet tablet Take 1 tablet by mouth 2 (Two) Times a Day.       aspirin 81 MG EC tablet Take 1 tablet by mouth Daily. 100 tablet 11     brimonidine (ALPHAGAN) 0.2 % ophthalmic solution Administer 1 drop to both eyes 2 (Two) Times a Day. 10 mL 12     docusate sodium (COLACE) 100 MG capsule Take 1 capsule by mouth Every Night.       escitalopram (LEXAPRO) 10 MG tablet Take 1 tablet by mouth Daily.       famotidine (Pepcid) 20 MG tablet Take 1 tablet by mouth 2 (Two) Times a Day. 60 tablet 5     galantamine (RAZADYNE) 8 MG tablet Take 1 tablet by mouth 2 (Two) Times a Day.       guaifenesin (ROBITUSSIN) 100 MG/5ML liquid Take 20 mL by mouth Every 8 (Eight) Hours. 1800 mL 1     Incruse Ellipta 62.5 MCG/INH aerosol powder  Take 1 puff by mouth Daily.       levothyroxine (SYNTHROID, LEVOTHROID) 25 MCG tablet Take 1 tablet by mouth Every Morning.       lidocaine (LIDODERM) 5 % Place 1 patch on the skin as directed by provider Daily. Remove & Discard patch within 12 hours or as directed by MD 30 each 5     loratadine (Claritin) 10 MG tablet Take 1 tablet by mouth Daily.       midodrine (PROAMATINE) 2.5 MG tablet Take 1 tablet by mouth As Needed. SBP <105       Multiple Vitamins-Minerals (MULTIVITAMIN WITH MINERALS) tablet tablet Take 1 tablet by mouth Every Night.       nitroglycerin (NITROSTAT) 0.4 MG SL tablet Place 1 tablet under the tongue Every 5 (Five) Minutes As Needed for Chest Pain. Take no more than 3 doses in 15 minutes.       predniSONE (DELTASONE) 5 MG tablet Take 1 tablet by mouth Daily.       rosuvastatin (CRESTOR) 20 MG tablet Take 1 tablet by mouth Every Evening.          Allergies:  Allergies   Allergen Reactions    Trazodone Hallucinations    Doxycycline GI Intolerance    Ciprofloxacin Other (See Comments)     Breathing problems    Doxycycline Nausea And Vomiting    Fosfomycin Tromethamine Confusion     Reports weakness and confusion    Gemtesa [Vibegron] Other (See Comments)     Reports  "urinary retention    Hydralazine Hives    Keppra [Levetiracetam] GI Intolerance    Macrobid [Nitrofurantoin] Other (See Comments)     Unknown reaction    Quetiapine Other (See Comments)     Having falls on it    Sulfa Antibiotics Other (See Comments)     Unknown reaction     Immunizations:  Immunization History   Administered Date(s) Administered    COVID-19 (MODERNA) 1st,2nd,3rd Dose Monovalent 2021, 2021, 2021    Fluzone High Dose =>65 Years ([employer] ONLY) 2017, 2017    Fluzone Quad >6mos (Multi-dose) 2016    H1N1 Inj 2009, 2009    Influenza Quad Vaccine (Inpatient) 2016    Influenza Seasonal Injectable 2015    Influenza TIV (IM) 10/24/2012, 2016    Influenza, Unspecified 10/24/2012, 10/24/2012, 2015, 2015, 2016, 2016    Pneumococcal Conjugate 13-Valent (PCV13) 01/15/2016, 01/15/2016     Social History:   Social History     Social History Narrative    ** Merged History Encounter **          Social History     Socioeconomic History    Marital status:    Tobacco Use    Smoking status: Former    Smokeless tobacco: Never    Tobacco comments:     quit 25 yrs ago   Vaping Use    Vaping Use: Never used   Substance and Sexual Activity    Alcohol use: No    Drug use: No    Sexual activity: Defer       Family History:  Family History   Problem Relation Age of Onset    Heart disease Father         Review of Systems  Not obtainable from the patient    Objective:  T Max 24 hrs: Temp (24hrs), Av.2 °F (36.2 °C), Min:97 °F (36.1 °C), Max:97.3 °F (36.3 °C)    Vitals Ranges:   Temp:  [97 °F (36.1 °C)-97.3 °F (36.3 °C)] 97.3 °F (36.3 °C)  Heart Rate:  [60-78] 78  Resp:  [16-18] 16  BP: (155-197)/(104-113) 197/109      Exam:  BP (!) 197/109 (BP Location: Left arm, Patient Position: Lying)   Pulse 78   Temp 97.3 °F (36.3 °C) (Oral)   Resp 16   Ht 177.8 cm (70\")   Wt 64 kg (141 lb 1.5 oz)   SpO2 100%   BMI 20.24 kg/m²     General " Appearance:    Alert, cooperative, no distress, appears stated age; chronically ill appearing   Head:    Normocephalic, without obvious abnormality, atraumatic   Eyes:    PERRL, conjunctivae/corneas clear, EOM's intact, both eyes   Ears:    Normal external ear canals, both ears   Nose:   Nares normal, septum midline, mucosa normal, no drainage    or sinus tenderness   Throat:   Lips, mucosa, and tongue normal   Neck:   Supple, symmetrical, trachea midline, no adenopathy;     thyroid:  no enlargement/tenderness/nodules; no carotid    bruit or JVD   Back:     Symmetric, no curvature, ROM normal, no CVA tenderness   Lungs:     Decreased breath sounds bilaterally, respirations unlabored   Chest Wall:    No tenderness or deformity    Heart:    Regular rate and rhythm, S1 and S2 normal, no murmur, rub   or gallop   Abdomen:     Soft, nontender, bowel sounds active all four quadrants,     no masses, no hepatomegaly, no splenomegaly   Extremities:   Extremities normal, atraumatic, no cyanosis or edema                       .    Data Review:  Labs in chart were reviewed.  WBC   Date Value Ref Range Status   11/20/2023 10.67 3.40 - 10.80 10*3/mm3 Final     Hemoglobin   Date Value Ref Range Status   11/20/2023 13.2 13.0 - 17.7 g/dL Final     Hematocrit   Date Value Ref Range Status   11/20/2023 40.3 37.5 - 51.0 % Final     Platelets   Date Value Ref Range Status   11/20/2023 203 140 - 450 10*3/mm3 Final     Sodium   Date Value Ref Range Status   11/20/2023 140 136 - 145 mmol/L Final     Potassium   Date Value Ref Range Status   11/20/2023 4.5 3.5 - 5.2 mmol/L Final     Chloride   Date Value Ref Range Status   11/20/2023 106 98 - 107 mmol/L Final     CO2   Date Value Ref Range Status   11/20/2023 29.2 (H) 22.0 - 29.0 mmol/L Final     BUN   Date Value Ref Range Status   11/20/2023 18 8 - 23 mg/dL Final     Creatinine   Date Value Ref Range Status   11/20/2023 0.73 (L) 0.76 - 1.27 mg/dL Final     Glucose   Date Value Ref Range  Status   11/20/2023 99 65 - 99 mg/dL Final     Calcium   Date Value Ref Range Status   11/20/2023 9.2 8.6 - 10.5 mg/dL Final     Magnesium   Date Value Ref Range Status   11/20/2023 2.2 1.6 - 2.4 mg/dL Final     AST (SGOT)   Date Value Ref Range Status   11/20/2023 19 1 - 40 U/L Final     ALT (SGPT)   Date Value Ref Range Status   11/20/2023 13 1 - 41 U/L Final     Alkaline Phosphatase   Date Value Ref Range Status   11/20/2023 86 39 - 117 U/L Final                Imaging Results (All)       Procedure Component Value Units Date/Time    CT Chest Without Contrast Diagnostic [[id number]] Collected: 11/20/23 1849     Updated: 11/20/23 1849    Narrative:      CT OF THE CHEST WITHOUT CONTRAST 11/20/2023     HISTORY: Possible aspiration.     TECHNIQUE: Spiral images were obtained from the lung apices to the upper  abdomen. No intravenous contrast was given.     FINDINGS: There are ill-defined areas of increased density in the  periphery of the bilateral lower lobes left more severe than right.  These findings appear similar to the previous study of 10/27/2021. There  are some mild similar changes in the periphery of the right upper lobe  and superior segment right lower lobe also similar to the previous  study. These findings are therefore thought to be chronic in nature.     No definite acute infiltrates are seen. There is fluid in pericardial  recesses. A few shotty mediastinal nodes are seen. There is mild  dilatation of the ascending aorta measuring up to 4.2 cm. Aortic arch  measures approximately 4.0 cm in diameter. A tiny subpleural nodule  measuring 3 mm is seen in the right upper lobe on image 38, not well  seen on the previous study.        Impression:      1. Ill-defined peripheral areas of largely interstitial disease in the  bilateral lungs as described. These appear relatively stable since the  10/27/2021 study and therefore thought to be related to chronic  parenchymal change/interstitial fibrosis.  2. No  definite acute infiltrates are seen.  4. Tiny subpleural 3 mm nodule in the right upper lobe. This is not  clearly seen on the previous study of 10/27/2021 but still may be  benign.  5. If clinically indicated a short-term follow-up CT of the chest in  approximately 4 months to 6 months could be obtained to continue  assessing stability of these findings.     Radiation dose reduction techniques were utilized, including automated  exposure control and exposure modulation based on body size.          CT Head Without Contrast [[id number]] Collected: 11/20/23 1847     Updated: 11/20/23 1847    Narrative:      CT OF THE BRAIN WITHOUT CONTRAST 11/20/2023     HISTORY: Confusion. Possible facial droop.     TECHNIQUE: Axial images were obtained through the brain without  intravenous contrast.     FINDINGS: There is moderately severe diffuse atrophy. There is decreased  attenuation of the periventricular white matter bilaterally consistent  with small vessel white matter ischemic disease. There is no evidence of  acute infarction, hemorrhage, midline shift or mass effect. There is a  small old infarction in the posterior right parieto-occipital region.  Another small area of old infarct is probably present in the posterior  left parieto-occipital region on image 28.     No bony abnormalities are seen. There is some ectasia of the left  vertebral and basilar arteries and also of the internal carotid arteries  stable since the 07/03/2023 study.       Impression:      1. Atrophy and chronic ischemic disease.  2. No acute process identified.        Radiation dose reduction techniques were utilized, including automated  exposure control and exposure modulation based on body size.          XR Chest 1 View [[id number]] Collected: 11/20/23 1732     Updated: 11/20/23 1735    Narrative:      XR CHEST 1 VW-11/20/2023     HISTORY: Confusion. Cough.     Heart size is at the upper limits of normal. The patient is rotated  slightly to the  right. There is some bandlike probable scarring in the  right lower lung similar to the 7/10/2023 study. No acute infiltrates  are seen. There is some aortic calcification. Cardiac pacemaker is seen.       Impression:      1. No acute process.  2. There is mild parenchymal scarring in the right lower lung.        This report was finalized on 11/20/2023 5:32 PM by Dr. Praveen Schwartz M.D on Workstation: [id number]                 Assessment:  Active Hospital Problems    Diagnosis  POA    **Altered mental status [R41.82]  Yes      Resolved Hospital Problems   No resolved problems to display.   Right arm weakness  Dementia  Cad  Hypertension  Dysphagia  hypothyroidism    Plan:  Will do stroke workup  Monitor on telemetry  He is a full code per the wife  Trend labs  Dw patient, family and ed provider    Fay Gamboa MD  11/20/2023  23:08 EST

## 2023-11-21 NOTE — PLAN OF CARE
Goal Outcome Evaluation:  Plan of Care Reviewed With: patient           Outcome Evaluation: Swallow eval completed. Recommend NPO with meds alternate route. Patient demonstrated wet vocal quality and coughing on secretions at time of eval. SLP to follow for re-eval at bedside.      Anticipated Discharge Disposition (SLP): unknown

## 2023-11-21 NOTE — NURSING NOTE
11/21/23 1458   Wound 07/04/23 0017 Bilateral coccyx Pressure Injury   Placement Date/Time: 07/04/23 0017   Present on Hospital Admission: Yes  Side: Bilateral  Location: coccyx  Primary Wound Type: Pressure Injury   Pressure Injury Stage DTPI   Dressing Appearance moist drainage   Base non-blanchable;moist;purple;red   Periwound redness;swelling   Periwound Temperature warm   Periwound Skin Turgor soft   Edges open   Drainage Characteristics/Odor serous   Drainage Amount scant   Care, Wound cleansed with;sterile water   Dressing Care dressing changed  (Triamcinolone/Nystatin placed, Optifoam dressing)   Skin Interventions   Pressure Reduction Devices specialty bed utilized  (Low air loss mattress)   Pressure Reduction Techniques heels elevated off bed;positioned off wounds;pressure points protected     Wound/Ostomy: Consult received regarding skin issue on Coccyx. Upon assessment is observed non-intact skin with localized purple discoloration, slow blanchable on Coccyx/Buttocks area, DTI POA evolving into an open blister and slight swollen in the scrotum  According to a family member who was at bedside, the Dr ordered Triamcinolone and Nystatin together, which is working, she sees the lesions better, we will continue that as well.  Wound care order and pressure standing measures have been implemented into Epic.   Low air loss mattress for adequate pressure redistribution and pressure relief from Agility and frame from [facility] will be requested, the unit secretary will call.  Rn at bedside.  Please re-consult for any additional needs.

## 2023-11-21 NOTE — CONSULTS
DOS: 2023  NAME: Jayden Bond   : 1940  PCP: Radha Santos MD  CC: Concern for stroke  Referring MD: Fay Gamboa, *    Neurological Problem and Interval History:  83 y.o.  male with a known history of CAD, vascular dementia, dyslipidemia/hyperlipidemia, GERD, hypertension, sick sinus syndrome status post PPM, multiple strokes, former tobacco abuse, immobile at baseline who presented to [facility]  due to wife's concern for recurrent stroke as patient was noted to be leaning to the right, right arm weaker than baseline, questionable right sided facial droop/weakness.  Patient's wife also reported some blank staring and decreased responsiveness on further discussion she reported episodes of eyes rolling back and difficulty focusing although some of these events happened while patient was coughing.  Since her arrival patient's wife reports that he is unable to bend his neck forward-she has had some chronic neck pain since 2022 where patient sustained a fall according to wife.  Patient is primarily bed and wheelchair bound patient cannot stand for transfers but does not ambulate at all.  Prior to arrival patient on Eliquis and Crestor for secondary stroke prevention.    Since admission, CT of the head completed which is negative for acute findings although there is evidence of severe diffuse atrophy.  She had CT of the chest that showed increased density bilateral lower lobes similar in appearance to prior imaging 10/2021-no acute findings.  Chest x-ray negative for acute findings.  Labs: CBC negative, CMP creatinine decreased at 0.73, CO2 29.2, anion gap 4.8, high sensitive troponin negative at 19, BMP negative at 211 and magnesium 2.2, TSH 4.5.  UA 1+ leuks, negative nitrite, 6-10 WBCs and no bacteria.  Eliquis resumed.  Aspirin 325 mg initiated; will hold at this time.  Atorvastatin 80 mg daily started along with IV fluids at 75 cc an hour.  MRI of the brain  ordered-pending.  EEG ordered-pending.    Past Medical/Surgical Hx:  Past Medical History:   Diagnosis Date    CAD (coronary artery disease)     Dementia     Depression     Disease of thyroid gland     Dysautonomia     Dyslipidemia     GERD (gastroesophageal reflux disease)     Head pain     Hyperlipidemia     Hypertension     Orthostatic hypotension     SSS (sick sinus syndrome)     Stroke     x 3 in 2018     Past Surgical History:   Procedure Laterality Date    APPENDECTOMY      CHOLECYSTECTOMY      CYSTOSCOPY      ENDOSCOPY N/A 10/17/2019    Procedure: ESOPHAGOGASTRODUODENOSCOPY with biopsy x 2 areas;  Surgeon: Ashok Sanchez MD;  Location: [facility] ENDOSCOPY;  Service: Gastroenterology    HERNIA REPAIR      INSERT / REPLACE / REMOVE PACEMAKER      LEFT HEART CATH      PACEMAKER IMPLANTATION      [employer]tr[employer]    TEMPORAL ARTERY BIOPSY      WRIST SURGERY      severed artery       Review of Systems:        A complete review of all systems is negative except as described above.     Medications On Admission  Medications Prior to Admission   Medication Sig Dispense Refill Last Dose    acetaminophen (TYLENOL) 500 MG tablet Take 2 tablets by mouth Every 6 (Six) Hours As Needed for Mild Pain.   11/20/2023    apixaban (ELIQUIS) 5 MG tablet tablet Take 2.5 mg by mouth 2 (Two) Times a Day.   11/20/2023    brimonidine (ALPHAGAN) 0.2 % ophthalmic solution Administer 1 drop to both eyes 2 (Two) Times a Day. 10 mL 12 11/20/2023    escitalopram (LEXAPRO) 10 MG tablet Take 1 tablet by mouth Daily.   11/20/2023    galantamine (RAZADYNE) 8 MG tablet Take 1 tablet by mouth 2 (Two) Times a Day.   11/20/2023    guaifenesin (ROBITUSSIN) 100 MG/5ML liquid Take 20 mL by mouth Every 8 (Eight) Hours. (Patient taking differently: Take 20 mL by mouth 4 (Four) Times a Day As Needed.) 1800 mL 1 Past Week    guaiFENesin 200 MG tablet Take 2 tablets by mouth Every 4 (Four) Hours As Needed for Cough.   11/20/2023    Incruse Ellipta 62.5 MCG/INH  aerosol powder  Take 1 puff by mouth Daily.   11/20/2023    levothyroxine (SYNTHROID, LEVOTHROID) 25 MCG tablet Take 1 tablet by mouth Every Morning.   11/20/2023    lidocaine (LMX) 4 % cream Apply 1 application  topically to the appropriate area as directed 3 (Three) Times a Day As Needed for Mild Pain. Back neck shoulders   11/20/2023    loratadine (Claritin) 10 MG tablet Take 1 tablet by mouth Daily.   11/20/2023    miconazole (MICOTIN) 2 % cream Apply 1 application  topically to the appropriate area as directed 2 (Two) Times a Day. Toes   11/20/2023    midodrine (PROAMATINE) 2.5 MG tablet Take 1 tablet by mouth As Needed. SBP <105   Past Month    nystatin (MYCOSTATIN) 100,000 unit/mL suspension Swish and swallow 5 mL 4 (Four) Times a Day.  buttocks   11/20/2023    predniSONE (DELTASONE) 5 MG tablet Take 1 tablet by mouth Daily.   11/20/2023    rosuvastatin (CRESTOR) 20 MG tablet Take 1 tablet by mouth Every Evening.   Past Week    tacrolimus (PROTOPIC) 0.1 % ointment Apply 1 application  topically to the appropriate area as directed 2 (Two) Times a Day. Scrotum   11/20/2023    triamcinolone (KENALOG) 0.1 % cream Apply 1 application  topically to the appropriate area as directed 2 (Two) Times a Day. buttocks   11/20/2023    Multiple Vitamins-Minerals (MULTIVITAMIN WITH MINERALS) tablet tablet Take 1 tablet by mouth Every Night.   More than a month    nitroglycerin (NITROSTAT) 0.4 MG SL tablet Place 1 tablet under the tongue Every 5 (Five) Minutes As Needed for Chest Pain. Take no more than 3 doses in 15 minutes.   More than a month       Allergies:  Allergies   Allergen Reactions    Trazodone Hallucinations    Doxycycline GI Intolerance    Ciprofloxacin Other (See Comments)     Breathing problems    Doxycycline Nausea And Vomiting    Fosfomycin Tromethamine Confusion     Reports weakness and confusion    Gemtesa [Vibegron] Other (See Comments)     Reports urinary retention    Hydralazine Hives    Keppra  [Levetiracetam] GI Intolerance    Macrobid [Nitrofurantoin] Other (See Comments)     Unknown reaction    Quetiapine Other (See Comments)     Having falls on it    Sulfa Antibiotics Other (See Comments)     Unknown reaction       Social Hx:  Social History     Socioeconomic History    Marital status:    Tobacco Use    Smoking status: Former    Smokeless tobacco: Never    Tobacco comments:     quit 25 yrs ago   Vaping Use    Vaping Use: Never used   Substance and Sexual Activity    Alcohol use: No    Drug use: No    Sexual activity: Defer       Family Hx:  Family History   Problem Relation Age of Onset    Heart disease Father        Review of Imaging (Interpretation of images not reports):      Laboratory Results:   Lab Results   Component Value Date    GLUCOSE 85 11/21/2023    CALCIUM 9.3 11/21/2023     11/21/2023    K 3.7 11/21/2023    CO2 26.9 11/21/2023     11/21/2023    BUN 13 11/21/2023    CREATININE 0.65 (L) 11/21/2023    EGFRIFNONA 77 10/27/2021    BCR 20.0 11/21/2023    ANIONGAP 9.1 11/21/2023     Lab Results   Component Value Date    WBC 11.67 (H) 11/21/2023    HGB 13.1 11/21/2023    HCT 39.9 11/21/2023    MCV 91.7 11/21/2023     11/21/2023     Lab Results   Component Value Date    CHOL 118 11/21/2023    CHOL 120 04/11/2023    CHOL 77 09/14/2021     Lab Results   Component Value Date    HDL 45 11/21/2023    HDL 41 04/11/2023    HDL 29 (L) 09/14/2021     Lab Results   Component Value Date    LDL 55 11/21/2023    LDL 57 04/11/2023    LDL 23 09/14/2021     Lab Results   Component Value Date    TRIG 97 11/21/2023    TRIG 121 04/11/2023    TRIG 145 09/14/2021     Lab Results   Component Value Date    HGBA1C 5.80 (H) 11/21/2023     Lab Results   Component Value Date    INR 1.09 11/20/2023    INR 1.06 08/17/2022    INR 1.04 04/16/2022    PROTIME 14.3 (H) 11/20/2023    PROTIME 10.9 08/17/2022    PROTIME 10.7 04/16/2022         Physical Examination:  /96 (BP Location: Left arm, Patient  "Position: Lying)   Pulse 82   Temp 97.9 °F (36.6 °C) (Oral)   Resp 20   Ht 177 cm (69.69\")   Wt 59 kg (130 lb 1.1 oz)   SpO2 93%   BMI 18.83 kg/m²   General Appearance:   Ill appearing.  Thin/emaciated.  Cooperative although tearful  HEENT:   Normocephalic.   Neck and Spine:  Normal range of motion.  Normal alignment. No mass or tenderness. No bruits.  Cardiac: Regular rate and rhythm. No murmurs.  Peripheral Vasculature: Radial and pedal pulses are equal and symmetric. No signs of distal embolization.  Extremities:    No edema or deformities. Normal joint ROM.   Skin:    No rashes or birth marks.    Neurological examination:  Higher Integrative  Function:   Indiscernible speech.  Attempts to follow commands.  Attends to voice-tracks around the room  CN II:    Pupils are equal, round, and reactive to light.  Blinks to threat bilaterally  CN III IV VI:   Extraocular movements are full without nystagmus.   CN V:    Normal facial sensation and strength of muscles of mastication.  CN VII:   Facial movements are symmetric. No weakness.  CN VIII:   Auditory acuity is normal.  CN IX & X:   Symmetric palatal movement.  CN XI:    Sternocleidomastoid and trapezius are normal.  No weakness.  CN XII:   The tongue is midline.  No atrophy or fasciculations.  Motor:    Generalized weakness throughout at least 4+/5  Sensation:   Withdraws all 4 extremities to stimuli quickly  Station and Gait:  Deferred due to safety  Coordination:  Unable.      Diagnoses:  1.  Perceived unilateral weakness on the right; given history of stroke recommend MRI to further evaluate-CT of the head stable with no acute findings specifically no mass/hemorrhage or obvious stroke  2.  Reported blank staring with decreased responsiveness and eyes rolling back-concerning for seizure will recommend EEG to further evaluate  3.  History of chronic stroke-on Eliquis prior to arrival with reported compliance-no missed doses  4.  History of vascular " dementia; CT of the head supports with severe diffuse atrophy noted with unexpected for age  5.  Hypertension  6.  Hyperlipidemia-on Crestor prior to arrival      Plan:  EEG  Continue Eliquis and statin as written  Hold aspirin for now-not on prior to arrival  MRI brain  Continue hydration  Avoid hypotension      Case reviewed with and seen in conjunction with attending neurologist Dr. Ross and he agrees with treatment plan above.      DEANDRA Butler

## 2023-11-21 NOTE — PLAN OF CARE
Problem: Skin Injury Risk Increased  Goal: Skin Health and Integrity  Outcome: Ongoing, Progressing     Problem: Fall Injury Risk  Goal: Absence of Fall and Fall-Related Injury  Outcome: Ongoing, Progressing   Goal Outcome Evaluation:           Progress: no change  Outcome Evaluation: Pt admitted to [address] [place] [address], alert and oriented x1, coop with care, weakness in ext comes and goes, wife concerned that pt has facial droop and not responding at baseline, failed swallow study due to this, ST to see today, pt did have coughing episode with thick white mucous, no resp distress, wound to buttocks that wife states she has been dressing and changing bid, wound consulted, voiding per cruz, repositioned, heels elevated off bed, neuro to see today.

## 2023-11-21 NOTE — CONSULTS
Nutrition Services    Patient Name:  Jayden Bond  YOB: 1940  MRN: [id number]  Admit Date:  11/20/2023  Assessment Date:  11/21/23    Summary: Nutrition consult per RN admission screen  This is a 82 yo male admitted for AMS and right arm weakness.  He has a hx of dementia and dysphagia.    Skin: stage 2 pressure injury on coccyx  BMI 18.83, Use to weigh 160's(wt loss of about 30lb x 1 year noted)  Pt on Pureed diet with thicken liquids at home, with inadequate po intake to meet nutritional needs.  Pt currently NPO at this time.    Patient meets ASPEN/AND criteria for nutrition diagnosis of severe malnutrition of chronic illness based on: wt loss, NFPE results, inadequate po intake.    Plan/Recommendation  Will follow for advance of diet as medical conditions allow  Will offer supplements when diet is advanced.    Will continue to follow clinical course and monitor nutritional needs.       CLINICAL NUTRITION ASSESSMENT      Reason for Assessment BMI, MST score 2+, Nurse Admission Screen, Pressure Injury and/or Non-Healing Wound     Diagnosis/Problem   AMS, hx of dementia, dysphagia   Medical/Surgical History Past Medical History:   Diagnosis Date    CAD (coronary artery disease)     Dementia     Depression     Disease of thyroid gland     Dysautonomia     Dyslipidemia     GERD (gastroesophageal reflux disease)     Head pain     Hyperlipidemia     Hypertension     Orthostatic hypotension     SSS (sick sinus syndrome)     Stroke     x 3 in 2018       Past Surgical History:   Procedure Laterality Date    APPENDECTOMY      CHOLECYSTECTOMY      CYSTOSCOPY      ENDOSCOPY N/A 10/17/2019    Procedure: ESOPHAGOGASTRODUODENOSCOPY with biopsy x 2 areas;  Surgeon: Ashok Sanchez MD;  Location: [facility] ENDOSCOPY;  Service: Gastroenterology    HERNIA REPAIR      INSERT / REPLACE / REMOVE PACEMAKER      LEFT HEART CATH      PACEMAKER IMPLANTATION      [employer]tr[employer]    TEMPORAL ARTERY BIOPSY      WRIST SURGERY      severed  "artery        Anthropometrics        Current Height  Current Weight  BMI kg/m2 Height: 177 cm (69.69\")  Weight: 59 kg (130 lb 1.1 oz) (11/21/23 0941)  Body mass index is 18.83 kg/m².   Adjusted BMI (if applicable)    BMI Category Normal/Healthy (18.4 - 24.9)   Ideal Body Weight (IBW) 159   Usual Body Weight (UBW) 160's   Weight Trend Loss   Weight History Wt Readings from Last 30 Encounters:   11/21/23 0941 59 kg (130 lb 1.1 oz)   11/20/23 2300 59 kg (130 lb 1.1 oz)   11/20/23 2008 64 kg (141 lb 1.5 oz)   07/12/23 0429 64 kg (141 lb 1.5 oz)   07/10/23 1929 61.4 kg (135 lb 5.8 oz)   07/08/23 0300 61.4 kg (135 lb 5.8 oz)   07/04/23 1149 64 kg (141 lb 1.5 oz)   07/04/23 0116 64 kg (141 lb 1.5 oz)   07/03/23 1858 63.5 kg (140 lb)   05/11/23 1446 63.5 kg (140 lb)   04/12/23 1321 63.5 kg (140 lb)   04/10/23 1734 63.5 kg (140 lb)   08/18/22 0215 73.3 kg (161 lb 9.6 oz)   08/17/22 1903 73.5 kg (162 lb)   04/16/22 1932 74.1 kg (163 lb 6.4 oz)   04/09/22 1629 74.8 kg (165 lb)   11/01/21 1545 74.8 kg (165 lb)   10/27/21 1511 73.2 kg (161 lb 6 oz)   10/21/21 1511 75.1 kg (165 lb 9.1 oz)   09/29/21 1646 72.6 kg (160 lb)   09/28/21 2302 72.6 kg (160 lb)   09/28/21 1602 72.6 kg (160 lb)   09/15/21 0618 82.5 kg (181 lb 12.8 oz)   09/14/21 1638 78.5 kg (173 lb)   09/14/21 0600 79.4 kg (175 lb)   09/14/21 0209 79.4 kg (175 lb)   09/13/21 2328 79.4 kg (175 lb)   09/13/21 2255 79.4 kg (175 lb)   04/28/21 0619 79 kg (174 lb 2.6 oz)   04/26/21 2253 80.7 kg (177 lb 14.6 oz)   04/26/21 1618 79.8 kg (175 lb 14.8 oz)   04/15/21 0730 78.5 kg (173 lb)   03/10/21 1538 78.5 kg (173 lb)   12/07/20 1435 78.5 kg (173 lb)   12/02/20 1442 77.1 kg (170 lb)   10/15/20 1444 78.5 kg (173 lb)   10/12/20 1548 77.6 kg (171 lb)   09/16/20 1602 81.6 kg (180 lb)   07/21/20 1841 80 kg (176 lb 5.9 oz)   05/13/20 1010 81.6 kg (180 lb)   05/12/20 0556 81.8 kg (180 lb 5.4 oz)   05/12/20 0253 80.8 kg (178 lb 2.1 oz)   05/11/20 1610 80.7 kg (177 lb 14.6 oz) "   05/11/20 1201 81.6 kg (180 lb)   05/01/20 1525 81.6 kg (180 lb)   03/06/20 0443 82.9 kg (182 lb 12.2 oz)   03/05/20 0321 80.2 kg (176 lb 12.9 oz)   03/04/20 1745 79.4 kg (175 lb)   02/04/20 1250 82.6 kg (182 lb)   02/03/20 1746 82.6 kg (182 lb)   10/17/19 0946 82.9 kg (182 lb 12.2 oz)   10/15/19 1517 80.3 kg (177 lb)      --  Labs       Pertinent Labs    Results from last 7 days   Lab Units 11/21/23 0525 11/20/23 1743   SODIUM mmol/L 140 140   POTASSIUM mmol/L 3.7 4.5   CHLORIDE mmol/L 104 106   CO2 mmol/L 26.9 29.2*   BUN mg/dL 13 18   CREATININE mg/dL 0.65* 0.73*   CALCIUM mg/dL 9.3 9.2   BILIRUBIN mg/dL 0.4 0.3   ALK PHOS U/L 79 86   ALT (SGPT) U/L 17 13   AST (SGOT) U/L 21 19   GLUCOSE mg/dL 85 99     Results from last 7 days   Lab Units 11/21/23 0525 11/20/23  1743   MAGNESIUM mg/dL  --  2.2   HEMOGLOBIN g/dL 13.1 13.2   HEMATOCRIT % 39.9 40.3   WBC 10*3/mm3 11.67* 10.67   TRIGLYCERIDES mg/dL 97  --    ALBUMIN g/dL 3.7 3.6     Results from last 7 days   Lab Units 11/21/23 0525 11/20/23  1743   INR   --  1.09   PLATELETS 10*3/mm3 208 203     COVID19   Date Value Ref Range Status   11/20/2023 Not Detected Not Detected - Ref. Range Final     Lab Results   Component Value Date    HGBA1C 5.80 (H) 11/21/2023          Medications           Scheduled Medications amLODIPine, 5 mg, Oral, Q24H  apixaban, 5 mg, Oral, Q12H  aspirin, 325 mg, Oral, Daily   Or  aspirin, 300 mg, Rectal, Daily  atorvastatin, 80 mg, Oral, Nightly  brimonidine, 1 drop, Both Eyes, BID  cetirizine, 10 mg, Oral, Daily  docusate sodium, 100 mg, Oral, Nightly  escitalopram, 10 mg, Oral, Daily  famotidine, 20 mg, Oral, BID  galantamine, 8 mg, Oral, BID  guaifenesin, 400 mg, Oral, Q8H  levothyroxine, 25 mcg, Oral, Q AM  Lidocaine, 1 patch, Transdermal, Q24H  multivitamin with minerals, 1 tablet, Oral, Nightly  tiotropium bromide monohydrate, 2 puff, Inhalation, Daily - RT       Infusions sodium chloride, 75 mL/hr, Last Rate: 75 mL/hr (11/21/23  1134)       PRN Medications   acetaminophen **OR** acetaminophen    bisacodyl    ondansetron    [COMPLETED] Insert Peripheral IV **AND** sodium chloride     Physical Findings          General Findings confused, disoriented   Oral/Mouth Cavity tooth or teeth missing   Edema  no edema   Gastrointestinal last bowel movement: 11/19   Skin  pressure injury: stage 2 bilateral coccyx   Tubes/Drains/Lines none   NFPE See Malnutrition Severity Assessment, Date Completed: 11/21   --  Malnutrition Severity Assessment      Patient meets criteria for : Severe Malnutrition  Malnutrition Type (last 8 hours)       Malnutrition Severity Assessment       Row Name 11/21/23 1135       Malnutrition Severity Assessment    Malnutrition Type Chronic Disease - Related Malnutrition      Row Name 11/21/23 1135       Insufficient Energy Intake     Insufficient Energy Intake Findings Severe    Insufficient Energy Intake  <75% of est. energy requirement for > or equal to 1 month      Row Name 11/21/23 1135       Unintentional Weight Loss     Unintentional Weight Loss Findings Severe    Unintentional Weight Loss  Weight loss greater than 20% in one year      Row Name 11/21/23 1135       Muscle Loss    [place] Region Severe - deep hollowing/scooping, lack of muscle to touch, facial bones well defined    Clavicle Bone Region Severe - protruding prominent bone    Acromion Bone Region Severe - squared shoulders, bones, and acromion process protrusion prominent    Patellar Region Moderate - patella more prominent, less muscle definition around patella      Row Name 11/21/23 1135       Fat Loss    Orbital Region  Moderate -  somewhat hollowness, slightly dark circles    Upper Arm Region Moderate - some fat tissue, not ample      Row Name 11/21/23 1135       Declining Functional Status    Declining Functional Status Findings Measurably Reduced      Row Name 11/21/23 1135       Criteria Met (Must meet criteria for severity in at least 2 of these  categories: M Wasting, Fat Loss, Fluid, Secondary Signs, Wt. Status, Intake)    Patient meets criteria for  Severe Malnutrition                       Estimated/Assessed Needs        Current Weight  Weight: 59 kg (130 lb 1.1 oz) (11/21/23 0941)       Energy Requirements    Weight for Calculation 59 kg   Method for Estimation  30-35 kcal/kg   EST Needs (kcal/day) 1770-35       Protein Requirements    Weight for Calculation 59 kg   EST Protein Needs (g/kg) 1.2 gm/kg   EST Daily Needs (g/day) 70       Fluid Requirements     Method for Estimation 1 mL/kcal    EST Needs (mL/day)      Current Nutrition Orders & Evaluation of Intake       Oral Nutrition     Food Allergies NKFA   Current PO Diet NPO Diet NPO Type: Strict NPO   Supplement n/a   PO Evaluation     % PO Intake NPO    Factors Affecting Intake: altered mental status, chewing difficulty, decreased appetite, swallow impairment, weakness   --  PES STATEMENT / NUTRITION DIAGNOSIS      Nutrition Dx Problem  Problem: Unintentional Weight Loss, Malnutrition (severe), Inadequate Oral Intake, Biting/Chewing Difficulty, and Swallowing Difficulty  Etiology: Medical Diagnosis - AMS and Factors Affecting Nutrition - difficulty chewing/swallowing, decrease po intake    Signs/Symptoms: NPO, NFPE Results, Unintended Weight Change, and Report/Observation     NUTRITION INTERVENTION / PLAN OF CARE      Intervention Goal(s) Reduce/improve symptoms, Disease management/therapy, Initiate feeding/diet, Establish PO intake, Tolerate PO , Maintain weight, Appropriate weight gain, and PO intake goal %: 75+         RD Intervention/Action Await initiation/advancement of PO diet, Interview for preferences, Continue to monitor, Care plan reviewed, and Recommend/order: ONS   --      Prescription/Orders:       PO Diet       Supplements When diet started, likes chocolate      Enteral Nutrition       Parenteral Nutrition    New Prescription Ordered? No, recommended   --      Monitor/Evaluation Per  protocol   Discharge Plan/Needs Pending clinical course   --    RD to follow per protocol.      Electronically signed by:  Amara Sanchez RD  11/21/23 11:39 EST

## 2023-11-21 NOTE — PROGRESS NOTES
Name: Jayden Bond ADMIT: 2023   : 1940  PCP: Radha Santos MD    MRN: [id number] LOS: 0 days   AGE/SEX: 83 y.o. male  ROOM: [address]     This is an 83-year-old man with history of CVA, previous admissions for aspiration pneumonia, documentation of advanced dementia and mobility and dysphagia who was brought to the hospital because his spouse thought that he had another CVA.  There was some concern that he had a right-sided facial droop.  He underwent a CT scan in the emergency department that showed chronic changes.  No acute abnormality was noted.  A CT of the chest was also obtained that showed areas of interstitial disease in bilateral lung fields that were stable from 2021.  His blood pressure was noted to be elevated.    Objective   Objective   Vital Signs  Temp:  [97 °F (36.1 °C)-97.9 °F (36.6 °C)] 97.9 °F (36.6 °C)  Heart Rate:  [60-78] 71  Resp:  [16-20] 20  BP: (155-197)/() 194/91  SpO2:  [92 %-100 %] 92 %  on  Flow (L/min):  [2-3] 3;   Device (Oxygen Therapy): nasal cannula  Body mass index is 18.83 kg/m².  Physical Exam  Constitutional:       Comments: Elderly and frail.   HENT:      Head: Normocephalic and atraumatic.   Cardiovascular:      Rate and Rhythm: Normal rate and regular rhythm.   Pulmonary:      Effort: Pulmonary effort is normal. No respiratory distress.   Musculoskeletal:      Right lower leg: No edema.      Left lower leg: No edema.   Skin:     General: Skin is warm and dry.   Neurological:      Mental Status: Mental status is at baseline. He is disoriented.         Results Review     I reviewed the patient's new clinical results.  Results from last 7 days   Lab Units 23  0525 23  1743   WBC 10*3/mm3 11.67* 10.67   HEMOGLOBIN g/dL 13.1 13.2   PLATELETS 10*3/mm3 208 203     Results from last 7 days   Lab Units 23  0525 23  1743   SODIUM mmol/L 140 140   POTASSIUM mmol/L 3.7 4.5   CHLORIDE mmol/L 104 106   CO2 mmol/L  26.9 29.2*   BUN mg/dL 13 18   CREATININE mg/dL 0.65* 0.73*   GLUCOSE mg/dL 85 99   Estimated Creatinine Clearance: 71.9 mL/min (A) (by C-G formula based on SCr of 0.65 mg/dL (L)).  Results from last 7 days   Lab Units 11/21/23  0525 11/20/23  1743   ALBUMIN g/dL 3.7 3.6   BILIRUBIN mg/dL 0.4 0.3   ALK PHOS U/L 79 86   AST (SGOT) U/L 21 19   ALT (SGPT) U/L 17 13     Results from last 7 days   Lab Units 11/21/23  0525 11/20/23  1743   CALCIUM mg/dL 9.3 9.2   ALBUMIN g/dL 3.7 3.6   MAGNESIUM mg/dL  --  2.2       COVID19   Date Value Ref Range Status   11/20/2023 Not Detected Not Detected - Ref. Range Final   07/04/2023 Not Detected Not Detected - Ref. Range Final     Hemoglobin A1C   Date/Time Value Ref Range Status   11/21/2023 0525 5.80 (H) 4.80 - 5.60 % Final     Glucose   Date/Time Value Ref Range Status   11/21/2023 1106 79 70 - 130 mg/dL Final   11/21/2023 0604 79 70 - 130 mg/dL Final   11/21/2023 0206 81 70 - 130 mg/dL Final     Results for orders placed or performed during the hospital encounter of 07/10/23   Blood Culture - Blood, Arm, Right    Specimen: Arm, Right; Blood   Result Value Ref Range    Blood Culture No growth at 5 days          Adult transthoracic echo complete    Left ventricular systolic function is normal. Calculated left   ventricular EF = 53.7%    Left ventricular diastolic function was not assessed.    Saline test results are negative.    There is mild calcification of the aortic valve.    Estimated right ventricular systolic pressure from tricuspid   regurgitation is normal (<35 mmHg).    Mild dilation of the aortic root is present. The aortic root measures 4   cm.    Scheduled Medications  amLODIPine, 5 mg, Oral, Q24H  apixaban, 5 mg, Oral, Q12H  aspirin, 325 mg, Oral, Daily   Or  aspirin, 300 mg, Rectal, Daily  atorvastatin, 80 mg, Oral, Nightly  brimonidine, 1 drop, Both Eyes, BID  cetirizine, 10 mg, Oral, Daily  docusate sodium, 100 mg, Oral, Nightly  escitalopram, 10 mg, Oral,  "Daily  famotidine, 20 mg, Oral, BID  galantamine, 8 mg, Oral, BID  guaifenesin, 400 mg, Oral, Q8H  levothyroxine, 25 mcg, Oral, Q AM  Lidocaine, 1 patch, Transdermal, Q24H  multivitamin with minerals, 1 tablet, Oral, Nightly  tiotropium bromide monohydrate, 2 puff, Inhalation, Daily - RT    Infusions  sodium chloride, 75 mL/hr, Last Rate: 75 mL/hr (11/21/23 1134)    Diet  NPO Diet NPO Type: Strict NPO       Assessment/Plan     Active Hospital Problems    Diagnosis  POA    **Altered mental status [R41.82]  Yes      Resolved Hospital Problems   No resolved problems to display.       83 y.o. male admitted with Altered mental status.    Altered mental status  Etiology is unclear at this point.  He does have a history of severe dementia.  He has a pacemaker which may or may not be MRI compatible.  The pacemaker technician will be providing with us with this information and then he will\" MRI.  Echocardiogram showed an EF of 54%.  Negative saline test.  CT of the head showed atrophy and chronic ischemic disease  Urinalysis has been unremarkable.  -Neurology has been consulted    Dysphagia  History of aspiration pneumonia  He has had previous admissions for aspiration pneumonia.  He underwent a CT of the chest that did not reveal any definite acute infiltrates.  Peripheral areas of interstitial disease were noted bilaterally that were noted to be stable since October 2021.    Hypertension  Blood pressure is currently uncontrolled.  He is on amlodipine 5 which is inadequate.  We will add lisinopril 20.     Hypothyroidism  Continue home levothyroxine    Sick sinus syndrome  Status post PPM.  On Eliquis    Advanced dementia  Continue galantamine      Eliquis (home med) for DVT prophylaxis.  Full code.  Discussed with patient, family, and nursing staff.      Jerald Bar MD  [facility]  11/21/23  13:19 EST        "

## 2023-11-21 NOTE — PROGRESS NOTES
[facility] Acute Rehab    Stroke screening per stroke order set via Epic. Please note that this is a screening. If you feel that this pt is or will be appropriate for Acute Rehab, please call the Admission Office at [phone] and a full evaluation will be initiated    Thank You  Mila Moran RN  Acute Rehab Admission Nurse

## 2023-11-22 NOTE — PLAN OF CARE
Goal Outcome Evaluation:  Plan of Care Reviewed With: spouse, patient   VSS. Oral suctioning needed. Still NPO per speech. WCTM the rest of the nursing shift.

## 2023-11-22 NOTE — NURSING NOTE
Patient arrived with transporter to radiology triage bay 10 to place patient's pacemaker in sure scan mode for his MRI.

## 2023-11-22 NOTE — PLAN OF CARE
Goal Outcome Evaluation:      Maintained NPO moist toothette only, hob elevated,wife remains at bedside

## 2023-11-22 NOTE — PROGRESS NOTES
DOS: 2023  NAME: Jayden Bond   : 1940  PCP: Radha Santos MD    Chief Complaint   Patient presents with    Altered Mental Status        Stroke    Subjective: Pt seen in follow up, however the problem is new to me.  Patient seen in the triage Department of radiology.  He is lethargic, not following commands.  Resisted to passive range of motion in upper extremities.  Slight moaning with movement of lower extremities.  RN reports the patient was like this when he came down and did not require any medication to complete the MRI.    Objective:  Vital signs:      Vitals:    23 1229 23 1239 23 1249 23 1305   BP: (!) 151/106 142/97 138/88 173/97   BP Location: Right arm Right arm Right arm Right arm   Patient Position: Lying Lying Lying Lying   Pulse: 100 88 99 81   Resp:       Temp:       TempSrc:       SpO2: 94% 94% 94% 98%   Weight:       Height:           Current Facility-Administered Medications:     acetaminophen (TYLENOL) tablet 650 mg, 650 mg, Oral, Q4H PRN **OR** acetaminophen (TYLENOL) suppository 650 mg, 650 mg, Rectal, Q4H PRN, Fay Gamboa MD    amLODIPine (NORVASC) tablet 5 mg, 5 mg, Oral, Q24H, Fay Gamboa MD    apixaban (ELIQUIS) tablet 5 mg, 5 mg, Oral, Q12H, Fay Gamboa MD    atorvastatin (LIPITOR) tablet 80 mg, 80 mg, Oral, Nightly, Fay Gamboa MD    bisacodyl (DULCOLAX) suppository 10 mg, 10 mg, Rectal, Daily PRN, Fay Gamboa MD    brimonidine (ALPHAGAN) 0.2 % ophthalmic solution 1 drop, 1 drop, Both Eyes, BID, Fay Gamboa MD, 1 drop at 23 [id number]    cefTRIAXone (ROCEPHIN) 2,000 mg in sodium chloride 0.9 % 100 mL IVPB-VTB, 2,000 mg, Intravenous, Q24H, Jerald Bar MD    cetirizine (zyrTEC) tablet 10 mg, 10 mg, Oral, Daily, Fay Gamboa MD    docusate sodium (COLACE) capsule 100 mg, 100 mg, Oral, Nightly, Fay Gamboa MD    escitalopram (LEXAPRO) tablet 10 mg, 10 mg, Oral,  Daily, Fay Gamboa MD    famotidine (PEPCID) tablet 20 mg, 20 mg, Oral, BID, Fay Gamboa MD    galantamine (RAZADYNE) tablet 8 mg, 8 mg, Oral, BID, Fay Gamboa MD    guaifenesin (ROBITUSSIN) 100 MG/5ML liquid 400 mg, 400 mg, Oral, Q8H, Fay Gamboa MD    levothyroxine (SYNTHROID, LEVOTHROID) tablet 25 mcg, 25 mcg, Oral, Q AM, Fay Gamboa MD    Lidocaine 4 % 1 patch, 1 patch, Transdermal, Q24H, Fay Gamboa MD, 1 patch at 11/21/23 0847    lisinopril (PRINIVIL,ZESTRIL) tablet 20 mg, 20 mg, Oral, Q24H, Jerald Bar MD    multivitamin with minerals 1 tablet, 1 tablet, Oral, Nightly, Fay Gamboa MD    nystatin (MYCOSTATIN) 675720 UNIT/GM cream 1 application , 1 application , Topical, Q12H, eJrald Bar MD    ondansetron (ZOFRAN) injection 4 mg, 4 mg, Intravenous, Q6H PRN, Fay Gamboa MD    [COMPLETED] Insert Peripheral IV, , , Once **AND** sodium chloride 0.9 % flush 10 mL, 10 mL, Intravenous, PRN, Ajay Varghese MD    sodium chloride 0.9 % infusion, 75 mL/hr, Intravenous, Continuous, Fay Gamboa MD, Last Rate: 75 mL/hr at 11/22/23 0246, 75 mL/hr at 11/22/23 0246    triamcinolone (KENALOG) 0.1 % ointment 1 application , 1 application , Topical, Q12H, Jerald Bar MD, 1 application  at 11/21/23 2135    Umeclidinium Bromide (INCRUSE ELLIPTA) aerosol powder  1 puff, 1 puff, Inhalation, Daily, Fay Gamboa MD, 1 puff at 11/22/23 0820    PRN meds    acetaminophen **OR** acetaminophen    bisacodyl    ondansetron    [COMPLETED] Insert Peripheral IV **AND** sodium chloride    No current facility-administered medications on file prior to encounter.     Current Outpatient Medications on File Prior to Encounter   Medication Sig    acetaminophen (TYLENOL) 500 MG tablet Take 2 tablets by mouth Every 6 (Six) Hours As Needed for Mild Pain.    apixaban (ELIQUIS) 5 MG tablet tablet Take 2.5 mg by mouth 2 (Two) Times a Day.     brimonidine (ALPHAGAN) 0.2 % ophthalmic solution Administer 1 drop to both eyes 2 (Two) Times a Day.    escitalopram (LEXAPRO) 10 MG tablet Take 1 tablet by mouth Daily.    galantamine (RAZADYNE) 8 MG tablet Take 1 tablet by mouth 2 (Two) Times a Day.    guaifenesin (ROBITUSSIN) 100 MG/5ML liquid Take 20 mL by mouth Every 8 (Eight) Hours. (Patient taking differently: Take 20 mL by mouth 4 (Four) Times a Day As Needed.)    guaiFENesin 200 MG tablet Take 2 tablets by mouth Every 4 (Four) Hours As Needed for Cough.    Incruse Ellipta 62.5 MCG/INH aerosol powder  Take 1 puff by mouth Daily.    levothyroxine (SYNTHROID, LEVOTHROID) 25 MCG tablet Take 1 tablet by mouth Every Morning.    lidocaine (LMX) 4 % cream Apply 1 application  topically to the appropriate area as directed 3 (Three) Times a Day As Needed for Mild Pain. Back neck shoulders    loratadine (Claritin) 10 MG tablet Take 1 tablet by mouth Daily.    miconazole (MICOTIN) 2 % cream Apply 1 application  topically to the appropriate area as directed 2 (Two) Times a Day. Toes    midodrine (PROAMATINE) 2.5 MG tablet Take 1 tablet by mouth As Needed. SBP <105    nystatin (MYCOSTATIN) 100,000 unit/mL suspension Swish and swallow 5 mL 4 (Four) Times a Day.  buttocks    predniSONE (DELTASONE) 5 MG tablet Take 1 tablet by mouth Daily.    rosuvastatin (CRESTOR) 20 MG tablet Take 1 tablet by mouth Every Evening.    tacrolimus (PROTOPIC) 0.1 % ointment Apply 1 application  topically to the appropriate area as directed 2 (Two) Times a Day. Scrotum    triamcinolone (KENALOG) 0.1 % cream Apply 1 application  topically to the appropriate area as directed 2 (Two) Times a Day. buttocks    Multiple Vitamins-Minerals (MULTIVITAMIN WITH MINERALS) tablet tablet Take 1 tablet by mouth Every Night.    nitroglycerin (NITROSTAT) 0.4 MG SL tablet Place 1 tablet under the tongue Every 5 (Five) Minutes As Needed for Chest Pain. Take no more than 3 doses in 15 minutes.       General  appearance: Chronically ill-appearing frail elderly male, lethargic, not following commands  HEENT: Normocephalic, atraumatic, PERRL,   Resp: Even and unlabored    Neurological:   MS: Lethargic, not following commands  CN: + Blink to threat reflex bilaterally, did not track me, resisted to manual eye opening bilaterally, no obvious facial drooping, difficult to assess most secondary to mental status   Motor: 5/5 in upper extremities against resistance but would not hold up against gravity, withdrew to painful stimulation bilaterally of lower extremities but would not hold up against gravity  Reflexes: Toes downgoing, 1+ in upper extremities  Sensory: light touch sensation intact in all 4 ext.  Coordination: ALY secondary to mental status  Gait and station: Deferred  Rapid alternating movements: ALY secondary to mental status    Laboratory results:  Lab Results   Component Value Date    TSH 4.500 (H) 11/21/2023     Lab Results   Component Value Date    HGBA1C 5.80 (H) 11/21/2023     Lab Results   Component Value Date    THXULTND10 298 04/12/2023     Lab Results   Component Value Date    CHOL 118 11/21/2023    CHOL 120 04/11/2023    CHOL 77 09/14/2021    CHLPL 111 11/29/2019     Lab Results   Component Value Date    TRIG 97 11/21/2023    TRIG 121 04/11/2023    TRIG 145 09/14/2021     Lab Results   Component Value Date    HDL 45 11/21/2023    HDL 41 04/11/2023    HDL 29 (L) 09/14/2021     Lab Results   Component Value Date    LDL 55 11/21/2023    LDL 57 04/11/2023    LDL 23 09/14/2021     Lab Results   Component Value Date    WBC 20.86 (H) 11/22/2023    HGB 13.1 11/22/2023    HCT 40.2 11/22/2023    MCV 91.8 11/22/2023     11/22/2023     Lab Results   Component Value Date    GLUCOSE 144 (H) 11/22/2023    BUN 13 11/22/2023    CREATININE 0.71 (L) 11/22/2023    EGFRIFNONA 77 10/27/2021    BCR 18.3 11/22/2023    K 3.8 11/22/2023    CO2 20.8 (L) 11/22/2023    CALCIUM 9.1 11/22/2023    ALBUMIN 3.3 (L) 11/22/2023     LABIL2 1.5 11/21/2018    AST 19 11/22/2023    ALT 11 11/22/2023     Lab Results   Component Value Date    PTT 28.7 (L) 04/16/2022     Lab Results   Component Value Date    INR 1.09 11/20/2023    INR 1.06 08/17/2022    INR 1.04 04/16/2022    PROTIME 14.3 (H) 11/20/2023    PROTIME 10.9 08/17/2022    PROTIME 10.7 04/16/2022     Brief Urine Lab Results  (Last result in the past 365 days)        Color   Clarity   Blood   Leuk Est   Nitrite   Protein   CREAT   Urine HCG        11/20/23 1930 Yellow   Clear   Negative   Small (1+)   Negative   Negative                   Review and interpretation of imaging:  MRI Brain Without Contrast    Result Date: 11/22/2023  1. 2 tiny foci of acute to subacute infarct in the right superior frontal lobe and at least one punctate focus of acute to subacute infarct in the left centrum semiovale are seen. 2. Severe changes of bilateral small vessel white matter ischemic disease with areas of chronic infarct. 3. Scattered foci of chronic microhemorrhage are noted.      CT Chest Without Contrast Diagnostic    Result Date: 11/20/2023  1. Ill-defined peripheral areas of largely interstitial disease in the bilateral lungs as described. These appear relatively stable since the 10/27/2021 study and therefore thought to be related to chronic parenchymal change/interstitial fibrosis. 2. No definite acute infiltrates are seen. 4. Tiny subpleural 3 mm nodule in the right upper lobe. This is not clearly seen on the previous study of 10/27/2021 but still may be benign. 5. If clinically indicated a short-term follow-up CT of the chest in approximately 4 months to 6 months could be obtained to continue assessing stability of these findings.  Radiation dose reduction techniques were utilized, including automated exposure control and exposure modulation based on body size.       CT Head Without Contrast    Result Date: 11/20/2023  1. Atrophy and chronic ischemic disease. 2. No acute process identified.    Radiation dose reduction techniques were utilized, including automated exposure control and exposure modulation based on body size.       XR Chest 1 View    Result Date: 11/20/2023  1. No acute process. 2. There is mild parenchymal scarring in the right lower lung.   This report was finalized on 11/20/2023 5:32 PM by Dr. Praveen Schwartz M.D on Workstation: [id number]     Results for orders placed during the hospital encounter of 11/20/23    Adult transthoracic echo complete    Interpretation Summary    Left ventricular systolic function is normal. Calculated left ventricular EF = 53.7%    Left ventricular diastolic function was not assessed.    Saline test results are negative.    There is mild calcification of the aortic valve.    Estimated right ventricular systolic pressure from tricuspid regurgitation is normal (<35 mmHg).    Mild dilation of the aortic root is present. The aortic root measures 4 cm.        Impression/Assessment:  This is a 83-year-old male with past medical history of stroke on Eliquis PTA, advanced dementia, pacemaker placement, hypertension, hyperlipidemia, dysautonomia who presented to the hospital on 11/20/2023 with complaints of progressively worse altered mental status.  Wife felt that he was favoring his right side more and seemed more weak.  She had also described symptoms of inattentiveness and staring.  She is his main caretaker and reports that he has been taking his Eliquis 2-1/2 mg twice daily.    BP on arrival 168/110, HR 67.  Temp 97.0.  BS 99.    Diagnosis:  Acute right frontal and left subcortical infarcts  Advanced dementia  Leukocytosis  Essential hypertension  History of strokes    Work up to date:  CTH WO: Severe generalized atrophy, no acute changes.  MRI Brain WO: Acute right superior frontal lobe and left centrum semiovale infarcts.  Severe bilateral white matter ischemic disease.  Scattered foci of chronic microhemorrhage within the bilateral cerebral hemispheres and  cerebellum.  2D echo: Normal LA cavity size, saline test negative, EF 53.7%, all LV wall segments contract normally, no AV stenosis, no MVR.  EEG: Diffuse theta slowing, indicative of moderate encephalopathy.  CT chest without: No definite acute infiltrate seen.  3 mm right upper lobe nodule noted.  Labs: Respiratory panel negative, U/A 1+ leukocytes/6-10 WBCs    Plan:  -Personally reviewed MRI, does show 2 punctate areas of infarct within the right frontal lobe and left subcortical hemisphere.  Wife reports he has been compliant with the Eliquis. No clear indication for the Eliquis that I can see? No hx of afib. Last cardiology note that I reviewed was in August and per note states of a/C indication was secondary to recurrent CVAs.  They did recommend starting ASA 81 mg however the patient was not on this PTA.  -Will not make any changes at this time, we will continue with Eliquis 2.5 mg twice daily based on his weight and age.  -Will get CTA H/N and send APL panel.  -Do not think that the strokes explain his encephalopathy.  He has a history of fluctuating mental status and his MRI shows severe diffuse cerebral atrophy however his white count is 20 today. Afrebrile. ? UTI.  Will send blood cultures.   -Will D/C the Lipitor and transition him to his home dose Crestor.  Neurochecks per stroke protocol  Avoid hypotension  Stroke Education  MAUREEN/SCDs  PT/OT/ST  Therapies as written. CCP for discharge planning. Call RRT for any acute neurological changes.   We will continue to follow and advise.    Case discussed with patient and Dr. Ross, and he agrees with plan above.     DEANDRA Henry

## 2023-11-22 NOTE — CASE MANAGEMENT/SOCIAL WORK
Continued Stay Note  [facility]     Patient Name: Jayden Bond  MRN: [id number]  Today's Date: 11/22/2023    Admit Date: 11/20/2023    Plan: Home with wife and HH referral, family to transport   Discharge Plan       Row Name 11/22/23 1145       Plan    Plan Home with wife and HH referral, family to transport    Patient/Family in Agreement with Plan yes    Plan Comments CCP spoke with wife, pt off floor for CT scan. Discussed HH, and she would like referrals to [facility]. EDDA SPENCE/CCP                   Discharge Codes    No documentation.                 Expected Discharge Date and Time       Expected Discharge Date Expected Discharge Time    Nov 23, 2023               Erika Vigil RN

## 2023-11-22 NOTE — PLAN OF CARE
"Goal Outcome Evaluation:  Plan of Care Reviewed With: patient           Outcome Evaluation: Long conversation regarding patient's status and baseline status regarding swallow function. VFSS 07/2023 recommending puree and nectar thick liquids. Spouse report use of \"thick-it\" at home. Patient not appropriate for PO trials this date, requiring frequent oral care/removal of secretions/suction. SLP explained risks of aspiration and that a PO diet likely not safe until patient alert and managing secretions. Briefly discussed temporary alternate nutrition and PO for QOL with known risk of aspiration. SLP to follow for re-eval as patient mental status improved and patient managing secretions.       Anticipated Discharge Disposition (SLP): unknown  "

## 2023-11-22 NOTE — PROGRESS NOTES
Name: Jayden Bond ADMIT: 2023   : 1940  PCP: Radha Santos MD    MRN: [id number] LOS: 0 days   AGE/SEX: 83 y.o. male  ROOM: [address]     This is an 83-year-old man with history of CVA, previous admissions for aspiration pneumonia, documentation of advanced dementia and mobility and dysphagia who was brought to the hospital because his spouse thought that he had another CVA.  There was some concern that he had a right-sided facial droop.  He underwent a CT scan in the emergency department that showed chronic changes.  No acute abnormality was noted.  A CT of the chest was also obtained that showed areas of interstitial disease in bilateral lung fields that were stable from 2021.  His blood pressure was noted to be elevated.    : No new changes. Remains altered. MRI pending.       Objective   Objective   Vital Signs  Temp:  [97.5 °F (36.4 °C)-99.7 °F (37.6 °C)] 99.5 °F (37.5 °C)  Heart Rate:  [] 81  Resp:  [19-22] 22  BP: (138-173)/() 173/97  SpO2:  [92 %-98 %] 98 %  on  Flow (L/min):  [2-3] 3;   Device (Oxygen Therapy): nasal cannula  Body mass index is 18.83 kg/m².  Physical Exam  Constitutional:       Comments: Elderly and frail.   HENT:      Head: Normocephalic and atraumatic.   Eyes:      Conjunctiva/sclera:      Left eye: Exudate present.   Cardiovascular:      Rate and Rhythm: Normal rate and regular rhythm.   Pulmonary:      Effort: Pulmonary effort is normal. No respiratory distress.   Musculoskeletal:      Right lower leg: No edema.      Left lower leg: No edema.   Skin:     General: Skin is warm and dry.   Neurological:      Mental Status: Mental status is at baseline. He is disoriented.         Results Review     I reviewed the patient's new clinical results.  Results from last 7 days   Lab Units 23  0631 23  0525 23  1743   WBC 10*3/mm3 20.86* 11.67* 10.67   HEMOGLOBIN g/dL 13.1 13.1 13.2   PLATELETS 10*3/mm3 243 208 203      Results from last 7 days   Lab Units 11/22/23  0631 11/21/23  0525 11/20/23  1743   SODIUM mmol/L 139 140 140   POTASSIUM mmol/L 3.8 3.7 4.5   CHLORIDE mmol/L 106 104 106   CO2 mmol/L 20.8* 26.9 29.2*   BUN mg/dL 13 13 18   CREATININE mg/dL 0.71* 0.65* 0.73*   GLUCOSE mg/dL 144* 85 99   Estimated Creatinine Clearance: 65.8 mL/min (A) (by C-G formula based on SCr of 0.71 mg/dL (L)).  Results from last 7 days   Lab Units 11/22/23  0631 11/21/23  0525 11/20/23  1743   ALBUMIN g/dL 3.3* 3.7 3.6   BILIRUBIN mg/dL 0.7 0.4 0.3   ALK PHOS U/L 75 79 86   AST (SGOT) U/L 19 21 19   ALT (SGPT) U/L 11 17 13     Results from last 7 days   Lab Units 11/22/23  0631 11/21/23  0525 11/20/23  1743   CALCIUM mg/dL 9.1 9.3 9.2   ALBUMIN g/dL 3.3* 3.7 3.6   MAGNESIUM mg/dL  --   --  2.2       COVID19   Date Value Ref Range Status   11/20/2023 Not Detected Not Detected - Ref. Range Final   07/04/2023 Not Detected Not Detected - Ref. Range Final     Hemoglobin A1C   Date/Time Value Ref Range Status   11/21/2023 0525 5.80 (H) 4.80 - 5.60 % Final     Glucose   Date/Time Value Ref Range Status   11/21/2023 1604 91 70 - 130 mg/dL Final   11/21/2023 1106 79 70 - 130 mg/dL Final   11/21/2023 0604 79 70 - 130 mg/dL Final   11/21/2023 0206 81 70 - 130 mg/dL Final     Results for orders placed or performed during the hospital encounter of 07/10/23   Blood Culture - Blood, Arm, Right    Specimen: Arm, Right; Blood   Result Value Ref Range    Blood Culture No growth at 5 days          EEG  Date of onset: 11/21/2023  Date of offset: 11/21/2023    Indication: Blank stare/decreased responsiveness    Technical description:  This is a 21 channel digital EEG recording that   began on 1304 and ended on 1331.    Background:  The background consists of a posteriorly dominant rhythm that   is notably absent.  Anteriorly, there is diffuse theta activity.  There is   fair spontaneous variability.     Hyperventilation was not performed and photic stimulation  did not induce   an abnormal driving response. There is no focal slowing.  There are no   interictal epileptiform discharges and no electrographic seizures noted   during the study.    EKG demonstrates normal rate.     Impression:  This is an abnormal study due to the presence of diffuse theta slowing.    There are no interictal epileptiform discharges and no electrographic   seizures.  These electrophysiologic findings are indicative of moderate   encephalopathy.       Adult transthoracic echo complete    Left ventricular systolic function is normal. Calculated left   ventricular EF = 53.7%    Left ventricular diastolic function was not assessed.    Saline test results are negative.    There is mild calcification of the aortic valve.    Estimated right ventricular systolic pressure from tricuspid   regurgitation is normal (<35 mmHg).    Mild dilation of the aortic root is present. The aortic root measures 4   cm.    Scheduled Medications  amLODIPine, 5 mg, Oral, Q24H  apixaban, 5 mg, Oral, Q12H  atorvastatin, 80 mg, Oral, Nightly  brimonidine, 1 drop, Both Eyes, BID  cefTRIAXone, 2,000 mg, Intravenous, Q24H  cetirizine, 10 mg, Oral, Daily  docusate sodium, 100 mg, Oral, Nightly  escitalopram, 10 mg, Oral, Daily  famotidine, 20 mg, Oral, BID  galantamine, 8 mg, Oral, BID  guaifenesin, 400 mg, Oral, Q8H  levothyroxine, 25 mcg, Oral, Q AM  Lidocaine, 1 patch, Transdermal, Q24H  lisinopril, 20 mg, Oral, Q24H  multivitamin with minerals, 1 tablet, Oral, Nightly  nystatin, 1 application , Topical, Q12H  triamcinolone, 1 application , Topical, Q12H  Umeclidinium Bromide, 1 puff, Inhalation, Daily    Infusions  sodium chloride, 75 mL/hr, Last Rate: 75 mL/hr (11/22/23 0246)    Diet  NPO Diet NPO Type: Strict NPO       Assessment/Plan     Active Hospital Problems    Diagnosis  POA    **Altered mental status [R41.82]  Yes    Severe malnutrition [E43]  Yes    Dysphagia [R13.10]  Yes    Difficulty speaking [R47.9]  Yes    Mixed  hyperlipidemia [E78.2]  Yes    SSS (sick sinus syndrome) [I49.5]  Yes    Essential hypertension [I10]  Yes    Coronary artery disease involving native coronary artery of native heart without angina pectoris [I25.10]  Yes    Anticoagulant long-term use [Z79.01]  Not Applicable      Resolved Hospital Problems   No resolved problems to display.       83 y.o. male admitted with Altered mental status.    Altered mental status  Urinary tract infection   Most likely due to UTI.   Echocardiogram showed an EF of 54%.  Negative saline test.  CT of the head showed atrophy and chronic ischemic disease  Urinalysis has been with 6-10 WBCs, no bacterial, 1+ leukocytes, but WBC elevated to 21K. Start rocephin, obtain cultures, check procal  Infectious disease consult     Dysphagia  History of aspiration pneumonia  He has had previous admissions for aspiration pneumonia.  He underwent a CT of the chest that did not reveal any definite acute infiltrates.  Peripheral areas of interstitial disease were noted bilaterally that were noted to be stable since October 2021.    Hypertension  Blood pressure somewhat better on lisinopril 20 and amlodipine 5    Hypothyroidism  Continue home levothyroxine    Sick sinus syndrome  Status post PPM.  On Eliquis    Advanced dementia  Continue galantamine      Eliquis (home med) for DVT prophylaxis.  Full code.  Discussed with patient, family, and nursing staff.      Jerald Bar MD  [facility]  11/22/23  13:43 EST

## 2023-11-22 NOTE — THERAPY TREATMENT NOTE
Acute Care - Speech Language Pathology   Swallow Treatment Note [facility]     Patient Name: Jayden Bond  : 1940  MRN: [id number]  Today's Date: 2023               Admit Date: 2023    Visit Dx:     ICD-10-CM ICD-9-CM   1. Altered mental status, unspecified altered mental status type  R41.82 780.97   2. Severe dementia, unspecified dementia type, unspecified whether behavioral, psychotic, or mood disturbance or anxiety  F03.C0 294.20   3. Coagulopathy: Eliquis induced  D68.9 286.9     Patient Active Problem List   Diagnosis    Chest pain    Essential hypertension    Stroke    Disease of thyroid gland    Coronary artery disease involving native coronary artery of native heart without angina pectoris    Dysautonomia    Dementia    Anticoagulant long-term use    Cardiac pacemaker in situ    COPD (chronic obstructive pulmonary disease)    Degenerative cervical spinal stenosis    Dysautonomia orthostatic hypotension syndrome    Epiphora due to insufficient drainage of both sides    History of CVA (cerebrovascular accident)    History of PTCA    Punctal stenosis, acquired    Nonsustained ventricular tachycardia    Pseudophakia, both eyes    SSS (sick sinus syndrome)    Palpitations    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    Weakness    Vascular dementia with behavior disturbance    Nocturnal hypoxemia    Delirium due to multiple etiologies, persistent, hypoactive    Cognitive safety issue    Caregiver stress    Dementia    Disease of thyroid gland    Dysautonomia    Mixed hyperlipidemia    Transient ischemic attack (TIA)    Altered mental status    CAP (community acquired pneumonia)    Abnormal finding on urinalysis    Poor short-term memory    Pneumonia    Altered mental status, unspecified altered mental status type    History of falling    Difficulty speaking    Dysphagia    Failure to thrive in adult    Aspiration pneumonia    Aspiration pneumonia of both lungs    Severe malnutrition  "    Past Medical History:   Diagnosis Date    CAD (coronary artery disease)     Dementia     Depression     Disease of thyroid gland     Dysautonomia     Dyslipidemia     GERD (gastroesophageal reflux disease)     Head pain     Hyperlipidemia     Hypertension     Orthostatic hypotension     SSS (sick sinus syndrome)     Stroke     x 3 in 2018     Past Surgical History:   Procedure Laterality Date    APPENDECTOMY      CHOLECYSTECTOMY      CYSTOSCOPY      ENDOSCOPY N/A 10/17/2019    Procedure: ESOPHAGOGASTRODUODENOSCOPY with biopsy x 2 areas;  Surgeon: Ashok Sanchez MD;  Location: [facility] ENDOSCOPY;  Service: Gastroenterology    HERNIA REPAIR      INSERT / REPLACE / REMOVE PACEMAKER      LEFT HEART CATH      PACEMAKER IMPLANTATION      [employer]    TEMPORAL ARTERY BIOPSY      WRIST SURGERY      severed artery       SLP Recommendation and Plan     SLP Diet Recommendation: NPO, other (see comments) (spouse considering PO for OQL/known aspiration risks) (11/22/23 1550)     SLP Rec. for Method of Medication Administration: meds via alternate route (11/22/23 1550)     Monitor for Signs of Aspiration: yes, notify SLP if any concerns (11/22/23 1550)  Recommended Diagnostics: reassess via clinical swallow evaluation (11/22/23 1550)     Anticipated Discharge Disposition (SLP): unknown (11/22/23 1550)     Therapy Frequency (Swallow): PRN (11/22/23 1550)  Predicted Duration Therapy Intervention (Days): until discharge (11/22/23 1550)  Oral Care Recommendations: Suction toothbrush (11/22/23 1550)                                      Oral Care Recommendations: Suction toothbrush (11/22/23 1550)    Plan of Care Reviewed With: patient  Outcome Evaluation: Long conversation regarding patient's status and baseline status regarding swallow function. VFSS 07/2023 recommending puree and nectar thick liquids. Spouse report use of \"thick-it\" at home. Patient not appropriate for PO trials this date, requiring frequent oral care/removal of " "secretions/suction. SLP explained risks of aspiration and that a PO diet likely not safe until patient alert and managing secretions. Briefly discussed temporary alternate nutrition and PO for QOL with known risk of aspiration. SLP to follow for re-eval as patient mental status improved and patient managing secretions.      SWALLOW EVALUATION (last 72 hours)       SLP Adult Swallow Evaluation       Row Name 11/22/23 1550 11/21/23 1500                Rehab Evaluation    Document Type therapy note (daily note)  -OC --       Patient Observations decreased LOC  -OC --       Symptoms Noted During/After Treatment none  -OC --       Oral Care other (see comments)  Suctioning and oral care had just been completed  -OC --          General Information    Pertinent History Of Current Problem -- Patient admitted with AMS. MRI pending. \"This is an 83-year-old man with history of CVA, previous admissions for aspiration pneumonia, documentation of advanced dementia and mobility and dysphagia who was brought to the hospital because his spouse thought that he had another CVA.  There was some concern that he had a right-sided facial droop.  He underwent a CT scan in the emergency department that showed chronic changes. \"  -OC       Precautions/Limitations, Vision -- WFL  -OC       Precautions/Limitations, Hearing -- WFL  -OC       Prior Level of Function-Communication -- cognitive-linguistic impairment  -OC       Prior Level of Function-Swallowing -- puree;nectar thick liquids;other (see comments)  VFSS 7/2023  -OC       Plans/Goals Discussed with -- patient;spouse/S.O.  -OC       Barriers to Rehab -- medically complex  -OC       Patient's Goals for Discharge -- patient did not state  -OC       Family Goals for Discharge -- patient able to return to PO diet  -OC          Pain Scale: Numbers Pre/Post-Treatment    Pretreatment Pain Rating 0/10 - no pain  -OC 0/10 - no pain  -OC       Posttreatment Pain Rating 0/10 - no pain  -OC 0/10 - " no pain  -OC          Oral Motor Structure and Function    Dentition Assessment -- edentulous  -OC       Secretion Management -- problems swallowing secretions;wet vocal quality  -OC       Mucosal Quality -- dry  -OC       Volitional Swallow -- unable to elicit  -OC       Volitional Cough -- weak  -OC          Oral Musculature and Cranial Nerve Assessment    Oral Motor General Assessment -- other (see comments)  unable to follow commands  -OC          Clinical Swallow Eval    Clinical Swallow Evaluation Summary -- Patient demonstrated throat clearing with secretions when SLP sat HOB upright. Patient demonstrated wet vocal qualiy and coughing, consistent coughing and throat clearing. Unable to expectorate thick secretions. Patient demonstrated absent swallow with nectar thick via spoon. Minimal laryngeal movement. eventual swallow with wet vocal quality. Patient provided suction, patient gaged with yaunker. aided in coughing and removing thick secretions from pharynx. RN reports suctioning thick secretions 1 hour prior. No further trials provided.  -OC          SLP Evaluation Clinical Impression    SLP Swallowing Diagnosis -- oral dysphagia;pharyngeal dysphagia  -OC       Functional Impact -- risk of aspiration/pneumonia  -OC       Rehab Potential/Prognosis, Swallowing -- adequate, monitor progress closely  -OC       Swallow Criteria for Skilled Therapeutic Interventions Met -- demonstrates skilled criteria  -OC          Recommendations    Therapy Frequency (Swallow) PRN  -OC PRN  -OC       Predicted Duration Therapy Intervention (Days) until discharge  -OC until discharge  -OC       SLP Diet Recommendation NPO;other (see comments)  spouse considering PO for OQL/known aspiration risks  -OC NPO;other (see comments)  moist swabs for comfort  -OC       Recommended Diagnostics reassess via clinical swallow evaluation  -OC reassess via clinical swallow evaluation  -OC       Oral Care Recommendations Suction toothbrush   "-OC Suction toothbrush  -OC       SLP Rec. for Method of Medication Administration meds via alternate route  -OC meds via alternate route  -OC       Monitor for Signs of Aspiration yes;notify SLP if any concerns  -OC yes;notify SLP if any concerns  -OC       Anticipated Discharge Disposition (SLP) unknown  -OC unknown  -OC          Swallow Goals (SLP)    Swallow LTGs Swallow Long Term Goal (free text)  -OC --          (LTG) Swallow    (LTG) Swallow Return/tolerate least restrictive diet.  -OC --       [place] (Swallow Long Term Goal) with 1:1 assist/ supervision  -OC --       Time Frame (Swallow Long Term Goal) by discharge  -OC --       Barriers (Swallow Long Term Goal) overall status/mental status  -OC --       Progress/Outcomes (Swallow Long Term Goal) medical status inhibited participation  -OC --       Comment (Swallow Long Term Goal) Long conversation regarding patient's status and baseline status regarding swallow function. VFSS 07/2023 recommending puree and nectar thick liquids. Spouse report use of \"thick-it\" at home. Patient not appropriate for PO trials this date, requiring frequent oral care/removal of secretions/suction. SLP explained risks of aspiration and that a PO diet likely not safe until patient alert and managing secretions. Briefly discussed temporary alternate nutrition and PO for QOL with known risk of aspiration. SLP to follow for re-eval as patient mental status improved and patient managing secretions.  -OC --                 User Key  (r) = Recorded By, (t) = Taken By, (c) = Cosigned By      Initials Name Effective Dates    OC Tamy Prater SLP 08/28/23 -                     EDUCATION  The patient has been educated in the following areas:   Dysphagia (Swallowing Impairment).        SLP GOALS       Row Name 11/22/23 1550             (LTG) Swallow    (LTG) Swallow Return/tolerate least restrictive diet.  -OC      [place] (Swallow Long Term Goal) with 1:1 assist/ supervision  -OC  " "    Time Frame (Swallow Long Term Goal) by discharge  -OC      Barriers (Swallow Long Term Goal) overall status/mental status  -OC      Progress/Outcomes (Swallow Long Term Goal) medical status inhibited participation  -OC      Comment (Swallow Long Term Goal) Long conversation regarding patient's status and baseline status regarding swallow function. VFSS 07/2023 recommending puree and nectar thick liquids. Spouse report use of \"thick-it\" at home. Patient not appropriate for PO trials this date, requiring frequent oral care/removal of secretions/suction. SLP explained risks of aspiration and that a PO diet likely not safe until patient alert and managing secretions. Briefly discussed temporary alternate nutrition and PO for QOL with known risk of aspiration. SLP to follow for re-eval as patient mental status improved and patient managing secretions.  -OC                User Key  (r) = Recorded By, (t) = Taken By, (c) = Cosigned By      Initials Name Provider Type    Tamy Rocha SLP Speech and Language Pathologist                       Time Calculation:    Time Calculation- SLP       Row Name 11/22/23 1602             Time Calculation- SLP    SLP Start Time 1602  -OC      SLP Received On 11/22/23  -OC         Untimed Charges    SLP Treatment ST Treatment Swallow Minutes  - 42630  -OC      [id number] Treatment Swallow Minutes 75  -OC         Total Minutes    Untimed Charges Total Minutes 75  -OC       Total Minutes 75  -OC                User Key  (r) = Recorded By, (t) = Taken By, (c) = Cosigned By      Initials Name Provider Type    Tamy Rocha SLP Speech and Language Pathologist                    Therapy Charges for Today       Code Description Service Date Service Provider Modifiers Qty    26251587586 HC ST EVAL ORAL PHARYNG SWALLOW 4 11/21/2023 Tamy Prater SLP GN 1    88185064515 HC ST TREATMENT SWALLOW 5 11/22/2023 Tamy Prater SLP GN 1                 ALEKSANDER Ballard  11/22/2023  "

## 2023-11-22 NOTE — DISCHARGE PLACEMENT REQUEST
"Barrington Bond (83 y.o. Male)       Date of Birth   1940    Social Security Number       Address   [address] [place] [facility]    Home Phone   [phone]    MRN   [id number]       Gnosticism   Caodaism    Marital Status                               Admission Date   11/20/23    Admission Type   Emergency    Admitting Provider   Fay Gamboa MD    Attending Provider   Jerald Bar MD    Department, Room/Bed   [address], [address]/1       Discharge Date       Discharge Disposition       Discharge Destination                                 Attending Provider: Jerald Bar MD    Allergies: Trazodone, Doxycycline, Ciprofloxacin, Doxycycline, Fosfomycin Tromethamine, Gemtesa [Vibegron], Hydralazine, Keppra [Levetiracetam], Macrobid [Nitrofurantoin], Quetiapine, Sulfa Antibiotics    Isolation: None   Infection: None   Code Status: CPR    Ht: 177 cm (69.69\")   Wt: 59 kg (130 lb 1.1 oz)    Admission Cmt: None   Principal Problem: Altered mental status [R41.82]                   Active Insurance as of 11/20/2023       Primary Coverage       Payor Plan Insurance Group Employer/Plan Group    MEDICARE MEDICARE A & B        Payor Plan Address Payor Plan Phone Number Payor Plan Fax Number Effective Dates    [address] [phone]  8/1/2005 - None Entered    [facility] [id number]         Subscriber Name Subscriber Birth Date Member ID       BARRINGTON BOND 1940 [id number]               Secondary Coverage       Payor Plan Insurance Group Employer/Plan Group    STANDARD LIFE ACCIDENT STANDARD LIFE ACCIDENT        Payor Plan Address Payor Plan Phone Number Payor Plan Fax Number Effective Dates    [address]   9/27/2019 - None Entered    [place] [address]         Subscriber Name Subscriber Birth Date Member ID       BARRINGTON BOND 1940 [id number]                     Emergency Contacts        (Rel.) Home Phone Work Phone Mobile Phone    MATT BOND (Spouse) " [phone] -- [phone]

## 2023-11-23 NOTE — SIGNIFICANT NOTE
11/23/23 1412   OTHER   Discipline speech language pathologist   Rehab Time/Intention   Session Not Performed   (Spoke with RN. Patient not appropriate for swallow re-assessment this date. Will continue to monitor and follow up pending goals of care.)

## 2023-11-23 NOTE — CODE DOCUMENTATION
"Patient Name:  Jayden Bond  YOB: 1940  MRN:  [id number]  Admit Date:  11/20/2023    Visit Diagnoses:     ICD-10-CM ICD-9-CM   1. Altered mental status, unspecified altered mental status type  R41.82 780.97   2. Severe dementia, unspecified dementia type, unspecified whether behavioral, psychotic, or mood disturbance or anxiety  F03.C0 294.20   3. Coagulopathy: Eliquis induced  D68.9 286.9       Reason For Rapid: lethargy    RN Communicated With: primary rn and rrt      Rapid Outcome: remain on unit    Communication From Rapid Team: Patients assessment appears to mirror the prior assessment made by Neurology, Green aprn.   Patient is lethargic, not following commands, does not track around the room. No facial droop. Does not hold any extremities up against gravity. He does withdraw to pain on all extremities.  Patient did moan out while being repositioned in bed.     Primary Rn to update neurology with any updates and concerns.    Most Recent Vital Signs  Temp:  [97.3 °F (36.3 °C)-99.7 °F (37.6 °C)] 97.3 °F (36.3 °C)  Heart Rate:  [] 72  Resp:  [18-22] 20  BP: (138-173)/() 168/101  SpO2:  [92 %-98 %] 96 %  on  Flow (L/min):  [3] 3;   Device (Oxygen Therapy): nasal cannula    Labs:  Results from last 7 days   Lab Units 11/20/23  1743   COVID19  Not Detected     Glucose   Date/Time Value Ref Range Status   11/22/2023 2003 115 70 - 130 mg/dL Final   11/22/2023 1629 137 (H) 70 - 130 mg/dL Final   11/22/2023 1432 141 (H) 70 - 130 mg/dL Final   11/21/2023 1604 91 70 - 130 mg/dL Final   11/21/2023 1106 79 70 - 130 mg/dL Final   11/21/2023 0604 79 70 - 130 mg/dL Final   11/21/2023 0206 81 70 - 130 mg/dL Final     No results found for: \"SITE\", \"ALLENTEST\", \"PHART\", \"QXA7AAB\", \"PO2ART\", \"RDO0QJA\", \"BASEEXCESS\", \"I0DEMFCT\", \"HGBBG\", \"HCTABG\", \"OXYHEMOGLOBI\", \"METHHGBN\", \"CARBOXYHGB\", \"CO2CT\", \"BAROMETRIC\", \"MODALITY\", \"FIO2\"  Results from last 7 days   Lab Units 11/22/23  0631 11/21/23  0525 " 11/20/23  1743   WBC 10*3/mm3 20.86* 11.67* 10.67   HEMOGLOBIN g/dL 13.1 13.1 13.2   PLATELETS 10*3/mm3 243 208 203     Results from last 7 days   Lab Units 11/22/23  0631 11/21/23  0525 11/20/23  1743   SODIUM mmol/L 139 140 140   POTASSIUM mmol/L 3.8 3.7 4.5   CHLORIDE mmol/L 106 104 106   CO2 mmol/L 20.8* 26.9 29.2*   BUN mg/dL 13 13 18   CREATININE mg/dL 0.71* 0.65* 0.73*   GLUCOSE mg/dL 144* 85 99   ALBUMIN g/dL 3.3* 3.7 3.6   BILIRUBIN mg/dL 0.7 0.4 0.3   ALK PHOS U/L 75 79 86   AST (SGOT) U/L 19 21 19   ALT (SGPT) U/L 11 17 13   Estimated Creatinine Clearance: 65.8 mL/min (A) (by C-G formula based on SCr of 0.71 mg/dL (L)).  Results from last 7 days   Lab Units 11/20/23  2303 11/20/23  1743   HSTROP T ng/L 20 19   PROBNP pg/mL  --  211.0     Results from last 7 days   Lab Units 11/22/23  1628 11/22/23  0631   PROCALCITONIN ng/mL  --  0.23   LACTATE mmol/L 1.8  --        Lab Results   Component Value Date    STREPPNEUAG Negative 10/21/2021    LEGANTIGENUR Negative 10/21/2021       Results from last 7 days   Lab Units 11/20/23  1743   ADENOVIRUS DETECTION BY PCR  Not Detected   CORONAVIRUS 229E  Not Detected   CORONAVIRUS HKU1  Not Detected   CORONAVIRUS NL63  Not Detected   CORONAVIRUS OC43  Not Detected   HUMAN METAPNEUMOVIRUS  Not Detected   HUMAN RHINOVIRUS/ENTEROVIRUS  Not Detected   INFLUENZA B PCR  Not Detected   PARAINFLUENZA 1  Not Detected   PARAINFLUENZA VIRUS 2  Not Detected   PARAINFLUENZA VIRUS 3  Not Detected   PARAINFLUENZA VIRUS 4  Not Detected   BORDETELLA PERTUSSIS PCR  Not Detected   CHLAMYDOPHILA PNEUMONIAE PCR  Not Detected   MYCOPLAMA PNEUMO PCR  Not Detected   INFLUENZA A PCR  Not Detected   RSV, PCR  Not Detected       NIH Stroke Scale:     1a. Level of Consciousness: 2-->Not alert, requires repeated stimulation to attend, or is obtunded and requires strong or painful stimulation to make movements (not stereotyped)  1b. LOC Questions: 2-->Answers neither question correctly  1c. LOC  Commands: 0-->Performs both tasks correctly  2. Best Gaze: 0-->Normal  3. Visual: 2-->Complete hemianopia (no response to visual threat with right eye.)  4. Facial Palsy: 1-->Minor paralysis (flattened nasolabial fold, asymmetry on smiling)  5a. Motor Arm, Left: 2-->Some effort against gravity, limb cannot get to or maintain (if cued) 90 (or 45) degrees, drifts down to bed, but has some effort against gravity  5b. Motor Arm, Right: 2-->Some effort against gravity, limb cannot get to or maintain (if cued) 90 (or 45) degrees, drifts down to bed, but has some effort against gravity  6a. Motor Leg, Left: 2-->Some effort against gravity, leg falls to bed by 5 secs, but has some effort against gravity  6b. Motor Leg, Right: 3-->No effort against gravity, leg falls to bed immediately  7. Limb Ataxia: 0-->Absent  8. Sensory: 0-->Normal, no sensory loss  9. Best Language: 2-->Severe aphasia, all communication is through fragmentary expression, great need for inference, questioning, and guessing by the listener. Range of information that can be exchanged is limited, listener carries burden of. . . (see row details)  10. Dysarthria: 2-->Severe dysarthria, patients speech is so slurred as to be unintelligible in the absence of or out of proportion to any dysphasia, or is mute/anarthric  11. Extinction and Inattention (formerly Neglect): 0-->No abnormality    Total (NIH Stroke Scale): 20    Please refer to full rapid documentation on summary page under Index / Code Timeline

## 2023-11-23 NOTE — PLAN OF CARE
Goal Outcome Evaluation:  Plan of Care Reviewed With: patient        Progress: declining  Outcome Evaluation: VS stable. pt did not appear to be in any pain.  meds given per orders.  NIHSS changes noted.  MD notified.  new orders received.  CTAs and CT completed.  wound care completed per orders.  will continue to monitor.

## 2023-11-23 NOTE — PROGRESS NOTES
"[facility] Clinical Pharmacy Services: Enoxaparin Consult    Jayden Bond has a pharmacy consult to dose full-dose enoxaparin per Dr. Bar's request.     Indication: A Fib - requiring full anticoagulation  Home Anticoagulation: apixaban-- pt NPO, no doses given here     Relevant clinical data and objective history reviewed:  83 y.o. male 177 cm (69.69\") 59 kg (130 lb 1.1 oz)   Body mass index is 18.83 kg/m².   Results from last 7 days   Lab Units 11/23/23  0639 11/22/23  0631 11/21/23  0525 11/20/23  1743   CREATININE mg/dL 0.61* 0.71* 0.65* 0.73*   PLATELETS 10*3/mm3 165 243 208 203   HEMOGLOBIN g/dL 11.7* 13.1 13.1 13.2   INR   --   --   --  1.09       Estimated Creatinine Clearance: 76.6 mL/min (A) (by C-G formula based on SCr of 0.61 mg/dL (L)).    Assessment/Plan    Will start patient on full dose enoxaparin  60mg  (1mg/kg) subcutaneous every 12 hours. Consult order will be discontinued but pharmacy will continue to follow.     Sariah Calderon, PharmD  Clinical Pharmacist    "

## 2023-11-23 NOTE — PROGRESS NOTES
Name: Jayden Bond ADMIT: 2023   : 1940  PCP: Radha Santos MD    MRN: [id number] LOS: 0 days   AGE/SEX: 83 y.o. male  ROOM: [address]     This is an 83-year-old man with history of CVA, previous admissions for aspiration pneumonia, documentation of advanced dementia and mobility and dysphagia who was brought to the hospital because his spouse thought that he had another CVA.  There was some concern that he had a right-sided facial droop.  He underwent a CT scan in the emergency department that showed chronic changes.  No acute abnormality was noted.  A CT of the chest was also obtained that showed areas of interstitial disease in bilateral lung fields that were stable from 2021.  His blood pressure was noted to be elevated.    : No new changes. Remains altered. MRI pending.     : patient does have 2 small foci of acute to subacute infarct in the right superior frontal lobe and at least one in the acute/subacute infarct in the left centrum 70 ovale.  Urinalysis also is concerning for urinary tract infection.  After starting ceftriaxone his mentation has improved and his white blood cell count decreased dramatically.      Objective   Objective   Vital Signs  Temp:  [97.3 °F (36.3 °C)-99 °F (37.2 °C)] 97.7 °F (36.5 °C)  Heart Rate:  [] 62  Resp:  [16-22] 16  BP: (138-173)/() 165/107  SpO2:  [87 %-99 %] 99 %  on  Flow (L/min):  [3] 3;   Device (Oxygen Therapy): room air  Body mass index is 18.83 kg/m².  Physical Exam  Constitutional:       Comments: Elderly and frail.   HENT:      Head: Normocephalic and atraumatic.   Cardiovascular:      Rate and Rhythm: Normal rate and regular rhythm.   Pulmonary:      Effort: Pulmonary effort is normal. No respiratory distress.   Musculoskeletal:      Right lower leg: No edema.      Left lower leg: No edema.   Skin:     General: Skin is warm and dry.   Neurological:      Mental Status: Mental status is at baseline. He  is disoriented.         Results Review     I reviewed the patient's new clinical results.  Results from last 7 days   Lab Units 11/23/23  0639 11/22/23  0631 11/21/23  0525 11/20/23  1743   WBC 10*3/mm3 10.71 20.86* 11.67* 10.67   HEMOGLOBIN g/dL 11.7* 13.1 13.1 13.2   PLATELETS 10*3/mm3 165 243 208 203     Results from last 7 days   Lab Units 11/23/23  0639 11/22/23  0631 11/21/23  0525 11/20/23  1743   SODIUM mmol/L 142 139 140 140   POTASSIUM mmol/L 3.6 3.8 3.7 4.5   CHLORIDE mmol/L 109* 106 104 106   CO2 mmol/L 24.4 20.8* 26.9 29.2*   BUN mg/dL 14 13 13 18   CREATININE mg/dL 0.61* 0.71* 0.65* 0.73*   GLUCOSE mg/dL 85 144* 85 99   Estimated Creatinine Clearance: 76.6 mL/min (A) (by C-G formula based on SCr of 0.61 mg/dL (L)).  Results from last 7 days   Lab Units 11/22/23  0631 11/21/23  0525 11/20/23  1743   ALBUMIN g/dL 3.3* 3.7 3.6   BILIRUBIN mg/dL 0.7 0.4 0.3   ALK PHOS U/L 75 79 86   AST (SGOT) U/L 19 21 19   ALT (SGPT) U/L 11 17 13     Results from last 7 days   Lab Units 11/23/23  0639 11/22/23  0631 11/21/23  0525 11/20/23  1743   CALCIUM mg/dL 8.7 9.1 9.3 9.2   ALBUMIN g/dL  --  3.3* 3.7 3.6   MAGNESIUM mg/dL  --   --   --  2.2     Results from last 7 days   Lab Units 11/22/23  1628 11/22/23 0631   PROCALCITONIN ng/mL  --  0.23   LACTATE mmol/L 1.8  --      COVID19   Date Value Ref Range Status   11/20/2023 Not Detected Not Detected - Ref. Range Final   07/04/2023 Not Detected Not Detected - Ref. Range Final     Hemoglobin A1C   Date/Time Value Ref Range Status   11/21/2023 0525 5.80 (H) 4.80 - 5.60 % Final     Glucose   Date/Time Value Ref Range Status   11/22/2023 2003 115 70 - 130 mg/dL Final   11/22/2023 1629 137 (H) 70 - 130 mg/dL Final   11/22/2023 1432 141 (H) 70 - 130 mg/dL Final   11/21/2023 1604 91 70 - 130 mg/dL Final   11/21/2023 1106 79 70 - 130 mg/dL Final   11/21/2023 0604 79 70 - 130 mg/dL Final   11/21/2023 0206 81 70 - 130 mg/dL Final     Results for orders placed or performed during  the hospital encounter of 07/10/23   Blood Culture - Blood, Arm, Right    Specimen: Arm, Right; Blood   Result Value Ref Range    Blood Culture No growth at 5 days          CT Cervical Spine Without Contrast  Narrative: CT CERVICAL SPINE WITHOUT CONTRAST     CLINICAL HISTORY: Neck pain.     TECHNIQUE: CT scan of the cervical spine was obtained with 1 mm axial  bone algorithm and 2 mm axial soft tissue algorithm images. Sagittal and  coronal reconstructed images were obtained.     FINDINGS:     There is normal alignment of the cervical spine.     At C2-3, there is no significant canal or foraminal stenosis. At C3-4,  there is mild left foraminal narrowing secondary to uncovertebral joint  hypertrophy.     At C4-5, there is no significant canal or foraminal stenosis.     At C5-6, there is mild right foraminal narrowing secondary to  combination of uncovertebral joint hypertrophy and facet arthrosis.     At C6-7, there are prominent degenerative disc changes. A disc  osteophyte complex mildly indents the ventral subarachnoid space. There  is no significant foraminal narrowing.     At C7-T1, there is no significant degree of canal or foraminal stenosis.     Impression:    Mild degrees of canal and foraminal narrowing secondary to degenerative  phenomena are as discussed in detail above. Further evaluation for soft  tissue abnormality such as disc bulges or disc herniations resulting in  foraminal stenosis could be further assessed on MR imaging, as  clinically indicated.        Radiation dose reduction techniques were utilized, including automated  exposure control and exposure modulation based on body size.     CT CEREBRAL PERFUSION WITH & WITHOUT CONTRAST, CT Angiogram Head, CT Angiogram Neck  Narrative: CT ANGIOGRAM OF THE HEAD AND NECK AND CT PERFUSION STUDY     CLINICAL HISTORY: Very encephalopathic. Brain MRI demonstrated acute  infarcts.     TECHNIQUE: A noncontrast head CT was performed with 3 mm axial  images.  Thereafter, a CT perfusion study was performed after the dynamic bolus  of IV contrast. Standard perfusion maps were constructed with RAPID  software. A CT angiogram of the head and neck was then performed with 1  mm axial images. Sagittal, coronal, and 3-dimensional reconstructed  images were obtained. Finally, a delayed postcontrast head CT was  performed with 3 mm axial images.     COMPARISON: Brain MRI dated 11/22/2023.     FINDINGS:     NONCONTRAST HEAD CT: There is a chronic infarct within the right  precentral gyrus measuring up to 9 mm in diameter. A chronic infarct is  noted within the right superior and middle frontal gyri measuring up to  approximately 2.6 x 1.2 cm in greatest axial dimensions. These chronic  infarcts are within the right MCA distribution. A chronic infarct within  the left middle frontal gyrus measures up to 1.2 cm in diameter and is  within the left MCA distribution. A chronic infarct is appreciated  within the posterior aspect of the right occipital lobe measuring up to  approximately 2.0 x 1.7 cm in greatest axial dimensions and this is  within the right PCA distribution. A chronic infarct within the  posterior aspect of left occipital lobe measures up to 1.3 x 1.0 cm in  greatest axial dimensions and is within the left PCA distribution. There  are foci of encephalomalacia noted within the left temporal lobe that  are compatible with chronic infarcts within the left MCA distribution.  These measure up to approximately 2.2 x 2.1 cm in greatest axial  dimensions. There are severe and confluent changes of chronic small  vessel ischemic phenomena. A small focus of encephalomalacia is  identified within the medial aspect of the left frontal lobe measuring  up to 7 mm in diameter that is compatible with a chronic infarct within  the left JONAS distribution.     On the previous brain MRI, there were findings suggestive of punctate  foci of acute to subacute infarcts within both  frontal lobes located  within either the middle or anterior cerebral artery distributions.  These are too small to be delineated on CT imaging.     Partial opacification of the mastoid air cells is seen.     CT PERFUSION STUDY: The CT perfusion study is nondiagnostic.     CTA NECK: There is a classic configuration of the aortic arch. There is  a mild to moderate degree of stenosis of the right subclavian artery.  There is a tortuous appearance of the internal carotids. However, no  significant NASCET stenosis is identified. There is mild degree of  stenosis at the origin of the left vertebral artery. The nondominant  right vertebral artery is unremarkable.     Incidental emphysematous changes are visualized within the lung apices.     CTA HEAD: The left vertebral artery is dominant. The vertebral arteries  are tortuous in appearance. The post PICA segment of the right vertebral  artery is hypoplastic. Mild to moderate degree of stenosis is seen  within the proximal V4 segment of the right vertebral artery. Otherwise,  the intracranial segments of the vertebral arteries are unremarkable.  The posterior cerebral arteries are unremarkable. The internal carotid  arteries are remarkable for atherosclerotic changes within the cavernous  and supraclinoid segments resulting in up to mild to moderate degrees of  luminal compromise. The middle and anterior cerebral arteries are  unremarkable.     DELAYED POSTCONTRAST HEAD CT: No abnormal foci of contrast enhancement  are identified.     Impression:    There are multiple chronic infarcts identified within the brain  parenchyma within multiple vascular distributions that have been  discussed in detail above. On the previous MRI of the brain dated  11/22/2023, there were 2 punctate foci of acute to subacute infarction  within the frontal lobes located within the JONAS or MCA distributions  that are not well delineated on this study.     The CT perfusion study is nondiagnostic.      Intracranially, there is a mild to moderate degree of stenosis involving  the V4 segment of the right vertebral artery. Mild to moderate degrees  of stenoses are identified within the carotid siphons.     No significant NASCET stenosis is identified within either internal  carotid artery        Radiation dose reduction techniques were utilized, including automated  exposure control and exposure modulation based on body size.          Scheduled Medications  albuterol, 2.5 mg, Nebulization, BID - RT  apixaban, 2.5 mg, Oral, Q12H  brimonidine, 1 drop, Both Eyes, BID  cefTRIAXone, 2,000 mg, Intravenous, Q24H  cetirizine, 10 mg, Oral, Daily  docusate sodium, 100 mg, Oral, Nightly  escitalopram, 10 mg, Oral, Daily  famotidine, 20 mg, Oral, BID  galantamine, 8 mg, Oral, BID  guaifenesin, 400 mg, Oral, Q8H  levothyroxine, 25 mcg, Oral, Q AM  Lidocaine, 1 patch, Transdermal, Q24H  lisinopril, 20 mg, Oral, Q24H  multivitamin with minerals, 1 tablet, Oral, Nightly  neomycin-polymyxin-dexamethasone, 1 drop, Both Eyes, Q8H  nystatin, 1 application , Topical, Q12H  rosuvastatin, 20 mg, Oral, Nightly  sodium chloride, 4 mL, Nebulization, BID - RT  triamcinolone, 1 application , Topical, Q12H  Umeclidinium Bromide, 1 puff, Inhalation, Daily    Infusions  Pharmacy to Dose enoxaparin (LOVENOX),   sodium chloride, 150 mL/hr, Last Rate: 150 mL/hr (11/23/23 0424)    Diet  NPO Diet NPO Type: Strict NPO       Assessment/Plan     Active Hospital Problems    Diagnosis  POA    **Altered mental status [R41.82]  Yes    Severe malnutrition [E43]  Yes    Dysphagia [R13.10]  Yes    Difficulty speaking [R47.9]  Yes    Mixed hyperlipidemia [E78.2]  Yes    SSS (sick sinus syndrome) [I49.5]  Yes    Essential hypertension [I10]  Yes    Coronary artery disease involving native coronary artery of native heart without angina pectoris [I25.10]  Yes    Anticoagulant long-term use [Z79.01]  Not Applicable      Resolved Hospital Problems   No resolved  problems to display.       83 y.o. male admitted with Altered mental status.    CVA  Acute to subacute strokes noted on MRI.  He should be on Eliquis however due to n.p.o. status and family refusing Dobbhoff, will start therapeutic Lovenox instead.    Altered mental status  Urinary tract infection   Most likely due to UTI.   Echocardiogram showed an EF of 54%.  Negative saline test.  CT of the head showed atrophy and chronic ischemic disease  Urinalysis has been with 6-10 WBCs, no bacterial, 1+ leukocytes, but WBC elevated to WBC improved after rocephin. ID consulted     Dysphagia  History of aspiration pneumonia  He has had previous admissions for aspiration pneumonia.  He underwent a CT of the chest that did not reveal any definite acute infiltrates.  Peripheral areas of interstitial disease were noted bilaterally that were noted to be stable since October 2021.    Hypertension  PRN labetalol for systolic BP >180    Hypothyroidism  Continue home levothyroxine    Sick sinus syndrome  Status post PPM.  On Eliquis- therapeutic lovenox while patient is not tolerating PO    Advanced dementia  Continue galantamine      Lovenox for DVT ppx   Full code.  Discussed with patient, family, and nursing staff.      Jerald Bar MD  [facility]  11/23/23  11:18 EST

## 2023-11-23 NOTE — PROGRESS NOTES
DOS: 2023  NAME: Jayden Bond   : 1940  PCP: Radha Santos MD    Chief Complaint   Patient presents with    Altered Mental Status        Stroke    Subjective: No acute events overnight.  Able to follow some commands today.  Awaken to the calling of his name.  Wife at bedside.    Objective:  Vital signs:      Vitals:    23 2321 23 0055 23 0423 23 0746   BP: 145/87  162/89 (!) 165/107   BP Location: Right arm  Left arm Left arm   Patient Position: Lying  Lying Lying   Pulse: 67 71 78 62   Resp:   16   Temp: 98.7 °F (37.1 °C)   97.7 °F (36.5 °C)   TempSrc: Oral   Oral   SpO2: (!) 87% 96% 96% 99%   Weight:       Height:           Current Facility-Administered Medications:     acetaminophen (TYLENOL) tablet 650 mg, 650 mg, Oral, Q4H PRN **OR** acetaminophen (TYLENOL) suppository 650 mg, 650 mg, Rectal, Q4H PRN, Fay Gamboa MD    albuterol (PROVENTIL) nebulizer solution 0.083% 2.5 mg/3mL, 2.5 mg, Nebulization, BID - RT, Jerald Bar MD    amLODIPine (NORVASC) tablet 5 mg, 5 mg, Oral, Q24H, Fay Gamboa MD    apixaban (ELIQUIS) tablet 2.5 mg, 2.5 mg, Oral, Q12H, Erica Nunez APRN    bisacodyl (DULCOLAX) suppository 10 mg, 10 mg, Rectal, Daily PRN, Fay Gamboa MD    brimonidine (ALPHAGAN) 0.2 % ophthalmic solution 1 drop, 1 drop, Both Eyes, BID, Fay Gamboa MD, 1 drop at 23 102    cefTRIAXone (ROCEPHIN) 2,000 mg in sodium chloride 0.9 % 100 mL IVPB-VTB, 2,000 mg, Intravenous, Q24H, Jerald Bar MD, Stopped at 23    cetirizine (zyrTEC) tablet 10 mg, 10 mg, Oral, Daily, Fay Gamboa MD    docusate sodium (COLACE) capsule 100 mg, 100 mg, Oral, Nightly, Fay Gamboa MD    escitalopram (LEXAPRO) tablet 10 mg, 10 mg, Oral, Daily, Fay Gamboa MD    famotidine (PEPCID) tablet 20 mg, 20 mg, Oral, BID, Fay Gamboa MD    galantamine (RAZADYNE) tablet 8 mg, 8 mg, Oral, BID, Cooper  Fay Dominguez MD    guaifenesin (ROBITUSSIN) 100 MG/5ML liquid 400 mg, 400 mg, Oral, Q8H, Fay Gamboa MD    levothyroxine (SYNTHROID, LEVOTHROID) tablet 25 mcg, 25 mcg, Oral, Q AM, Fay Gamboa MD    Lidocaine 4 % 1 patch, 1 patch, Transdermal, Q24H, Fay Gamboa MD, 1 patch at 11/21/23 0847    lisinopril (PRINIVIL,ZESTRIL) tablet 20 mg, 20 mg, Oral, Q24H, Jerald Bar MD    multivitamin with minerals 1 tablet, 1 tablet, Oral, Nightly, Fay Gamboa MD    nystatin (MYCOSTATIN) 787145 UNIT/GM cream 1 application , 1 application , Topical, Q12H, Jerald Bar MD, 1 application  at 11/22/23 2039    ondansetron (ZOFRAN) injection 4 mg, 4 mg, Intravenous, Q6H PRN, Fay Gamboa MD    rosuvastatin (CRESTOR) tablet 20 mg, 20 mg, Oral, Nightly, Erica Nunez, APRN    [COMPLETED] Insert Peripheral IV, , , Once **AND** sodium chloride 0.9 % flush 10 mL, 10 mL, Intravenous, PRN, Ajay Varghese MD    sodium chloride 0.9 % infusion, 150 mL/hr, Intravenous, Continuous, Jerald Bar MD, Last Rate: 150 mL/hr at 11/23/23 0424, 150 mL/hr at 11/23/23 0424    sodium chloride 3 % nebulizer solution 4 mL, 4 mL, Nebulization, BID - RT, Jerald Bar MD    triamcinolone (KENALOG) 0.1 % ointment 1 application , 1 application , Topical, Q12H, Jerald Bar MD, 1 application  at 11/22/23 2039    Umeclidinium Bromide (INCRUSE ELLIPTA) aerosol powder  1 puff, 1 puff, Inhalation, Daily, Fay Gamboa MD, 1 puff at 11/22/23 0820    PRN meds    acetaminophen **OR** acetaminophen    bisacodyl    ondansetron    [COMPLETED] Insert Peripheral IV **AND** sodium chloride    No current facility-administered medications on file prior to encounter.     Current Outpatient Medications on File Prior to Encounter   Medication Sig    acetaminophen (TYLENOL) 500 MG tablet Take 2 tablets by mouth Every 6 (Six) Hours As Needed for Mild Pain.    apixaban (ELIQUIS) 5 MG tablet tablet Take 2.5 mg by  mouth 2 (Two) Times a Day.    brimonidine (ALPHAGAN) 0.2 % ophthalmic solution Administer 1 drop to both eyes 2 (Two) Times a Day.    escitalopram (LEXAPRO) 10 MG tablet Take 1 tablet by mouth Daily.    galantamine (RAZADYNE) 8 MG tablet Take 1 tablet by mouth 2 (Two) Times a Day.    guaifenesin (ROBITUSSIN) 100 MG/5ML liquid Take 20 mL by mouth Every 8 (Eight) Hours. (Patient taking differently: Take 20 mL by mouth 4 (Four) Times a Day As Needed.)    guaiFENesin 200 MG tablet Take 2 tablets by mouth Every 4 (Four) Hours As Needed for Cough.    Incruse Ellipta 62.5 MCG/INH aerosol powder  Take 1 puff by mouth Daily.    levothyroxine (SYNTHROID, LEVOTHROID) 25 MCG tablet Take 1 tablet by mouth Every Morning.    lidocaine (LMX) 4 % cream Apply 1 application  topically to the appropriate area as directed 3 (Three) Times a Day As Needed for Mild Pain. Back neck shoulders    loratadine (Claritin) 10 MG tablet Take 1 tablet by mouth Daily.    miconazole (MICOTIN) 2 % cream Apply 1 application  topically to the appropriate area as directed 2 (Two) Times a Day. Toes    midodrine (PROAMATINE) 2.5 MG tablet Take 1 tablet by mouth As Needed. SBP <105    nystatin (MYCOSTATIN) 100,000 unit/mL suspension Swish and swallow 5 mL 4 (Four) Times a Day.  buttocks    predniSONE (DELTASONE) 5 MG tablet Take 1 tablet by mouth Daily.    rosuvastatin (CRESTOR) 20 MG tablet Take 1 tablet by mouth Every Evening.    tacrolimus (PROTOPIC) 0.1 % ointment Apply 1 application  topically to the appropriate area as directed 2 (Two) Times a Day. Scrotum    triamcinolone (KENALOG) 0.1 % cream Apply 1 application  topically to the appropriate area as directed 2 (Two) Times a Day. buttocks    Multiple Vitamins-Minerals (MULTIVITAMIN WITH MINERALS) tablet tablet Take 1 tablet by mouth Every Night.    nitroglycerin (NITROSTAT) 0.4 MG SL tablet Place 1 tablet under the tongue Every 5 (Five) Minutes As Needed for Chest Pain. Take no more than 3 doses in 15  minutes.       General appearance: Chronically ill-appearing frail elderly male, awaken to the calling of his name, followed some simple commands   HEENT: Normocephalic, atraumatic  Resp: Even and unlabored     Neurological:   MS: Drowsy but awakened to calling of his name easily, followed some commands  CN: + Blink to threat reflex bilaterally, no obvious facial droop, resisted to manual eye opening bilaterally  Motor: 5/5 in upper extremities, wiggled toes of both lower extremities but would not hold up against gravity immediate drifting of both legs  Sensory: light touch sensation intact in all 4 ext.    Physical exam performed, changes noted.    Laboratory results:  Lab Results   Component Value Date    TSH 4.500 (H) 11/21/2023     Lab Results   Component Value Date    HGBA1C 5.80 (H) 11/21/2023     Lab Results   Component Value Date    KQGENOOM27 298 04/12/2023     Lab Results   Component Value Date    CHOL 118 11/21/2023    CHOL 120 04/11/2023    CHOL 77 09/14/2021    CHLPL 111 11/29/2019     Lab Results   Component Value Date    TRIG 97 11/21/2023    TRIG 121 04/11/2023    TRIG 145 09/14/2021     Lab Results   Component Value Date    HDL 45 11/21/2023    HDL 41 04/11/2023    HDL 29 (L) 09/14/2021     Lab Results   Component Value Date    LDL 55 11/21/2023    LDL 57 04/11/2023    LDL 23 09/14/2021     Lab Results   Component Value Date    WBC 10.71 11/23/2023    HGB 11.7 (L) 11/23/2023    HCT 35.8 (L) 11/23/2023    MCV 93.0 11/23/2023     11/23/2023     Lab Results   Component Value Date    GLUCOSE 85 11/23/2023    BUN 14 11/23/2023    CREATININE 0.61 (L) 11/23/2023    EGFRIFNONA 77 10/27/2021    BCR 23.0 11/23/2023    K 3.6 11/23/2023    CO2 24.4 11/23/2023    CALCIUM 8.7 11/23/2023    ALBUMIN 3.3 (L) 11/22/2023    LABIL2 1.5 11/21/2018    AST 19 11/22/2023    ALT 11 11/22/2023     Lab Results   Component Value Date    PTT 28.7 (L) 04/16/2022     Lab Results   Component Value Date    INR 1.09  11/20/2023    INR 1.06 08/17/2022    INR 1.04 04/16/2022    PROTIME 14.3 (H) 11/20/2023    PROTIME 10.9 08/17/2022    PROTIME 10.7 04/16/2022     Brief Urine Lab Results  (Last result in the past 365 days)        Color   Clarity   Blood   Leuk Est   Nitrite   Protein   CREAT   Urine HCG        11/23/23 0220 Yellow   Clear   Negative   Negative   Negative   Negative                   Review and interpretation of imaging:  CT Cervical Spine Without Contrast    Result Date: 11/23/2023   Mild degrees of canal and foraminal narrowing secondary to degenerative phenomena are as discussed in detail above. Further evaluation for soft tissue abnormality such as disc bulges or disc herniations resulting in foraminal stenosis could be further assessed on MR imaging, as clinically indicated.   Radiation dose reduction techniques were utilized, including automated exposure control and exposure modulation based on body size.      CT CEREBRAL PERFUSION WITH & WITHOUT CONTRAST    Result Date: 11/23/2023   There are multiple chronic infarcts identified within the brain parenchyma within multiple vascular distributions that have been discussed in detail above. On the previous MRI of the brain dated 11/22/2023, there were 2 punctate foci of acute to subacute infarction within the frontal lobes located within the JONAS or MCA distributions that are not well delineated on this study.  The CT perfusion study is nondiagnostic.  Intracranially, there is a mild to moderate degree of stenosis involving the V4 segment of the right vertebral artery. Mild to moderate degrees of stenoses are identified within the carotid siphons.  No significant NASCET stenosis is identified within either internal carotid artery   Radiation dose reduction techniques were utilized, including automated exposure control and exposure modulation based on body size.       CT Angiogram Head    Result Date: 11/23/2023   There are multiple chronic infarcts identified within  the brain parenchyma within multiple vascular distributions that have been discussed in detail above. On the previous MRI of the brain dated 11/22/2023, there were 2 punctate foci of acute to subacute infarction within the frontal lobes located within the JONAS or MCA distributions that are not well delineated on this study.  The CT perfusion study is nondiagnostic.  Intracranially, there is a mild to moderate degree of stenosis involving the V4 segment of the right vertebral artery. Mild to moderate degrees of stenoses are identified within the carotid siphons.  No significant NASCET stenosis is identified within either internal carotid artery   Radiation dose reduction techniques were utilized, including automated exposure control and exposure modulation based on body size.       CT Angiogram Neck    Result Date: 11/23/2023   There are multiple chronic infarcts identified within the brain parenchyma within multiple vascular distributions that have been discussed in detail above. On the previous MRI of the brain dated 11/22/2023, there were 2 punctate foci of acute to subacute infarction within the frontal lobes located within the JONAS or MCA distributions that are not well delineated on this study.  The CT perfusion study is nondiagnostic.  Intracranially, there is a mild to moderate degree of stenosis involving the V4 segment of the right vertebral artery. Mild to moderate degrees of stenoses are identified within the carotid siphons.  No significant NASCET stenosis is identified within either internal carotid artery   Radiation dose reduction techniques were utilized, including automated exposure control and exposure modulation based on body size.       MRI Brain Without Contrast    Result Date: 11/22/2023  1. 2 tiny foci of acute to subacute infarct in the right superior frontal lobe and at least one punctate focus of acute to subacute infarct in the left centrum semiovale are seen. 2. Severe changes of bilateral  small vessel white matter ischemic disease with areas of chronic infarct. 3. Scattered foci of chronic microhemorrhage are noted.      CT Chest Without Contrast Diagnostic    Result Date: 11/20/2023  1. Ill-defined peripheral areas of largely interstitial disease in the bilateral lungs as described. These appear relatively stable since the 10/27/2021 study and therefore thought to be related to chronic parenchymal change/interstitial fibrosis. 2. No definite acute infiltrates are seen. 4. Tiny subpleural 3 mm nodule in the right upper lobe. This is not clearly seen on the previous study of 10/27/2021 but still may be benign. 5. If clinically indicated a short-term follow-up CT of the chest in approximately 4 months to 6 months could be obtained to continue assessing stability of these findings.  Radiation dose reduction techniques were utilized, including automated exposure control and exposure modulation based on body size.       CT Head Without Contrast    Result Date: 11/20/2023  1. Atrophy and chronic ischemic disease. 2. No acute process identified.   Radiation dose reduction techniques were utilized, including automated exposure control and exposure modulation based on body size.       XR Chest 1 View    Result Date: 11/20/2023  1. No acute process. 2. There is mild parenchymal scarring in the right lower lung.   This report was finalized on 11/20/2023 5:32 PM by Dr. Praveen Schwartz M.D on Workstation: [id number]     Results for orders placed during the hospital encounter of 11/20/23    Adult transthoracic echo complete    Interpretation Summary    Left ventricular systolic function is normal. Calculated left ventricular EF = 53.7%    Left ventricular diastolic function was not assessed.    Saline test results are negative.    There is mild calcification of the aortic valve.    Estimated right ventricular systolic pressure from tricuspid regurgitation is normal (<35 mmHg).    Mild dilation of the aortic root  is present. The aortic root measures 4 cm.      Impression/Assessment:  This is a 83-year-old male with past medical history of stroke on Eliquis PTA, advanced dementia, pacemaker placement, hypertension, hyperlipidemia, dysautonomia who presented to the hospital on 11/20/2023 with complaints of progressively worse altered mental status.  Wife felt that he was favoring his right side more and seemed more weak.  She had also described symptoms of inattentiveness and staring.  She is his main caretaker and reports that he has been taking his Eliquis 2-1/2 mg twice daily.     BP on arrival 168/110, HR 67.  Temp 97.0.  BS 99.     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  MRI Brain WO: Acute right superior frontal lobe and left centrum semiovale infarcts.  Severe bilateral white matter ischemic disease.  Scattered foci of chronic microhemorrhage within the bilateral cerebral hemispheres and cerebellum.  2D echo: Normal LA cavity size, saline test negative, EF 53.7%, all LV wall segments contract normally, no AV stenosis, no MVR.  EEG: Diffuse theta slowing, indicative of moderate encephalopathy.  CT chest without: No definite acute infiltrate seen.  3 mm right upper lobe nodule noted.  Labs: Respiratory panel negative, U/A 1+ leukocytes/6-10 WBCs  CTA H/N: Vertebral arteries are tortuous in appearance, post PICA segment of the RVA is hypoplastic, mild to moderate degree of stenosis within the proximal V4 segment on the right, no significant degree of stenosis of the ICAs, perfusion study showing artifact no actual perfusion deficit.    Plan:  -CTA with no significant findings that would explain source of his new infarcts.  -APL panel is pending  -Will continue with plans of Eliquis 2.5 mg twice daily based on  his weight and age.  -He has a poor prognosis given his progressive and advanced dementia.  Wife is really wanting to take him home.  Discussed that ST is recommending n.p.o. however she would like to feed him a regular diet despite aspiration risk once returning home.  Continue Crestor 20mg  Neurochecks per stroke protocol  Normalize BP  Stroke Education  MAUREEN/SCDs  PT/OT/ST  Therapies as written. CCP for discharge planning. Call RRT for any acute neurological changes.   We will sign off, will see again per request.    Case discussed with wife at bedside and Dr. Ross, and he agrees with plan above.    DEANDRA Henry

## 2023-11-23 NOTE — CONSULTS
CONSULT NOTE    Infectious Diseases - Ritchie Hinojosa MD  [facility]       Patient Identification:  Name: Jayden Bond  Age: 83 y.o.  Sex: male  :  1940  MRN: [id number]             Date of Consultation: 2023      Primary Care Physician: Radha Santos MD                               Requesting Physician: Dr. Bar  Reason for Consultation: Family's request for evaluation of sepsis/infection    Impression: Patient is a 83-year-old male who has complicated past medical history including vascular dementia, prior multiple strokes, history of hypertension dyslipidemia coronary artery disease as well as history of sick sinus syndrome for which he has had permanent pacemaker placed and has been immobile at the baseline but when feels well according to the wife able to feed himself has been declining for the last for 5 days and eventually presented to the hospital on 2023 because of weakness and since being slumped over and unable to use his arm to feed himself.  Patient has a blank stare and was not communicating.  Patient has normal white blood cell count at the time of admission but had abnormal urinalysis consistent with trace leuk trase and 6-10 WBCs.  Patient was started on IV fluids and neurology was consulted and is being worked up.  Over the course of next couple days patient white blood cell count started to go up and concern was raised for possibility of UTI.  Patient was started on IV antibiotics on 2023 but by that time his white blood cell count was already improved.  Patient has normal lactic acid level and this morning his repeat urinalysis was essentially unremarkable.  Patient is not febrile and has resolution of low-grade fever even before antibiotic was initiated.  Patient himself is unable to give much account of his symptoms.  At the time of admission patient was noted to have some ecchymosis and bruising in the sacrococcygeal and left gluteal area but no  open wound.  Because of his persistent mental status changes his wife is concerned that he has UTI and infectious disease service is consulted.  Patient has not been able to eat or drink much and has concern for significant dysphagia.  1-altered mental status multifactorial including recurrence of stroke versus dehydration due to decreased intake versus episodic aspiration causing leukocytosis and low-grade fever without overt pneumonia versus UTI.  2-vascular dementia  3-sick sinus syndrome status post permanent pacemaker placement  4-immobility and dysphagia  5-evolving decubiti  6-other diagnoses per primary team.      Recommendations/Discussions:  At this juncture I agree with your care plan consisting of supportive care, aspiration precautions and follow-up on blood cultures and monitor his clinical course.  If the blood cultures remain negative and he does not have any more fever and continues to have normal white blood cell count and would recommend stopping antibiotic therapy as his urinalysis that was done earlier is unimpressive.  The real issue is goals of care going forward.  Would recommend palliative care.  Acute Dr. Bar for letting me be the part of your patient care please see above impression and recommendations      History of Present Illness:   Patient is a 83-year-old male who has complicated past medical history including vascular dementia, prior multiple strokes, history of hypertension dyslipidemia coronary artery disease as well as history of sick sinus syndrome for which he has had permanent pacemaker placed and has been immobile at the baseline but when feels well according to the wife able to feed himself has been declining for the last for 5 days and eventually presented to the hospital on 11/20/2023 because of weakness and since being slumped over and unable to use his arm to feed himself.  Patient has a blank stare and was not communicating.  Patient has normal white blood cell count  at the time of admission but had abnormal urinalysis consistent with trace leuk trase and 6-10 WBCs.  Patient was started on IV fluids and neurology was consulted and is being worked up.  Over the course of next couple days patient white blood cell count started to go up and concern was raised for possibility of UTI.  Patient was started on IV antibiotics on 11/22/2023 but by that time his white blood cell count was already improved.  Patient has normal lactic acid level and this morning his repeat urinalysis was essentially unremarkable.  Patient is not febrile and has resolution of low-grade fever even before antibiotic was initiated.  Patient himself is unable to give much account of his symptoms.  At the time of admission patient was noted to have some ecchymosis and bruising in the sacrococcygeal and left gluteal area but no open wound.  Because of his persistent mental status changes his wife is concerned that he has UTI and infectious disease service is consulted.  Patient has not been able to eat or drink much and has concern for significant dysphagia.      Past Medical History:  Past Medical History:   Diagnosis Date    CAD (coronary artery disease)     Dementia     Depression     Disease of thyroid gland     Dysautonomia     Dyslipidemia     GERD (gastroesophageal reflux disease)     Head pain     Hyperlipidemia     Hypertension     Orthostatic hypotension     SSS (sick sinus syndrome)     Stroke     x 3 in 2018     Past Surgical History:  Past Surgical History:   Procedure Laterality Date    APPENDECTOMY      CHOLECYSTECTOMY      CYSTOSCOPY      ENDOSCOPY N/A 10/17/2019    Procedure: ESOPHAGOGASTRODUODENOSCOPY with biopsy x 2 areas;  Surgeon: Ashok Sanchez MD;  Location: [facility] ENDOSCOPY;  Service: Gastroenterology    HERNIA REPAIR      INSERT / REPLACE / REMOVE PACEMAKER      LEFT HEART CATH      PACEMAKER IMPLANTATION      [employer]    TEMPORAL ARTERY BIOPSY      WRIST SURGERY      severed artery       Home Meds:  Medications Prior to Admission   Medication Sig Dispense Refill Last Dose    acetaminophen (TYLENOL) 500 MG tablet Take 2 tablets by mouth Every 6 (Six) Hours As Needed for Mild Pain.   11/20/2023    apixaban (ELIQUIS) 5 MG tablet tablet Take 2.5 mg by mouth 2 (Two) Times a Day.   11/20/2023    brimonidine (ALPHAGAN) 0.2 % ophthalmic solution Administer 1 drop to both eyes 2 (Two) Times a Day. 10 mL 12 11/20/2023    escitalopram (LEXAPRO) 10 MG tablet Take 1 tablet by mouth Daily.   11/20/2023    galantamine (RAZADYNE) 8 MG tablet Take 1 tablet by mouth 2 (Two) Times a Day.   11/20/2023    guaifenesin (ROBITUSSIN) 100 MG/5ML liquid Take 20 mL by mouth Every 8 (Eight) Hours. (Patient taking differently: Take 20 mL by mouth 4 (Four) Times a Day As Needed.) 1800 mL 1 Past Week    guaiFENesin 200 MG tablet Take 2 tablets by mouth Every 4 (Four) Hours As Needed for Cough.   11/20/2023    Incruse Ellipta 62.5 MCG/INH aerosol powder  Take 1 puff by mouth Daily.   11/20/2023    levothyroxine (SYNTHROID, LEVOTHROID) 25 MCG tablet Take 1 tablet by mouth Every Morning.   11/20/2023    lidocaine (LMX) 4 % cream Apply 1 application  topically to the appropriate area as directed 3 (Three) Times a Day As Needed for Mild Pain. Back neck shoulders   11/20/2023    loratadine (Claritin) 10 MG tablet Take 1 tablet by mouth Daily.   11/20/2023    miconazole (MICOTIN) 2 % cream Apply 1 application  topically to the appropriate area as directed 2 (Two) Times a Day. Toes   11/20/2023    midodrine (PROAMATINE) 2.5 MG tablet Take 1 tablet by mouth As Needed. SBP <105   Past Month    nystatin (MYCOSTATIN) 100,000 unit/mL suspension Swish and swallow 5 mL 4 (Four) Times a Day.  buttocks   11/20/2023    predniSONE (DELTASONE) 5 MG tablet Take 1 tablet by mouth Daily.   11/20/2023    rosuvastatin (CRESTOR) 20 MG tablet Take 1 tablet by mouth Every Evening.   Past Week    tacrolimus (PROTOPIC) 0.1 % ointment Apply 1 application   topically to the appropriate area as directed 2 (Two) Times a Day. Scrotum   11/20/2023    triamcinolone (KENALOG) 0.1 % cream Apply 1 application  topically to the appropriate area as directed 2 (Two) Times a Day. buttocks   11/20/2023    Multiple Vitamins-Minerals (MULTIVITAMIN WITH MINERALS) tablet tablet Take 1 tablet by mouth Every Night.   More than a month    nitroglycerin (NITROSTAT) 0.4 MG SL tablet Place 1 tablet under the tongue Every 5 (Five) Minutes As Needed for Chest Pain. Take no more than 3 doses in 15 minutes.   More than a month     Current Meds:     Current Facility-Administered Medications:     acetaminophen (TYLENOL) tablet 650 mg, 650 mg, Oral, Q4H PRN **OR** acetaminophen (TYLENOL) suppository 650 mg, 650 mg, Rectal, Q4H PRN, Fay Gamboa MD    amLODIPine (NORVASC) tablet 5 mg, 5 mg, Oral, Q24H, Fay Gamboa MD    apixaban (ELIQUIS) tablet 2.5 mg, 2.5 mg, Oral, Q12H, Erica Nunez APRN    bisacodyl (DULCOLAX) suppository 10 mg, 10 mg, Rectal, Daily PRN, Fay Gamboa MD    brimonidine (ALPHAGAN) 0.2 % ophthalmic solution 1 drop, 1 drop, Both Eyes, BID, Fay Gamboa MD, 1 drop at 11/22/23 2037    cefTRIAXone (ROCEPHIN) 2,000 mg in sodium chloride 0.9 % 100 mL IVPB-VTB, 2,000 mg, Intravenous, Q24H, Jerald Bar MD, Stopped at 11/22/23 2035    cetirizine (zyrTEC) tablet 10 mg, 10 mg, Oral, Daily, Fay Gamboa MD    docusate sodium (COLACE) capsule 100 mg, 100 mg, Oral, Nightly, Fay Gamboa MD    escitalopram (LEXAPRO) tablet 10 mg, 10 mg, Oral, Daily, Fay Gamboa MD    famotidine (PEPCID) tablet 20 mg, 20 mg, Oral, BID, Fay Gamboa MD    galantamine (RAZADYNE) tablet 8 mg, 8 mg, Oral, BID, Fay Gamboa MD    guaifenesin (ROBITUSSIN) 100 MG/5ML liquid 400 mg, 400 mg, Oral, Q8H, Fay Gamboa MD    levothyroxine (SYNTHROID, LEVOTHROID) tablet 25 mcg, 25 mcg, Oral, Q AM, Fay Gamboa,  MD    Lidocaine 4 % 1 patch, 1 patch, Transdermal, Q24H, Fay Gamboa MD, 1 patch at 11/21/23 0847    lisinopril (PRINIVIL,ZESTRIL) tablet 20 mg, 20 mg, Oral, Q24H, Jerald Bar MD    multivitamin with minerals 1 tablet, 1 tablet, Oral, Nightly, Fay Gamboa MD    nystatin (MYCOSTATIN) 775030 UNIT/GM cream 1 application , 1 application , Topical, Q12H, Jerald Bar MD, 1 application  at 11/22/23 2039    ondansetron (ZOFRAN) injection 4 mg, 4 mg, Intravenous, Q6H PRN, Fay Gamboa MD    rosuvastatin (CRESTOR) tablet 20 mg, 20 mg, Oral, Nightly, Erica Nunez APRN    [COMPLETED] Insert Peripheral IV, , , Once **AND** sodium chloride 0.9 % flush 10 mL, 10 mL, Intravenous, PRN, Ajay Varghese MD    sodium chloride 0.9 % infusion, 150 mL/hr, Intravenous, Continuous, Jerald Bar MD, Last Rate: 150 mL/hr at 11/23/23 0424, 150 mL/hr at 11/23/23 0424    triamcinolone (KENALOG) 0.1 % ointment 1 application , 1 application , Topical, Q12H, Jerald Bar MD, 1 application  at 11/22/23 2039    Umeclidinium Bromide (INCRUSE ELLIPTA) aerosol powder  1 puff, 1 puff, Inhalation, Daily, Fay Gamboa MD, 1 puff at 11/22/23 0820  Allergies:  Allergies   Allergen Reactions    Trazodone Hallucinations    Doxycycline GI Intolerance    Ciprofloxacin Other (See Comments)     Breathing problems    Doxycycline Nausea And Vomiting    Fosfomycin Tromethamine Confusion     Reports weakness and confusion    Gemtesa [Vibegron] Other (See Comments)     Reports urinary retention    Hydralazine Hives    Keppra [Levetiracetam] GI Intolerance    Macrobid [Nitrofurantoin] Other (See Comments)     Unknown reaction    Quetiapine Other (See Comments)     Having falls on it    Sulfa Antibiotics Other (See Comments)     Unknown reaction     Social History:   Social History     Tobacco Use    Smoking status: Former    Smokeless tobacco: Never    Tobacco comments:     quit 25 yrs ago   Substance Use Topics     "Alcohol use: No      Family History:  Family History   Problem Relation Age of Onset    Heart disease Father           Review of Systems  See history of present illness and past medical history.   Could not be obtained from the patient    Vitals:   BP (!) 165/107 (BP Location: Left arm, Patient Position: Lying)   Pulse 62   Temp 97.7 °F (36.5 °C) (Oral)   Resp 16   Ht 177 cm (69.69\")   Wt 59 kg (130 lb 1.1 oz)   SpO2 99%   BMI 18.83 kg/m²   I/O:   Intake/Output Summary (Last 24 hours) at 11/23/2023 0858  Last data filed at 11/23/2023 0608  Gross per 24 hour   Intake 4257 ml   Output 600 ml   Net 3657 ml     Exam:  Patient is examined using the personal protective equipment as per guidelines from infection control for this particular patient as enacted.  Hand washing was performed before and after patient interaction.  General Appearance:  Somnolent lethargic cachectic male   Head:    Normocephalic, without obvious abnormality, atraumatic   Eyes:    PERRL, conjunctivae/corneas clear, EOM's intact, both eyes   Ears:    Normal external ear canals, both ears   Nose:   Nares normal, septum midline, mucosa normal, no drainage    or sinus tenderness   Throat: Dry oral mucosa   Neck: Supple and no adenopathy noted   Back:     Symmetric, no curvature, ROM normal, no CVA tenderness   Lungs:     Clear to auscultation bilaterally, respirations unlabored   Chest Wall:    No tenderness or deformity, pacemaker in place    Heart:  S1-S2 regular paced rhythm   Abdomen:   Scaphoid   Extremities: Contractures of extremities due to prior history   Pulses:   Pulses palpable in all extremities; symmetric all extremities   Skin:            Neurologic: Lethargic somnolent does not engage has underlying dementia and contractures due to prior stroke       Data Review:    I reviewed the patient's new clinical results.  Results from last 7 days   Lab Units 11/23/23  0639 11/22/23  0631 11/21/23  0525 11/20/23  1743   WBC 10*3/mm3 10.71 " 20.86* 11.67* 10.67   HEMOGLOBIN g/dL 11.7* 13.1 13.1 13.2   PLATELETS 10*3/mm3 165 243 208 203     Results from last 7 days   Lab Units 11/23/23  0639 11/22/23  0631 11/21/23  0525 11/20/23  1743   SODIUM mmol/L 142 139 140 140   POTASSIUM mmol/L 3.6 3.8 3.7 4.5   CHLORIDE mmol/L 109* 106 104 106   CO2 mmol/L 24.4 20.8* 26.9 29.2*   BUN mg/dL 14 13 13 18   CREATININE mg/dL 0.61* 0.71* 0.65* 0.73*   CALCIUM mg/dL 8.7 9.1 9.3 9.2   GLUCOSE mg/dL 85 144* 85 99     Microbiology Results (last 10 days)       Procedure Component Value - Date/Time    Respiratory Panel PCR w/COVID-19(SARS-CoV-2) [facility]EPA/[facility]/DIPIKA In-House, NP Swab in UTM/VTM, 2 HR TAT - Swab, Nasopharynx [[id number]]  (Normal) Collected: 11/20/23 1743    Lab Status: Final result Specimen: Swab from Nasopharynx Updated: 11/20/23 1849     ADENOVIRUS, PCR Not Detected     Coronavirus 229E Not Detected     Coronavirus HKU1 Not Detected     Coronavirus NL63 Not Detected     Coronavirus OC43 Not Detected     COVID19 Not Detected     Human Metapneumovirus Not Detected     Human Rhinovirus/Enterovirus Not Detected     Influenza A PCR Not Detected     Influenza B PCR Not Detected     Parainfluenza Virus 1 Not Detected     Parainfluenza Virus 2 Not Detected     Parainfluenza Virus 3 Not Detected     Parainfluenza Virus 4 Not Detected     RSV, PCR Not Detected     Bordetella pertussis pcr Not Detected     Bordetella parapertussis PCR Not Detected     Chlamydophila pneumoniae PCR Not Detected     Mycoplasma pneumo by PCR Not Detected    Narrative:      In the setting of a positive respiratory panel with a viral infection PLUS a negative procalcitonin without other underlying concern for bacterial infection, consider observing off antibiotics or discontinuation of antibiotics and continue supportive care. If the respiratory panel is positive for atypical bacterial infection (Bordetella pertussis, Chlamydophila pneumoniae, or Mycoplasma pneumoniae), consider  antibiotic de-escalation to target atypical bacterial infection.            Assessment:  Active Hospital Problems    Diagnosis  POA    **Altered mental status [R41.82]  Yes    Severe malnutrition [E43]  Yes    Dysphagia [R13.10]  Yes    Difficulty speaking [R47.9]  Yes    Mixed hyperlipidemia [E78.2]  Yes    SSS (sick sinus syndrome) [I49.5]  Yes    Essential hypertension [I10]  Yes    Coronary artery disease involving native coronary artery of native heart without angina pectoris [I25.10]  Yes    Anticoagulant long-term use [Z79.01]  Not Applicable      Resolved Hospital Problems   No resolved problems to display.         Plan:  See above  Ritchie Zamora MD   11/23/2023  08:58 EST    Parts of this note may be an electronic transcription/translation of spoken language to printed text using the Dragon dictation system.

## 2023-11-24 PROBLEM — R41.82 AMS (ALTERED MENTAL STATUS): Status: ACTIVE | Noted: 2023-01-01

## 2023-11-24 NOTE — THERAPY EVALUATION
Patient Name: Jayden Bond  : 1940    MRN: [id number]                              Today's Date: 2023       Admit Date: 2023    Visit Dx:     ICD-10-CM ICD-9-CM   1. Altered mental status, unspecified altered mental status type  R41.82 780.97   2. Severe dementia, unspecified dementia type, unspecified whether behavioral, psychotic, or mood disturbance or anxiety  F03.C0 294.20   3. Coagulopathy: Eliquis induced  D68.9 286.9     Patient Active Problem List   Diagnosis    Chest pain    Essential hypertension    Stroke    Disease of thyroid gland    Coronary artery disease involving native coronary artery of native heart without angina pectoris    Dysautonomia    Dementia    Anticoagulant long-term use    Cardiac pacemaker in situ    COPD (chronic obstructive pulmonary disease)    Degenerative cervical spinal stenosis    Dysautonomia orthostatic hypotension syndrome    Epiphora due to insufficient drainage of both sides    History of CVA (cerebrovascular accident)    History of PTCA    Punctal stenosis, acquired    Nonsustained ventricular tachycardia    Pseudophakia, both eyes    SSS (sick sinus syndrome)    Palpitations    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    Weakness    Vascular dementia with behavior disturbance    Nocturnal hypoxemia    Delirium due to multiple etiologies, persistent, hypoactive    Cognitive safety issue    Caregiver stress    Dementia    Disease of thyroid gland    Dysautonomia    Mixed hyperlipidemia    Transient ischemic attack (TIA)    Altered mental status    CAP (community acquired pneumonia)    Abnormal finding on urinalysis    Poor short-term memory    Pneumonia    Altered mental status, unspecified altered mental status type    History of falling    Difficulty speaking    Dysphagia    Failure to thrive in adult    Aspiration pneumonia    Aspiration pneumonia of both lungs    Severe malnutrition    AMS (altered mental status)     Past Medical History:    Diagnosis Date    CAD (coronary artery disease)     Dementia     Depression     Disease of thyroid gland     Dysautonomia     Dyslipidemia     GERD (gastroesophageal reflux disease)     Head pain     Hyperlipidemia     Hypertension     Orthostatic hypotension     SSS (sick sinus syndrome)     Stroke     x 3 in 2018     Past Surgical History:   Procedure Laterality Date    APPENDECTOMY      CHOLECYSTECTOMY      CYSTOSCOPY      ENDOSCOPY N/A 10/17/2019    Procedure: ESOPHAGOGASTRODUODENOSCOPY with biopsy x 2 areas;  Surgeon: Ashok Sanchez MD;  Location: [facility] ENDOSCOPY;  Service: Gastroenterology    HERNIA REPAIR      INSERT / REPLACE / REMOVE PACEMAKER      LEFT HEART CATH      PACEMAKER IMPLANTATION      [employer]    TEMPORAL ARTERY BIOPSY      WRIST SURGERY      severed artery      General Information       Row Name 11/24/23 1426          Physical Therapy Time and Intention    Document Type evaluation  -ZB     Mode of Treatment individual therapy;physical therapy  -ZB       Row Name 11/24/23 1426          General Information    Patient Profile Reviewed yes  -ZB     Prior Level of Function mod assist:;max assist:  dependent for ADLs; modA for txfr to WC,dependent for WC mobility  -ZB     Existing Precautions/Restrictions fall  -ZB     Barriers to Rehab medically complex;previous functional deficit;cognitive status  -ZB       Row Name 11/24/23 1426          Living Environment    People in Home spouse  -ZB       Row Name 11/24/23 1426          Home Main Entrance    Number of Stairs, Main Entrance other (see comments)  ramp  -ZB       Row Name 11/24/23 1426          Stairs Within Home, Primary    Number of Stairs, Within Home, Primary none  -ZB       Row Name 11/24/23 1426          Cognition    Orientation Status (Cognition) oriented to;person  -ZB       Row Name 11/24/23 1426          Safety Issues, Functional Mobility    Safety Issues Affecting Function (Mobility) ability to follow commands;awareness of need for  assistance;insight into deficits/self-awareness;problem-solving;judgment;safety precaution awareness  -ZB     Impairments Affecting Function (Mobility) balance;cognition;endurance/activity tolerance;grasp;motor control;motor planning;pain;postural/trunk control;strength  -ZB     Cognitive Impairments, Mobility Safety/Performance awareness, need for assistance;insight into deficits/self-awareness;judgment;problem-solving/reasoning;safety precaution awareness  -ZB               User Key  (r) = Recorded By, (t) = Taken By, (c) = Cosigned By      Initials Name Provider Type    Brayan oRwe PT Physical Therapist                   Mobility       Row Name 11/24/23 1428          Bed Mobility    Bed Mobility supine-sit;sit-supine  -ZB     Supine-Sit [place] (Bed Mobility) verbal cues;nonverbal cues (demo/gesture);1 person assist;maximum assist (25% patient effort)  -ZB     Sit-Supine [place] (Bed Mobility) verbal cues;nonverbal cues (demo/gesture);1 person assist;maximum assist (25% patient effort)  -ZB     Assistive Device (Bed Mobility) head of bed elevated  -ZB       Row Name 11/24/23 1428          Bed-Chair Transfer    Bed-Chair [place] (Transfers) not tested  -ZB       Row Name 11/24/23 1428          Sit-Stand Transfer    Sit-Stand [place] (Transfers) maximum assist (25% patient effort);verbal cues;nonverbal cues (demo/gesture);set up  -ZB     Assistive Device (Sit-Stand Transfers) walker, front-wheeled  -ZB       Row Name 11/24/23 1428          Gait/Stairs (Locomotion)    [place] Level (Gait) unable to assess  -ZB     [place] Level (Stairs) not tested  -ZB     Comment, (Gait/Stairs) unable to attempt gait due to significant posterior lean and decreased command following; pt able to take few side steps to HOB w/ mod-maxA to maintain upright  -ZB               User Key  (r) = Recorded By, (t) = Taken By, (c) = Cosigned By      Initials Name Provider Type    Brayan Rowe PT Physical  Therapist                   Obj/Interventions       Row Name 11/24/23 1432          Range of Motion Comprehensive    General Range of Motion no range of motion deficits identified  -ZB       Row Name 11/24/23 1432          Strength Comprehensive (MMT)    Comment, General Manual Muscle Testing (MMT) Assessment unable to formally assess LE MMT; gross B LE MMT at least 3+/5 given that patient can weight bear  -ZB       Row Name 11/24/23 1432          Motor Skills    Motor Skills functional endurance  -ZB     Functional Endurance poor  -ZB       Row Name 11/24/23 1432          Balance    Balance Assessment sitting static balance;sitting dynamic balance;standing static balance;standing dynamic balance  -ZB     Static Sitting Balance minimal assist;maximum assist  -ZB     Dynamic Sitting Balance moderate assist;maximum assist  -ZB     Position, Sitting Balance sitting edge of bed  -ZB     Static Standing Balance moderate assist  -ZB     Dynamic Standing Balance moderate assist;maximum assist  -ZB     Position/Device Used, Standing Balance supported;walker, front-wheeled  -ZB     Balance Interventions sitting;standing;sit to stand;supported;static;dynamic  -ZB       Row Name 11/24/23 1432          Sensory Assessment (Somatosensory)    Sensory Assessment (Somatosensory) unable/difficult to assess  -ZB               User Key  (r) = Recorded By, (t) = Taken By, (c) = Cosigned By      Initials Name Provider Type    ZB Brayan Pearl, PT Physical Therapist                   Goals/Plan       Row Name 11/24/23 1448          Bed Mobility Goal 1 (PT)    Activity/Assistive Device (Bed Mobility Goal 1, PT) bed mobility activities, all  -ZB     [place] Level/Cues Needed (Bed Mobility Goal 1, PT) minimum assist (75% or more patient effort)  -ZB     Time Frame (Bed Mobility Goal 1, PT) short term goal (STG);2 weeks  -ZB       Row Name 11/24/23 1448          Transfer Goal 1 (PT)    Activity/Assistive Device (Transfer Goal 1, PT)  transfers, all  -ZB     [place] Level/Cues Needed (Transfer Goal 1, PT) moderate assist (50-74% patient effort)  -ZB     Time Frame (Transfer Goal 1, PT) short term goal (STG);2 weeks  -ZB       Row Name 11/24/23 1448          Therapy Assessment/Plan (PT)    Planned Therapy Interventions (PT) balance training;bed mobility training;home exercise program;strengthening;patient/family education;transfer training  -ZB               User Key  (r) = Recorded By, (t) = Taken By, (c) = Cosigned By      Initials Name Provider Type    ZB Brayan Pearl, PT Physical Therapist                   Clinical Impression       Row Name 11/24/23 1432          Pain    Pretreatment Pain Rating 0/10 - no pain  -ZB     Posttreatment Pain Rating 0/10 - no pain  -ZB     Pain Intervention(s) Ambulation/increased activity;Repositioned;Rest  -ZB       Row Name 11/24/23 1435          Plan of Care Review    Plan of Care Reviewed With patient;spouse  -ZB     Outcome Evaluation Pt is an 84 y/o male admitted to [facility] with AMS. The patient has a baseline of advanced dementia, as well as hx of CVA. Per chart MRI shows acute-subacute strokes with recent AMS suspected to likely be due to UTI. Pt spouse in room and provides majority of PLOF. Per spouse the patient is dependent for ADLs at baseline, txfrs to a transport chair modA, and is dependent for WC mobility. She states that the only transfers he completes in a day are from bed>WC>couch and back. The home has a ramp to enter. Today the patient is in bed A+Ox1. He is able to occasionally verbalize reponses to questions or prompts, but often mumbles unintelligible speech. The patient comes to EOB maxA. He is able to sit EOB initially mod-maxA d/t heavy L lateral/posterior lean, but by the end of session was able to maintain balance Felicia. Pt completes STS maxA to rwx and demonstrates heavy post. lean requiring mod-maxA to correct. The patient takes small steps to HOB mod-maxA + vcs + assist for AD  management. Pt assisted back to bed [place]. Discussed DC plan with spouse, she is adamantly opposed to rehab and states that she is capable to manage DC home to prior arrangements with no concerns. PT will continue to monitor and progress as able.  -ZB       Row Name 11/24/23 1433          Therapy Assessment/Plan (PT)    Rehab Potential (PT) good, to achieve stated therapy goals  -ZB     Criteria for Skilled Interventions Met (PT) yes  -ZB     Therapy Frequency (PT) 3 times/wk  -ZB       Row Name 11/24/23 1433          Positioning and Restraints    Pre-Treatment Position in bed  -ZB     Post Treatment Position bed  -ZB     In Bed notified nsg;fowlers;call light within reach;encouraged to call for assist;exit alarm on;with family/caregiver  -ZB               User Key  (r) = Recorded By, (t) = Taken By, (c) = Cosigned By      Initials Name Provider Type    Brayan Rowe, PT Physical Therapist                   Outcome Measures       Row Name 11/24/23 1448 11/24/23 0937       How much help from another person do you currently need...    Turning from your back to your side while in flat bed without using bedrails? 2  -ZB 2  -RL    Moving from lying on back to sitting on the side of a flat bed without bedrails? 2  -ZB 2  -RL    Moving to and from a bed to a chair (including a wheelchair)? 2  -ZB 2  -RL    Standing up from a chair using your arms (e.g., wheelchair, bedside chair)? 2  -ZB 2  -RL    Climbing 3-5 steps with a railing? 1  -ZB 1  -RL    To walk in hospital room? 1  -ZB 1  -RL    AM-PAC 6 Clicks Score (PT) 10  -ZB 10  -RL    Highest Level of Mobility Goal 4 --> Transfer to chair/commode  -ZB 4 --> Transfer to chair/commode  -RL      Row Name 11/24/23 1448          Modified [place] Scale    Modified [place] Scale 5 - Severe disability.  Bedridden, incontinent, and requiring constant nursing care and attention.  -ZB       Row Name 11/24/23 1448          Functional Assessment    Outcome Measure Options AM-PAC 6  Clicks Basic Mobility (PT);Modified [place]  -ZB               User Key  (r) = Recorded By, (t) = Taken By, (c) = Cosigned By      Initials Name Provider Type    Kelly Barksdale, RN Registered Nurse    Brayan Rowe, PT Physical Therapist                                   PT Recommendation and Plan  Planned Therapy Interventions (PT): balance training, bed mobility training, home exercise program, strengthening, patient/family education, transfer training  Plan of Care Reviewed With: patient, spouse  Outcome Evaluation: Pt is an 82 y/o male admitted to [facility] with AMS. The patient has a baseline of advanced dementia, as well as hx of CVA. Per chart MRI shows acute-subacute strokes with recent AMS suspected to likely be due to UTI. Pt spouse in room and provides majority of PLOF. Per spouse the patient is dependent for ADLs at baseline, txfrs to a transport chair modA, and is dependent for WC mobility. She states that the only transfers he completes in a day are from bed>WC>couch and back. The home has a ramp to enter. Today the patient is in bed A+Ox1. He is able to occasionally verbalize reponses to questions or prompts, but often mumbles unintelligible speech. The patient comes to EOB maxA. He is able to sit EOB initially mod-maxA d/t heavy L lateral/posterior lean, but by the end of session was able to maintain balance Felicia. Pt completes STS maxA to rwx and demonstrates heavy post. lean requiring mod-maxA to correct. The patient takes small steps to HOB mod-maxA + vcs + assist for AD management. Pt assisted back to bed maxA. Discussed DC plan with spouse, she is adamantly opposed to rehab and states that she is capable to manage DC home to prior arrangements with no concerns. PT will continue to monitor and progress as able.     Time Calculation:         PT Charges       Row Name 11/24/23 7628             Time Calculation    Start Time 1337  -ZB      Stop Time 1407  -ZB      Time Calculation (min) 30 min  -ZB       PT Received On 11/24/23  -ZB      PT - Next Appointment 11/26/23  -[id number]      PT Goal Re-Cert Due Date 12/08/23  -ZB         Time Calculation- PT    Total Timed Code Minutes- PT 23 minute(s)  -ZB         Timed Charges    03909 - PT Therapeutic Exercise Minutes 13  -ZB      28051 - PT Therapeutic Activity Minutes 10  -ZB         Total Minutes    Timed Charges Total Minutes 23  -ZB       Total Minutes 23  -ZB                User Key  (r) = Recorded By, (t) = Taken By, (c) = Cosigned By      Initials Name Provider Type    Brayan Rowe, SOTERO Physical Therapist                  Therapy Charges for Today       Code Description Service Date Service Provider Modifiers Qty    26574330225 HC PT THER PROC EA 15 MIN 11/24/2023 Brayan Pearl, PT GP 1    93649173848 HC PT THERAPEUTIC ACT EA 15 MIN 11/24/2023 Brayan Pearl, PT GP 1    87064897898 HC PT EVAL MOD COMPLEXITY 2 11/24/2023 Brayan Pearl, PT GP 1            PT G-Codes  Outcome Measure Options: AM-PAC 6 Clicks Basic Mobility (PT), Modified [place]  AM-PAC 6 Clicks Score (PT): 10  AM-PAC 6 Clicks Score (OT): 9  Modified [place] Scale: 5 - Severe disability.  Bedridden, incontinent, and requiring constant nursing care and attention.  PT Discharge Summary  Anticipated Discharge Disposition (PT): home with 24/7 care, skilled nursing facility    Samir Pearl, PT  11/24/2023

## 2023-11-24 NOTE — PLAN OF CARE
Goal Outcome Evaluation:  Plan of Care Reviewed With: patient, spouse           Outcome Evaluation: Re-eval of swallow completed. Continue to recommend NPO. Patient continues to demonstrated overt s/s of aspiration despite improvement in ability to verbalize and initiate a swallow. Wife reports plan is to initiate PO diet s/p DC home, even with reviewing risks of aspiration with SLP. SLP to continue to follow for re-eval and instrumental readiness.      Anticipated Discharge Disposition (SLP): per wife, home with assist.

## 2023-11-24 NOTE — PLAN OF CARE
Goal Outcome Evaluation:  Plan of Care Reviewed With: patient, spouse           Outcome Evaluation: Pt is an 82 y/o male admitted to [facility] with AMS. The patient has a baseline of advanced dementia, as well as hx of CVA. Per chart MRI shows acute-subacute strokes with recent AMS suspected to likely be due to UTI. Pt spouse in room and provides majority of PLOF. Per spouse the patient is dependent for ADLs at baseline, txfrs to a transport chair modA, and is dependent for WC mobility. She states that the only transfers he completes in a day are from bed>WC>couch and back. The home has a ramp to enter. Today the patient is in bed A+Ox1. He is able to occasionally verbalize reponses to questions or prompts, but often mumbles unintelligible speech. The patient comes to EOB maxA. He is able to sit EOB initially mod-maxA d/t heavy L lateral/posterior lean, but by the end of session was able to maintain balance Felicia. Pt completes STS maxA to rwx and demonstrates heavy post. lean requiring mod-maxA to correct. The patient takes small steps to HOB mod-maxA + vcs + assist for AD management. Pt assisted back to bed maxA. Discussed DC plan with spouse, she is adamantly opposed to rehab and states that she is capable to manage DC home to prior arrangements with no concerns. PT will continue to monitor and progress as able.      Anticipated Discharge Disposition (PT): home with 24/7 care, skilled nursing facility

## 2023-11-24 NOTE — NURSING NOTE
PATIENTS WIFE HAS REMAINED AT BEDSIDE.  WHEN STAFF WAS CHANGING DRESSING ON [employer] PTS WIFE SAID SHE WANTED A PICTURE.  SHE WAS TOLD PICTURES ARE NOT ALLOWED TO BE TAKEN OF PT CARE IN HOSPITAL BUT CONTINUED TO INTERRUPT CARE AND TOOK PICTURES ANYWAY.  WIFE IS SET UP IN A RECLINER IN PT ROOM, SHE IS DEMANDING OF STAFF (WANTS TO BE SERVED FOOD AND SNACKS0 AND DOES NOT PARTICIPATE IN CARE.  SHE VIES FOR ATTENTION WHEN STAFF ARE IN THE ROOM CARING FOR THE PATIENT AND BECOMES SNIDE WHEN ATTENTION IS NOT PAID TO HER.

## 2023-11-24 NOTE — PROGRESS NOTES
"  Infectious Diseases Progress Note    Ritchie Zamora MD     [facility]  [place]: 0 days  Patient Identification:  Name: Jayden Bond  Age: 83 y.o.  Sex: male  :  1940  MRN: [id number]         Primary Care Physician: Radha Santos MD        Subjective: More awake as per wife at the bedside and follows command and able to answer simple questions.  Interval History: See consultation note.    Objective:    Scheduled Meds:albuterol, 2.5 mg, Nebulization, BID - RT  brimonidine, 1 drop, Both Eyes, BID  cefTRIAXone, 2,000 mg, Intravenous, Q24H  cetirizine, 10 mg, Oral, Daily  docusate sodium, 100 mg, Oral, Nightly  enoxaparin, 1 mg/kg, Subcutaneous, Q12H  escitalopram, 10 mg, Oral, Daily  famotidine, 20 mg, Oral, BID  galantamine, 8 mg, Oral, BID  guaifenesin, 400 mg, Oral, Q8H  levothyroxine, 25 mcg, Oral, Q AM  Lidocaine, 1 patch, Transdermal, Q24H  lisinopril, 20 mg, Oral, Q24H  multivitamin with minerals, 1 tablet, Oral, Nightly  neomycin-polymyxin-dexamethasone, 1 drop, Both Eyes, Q8H  nystatin, 1 application , Topical, Q12H  rosuvastatin, 20 mg, Oral, Nightly  sodium chloride, 4 mL, Nebulization, BID - RT  triamcinolone, 1 application , Topical, Q12H  Umeclidinium Bromide, 1 puff, Inhalation, Daily      Continuous Infusions:sodium chloride, 150 mL/hr, Last Rate: 150 mL/hr (23 0424)        Vital signs in last 24 hours:  Temp:  [97.3 °F (36.3 °C)-98.1 °F (36.7 °C)] 97.7 °F (36.5 °C)  Heart Rate:  [61-74] 63  Resp:  [16-20] 18  BP: (137-172)/() 172/94    Intake/Output:    Intake/Output Summary (Last 24 hours) at 2023 0730  Last data filed at 2023 2352  Gross per 24 hour   Intake --   Output 950 ml   Net -950 ml       Exam:  /94 (BP Location: Left arm, Patient Position: Lying)   Pulse 63   Temp 97.7 °F (36.5 °C) (Oral)   Resp 18   Ht 177 cm (69.69\")   Wt 59 kg (130 lb 1.1 oz)   SpO2 99%   BMI 18.83 kg/m²   Patient is examined using the personal protective " equipment as per guidelines from infection control for this particular patient as enacted.  Hand washing was performed before and after patient interaction.  General Appearance:  More awake and interactive but still appears weak and ill.                          Head:    Normocephalic, without obvious abnormality, atraumatic                           Eyes:    PERRL, conjunctivae/corneas clear, EOM's intact, both eyes                         Throat:   Lips, tongue, gums normal; oral mucosa pink and moist                           Neck:   Supple, symmetrical, trachea midline, no JVD                         Lungs:    Clear to auscultation bilaterally, respirations unlabored                 Chest Wall:    No tenderness or deformity                          Heart:  S1-S2 regular                  Abdomen:   Soft nontender                 Extremities:   Extremities normal, atraumatic, no cyanosis or edema                        Pulses:   Pulses palpable in all extremities                            Skin:   Skin is warm and dry,  no rashes or palpable lesions                  Neurologic: Lethargic but more awake.  Still confused       Data Review:    I reviewed the patient's new clinical results.  Results from last 7 days   Lab Units 11/23/23  0639 11/22/23  0631 11/21/23  0525 11/20/23  1743   WBC 10*3/mm3 10.71 20.86* 11.67* 10.67   HEMOGLOBIN g/dL 11.7* 13.1 13.1 13.2   PLATELETS 10*3/mm3 165 243 208 203     Results from last 7 days   Lab Units 11/23/23  0639 11/22/23  0631 11/21/23  0525 11/20/23  1743   SODIUM mmol/L 142 139 140 140   POTASSIUM mmol/L 3.6 3.8 3.7 4.5   CHLORIDE mmol/L 109* 106 104 106   CO2 mmol/L 24.4 20.8* 26.9 29.2*   BUN mg/dL 14 13 13 18   CREATININE mg/dL 0.61* 0.71* 0.65* 0.73*   CALCIUM mg/dL 8.7 9.1 9.3 9.2   GLUCOSE mg/dL 85 144* 85 99     Microbiology Results (last 10 days)       Procedure Component Value - Date/Time    Blood Culture - Blood, Arm, Left [[id number]]  (Normal) Collected:  11/22/23 1627    Lab Status: Preliminary result Specimen: Blood from Arm, Left Updated: 11/23/23 1645     Blood Culture No growth at 24 hours    Blood Culture - Blood, Arm, Right [[id number]]  (Normal) Collected: 11/22/23 1627    Lab Status: Preliminary result Specimen: Blood from Arm, Right Updated: 11/23/23 1645     Blood Culture No growth at 24 hours    Respiratory Panel PCR w/COVID-19(SARS-CoV-2) DANIEL/KHUSHBU/DEEPA/PAD/COR/DIPIKA In-House, NP Swab in UTM/VTM, 2 HR TAT - Swab, Nasopharynx [[id number]]  (Normal) Collected: 11/20/23 1743    Lab Status: Final result Specimen: Swab from Nasopharynx Updated: 11/20/23 1849     ADENOVIRUS, PCR Not Detected     Coronavirus 229E Not Detected     Coronavirus HKU1 Not Detected     Coronavirus NL63 Not Detected     Coronavirus OC43 Not Detected     COVID19 Not Detected     Human Metapneumovirus Not Detected     Human Rhinovirus/Enterovirus Not Detected     Influenza A PCR Not Detected     Influenza B PCR Not Detected     Parainfluenza Virus 1 Not Detected     Parainfluenza Virus 2 Not Detected     Parainfluenza Virus 3 Not Detected     Parainfluenza Virus 4 Not Detected     RSV, PCR Not Detected     Bordetella pertussis pcr Not Detected     Bordetella parapertussis PCR Not Detected     Chlamydophila pneumoniae PCR Not Detected     Mycoplasma pneumo by PCR Not Detected    Narrative:      In the setting of a positive respiratory panel with a viral infection PLUS a negative procalcitonin without other underlying concern for bacterial infection, consider observing off antibiotics or discontinuation of antibiotics and continue supportive care. If the respiratory panel is positive for atypical bacterial infection (Bordetella pertussis, Chlamydophila pneumoniae, or Mycoplasma pneumoniae), consider antibiotic de-escalation to target atypical bacterial infection.          Brief Urine Lab Results  (Last result in the past 365 days)        Color   Clarity   Blood   Leuk Est   Nitrite   Protein    CREAT   Urine HCG        11/23/23 0220 Yellow   Clear   Negative   Negative   Negative   Negative                 CT CEREBRAL PERFUSION WITH & WITHOUT CONTRAST    Result Date: 11/23/2023   There are multiple chronic infarcts identified within the brain parenchyma within multiple vascular distributions that have been discussed in detail above. On the previous MRI of the brain dated 11/22/2023, there were 2 punctate foci of acute to subacute infarction within the frontal lobes located within the JONAS or MCA distributions that are not well delineated on this study.  The CT perfusion study is nondiagnostic.  Intracranially, there is a mild to moderate degree of stenosis involving the V4 segment of the right vertebral artery. Mild to moderate degrees of stenoses are identified within the carotid siphons.  No significant NASCET stenosis is identified within either internal carotid artery   Radiation dose reduction techniques were utilized, including automated exposure control and exposure modulation based on body size.    This report was finalized on 11/23/2023 3:45 PM by Dr. Ramón Gregg M.D on Workstation: [id number]      CT Angiogram Head    Result Date: 11/23/2023   There are multiple chronic infarcts identified within the brain parenchyma within multiple vascular distributions that have been discussed in detail above. On the previous MRI of the brain dated 11/22/2023, there were 2 punctate foci of acute to subacute infarction within the frontal lobes located within the JONAS or MCA distributions that are not well delineated on this study.  The CT perfusion study is nondiagnostic.  Intracranially, there is a mild to moderate degree of stenosis involving the V4 segment of the right vertebral artery. Mild to moderate degrees of stenoses are identified within the carotid siphons.  No significant NASCET stenosis is identified within either internal carotid artery   Radiation dose reduction techniques were utilized, including  automated exposure control and exposure modulation based on body size.    This report was finalized on 11/23/2023 3:45 PM by Dr. Ramón Gregg M.D on Workstation: [id number]      CT Angiogram Neck    Result Date: 11/23/2023   There are multiple chronic infarcts identified within the brain parenchyma within multiple vascular distributions that have been discussed in detail above. On the previous MRI of the brain dated 11/22/2023, there were 2 punctate foci of acute to subacute infarction within the frontal lobes located within the JONAS or MCA distributions that are not well delineated on this study.  The CT perfusion study is nondiagnostic.  Intracranially, there is a mild to moderate degree of stenosis involving the V4 segment of the right vertebral artery. Mild to moderate degrees of stenoses are identified within the carotid siphons.  No significant NASCET stenosis is identified within either internal carotid artery   Radiation dose reduction techniques were utilized, including automated exposure control and exposure modulation based on body size.    This report was finalized on 11/23/2023 3:45 PM by Dr. Ramón Gregg M.D on Workstation: [id number]      CT Cervical Spine Without Contrast    Result Date: 11/23/2023   Mild degrees of canal and foraminal narrowing secondary to degenerative phenomena are as discussed in detail above. Further evaluation for soft tissue abnormalities such as disc bulges or disc herniations resulting in foraminal stenosis could be further assessed on MR imaging, as clinically indicated.   Radiation dose reduction techniques were utilized, including automated exposure control and exposure modulation based on body size.   This report was finalized on 11/23/2023 3:45 PM by Dr. Ramón Gregg M.D on Workstation: [id number]      MRI Brain Without Contrast    Result Date: 11/22/2023  1. 2 tiny foci of acute to subacute infarct in the right superior frontal lobe and at least one punctate focus of acute  to subacute infarct in the left centrum semiovale are seen. 2. Severe changes of bilateral small vessel white matter ischemic disease with areas of chronic infarct. 3. Scattered foci of chronic microhemorrhage are noted.      CT Chest Without Contrast Diagnostic    Result Date: 11/20/2023  1. Ill-defined peripheral areas of largely interstitial disease in the bilateral lungs as described. These appear relatively stable since the 10/27/2021 study and therefore thought to be related to chronic parenchymal change/interstitial fibrosis. 2. No definite acute infiltrates are seen. 4. Tiny subpleural 3 mm nodule in the right upper lobe. This is not clearly seen on the previous study of 10/27/2021 but still may be benign. 5. If clinically indicated a short-term follow-up CT of the chest in approximately 4 months to 6 months could be obtained to continue assessing stability of these findings.  Radiation dose reduction techniques were utilized, including automated exposure control and exposure modulation based on body size.       CT Head Without Contrast    Result Date: 11/20/2023  1. Atrophy and chronic ischemic disease. 2. No acute process identified.   Radiation dose reduction techniques were utilized, including automated exposure control and exposure modulation based on body size.       XR Chest 1 View    Result Date: 11/20/2023  1. No acute process. 2. There is mild parenchymal scarring in the right lower lung.   This report was finalized on 11/20/2023 5:32 PM by Dr. Praveen Schwartz M.D on Workstation: [id number]         Assessment:    Altered mental status    Essential hypertension    Coronary artery disease involving native coronary artery of native heart without angina pectoris    Anticoagulant long-term use    SSS (sick sinus syndrome)    Mixed hyperlipidemia    Difficulty speaking    Dysphagia    Severe malnutrition  1-altered mental status multifactorial including recurrence of stroke versus dehydration due to  decreased intake versus episodic aspiration causing leukocytosis and low-grade fever without overt pneumonia versus UTI.  2-vascular dementia  3-sick sinus syndrome status post permanent pacemaker placement  4-immobility and dysphagia  5-evolving decubiti  6-other diagnoses per primary team.        Recommendations/Discussions:  See my discussion and recommendations on 11/23/2023.  Continue supportive care and empiric IV antibiotic therapy for 5 to 7 days.  I did have a long conversation with patient's wife at the bedside about progressive decline and consideration for care structure change to her symptom management and dignified care.  Ritchie Zamora MD  11/24/2023  07:30 EST    Parts of this note may be an electronic transcription/translation of spoken language to printed text using the Dragon dictation system.

## 2023-11-24 NOTE — PLAN OF CARE
Goal Outcome Evaluation:                      VSS; BP elevated this evening; PRN meds given. Wife at bedside; tearful at times today. NIH 11; per wife he was most alert he's been this morning. ST did not clear for diet. PT worked with patient.

## 2023-11-24 NOTE — PROGRESS NOTES
Continued Stay Note  [facility]     Patient Name: Jayden Bond  MRN: [id number]  Today's Date: 11/24/2023    Admit Date: 11/20/2023    Plan: Home with spouse and HH   Discharge Plan       Row Name 11/24/23 1208       Plan    Plan Home with spouse and HH    Plan Comments [facility] declined referral d/t staffing. Referral sent to [facility]. Spoke with [facility] answering service; awaiting call back to confirm acceptance. Spouse reports patient is current at the wound care center in [place] on [facility].  Spouse is making arrangements to transport patient home. Patient is current with [facility] for Home [facility]. Spouse adamantly denies SNF needs.                   Discharge Codes    No documentation.                 Expected Discharge Date and Time       Expected Discharge Date Expected Discharge Time    Nov 24, 2023               Cecille Negron RN

## 2023-11-24 NOTE — DISCHARGE SUMMARY
Patient Name: Jayden Bond  : 1940  MRN: [id number]    Date of Admission: 2023  Date of Discharge:  2023  Primary Care Physician: Radha Santos MD      Chief Complaint:   Altered Mental Status      Discharge Diagnoses     Active Hospital Problems    Diagnosis  POA    **Altered mental status [R41.82]  Yes    AMS (altered mental status) [R41.82]  Yes    Severe malnutrition [E43]  Yes    Dysphagia [R13.10]  Yes    Difficulty speaking [R47.9]  Yes    Mixed hyperlipidemia [E78.2]  Yes    SSS (sick sinus syndrome) [I49.5]  Yes    Essential hypertension [I10]  Yes    Coronary artery disease involving native coronary artery of native heart without angina pectoris [I25.10]  Yes    Anticoagulant long-term use [Z79.01]  Not Applicable      Resolved Hospital Problems   No resolved problems to display.        Hospital Course       This is an 83-year-old man with a complicated medical history including vascular dementia, previous CVA, hypertension, coronary artery disease as well as history of sick sinus syndrome with permanent pacemaker, and mobility presented to hospital with altered mental status.  Neurology was consulted due to the altered mental status.  An MRI of the brain was revealing of acute right superior frontal lobe and left centrum semiovale infarcts.  These findings would not account for his encephalopathy.  He developed a marked leukocytosis and was treated for a urinary tract infection with ceftriaxone.      Speech therapy has evaluated the patient and recommended that he not take anything by mouth.  His wife, his primary caretaker, refused insertion of a Dobbhoff tube or considerations of PEG placement.  She insisted that the patient be discharged home while he very clearly requires skilled care.  She also reports that she wants him to remain full code however she will be giving him food by mouth despite the recommendation from the speech therapist that he not receive anything by  mouth.    She will be taking the patient home today.  My suspicion is that the patient will not be able to tolerate much by mouth, will not be able to take his oral antibiotics, or his Eliquis.  I will send him home with some therapeutic Lovenox for approximately 1 weeks duration.    Once again this is a very difficult situation, and the patient's main caregiver is going against any and all recommendations provided by his care team.    This patient is palliative care appropriate.      Physical Exam:  Temp:  [97.3 °F (36.3 °C)-98.1 °F (36.7 °C)] 98.1 °F (36.7 °C)  Heart Rate:  [61-74] 63  Resp:  [16-20] 20  BP: (137-172)/(80-94) 164/90  Body mass index is 18.83 kg/m².  Physical Exam  Constitutional:       General: He is not in acute distress.     Appearance: He is ill-appearing. He is not toxic-appearing.   HENT:      Head: Normocephalic and atraumatic.   Cardiovascular:      Rate and Rhythm: Normal rate and regular rhythm.   Pulmonary:      Effort: Pulmonary effort is normal.      Comments: On 3L O2  Abdominal:      General: There is no distension.      Palpations: Abdomen is soft.      Tenderness: There is no abdominal tenderness.   Musculoskeletal:      Right lower leg: No edema.      Left lower leg: No edema.   Neurological:      Mental Status: He is disoriented.      Motor: Weakness present.      Comments: Very minimally interactive          Consultants     Consult Orders (all) (From admission, onward)       Start     Ordered    11/23/23 1255  Inpatient Palliative Care Team Consult  Once        Provider:  (Not yet assigned)    11/23/23 1255    11/23/23 1239  Inpatient Palliative Care Team Consult  Once,   Status:  Canceled        Provider:  (Not yet assigned)    11/23/23 1239    11/22/23 1349  Inpatient Infectious Diseases Consult  Once,   Status:  Canceled        Specialty:  Infectious Diseases  Provider:  (Not yet assigned)    11/22/23 1348    11/22/23 1349  Inpatient Infectious Diseases Consult  Once         Specialty:  Infectious Diseases  Provider:  Ritchie Zamora MD    11/22/23 1348    11/20/23 2352  Inpatient Nutrition Consult  Once        Provider:  (Not yet assigned)    11/20/23 2351    11/20/23 1924  Notify Stroke Coordinator  Once        Provider:  (Not yet assigned)    11/20/23 1923 11/20/23 1924  Inpatient Rehab Admission Consult  Once        Provider:  (Not yet assigned)    11/20/23 1923 11/20/23 1924  Consult to Case Management   Once        Provider:  (Not yet assigned)    11/20/23 1923 11/20/23 1924  Consult to Diabetes Educator  Once,   Status:  Canceled        Provider:  (Not yet assigned)    11/20/23 1923 11/20/23 1924  Inpatient Neurology Consult Stroke  Once        Specialty:  Neurology  Provider:  Rafa Kirkpatrick MD    11/20/23 1923 11/20/23 1851  [facility] (on-call MD unless specified) Details  Once        Specialty:  Hospitalist  Provider:  Fay Gamboa MD    11/20/23 1850                  Procedures     Imaging Results (All)       Procedure Component Value Units Date/Time    CT Chest Without Contrast Diagnostic [[id number]] Collected: 11/20/23 1849     Updated: 11/24/23 1030    Narrative:      CT OF THE CHEST WITHOUT CONTRAST 11/20/2023     HISTORY: Possible aspiration.     TECHNIQUE: Spiral images were obtained from the lung apices to the upper  abdomen. No intravenous contrast was given.     FINDINGS: There are ill-defined areas of increased density in the  periphery of the bilateral lower lobes left more severe than right.  These findings appear similar to the previous study of 10/27/2021. There  are some mild similar changes in the periphery of the right upper lobe  and superior segment right lower lobe also similar to the previous  study. These findings are therefore thought to be chronic in nature.     No definite acute infiltrates are seen. There is fluid in pericardial  recesses. A few shotty mediastinal nodes are seen. There is mild  dilatation of  the ascending aorta measuring up to 4.2 cm. Aortic arch  measures approximately 4.0 cm in diameter. A tiny subpleural nodule  measuring 3 mm is seen in the right upper lobe on image 38, not well  seen on the previous study.        Impression:      1. Ill-defined peripheral areas of largely interstitial disease in the  bilateral lungs as described. These appear relatively stable since the  10/27/2021 study and therefore thought to be related to chronic  parenchymal change/interstitial fibrosis.  2. No definite acute infiltrates are seen.  4. Tiny subpleural 3 mm nodule in the right upper lobe. This is not  clearly seen on the previous study of 10/27/2021 but still may be  benign.  5. If clinically indicated a short-term follow-up CT of the chest in  approximately 4 months to 6 months could be obtained to continue  assessing stability of these findings.     Radiation dose reduction techniques were utilized, including automated  exposure control and exposure modulation based on body size.        This report was finalized on 11/24/2023 10:27 AM by Dr. Praveen Schwartz M.D on Workstation: [id number]       CT Head Without Contrast [[id number]] Collected: 11/20/23 1847     Updated: 11/24/23 1029    Narrative:      CT OF THE BRAIN WITHOUT CONTRAST 11/20/2023     HISTORY: Confusion. Possible facial droop.     TECHNIQUE: Axial images were obtained through the brain without  intravenous contrast.     FINDINGS: There is moderately severe diffuse atrophy. There is decreased  attenuation of the periventricular white matter bilaterally consistent  with small vessel white matter ischemic disease. There is no evidence of  acute infarction, hemorrhage, midline shift or mass effect. There is a  small old infarction in the posterior right parieto-occipital region.  Another small area of old infarct is probably present in the posterior  left parieto-occipital region on image 28.     No bony abnormalities are seen. There is some ectasia  of the left  vertebral and basilar arteries and also of the internal carotid arteries  stable since the 07/03/2023 study.       Impression:      1. Atrophy and chronic ischemic disease.  2. No acute process identified.        Radiation dose reduction techniques were utilized, including automated  exposure control and exposure modulation based on body size.        This report was finalized on 11/24/2023 10:26 AM by Dr. Praveen Schwartz M.D on Workstation: [id number]       CT CEREBRAL PERFUSION WITH & WITHOUT CONTRAST [[id number]] Collected: 11/23/23 0709     Updated: 11/23/23 1550    Narrative:      CT ANGIOGRAM OF THE HEAD AND NECK AND CT PERFUSION STUDY     CLINICAL HISTORY: Very encephalopathic. Brain MRI demonstrated acute  infarcts.     TECHNIQUE: A noncontrast head CT was performed with 3 mm axial images.  Thereafter, a CT perfusion study was performed after the dynamic bolus  of IV contrast. Standard perfusion maps were constructed with RAPID  software. A CT angiogram of the head and neck was then performed with 1  mm axial images. Sagittal, coronal, and 3-dimensional reconstructed  images were obtained. Finally, a delayed postcontrast head CT was  performed with 3 mm axial images.     COMPARISON: Brain MRI dated 11/22/2023.     FINDINGS:     NONCONTRAST HEAD CT: There is a chronic infarct within the right  precentral gyrus measuring up to 9 mm in diameter. A chronic infarct is  noted within the right superior and middle frontal gyri measuring up to  approximately 2.6 x 1.2 cm in greatest axial dimensions. These chronic  infarcts are within the right MCA distribution. A chronic infarct within  the left middle frontal gyrus measures up to 1.2 cm in diameter and is  within the left MCA distribution. A chronic infarct is appreciated  within the posterior aspect of the right occipital lobe measuring up to  approximately 2.0 x 1.7 cm in greatest axial dimensions and this is  within the right PCA distribution. A  chronic infarct within the  posterior aspect of left occipital lobe measures up to 1.3 x 1.0 cm in  greatest axial dimensions and is within the left PCA distribution. There  are foci of encephalomalacia noted within the left temporal lobe that  are compatible with chronic infarcts within the left MCA distribution.  These measure up to approximately 2.2 x 2.1 cm in greatest axial  dimensions. There are severe and confluent changes of chronic small  vessel ischemic phenomena. A small focus of encephalomalacia is  identified within the medial aspect of the left frontal lobe measuring  up to 7 mm in diameter that is compatible with a chronic infarct within  the left JONAS distribution.     On the previous brain MRI, there were findings suggestive of punctate  foci of acute to subacute infarcts within both frontal lobes located  within either the middle or anterior cerebral artery distributions.  These are too small to be delineated on CT imaging.     Partial opacification of the mastoid air cells is seen.     CT PERFUSION STUDY: The CT perfusion study is nondiagnostic.     CTA NECK: There is a classic configuration of the aortic arch. There is  a mild to moderate degree of stenosis of the right subclavian artery.  There is a tortuous appearance of the internal carotids. However, no  significant NASCET stenosis is identified. There is mild degree of  stenosis at the origin of the left vertebral artery. The nondominant  right vertebral artery is unremarkable.     Incidental emphysematous changes are visualized within the lung apices.     CTA HEAD: The left vertebral artery is dominant. The vertebral arteries  are tortuous in appearance. The post PICA segment of the right vertebral  artery is hypoplastic. Mild to moderate degree of stenosis is seen  within the proximal V4 segment of the right vertebral artery. Otherwise,  the intracranial segments of the vertebral arteries are unremarkable.  The posterior cerebral arteries  are unremarkable. The internal carotid  arteries are remarkable for atherosclerotic changes within the cavernous  and supraclinoid segments resulting in up to mild to moderate degrees of  luminal compromise. The middle and anterior cerebral arteries are  unremarkable.     DELAYED POSTCONTRAST HEAD CT: No abnormal foci of contrast enhancement  are identified.       Impression:         There are multiple chronic infarcts identified within the brain  parenchyma within multiple vascular distributions that have been  discussed in detail above. On the previous MRI of the brain dated  11/22/2023, there were 2 punctate foci of acute to subacute infarction  within the frontal lobes located within the JONAS or MCA distributions  that are not well delineated on this study.     The CT perfusion study is nondiagnostic.     Intracranially, there is a mild to moderate degree of stenosis involving  the V4 segment of the right vertebral artery. Mild to moderate degrees  of stenoses are identified within the carotid siphons.     No significant NASCET stenosis is identified within either internal  carotid artery        Radiation dose reduction techniques were utilized, including automated  exposure control and exposure modulation based on body size.           This report was finalized on 11/23/2023 3:45 PM by Dr. Ramón Gregg M.D  on Workstation: [id number]       CT Angiogram Head [[id number]] Collected: 11/23/23 0709     Updated: 11/23/23 1550    Narrative:      CT ANGIOGRAM OF THE HEAD AND NECK AND CT PERFUSION STUDY     CLINICAL HISTORY: Very encephalopathic. Brain MRI demonstrated acute  infarcts.     TECHNIQUE: A noncontrast head CT was performed with 3 mm axial images.  Thereafter, a CT perfusion study was performed after the dynamic bolus  of IV contrast. Standard perfusion maps were constructed with RAPID  software. A CT angiogram of the head and neck was then performed with 1  mm axial images. Sagittal, coronal, and 3-dimensional  reconstructed  images were obtained. Finally, a delayed postcontrast head CT was  performed with 3 mm axial images.     COMPARISON: Brain MRI dated 11/22/2023.     FINDINGS:     NONCONTRAST HEAD CT: There is a chronic infarct within the right  precentral gyrus measuring up to 9 mm in diameter. A chronic infarct is  noted within the right superior and middle frontal gyri measuring up to  approximately 2.6 x 1.2 cm in greatest axial dimensions. These chronic  infarcts are within the right MCA distribution. A chronic infarct within  the left middle frontal gyrus measures up to 1.2 cm in diameter and is  within the left MCA distribution. A chronic infarct is appreciated  within the posterior aspect of the right occipital lobe measuring up to  approximately 2.0 x 1.7 cm in greatest axial dimensions and this is  within the right PCA distribution. A chronic infarct within the  posterior aspect of left occipital lobe measures up to 1.3 x 1.0 cm in  greatest axial dimensions and is within the left PCA distribution. There  are foci of encephalomalacia noted within the left temporal lobe that  are compatible with chronic infarcts within the left MCA distribution.  These measure up to approximately 2.2 x 2.1 cm in greatest axial  dimensions. There are severe and confluent changes of chronic small  vessel ischemic phenomena. A small focus of encephalomalacia is  identified within the medial aspect of the left frontal lobe measuring  up to 7 mm in diameter that is compatible with a chronic infarct within  the left JONAS distribution.     On the previous brain MRI, there were findings suggestive of punctate  foci of acute to subacute infarcts within both frontal lobes located  within either the middle or anterior cerebral artery distributions.  These are too small to be delineated on CT imaging.     Partial opacification of the mastoid air cells is seen.     CT PERFUSION STUDY: The CT perfusion study is nondiagnostic.     CTA NECK:  There is a classic configuration of the aortic arch. There is  a mild to moderate degree of stenosis of the right subclavian artery.  There is a tortuous appearance of the internal carotids. However, no  significant NASCET stenosis is identified. There is mild degree of  stenosis at the origin of the left vertebral artery. The nondominant  right vertebral artery is unremarkable.     Incidental emphysematous changes are visualized within the lung apices.     CTA HEAD: The left vertebral artery is dominant. The vertebral arteries  are tortuous in appearance. The post PICA segment of the right vertebral  artery is hypoplastic. Mild to moderate degree of stenosis is seen  within the proximal V4 segment of the right vertebral artery. Otherwise,  the intracranial segments of the vertebral arteries are unremarkable.  The posterior cerebral arteries are unremarkable. The internal carotid  arteries are remarkable for atherosclerotic changes within the cavernous  and supraclinoid segments resulting in up to mild to moderate degrees of  luminal compromise. The middle and anterior cerebral arteries are  unremarkable.     DELAYED POSTCONTRAST HEAD CT: No abnormal foci of contrast enhancement  are identified.       Impression:         There are multiple chronic infarcts identified within the brain  parenchyma within multiple vascular distributions that have been  discussed in detail above. On the previous MRI of the brain dated  11/22/2023, there were 2 punctate foci of acute to subacute infarction  within the frontal lobes located within the JONAS or MCA distributions  that are not well delineated on this study.     The CT perfusion study is nondiagnostic.     Intracranially, there is a mild to moderate degree of stenosis involving  the V4 segment of the right vertebral artery. Mild to moderate degrees  of stenoses are identified within the carotid siphons.     No significant NASCET stenosis is identified within either  internal  carotid artery        Radiation dose reduction techniques were utilized, including automated  exposure control and exposure modulation based on body size.           This report was finalized on 11/23/2023 3:45 PM by Dr. Ramón Gregg M.D  on Workstation: [id number]       CT Angiogram Neck [[id number]] Collected: 11/23/23 0709     Updated: 11/23/23 1550    Narrative:      CT ANGIOGRAM OF THE HEAD AND NECK AND CT PERFUSION STUDY     CLINICAL HISTORY: Very encephalopathic. Brain MRI demonstrated acute  infarcts.     TECHNIQUE: A noncontrast head CT was performed with 3 mm axial images.  Thereafter, a CT perfusion study was performed after the dynamic bolus  of IV contrast. Standard perfusion maps were constructed with RAPID  software. A CT angiogram of the head and neck was then performed with 1  mm axial images. Sagittal, coronal, and 3-dimensional reconstructed  images were obtained. Finally, a delayed postcontrast head CT was  performed with 3 mm axial images.     COMPARISON: Brain MRI dated 11/22/2023.     FINDINGS:     NONCONTRAST HEAD CT: There is a chronic infarct within the right  precentral gyrus measuring up to 9 mm in diameter. A chronic infarct is  noted within the right superior and middle frontal gyri measuring up to  approximately 2.6 x 1.2 cm in greatest axial dimensions. These chronic  infarcts are within the right MCA distribution. A chronic infarct within  the left middle frontal gyrus measures up to 1.2 cm in diameter and is  within the left MCA distribution. A chronic infarct is appreciated  within the posterior aspect of the right occipital lobe measuring up to  approximately 2.0 x 1.7 cm in greatest axial dimensions and this is  within the right PCA distribution. A chronic infarct within the  posterior aspect of left occipital lobe measures up to 1.3 x 1.0 cm in  greatest axial dimensions and is within the left PCA distribution. There  are foci of encephalomalacia noted within the left  temporal lobe that  are compatible with chronic infarcts within the left MCA distribution.  These measure up to approximately 2.2 x 2.1 cm in greatest axial  dimensions. There are severe and confluent changes of chronic small  vessel ischemic phenomena. A small focus of encephalomalacia is  identified within the medial aspect of the left frontal lobe measuring  up to 7 mm in diameter that is compatible with a chronic infarct within  the left JONAS distribution.     On the previous brain MRI, there were findings suggestive of punctate  foci of acute to subacute infarcts within both frontal lobes located  within either the middle or anterior cerebral artery distributions.  These are too small to be delineated on CT imaging.     Partial opacification of the mastoid air cells is seen.     CT PERFUSION STUDY: The CT perfusion study is nondiagnostic.     CTA NECK: There is a classic configuration of the aortic arch. There is  a mild to moderate degree of stenosis of the right subclavian artery.  There is a tortuous appearance of the internal carotids. However, no  significant NASCET stenosis is identified. There is mild degree of  stenosis at the origin of the left vertebral artery. The nondominant  right vertebral artery is unremarkable.     Incidental emphysematous changes are visualized within the lung apices.     CTA HEAD: The left vertebral artery is dominant. The vertebral arteries  are tortuous in appearance. The post PICA segment of the right vertebral  artery is hypoplastic. Mild to moderate degree of stenosis is seen  within the proximal V4 segment of the right vertebral artery. Otherwise,  the intracranial segments of the vertebral arteries are unremarkable.  The posterior cerebral arteries are unremarkable. The internal carotid  arteries are remarkable for atherosclerotic changes within the cavernous  and supraclinoid segments resulting in up to mild to moderate degrees of  luminal compromise. The middle and  anterior cerebral arteries are  unremarkable.     DELAYED POSTCONTRAST HEAD CT: No abnormal foci of contrast enhancement  are identified.       Impression:         There are multiple chronic infarcts identified within the brain  parenchyma within multiple vascular distributions that have been  discussed in detail above. On the previous MRI of the brain dated  11/22/2023, there were 2 punctate foci of acute to subacute infarction  within the frontal lobes located within the JONAS or MCA distributions  that are not well delineated on this study.     The CT perfusion study is nondiagnostic.     Intracranially, there is a mild to moderate degree of stenosis involving  the V4 segment of the right vertebral artery. Mild to moderate degrees  of stenoses are identified within the carotid siphons.     No significant NASCET stenosis is identified within either internal  carotid artery        Radiation dose reduction techniques were utilized, including automated  exposure control and exposure modulation based on body size.           This report was finalized on 11/23/2023 3:45 PM by Dr. Ramón Gregg M.D  on Workstation: [id number]       CT Cervical Spine Without Contrast [[id number]] Collected: 11/23/23 0710     Updated: 11/23/23 1548    Narrative:      CT CERVICAL SPINE WITHOUT CONTRAST     CLINICAL HISTORY: Neck pain.     TECHNIQUE: CT scan of the cervical spine was obtained with 1 mm axial  bone algorithm and 2 mm axial soft tissue algorithm images. Sagittal and  coronal reconstructed images were obtained.     FINDINGS:     There is normal alignment of the cervical spine.     At C2-3, there is no significant canal or foraminal stenosis. At C3-4,  there is mild left foraminal narrowing secondary to uncovertebral joint  hypertrophy.     At C4-5, there is no significant canal or foraminal stenosis.     At C5-6, there is mild right foraminal narrowing secondary to  combination of uncovertebral joint hypertrophy and facet  arthrosis.     At C6-7, there are prominent degenerative disc changes. A disc  osteophyte complex mildly indents the ventral subarachnoid space. There  is no significant foraminal narrowing.     At C7-T1, there is no significant degree of canal or foraminal stenosis.       Impression:         Mild degrees of canal and foraminal narrowing secondary to degenerative  phenomena are as discussed in detail above. Further evaluation for soft  tissue abnormalities such as disc bulges or disc herniations resulting  in foraminal stenosis could be further assessed on MR imaging, as  clinically indicated.        Radiation dose reduction techniques were utilized, including automated  exposure control and exposure modulation based on body size.        This report was finalized on 11/23/2023 3:45 PM by Dr. Ramón Gregg M.D  on Workstation: [id number]       MRI Brain Without Contrast [[id number]] Collected: 11/22/23 1346     Updated: 11/22/23 1347    Narrative:      MRI BRAIN WO CONTRAST-11/22/2023     HISTORY: Follow-up stroke.     Multiple noncontrast sagittal and axial images were obtained through the  brain.     On the diffusion weighted images there are 2 sub-5 mm foci of bright  signal intensity in the right superior frontal region consistent with  tiny areas of acute to subacute infarct. Another tiny area of faintly  bright signal is seen in the left centrum semiovale consistent with an  additional tiny area of acute to subacute infarct.     FLAIR images show extensive bright signal intensity in the bilateral  cerebral white matter consistent with small vessel white matter ischemic  disease. FLAIR bright signal in the brainstem is also seen consistent  with small vessel ischemic disease. Small old infarcts are seen in the  bilateral posterior occipital lobes slightly larger on the right. These  are also seen on the recent CT of the brain. A few scattered tiny foci  of hemosiderin deposition are seen in the bilateral cerebral  hemispheres  and cerebellum consistent with tiny areas of chronic microhemorrhage.  There is moderate diffuse atrophy. There is fluid in the bilateral  mastoid air cells. The left vertebral artery is larger than the right.  There is mild ectasia of the basilar artery. The flow void on T2  weighted images in the distal internal carotid arteries and middle and  anterior cerebral arteries appears relatively unremarkable.       Impression:      1. 2 tiny foci of acute to subacute infarct in the right superior  frontal lobe and at least one punctate focus of acute to subacute  infarct in the left centrum semiovale are seen.  2. Severe changes of bilateral small vessel white matter ischemic  disease with areas of chronic infarct.  3. Scattered foci of chronic microhemorrhage are noted.       XR Chest 1 View [[id number]] Collected: 11/20/23 1732     Updated: 11/20/23 1735    Narrative:      XR CHEST 1 VW-11/20/2023     HISTORY: Confusion. Cough.     Heart size is at the upper limits of normal. The patient is rotated  slightly to the right. There is some bandlike probable scarring in the  right lower lung similar to the 7/10/2023 study. No acute infiltrates  are seen. There is some aortic calcification. Cardiac pacemaker is seen.       Impression:      1. No acute process.  2. There is mild parenchymal scarring in the right lower lung.        This report was finalized on 11/20/2023 5:32 PM by Dr. Praveen Schwartz M.D on Workstation: [id number]               Pertinent Labs     Results from last 7 days   Lab Units 11/23/23  0639 11/22/23  0631 11/21/23 0525 11/20/23  1743   WBC 10*3/mm3 10.71 20.86* 11.67* 10.67   HEMOGLOBIN g/dL 11.7* 13.1 13.1 13.2   PLATELETS 10*3/mm3 165 243 208 203     Results from last 7 days   Lab Units 11/23/23  0639 11/22/23  0631 11/21/23  0525 11/20/23  1743   SODIUM mmol/L 142 139 140 140   POTASSIUM mmol/L 3.6 3.8 3.7 4.5   CHLORIDE mmol/L 109* 106 104 106   CO2 mmol/L 24.4 20.8* 26.9 29.2*    BUN mg/dL 14 13 13 18   CREATININE mg/dL 0.61* 0.71* 0.65* 0.73*   GLUCOSE mg/dL 85 144* 85 99   Estimated Creatinine Clearance: 76.6 mL/min (A) (by C-G formula based on SCr of 0.61 mg/dL (L)).  Results from last 7 days   Lab Units 11/22/23  0631 11/21/23  0525 11/20/23  1743   ALBUMIN g/dL 3.3* 3.7 3.6   BILIRUBIN mg/dL 0.7 0.4 0.3   ALK PHOS U/L 75 79 86   AST (SGOT) U/L 19 21 19   ALT (SGPT) U/L 11 17 13     Results from last 7 days   Lab Units 11/23/23  0639 11/22/23  0631 11/21/23  0525 11/20/23  1743   CALCIUM mg/dL 8.7 9.1 9.3 9.2   ALBUMIN g/dL  --  3.3* 3.7 3.6   MAGNESIUM mg/dL  --   --   --  2.2       Results from last 7 days   Lab Units 11/20/23  2303 11/20/23  1743   HSTROP T ng/L 20 19   PROBNP pg/mL  --  211.0       Results from last 7 days   Lab Units 11/21/23  0525   CHOLESTEROL mg/dL 118   TRIGLYCERIDES mg/dL 97   HDL CHOL mg/dL 45   LDL CHOL mg/dL 55     Results from last 7 days   Lab Units 11/22/23  1627   BLOODCX  No growth at 24 hours  No growth at 24 hours       Test Results Pending at Discharge     Pending Labs       Order Current Status    Lupus Anticoag / Cardiolipin Ab In process    Blood Culture - Blood, Arm, Left Preliminary result    Blood Culture - Blood, Arm, Right Preliminary result            Discharge Details        Discharge Medications        New Medications        Instructions Start Date   Enoxaparin Sodium 60 MG/0.6ML solution prefilled syringe syringe  Commonly known as: LOVENOX   1 mg/kg (60 mg), Subcutaneous, Every 12 Hours   Start Date: November 25, 2023     lisinopril 20 MG tablet  Commonly known as: PRINIVIL,ZESTRIL   20 mg, Oral, Every 24 Hours Scheduled   Start Date: November 25, 2023            Continue These Medications        Instructions Start Date   acetaminophen 500 MG tablet  Commonly known as: TYLENOL   1,000 mg, Oral, Every 6 Hours PRN      apixaban 5 MG tablet tablet  Commonly known as: ELIQUIS   2.5 mg, Oral, 2 Times Daily      brimonidine 0.2 % ophthalmic  solution  Commonly known as: ALPHAGAN   1 drop, Both Eyes, 2 Times Daily      Claritin 10 MG tablet  Generic drug: loratadine   10 mg, Oral, Daily      escitalopram 10 MG tablet  Commonly known as: LEXAPRO   10 mg, Oral, Daily      galantamine 8 MG tablet  Commonly known as: RAZADYNE   8 mg, Oral, 2 Times Daily      guaiFENesin 200 MG tablet   400 mg, Oral, Every 4 Hours PRN      Anette-Tussin 100 MG/5ML liquid  Generic drug: guaifenesin   400 mg, Oral, Every 8 Hours Scheduled      Incruse Ellipta 62.5 MCG/INH aerosol powder   Generic drug: Umeclidinium Bromide   1 puff, Oral, Daily      levothyroxine 25 MCG tablet  Commonly known as: SYNTHROID, LEVOTHROID   25 mcg, Oral, Every Early Morning      lidocaine 4 % cream  Commonly known as: LMX   1 application , Topical, 3 Times Daily PRN, Back neck shoulders       miconazole 2 % cream  Commonly known as: MICOTIN   1 application , Topical, 2 Times Daily, Toes       midodrine 2.5 MG tablet  Commonly known as: PROAMATINE   2.5 mg, Oral, As Needed, SBP <105      multivitamin with minerals tablet tablet   1 tablet, Oral, Nightly      nitroglycerin 0.4 MG SL tablet  Commonly known as: NITROSTAT   0.4 mg, Sublingual, Every 5 Minutes PRN, Take no more than 3 doses in 15 minutes.       nystatin 100,000 unit/mL suspension  Commonly known as: MYCOSTATIN   500,000 Units, Swish & Swallow, 4 Times Daily,  buttocks      predniSONE 5 MG tablet  Commonly known as: DELTASONE   5 mg, Oral, Daily      rosuvastatin 20 MG tablet  Commonly known as: CRESTOR   20 mg, Oral, Every Evening      tacrolimus 0.1 % ointment  Commonly known as: PROTOPIC   1 application , Topical, 2 Times Daily, Scrotum       triamcinolone 0.1 % cream  Commonly known as: KENALOG   1 application , Topical, 2 Times Daily, buttocks               Allergies   Allergen Reactions    Trazodone Hallucinations    Doxycycline GI Intolerance    Ciprofloxacin Other (See Comments)     Breathing problems    Doxycycline Nausea And  Vomiting    Fosfomycin Tromethamine Confusion     Reports weakness and confusion    Gemtesa [Vibegron] Other (See Comments)     Reports urinary retention    Hydralazine Hives    Keppra [Levetiracetam] GI Intolerance    Macrobid [Nitrofurantoin] Other (See Comments)     Unknown reaction    Quetiapine Other (See Comments)     Having falls on it    Sulfa Antibiotics Other (See Comments)     Unknown reaction         Discharge Disposition:  Home or Self Care    Discharge Diet:  Diet Order   Procedures    NPO Diet NPO Type: Strict NPO       Discharge Activity:       CODE STATUS:    Code Status and Medical Interventions:   Ordered at: 11/21/23 1332     Code Status (Patient has no pulse and is not breathing):    CPR (Attempt to Resuscitate)     Medical Interventions (Patient has pulse or is breathing):    Full Support       No future appointments.   Follow-up Information       Radha Santos MD .    Specialty: Family Medicine  Contact information:  [address] [facility] [place] [address] [phone]                             Time Spent on Discharge:  Greater than 30 minutes      Jerald Bar MD  [facility]  11/24/23  13:29 EST

## 2023-11-24 NOTE — THERAPY RE-EVALUATION
Acute Care - Speech Language Pathology   Swallow Re-Evaluation [facility]     Patient Name: Jayden Bond  : 1940  MRN: [id number]  Today's Date: 2023               Admit Date: 2023    Visit Dx:     ICD-10-CM ICD-9-CM   1. Altered mental status, unspecified altered mental status type  R41.82 780.97   2. Severe dementia, unspecified dementia type, unspecified whether behavioral, psychotic, or mood disturbance or anxiety  F03.C0 294.20   3. Coagulopathy: Eliquis induced  D68.9 286.9     Patient Active Problem List   Diagnosis    Chest pain    Essential hypertension    Stroke    Disease of thyroid gland    Coronary artery disease involving native coronary artery of native heart without angina pectoris    Dysautonomia    Dementia    Anticoagulant long-term use    Cardiac pacemaker in situ    COPD (chronic obstructive pulmonary disease)    Degenerative cervical spinal stenosis    Dysautonomia orthostatic hypotension syndrome    Epiphora due to insufficient drainage of both sides    History of CVA (cerebrovascular accident)    History of PTCA    Punctal stenosis, acquired    Nonsustained ventricular tachycardia    Pseudophakia, both eyes    SSS (sick sinus syndrome)    Palpitations    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    Weakness    Vascular dementia with behavior disturbance    Nocturnal hypoxemia    Delirium due to multiple etiologies, persistent, hypoactive    Cognitive safety issue    Caregiver stress    Dementia    Disease of thyroid gland    Dysautonomia    Mixed hyperlipidemia    Transient ischemic attack (TIA)    Altered mental status    CAP (community acquired pneumonia)    Abnormal finding on urinalysis    Poor short-term memory    Pneumonia    Altered mental status, unspecified altered mental status type    History of falling    Difficulty speaking    Dysphagia    Failure to thrive in adult    Aspiration pneumonia    Aspiration pneumonia of both lungs    Severe malnutrition     AMS (altered mental status)     Past Medical History:   Diagnosis Date    CAD (coronary artery disease)     Dementia     Depression     Disease of thyroid gland     Dysautonomia     Dyslipidemia     GERD (gastroesophageal reflux disease)     Head pain     Hyperlipidemia     Hypertension     Orthostatic hypotension     SSS (sick sinus syndrome)     Stroke     x 3 in 2018     Past Surgical History:   Procedure Laterality Date    APPENDECTOMY      CHOLECYSTECTOMY      CYSTOSCOPY      ENDOSCOPY N/A 10/17/2019    Procedure: ESOPHAGOGASTRODUODENOSCOPY with biopsy x 2 areas;  Surgeon: Ashok Sanchez MD;  Location: [facility] ENDOSCOPY;  Service: Gastroenterology    HERNIA REPAIR      INSERT / REPLACE / REMOVE PACEMAKER      LEFT HEART CATH      PACEMAKER IMPLANTATION      [employer]    TEMPORAL ARTERY BIOPSY      WRIST SURGERY      severed artery       SLP Recommendation and Plan  SLP Swallowing Diagnosis: suspected pharyngeal dysphagia (11/24/23 1112)  SLP Diet Recommendation: NPO, other (see comments) (Spouse reports plan for PO despite risk of aspiration) (11/24/23 1112)     SLP Rec. for Method of Medication Administration: meds via alternate route (11/24/23 1112)     Monitor for Signs of Aspiration: yes, notify SLP if any concerns (11/24/23 1112)  Recommended Diagnostics: reassess via clinical swallow evaluation (11/24/23 1112)  Swallow Criteria for Skilled Therapeutic Interventions Met: demonstrates skilled criteria (11/24/23 1112)  Anticipated Discharge Disposition (SLP): unknown (11/24/23 1112)  Rehab Potential/Prognosis, Swallowing: adequate, monitor progress closely (11/24/23 1112)  Therapy Frequency (Swallow): PRN (11/24/23 1112)  Predicted Duration Therapy Intervention (Days): until discharge (11/24/23 1112)  Oral Care Recommendations: Suction toothbrush (11/24/23 1112)                                      Oral Care Recommendations: Suction toothbrush (11/24/23 1112)    Plan of Care Reviewed With: patient,  "spouse  Outcome Evaluation: Re-eval of swallow completed. Continue to recommend NPO. Patient continues to demonstrated overt s/s of aspiration despite improvement in ability to verbalize and initiate a swallow. Wife reports plan is to initiate PO diet s/p DC home, even with reviewing risks of aspiration with SLP. SLP to continue to follow for re-eval and instrumental readiness.      SWALLOW EVALUATION (last 72 hours)       SLP Adult Swallow Evaluation       Row Name 11/24/23 1112 11/22/23 4070 11/21/23 1500             Rehab Evaluation    Document Type re-evaluation  -OC therapy note (daily note)  -OC --      Subjective Information no complaints  -OC -- --      Patient Observations cooperative;agree to therapy  -OC decreased LOC  -OC --      Patient/Family/Caregiver Comments/Observations improved alertness, weak unintelligible vocal quality  -OC -- --      Patient Effort good  -OC -- --      Symptoms Noted During/After Treatment none  -OC none  -OC --      Oral Care -- other (see comments)  Suctioning and oral care had just been completed  -OC --         General Information    Patient Profile Reviewed yes  -OC -- --      Pertinent History Of Current Problem -- -- Patient admitted with AMS. MRI pending. \"This is an 83-year-old man with history of CVA, previous admissions for aspiration pneumonia, documentation of advanced dementia and mobility and dysphagia who was brought to the hospital because his spouse thought that he had another CVA.  There was some concern that he had a right-sided facial droop.  He underwent a CT scan in the emergency department that showed chronic changes. \"  -OC      Current Method of Nutrition NPO  -OC -- --      Precautions/Limitations, Vision WFL with corrective lenses  -OC -- WFL  -OC      Precautions/Limitations, Hearing difficult to assess;WFL;for purposes of eval  -OC -- WFL  -OC      Prior Level of Function-Communication cognitive-linguistic impairment  -OC -- cognitive-linguistic " impairment  -OC      Prior Level of Function-Swallowing puree;nectar thick liquids;other (see comments)  07/2023  -OC -- puree;nectar thick liquids;other (see comments)  VFSS 7/2023  -OC      Plans/Goals Discussed with patient;spouse/S.O.  -OC -- patient;spouse/S.O.  -OC      Barriers to Rehab medically complex  -OC -- medically complex  -OC      Patient's Goals for Discharge patient did not state;patient could not state  -OC -- patient did not state  -OC      Family Goals for Discharge patient able to return to PO diet  -OC -- patient able to return to PO diet  -OC         Pain Scale: Numbers Pre/Post-Treatment    Pretreatment Pain Rating -- 0/10 - no pain  -OC 0/10 - no pain  -OC      Posttreatment Pain Rating -- 0/10 - no pain  -OC 0/10 - no pain  -OC      Pre/Posttreatment Pain Comment no complaints, appears comfortable  -OC -- --         Oral Motor Structure and Function    Dentition Assessment edentulous  -OC -- edentulous  -OC      Secretion Management problems swallowing secretions;wet vocal quality  -OC -- problems swallowing secretions;wet vocal quality  -OC      Mucosal Quality dry  -OC -- dry  -OC      Volitional Swallow unable to elicit  -OC -- unable to elicit  -OC      Volitional Cough WFL  -OC -- weak  -OC         Oral Musculature and Cranial Nerve Assessment    Oral Motor General Assessment unable to assess  -OC -- other (see comments)  unable to follow commands  -OC         Clinical Swallow Eval    Clinical Swallow Evaluation Summary Re-eval completed. Patient with improved acceptance of PO. Patient demonstrated audible swallow with nectar thick liquids, multiple swallows (2-3) with 1/2 teaspoon. Patient demonstrated delayed coughing and wet vocal quality. Able to clear vocal quality s/p coughing. Patient demonstrated adequate acceptance puree, 5-6 swallows per bolus. Delayed change in vocal quality, able to clear vocal quality s/p throat clearing.  Upon palpation, reduced laryngeal elevation.  -OC  -- Patient demonstrated throat clearing with secretions when SLP sat HOB upright. Patient demonstrated wet vocal qualiy and coughing, consistent coughing and throat clearing. Unable to expectorate thick secretions. Patient demonstrated absent swallow with nectar thick via spoon. Minimal laryngeal movement. eventual swallow with wet vocal quality. Patient provided suction, patient gaged with yaunker. aided in coughing and removing thick secretions from pharynx. RN reports suctioning thick secretions 1 hour prior. No further trials provided.  -OC         SLP Evaluation Clinical Impression    SLP Swallowing Diagnosis suspected pharyngeal dysphagia  -OC -- oral dysphagia;pharyngeal dysphagia  -OC      Functional Impact risk of aspiration/pneumonia  -OC -- risk of aspiration/pneumonia  -OC      Rehab Potential/Prognosis, Swallowing adequate, monitor progress closely  -OC -- adequate, monitor progress closely  -OC      Swallow Criteria for Skilled Therapeutic Interventions Met demonstrates skilled criteria  -OC -- demonstrates skilled criteria  -OC         Recommendations    Therapy Frequency (Swallow) PRN  -OC PRN  -OC PRN  -OC      Predicted Duration Therapy Intervention (Days) until discharge  -OC until discharge  -OC until discharge  -OC      SLP Diet Recommendation NPO;other (see comments)  Spouse reports plan for PO despite risk of aspiration  -OC NPO;other (see comments)  spouse considering PO for OQL/known aspiration risks  -OC NPO;other (see comments)  moist swabs for comfort  -OC      Recommended Diagnostics reassess via clinical swallow evaluation  -OC reassess via clinical swallow evaluation  -OC reassess via clinical swallow evaluation  -OC      Oral Care Recommendations Suction toothbrush  -OC Suction toothbrush  -OC Suction toothbrush  -OC      SLP Rec. for Method of Medication Administration meds via alternate route  -OC meds via alternate route  -OC meds via alternate route  -OC      Monitor for Signs of  "Aspiration yes;notify SLP if any concerns  -OC yes;notify SLP if any concerns  -OC yes;notify SLP if any concerns  -OC      Anticipated Discharge Disposition (SLP) unknown  -OC unknown  -OC unknown  -OC         Swallow Goals (SLP)    Swallow LTGs -- Swallow Long Term Goal (free text)  -OC --         (LTG) Swallow    (LTG) Swallow Return/tolerate least restrictive diet.  -OC Return/tolerate least restrictive diet.  -OC --      [place] (Swallow Long Term Goal) with 1:1 assist/ supervision  -OC with 1:1 assist/ supervision  -OC --      Time Frame (Swallow Long Term Goal) by discharge  -OC by discharge  -OC --      Barriers (Swallow Long Term Goal) overall status/mental status  -OC overall status/mental status  -OC --      Progress/Outcomes (Swallow Long Term Goal) medical status inhibited participation  -OC medical status inhibited participation  -OC --      Comment (Swallow Long Term Goal) -- Long conversation regarding patient's status and baseline status regarding swallow function. VFSS 07/2023 recommending puree and nectar thick liquids. Spouse report use of \"thick-it\" at home. Patient not appropriate for PO trials this date, requiring frequent oral care/removal of secretions/suction. SLP explained risks of aspiration and that a PO diet likely not safe until patient alert and managing secretions. Briefly discussed temporary alternate nutrition and PO for QOL with known risk of aspiration. SLP to follow for re-eval as patient mental status improved and patient managing secretions.  -OC --                User Key  (r) = Recorded By, (t) = Taken By, (c) = Cosigned By      Initials Name Effective Dates    OC Tamy Prater SLP 08/28/23 -                     EDUCATION  The patient has been educated in the following areas:   Dysphagia (Swallowing Impairment) NPO rationale.        SLP GOALS       Row Name 11/24/23 1112 11/22/23 1550          (LTG) Swallow    (LTG) Swallow Return/tolerate least restrictive diet.  -OC " "Return/tolerate least restrictive diet.  -OC     [place] (Swallow Long Term Goal) with 1:1 assist/ supervision  -OC with 1:1 assist/ supervision  -OC     Time Frame (Swallow Long Term Goal) by discharge  -OC by discharge  -OC     Barriers (Swallow Long Term Goal) overall status/mental status  -OC overall status/mental status  -OC     Progress/Outcomes (Swallow Long Term Goal) medical status inhibited participation  -OC medical status inhibited participation  -OC     Comment (Swallow Long Term Goal) -- Long conversation regarding patient's status and baseline status regarding swallow function. VFSS 07/2023 recommending puree and nectar thick liquids. Spouse report use of \"thick-it\" at home. Patient not appropriate for PO trials this date, requiring frequent oral care/removal of secretions/suction. SLP explained risks of aspiration and that a PO diet likely not safe until patient alert and managing secretions. Briefly discussed temporary alternate nutrition and PO for QOL with known risk of aspiration. SLP to follow for re-eval as patient mental status improved and patient managing secretions.  -OC               User Key  (r) = Recorded By, (t) = Taken By, (c) = Cosigned By      Initials Name Provider Type    Tamy Rocha SLP Speech and Language Pathologist                       Time Calculation:    Time Calculation- SLP       Row Name 11/24/23 1122             Time Calculation- SLP    SLP Start Time 1030  -OC      SLP Received On 11/24/23  -OC         Untimed Charges    SLP Treatment ST Treatment Swallow Minutes  - 13349  -OC      [id number]-SS Treatment Swallow Minutes 60  -OC         Total Minutes    Untimed Charges Total Minutes 60  -OC       Total Minutes 60  -OC                User Key  (r) = Recorded By, (t) = Taken By, (c) = Cosigned By      Initials Name Provider Type    Tamy Rocha SLP Speech and Language Pathologist                    Therapy Charges for Today       Code Description Service Date " Service Provider Modifiers Qty    [id number] HC ST TREATMENT SWALLOW 4 11/24/2023 Tmay Prater SLP GN 1                 ALEKSANDER Ballard  11/24/2023

## 2023-11-24 NOTE — DISCHARGE PLACEMENT REQUEST
"Barrington Bond (83 y.o. Male)       Date of Birth   1940    Social Security Number       Address   [address] [place] [facility]    Home Phone   [phone]    MRN   [id number]       Episcopalian   Confucianist    Marital Status                               Admission Date   11/20/23    Admission Type   Emergency    Admitting Provider   Fay Gamboa MD    Attending Provider   Jerald Bar MD    Department, Room/Bed   [address], [address]/1       Discharge Date       Discharge Disposition       Discharge Destination                                 Attending Provider: Jerald Bar MD    Allergies: Trazodone, Doxycycline, Ciprofloxacin, Doxycycline, Fosfomycin Tromethamine, Gemtesa [Vibegron], Hydralazine, Keppra [Levetiracetam], Macrobid [Nitrofurantoin], Quetiapine, Sulfa Antibiotics    Isolation: None   Infection: None   Code Status: CPR    Ht: 177 cm (69.69\")   Wt: 59 kg (130 lb 1.1 oz)    Admission Cmt: None   Principal Problem: Altered mental status [R41.82]                   Active Insurance as of 11/20/2023       Primary Coverage       Payor Plan Insurance Group Employer/Plan Group    MEDICARE MEDICARE A & B        Payor Plan Address Payor Plan Phone Number Payor Plan Fax Number Effective Dates    [address] [phone]  8/1/2005 - None Entered    [place] [place] [facility] [address]         Subscriber Name Subscriber Birth Date Member ID       BARRINGTON BOND 1940 [id number]               Secondary Coverage       Payor Plan Insurance Group Employer/Plan Group    STANDARD LIFE ACCIDENT STANDARD LIFE ACCIDENT        Payor Plan Address Payor Plan Phone Number Payor Plan Fax Number Effective Dates    [address]   9/27/2019 - None Entered    [place] [address]         Subscriber Name Subscriber Birth Date Member ID       BARRINGTON BOND 1940 [id number]                     Emergency Contacts        (Rel.) Home Phone Work Phone Mobile Phone    MATT BOND (Spouse) " [phone] -- [phone]

## 2023-11-25 NOTE — PLAN OF CARE
"Patient a/o x self, sometimes answers name, speech is garbled and hard to understand. Wife is at bedside, attentive to patient. Wife requesting temp to be taken numerous times this shift, with readings 98.8. Wife states if they were at homes, she would give tylenol. This RN educated wife on allowing the body to fight a low grade fever, but that 98.8 is not a fever and does not need treated. Wife stated understanding.   PO meds not given due to strict NPO status. Pt on 3L o2 NC, which wife states is baseline at home. Purewick in place, which wife has concerns that it's \"training\" pt to use the bathroom in the bed instead of asking to go to the bathroom. Pt is incont of b/b. Wife also stated that her sister is a hospital  of a local hospital in the state and told her that it should be changed hourly and she noticed that we are only changing it 4x a day at most. Educated wife on policy of craig care and changing external catheters, but asked if she would like it changed at this time. Wife stated \"no, it was changed earlier\".   Pt is anticipated to d/c home tomorrow.   See v/s and labs.          "

## 2023-11-25 NOTE — DISCHARGE SUMMARY
Patient Name: Jayden Bond  : 1940  MRN: [id number]    Date of Admission: 2023  Date of Discharge:  2023  Primary Care Physician: Radha Santos MD      Chief Complaint:   Altered Mental Status      Discharge Diagnoses     Active Hospital Problems    Diagnosis  POA    **Altered mental status [R41.82]  Yes    AMS (altered mental status) [R41.82]  Yes    Severe malnutrition [E43]  Yes    Dysphagia [R13.10]  Yes    Difficulty speaking [R47.9]  Yes    Mixed hyperlipidemia [E78.2]  Yes    SSS (sick sinus syndrome) [I49.5]  Yes    Essential hypertension [I10]  Yes    Coronary artery disease involving native coronary artery of native heart without angina pectoris [I25.10]  Yes    Anticoagulant long-term use [Z79.01]  Not Applicable      Resolved Hospital Problems   No resolved problems to display.        Hospital Course       This is an 83-year-old man with a complicated medical history including vascular dementia, previous CVA, hypertension, coronary artery disease as well as history of sick sinus syndrome with permanent pacemaker, and mobility presented to hospital with altered mental status.  Neurology was consulted due to the altered mental status.  An MRI of the brain was revealing of acute right superior frontal lobe and left centrum semiovale infarcts.  These findings would not account for his encephalopathy.  He developed a marked leukocytosis and was treated for a urinary tract infection with ceftriaxone.      Speech therapy has evaluated the patient and recommended that he not take anything by mouth.  His wife, his primary caretaker, refused insertion of a Dobbhoff tube or considerations of PEG placement.  She insisted that the patient be discharged home while he very clearly requires skilled care.  She also reports that she wants him to remain full code however she will be giving him food by mouth despite the recommendation from the speech therapist that he not receive anything by  mouth.    She will be taking the patient home today.  My suspicion is that the patient will not be able to tolerate much by mouth, will not be able to take his oral antibiotics, or his Eliquis.  I will send him home with some therapeutic Lovenox for approximately 1 weeks duration.  I have discussed with the patient's wife that he should not be receiving the Lovenox and Eliquis together.  I have reiterated and stressed that the Lovenox is only until he is able to adequately tolerate taking the Eliquis tablet.    Once again this is a very difficult situation, and the patient's main caregiver is going against any and all recommendations provided by his care team.    This patient is palliative care appropriate and I do not think he will do well at home.     Physical Exam:  Temp:  [98.1 °F (36.7 °C)] 98.1 °F (36.7 °C)  Heart Rate:  [63-83] 83  Resp:  [16-20] 18  BP: (154-186)/() 154/87  Body mass index is 18.83 kg/m².  Physical Exam  Constitutional:       General: He is not in acute distress.     Appearance: He is ill-appearing. He is not toxic-appearing.   HENT:      Head: Normocephalic and atraumatic.   Cardiovascular:      Rate and Rhythm: Normal rate and regular rhythm.   Pulmonary:      Effort: Pulmonary effort is normal.      Comments: On 3L O2  Abdominal:      General: There is no distension.      Palpations: Abdomen is soft.      Tenderness: There is no abdominal tenderness.   Musculoskeletal:      Right lower leg: No edema.      Left lower leg: No edema.   Neurological:      Mental Status: He is disoriented.      Motor: Weakness present.      Comments: Very minimally interactive          Consultants     Consult Orders (all) (From admission, onward)       Start     Ordered    11/23/23 1255  Inpatient Palliative Care Team Consult  Once        Provider:  (Not yet assigned)    11/23/23 1255    11/23/23 1239  Inpatient Palliative Care Team Consult  Once,   Status:  Canceled        Provider:  (Not yet assigned)     11/23/23 1239    11/22/23 1349  Inpatient Infectious Diseases Consult  Once,   Status:  Canceled        Specialty:  Infectious Diseases  Provider:  (Not yet assigned)    11/22/23 1348    11/22/23 1349  Inpatient Infectious Diseases Consult  Once        Specialty:  Infectious Diseases  Provider:  Ritchie Zamora MD    11/22/23 1348    11/20/23 2352  Inpatient Nutrition Consult  Once        Provider:  (Not yet assigned)    11/20/23 2351 11/20/23 1924  Notify Stroke Coordinator  Once        Provider:  (Not yet assigned)    11/20/23 1923    11/20/23 1924  Inpatient Rehab Admission Consult  Once        Provider:  (Not yet assigned)    11/20/23 1923 11/20/23 1924  Consult to Case Management   Once        Provider:  (Not yet assigned)    11/20/23 1923 11/20/23 1924  Consult to Diabetes Educator  Once,   Status:  Canceled        Provider:  (Not yet assigned)    11/20/23 1923 11/20/23 1924  Inpatient Neurology Consult Stroke  Once        Specialty:  Neurology  Provider:  Rafa Kirkpatrick MD    11/20/23 1923 11/20/23 1851  [facility] (on-call MD unless specified) Details  Once        Specialty:  Hospitalist  Provider:  Fay Gamboa MD    11/20/23 1850                  Procedures     Imaging Results (All)       Procedure Component Value Units Date/Time    CT Chest Without Contrast Diagnostic [[id number]] Collected: 11/20/23 1849     Updated: 11/24/23 1030    Narrative:      CT OF THE CHEST WITHOUT CONTRAST 11/20/2023     HISTORY: Possible aspiration.     TECHNIQUE: Spiral images were obtained from the lung apices to the upper  abdomen. No intravenous contrast was given.     FINDINGS: There are ill-defined areas of increased density in the  periphery of the bilateral lower lobes left more severe than right.  These findings appear similar to the previous study of 10/27/2021. There  are some mild similar changes in the periphery of the right upper lobe  and superior segment right lower lobe  also similar to the previous  study. These findings are therefore thought to be chronic in nature.     No definite acute infiltrates are seen. There is fluid in pericardial  recesses. A few shotty mediastinal nodes are seen. There is mild  dilatation of the ascending aorta measuring up to 4.2 cm. Aortic arch  measures approximately 4.0 cm in diameter. A tiny subpleural nodule  measuring 3 mm is seen in the right upper lobe on image 38, not well  seen on the previous study.        Impression:      1. Ill-defined peripheral areas of largely interstitial disease in the  bilateral lungs as described. These appear relatively stable since the  10/27/2021 study and therefore thought to be related to chronic  parenchymal change/interstitial fibrosis.  2. No definite acute infiltrates are seen.  4. Tiny subpleural 3 mm nodule in the right upper lobe. This is not  clearly seen on the previous study of 10/27/2021 but still may be  benign.  5. If clinically indicated a short-term follow-up CT of the chest in  approximately 4 months to 6 months could be obtained to continue  assessing stability of these findings.     Radiation dose reduction techniques were utilized, including automated  exposure control and exposure modulation based on body size.        This report was finalized on 11/24/2023 10:27 AM by Dr. Praveen Schwartz M.D on Workstation: [id number]       CT Head Without Contrast [[id number]] Collected: 11/20/23 1847     Updated: 11/24/23 1029    Narrative:      CT OF THE BRAIN WITHOUT CONTRAST 11/20/2023     HISTORY: Confusion. Possible facial droop.     TECHNIQUE: Axial images were obtained through the brain without  intravenous contrast.     FINDINGS: There is moderately severe diffuse atrophy. There is decreased  attenuation of the periventricular white matter bilaterally consistent  with small vessel white matter ischemic disease. There is no evidence of  acute infarction, hemorrhage, midline shift or mass effect.  There is a  small old infarction in the posterior right parieto-occipital region.  Another small area of old infarct is probably present in the posterior  left parieto-occipital region on image 28.     No bony abnormalities are seen. There is some ectasia of the left  vertebral and basilar arteries and also of the internal carotid arteries  stable since the 07/03/2023 study.       Impression:      1. Atrophy and chronic ischemic disease.  2. No acute process identified.        Radiation dose reduction techniques were utilized, including automated  exposure control and exposure modulation based on body size.        This report was finalized on 11/24/2023 10:26 AM by Dr. Praveen Schwartz M.D on Workstation: [id number]       CT CEREBRAL PERFUSION WITH & WITHOUT CONTRAST [[id number]] Collected: 11/23/23 0709     Updated: 11/23/23 1550    Narrative:      CT ANGIOGRAM OF THE HEAD AND NECK AND CT PERFUSION STUDY     CLINICAL HISTORY: Very encephalopathic. Brain MRI demonstrated acute  infarcts.     TECHNIQUE: A noncontrast head CT was performed with 3 mm axial images.  Thereafter, a CT perfusion study was performed after the dynamic bolus  of IV contrast. Standard perfusion maps were constructed with RAPID  software. A CT angiogram of the head and neck was then performed with 1  mm axial images. Sagittal, coronal, and 3-dimensional reconstructed  images were obtained. Finally, a delayed postcontrast head CT was  performed with 3 mm axial images.     COMPARISON: Brain MRI dated 11/22/2023.     FINDINGS:     NONCONTRAST HEAD CT: There is a chronic infarct within the right  precentral gyrus measuring up to 9 mm in diameter. A chronic infarct is  noted within the right superior and middle frontal gyri measuring up to  approximately 2.6 x 1.2 cm in greatest axial dimensions. These chronic  infarcts are within the right MCA distribution. A chronic infarct within  the left middle frontal gyrus measures up to 1.2 cm in diameter  and is  within the left MCA distribution. A chronic infarct is appreciated  within the posterior aspect of the right occipital lobe measuring up to  approximately 2.0 x 1.7 cm in greatest axial dimensions and this is  within the right PCA distribution. A chronic infarct within the  posterior aspect of left occipital lobe measures up to 1.3 x 1.0 cm in  greatest axial dimensions and is within the left PCA distribution. There  are foci of encephalomalacia noted within the left temporal lobe that  are compatible with chronic infarcts within the left MCA distribution.  These measure up to approximately 2.2 x 2.1 cm in greatest axial  dimensions. There are severe and confluent changes of chronic small  vessel ischemic phenomena. A small focus of encephalomalacia is  identified within the medial aspect of the left frontal lobe measuring  up to 7 mm in diameter that is compatible with a chronic infarct within  the left JONAS distribution.     On the previous brain MRI, there were findings suggestive of punctate  foci of acute to subacute infarcts within both frontal lobes located  within either the middle or anterior cerebral artery distributions.  These are too small to be delineated on CT imaging.     Partial opacification of the mastoid air cells is seen.     CT PERFUSION STUDY: The CT perfusion study is nondiagnostic.     CTA NECK: There is a classic configuration of the aortic arch. There is  a mild to moderate degree of stenosis of the right subclavian artery.  There is a tortuous appearance of the internal carotids. However, no  significant NASCET stenosis is identified. There is mild degree of  stenosis at the origin of the left vertebral artery. The nondominant  right vertebral artery is unremarkable.     Incidental emphysematous changes are visualized within the lung apices.     CTA HEAD: The left vertebral artery is dominant. The vertebral arteries  are tortuous in appearance. The post PICA segment of the right  vertebral  artery is hypoplastic. Mild to moderate degree of stenosis is seen  within the proximal V4 segment of the right vertebral artery. Otherwise,  the intracranial segments of the vertebral arteries are unremarkable.  The posterior cerebral arteries are unremarkable. The internal carotid  arteries are remarkable for atherosclerotic changes within the cavernous  and supraclinoid segments resulting in up to mild to moderate degrees of  luminal compromise. The middle and anterior cerebral arteries are  unremarkable.     DELAYED POSTCONTRAST HEAD CT: No abnormal foci of contrast enhancement  are identified.       Impression:         There are multiple chronic infarcts identified within the brain  parenchyma within multiple vascular distributions that have been  discussed in detail above. On the previous MRI of the brain dated  11/22/2023, there were 2 punctate foci of acute to subacute infarction  within the frontal lobes located within the JONAS or MCA distributions  that are not well delineated on this study.     The CT perfusion study is nondiagnostic.     Intracranially, there is a mild to moderate degree of stenosis involving  the V4 segment of the right vertebral artery. Mild to moderate degrees  of stenoses are identified within the carotid siphons.     No significant NASCET stenosis is identified within either internal  carotid artery        Radiation dose reduction techniques were utilized, including automated  exposure control and exposure modulation based on body size.           This report was finalized on 11/23/2023 3:45 PM by Dr. Ramón Gregg M.D  on Workstation: [id number]       CT Angiogram Head [[id number]] Collected: 11/23/23 0709     Updated: 11/23/23 1550    Narrative:      CT ANGIOGRAM OF THE HEAD AND NECK AND CT PERFUSION STUDY     CLINICAL HISTORY: Very encephalopathic. Brain MRI demonstrated acute  infarcts.     TECHNIQUE: A noncontrast head CT was performed with 3 mm axial images.  Thereafter,  a CT perfusion study was performed after the dynamic bolus  of IV contrast. Standard perfusion maps were constructed with RAPID  software. A CT angiogram of the head and neck was then performed with 1  mm axial images. Sagittal, coronal, and 3-dimensional reconstructed  images were obtained. Finally, a delayed postcontrast head CT was  performed with 3 mm axial images.     COMPARISON: Brain MRI dated 11/22/2023.     FINDINGS:     NONCONTRAST HEAD CT: There is a chronic infarct within the right  precentral gyrus measuring up to 9 mm in diameter. A chronic infarct is  noted within the right superior and middle frontal gyri measuring up to  approximately 2.6 x 1.2 cm in greatest axial dimensions. These chronic  infarcts are within the right MCA distribution. A chronic infarct within  the left middle frontal gyrus measures up to 1.2 cm in diameter and is  within the left MCA distribution. A chronic infarct is appreciated  within the posterior aspect of the right occipital lobe measuring up to  approximately 2.0 x 1.7 cm in greatest axial dimensions and this is  within the right PCA distribution. A chronic infarct within the  posterior aspect of left occipital lobe measures up to 1.3 x 1.0 cm in  greatest axial dimensions and is within the left PCA distribution. There  are foci of encephalomalacia noted within the left temporal lobe that  are compatible with chronic infarcts within the left MCA distribution.  These measure up to approximately 2.2 x 2.1 cm in greatest axial  dimensions. There are severe and confluent changes of chronic small  vessel ischemic phenomena. A small focus of encephalomalacia is  identified within the medial aspect of the left frontal lobe measuring  up to 7 mm in diameter that is compatible with a chronic infarct within  the left JONAS distribution.     On the previous brain MRI, there were findings suggestive of punctate  foci of acute to subacute infarcts within both frontal lobes  located  within either the middle or anterior cerebral artery distributions.  These are too small to be delineated on CT imaging.     Partial opacification of the mastoid air cells is seen.     CT PERFUSION STUDY: The CT perfusion study is nondiagnostic.     CTA NECK: There is a classic configuration of the aortic arch. There is  a mild to moderate degree of stenosis of the right subclavian artery.  There is a tortuous appearance of the internal carotids. However, no  significant NASCET stenosis is identified. There is mild degree of  stenosis at the origin of the left vertebral artery. The nondominant  right vertebral artery is unremarkable.     Incidental emphysematous changes are visualized within the lung apices.     CTA HEAD: The left vertebral artery is dominant. The vertebral arteries  are tortuous in appearance. The post PICA segment of the right vertebral  artery is hypoplastic. Mild to moderate degree of stenosis is seen  within the proximal V4 segment of the right vertebral artery. Otherwise,  the intracranial segments of the vertebral arteries are unremarkable.  The posterior cerebral arteries are unremarkable. The internal carotid  arteries are remarkable for atherosclerotic changes within the cavernous  and supraclinoid segments resulting in up to mild to moderate degrees of  luminal compromise. The middle and anterior cerebral arteries are  unremarkable.     DELAYED POSTCONTRAST HEAD CT: No abnormal foci of contrast enhancement  are identified.       Impression:         There are multiple chronic infarcts identified within the brain  parenchyma within multiple vascular distributions that have been  discussed in detail above. On the previous MRI of the brain dated  11/22/2023, there were 2 punctate foci of acute to subacute infarction  within the frontal lobes located within the JONAS or MCA distributions  that are not well delineated on this study.     The CT perfusion study is nondiagnostic.      Intracranially, there is a mild to moderate degree of stenosis involving  the V4 segment of the right vertebral artery. Mild to moderate degrees  of stenoses are identified within the carotid siphons.     No significant NASCET stenosis is identified within either internal  carotid artery        Radiation dose reduction techniques were utilized, including automated  exposure control and exposure modulation based on body size.           This report was finalized on 11/23/2023 3:45 PM by Dr. Ramón Gregg M.D  on Workstation: [id number]       CT Angiogram Neck [[id number]] Collected: 11/23/23 0709     Updated: 11/23/23 1550    Narrative:      CT ANGIOGRAM OF THE HEAD AND NECK AND CT PERFUSION STUDY     CLINICAL HISTORY: Very encephalopathic. Brain MRI demonstrated acute  infarcts.     TECHNIQUE: A noncontrast head CT was performed with 3 mm axial images.  Thereafter, a CT perfusion study was performed after the dynamic bolus  of IV contrast. Standard perfusion maps were constructed with RAPID  software. A CT angiogram of the head and neck was then performed with 1  mm axial images. Sagittal, coronal, and 3-dimensional reconstructed  images were obtained. Finally, a delayed postcontrast head CT was  performed with 3 mm axial images.     COMPARISON: Brain MRI dated 11/22/2023.     FINDINGS:     NONCONTRAST HEAD CT: There is a chronic infarct within the right  precentral gyrus measuring up to 9 mm in diameter. A chronic infarct is  noted within the right superior and middle frontal gyri measuring up to  approximately 2.6 x 1.2 cm in greatest axial dimensions. These chronic  infarcts are within the right MCA distribution. A chronic infarct within  the left middle frontal gyrus measures up to 1.2 cm in diameter and is  within the left MCA distribution. A chronic infarct is appreciated  within the posterior aspect of the right occipital lobe measuring up to  approximately 2.0 x 1.7 cm in greatest axial dimensions and this  is  within the right PCA distribution. A chronic infarct within the  posterior aspect of left occipital lobe measures up to 1.3 x 1.0 cm in  greatest axial dimensions and is within the left PCA distribution. There  are foci of encephalomalacia noted within the left temporal lobe that  are compatible with chronic infarcts within the left MCA distribution.  These measure up to approximately 2.2 x 2.1 cm in greatest axial  dimensions. There are severe and confluent changes of chronic small  vessel ischemic phenomena. A small focus of encephalomalacia is  identified within the medial aspect of the left frontal lobe measuring  up to 7 mm in diameter that is compatible with a chronic infarct within  the left JONAS distribution.     On the previous brain MRI, there were findings suggestive of punctate  foci of acute to subacute infarcts within both frontal lobes located  within either the middle or anterior cerebral artery distributions.  These are too small to be delineated on CT imaging.     Partial opacification of the mastoid air cells is seen.     CT PERFUSION STUDY: The CT perfusion study is nondiagnostic.     CTA NECK: There is a classic configuration of the aortic arch. There is  a mild to moderate degree of stenosis of the right subclavian artery.  There is a tortuous appearance of the internal carotids. However, no  significant NASCET stenosis is identified. There is mild degree of  stenosis at the origin of the left vertebral artery. The nondominant  right vertebral artery is unremarkable.     Incidental emphysematous changes are visualized within the lung apices.     CTA HEAD: The left vertebral artery is dominant. The vertebral arteries  are tortuous in appearance. The post PICA segment of the right vertebral  artery is hypoplastic. Mild to moderate degree of stenosis is seen  within the proximal V4 segment of the right vertebral artery. Otherwise,  the intracranial segments of the vertebral arteries are  unremarkable.  The posterior cerebral arteries are unremarkable. The internal carotid  arteries are remarkable for atherosclerotic changes within the cavernous  and supraclinoid segments resulting in up to mild to moderate degrees of  luminal compromise. The middle and anterior cerebral arteries are  unremarkable.     DELAYED POSTCONTRAST HEAD CT: No abnormal foci of contrast enhancement  are identified.       Impression:         There are multiple chronic infarcts identified within the brain  parenchyma within multiple vascular distributions that have been  discussed in detail above. On the previous MRI of the brain dated  11/22/2023, there were 2 punctate foci of acute to subacute infarction  within the frontal lobes located within the JONAS or MCA distributions  that are not well delineated on this study.     The CT perfusion study is nondiagnostic.     Intracranially, there is a mild to moderate degree of stenosis involving  the V4 segment of the right vertebral artery. Mild to moderate degrees  of stenoses are identified within the carotid siphons.     No significant NASCET stenosis is identified within either internal  carotid artery        Radiation dose reduction techniques were utilized, including automated  exposure control and exposure modulation based on body size.           This report was finalized on 11/23/2023 3:45 PM by Dr. Ramón Gregg M.D  on Workstation: [id number]       CT Cervical Spine Without Contrast [[id number]] Collected: 11/23/23 0710     Updated: 11/23/23 1548    Narrative:      CT CERVICAL SPINE WITHOUT CONTRAST     CLINICAL HISTORY: Neck pain.     TECHNIQUE: CT scan of the cervical spine was obtained with 1 mm axial  bone algorithm and 2 mm axial soft tissue algorithm images. Sagittal and  coronal reconstructed images were obtained.     FINDINGS:     There is normal alignment of the cervical spine.     At C2-3, there is no significant canal or foraminal stenosis. At C3-4,  there is mild  left foraminal narrowing secondary to uncovertebral joint  hypertrophy.     At C4-5, there is no significant canal or foraminal stenosis.     At C5-6, there is mild right foraminal narrowing secondary to  combination of uncovertebral joint hypertrophy and facet arthrosis.     At C6-7, there are prominent degenerative disc changes. A disc  osteophyte complex mildly indents the ventral subarachnoid space. There  is no significant foraminal narrowing.     At C7-T1, there is no significant degree of canal or foraminal stenosis.       Impression:         Mild degrees of canal and foraminal narrowing secondary to degenerative  phenomena are as discussed in detail above. Further evaluation for soft  tissue abnormalities such as disc bulges or disc herniations resulting  in foraminal stenosis could be further assessed on MR imaging, as  clinically indicated.        Radiation dose reduction techniques were utilized, including automated  exposure control and exposure modulation based on body size.        This report was finalized on 11/23/2023 3:45 PM by Dr. Ramón Gregg M.D  on Workstation: [id number]       MRI Brain Without Contrast [[id number]] Collected: 11/22/23 1346     Updated: 11/22/23 1347    Narrative:      MRI BRAIN WO CONTRAST-11/22/2023     HISTORY: Follow-up stroke.     Multiple noncontrast sagittal and axial images were obtained through the  brain.     On the diffusion weighted images there are 2 sub-5 mm foci of bright  signal intensity in the right superior frontal region consistent with  tiny areas of acute to subacute infarct. Another tiny area of faintly  bright signal is seen in the left centrum semiovale consistent with an  additional tiny area of acute to subacute infarct.     FLAIR images show extensive bright signal intensity in the bilateral  cerebral white matter consistent with small vessel white matter ischemic  disease. FLAIR bright signal in the brainstem is also seen consistent  with small vessel  ischemic disease. Small old infarcts are seen in the  bilateral posterior occipital lobes slightly larger on the right. These  are also seen on the recent CT of the brain. A few scattered tiny foci  of hemosiderin deposition are seen in the bilateral cerebral hemispheres  and cerebellum consistent with tiny areas of chronic microhemorrhage.  There is moderate diffuse atrophy. There is fluid in the bilateral  mastoid air cells. The left vertebral artery is larger than the right.  There is mild ectasia of the basilar artery. The flow void on T2  weighted images in the distal internal carotid arteries and middle and  anterior cerebral arteries appears relatively unremarkable.       Impression:      1. 2 tiny foci of acute to subacute infarct in the right superior  frontal lobe and at least one punctate focus of acute to subacute  infarct in the left centrum semiovale are seen.  2. Severe changes of bilateral small vessel white matter ischemic  disease with areas of chronic infarct.  3. Scattered foci of chronic microhemorrhage are noted.       XR Chest 1 View [[id number]] Collected: 11/20/23 1732     Updated: 11/20/23 1735    Narrative:      XR CHEST 1 VW-11/20/2023     HISTORY: Confusion. Cough.     Heart size is at the upper limits of normal. The patient is rotated  slightly to the right. There is some bandlike probable scarring in the  right lower lung similar to the 7/10/2023 study. No acute infiltrates  are seen. There is some aortic calcification. Cardiac pacemaker is seen.       Impression:      1. No acute process.  2. There is mild parenchymal scarring in the right lower lung.        This report was finalized on 11/20/2023 5:32 PM by Dr. Praveen Schwartz M.D on Workstation: [id number]               Pertinent Labs     Results from last 7 days   Lab Units 11/23/23  0639 11/22/23  0631 11/21/23  0525 11/20/23  1743   WBC 10*3/mm3 10.71 20.86* 11.67* 10.67   HEMOGLOBIN g/dL 11.7* 13.1 13.1 13.2   PLATELETS  10*3/mm3 165 243 208 203     Results from last 7 days   Lab Units 11/23/23  0639 11/22/23  0631 11/21/23  0525 11/20/23  1743   SODIUM mmol/L 142 139 140 140   POTASSIUM mmol/L 3.6 3.8 3.7 4.5   CHLORIDE mmol/L 109* 106 104 106   CO2 mmol/L 24.4 20.8* 26.9 29.2*   BUN mg/dL 14 13 13 18   CREATININE mg/dL 0.61* 0.71* 0.65* 0.73*   GLUCOSE mg/dL 85 144* 85 99   Estimated Creatinine Clearance: 76.6 mL/min (A) (by C-G formula based on SCr of 0.61 mg/dL (L)).  Results from last 7 days   Lab Units 11/22/23  0631 11/21/23  0525 11/20/23  1743   ALBUMIN g/dL 3.3* 3.7 3.6   BILIRUBIN mg/dL 0.7 0.4 0.3   ALK PHOS U/L 75 79 86   AST (SGOT) U/L 19 21 19   ALT (SGPT) U/L 11 17 13     Results from last 7 days   Lab Units 11/23/23  0639 11/22/23  0631 11/21/23  0525 11/20/23  1743   CALCIUM mg/dL 8.7 9.1 9.3 9.2   ALBUMIN g/dL  --  3.3* 3.7 3.6   MAGNESIUM mg/dL  --   --   --  2.2       Results from last 7 days   Lab Units 11/20/23  2303 11/20/23  1743   HSTROP T ng/L 20 19   PROBNP pg/mL  --  211.0       Results from last 7 days   Lab Units 11/21/23  0525   CHOLESTEROL mg/dL 118   TRIGLYCERIDES mg/dL 97   HDL CHOL mg/dL 45   LDL CHOL mg/dL 55     Results from last 7 days   Lab Units 11/22/23  1627   BLOODCX  No growth at 2 days  No growth at 2 days       Test Results Pending at Discharge     Pending Labs       Order Current Status    Lupus Anticoag / Cardiolipin Ab In process    Urine Culture - Urine, Urine, Clean Catch In process    Blood Culture - Blood, Arm, Left Preliminary result    Blood Culture - Blood, Arm, Right Preliminary result            Discharge Details        Discharge Medications        New Medications        Instructions Start Date   cefdinir 300 MG capsule  Commonly known as: OMNICEF   300 mg, Oral, 2 Times Daily      Enoxaparin Sodium 60 MG/0.6ML solution prefilled syringe syringe  Commonly known as: LOVENOX   1 mg/kg (60 mg), Subcutaneous, Every 12 Hours      lisinopril 20 MG tablet  Commonly known as:  PRINIVIL,ZESTRIL   20 mg, Oral, Every 24 Hours Scheduled             Continue These Medications        Instructions Start Date   acetaminophen 500 MG tablet  Commonly known as: TYLENOL   1,000 mg, Oral, Every 6 Hours PRN      apixaban 5 MG tablet tablet  Commonly known as: ELIQUIS   2.5 mg, Oral, 2 Times Daily      brimonidine 0.2 % ophthalmic solution  Commonly known as: ALPHAGAN   1 drop, Both Eyes, 2 Times Daily      Claritin 10 MG tablet  Generic drug: loratadine   10 mg, Oral, Daily      escitalopram 10 MG tablet  Commonly known as: LEXAPRO   10 mg, Oral, Daily      galantamine 8 MG tablet  Commonly known as: RAZADYNE   8 mg, Oral, 2 Times Daily      guaiFENesin 200 MG tablet   400 mg, Oral, Every 4 Hours PRN      Anette-Tussin 100 MG/5ML liquid  Generic drug: guaifenesin   400 mg, Oral, Every 8 Hours Scheduled      Incruse Ellipta 62.5 MCG/INH aerosol powder   Generic drug: Umeclidinium Bromide   1 puff, Oral, Daily      levothyroxine 25 MCG tablet  Commonly known as: SYNTHROID, LEVOTHROID   25 mcg, Oral, Every Early Morning      lidocaine 4 % cream  Commonly known as: LMX   1 application , Topical, 3 Times Daily PRN, Back neck shoulders       miconazole 2 % cream  Commonly known as: MICOTIN   1 application , Topical, 2 Times Daily, Toes       midodrine 2.5 MG tablet  Commonly known as: PROAMATINE   2.5 mg, Oral, As Needed, SBP <105      multivitamin with minerals tablet tablet   1 tablet, Oral, Nightly      nitroglycerin 0.4 MG SL tablet  Commonly known as: NITROSTAT   0.4 mg, Sublingual, Every 5 Minutes PRN, Take no more than 3 doses in 15 minutes.       nystatin 100,000 unit/mL suspension  Commonly known as: MYCOSTATIN   500,000 Units, Swish & Swallow, 4 Times Daily,  buttocks      predniSONE 5 MG tablet  Commonly known as: DELTASONE   5 mg, Oral, Daily      rosuvastatin 20 MG tablet  Commonly known as: CRESTOR   20 mg, Oral, Every Evening      tacrolimus 0.1 % ointment  Commonly known as: PROTOPIC   1  application , Topical, 2 Times Daily, Scrotum       triamcinolone 0.1 % cream  Commonly known as: KENALOG   1 application , Topical, 2 Times Daily, buttocks               Allergies   Allergen Reactions    Trazodone Hallucinations    Doxycycline GI Intolerance    Ciprofloxacin Other (See Comments)     Breathing problems    Doxycycline Nausea And Vomiting    Fosfomycin Tromethamine Confusion     Reports weakness and confusion    Gemtesa [Vibegron] Other (See Comments)     Reports urinary retention    Hydralazine Hives    Keppra [Levetiracetam] GI Intolerance    Macrobid [Nitrofurantoin] Other (See Comments)     Unknown reaction    Quetiapine Other (See Comments)     Having falls on it    Sulfa Antibiotics Other (See Comments)     Unknown reaction         Discharge Disposition:  Home or Self Care    Discharge Diet:  Diet Order   Procedures    NPO Diet NPO Type: Strict NPO       Discharge Activity:       CODE STATUS:    Code Status and Medical Interventions:   Ordered at: 11/21/23 1332     Code Status (Patient has no pulse and is not breathing):    CPR (Attempt to Resuscitate)     Medical Interventions (Patient has pulse or is breathing):    Full Support       No future appointments.   Follow-up Information       Radha Santos MD. Schedule an appointment as soon as possible for a visit in 1 week(s).    Specialty: Family Medicine  Contact information:  [address] [facility] [place] [address] [phone]               Erica Nunez, DEANDRA. Schedule an appointment as soon as possible for a visit.    Specialties: Nurse Practitioner, Neurology  Why: call office for follow up appointment  Contact information:  [address] [facility] [address] [address] [phone]                             Time Spent on Discharge:  Greater than 30 minutes      Jerald Bar MD  [facility]  11/25/23  13:29 EST

## 2023-11-25 NOTE — PROGRESS NOTES
"  Infectious Diseases Progress Note    Ritchie Zamora MD     [facility]  [place]: 1 day  Patient Identification:  Name: Jayden Bond  Age: 83 y.o.  Sex: male  :  1940  MRN: [id number]         Primary Care Physician: Radha Santos MD        Subjective: Awake and interactive.  Patient's wife wants to take him home and does not want to follow recommendations about aspiration precautions.  Interval History: See consultation note.    Objective:    Scheduled Meds:albuterol, 2.5 mg, Nebulization, BID - RT  brimonidine, 1 drop, Both Eyes, BID  cefTRIAXone, 2,000 mg, Intravenous, Q24H  cetirizine, 10 mg, Oral, Daily  docusate sodium, 100 mg, Oral, Nightly  enoxaparin, 1 mg/kg, Subcutaneous, Q12H  escitalopram, 10 mg, Oral, Daily  famotidine, 20 mg, Oral, BID  galantamine, 8 mg, Oral, BID  guaifenesin, 400 mg, Oral, Q8H  levothyroxine, 25 mcg, Oral, Q AM  Lidocaine, 1 patch, Transdermal, Q24H  lisinopril, 20 mg, Oral, Q24H  multivitamin with minerals, 1 tablet, Oral, Nightly  neomycin-polymyxin-dexamethasone, 1 drop, Both Eyes, Q8H  nystatin, 1 application , Topical, Q12H  rosuvastatin, 20 mg, Oral, Nightly  sodium chloride, 4 mL, Nebulization, BID - RT  triamcinolone, 1 application , Topical, Q12H  Umeclidinium Bromide, 1 puff, Inhalation, Daily      Continuous Infusions:sodium chloride, 150 mL/hr, Last Rate: 150 mL/hr (23)        Vital signs in last 24 hours:  Temp:  [98.1 °F (36.7 °C)] 98.1 °F (36.7 °C)  Heart Rate:  [63-83] 83  Resp:  [16-20] 18  BP: (154-186)/() 154/87    Intake/Output:    Intake/Output Summary (Last 24 hours) at 2023 0823  Last data filed at 2023 0520  Gross per 24 hour   Intake 0 ml   Output 2500 ml   Net -2500 ml       Exam:  /87 (BP Location: Left arm, Patient Position: Lying)   Pulse 83   Temp 98.1 °F (36.7 °C) (Axillary)   Resp 18   Ht 177 cm (69.69\")   Wt 59 kg (130 lb 1.1 oz)   SpO2 94%   BMI 18.83 kg/m²   Patient is examined using " the personal protective equipment as per guidelines from infection control for this particular patient as enacted.  Hand washing was performed before and after patient interaction.  General Appearance:  More awake and interactive but still appears weak and ill.                          Head:    Normocephalic, without obvious abnormality, atraumatic                           Eyes:    PERRL, conjunctivae/corneas clear, EOM's intact, both eyes                         Throat:   Lips, tongue, gums normal; oral mucosa pink and moist                           Neck:   Supple, symmetrical, trachea midline, no JVD                         Lungs:    Clear to auscultation bilaterally, respirations unlabored                 Chest Wall:    No tenderness or deformity                          Heart:  S1-S2 regular                  Abdomen:   Soft nontender                 Extremities:   Extremities normal, atraumatic, no cyanosis or edema                        Pulses:   Pulses palpable in all extremities                            Skin:   Skin is warm and dry,  no rashes or palpable lesions                  Neurologic: Lethargic but more awake.  Still confused       Data Review:    I reviewed the patient's new clinical results.  Results from last 7 days   Lab Units 11/23/23  0639 11/22/23  0631 11/21/23  0525 11/20/23  1743   WBC 10*3/mm3 10.71 20.86* 11.67* 10.67   HEMOGLOBIN g/dL 11.7* 13.1 13.1 13.2   PLATELETS 10*3/mm3 165 243 208 203     Results from last 7 days   Lab Units 11/23/23  0639 11/22/23  0631 11/21/23  0525 11/20/23  1743   SODIUM mmol/L 142 139 140 140   POTASSIUM mmol/L 3.6 3.8 3.7 4.5   CHLORIDE mmol/L 109* 106 104 106   CO2 mmol/L 24.4 20.8* 26.9 29.2*   BUN mg/dL 14 13 13 18   CREATININE mg/dL 0.61* 0.71* 0.65* 0.73*   CALCIUM mg/dL 8.7 9.1 9.3 9.2   GLUCOSE mg/dL 85 144* 85 99     Microbiology Results (last 10 days)       Procedure Component Value - Date/Time    Blood Culture - Blood, Arm, Left [[id number]]   (Normal) Collected: 11/22/23 1627    Lab Status: Preliminary result Specimen: Blood from Arm, Left Updated: 11/24/23 1645     Blood Culture No growth at 2 days    Blood Culture - Blood, Arm, Right [[id number]]  (Normal) Collected: 11/22/23 1627    Lab Status: Preliminary result Specimen: Blood from Arm, Right Updated: 11/24/23 1645     Blood Culture No growth at 2 days    Respiratory Panel PCR w/COVID-19(SARS-CoV-2) DANIEL/KHUSHBU/DEEPA/PAD/COR/DIPIKA In-House, NP Swab in UTM/VTM, 2 HR TAT - Swab, Nasopharynx [[id number]]  (Normal) Collected: 11/20/23 1743    Lab Status: Final result Specimen: Swab from Nasopharynx Updated: 11/20/23 1849     ADENOVIRUS, PCR Not Detected     Coronavirus 229E Not Detected     Coronavirus HKU1 Not Detected     Coronavirus NL63 Not Detected     Coronavirus OC43 Not Detected     COVID19 Not Detected     Human Metapneumovirus Not Detected     Human Rhinovirus/Enterovirus Not Detected     Influenza A PCR Not Detected     Influenza B PCR Not Detected     Parainfluenza Virus 1 Not Detected     Parainfluenza Virus 2 Not Detected     Parainfluenza Virus 3 Not Detected     Parainfluenza Virus 4 Not Detected     RSV, PCR Not Detected     Bordetella pertussis pcr Not Detected     Bordetella parapertussis PCR Not Detected     Chlamydophila pneumoniae PCR Not Detected     Mycoplasma pneumo by PCR Not Detected    Narrative:      In the setting of a positive respiratory panel with a viral infection PLUS a negative procalcitonin without other underlying concern for bacterial infection, consider observing off antibiotics or discontinuation of antibiotics and continue supportive care. If the respiratory panel is positive for atypical bacterial infection (Bordetella pertussis, Chlamydophila pneumoniae, or Mycoplasma pneumoniae), consider antibiotic de-escalation to target atypical bacterial infection.          Brief Urine Lab Results  (Last result in the past 365 days)        Color   Clarity   Blood   Leuk Est    Nitrite   Protein   CREAT   Urine HCG        11/23/23 0220 Yellow   Clear   Negative   Negative   Negative   Negative                 MRI Brain Without Contrast    Result Date: 11/24/2023  1. 2 tiny foci of acute to subacute infarct in the right superior frontal lobe and at least one punctate focus of acute to subacute infarct in the left centrum semiovale are seen. 2. Severe changes of bilateral small vessel white matter ischemic disease with areas of chronic infarct. 3. Scattered foci of chronic microhemorrhage are noted.  This report was finalized on 11/24/2023 1:45 PM by Dr. Praveen Schwartz M.D on Workstation: [id number]      CT Chest Without Contrast Diagnostic    Result Date: 11/24/2023  1. Ill-defined peripheral areas of largely interstitial disease in the bilateral lungs as described. These appear relatively stable since the 10/27/2021 study and therefore thought to be related to chronic parenchymal change/interstitial fibrosis. 2. No definite acute infiltrates are seen. 4. Tiny subpleural 3 mm nodule in the right upper lobe. This is not clearly seen on the previous study of 10/27/2021 but still may be benign. 5. If clinically indicated a short-term follow-up CT of the chest in approximately 4 months to 6 months could be obtained to continue assessing stability of these findings.  Radiation dose reduction techniques were utilized, including automated exposure control and exposure modulation based on body size.   This report was finalized on 11/24/2023 10:27 AM by Dr. Praveen Schwartz M.D on Workstation: [id number]      CT Head Without Contrast    Result Date: 11/24/2023  1. Atrophy and chronic ischemic disease. 2. No acute process identified.   Radiation dose reduction techniques were utilized, including automated exposure control and exposure modulation based on body size.   This report was finalized on 11/24/2023 10:26 AM by Dr. Praveen Schwartz M.D on Workstation: [id number]      CT CEREBRAL PERFUSION  WITH & WITHOUT CONTRAST    Result Date: 11/23/2023   There are multiple chronic infarcts identified within the brain parenchyma within multiple vascular distributions that have been discussed in detail above. On the previous MRI of the brain dated 11/22/2023, there were 2 punctate foci of acute to subacute infarction within the frontal lobes located within the JONAS or MCA distributions that are not well delineated on this study.  The CT perfusion study is nondiagnostic.  Intracranially, there is a mild to moderate degree of stenosis involving the V4 segment of the right vertebral artery. Mild to moderate degrees of stenoses are identified within the carotid siphons.  No significant NASCET stenosis is identified within either internal carotid artery   Radiation dose reduction techniques were utilized, including automated exposure control and exposure modulation based on body size.    This report was finalized on 11/23/2023 3:45 PM by Dr. Ramón Gregg M.D on Workstation: [id number]      CT Angiogram Head    Result Date: 11/23/2023   There are multiple chronic infarcts identified within the brain parenchyma within multiple vascular distributions that have been discussed in detail above. On the previous MRI of the brain dated 11/22/2023, there were 2 punctate foci of acute to subacute infarction within the frontal lobes located within the JONAS or MCA distributions that are not well delineated on this study.  The CT perfusion study is nondiagnostic.  Intracranially, there is a mild to moderate degree of stenosis involving the V4 segment of the right vertebral artery. Mild to moderate degrees of stenoses are identified within the carotid siphons.  No significant NASCET stenosis is identified within either internal carotid artery   Radiation dose reduction techniques were utilized, including automated exposure control and exposure modulation based on body size.    This report was finalized on 11/23/2023 3:45 PM by Dr. Melgar  ELODIA Gregg on Workstation: [id number]      CT Angiogram Neck    Result Date: 11/23/2023   There are multiple chronic infarcts identified within the brain parenchyma within multiple vascular distributions that have been discussed in detail above. On the previous MRI of the brain dated 11/22/2023, there were 2 punctate foci of acute to subacute infarction within the frontal lobes located within the JONAS or MCA distributions that are not well delineated on this study.  The CT perfusion study is nondiagnostic.  Intracranially, there is a mild to moderate degree of stenosis involving the V4 segment of the right vertebral artery. Mild to moderate degrees of stenoses are identified within the carotid siphons.  No significant NASCET stenosis is identified within either internal carotid artery   Radiation dose reduction techniques were utilized, including automated exposure control and exposure modulation based on body size.    This report was finalized on 11/23/2023 3:45 PM by Dr. Ramón Gregg M.D on Workstation: [id number]      CT Cervical Spine Without Contrast    Result Date: 11/23/2023   Mild degrees of canal and foraminal narrowing secondary to degenerative phenomena are as discussed in detail above. Further evaluation for soft tissue abnormalities such as disc bulges or disc herniations resulting in foraminal stenosis could be further assessed on MR imaging, as clinically indicated.   Radiation dose reduction techniques were utilized, including automated exposure control and exposure modulation based on body size.   This report was finalized on 11/23/2023 3:45 PM by Dr. Ramón Gregg M.D on Workstation: [id number]      XR Chest 1 View    Result Date: 11/20/2023  1. No acute process. 2. There is mild parenchymal scarring in the right lower lung.   This report was finalized on 11/20/2023 5:32 PM by Dr. Praveen Schwartz M.D on Workstation: [id number]         Assessment:    Altered mental status    Essential hypertension     Coronary artery disease involving native coronary artery of native heart without angina pectoris    Anticoagulant long-term use    SSS (sick sinus syndrome)    Mixed hyperlipidemia    Difficulty speaking    Dysphagia    Severe malnutrition    AMS (altered mental status)  1-altered mental status multifactorial including recurrence of stroke versus dehydration due to decreased intake versus episodic aspiration causing leukocytosis and low-grade fever without overt pneumonia versus UTI.  2-vascular dementia  3-sick sinus syndrome status post permanent pacemaker placement  4-immobility and dysphagia  5-evolving decubiti  6-other diagnoses per primary team.        Recommendations/Discussions:  See my discussion and recommendations on 11/23/2023.  Continue supportive care and empiric IV antibiotic therapy for 5 to 7 days.  I did have a long conversation with patient's wife at the bedside about progressive decline and consideration for care structure change to her symptom management and dignified care.  Ritchie Zamora MD  11/25/2023  08:23 EST    Parts of this note may be an electronic transcription/translation of spoken language to printed text using the Dragon dictation system.

## 2023-11-25 NOTE — PLAN OF CARE
Goal Outcome Evaluation:      Pt d/c home. Education provided on lovenox and the risks of pt eating food. Spoke with [facility] to verify HH plans. Pt to be reviewed tomorrow to attempt to set up HH.

## 2023-11-25 NOTE — NURSING NOTE
"Pt's wife was seen with a flashlight in pt's mouth by staff. This RN entered the room and wife stated that pt coughs up stuff and she was looking for it, as she's been using green swabs to \"retrieve secretions\", educated on use of tonsil tip oral suction to get out secretions. Pt then stated that she uses the green swabs to wet his mouth and allows him to suck on them to get water.   This RN educated family on aspiration risk and that he has failed his swallow screen. Wife states that during his swallow screen today should was told that pt was swallowing ok. Explained to her that pt is initiating a swallow, but still has s/s of aspiration, per SLP notes.   Wife states she will be feeding him when she gets home and she is leaving it up to the Lord if he aspirates and would like an abx to be taken at home in case he does get pneumonia. She does state she has spoke to ID about this and was told no and given reasons why this isn't a good idea.   Wife c/o pt not receiving PO medication while here and that she will be starting upon d/c.   No other issues noted.   "

## 2023-11-25 NOTE — PLAN OF CARE
Goal Outcome Evaluation:              Outcome Evaluation: Plan for discharge home with wife this date, per MD. Speech continuing to recommend NPO. Education regarding risk of aspiration provided to wife previous date, however, wife plans to initiate PO diet upon discharge.

## 2023-11-26 NOTE — PLAN OF CARE
Goal Outcome Evaluation:      Pt was scheduled to discharge home today once daughter arrived to transport. Transport arrived at bedside, pt was unable to support his own weight while in chair. Due to the risk of falling, RN advised pt's wife he would require EMS transport. Called on call [facility] to update them on the situation. Called on call  and was given the number for [facility]. Called [facility] via their automated service. When EMS returned call, they explained Monday would most likely be the quickest pickup time. Called back on call [facility] and updated regarding potential discharge on Monday. [facility] said Dr. Bar would round on pt again tomorrow. Went back to pt room to update Wife and Daughter at the bedside. Explained to family earliest pickup would be Monday. Tomorrow day shift could explore other possible options for discharged. Explained to wife, since pt would be here and under hospital care, pt would be strict NPO per order. Attempted to educate wife on the risk of aspiration pneumonia when feeding someone with impaired swallowing. Wife was adamant pt would rally once home and she would feed him. RN asked if she had thought of palliative care, quality of life verses quantity and she said all physician's she had spoken to agreed with her plan of action. Take her  and feed him to get his strength up. IV team called to gain access, pt placed back on heart monitor with O2 monitoring. Oral care provided.

## 2023-11-26 NOTE — PLAN OF CARE
Problem: Adult Inpatient Plan of Care  Goal: Plan of Care Review  Outcome: Ongoing, Progressing  Flowsheets (Taken 11/26/2023 1727)  Progress: declining  Plan of Care Reviewed With:   spouse   family  Outcome Evaluation: Palliative transfer from N. Brief. Bladder scanned <200mL. Stage II coccyx. Morphine 4mg given for tachypnea. Loose cough. Wife Nava and family at bedside. Weaned O2 down to 1L per family request.   Goal Outcome Evaluation:  Plan of Care Reviewed With: spouse, family        Progress: declining  Outcome Evaluation: Palliative transfer from 5N. Brief. Bladder scanned <200mL. Stage II coccyx. Morphine 4mg given for tachypnea. Loose cough. Wife Nava and family at bedside. Weaned O2 down to 1L per family request.

## 2023-11-26 NOTE — PROGRESS NOTES
Name: Jayden Bond ADMIT: 2023   : 1940  PCP: Radha Santos MD    MRN: [id number] LOS: 2 days   AGE/SEX: 83 y.o. male  ROOM: [facility]     Subjective     Discussed with family (daughter, son, and their children), as well as patient's wife. Goals of care changed to DNR comfort. Palliative care order set and transfer to  ordered.       Objective   Objective   Vital Signs  Heart Rate:  [65-71] 65  Resp:  [18-22] 20  BP: (139-150)/() 139/94  SpO2:  [88 %-97 %] 96 %  on  Flow (L/min):  [3-5] 5;   Device (Oxygen Therapy): nasal cannula  Body mass index is 18.83 kg/m².  Physical Exam  Constitutional:       Comments: Elderly and frail.   HENT:      Head: Normocephalic and atraumatic.   Cardiovascular:      Rate and Rhythm: Normal rate and regular rhythm.   Pulmonary:      Effort: Pulmonary effort is normal. No respiratory distress.   Musculoskeletal:      Right lower leg: No edema.      Left lower leg: No edema.   Skin:     General: Skin is warm and dry.   Neurological:      Mental Status: Mental status is at baseline. He is disoriented.         Results Review     I reviewed the patient's new clinical results.  Results from last 7 days   Lab Units 23  0639 23  0631 23  0525 23  1743   WBC 10*3/mm3 10.71 20.86* 11.67* 10.67   HEMOGLOBIN g/dL 11.7* 13.1 13.1 13.2   PLATELETS 10*3/mm3 165 243 208 203     Results from last 7 days   Lab Units 23  0639 23  0631 23  0525 23  1743   SODIUM mmol/L 142 139 140 140   POTASSIUM mmol/L 3.6 3.8 3.7 4.5   CHLORIDE mmol/L 109* 106 104 106   CO2 mmol/L 24.4 20.8* 26.9 29.2*   BUN mg/dL 14 13 13 18   CREATININE mg/dL 0.61* 0.71* 0.65* 0.73*   GLUCOSE mg/dL 85 144* 85 99   Estimated Creatinine Clearance: 76.6 mL/min (A) (by C-G formula based on SCr of 0.61 mg/dL (L)).  Results from last 7 days   Lab Units 23  0631 23  0525 23  1743   ALBUMIN g/dL 3.3* 3.7 3.6   BILIRUBIN mg/dL 0.7 0.4 0.3   ALK PHOS  "U/L 75 79 86   AST (SGOT) U/L 19 21 19   ALT (SGPT) U/L 11 17 13     Results from last 7 days   Lab Units 11/23/23  0639 11/22/23  0631 11/21/23  0525 11/20/23  1743   CALCIUM mg/dL 8.7 9.1 9.3 9.2   ALBUMIN g/dL  --  3.3* 3.7 3.6   MAGNESIUM mg/dL  --   --   --  2.2     Results from last 7 days   Lab Units 11/22/23  1628 11/22/23  0631   PROCALCITONIN ng/mL  --  0.23   LACTATE mmol/L 1.8  --      COVID19   Date Value Ref Range Status   11/20/2023 Not Detected Not Detected - Ref. Range Final   07/04/2023 Not Detected Not Detected - Ref. Range Final     No results found for: \"HGBA1C\", \"POCGLU\"    Results for orders placed or performed during the hospital encounter of 11/20/23   Blood Culture - Blood, Arm, Right    Specimen: Arm, Right; Blood   Result Value Ref Range    Blood Culture No growth at 3 days          Duplex Venous Upper Extremity - Left CAR    Normal left upper extremity venous duplex scan.    Scheduled Medications  albuterol, 2.5 mg, Nebulization, BID - RT  brimonidine, 1 drop, Both Eyes, BID  cefTRIAXone, 2,000 mg, Intravenous, Q24H  cetirizine, 10 mg, Oral, Daily  docusate sodium, 100 mg, Oral, Nightly  enoxaparin, 1 mg/kg, Subcutaneous, Q12H  escitalopram, 10 mg, Oral, Daily  famotidine, 20 mg, Oral, BID  galantamine, 8 mg, Oral, BID  guaifenesin, 400 mg, Oral, Q8H  levothyroxine, 25 mcg, Oral, Q AM  Lidocaine, 1 patch, Transdermal, Q24H  lisinopril, 20 mg, Oral, Q24H  multivitamin with minerals, 1 tablet, Oral, Nightly  neomycin-polymyxin-dexamethasone, 1 drop, Both Eyes, Q8H  nystatin, 1 application , Topical, Q12H  rosuvastatin, 20 mg, Oral, Nightly  sodium chloride, 4 mL, Nebulization, BID - RT  triamcinolone, 1 application , Topical, Q12H  Umeclidinium Bromide, 1 puff, Inhalation, Daily    Infusions  sodium chloride, 150 mL/hr, Last Rate: 150 mL/hr (11/24/23 1925)    Diet  NPO Diet NPO Type: Strict NPO       Assessment/Plan     Active Hospital Problems    Diagnosis  POA    **Altered mental status " [R41.82]  Yes    AMS (altered mental status) [R41.82]  Yes    Severe malnutrition [E43]  Yes    Dysphagia [R13.10]  Yes    Difficulty speaking [R47.9]  Yes    Mixed hyperlipidemia [E78.2]  Yes    SSS (sick sinus syndrome) [I49.5]  Yes    Essential hypertension [I10]  Yes    Coronary artery disease involving native coronary artery of native heart without angina pectoris [I25.10]  Yes    Anticoagulant long-term use [Z79.01]  Not Applicable      Resolved Hospital Problems   No resolved problems to display.       83 y.o. male admitted with Altered mental status.    CVA  Altered mental status  Urinary tract infection   Dysphagia  History of aspiration pneumonia  Hypertension  Hypothyroidism  Sick sinus syndrome  Advanced dementia    -All medications not part of palliative order set-discontinued.  Continue Ativan and morphine.  Transfer to [facility].        CODE STATUS: DNR, comfort measures only      Jerald Bar MD  [facility]  11/26/23  13:32 EST

## 2023-11-27 PROBLEM — E43 SEVERE MALNUTRITION: Status: ACTIVE | Noted: 2023-01-01

## 2023-11-27 PROBLEM — F01.50 VASCULAR DEMENTIA: Status: ACTIVE | Noted: 2023-01-01

## 2023-11-27 PROBLEM — G31.1 SENILE DEGENERATION OF BRAIN: Status: ACTIVE | Noted: 2023-01-01

## 2023-11-27 PROBLEM — F01.50 VASCULAR DEMENTIA WITHOUT BEHAVIORAL DISTURBANCE: Status: ACTIVE | Noted: 2023-01-01

## 2023-11-27 NOTE — CONSULTS
HSB Admission: 11/27/23  [facility] ID: [id number]  Diagnosis: senile degeneration of the brain (G31.1) vascular dementia (F01.50) severe protein malnutrition (E43) SSS (I49.5) CAD (I25.10) HTN (I10) dysphagia (R13.10)  Symptom Management: anxiety/dyspnea/pain    Met with spouse Nava and daughter Cande at bedside. Explanation of services provided with a focus on HSB ([facility] Scattered Bed). Answered all questions, discussed medications, symptom management needs, and goals of care. Spouse Nava confirmed DNR/CMO status as did daughter Cande Terrell. [facility] services selected. [facility] consent forms completed and signed by spouse. [facility] information provided and encouraged to reach out with any needs.    Benefit change completed and copy left with Cyndi Ambrose CCP. [facility] daily visits will begin tomorrow 11/28/23.    Please call with any questions, concerns, or change in patient status. Thank you for allowing us the opportunity to participate in the care of this patient/family.    Mary Vazquez, RN, BSN  [facility] Admissions  [phone]

## 2023-11-27 NOTE — CONSULTS
Patient transferred to the palliative care unit following goals of care and treatment preferences discussion with Dr. Bar.  Patient and/or family state goal of care to be comfort and treatment preferences to be geared toward symptom management.  The palliative care team will follow for further support and discussions as needed.

## 2023-11-27 NOTE — PROGRESS NOTES
[facility] Acute Inpt Rehab     Following per stroke order set.    Chart reviewed.  Noted patient moved to palliative care unit and goals of care are for comfort measures only with symptom management.    Will sign off at this time.    Thank you,   Sandra Beebe RN  Rehab Admission Nurse

## 2023-11-27 NOTE — PLAN OF CARE
Goal Outcome Evaluation:  Plan of Care Reviewed With: patient        Progress: declining  Outcome Evaluation: Responsive to pain. Wife at bedside. O2 remains at 1L. Premeds with Morphine

## 2023-11-27 NOTE — DISCHARGE SUMMARY
This is an 83-year-old man with a complicated medical history including vascular dementia, previous CVA, hypertension, coronary artery disease as well as history of sick sinus syndrome with permanent pacemaker, and mobility presented to hospital with altered mental status.  Neurology was consulted due to the altered mental status.  An MRI of the brain was revealing of multiple chronic infarcts.  He developed a marked leukocytosis and was treated for a urinary tract infection with ceftriaxone.       Speech therapy has evaluated the patient and recommended that he not take anything by mouth.  His wife, his primary caretaker, refused insertion of a Dobbhoff tube or considerations of PEG placement.  She insisted that the patient be discharged home while he very clearly requires skilled care.  She also reports that she wants him to remain full code however she will be giving him food by mouth despite the recommendation from the speech therapist that he not receive anything by mouth.    The above was written by Dr. Bar on 11/25.    The patient had been doing fairly poor and was not tolerating p.o. but patient's wife did want to bring him home so there was plan discharged on 11/24 or 11/25 but had difficulty with transportation.  In the meantime his health continued to decline and patient's family then was not comfortable bringing him home and with his declining health did wish for palliative/comfort care.  I do think this is very reasonable and actually think that timing worked out well as he would not have done well at home and would have had continued decline with his advanced dementia and dysphagia. He will be discharged from the hospital and admitted as HSB. Given morphine/ativan for symptom control.    Exam 11/27  Unresponsive  Lungs fairly clear, decreased bs at bases  Chronically ill  Pale   Muscle atrophy     MRI and labs reviewed.     Electronically signed by Kendall Rose MD, 11/27/23, 5:15 PM EST.

## 2023-11-27 NOTE — SIGNIFICANT NOTE
11/27/23 1417   OTHER   Discipline occupational therapist   Rehab Time/Intention   Session Not Performed other (see comments)  (Per chart, pt w/ change in medical status and is lethargic and primarily unresponsive. Spoke w/ RN who reports GOC has been changed to comfort care only. OT will sign off at this time.)

## 2023-11-27 NOTE — CONSULTS
Visit with patient at bedside. Patient unresponsive, but with eyes open. Patient and his spouse are Hindu and were very involved in their [employer]. Spouse is ok if he dies though she is very much praying for a full recovery. Prayer offered for strength, peace and comfort as spouse makes decisions. Spouse is aware that chaplains are available for continued care and support.

## 2023-11-28 PROBLEM — G31.1: Status: ACTIVE | Noted: 2023-01-01

## 2023-11-28 NOTE — H&P
Patient Name:  Jayden Bond  YOB: 1940  MRN:  [id number]  Admit Date:  11/27/2023  Patient Care Team:  Radha Santos MD as PCP - General (Family Medicine)  Deam, Ashutosh Santana MD as Consulting Physician (Cardiac Electrophysiology)      Subjective   History Present Illness     No chief complaint on file.  Cc- ams    This is an 83-year-old man with a complicated medical history including vascular dementia, previous CVA, hypertension, coronary artery disease as well as history of sick sinus syndrome with permanent pacemaker, and mobility presented to hospital with altered mental status.  Neurology was consulted due to the altered mental status.  An MRI of the brain was revealing of multiple chronic infarcts.  He developed a marked leukocytosis and was treated for a urinary tract infection with ceftriaxone.       Speech therapy has evaluated the patient and recommended that he not take anything by mouth.  His wife, his primary caretaker, refused insertion of a Dobbhoff tube or considerations of PEG placement.  She insisted that the patient be discharged home while he very clearly requires skilled care.  She also reports that she wants him to remain full code however she will be giving him food by mouth despite the recommendation from the speech therapist that he not receive anything by mouth.     The above was written by Dr. Bar on 11/25.     The patient had been doing fairly poor and was not tolerating p.o. but patient's wife did want to bring him home so there was plan discharged on 11/24 or 11/25 but had difficulty with transportation.  In the meantime his health continued to decline and patient's family then was not comfortable bringing him home and with his declining health did wish for palliative/comfort care.  I do think this is very reasonable and actually think that timing worked out well as he would not have done well at home and would have had continued decline with his advanced  dementia and dysphagia. He will be discharged from the hospital and admitted as HSB. Given morphine/ativan for symptom control.         History of Present Illness  Review of Systems   Unable to perform ROS: Acuity of condition        Personal History     Past Medical History:   Diagnosis Date    CAD (coronary artery disease)     Dementia     Depression     Disease of thyroid gland     Dysautonomia     Dyslipidemia     GERD (gastroesophageal reflux disease)     Head pain     Hyperlipidemia     Hypertension     Orthostatic hypotension     SSS (sick sinus syndrome)     Stroke     x 3 in 2018     Past Surgical History:   Procedure Laterality Date    APPENDECTOMY      CHOLECYSTECTOMY      CYSTOSCOPY      ENDOSCOPY N/A 10/17/2019    Procedure: ESOPHAGOGASTRODUODENOSCOPY with biopsy x 2 areas;  Surgeon: Ashok Sanchez MD;  Location: [facility] ENDOSCOPY;  Service: Gastroenterology    HERNIA REPAIR      INSERT / REPLACE / REMOVE PACEMAKER      LEFT HEART CATH      PACEMAKER IMPLANTATION      [employer]    TEMPORAL ARTERY BIOPSY      WRIST SURGERY      severed artery     Family History   Problem Relation Age of Onset    Heart disease Father      Social History     Tobacco Use    Smoking status: Former    Smokeless tobacco: Never    Tobacco comments:     quit 25 yrs ago   Vaping Use    Vaping Use: Never used   Substance Use Topics    Alcohol use: No    Drug use: No     Medications Prior to Admission   Medication Sig Dispense Refill Last Dose    acetaminophen (TYLENOL) 500 MG tablet Take 2 tablets by mouth Every 6 (Six) Hours As Needed for Mild Pain.       apixaban (ELIQUIS) 5 MG tablet tablet Take 2.5 mg by mouth 2 (Two) Times a Day.       brimonidine (ALPHAGAN) 0.2 % ophthalmic solution Administer 1 drop to both eyes 2 (Two) Times a Day. 10 mL 12     cefdinir (OMNICEF) 300 MG capsule Take 1 capsule by mouth 2 (Two) Times a Day. 6 capsule 0     Enoxaparin Sodium (LOVENOX) 60 MG/0.6ML solution prefilled syringe syringe Inject 0.6  mL under the skin into the appropriate area as directed Every 12 (Twelve) Hours. Indications: Atrial Fibrillation 9.6 mL 0     escitalopram (LEXAPRO) 10 MG tablet Take 1 tablet by mouth Daily.       galantamine (RAZADYNE) 8 MG tablet Take 1 tablet by mouth 2 (Two) Times a Day.       guaifenesin (ROBITUSSIN) 100 MG/5ML liquid Take 20 mL by mouth Every 8 (Eight) Hours. (Patient taking differently: Take 20 mL by mouth 4 (Four) Times a Day As Needed.) 1800 mL 1     guaiFENesin 200 MG tablet Take 2 tablets by mouth Every 4 (Four) Hours As Needed for Cough.       Incruse Ellipta 62.5 MCG/INH aerosol powder  Take 1 puff by mouth Daily.       levothyroxine (SYNTHROID, LEVOTHROID) 25 MCG tablet Take 1 tablet by mouth Every Morning.       lidocaine (LMX) 4 % cream Apply 1 application  topically to the appropriate area as directed 3 (Three) Times a Day As Needed for Mild Pain. Back neck shoulders       lisinopril (PRINIVIL,ZESTRIL) 20 MG tablet Take 1 tablet by mouth Daily. 30 tablet 0     loratadine (Claritin) 10 MG tablet Take 1 tablet by mouth Daily.       miconazole (MICOTIN) 2 % cream Apply 1 application  topically to the appropriate area as directed 2 (Two) Times a Day. Toes       midodrine (PROAMATINE) 2.5 MG tablet Take 1 tablet by mouth As Needed. SBP <105       Multiple Vitamins-Minerals (MULTIVITAMIN WITH MINERALS) tablet tablet Take 1 tablet by mouth Every Night.       nitroglycerin (NITROSTAT) 0.4 MG SL tablet Place 1 tablet under the tongue Every 5 (Five) Minutes As Needed for Chest Pain. Take no more than 3 doses in 15 minutes.       nystatin (MYCOSTATIN) 100,000 unit/mL suspension Swish and swallow 5 mL 4 (Four) Times a Day.  buttocks       predniSONE (DELTASONE) 5 MG tablet Take 1 tablet by mouth Daily.       rosuvastatin (CRESTOR) 20 MG tablet Take 1 tablet by mouth Every Evening.       tacrolimus (PROTOPIC) 0.1 % ointment Apply 1 application  topically to the appropriate area as directed 2 (Two) Times a Day.  Scrotum       triamcinolone (KENALOG) 0.1 % cream Apply 1 application  topically to the appropriate area as directed 2 (Two) Times a Day. buttocks        Allergies:    Allergies   Allergen Reactions    Trazodone Hallucinations    Doxycycline GI Intolerance    Ciprofloxacin Other (See Comments)     Breathing problems    Doxycycline Nausea And Vomiting    Fosfomycin Tromethamine Confusion     Reports weakness and confusion    Gemtesa [Vibegron] Other (See Comments)     Reports urinary retention    Hydralazine Hives    Keppra [Levetiracetam] GI Intolerance    Macrobid [Nitrofurantoin] Other (See Comments)     Unknown reaction    Quetiapine Other (See Comments)     Having falls on it    Sulfa Antibiotics Other (See Comments)     Unknown reaction       Objective    Objective     Vital Signs  Temp:  [97.3 °F (36.3 °C)-97.9 °F (36.6 °C)] 97.3 °F (36.3 °C)  Heart Rate:  [75-86] 75  Resp:  [14-16] 16  BP: (142-147)/(80-82) 147/80  SpO2:  [84 %-97 %] 97 %  on  Flow (L/min):  [1] 1;   Device (Oxygen Therapy): nasal cannula  There is no height or weight on file to calculate BMI.    Physical Exam  Constitutional:       General: He is in acute distress.      Appearance: He is ill-appearing.   Cardiovascular:      Rate and Rhythm: Normal rate.   Pulmonary:      Effort: No respiratory distress.      Breath sounds: No wheezing.   Abdominal:      Palpations: Abdomen is soft.   Skin:     Coloration: Skin is pale.     Acutely and chronically ill  Unresponsive  Snoring     Results Review:  I reviewed the patient's new clinical results.      Lab Results (last 24 hours)       ** No results found for the last 24 hours. **            Imaging Results (Last 24 Hours)       ** No results found for the last 24 hours. **        Reviewed MRI    Results for orders placed during the hospital encounter of 11/20/23    Adult transthoracic echo complete    Interpretation Summary    Left ventricular systolic function is normal. Calculated left  ventricular EF = 53.7%    Left ventricular diastolic function was not assessed.    Saline test results are negative.    There is mild calcification of the aortic valve.    Estimated right ventricular systolic pressure from tricuspid regurgitation is normal (<35 mmHg).    Mild dilation of the aortic root is present. The aortic root measures 4 cm.      No orders to display        Assessment/Plan     Active Hospital Problems    Diagnosis  POA    **Senile degeneration of brain [G31.1]  Yes    Vascular dementia [F01.50]  Yes    Severe malnutrition [E43]  Yes    Coronary artery disease involving native coronary artery of native heart without angina pectoris [I25.10]  Yes         Admit to Hospice   Agents for symptom control including morphine and ativan  Counseling to family  I discussed the patients findings and my recommendations with patient, family, and nursing staff.        Kendall Rose MD  [facility]  11/27/23  20:33 EST

## 2023-11-28 NOTE — PROGRESS NOTES
Case Management Discharge Note      Final Note: admitted to a [facility] scattered bed on 11/27/23. JAY Ambrose RN, CCP    Provided Post Acute Provider List?: Yes  Provided Post Acute Provider Quality & Resource List?: Refused    Selected Continued Care - Discharged on 11/27/2023 Admission date: 11/20/2023 - Discharge disposition: Hospice/Medical Facility (DCR - [facility])      Destination Coordination complete.      Service Provider Selected Services Address Phone Fax Patient Preferred    [facility] [address], [place] [address] [phone] --              Durable Medical Equipment    No services have been selected for the patient.                Dialysis/Infusion    No services have been selected for the patient.                Home Medical Care    No services have been selected for the patient.                Therapy    No services have been selected for the patient.                Community Resources    No services have been selected for the patient.                Community & DME    No services have been selected for the patient.                         Final Discharge Disposition Code: 51 - hospice medical facility

## 2023-11-28 NOTE — PROGRESS NOTES
Case Management Discharge Note      Final Note: admitted to a [facility] scattered bed on 11/27/23. JAY Ambrose RN, CCP    Provided Post Acute Provider List?: Yes  Provided Post Acute Provider Quality & Resource List?: Refused    Selected Continued Care - Discharged on 11/27/2023 Admission date: 11/20/2023 - Discharge disposition: Hospice/Medical Facility (DCR - [facility])      Destination Coordination complete.      Service Provider Selected Services Address Phone Fax Patient Preferred    [facility] [address], [place] [address] [phone] --              Durable Medical Equipment    No services have been selected for the patient.                Dialysis/Infusion    No services have been selected for the patient.                Home Medical Care    No services have been selected for the patient.                Therapy    No services have been selected for the patient.                Community Resources    No services have been selected for the patient.                Community & DME    No services have been selected for the patient.                         Final Discharge Disposition Code: 51 - hospice medical facility

## 2023-11-28 NOTE — PROGRESS NOTES
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 Admitted to [facility] for GIP for EOL care and symptom management of pain, dyspnea, anxiety and congestion.     PPS: 10%    VS: 98.3, 82, 18, 108/74, 92% on 1L via NC    Medications in 24 hours:  -IV Robinul 0.2mg x3  -SL Morphine 10mg x2  -SL Ativan 0.5mg x2  -IV Morphine 4mg x1    Recommendations:  Continue to monitor for signs of decline and provide comfort measures. Contact [facility] at [phone] with any questions or concerns and at TOD.     Assessment:  Patient is bed bound, oral care only, minimally responsive, PPS 10%. Respirations are unlabored, lung sounds diminished with occasional nonproductive cough, remains on 1L via NC. Abdomen is soft with hypoactive bowel sounds, no PO intake documented. Extremities are warm to touch with redness to the hands with dusky nailbeds. Peripheral pulses palpable. Patient incontinent of bowel and bladder, [facility] RN monitoring for urinary retention and possible frausto catheter placement.     Collaboration:  Spoke with pts spouse and family at the bedside with condition update and support provided. Training provided regarding assessment findings, symptom management and Scattered Bed Team roles. Family v/u of pts condition and all questions were answered. Pts spouse reports feeling overwhelmed with information and decision making at times today, reports no further questions or concerns during this visit. Collaborated with [facility] RNDorcas and Cyndi SAMUEL about pts condition.    Disposition:  Patient meets GIP criteria d/t frequent administration and continued titration of IV and SL (due to IV formulary shortage) medications to achieve and maintain symptom management. Patient requiring increasing symptom  management and frequent RN assessment. If patient were to stabilize he would either require LTC placement d/t increased daily care needs or return home with family and [facility] support. Will continue [facility] RN visits to monitor for changes, assess needs and provide support.       Keira Coleman, RN  [facility] Visit Nurse  Scattered Bed Team

## 2023-11-28 NOTE — PLAN OF CARE
Goal Outcome Evaluation:   Responds to voice, nonverbal, emotional, wife at bedside throughout the night, PRN robinul given, no PRN pain meds this shift per wife's request

## 2023-11-28 NOTE — PLAN OF CARE
Goal Outcome Evaluation:  Plan of Care Reviewed With: patient, spouse        Progress: declining  Outcome Evaluation: Pt responsive to pain, sometimes speech. Wife bedside all day. Pt recevied ativan 0.5 mg sublingual, morphine 10 mg sublingual and robinul 0.2. Scope patch was placed today. Educated wife on expectations for decreased responsiveness and congestion that will not go away. Also educated pt wife on morphine aiding in tachypnea. Continue to monitor and provide comfort care.

## 2023-11-28 NOTE — PROGRESS NOTES
Name: Jayden Bond ADMIT: 2023   : 1940  PCP: Radha Santos MD    MRN: [id number] LOS: 1 days   AGE/SEX: 83 y.o. male  ROOM: [facility]     CC- AMS  Subjective     Will wake up at times but not very responsive  Previously failed swallow eval  Getting robinul for congestion  Wife has not wanted him to have ativan/morphine overnight    Objective   Vital Signs  Temp:  [97.3 °F (36.3 °C)-98.1 °F (36.7 °C)] 98.1 °F (36.7 °C)  Heart Rate:  [] 100  Resp:  [16] 16  BP: (147-169)/() 169/100  SpO2:  [88 %-97 %] 88 %  on  Flow (L/min):  [1] 1;   Device (Oxygen Therapy): nasal cannula  There is no height or weight on file to calculate BMI.  Acutely and chronically ill  Goes from sleeping to slightly responsive but does not talk  Muscle atrophy   Objective:  General Appearance:  Ill-appearing and uncomfortable.    Vital signs: (most recent): Blood pressure 169/100, pulse 100, temperature 98.1 °F (36.7 °C), temperature source Axillary, resp. rate 16, SpO2 (!) 88%.  Vital signs are normal.    Lungs:  Normal effort.  There are decreased breath sounds.    Heart: Normal rate.    Abdomen: Abdomen is soft and non-distended.  There is no abdominal tenderness.             Results Review:       I reviewed the patient's new clinical results.  Results from last 7 days   Lab Units 23  0639 23  0631   WBC 10*3/mm3 10.71 20.86*   HEMOGLOBIN g/dL 11.7* 13.1   PLATELETS 10*3/mm3 165 243     Results from last 7 days   Lab Units 23  0639 23  0631   SODIUM mmol/L 142 139   POTASSIUM mmol/L 3.6 3.8   CHLORIDE mmol/L 109* 106   CO2 mmol/L 24.4 20.8*   BUN mg/dL 14 13   CREATININE mg/dL 0.61* 0.71*   GLUCOSE mg/dL 85 144*   Estimated Creatinine Clearance: 76.6 mL/min (A) (by C-G formula based on SCr of 0.61 mg/dL (L)).  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       Plan:   - Continue comfort care  - IV narcotics, Ativan, robinul for symptom control. Will add lidocaine patch as well as currently wife wants to minimize medications  - Estrada if needed, O2  - Counseling to family    I discussed the patient's case at length with his wife at bedside.  As noted previously he is clearly at the end of his life with vascular dementia as well as recurrent strokes and issues with coronary artery disease, sick sinus syndrome.  He was treated with multiple days of IV antibiotics for urinary tract infection but continued to decline.  His speech therapy evaluation had determined that oral intake was not safe for him.    Despite some of these things his wife wants to discuss this plan of hospice further both with hospice providers as well as some of his outpatient physicians.  I discussed with Dr. Sanchez about this.  Again my medical opinion is that he has at the end of his life and hospice care is absolutely appropriate and I do think he should be getting medications for symptom control including Ativan and morphine so that his final chapter of life is without discomfort      D/W family  DW RN  ARIANA Sanchez- patients wife wanted to speak with him and she trusts him as Mr Ronda's long term GI physician    Reviewed previous records        Kendall Rose MD  [facility]  11/28/23  10:15 EST

## 2023-11-28 NOTE — PROGRESS NOTES
SB team SW met with patient  and wife to explain role of SB team SW and assess for needs. Patient was lying in his hospital bed, unresponsive with no signs of pain or discomfort. Patient's wife was at the bedside. SW sat with wife to provide supportive presence.   Patient is currently a 10% PPS and is unresponsive, therefore SW unable to complete PHQ9. Patient has two adult children and are both involved in patient's care. Patient was living at home with his wife prior to this hospital stay. Family  would like for patient to remain in a SB for EOL care due to his imminent prognosis. SW educated on bereavement services provided by hospitals and family voiced understanding. Discussed  planning and wife is not ready to discuss. SW will visit on a weekly and PRN basis to offer EOL support and assist with needs.

## 2023-11-29 LAB
APTT HEX PL PPP: 6 SEC
APTT IMM NP PPP: ABNORMAL SEC
APTT PPP 1:1 SALINE: ABNORMAL SEC
APTT PPP: 31.8 SEC
B2 GLYCOPROT1 IGA SER-ACNC: <10 SAU
B2 GLYCOPROT1 IGG SER-ACNC: <10 SGU
B2 GLYCOPROT1 IGM SER-ACNC: <10 SMU
CARDIOLIPIN IGG SER IA-ACNC: <10 GPL
CARDIOLIPIN IGM SER IA-ACNC: <10 MPL
CONFIRM DRVVT: ABNORMAL SEC
DRVVT SCREEN TO CONFIRM RATIO: ABNORMAL RATIO
INR PPP: 1.1 RATIO
LABORATORY COMMENT REPORT: ABNORMAL
PROTHROMBIN TIME: 11.4 SEC
SCREEN DRVVT: 41.6 SEC
THROMBIN TIME: 14.4 SEC

## 2023-11-29 NOTE — PROGRESS NOTES
Case Management Discharge Note      Final Note: The patient  on 23 @ 19:57. B. Arnol RN, CCP         Selected Continued Care - Discharged on 2023 Admission date: 2023 - Discharge disposition:       Destination Coordination complete.      Service Provider Selected Services Address Phone Fax Patient Preferred    [facility] [address], [facility] [id number] [phone] --              Durable Medical Equipment    No services have been selected for the patient.                Dialysis/Infusion    No services have been selected for the patient.                Home Medical Care    No services have been selected for the patient.                Therapy    No services have been selected for the patient.                Community Resources    No services have been selected for the patient.                Community & DME    No services have been selected for the patient.                    Selected Continued Care - Prior Encounters Includes continued care and service providers with selected services from prior encounters from 2023 to 2023      Discharged on 2023 Admission date: 2023 - Discharge disposition: Hospice/Medical Facility ([facility])      Destination       Service Provider Selected Services Address Phone Fax Patient Preferred    [facility] [address], [facility] [id number] [phone] --                               Final Discharge Disposition Code: 41 -  in medical facility

## (undated) DEVICE — PK ENDO GI 50

## (undated) DEVICE — PAPR PRNT PK SONY W RIBN UPC55

## (undated) DEVICE — BITEBLOCK ENDO W/STRAP 60F A/ LF DISP

## (undated) DEVICE — SINGLE-USE BIOPSY FORCEPS: Brand: RADIAL JAW 4